# Patient Record
Sex: MALE | Race: WHITE | NOT HISPANIC OR LATINO | Employment: OTHER | URBAN - METROPOLITAN AREA
[De-identification: names, ages, dates, MRNs, and addresses within clinical notes are randomized per-mention and may not be internally consistent; named-entity substitution may affect disease eponyms.]

---

## 2017-01-19 ENCOUNTER — ALLSCRIPTS OFFICE VISIT (OUTPATIENT)
Dept: OTHER | Facility: OTHER | Age: 72
End: 2017-01-19

## 2017-01-19 ENCOUNTER — HOSPITAL ENCOUNTER (OUTPATIENT)
Dept: ULTRASOUND IMAGING | Facility: HOSPITAL | Age: 72
Discharge: HOME | End: 2017-01-19
Attending: UROLOGY
Payer: COMMERCIAL

## 2017-01-19 DIAGNOSIS — R39.198 OTHER DIFFICULTIES WITH MICTURITION: ICD-10-CM

## 2017-01-19 DIAGNOSIS — E78.5 HYPERLIPIDEMIA: ICD-10-CM

## 2017-01-19 DIAGNOSIS — T14.8XXA OTHER INJURY OF UNSPECIFIED BODY REGION: ICD-10-CM

## 2017-01-19 DIAGNOSIS — I35.0 NONRHEUMATIC AORTIC VALVE STENOSIS: ICD-10-CM

## 2017-01-19 LAB
BILIRUB UR QL STRIP: NORMAL
CLARITY UR: NORMAL
COLOR UR: YELLOW
GLUCOSE (HISTORICAL): NORMAL
HGB UR QL STRIP.AUTO: NORMAL
KETONES UR STRIP-MCNC: NORMAL MG/DL
LEUKOCYTE ESTERASE UR QL STRIP: NORMAL
NITRITE UR QL STRIP: NORMAL
PH UR STRIP.AUTO: 5 [PH]
PROT UR STRIP-MCNC: 30 MG/DL
SP GR UR STRIP.AUTO: 1.03
UROBILINOGEN UR QL STRIP.AUTO: NORMAL

## 2017-01-19 PROCEDURE — 76770 US EXAM ABDO BACK WALL COMP: CPT

## 2017-01-31 ENCOUNTER — APPOINTMENT (EMERGENCY)
Dept: RADIOLOGY | Facility: HOSPITAL | Age: 72
End: 2017-01-31
Payer: COMMERCIAL

## 2017-01-31 ENCOUNTER — HOSPITAL ENCOUNTER (EMERGENCY)
Facility: HOSPITAL | Age: 72
Discharge: HOME | End: 2017-01-31
Admitting: EMERGENCY MEDICINE
Payer: COMMERCIAL

## 2017-01-31 ENCOUNTER — APPOINTMENT (EMERGENCY)
Dept: ULTRASOUND IMAGING | Facility: HOSPITAL | Age: 72
End: 2017-01-31
Payer: COMMERCIAL
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
## 2017-01-31 DIAGNOSIS — S80.11XA TRAUMATIC HEMATOMA OF RIGHT LOWER LEG, INITIAL ENCOUNTER: Primary | ICD-10-CM

## 2017-01-31 LAB
ALBUMIN SERPL BCP-MCNC: 3.5 G/DL (ref 3.5–5)
ALP SERPL-CCNC: 113 U/L (ref 46–116)
ALT SERPL W P-5'-P-CCNC: 24 U/L (ref 12–78)
ANION GAP SERPL CALCULATED.3IONS-SCNC: 8 MMOL/L (ref 4–13)
APTT PPP: 35 SECONDS (ref 24–36)
AST SERPL W P-5'-P-CCNC: 16 U/L (ref 5–45)
BASOPHILS # BLD AUTO: 0.19 THOUSANDS/ΜL (ref 0–0.1)
BASOPHILS NFR BLD AUTO: 3 % (ref 0–1)
BILIRUB SERPL-MCNC: 0.5 MG/DL (ref 0.2–1)
BUN SERPL-MCNC: 16 MG/DL (ref 5–25)
CALCIUM SERPL-MCNC: 8.8 MG/DL (ref 8.3–10.1)
CHLORIDE SERPL-SCNC: 109 MMOL/L (ref 100–108)
CO2 SERPL-SCNC: 31 MMOL/L (ref 21–32)
CREAT SERPL-MCNC: 0.84 MG/DL (ref 0.6–1.3)
EOSINOPHIL # BLD AUTO: 0.3 THOUSAND/ΜL (ref 0–0.61)
EOSINOPHIL NFR BLD AUTO: 4 % (ref 0–6)
ERYTHROCYTE [DISTWIDTH] IN BLOOD BY AUTOMATED COUNT: 17.8 % (ref 11.6–15.1)
GFR SERPL CREATININE-BSD FRML MDRD: >60 ML/MIN/1.73SQ M
GLUCOSE SERPL-MCNC: 103 MG/DL (ref 65–140)
HCT VFR BLD AUTO: 37.1 % (ref 36.5–49.3)
HGB BLD-MCNC: 11.9 G/DL (ref 12–17)
HOLD SPECIMEN: NORMAL
HOLD SPECIMEN: YES
INR PPP: 1.12 (ref 0.86–1.16)
LYMPHOCYTES # BLD AUTO: 1.64 THOUSANDS/ΜL (ref 0.6–4.47)
LYMPHOCYTES NFR BLD AUTO: 21 % (ref 14–44)
MCH RBC QN AUTO: 22.8 PG (ref 26.8–34.3)
MCHC RBC AUTO-ENTMCNC: 32.1 G/DL (ref 31.4–37.4)
MCV RBC AUTO: 71 FL (ref 82–98)
MONOCYTES # BLD AUTO: 1.52 THOUSAND/ΜL (ref 0.17–1.22)
MONOCYTES NFR BLD AUTO: 20 % (ref 4–12)
NEUTROPHILS # BLD AUTO: 4.04 THOUSANDS/ΜL (ref 1.85–7.62)
NEUTS SEG NFR BLD AUTO: 52 % (ref 43–75)
PLATELET # BLD AUTO: 259 THOUSANDS/UL (ref 149–390)
PMV BLD AUTO: 9 FL (ref 8.9–12.7)
POTASSIUM SERPL-SCNC: 3.2 MMOL/L (ref 3.5–5.3)
PROT SERPL-MCNC: 6.2 G/DL (ref 6.4–8.2)
PROTHROMBIN TIME: 14.1 SECONDS (ref 12–14.3)
RBC # BLD AUTO: 5.23 MILLION/UL (ref 3.88–5.62)
SODIUM SERPL-SCNC: 148 MMOL/L (ref 136–145)
TROPONIN I SERPL-MCNC: <0.02 NG/ML
WBC # BLD AUTO: 7.69 THOUSAND/UL (ref 4.31–10.16)

## 2017-01-31 PROCEDURE — 93005 ELECTROCARDIOGRAM TRACING: CPT

## 2017-01-31 PROCEDURE — 99285 EMERGENCY DEPT VISIT HI MDM: CPT

## 2017-01-31 PROCEDURE — 73590 X-RAY EXAM OF LOWER LEG: CPT

## 2017-01-31 PROCEDURE — 85610 PROTHROMBIN TIME: CPT

## 2017-01-31 PROCEDURE — 85730 THROMBOPLASTIN TIME PARTIAL: CPT | Performed by: PHYSICIAN ASSISTANT

## 2017-01-31 PROCEDURE — 36415 COLL VENOUS BLD VENIPUNCTURE: CPT

## 2017-01-31 PROCEDURE — 80053 COMPREHEN METABOLIC PANEL: CPT

## 2017-01-31 PROCEDURE — 76882 US LMTD JT/FCL EVL NVASC XTR: CPT

## 2017-01-31 PROCEDURE — 84484 ASSAY OF TROPONIN QUANT: CPT

## 2017-01-31 PROCEDURE — 85025 COMPLETE CBC W/AUTO DIFF WBC: CPT

## 2017-01-31 PROCEDURE — 96374 THER/PROPH/DIAG INJ IV PUSH: CPT

## 2017-01-31 PROCEDURE — 93922 UPR/L XTREMITY ART 2 LEVELS: CPT

## 2017-01-31 PROCEDURE — 71020 HB CHEST X-RAY 2VW FRONTAL&LATL: CPT

## 2017-01-31 RX ORDER — TRAMADOL HYDROCHLORIDE 50 MG/1
50 TABLET ORAL ONCE
Status: COMPLETED | OUTPATIENT
Start: 2017-01-31 | End: 2017-01-31

## 2017-01-31 RX ORDER — TRAMADOL HYDROCHLORIDE 50 MG/1
50 TABLET ORAL EVERY 8 HOURS PRN
Qty: 12 TABLET | Refills: 0 | Status: SHIPPED | OUTPATIENT
Start: 2017-01-31 | End: 2017-02-10

## 2017-01-31 RX ADMIN — HYDROMORPHONE HYDROCHLORIDE 1 MG: 1 INJECTION, SOLUTION INTRAMUSCULAR; INTRAVENOUS; SUBCUTANEOUS at 16:34

## 2017-01-31 RX ADMIN — TRAMADOL HYDROCHLORIDE 50 MG: 50 TABLET, COATED ORAL at 18:28

## 2017-02-01 LAB
ATRIAL RATE: 61 BPM
P AXIS: 60 DEGREES
PR INTERVAL: 178 MS
QRS AXIS: 75 DEGREES
QRSD INTERVAL: 82 MS
QT INTERVAL: 418 MS
QTC INTERVAL: 420 MS
T WAVE AXIS: 61 DEGREES
VENTRICULAR RATE: 61 BPM

## 2017-02-02 VITALS
RESPIRATION RATE: 18 BRPM | DIASTOLIC BLOOD PRESSURE: 77 MMHG | SYSTOLIC BLOOD PRESSURE: 173 MMHG | WEIGHT: 200 LBS | TEMPERATURE: 98.4 F | OXYGEN SATURATION: 96 % | HEART RATE: 64 BPM | BODY MASS INDEX: 29.53 KG/M2

## 2017-02-07 ENCOUNTER — TRANSCRIBE ORDERS (OUTPATIENT)
Dept: ADMINISTRATIVE | Facility: HOSPITAL | Age: 72
End: 2017-02-07

## 2017-02-07 ENCOUNTER — HOSPITAL ENCOUNTER (OUTPATIENT)
Dept: RADIOLOGY | Facility: HOSPITAL | Age: 72
Discharge: HOME | End: 2017-02-07
Attending: ORTHOPAEDIC SURGERY
Payer: COMMERCIAL

## 2017-02-07 ENCOUNTER — APPOINTMENT (OUTPATIENT)
Dept: LAB | Facility: HOSPITAL | Age: 72
End: 2017-02-07
Attending: INTERNAL MEDICINE
Payer: COMMERCIAL

## 2017-02-07 ENCOUNTER — ALLSCRIPTS OFFICE VISIT (OUTPATIENT)
Dept: OTHER | Facility: OTHER | Age: 72
End: 2017-02-07

## 2017-02-07 DIAGNOSIS — E78.5 HYPERLIPIDEMIA: ICD-10-CM

## 2017-02-07 DIAGNOSIS — I35.0 NONRHEUMATIC AORTIC VALVE STENOSIS: ICD-10-CM

## 2017-02-07 DIAGNOSIS — I35.0 NODULAR CALCIFIC AORTIC VALVE STENOSIS: Primary | ICD-10-CM

## 2017-02-07 DIAGNOSIS — M16.11 LOCALIZED OSTEOARTHROSIS OF RIGHT HIP: ICD-10-CM

## 2017-02-07 LAB
CHOLEST SERPL-MCNC: 97 MG/DL (ref 50–200)
HDLC SERPL-MCNC: 30 MG/DL (ref 40–60)
LDLC SERPL CALC-MCNC: 42 MG/DL (ref 0–100)
TRIGL SERPL-MCNC: 124 MG/DL

## 2017-02-07 PROCEDURE — 80061 LIPID PANEL: CPT

## 2017-02-07 PROCEDURE — 77002 NEEDLE LOCALIZATION BY XRAY: CPT

## 2017-02-07 PROCEDURE — 36415 COLL VENOUS BLD VENIPUNCTURE: CPT

## 2017-02-07 RX ORDER — BUPIVACAINE HYDROCHLORIDE 5 MG/ML
10 INJECTION, SOLUTION PERINEURAL ONCE
Status: COMPLETED | OUTPATIENT
Start: 2017-02-07 | End: 2017-02-07

## 2017-02-07 RX ORDER — METHYLPREDNISOLONE ACETATE 80 MG/ML
80 INJECTION, SUSPENSION INTRA-ARTICULAR; INTRALESIONAL; INTRAMUSCULAR; SOFT TISSUE ONCE
Status: COMPLETED | OUTPATIENT
Start: 2017-02-07 | End: 2017-02-07

## 2017-02-07 RX ORDER — LIDOCAINE HYDROCHLORIDE 10 MG/ML
10 INJECTION, SOLUTION EPIDURAL; INFILTRATION; INTRACAUDAL; PERINEURAL ONCE
Status: COMPLETED | OUTPATIENT
Start: 2017-02-07 | End: 2017-02-07

## 2017-02-07 RX ADMIN — BUPIVACAINE HYDROCHLORIDE 10 ML: 5 INJECTION, SOLUTION PERINEURAL at 11:54

## 2017-02-07 RX ADMIN — LIDOCAINE HYDROCHLORIDE 10 ML: 10 INJECTION, SOLUTION EPIDURAL; INFILTRATION; INTRACAUDAL; PERINEURAL at 11:54

## 2017-02-07 RX ADMIN — IOHEXOL 2 ML: 240 INJECTION, SOLUTION INTRATHECAL; INTRAVASCULAR; INTRAVENOUS; ORAL at 11:54

## 2017-02-07 RX ADMIN — METHYLPREDNISOLONE ACETATE 80 MG: 80 INJECTION, SUSPENSION INTRA-ARTICULAR; INTRALESIONAL; INTRAMUSCULAR; SOFT TISSUE at 11:54

## 2017-02-15 ENCOUNTER — ALLSCRIPTS OFFICE VISIT (OUTPATIENT)
Dept: OTHER | Facility: OTHER | Age: 72
End: 2017-02-15

## 2017-02-15 ENCOUNTER — APPOINTMENT (OUTPATIENT)
Dept: LAB | Facility: MEDICAL CENTER | Age: 72
End: 2017-02-15
Payer: COMMERCIAL

## 2017-02-15 ENCOUNTER — TRANSCRIBE ORDERS (OUTPATIENT)
Dept: LAB | Facility: MEDICAL CENTER | Age: 72
End: 2017-02-15

## 2017-02-15 DIAGNOSIS — T07.XXXA CLOSED DISLOCATION, MULTIPLE AND ILL-DEFINED SITES: Primary | ICD-10-CM

## 2017-02-15 DIAGNOSIS — T07.XXXA CLOSED DISLOCATION, MULTIPLE AND ILL-DEFINED SITES: ICD-10-CM

## 2017-02-15 PROCEDURE — 87205 SMEAR GRAM STAIN: CPT

## 2017-02-15 PROCEDURE — 87070 CULTURE OTHR SPECIMN AEROBIC: CPT

## 2017-02-17 ENCOUNTER — OFFICE VISIT (OUTPATIENT)
Dept: URGENT CARE | Facility: CLINIC | Age: 72
End: 2017-02-17
Payer: COMMERCIAL

## 2017-02-17 PROCEDURE — 99214 OFFICE O/P EST MOD 30 MIN: CPT

## 2017-02-17 PROCEDURE — 10060 I&D ABSCESS SIMPLE/SINGLE: CPT

## 2017-02-17 PROCEDURE — G0463 HOSPITAL OUTPT CLINIC VISIT: HCPCS

## 2017-02-18 ENCOUNTER — HOSPITAL ENCOUNTER (EMERGENCY)
Facility: HOSPITAL | Age: 72
Discharge: HOME | End: 2017-02-18
Attending: EMERGENCY MEDICINE | Admitting: EMERGENCY MEDICINE
Payer: COMMERCIAL

## 2017-02-18 VITALS
BODY MASS INDEX: 26.34 KG/M2 | DIASTOLIC BLOOD PRESSURE: 78 MMHG | RESPIRATION RATE: 18 BRPM | SYSTOLIC BLOOD PRESSURE: 164 MMHG | OXYGEN SATURATION: 98 % | TEMPERATURE: 98.5 F | HEIGHT: 70 IN | WEIGHT: 184 LBS | HEART RATE: 81 BPM

## 2017-02-18 DIAGNOSIS — S81.801A LEG WOUND, RIGHT, INITIAL ENCOUNTER: Primary | ICD-10-CM

## 2017-02-18 LAB
APTT PPP: 31 SECONDS (ref 24–36)
BACTERIA WND AEROBE CULT: NO GROWTH
ERYTHROCYTE [DISTWIDTH] IN BLOOD BY AUTOMATED COUNT: 18 % (ref 11.6–15.1)
GRAM STN SPEC: NORMAL
GRAM STN SPEC: NORMAL
HCT VFR BLD AUTO: 34.8 % (ref 36.5–49.3)
HGB BLD-MCNC: 11 G/DL (ref 12–17)
INR PPP: 1.17 (ref 0.86–1.16)
MCH RBC QN AUTO: 22.6 PG (ref 26.8–34.3)
MCHC RBC AUTO-ENTMCNC: 31.6 G/DL (ref 31.4–37.4)
MCV RBC AUTO: 72 FL (ref 82–98)
PLATELET # BLD AUTO: 342 THOUSANDS/UL (ref 149–390)
PMV BLD AUTO: 8.9 FL (ref 8.9–12.7)
PROTHROMBIN TIME: 14.5 SECONDS (ref 12–14.3)
RBC # BLD AUTO: 4.87 MILLION/UL (ref 3.88–5.62)
WBC # BLD AUTO: 14.9 THOUSAND/UL (ref 4.31–10.16)

## 2017-02-18 PROCEDURE — 99283 EMERGENCY DEPT VISIT LOW MDM: CPT

## 2017-02-18 PROCEDURE — 36415 COLL VENOUS BLD VENIPUNCTURE: CPT | Performed by: EMERGENCY MEDICINE

## 2017-02-18 PROCEDURE — 85027 COMPLETE CBC AUTOMATED: CPT | Performed by: EMERGENCY MEDICINE

## 2017-02-18 PROCEDURE — 85730 THROMBOPLASTIN TIME PARTIAL: CPT | Performed by: EMERGENCY MEDICINE

## 2017-02-18 PROCEDURE — 85610 PROTHROMBIN TIME: CPT | Performed by: EMERGENCY MEDICINE

## 2017-02-18 RX ORDER — CEPHALEXIN 500 MG/1
500 CAPSULE ORAL 3 TIMES DAILY
COMMUNITY
End: 2017-06-03 | Stop reason: ALTCHOICE

## 2017-02-18 RX ORDER — ATORVASTATIN CALCIUM 40 MG/1
40 TABLET, FILM COATED ORAL DAILY
COMMUNITY
End: 2018-12-26 | Stop reason: SDUPTHER

## 2017-02-23 ENCOUNTER — HOSPITAL ENCOUNTER (EMERGENCY)
Facility: HOSPITAL | Age: 72
Discharge: HOME | End: 2017-02-23
Admitting: EMERGENCY MEDICINE
Payer: COMMERCIAL

## 2017-02-23 VITALS
OXYGEN SATURATION: 97 % | WEIGHT: 190 LBS | SYSTOLIC BLOOD PRESSURE: 190 MMHG | DIASTOLIC BLOOD PRESSURE: 91 MMHG | BODY MASS INDEX: 27.46 KG/M2 | RESPIRATION RATE: 18 BRPM | HEART RATE: 80 BPM | TEMPERATURE: 98.4 F

## 2017-02-23 DIAGNOSIS — Z51.89 VISIT FOR WOUND CHECK: Primary | ICD-10-CM

## 2017-02-23 DIAGNOSIS — E87.6 HYPOKALEMIA: ICD-10-CM

## 2017-02-23 LAB
ANION GAP SERPL CALCULATED.3IONS-SCNC: 6 MMOL/L (ref 4–13)
BASOPHILS # BLD AUTO: 0.24 THOUSANDS/ΜL (ref 0–0.1)
BASOPHILS NFR BLD AUTO: 2 % (ref 0–1)
BUN SERPL-MCNC: 15 MG/DL (ref 5–25)
CALCIUM SERPL-MCNC: 9.1 MG/DL (ref 8.3–10.1)
CHLORIDE SERPL-SCNC: 104 MMOL/L (ref 100–108)
CO2 SERPL-SCNC: 34 MMOL/L (ref 21–32)
CREAT SERPL-MCNC: 0.91 MG/DL (ref 0.6–1.3)
EOSINOPHIL # BLD AUTO: 0.41 THOUSAND/ΜL (ref 0–0.61)
EOSINOPHIL NFR BLD AUTO: 4 % (ref 0–6)
ERYTHROCYTE [DISTWIDTH] IN BLOOD BY AUTOMATED COUNT: 18.1 % (ref 11.6–15.1)
GFR SERPL CREATININE-BSD FRML MDRD: >60 ML/MIN/1.73SQ M
GLUCOSE SERPL-MCNC: 110 MG/DL (ref 65–140)
HCT VFR BLD AUTO: 35.4 % (ref 36.5–49.3)
HGB BLD-MCNC: 11.2 G/DL (ref 12–17)
LYMPHOCYTES # BLD AUTO: 2.33 THOUSANDS/ΜL (ref 0.6–4.47)
LYMPHOCYTES NFR BLD AUTO: 20 % (ref 14–44)
MCH RBC QN AUTO: 22.5 PG (ref 26.8–34.3)
MCHC RBC AUTO-ENTMCNC: 31.6 G/DL (ref 31.4–37.4)
MCV RBC AUTO: 71 FL (ref 82–98)
MONOCYTES # BLD AUTO: 1.54 THOUSAND/ΜL (ref 0.17–1.22)
MONOCYTES NFR BLD AUTO: 13 % (ref 4–12)
NEUTROPHILS # BLD AUTO: 7.17 THOUSANDS/ΜL (ref 1.85–7.62)
NEUTS SEG NFR BLD AUTO: 61 % (ref 43–75)
PLATELET # BLD AUTO: 401 THOUSANDS/UL (ref 149–390)
PMV BLD AUTO: 8.5 FL (ref 8.9–12.7)
POTASSIUM SERPL-SCNC: 3.2 MMOL/L (ref 3.5–5.3)
RBC # BLD AUTO: 4.98 MILLION/UL (ref 3.88–5.62)
SODIUM SERPL-SCNC: 144 MMOL/L (ref 136–145)
WBC # BLD AUTO: 11.69 THOUSAND/UL (ref 4.31–10.16)

## 2017-02-23 PROCEDURE — 85025 COMPLETE CBC W/AUTO DIFF WBC: CPT | Performed by: PHYSICIAN ASSISTANT

## 2017-02-23 PROCEDURE — 36415 COLL VENOUS BLD VENIPUNCTURE: CPT | Performed by: PHYSICIAN ASSISTANT

## 2017-02-23 PROCEDURE — 99283 EMERGENCY DEPT VISIT LOW MDM: CPT

## 2017-02-23 PROCEDURE — 80048 BASIC METABOLIC PNL TOTAL CA: CPT | Performed by: PHYSICIAN ASSISTANT

## 2017-02-23 RX ORDER — POTASSIUM CHLORIDE 20 MEQ/1
20 TABLET, EXTENDED RELEASE ORAL ONCE
Status: COMPLETED | OUTPATIENT
Start: 2017-02-23 | End: 2017-02-23

## 2017-02-23 RX ADMIN — POTASSIUM CHLORIDE 20 MEQ: 1500 TABLET, EXTENDED RELEASE ORAL at 15:59

## 2017-02-24 ENCOUNTER — HOSPITAL ENCOUNTER (OUTPATIENT)
Dept: ULTRASOUND IMAGING | Facility: HOSPITAL | Age: 72
Discharge: HOME | End: 2017-02-24
Payer: COMMERCIAL

## 2017-02-24 DIAGNOSIS — T14.8XXA OTHER INJURY OF UNSPECIFIED BODY REGION: ICD-10-CM

## 2017-02-24 PROCEDURE — 93971 EXTREMITY STUDY: CPT

## 2017-03-02 ENCOUNTER — GENERIC CONVERSION - ENCOUNTER (OUTPATIENT)
Dept: OTHER | Facility: OTHER | Age: 72
End: 2017-03-02

## 2017-03-03 ENCOUNTER — ALLSCRIPTS OFFICE VISIT (OUTPATIENT)
Dept: OTHER | Facility: OTHER | Age: 72
End: 2017-03-03

## 2017-03-10 ENCOUNTER — HOSPITAL ENCOUNTER (OUTPATIENT)
Dept: NON INVASIVE DIAGNOSTICS | Facility: HOSPITAL | Age: 72
Discharge: HOME | End: 2017-03-10
Attending: INTERNAL MEDICINE
Payer: COMMERCIAL

## 2017-03-10 ENCOUNTER — HOSPITAL ENCOUNTER (OUTPATIENT)
Dept: NUCLEAR MEDICINE | Facility: HOSPITAL | Age: 72
Discharge: HOME | End: 2017-03-10
Attending: INTERNAL MEDICINE
Payer: COMMERCIAL

## 2017-03-10 DIAGNOSIS — I35.0 NONRHEUMATIC AORTIC VALVE STENOSIS: ICD-10-CM

## 2017-03-10 PROCEDURE — 78452 HT MUSCLE IMAGE SPECT MULT: CPT

## 2017-03-10 PROCEDURE — A9502 TC99M TETROFOSMIN: HCPCS

## 2017-03-10 PROCEDURE — 93017 CV STRESS TEST TRACING ONLY: CPT

## 2017-03-10 PROCEDURE — 93306 TTE W/DOPPLER COMPLETE: CPT

## 2017-03-10 RX ADMIN — REGADENOSON 0.4 MG: 0.08 INJECTION, SOLUTION INTRAVENOUS at 09:15

## 2017-05-18 DIAGNOSIS — Z12.11 ENCOUNTER FOR SCREENING FOR MALIGNANT NEOPLASM OF COLON: ICD-10-CM

## 2017-05-30 ENCOUNTER — ALLSCRIPTS OFFICE VISIT (OUTPATIENT)
Dept: OTHER | Facility: OTHER | Age: 72
End: 2017-05-30

## 2017-06-03 ENCOUNTER — APPOINTMENT (EMERGENCY)
Dept: RADIOLOGY | Facility: HOSPITAL | Age: 72
End: 2017-06-03
Payer: COMMERCIAL

## 2017-06-03 ENCOUNTER — APPOINTMENT (EMERGENCY)
Dept: CT IMAGING | Facility: HOSPITAL | Age: 72
End: 2017-06-03
Payer: COMMERCIAL

## 2017-06-03 ENCOUNTER — HOSPITAL ENCOUNTER (OUTPATIENT)
Facility: HOSPITAL | Age: 72
Setting detail: OBSERVATION
End: 2017-06-04
Attending: EMERGENCY MEDICINE | Admitting: HOSPITALIST
Payer: COMMERCIAL

## 2017-06-03 DIAGNOSIS — R53.1 WEAKNESS GENERALIZED: ICD-10-CM

## 2017-06-03 DIAGNOSIS — E78.5 HYPERLIPIDEMIA, UNSPECIFIED HYPERLIPIDEMIA TYPE: ICD-10-CM

## 2017-06-03 DIAGNOSIS — R53.1 GENERALIZED WEAKNESS: ICD-10-CM

## 2017-06-03 DIAGNOSIS — I10 ESSENTIAL HYPERTENSION: ICD-10-CM

## 2017-06-03 DIAGNOSIS — E86.0 DEHYDRATION, MILD: ICD-10-CM

## 2017-06-03 DIAGNOSIS — N17.9 ACUTE KIDNEY INJURY (HCC): Primary | ICD-10-CM

## 2017-06-03 LAB
ALBUMIN SERPL BCP-MCNC: 3.5 G/DL (ref 3.5–5)
ALP SERPL-CCNC: 99 U/L (ref 46–116)
ALT SERPL W P-5'-P-CCNC: 24 U/L (ref 12–78)
AMMONIA PLAS-SCNC: 17 UMOL/L (ref 11–35)
ANION GAP SERPL CALCULATED.3IONS-SCNC: 9 MMOL/L (ref 4–13)
AST SERPL W P-5'-P-CCNC: 18 U/L (ref 5–45)
BASOPHILS # BLD AUTO: 0.18 THOUSANDS/ΜL (ref 0–0.1)
BASOPHILS NFR BLD AUTO: 2 % (ref 0–1)
BILIRUB SERPL-MCNC: 0.6 MG/DL (ref 0.2–1)
BUN SERPL-MCNC: 16 MG/DL (ref 5–25)
CALCIUM SERPL-MCNC: 9 MG/DL (ref 8.3–10.1)
CHLORIDE SERPL-SCNC: 106 MMOL/L (ref 100–108)
CK SERPL-CCNC: 84 U/L (ref 39–308)
CO2 SERPL-SCNC: 29 MMOL/L (ref 21–32)
CREAT SERPL-MCNC: 1.58 MG/DL (ref 0.6–1.3)
EOSINOPHIL # BLD AUTO: 0.4 THOUSAND/ΜL (ref 0–0.61)
EOSINOPHIL NFR BLD AUTO: 4 % (ref 0–6)
ERYTHROCYTE [DISTWIDTH] IN BLOOD BY AUTOMATED COUNT: 19.8 % (ref 11.6–15.1)
GFR SERPL CREATININE-BSD FRML MDRD: 43.1 ML/MIN/1.73SQ M
GLUCOSE SERPL-MCNC: 129 MG/DL (ref 65–140)
GLUCOSE SERPL-MCNC: 99 MG/DL (ref 65–140)
HCT VFR BLD AUTO: 36.9 % (ref 36.5–49.3)
HGB BLD-MCNC: 11.6 G/DL (ref 12–17)
LACTATE SERPL-SCNC: 1.8 MMOL/L (ref 0.5–2)
LYMPHOCYTES # BLD AUTO: 1.64 THOUSANDS/ΜL (ref 0.6–4.47)
LYMPHOCYTES NFR BLD AUTO: 16 % (ref 14–44)
MCH RBC QN AUTO: 21.4 PG (ref 26.8–34.3)
MCHC RBC AUTO-ENTMCNC: 31.4 G/DL (ref 31.4–37.4)
MCV RBC AUTO: 68 FL (ref 82–98)
MONOCYTES # BLD AUTO: 1.6 THOUSAND/ΜL (ref 0.17–1.22)
MONOCYTES NFR BLD AUTO: 16 % (ref 4–12)
NEUTROPHILS # BLD AUTO: 6.15 THOUSANDS/ΜL (ref 1.85–7.62)
NEUTS SEG NFR BLD AUTO: 62 % (ref 43–75)
PLATELET # BLD AUTO: 327 THOUSANDS/UL (ref 149–390)
PMV BLD AUTO: 10.2 FL (ref 8.9–12.7)
POTASSIUM SERPL-SCNC: 3.6 MMOL/L (ref 3.5–5.3)
PROT SERPL-MCNC: 6.1 G/DL (ref 6.4–8.2)
RBC # BLD AUTO: 5.43 MILLION/UL (ref 3.88–5.62)
SODIUM SERPL-SCNC: 144 MMOL/L (ref 136–145)
SPECIMEN SOURCE: NORMAL
TROPONIN I BLD-MCNC: 0.04 NG/ML (ref 0–0.08)
TSH SERPL DL<=0.05 MIU/L-ACNC: 1.95 UIU/ML (ref 0.36–3.74)
WBC # BLD AUTO: 9.97 THOUSAND/UL (ref 4.31–10.16)

## 2017-06-03 PROCEDURE — 84484 ASSAY OF TROPONIN QUANT: CPT

## 2017-06-03 PROCEDURE — 70450 CT HEAD/BRAIN W/O DYE: CPT

## 2017-06-03 PROCEDURE — 36415 COLL VENOUS BLD VENIPUNCTURE: CPT | Performed by: EMERGENCY MEDICINE

## 2017-06-03 PROCEDURE — 84443 ASSAY THYROID STIM HORMONE: CPT | Performed by: EMERGENCY MEDICINE

## 2017-06-03 PROCEDURE — 93005 ELECTROCARDIOGRAM TRACING: CPT | Performed by: EMERGENCY MEDICINE

## 2017-06-03 PROCEDURE — 80053 COMPREHEN METABOLIC PANEL: CPT | Performed by: EMERGENCY MEDICINE

## 2017-06-03 PROCEDURE — 71020 HB CHEST X-RAY 2VW FRONTAL&LATL: CPT

## 2017-06-03 PROCEDURE — 82550 ASSAY OF CK (CPK): CPT | Performed by: EMERGENCY MEDICINE

## 2017-06-03 PROCEDURE — 82948 REAGENT STRIP/BLOOD GLUCOSE: CPT

## 2017-06-03 PROCEDURE — 94762 N-INVAS EAR/PLS OXIMTRY CONT: CPT

## 2017-06-03 PROCEDURE — 85025 COMPLETE CBC W/AUTO DIFF WBC: CPT | Performed by: EMERGENCY MEDICINE

## 2017-06-03 PROCEDURE — 99285 EMERGENCY DEPT VISIT HI MDM: CPT

## 2017-06-03 PROCEDURE — 82140 ASSAY OF AMMONIA: CPT | Performed by: EMERGENCY MEDICINE

## 2017-06-03 PROCEDURE — 83605 ASSAY OF LACTIC ACID: CPT | Performed by: EMERGENCY MEDICINE

## 2017-06-03 PROCEDURE — 96360 HYDRATION IV INFUSION INIT: CPT

## 2017-06-03 RX ORDER — ASPIRIN 300 MG/1
300 SUPPOSITORY RECTAL DAILY
Status: DISCONTINUED | OUTPATIENT
Start: 2017-06-04 | End: 2017-06-04 | Stop reason: HOSPADM

## 2017-06-03 RX ORDER — BISACODYL 10 MG
10 SUPPOSITORY, RECTAL RECTAL AS NEEDED
Status: DISCONTINUED | OUTPATIENT
Start: 2017-06-03 | End: 2017-06-04 | Stop reason: HOSPADM

## 2017-06-03 RX ORDER — ALBUTEROL SULFATE 2.5 MG/3ML
2.5 SOLUTION RESPIRATORY (INHALATION) EVERY 6 HOURS PRN
Status: DISCONTINUED | OUTPATIENT
Start: 2017-06-03 | End: 2017-06-04 | Stop reason: HOSPADM

## 2017-06-03 RX ORDER — PANTOPRAZOLE SODIUM 40 MG/1
40 INJECTION, POWDER, FOR SOLUTION INTRAVENOUS
Status: DISCONTINUED | OUTPATIENT
Start: 2017-06-04 | End: 2017-06-04 | Stop reason: HOSPADM

## 2017-06-03 RX ORDER — SODIUM CHLORIDE 9 MG/ML
125 INJECTION, SOLUTION INTRAVENOUS CONTINUOUS
Status: DISCONTINUED | OUTPATIENT
Start: 2017-06-03 | End: 2017-06-04 | Stop reason: HOSPADM

## 2017-06-03 RX ORDER — ONDANSETRON 2 MG/ML
4 INJECTION INTRAMUSCULAR; INTRAVENOUS EVERY 6 HOURS PRN
Status: DISCONTINUED | OUTPATIENT
Start: 2017-06-03 | End: 2017-06-04 | Stop reason: HOSPADM

## 2017-06-03 RX ADMIN — SODIUM CHLORIDE 125 ML/HR: 0.9 INJECTION, SOLUTION INTRAVENOUS at 23:19

## 2017-06-03 RX ADMIN — SODIUM CHLORIDE 1000 ML: 0.9 INJECTION, SOLUTION INTRAVENOUS at 18:25

## 2017-06-04 ENCOUNTER — APPOINTMENT (INPATIENT)
Dept: RADIOLOGY | Facility: HOSPITAL | Age: 72
DRG: 057 | End: 2017-06-04
Payer: COMMERCIAL

## 2017-06-04 ENCOUNTER — HOSPITAL ENCOUNTER (INPATIENT)
Facility: HOSPITAL | Age: 72
LOS: 1 days | Discharge: HOME | DRG: 057 | End: 2017-06-05
Attending: HOSPITALIST | Admitting: HOSPITALIST
Payer: COMMERCIAL

## 2017-06-04 VITALS
BODY MASS INDEX: 27.36 KG/M2 | RESPIRATION RATE: 18 BRPM | HEIGHT: 69 IN | DIASTOLIC BLOOD PRESSURE: 61 MMHG | HEART RATE: 64 BPM | SYSTOLIC BLOOD PRESSURE: 131 MMHG | WEIGHT: 184.75 LBS | OXYGEN SATURATION: 96 % | TEMPERATURE: 98.9 F

## 2017-06-04 DIAGNOSIS — R53.1 GENERALIZED WEAKNESS: Primary | ICD-10-CM

## 2017-06-04 PROBLEM — R26.2 AMBULATORY DYSFUNCTION: Status: ACTIVE | Noted: 2017-06-04

## 2017-06-04 PROBLEM — E86.0 DEHYDRATION, MILD: Chronic | Status: ACTIVE | Noted: 2017-06-03

## 2017-06-04 PROBLEM — F17.200 SMOKER: Status: ACTIVE | Noted: 2017-06-04

## 2017-06-04 LAB
ANION GAP SERPL CALCULATED.3IONS-SCNC: 7 MMOL/L (ref 4–13)
BUN SERPL-MCNC: 17 MG/DL (ref 5–25)
CALCIUM SERPL-MCNC: 8.4 MG/DL (ref 8.3–10.1)
CHLORIDE SERPL-SCNC: 111 MMOL/L (ref 100–108)
CK SERPL-CCNC: 35 U/L (ref 39–308)
CO2 SERPL-SCNC: 27 MMOL/L (ref 21–32)
CREAT SERPL-MCNC: 1.03 MG/DL (ref 0.6–1.3)
ERYTHROCYTE [DISTWIDTH] IN BLOOD BY AUTOMATED COUNT: 18.9 % (ref 11.6–15.1)
EST. AVERAGE GLUCOSE BLD GHB EST-MCNC: 131 MG/DL
FOLATE SERPL-MCNC: >20 NG/ML (ref 3.1–17.5)
GFR SERPL CREATININE-BSD FRML MDRD: >60 ML/MIN/1.73SQ M
GLUCOSE SERPL-MCNC: 92 MG/DL (ref 65–140)
HBA1C MFR BLD: 6.2 % (ref 4.2–6.3)
HCT VFR BLD AUTO: 33.6 % (ref 36.5–49.3)
HGB BLD-MCNC: 10.6 G/DL (ref 12–17)
MAGNESIUM SERPL-MCNC: 1.9 MG/DL (ref 1.6–2.6)
MCH RBC QN AUTO: 21.3 PG (ref 26.8–34.3)
MCHC RBC AUTO-ENTMCNC: 31.5 G/DL (ref 31.4–37.4)
MCV RBC AUTO: 68 FL (ref 82–98)
PHOSPHATE SERPL-MCNC: 2.4 MG/DL (ref 2.3–4.1)
PLATELET # BLD AUTO: 274 THOUSANDS/UL (ref 149–390)
PMV BLD AUTO: 9.3 FL (ref 8.9–12.7)
POTASSIUM SERPL-SCNC: 3.7 MMOL/L (ref 3.5–5.3)
RBC # BLD AUTO: 4.98 MILLION/UL (ref 3.88–5.62)
SODIUM SERPL-SCNC: 145 MMOL/L (ref 136–145)
T4 FREE SERPL-MCNC: 1.09 NG/DL (ref 0.76–1.46)
TROPONIN I SERPL-MCNC: 0.02 NG/ML
TSH SERPL DL<=0.05 MIU/L-ACNC: 0.31 UIU/ML (ref 0.36–3.74)
TSH SERPL DL<=0.05 MIU/L-ACNC: 0.64 UIU/ML (ref 0.36–3.74)
VIT B12 SERPL-MCNC: 756 PG/ML (ref 100–900)
WBC # BLD AUTO: 9.63 THOUSAND/UL (ref 4.31–10.16)

## 2017-06-04 PROCEDURE — 84484 ASSAY OF TROPONIN QUANT: CPT | Performed by: HOSPITALIST

## 2017-06-04 PROCEDURE — 86038 ANTINUCLEAR ANTIBODIES: CPT | Performed by: PSYCHIATRY & NEUROLOGY

## 2017-06-04 PROCEDURE — 94760 N-INVAS EAR/PLS OXIMETRY 1: CPT

## 2017-06-04 PROCEDURE — 70551 MRI BRAIN STEM W/O DYE: CPT

## 2017-06-04 PROCEDURE — 83036 HEMOGLOBIN GLYCOSYLATED A1C: CPT | Performed by: PSYCHIATRY & NEUROLOGY

## 2017-06-04 PROCEDURE — 83735 ASSAY OF MAGNESIUM: CPT | Performed by: HOSPITALIST

## 2017-06-04 PROCEDURE — 82746 ASSAY OF FOLIC ACID SERUM: CPT | Performed by: PSYCHIATRY & NEUROLOGY

## 2017-06-04 PROCEDURE — 85027 COMPLETE CBC AUTOMATED: CPT | Performed by: HOSPITALIST

## 2017-06-04 PROCEDURE — 82550 ASSAY OF CK (CPK): CPT | Performed by: PSYCHIATRY & NEUROLOGY

## 2017-06-04 PROCEDURE — 84100 ASSAY OF PHOSPHORUS: CPT | Performed by: HOSPITALIST

## 2017-06-04 PROCEDURE — 84439 ASSAY OF FREE THYROXINE: CPT | Performed by: PSYCHIATRY & NEUROLOGY

## 2017-06-04 PROCEDURE — 84443 ASSAY THYROID STIM HORMONE: CPT | Performed by: PSYCHIATRY & NEUROLOGY

## 2017-06-04 PROCEDURE — 84443 ASSAY THYROID STIM HORMONE: CPT | Performed by: HOSPITALIST

## 2017-06-04 PROCEDURE — 86430 RHEUMATOID FACTOR TEST QUAL: CPT | Performed by: PSYCHIATRY & NEUROLOGY

## 2017-06-04 PROCEDURE — 86235 NUCLEAR ANTIGEN ANTIBODY: CPT | Performed by: PSYCHIATRY & NEUROLOGY

## 2017-06-04 PROCEDURE — 72141 MRI NECK SPINE W/O DYE: CPT

## 2017-06-04 PROCEDURE — 86592 SYPHILIS TEST NON-TREP QUAL: CPT | Performed by: PSYCHIATRY & NEUROLOGY

## 2017-06-04 PROCEDURE — 80048 BASIC METABOLIC PNL TOTAL CA: CPT | Performed by: HOSPITALIST

## 2017-06-04 PROCEDURE — 82607 VITAMIN B-12: CPT | Performed by: PSYCHIATRY & NEUROLOGY

## 2017-06-04 RX ORDER — LANOLIN ALCOHOL/MO/W.PET/CERES
3 CREAM (GRAM) TOPICAL
Status: DISCONTINUED | OUTPATIENT
Start: 2017-06-04 | End: 2017-06-05 | Stop reason: HOSPADM

## 2017-06-04 RX ORDER — ONDANSETRON 2 MG/ML
4 INJECTION INTRAMUSCULAR; INTRAVENOUS EVERY 6 HOURS PRN
Status: DISCONTINUED | OUTPATIENT
Start: 2017-06-04 | End: 2017-06-05 | Stop reason: HOSPADM

## 2017-06-04 RX ORDER — PANTOPRAZOLE SODIUM 20 MG/1
20 TABLET, DELAYED RELEASE ORAL
Status: DISCONTINUED | OUTPATIENT
Start: 2017-06-04 | End: 2017-06-05 | Stop reason: HOSPADM

## 2017-06-04 RX ORDER — TAMSULOSIN HYDROCHLORIDE 0.4 MG/1
0.4 CAPSULE ORAL DAILY
Status: DISCONTINUED | OUTPATIENT
Start: 2017-06-04 | End: 2017-06-05 | Stop reason: HOSPADM

## 2017-06-04 RX ORDER — RANOLAZINE 500 MG/1
500 TABLET, EXTENDED RELEASE ORAL 2 TIMES DAILY
Status: DISCONTINUED | OUTPATIENT
Start: 2017-06-04 | End: 2017-06-05 | Stop reason: HOSPADM

## 2017-06-04 RX ORDER — SENNOSIDES 8.6 MG
1 TABLET ORAL DAILY
Status: DISCONTINUED | OUTPATIENT
Start: 2017-06-04 | End: 2017-06-05 | Stop reason: HOSPADM

## 2017-06-04 RX ORDER — ATORVASTATIN CALCIUM 40 MG/1
40 TABLET, FILM COATED ORAL DAILY
Status: DISCONTINUED | OUTPATIENT
Start: 2017-06-04 | End: 2017-06-05 | Stop reason: HOSPADM

## 2017-06-04 RX ORDER — CLONIDINE HYDROCHLORIDE 0.1 MG/1
0.1 TABLET ORAL 2 TIMES DAILY
Status: DISCONTINUED | OUTPATIENT
Start: 2017-06-04 | End: 2017-06-05 | Stop reason: HOSPADM

## 2017-06-04 RX ORDER — SODIUM CHLORIDE 9 MG/ML
75 INJECTION, SOLUTION INTRAVENOUS CONTINUOUS
Status: DISCONTINUED | OUTPATIENT
Start: 2017-06-04 | End: 2017-06-05 | Stop reason: HOSPADM

## 2017-06-04 RX ORDER — DOCUSATE SODIUM 100 MG/1
100 CAPSULE, LIQUID FILLED ORAL 2 TIMES DAILY
Status: DISCONTINUED | OUTPATIENT
Start: 2017-06-04 | End: 2017-06-05 | Stop reason: HOSPADM

## 2017-06-04 RX ORDER — NICOTINE 21 MG/24HR
1 PATCH, TRANSDERMAL 24 HOURS TRANSDERMAL DAILY
Status: DISCONTINUED | OUTPATIENT
Start: 2017-06-04 | End: 2017-06-05 | Stop reason: HOSPADM

## 2017-06-04 RX ORDER — CLOPIDOGREL BISULFATE 75 MG/1
75 TABLET ORAL DAILY
Status: DISCONTINUED | OUTPATIENT
Start: 2017-06-04 | End: 2017-06-05

## 2017-06-04 RX ORDER — AMLODIPINE BESYLATE 5 MG/1
5 TABLET ORAL DAILY
Status: DISCONTINUED | OUTPATIENT
Start: 2017-06-04 | End: 2017-06-05 | Stop reason: HOSPADM

## 2017-06-04 RX ORDER — PREGABALIN 25 MG/1
75 CAPSULE ORAL DAILY
Status: DISCONTINUED | OUTPATIENT
Start: 2017-06-04 | End: 2017-06-05 | Stop reason: HOSPADM

## 2017-06-04 RX ORDER — ACETAMINOPHEN 325 MG/1
650 TABLET ORAL EVERY 6 HOURS PRN
Status: DISCONTINUED | OUTPATIENT
Start: 2017-06-04 | End: 2017-06-05 | Stop reason: HOSPADM

## 2017-06-04 RX ORDER — ACETAMINOPHEN 325 MG/1
500 TABLET ORAL 4 TIMES DAILY
Status: DISCONTINUED | OUTPATIENT
Start: 2017-06-04 | End: 2017-06-05 | Stop reason: HOSPADM

## 2017-06-04 RX ADMIN — ATORVASTATIN CALCIUM 40 MG: 40 TABLET, FILM COATED ORAL at 08:10

## 2017-06-04 RX ADMIN — DOCUSATE SODIUM 100 MG: 100 CAPSULE, LIQUID FILLED ORAL at 18:18

## 2017-06-04 RX ADMIN — SODIUM CHLORIDE 75 ML/HR: 0.9 INJECTION, SOLUTION INTRAVENOUS at 04:41

## 2017-06-04 RX ADMIN — ACETAMINOPHEN 488 MG: 325 TABLET, FILM COATED ORAL at 08:10

## 2017-06-04 RX ADMIN — TAMSULOSIN HYDROCHLORIDE 0.4 MG: 0.4 CAPSULE ORAL at 08:10

## 2017-06-04 RX ADMIN — CLOPIDOGREL BISULFATE 75 MG: 75 TABLET ORAL at 08:10

## 2017-06-04 RX ADMIN — METOPROLOL TARTRATE 25 MG: 25 TABLET ORAL at 08:10

## 2017-06-04 RX ADMIN — ENOXAPARIN SODIUM 40 MG: 40 INJECTION SUBCUTANEOUS at 08:11

## 2017-06-04 RX ADMIN — ACETAMINOPHEN 650 MG: 325 TABLET, FILM COATED ORAL at 05:14

## 2017-06-04 RX ADMIN — RANOLAZINE 500 MG: 500 TABLET, FILM COATED, EXTENDED RELEASE ORAL at 18:19

## 2017-06-04 RX ADMIN — CLONIDINE HYDROCHLORIDE 0.1 MG: 0.1 TABLET ORAL at 08:10

## 2017-06-04 RX ADMIN — CLONIDINE HYDROCHLORIDE 0.1 MG: 0.1 TABLET ORAL at 18:18

## 2017-06-04 RX ADMIN — ACETAMINOPHEN 488 MG: 325 TABLET, FILM COATED ORAL at 18:18

## 2017-06-04 RX ADMIN — RANOLAZINE 500 MG: 500 TABLET, FILM COATED, EXTENDED RELEASE ORAL at 08:15

## 2017-06-04 RX ADMIN — PANTOPRAZOLE SODIUM 20 MG: 20 TABLET, DELAYED RELEASE ORAL at 05:14

## 2017-06-04 RX ADMIN — METOPROLOL TARTRATE 25 MG: 25 TABLET ORAL at 18:18

## 2017-06-04 RX ADMIN — AMLODIPINE BESYLATE 5 MG: 5 TABLET ORAL at 08:10

## 2017-06-04 RX ADMIN — PREGABALIN 75 MG: 25 CAPSULE ORAL at 08:10

## 2017-06-05 ENCOUNTER — APPOINTMENT (INPATIENT)
Dept: RADIOLOGY | Facility: HOSPITAL | Age: 72
DRG: 057 | End: 2017-06-05
Payer: COMMERCIAL

## 2017-06-05 ENCOUNTER — APPOINTMENT (INPATIENT)
Dept: OCCUPATIONAL THERAPY | Facility: HOSPITAL | Age: 72
DRG: 057 | End: 2017-06-05
Payer: COMMERCIAL

## 2017-06-05 ENCOUNTER — APPOINTMENT (INPATIENT)
Dept: RADIOLOGY | Facility: HOSPITAL | Age: 72
DRG: 057 | End: 2017-06-05
Attending: HOSPITALIST
Payer: COMMERCIAL

## 2017-06-05 VITALS
RESPIRATION RATE: 18 BRPM | TEMPERATURE: 98.4 F | HEIGHT: 69 IN | WEIGHT: 184 LBS | BODY MASS INDEX: 27.25 KG/M2 | OXYGEN SATURATION: 96 % | SYSTOLIC BLOOD PRESSURE: 133 MMHG | HEART RATE: 60 BPM | DIASTOLIC BLOOD PRESSURE: 60 MMHG

## 2017-06-05 LAB
ANION GAP SERPL CALCULATED.3IONS-SCNC: 8 MMOL/L (ref 4–13)
ATRIAL RATE: 65 BPM
ATRIAL RATE: 65 BPM
BASOPHILS # BLD AUTO: 0.12 THOUSANDS/ΜL (ref 0–0.1)
BASOPHILS NFR BLD AUTO: 2 % (ref 0–1)
BUN SERPL-MCNC: 19 MG/DL (ref 5–25)
CALCIUM SERPL-MCNC: 8.6 MG/DL (ref 8.3–10.1)
CHLORIDE SERPL-SCNC: 113 MMOL/L (ref 100–108)
CO2 SERPL-SCNC: 25 MMOL/L (ref 21–32)
CREAT SERPL-MCNC: 1.01 MG/DL (ref 0.6–1.3)
EOSINOPHIL # BLD AUTO: 0.33 THOUSAND/ΜL (ref 0–0.61)
EOSINOPHIL NFR BLD AUTO: 5 % (ref 0–6)
ERYTHROCYTE [DISTWIDTH] IN BLOOD BY AUTOMATED COUNT: 18.8 % (ref 11.6–15.1)
GFR SERPL CREATININE-BSD FRML MDRD: >60 ML/MIN/1.73SQ M
GLUCOSE SERPL-MCNC: 78 MG/DL (ref 65–140)
HCT VFR BLD AUTO: 31.6 % (ref 36.5–49.3)
HGB BLD-MCNC: 9.8 G/DL (ref 12–17)
INR PPP: 1.11 (ref 0.86–1.16)
LYMPHOCYTES # BLD AUTO: 2.19 THOUSANDS/ΜL (ref 0.6–4.47)
LYMPHOCYTES NFR BLD AUTO: 35 % (ref 14–44)
MCH RBC QN AUTO: 21.2 PG (ref 26.8–34.3)
MCHC RBC AUTO-ENTMCNC: 31 G/DL (ref 31.4–37.4)
MCV RBC AUTO: 68 FL (ref 82–98)
MONOCYTES # BLD AUTO: 0.68 THOUSAND/ΜL (ref 0.17–1.22)
MONOCYTES NFR BLD AUTO: 11 % (ref 4–12)
NEUTROPHILS # BLD AUTO: 2.93 THOUSANDS/ΜL (ref 1.85–7.62)
NEUTS SEG NFR BLD AUTO: 47 % (ref 43–75)
NRBC BLD AUTO-RTO: 0 /100 WBCS
P AXIS: 50 DEGREES
PLATELET # BLD AUTO: 231 THOUSANDS/UL (ref 149–390)
PMV BLD AUTO: 9.5 FL (ref 8.9–12.7)
POTASSIUM SERPL-SCNC: 3.7 MMOL/L (ref 3.5–5.3)
PR INTERVAL: 184 MS
PROTHROMBIN TIME: 14.3 SECONDS (ref 12.1–14.4)
QRS AXIS: 62 DEGREES
QRS AXIS: 64 DEGREES
QRSD INTERVAL: 82 MS
QRSD INTERVAL: 84 MS
QT INTERVAL: 402 MS
QT INTERVAL: 418 MS
QTC INTERVAL: 418 MS
QTC INTERVAL: 434 MS
RBC # BLD AUTO: 4.62 MILLION/UL (ref 3.88–5.62)
RHEUMATOID FACT SER QL LA: NEGATIVE
RPR SER QL: NORMAL
RYE IGE QN: NEGATIVE
SODIUM SERPL-SCNC: 146 MMOL/L (ref 136–145)
T WAVE AXIS: 61 DEGREES
T WAVE AXIS: 98 DEGREES
VENTRICULAR RATE: 65 BPM
VENTRICULAR RATE: 65 BPM
WBC # BLD AUTO: 6.27 THOUSAND/UL (ref 4.31–10.16)

## 2017-06-05 PROCEDURE — 83655 ASSAY OF LEAD: CPT | Performed by: PSYCHIATRY & NEUROLOGY

## 2017-06-05 PROCEDURE — 82175 ASSAY OF ARSENIC: CPT | Performed by: PSYCHIATRY & NEUROLOGY

## 2017-06-05 PROCEDURE — 85610 PROTHROMBIN TIME: CPT | Performed by: PHYSICIAN ASSISTANT

## 2017-06-05 PROCEDURE — G8978 MOBILITY CURRENT STATUS: HCPCS

## 2017-06-05 PROCEDURE — 97162 PT EVAL MOD COMPLEX 30 MIN: CPT

## 2017-06-05 PROCEDURE — G8988 SELF CARE GOAL STATUS: HCPCS

## 2017-06-05 PROCEDURE — 85025 COMPLETE CBC W/AUTO DIFF WBC: CPT | Performed by: INTERNAL MEDICINE

## 2017-06-05 PROCEDURE — G8979 MOBILITY GOAL STATUS: HCPCS

## 2017-06-05 PROCEDURE — 97167 OT EVAL HIGH COMPLEX 60 MIN: CPT

## 2017-06-05 PROCEDURE — 84207 ASSAY OF VITAMIN B-6: CPT | Performed by: PSYCHIATRY & NEUROLOGY

## 2017-06-05 PROCEDURE — 80048 BASIC METABOLIC PNL TOTAL CA: CPT | Performed by: INTERNAL MEDICINE

## 2017-06-05 PROCEDURE — 83825 ASSAY OF MERCURY: CPT | Performed by: PSYCHIATRY & NEUROLOGY

## 2017-06-05 PROCEDURE — 82300 ASSAY OF CADMIUM: CPT | Performed by: PSYCHIATRY & NEUROLOGY

## 2017-06-05 PROCEDURE — G8987 SELF CARE CURRENT STATUS: HCPCS

## 2017-06-05 RX ORDER — CLOPIDOGREL BISULFATE 75 MG/1
75 TABLET ORAL DAILY
Qty: 30 TABLET | Refills: 0 | Status: ON HOLD | OUTPATIENT
Start: 2017-06-05 | End: 2018-04-10

## 2017-06-05 RX ADMIN — TAMSULOSIN HYDROCHLORIDE 0.4 MG: 0.4 CAPSULE ORAL at 08:52

## 2017-06-05 RX ADMIN — CLONIDINE HYDROCHLORIDE 0.1 MG: 0.1 TABLET ORAL at 08:53

## 2017-06-05 RX ADMIN — DOCUSATE SODIUM 100 MG: 100 CAPSULE, LIQUID FILLED ORAL at 08:52

## 2017-06-05 RX ADMIN — ACETAMINOPHEN 488 MG: 325 TABLET, FILM COATED ORAL at 17:17

## 2017-06-05 RX ADMIN — ACETAMINOPHEN 488 MG: 325 TABLET, FILM COATED ORAL at 08:52

## 2017-06-05 RX ADMIN — METOPROLOL TARTRATE 25 MG: 25 TABLET ORAL at 17:17

## 2017-06-05 RX ADMIN — ATORVASTATIN CALCIUM 40 MG: 40 TABLET, FILM COATED ORAL at 08:52

## 2017-06-05 RX ADMIN — PREGABALIN 150 MG: 100 CAPSULE ORAL at 00:20

## 2017-06-05 RX ADMIN — PANTOPRAZOLE SODIUM 20 MG: 20 TABLET, DELAYED RELEASE ORAL at 05:42

## 2017-06-05 RX ADMIN — CLONIDINE HYDROCHLORIDE 0.1 MG: 0.1 TABLET ORAL at 17:17

## 2017-06-05 RX ADMIN — SODIUM CHLORIDE 75 ML/HR: 0.9 INJECTION, SOLUTION INTRAVENOUS at 05:44

## 2017-06-05 RX ADMIN — AMLODIPINE BESYLATE 5 MG: 5 TABLET ORAL at 08:53

## 2017-06-05 RX ADMIN — ENOXAPARIN SODIUM 40 MG: 40 INJECTION SUBCUTANEOUS at 11:21

## 2017-06-05 RX ADMIN — ACETAMINOPHEN 488 MG: 325 TABLET, FILM COATED ORAL at 11:21

## 2017-06-05 RX ADMIN — SENNOSIDES 8.6 MG: 8.6 TABLET, FILM COATED ORAL at 08:53

## 2017-06-05 RX ADMIN — RANOLAZINE 500 MG: 500 TABLET, FILM COATED, EXTENDED RELEASE ORAL at 08:54

## 2017-06-05 RX ADMIN — ACETAMINOPHEN 488 MG: 325 TABLET, FILM COATED ORAL at 00:22

## 2017-06-05 RX ADMIN — PREGABALIN 75 MG: 25 CAPSULE ORAL at 08:58

## 2017-06-05 RX ADMIN — METOPROLOL TARTRATE 25 MG: 25 TABLET ORAL at 08:53

## 2017-06-05 RX ADMIN — RANOLAZINE 500 MG: 500 TABLET, FILM COATED, EXTENDED RELEASE ORAL at 17:17

## 2017-06-06 LAB
ENA SS-A AB SER-ACNC: <0.2 AI (ref 0–0.9)
ENA SS-B AB SER-ACNC: <0.2 AI (ref 0–0.9)

## 2017-06-07 LAB
ARSENIC BLD-MCNC: 6 UG/L (ref 2–23)
CADMIUM BLD-MCNC: 2.4 UG/L (ref 0–1.2)
LEAD BLD-MCNC: 2 UG/DL (ref 0–19)
MERCURY BLD-MCNC: ABNORMAL UG/L (ref 0–14.9)

## 2017-06-08 LAB — VIT B6 SERPL-MCNC: 7.8 UG/L (ref 5.3–46.7)

## 2017-06-20 DIAGNOSIS — M16.11 PRIMARY OSTEOARTHRITIS OF RIGHT HIP: ICD-10-CM

## 2018-01-13 VITALS
BODY MASS INDEX: 28.73 KG/M2 | HEART RATE: 68 BPM | WEIGHT: 194 LBS | SYSTOLIC BLOOD PRESSURE: 137 MMHG | HEIGHT: 69 IN | DIASTOLIC BLOOD PRESSURE: 87 MMHG

## 2018-01-13 VITALS
HEIGHT: 69 IN | DIASTOLIC BLOOD PRESSURE: 70 MMHG | WEIGHT: 197 LBS | HEART RATE: 67 BPM | SYSTOLIC BLOOD PRESSURE: 142 MMHG | BODY MASS INDEX: 29.18 KG/M2

## 2018-01-13 NOTE — MISCELLANEOUS
Provider Comments  Provider Comments:   no show for appt      Signatures   Electronically signed by : Reginaldo Snow DO; Mar  2 2017 10:48AM EST                       (Author)

## 2018-01-14 VITALS
HEART RATE: 64 BPM | SYSTOLIC BLOOD PRESSURE: 128 MMHG | BODY MASS INDEX: 29.03 KG/M2 | DIASTOLIC BLOOD PRESSURE: 78 MMHG | HEIGHT: 69 IN | WEIGHT: 196 LBS

## 2018-01-14 VITALS
SYSTOLIC BLOOD PRESSURE: 192 MMHG | BODY MASS INDEX: 29.33 KG/M2 | WEIGHT: 198 LBS | DIASTOLIC BLOOD PRESSURE: 93 MMHG | HEART RATE: 67 BPM | HEIGHT: 69 IN

## 2018-01-15 NOTE — MISCELLANEOUS
Provider Comments  Provider Comments:   Marlyn Sevilla FOR APPT      Signatures   Electronically signed by : Joseph Pendleton DO; May 30 2017 10:46AM EST                       (Author)

## 2018-02-14 ENCOUNTER — OFFICE VISIT (OUTPATIENT)
Dept: OBGYN CLINIC | Facility: CLINIC | Age: 73
End: 2018-02-14
Payer: COMMERCIAL

## 2018-02-14 ENCOUNTER — APPOINTMENT (OUTPATIENT)
Dept: RADIOLOGY | Facility: MEDICAL CENTER | Age: 73
End: 2018-02-14
Payer: COMMERCIAL

## 2018-02-14 VITALS
BODY MASS INDEX: 27.06 KG/M2 | DIASTOLIC BLOOD PRESSURE: 85 MMHG | WEIGHT: 189 LBS | HEIGHT: 70 IN | HEART RATE: 75 BPM | SYSTOLIC BLOOD PRESSURE: 164 MMHG

## 2018-02-14 DIAGNOSIS — M25.511 PAIN IN JOINT OF RIGHT SHOULDER: Primary | ICD-10-CM

## 2018-02-14 DIAGNOSIS — M25.511 CHRONIC RIGHT SHOULDER PAIN: ICD-10-CM

## 2018-02-14 DIAGNOSIS — G89.29 CHRONIC RIGHT SHOULDER PAIN: ICD-10-CM

## 2018-02-14 DIAGNOSIS — S12.501A CLOSED NONDISPLACED FRACTURE OF SIXTH CERVICAL VERTEBRA, UNSPECIFIED FRACTURE MORPHOLOGY, INITIAL ENCOUNTER (HCC): ICD-10-CM

## 2018-02-14 PROCEDURE — 72050 X-RAY EXAM NECK SPINE 4/5VWS: CPT

## 2018-02-14 PROCEDURE — 73030 X-RAY EXAM OF SHOULDER: CPT

## 2018-02-14 PROCEDURE — 20610 DRAIN/INJ JOINT/BURSA W/O US: CPT | Performed by: PHYSICIAN ASSISTANT

## 2018-02-14 PROCEDURE — 99213 OFFICE O/P EST LOW 20 MIN: CPT | Performed by: ORTHOPAEDIC SURGERY

## 2018-02-14 RX ORDER — RANOLAZINE 500 MG/1
500 TABLET, EXTENDED RELEASE ORAL 2 TIMES DAILY
COMMUNITY
End: 2018-12-26 | Stop reason: SDUPTHER

## 2018-02-14 RX ORDER — DOXAZOSIN MESYLATE 4 MG/1
TABLET ORAL
Status: ON HOLD | COMMUNITY
End: 2018-04-03 | Stop reason: CLARIF

## 2018-02-14 RX ORDER — ISOSORBIDE MONONITRATE 60 MG/1
TABLET, EXTENDED RELEASE ORAL
Status: ON HOLD | COMMUNITY
End: 2018-04-03 | Stop reason: CLARIF

## 2018-02-14 RX ORDER — METRONIDAZOLE 500 MG/1
TABLET ORAL
COMMUNITY
Start: 2016-11-03 | End: 2018-04-03 | Stop reason: ALTCHOICE

## 2018-02-14 RX ORDER — ALBUTEROL SULFATE 90 UG/1
AEROSOL, METERED RESPIRATORY (INHALATION)
Status: ON HOLD | COMMUNITY
End: 2018-04-10

## 2018-02-14 RX ORDER — LIDOCAINE HYDROCHLORIDE 10 MG/ML
1 INJECTION, SOLUTION INFILTRATION; PERINEURAL
Status: COMPLETED | OUTPATIENT
Start: 2018-02-14 | End: 2018-02-14

## 2018-02-14 RX ORDER — CITALOPRAM 20 MG/1
TABLET ORAL
Status: ON HOLD | COMMUNITY
End: 2018-04-03 | Stop reason: CLARIF

## 2018-02-14 RX ORDER — MECLIZINE HCL 12.5 MG/1
TABLET ORAL
COMMUNITY
Start: 2016-03-29 | End: 2018-04-10 | Stop reason: HOSPADM

## 2018-02-14 RX ORDER — MULTIVITAMIN
TABLET ORAL
Status: ON HOLD | COMMUNITY
End: 2018-05-06 | Stop reason: ALTCHOICE

## 2018-02-14 RX ORDER — BETAMETHASONE SODIUM PHOSPHATE AND BETAMETHASONE ACETATE 3; 3 MG/ML; MG/ML
6 INJECTION, SUSPENSION INTRA-ARTICULAR; INTRALESIONAL; INTRAMUSCULAR; SOFT TISSUE
Status: COMPLETED | OUTPATIENT
Start: 2018-02-14 | End: 2018-02-14

## 2018-02-14 RX ADMIN — BETAMETHASONE SODIUM PHOSPHATE AND BETAMETHASONE ACETATE 6 MG: 3; 3 INJECTION, SUSPENSION INTRA-ARTICULAR; INTRALESIONAL; INTRAMUSCULAR; SOFT TISSUE at 14:23

## 2018-02-14 RX ADMIN — LIDOCAINE HYDROCHLORIDE 1 ML: 10 INJECTION, SOLUTION INFILTRATION; PERINEURAL at 14:23
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
## 2018-02-14 NOTE — PATIENT INSTRUCTIONS
Ice right shoulder 20 minutes 2 times today  Continue to wear the cervical collar until seen by orthopedic spine surgeon  Continue wearing lumbar brace until seen by our orthopedic spine surgery make arrangements with the [employer] bus for transportation for that visit  Return to Dr Amish Eddy he has office in 8 weeks for repeat evaluation  No heavy lifting

## 2018-04-03 ENCOUNTER — APPOINTMENT (EMERGENCY)
Dept: RADIOLOGY | Facility: HOSPITAL | Age: 73
DRG: 189 | End: 2018-04-03
Payer: COMMERCIAL

## 2018-04-03 ENCOUNTER — APPOINTMENT (INPATIENT)
Dept: NON INVASIVE DIAGNOSTICS | Facility: HOSPITAL | Age: 73
DRG: 189 | End: 2018-04-03
Payer: COMMERCIAL

## 2018-04-03 ENCOUNTER — APPOINTMENT (EMERGENCY)
Dept: CT IMAGING | Facility: HOSPITAL | Age: 73
DRG: 189 | End: 2018-04-03
Payer: COMMERCIAL

## 2018-04-03 ENCOUNTER — HOSPITAL ENCOUNTER (INPATIENT)
Facility: HOSPITAL | Age: 73
LOS: 7 days | Discharge: HOME HEALTH | DRG: 189 | End: 2018-04-10
Attending: EMERGENCY MEDICINE | Admitting: INTERNAL MEDICINE
Payer: COMMERCIAL

## 2018-04-03 ENCOUNTER — APPOINTMENT (EMERGENCY)
Dept: ULTRASOUND IMAGING | Facility: HOSPITAL | Age: 73
DRG: 189 | End: 2018-04-03
Payer: COMMERCIAL

## 2018-04-03 ENCOUNTER — APPOINTMENT (INPATIENT)
Dept: CT IMAGING | Facility: HOSPITAL | Age: 73
DRG: 189 | End: 2018-04-03
Payer: COMMERCIAL

## 2018-04-03 ENCOUNTER — ANESTHESIA EVENT (INPATIENT)
Dept: PERIOP | Facility: HOSPITAL | Age: 73
DRG: 189 | End: 2018-04-03
Payer: COMMERCIAL

## 2018-04-03 DIAGNOSIS — Z72.0 TOBACCO ABUSE: ICD-10-CM

## 2018-04-03 DIAGNOSIS — E87.6 HYPOKALEMIA: ICD-10-CM

## 2018-04-03 DIAGNOSIS — R07.9 CHEST PAIN: ICD-10-CM

## 2018-04-03 DIAGNOSIS — J44.1 CHRONIC OBSTRUCTIVE PULMONARY DISEASE WITH ACUTE EXACERBATION (HCC): ICD-10-CM

## 2018-04-03 DIAGNOSIS — J44.9 COPD (CHRONIC OBSTRUCTIVE PULMONARY DISEASE) (HCC): Chronic | ICD-10-CM

## 2018-04-03 DIAGNOSIS — I48.91 ATRIAL FIBRILLATION WITH RAPID VENTRICULAR RESPONSE (HCC): Chronic | ICD-10-CM

## 2018-04-03 DIAGNOSIS — I48.91 ATRIAL FIBRILLATION (HCC): Primary | ICD-10-CM

## 2018-04-03 DIAGNOSIS — R51.9 HEADACHE: ICD-10-CM

## 2018-04-03 DIAGNOSIS — I10 ESSENTIAL HYPERTENSION: Chronic | ICD-10-CM

## 2018-04-03 DIAGNOSIS — G89.4 CHRONIC PAIN SYNDROME: Chronic | ICD-10-CM

## 2018-04-03 PROBLEM — R91.1 PULMONARY NODULE: Status: ACTIVE | Noted: 2018-04-03

## 2018-04-03 LAB
ALBUMIN SERPL BCP-MCNC: 3.7 G/DL (ref 3.5–5)
ALP SERPL-CCNC: 97 U/L (ref 46–116)
ALT SERPL W P-5'-P-CCNC: 23 U/L (ref 12–78)
ANION GAP SERPL CALCULATED.3IONS-SCNC: 9 MMOL/L (ref 4–13)
APTT PPP: 34 SECONDS (ref 23–35)
AST SERPL W P-5'-P-CCNC: 16 U/L (ref 5–45)
ATRIAL RATE: 86 BPM
BACTERIA UR QL AUTO: ABNORMAL /HPF
BASOPHILS # BLD AUTO: 0.18 THOUSANDS/ΜL (ref 0–0.1)
BASOPHILS NFR BLD AUTO: 2 % (ref 0–1)
BILIRUB SERPL-MCNC: 0.7 MG/DL (ref 0.2–1)
BILIRUB UR QL STRIP: NEGATIVE
BUN SERPL-MCNC: 19 MG/DL (ref 5–25)
CALCIUM SERPL-MCNC: 9.4 MG/DL (ref 8.3–10.1)
CHLORIDE SERPL-SCNC: 105 MMOL/L (ref 100–108)
CLARITY UR: ABNORMAL
CO2 SERPL-SCNC: 31 MMOL/L (ref 21–32)
COLOR UR: ABNORMAL
CREAT SERPL-MCNC: 1.15 MG/DL (ref 0.6–1.3)
EOSINOPHIL # BLD AUTO: 0.43 THOUSAND/ΜL (ref 0–0.61)
EOSINOPHIL NFR BLD AUTO: 4 % (ref 0–6)
ERYTHROCYTE [DISTWIDTH] IN BLOOD BY AUTOMATED COUNT: 16.7 % (ref 11.6–15.1)
GFR SERPL CREATININE-BSD FRML MDRD: 62 ML/MIN/1.73SQ M
GLUCOSE SERPL-MCNC: 97 MG/DL (ref 65–140)
GLUCOSE UR STRIP-MCNC: NEGATIVE MG/DL
HCT VFR BLD AUTO: 42.3 % (ref 36.5–49.3)
HGB BLD-MCNC: 13.9 G/DL (ref 12–17)
HGB UR QL STRIP.AUTO: NEGATIVE
HYALINE CASTS #/AREA URNS LPF: ABNORMAL /LPF
INR PPP: 1.12 (ref 0.86–1.16)
KETONES UR STRIP-MCNC: NEGATIVE MG/DL
L PNEUMO1 AG UR QL IA.RAPID: NEGATIVE
LACTATE SERPL-SCNC: 1.5 MMOL/L (ref 0.5–2)
LEUKOCYTE ESTERASE UR QL STRIP: NEGATIVE
LYMPHOCYTES # BLD AUTO: 1.95 THOUSANDS/ΜL (ref 0.6–4.47)
LYMPHOCYTES NFR BLD AUTO: 18 % (ref 14–44)
MAGNESIUM SERPL-MCNC: 1.7 MG/DL (ref 1.6–2.6)
MCH RBC QN AUTO: 23.4 PG (ref 26.8–34.3)
MCHC RBC AUTO-ENTMCNC: 32.9 G/DL (ref 31.4–37.4)
MCV RBC AUTO: 71 FL (ref 82–98)
MONOCYTES # BLD AUTO: 1.53 THOUSAND/ΜL (ref 0.17–1.22)
MONOCYTES NFR BLD AUTO: 14 % (ref 4–12)
MUCOUS THREADS UR QL AUTO: ABNORMAL
NEUTROPHILS # BLD AUTO: 6.61 THOUSANDS/ΜL (ref 1.85–7.62)
NEUTS SEG NFR BLD AUTO: 62 % (ref 43–75)
NITRITE UR QL STRIP: NEGATIVE
NON-SQ EPI CELLS URNS QL MICRO: ABNORMAL /HPF
NT-PROBNP SERPL-MCNC: 1210 PG/ML
OTHER STN SPEC: ABNORMAL
PH UR STRIP.AUTO: 6 [PH] (ref 4.5–8)
PLATELET # BLD AUTO: 286 THOUSANDS/UL (ref 149–390)
PMV BLD AUTO: 9.7 FL (ref 8.9–12.7)
POTASSIUM SERPL-SCNC: 3.1 MMOL/L (ref 3.5–5.3)
PROT SERPL-MCNC: 6.6 G/DL (ref 6.4–8.2)
PROT UR STRIP-MCNC: ABNORMAL MG/DL
PROTHROMBIN TIME: 14.3 SECONDS (ref 12.1–14.4)
QRS AXIS: 65 DEGREES
QRSD INTERVAL: 88 MS
QT INTERVAL: 284 MS
QTC INTERVAL: 392 MS
RBC # BLD AUTO: 5.93 MILLION/UL (ref 3.88–5.62)
RBC #/AREA URNS AUTO: ABNORMAL /HPF
S PNEUM AG UR QL: NEGATIVE
SODIUM SERPL-SCNC: 145 MMOL/L (ref 136–145)
SP GR UR STRIP.AUTO: >=1.03 (ref 1–1.03)
T WAVE AXIS: 234 DEGREES
TROPONIN I SERPL-MCNC: 0.02 NG/ML
TSH SERPL DL<=0.05 MIU/L-ACNC: 1.74 UIU/ML (ref 0.36–3.74)
UROBILINOGEN UR QL STRIP.AUTO: 0.2 E.U./DL
VENTRICULAR RATE: 115 BPM
WBC # BLD AUTO: 10.7 THOUSAND/UL (ref 4.31–10.16)
WBC #/AREA URNS AUTO: ABNORMAL /HPF

## 2018-04-03 PROCEDURE — 83735 ASSAY OF MAGNESIUM: CPT | Performed by: EMERGENCY MEDICINE

## 2018-04-03 PROCEDURE — 71250 CT THORAX DX C-: CPT

## 2018-04-03 PROCEDURE — 94640 AIRWAY INHALATION TREATMENT: CPT

## 2018-04-03 PROCEDURE — 87633 RESP VIRUS 12-25 TARGETS: CPT | Performed by: INTERNAL MEDICINE

## 2018-04-03 PROCEDURE — 36415 COLL VENOUS BLD VENIPUNCTURE: CPT | Performed by: EMERGENCY MEDICINE

## 2018-04-03 PROCEDURE — 83880 ASSAY OF NATRIURETIC PEPTIDE: CPT | Performed by: INTERNAL MEDICINE

## 2018-04-03 PROCEDURE — 93970 EXTREMITY STUDY: CPT | Performed by: SURGERY

## 2018-04-03 PROCEDURE — 96374 THER/PROPH/DIAG INJ IV PUSH: CPT

## 2018-04-03 PROCEDURE — 94760 N-INVAS EAR/PLS OXIMETRY 1: CPT

## 2018-04-03 PROCEDURE — 93005 ELECTROCARDIOGRAM TRACING: CPT

## 2018-04-03 PROCEDURE — 80053 COMPREHEN METABOLIC PANEL: CPT | Performed by: EMERGENCY MEDICINE

## 2018-04-03 PROCEDURE — 70450 CT HEAD/BRAIN W/O DYE: CPT

## 2018-04-03 PROCEDURE — 93010 ELECTROCARDIOGRAM REPORT: CPT | Performed by: INTERNAL MEDICINE

## 2018-04-03 PROCEDURE — 85730 THROMBOPLASTIN TIME PARTIAL: CPT | Performed by: EMERGENCY MEDICINE

## 2018-04-03 PROCEDURE — 87086 URINE CULTURE/COLONY COUNT: CPT | Performed by: INTERNAL MEDICINE

## 2018-04-03 PROCEDURE — 84484 ASSAY OF TROPONIN QUANT: CPT | Performed by: EMERGENCY MEDICINE

## 2018-04-03 PROCEDURE — 83605 ASSAY OF LACTIC ACID: CPT | Performed by: EMERGENCY MEDICINE

## 2018-04-03 PROCEDURE — 81001 URINALYSIS AUTO W/SCOPE: CPT | Performed by: INTERNAL MEDICINE

## 2018-04-03 PROCEDURE — 96375 TX/PRO/DX INJ NEW DRUG ADDON: CPT

## 2018-04-03 PROCEDURE — 93970 EXTREMITY STUDY: CPT

## 2018-04-03 PROCEDURE — 93306 TTE W/DOPPLER COMPLETE: CPT

## 2018-04-03 PROCEDURE — 99223 1ST HOSP IP/OBS HIGH 75: CPT | Performed by: INTERNAL MEDICINE

## 2018-04-03 PROCEDURE — 84443 ASSAY THYROID STIM HORMONE: CPT | Performed by: EMERGENCY MEDICINE

## 2018-04-03 PROCEDURE — 94660 CPAP INITIATION&MGMT: CPT

## 2018-04-03 PROCEDURE — 85025 COMPLETE CBC W/AUTO DIFF WBC: CPT | Performed by: EMERGENCY MEDICINE

## 2018-04-03 PROCEDURE — 87449 NOS EACH ORGANISM AG IA: CPT | Performed by: INTERNAL MEDICINE

## 2018-04-03 PROCEDURE — 84484 ASSAY OF TROPONIN QUANT: CPT | Performed by: INTERNAL MEDICINE

## 2018-04-03 PROCEDURE — 71046 X-RAY EXAM CHEST 2 VIEWS: CPT

## 2018-04-03 PROCEDURE — 99285 EMERGENCY DEPT VISIT HI MDM: CPT

## 2018-04-03 PROCEDURE — 87081 CULTURE SCREEN ONLY: CPT | Performed by: INTERNAL MEDICINE

## 2018-04-03 PROCEDURE — 96376 TX/PRO/DX INJ SAME DRUG ADON: CPT

## 2018-04-03 PROCEDURE — 93306 TTE W/DOPPLER COMPLETE: CPT | Performed by: INTERNAL MEDICINE

## 2018-04-03 PROCEDURE — 85610 PROTHROMBIN TIME: CPT | Performed by: EMERGENCY MEDICINE

## 2018-04-03 RX ORDER — DILTIAZEM HYDROCHLORIDE 5 MG/ML
10 INJECTION INTRAVENOUS ONCE
Status: COMPLETED | OUTPATIENT
Start: 2018-04-03 | End: 2018-04-03

## 2018-04-03 RX ORDER — CLOPIDOGREL BISULFATE 75 MG/1
75 TABLET ORAL DAILY
Status: DISCONTINUED | OUTPATIENT
Start: 2018-04-03 | End: 2018-04-10 | Stop reason: HOSPADM

## 2018-04-03 RX ORDER — METOPROLOL TARTRATE 50 MG/1
50 TABLET, FILM COATED ORAL EVERY 12 HOURS SCHEDULED
Status: DISCONTINUED | OUTPATIENT
Start: 2018-04-03 | End: 2018-04-08

## 2018-04-03 RX ORDER — PREGABALIN 300 MG/1
300 CAPSULE ORAL 2 TIMES DAILY
COMMUNITY
End: 2018-12-26 | Stop reason: SDUPTHER

## 2018-04-03 RX ORDER — RANOLAZINE 500 MG/1
500 TABLET, EXTENDED RELEASE ORAL EVERY 12 HOURS SCHEDULED
Status: DISCONTINUED | OUTPATIENT
Start: 2018-04-03 | End: 2018-04-10 | Stop reason: HOSPADM

## 2018-04-03 RX ORDER — OXYCODONE HYDROCHLORIDE 5 MG/1
5 TABLET ORAL EVERY 4 HOURS PRN
Status: DISCONTINUED | OUTPATIENT
Start: 2018-04-03 | End: 2018-04-10 | Stop reason: HOSPADM

## 2018-04-03 RX ORDER — DOXAZOSIN MESYLATE 4 MG/1
4 TABLET ORAL DAILY
Status: DISCONTINUED | OUTPATIENT
Start: 2018-04-03 | End: 2018-04-03

## 2018-04-03 RX ORDER — ONDANSETRON 2 MG/ML
4 INJECTION INTRAMUSCULAR; INTRAVENOUS EVERY 6 HOURS PRN
Status: DISCONTINUED | OUTPATIENT
Start: 2018-04-03 | End: 2018-04-10 | Stop reason: HOSPADM

## 2018-04-03 RX ORDER — SODIUM CHLORIDE, SODIUM LACTATE, POTASSIUM CHLORIDE, CALCIUM CHLORIDE 600; 310; 30; 20 MG/100ML; MG/100ML; MG/100ML; MG/100ML
125 INJECTION, SOLUTION INTRAVENOUS CONTINUOUS
Status: DISCONTINUED | OUTPATIENT
Start: 2018-04-03 | End: 2018-04-04

## 2018-04-03 RX ORDER — ASPIRIN 81 MG/1
324 TABLET, CHEWABLE ORAL ONCE
Status: COMPLETED | OUTPATIENT
Start: 2018-04-03 | End: 2018-04-03

## 2018-04-03 RX ORDER — SODIUM CHLORIDE 9 MG/ML
50 INJECTION, SOLUTION INTRAVENOUS CONTINUOUS
Status: DISCONTINUED | OUTPATIENT
Start: 2018-04-03 | End: 2018-04-03

## 2018-04-03 RX ORDER — LANOLIN ALCOHOL/MO/W.PET/CERES
3 CREAM (GRAM) TOPICAL
Status: DISCONTINUED | OUTPATIENT
Start: 2018-04-03 | End: 2018-04-10 | Stop reason: HOSPADM

## 2018-04-03 RX ORDER — LABETALOL HYDROCHLORIDE 5 MG/ML
10 INJECTION, SOLUTION INTRAVENOUS EVERY 4 HOURS PRN
Status: DISCONTINUED | OUTPATIENT
Start: 2018-04-03 | End: 2018-04-08

## 2018-04-03 RX ORDER — ATORVASTATIN CALCIUM 40 MG/1
40 TABLET, FILM COATED ORAL
Status: DISCONTINUED | OUTPATIENT
Start: 2018-04-03 | End: 2018-04-10 | Stop reason: HOSPADM

## 2018-04-03 RX ORDER — MORPHINE SULFATE 4 MG/ML
4 INJECTION, SOLUTION INTRAMUSCULAR; INTRAVENOUS ONCE
Status: COMPLETED | OUTPATIENT
Start: 2018-04-03 | End: 2018-04-03

## 2018-04-03 RX ORDER — ALBUTEROL SULFATE 2.5 MG/3ML
2.5 SOLUTION RESPIRATORY (INHALATION) EVERY 6 HOURS PRN
Status: DISCONTINUED | OUTPATIENT
Start: 2018-04-03 | End: 2018-04-10 | Stop reason: HOSPADM

## 2018-04-03 RX ORDER — MAGNESIUM SULFATE HEPTAHYDRATE 40 MG/ML
2 INJECTION, SOLUTION INTRAVENOUS ONCE
Status: COMPLETED | OUTPATIENT
Start: 2018-04-03 | End: 2018-04-05

## 2018-04-03 RX ORDER — PREGABALIN 50 MG/1
100 CAPSULE ORAL 3 TIMES DAILY
Status: DISCONTINUED | OUTPATIENT
Start: 2018-04-03 | End: 2018-04-10 | Stop reason: HOSPADM

## 2018-04-03 RX ORDER — IPRATROPIUM BROMIDE AND ALBUTEROL SULFATE 2.5; .5 MG/3ML; MG/3ML
3 SOLUTION RESPIRATORY (INHALATION)
Status: DISCONTINUED | OUTPATIENT
Start: 2018-04-03 | End: 2018-04-09

## 2018-04-03 RX ORDER — ISOSORBIDE MONONITRATE 60 MG/1
60 TABLET, EXTENDED RELEASE ORAL DAILY
Status: DISCONTINUED | OUTPATIENT
Start: 2018-04-04 | End: 2018-04-03

## 2018-04-03 RX ORDER — CITALOPRAM 20 MG/1
20 TABLET ORAL DAILY
Status: DISCONTINUED | OUTPATIENT
Start: 2018-04-04 | End: 2018-04-03

## 2018-04-03 RX ORDER — ASPIRIN 81 MG/1
81 TABLET, CHEWABLE ORAL DAILY
Status: DISCONTINUED | OUTPATIENT
Start: 2018-04-04 | End: 2018-04-10 | Stop reason: HOSPADM

## 2018-04-03 RX ORDER — METHYLPREDNISOLONE SODIUM SUCCINATE 40 MG/ML
40 INJECTION, POWDER, LYOPHILIZED, FOR SOLUTION INTRAMUSCULAR; INTRAVENOUS EVERY 12 HOURS SCHEDULED
Status: DISCONTINUED | OUTPATIENT
Start: 2018-04-03 | End: 2018-04-08

## 2018-04-03 RX ORDER — POTASSIUM CHLORIDE 14.9 MG/ML
20 INJECTION INTRAVENOUS ONCE
Status: COMPLETED | OUTPATIENT
Start: 2018-04-03 | End: 2018-04-05

## 2018-04-03 RX ORDER — ACETAMINOPHEN 325 MG/1
650 TABLET ORAL EVERY 6 HOURS PRN
Status: DISCONTINUED | OUTPATIENT
Start: 2018-04-03 | End: 2018-04-10 | Stop reason: HOSPADM

## 2018-04-03 RX ORDER — DULOXETIN HYDROCHLORIDE 30 MG/1
60 CAPSULE, DELAYED RELEASE ORAL DAILY
Status: DISCONTINUED | OUTPATIENT
Start: 2018-04-04 | End: 2018-04-10 | Stop reason: HOSPADM

## 2018-04-03 RX ORDER — CLONIDINE HYDROCHLORIDE 0.1 MG/1
0.1 TABLET ORAL 2 TIMES DAILY
Status: DISCONTINUED | OUTPATIENT
Start: 2018-04-03 | End: 2018-04-10 | Stop reason: HOSPADM

## 2018-04-03 RX ORDER — TAMSULOSIN HYDROCHLORIDE 0.4 MG/1
0.4 CAPSULE ORAL
Status: DISCONTINUED | OUTPATIENT
Start: 2018-04-04 | End: 2018-04-10 | Stop reason: HOSPADM

## 2018-04-03 RX ORDER — AMLODIPINE BESYLATE 5 MG/1
5 TABLET ORAL DAILY
Status: DISCONTINUED | OUTPATIENT
Start: 2018-04-03 | End: 2018-04-03

## 2018-04-03 RX ORDER — POTASSIUM CHLORIDE 20 MEQ/1
40 TABLET, EXTENDED RELEASE ORAL ONCE
Status: COMPLETED | OUTPATIENT
Start: 2018-04-03 | End: 2018-04-03

## 2018-04-03 RX ORDER — ONDANSETRON 2 MG/ML
4 INJECTION INTRAMUSCULAR; INTRAVENOUS ONCE
Status: COMPLETED | OUTPATIENT
Start: 2018-04-03 | End: 2018-04-03

## 2018-04-03 RX ORDER — TAMSULOSIN HYDROCHLORIDE 0.4 MG/1
0.4 CAPSULE ORAL
Status: DISCONTINUED | OUTPATIENT
Start: 2018-04-03 | End: 2018-04-03

## 2018-04-03 RX ADMIN — MORPHINE SULFATE 4 MG: 4 INJECTION, SOLUTION INTRAMUSCULAR; INTRAVENOUS at 07:40

## 2018-04-03 RX ADMIN — PREGABALIN 100 MG: 50 CAPSULE ORAL at 15:51

## 2018-04-03 RX ADMIN — DILTIAZEM HYDROCHLORIDE 10 MG: 5 INJECTION INTRAVENOUS at 06:43

## 2018-04-03 RX ADMIN — DILTIAZEM HYDROCHLORIDE 5 MG/HR: 5 INJECTION INTRAVENOUS at 07:44

## 2018-04-03 RX ADMIN — METHYLPREDNISOLONE SODIUM SUCCINATE 40 MG: 40 INJECTION, POWDER, FOR SOLUTION INTRAMUSCULAR; INTRAVENOUS at 21:05

## 2018-04-03 RX ADMIN — MELATONIN 3 MG: 3 TAB ORAL at 21:06

## 2018-04-03 RX ADMIN — POTASSIUM CHLORIDE 20 MEQ: 200 INJECTION, SOLUTION INTRAVENOUS at 08:41

## 2018-04-03 RX ADMIN — APIXABAN 5 MG: 5 TABLET, FILM COATED ORAL at 14:00

## 2018-04-03 RX ADMIN — CLONIDINE HYDROCHLORIDE 0.1 MG: 0.1 TABLET ORAL at 17:24

## 2018-04-03 RX ADMIN — MAGNESIUM SULFATE HEPTAHYDRATE 2 G: 40 INJECTION, SOLUTION INTRAVENOUS at 08:21

## 2018-04-03 RX ADMIN — METHYLPREDNISOLONE SODIUM SUCCINATE 40 MG: 40 INJECTION, POWDER, FOR SOLUTION INTRAMUSCULAR; INTRAVENOUS at 13:59

## 2018-04-03 RX ADMIN — IPRATROPIUM BROMIDE AND ALBUTEROL SULFATE 3 ML: .5; 3 SOLUTION RESPIRATORY (INHALATION) at 20:27

## 2018-04-03 RX ADMIN — CLOPIDOGREL BISULFATE 75 MG: 75 TABLET ORAL at 14:00

## 2018-04-03 RX ADMIN — NICOTINE 1 PATCH: 7 PATCH, EXTENDED RELEASE TRANSDERMAL at 14:00

## 2018-04-03 RX ADMIN — AZITHROMYCIN MONOHYDRATE 500 MG: 500 INJECTION, POWDER, LYOPHILIZED, FOR SOLUTION INTRAVENOUS at 13:39

## 2018-04-03 RX ADMIN — APIXABAN 5 MG: 5 TABLET, FILM COATED ORAL at 21:06

## 2018-04-03 RX ADMIN — POTASSIUM CHLORIDE 40 MEQ: 1500 TABLET, EXTENDED RELEASE ORAL at 09:48

## 2018-04-03 RX ADMIN — ASPIRIN 81 MG 324 MG: 81 TABLET ORAL at 06:43

## 2018-04-03 RX ADMIN — SODIUM CHLORIDE 125 ML/HR: 0.9 INJECTION, SOLUTION INTRAVENOUS at 08:41

## 2018-04-03 RX ADMIN — ATORVASTATIN CALCIUM 40 MG: 40 TABLET, FILM COATED ORAL at 15:51

## 2018-04-03 RX ADMIN — METOPROLOL TARTRATE 50 MG: 50 TABLET ORAL at 11:39

## 2018-04-03 RX ADMIN — NITROGLYCERIN 0.5 INCH: 20 OINTMENT TOPICAL at 06:45

## 2018-04-03 RX ADMIN — METOPROLOL TARTRATE 50 MG: 50 TABLET ORAL at 21:06

## 2018-04-03 RX ADMIN — PREGABALIN 100 MG: 50 CAPSULE ORAL at 21:05

## 2018-04-03 RX ADMIN — RANOLAZINE 500 MG: 500 TABLET, FILM COATED, EXTENDED RELEASE ORAL at 21:05

## 2018-04-03 RX ADMIN — ONDANSETRON 4 MG: 2 INJECTION INTRAMUSCULAR; INTRAVENOUS at 07:40

## 2018-04-03 RX ADMIN — SODIUM CHLORIDE, SODIUM LACTATE, POTASSIUM CHLORIDE, AND CALCIUM CHLORIDE 125 ML/HR: .6; .31; .03; .02 INJECTION, SOLUTION INTRAVENOUS at 19:36

## 2018-04-03 NOTE — ED PROCEDURE NOTE
PROCEDURE  ECG 12 Lead Documentation  Date/Time: 4/3/2018 6:27 AM  Performed by: Nguyen Yao  Authorized by: Nguyen Yao     Indications / Diagnosis:  Short of breath   ECG reviewed by me, the ED Provider: yes    Patient location:  ED  Previous ECG:     Previous ECG:  Unavailable  Interpretation:     Interpretation: non-specific    Rate:     ECG rate:  115    ECG rate assessment: normal    Rhythm:     Rhythm: atrial fibrillation    Ectopy:     Ectopy: none    ST segments:     ST segments:  Non-specific  T waves:     T waves: non-specific           Cody Rivera DO  04/03/18 [id number]

## 2018-04-03 NOTE — ASSESSMENT & PLAN NOTE
-admit the patient to level 1-stepdown status  -patient will be treated with an IV Cardizem drip/infusion  -p o  metoprolol  -follow troponin levels through 3 sets  -consult Cardiology  -check an echocardiogram  -initiate full anticoagulation with Eliquis 5 mg p o  B i d   -TSH level was within normal limits

## 2018-04-03 NOTE — ED NOTES
Called and spoke with Adair Gonzalez in the pharmacy about drug compatibility, waiting for a call back        Osvadlo Sethi RN  04/03/18 0050

## 2018-04-03 NOTE — ASSESSMENT & PLAN NOTE
-replete with p   O  and IV potassium supplementation for goal potassium level of at least 4  -follow the patient's magnesium level for goal magnesium level of at least
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
## 2018-04-03 NOTE — ASSESSMENT & PLAN NOTE
-IV methylprednisolone  -IV azithromycin  -initiate the respiratory protocol and airway clearance protocol  -supplemental oxygen to maintain oxygen saturation levels of 90% and above  -check the respiratory pathogen profile
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
## 2018-04-03 NOTE — ED NOTES
Returns from xray - states chest pain persists at 9/10 and HA is persistant as well     Veena Campbell, RN  04/03/18 4633
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
## 2018-04-03 NOTE — ED NOTES
Pharmacy made aware of need for cardizem drip, patient being transported to radiology at this time, ok with Dr Doyal Sanfilippo Ansel Cockayne, LEIGH  04/03/18 [id number]

## 2018-04-03 NOTE — ED NOTES
Pt sitting quietly in bed gripping siderail - states "I'm freaking out"  - reassured that we are helping him - Dr Rafal Tapia, LEIGH  04/03/18 2040

## 2018-04-03 NOTE — ASSESSMENT & PLAN NOTE
-continue the patient's home hypertension medication regimen  -p r n  IV labetalol  -follow patient's blood pressure trend

## 2018-04-03 NOTE — ED NOTES
Deanna Price at bedside performing testing at this time        Lorraine Constantino RN  04/03/18 9964
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
## 2018-04-03 NOTE — ED NOTES
Anamika Chavez from pharmacy states Potassium, Magnesium, and Cardizem are all compatible and I can run any of the three medications together       Vanesa Ramirez RN  04/03/18 6522

## 2018-04-04 ENCOUNTER — APPOINTMENT (INPATIENT)
Dept: NON INVASIVE DIAGNOSTICS | Facility: HOSPITAL | Age: 73
DRG: 189 | End: 2018-04-04
Attending: INTERNAL MEDICINE
Payer: COMMERCIAL

## 2018-04-04 ENCOUNTER — ANESTHESIA (INPATIENT)
Dept: PERIOP | Facility: HOSPITAL | Age: 73
DRG: 189 | End: 2018-04-04
Payer: COMMERCIAL

## 2018-04-04 LAB
ADENOVIRUS: NOT DETECTED
ALBUMIN SERPL BCP-MCNC: 3.5 G/DL (ref 3.5–5)
ALP SERPL-CCNC: 95 U/L (ref 46–116)
ALT SERPL W P-5'-P-CCNC: 31 U/L (ref 12–78)
ANION GAP SERPL CALCULATED.3IONS-SCNC: 9 MMOL/L (ref 4–13)
AST SERPL W P-5'-P-CCNC: 20 U/L (ref 5–45)
BASOPHILS # BLD AUTO: 0.01 THOUSANDS/ΜL (ref 0–0.1)
BASOPHILS NFR BLD AUTO: 0 % (ref 0–1)
BILIRUB SERPL-MCNC: 0.6 MG/DL (ref 0.2–1)
BUN SERPL-MCNC: 23 MG/DL (ref 5–25)
C PNEUM DNA SPEC QL NAA+PROBE: NOT DETECTED
CALCIUM SERPL-MCNC: 9.1 MG/DL (ref 8.3–10.1)
CHLORIDE SERPL-SCNC: 104 MMOL/L (ref 100–108)
CK SERPL-CCNC: 34 U/L (ref 39–308)
CO2 SERPL-SCNC: 27 MMOL/L (ref 21–32)
CREAT SERPL-MCNC: 1.16 MG/DL (ref 0.6–1.3)
EOSINOPHIL # BLD AUTO: 0.01 THOUSAND/ΜL (ref 0–0.61)
EOSINOPHIL NFR BLD AUTO: 0 % (ref 0–6)
ERYTHROCYTE [DISTWIDTH] IN BLOOD BY AUTOMATED COUNT: 16.5 % (ref 11.6–15.1)
FLUAV H1 RNA SPEC QL NAA+PROBE: NOT DETECTED
FLUAV H3 RNA SPEC QL NAA+PROBE: NOT DETECTED
FLUAV RNA SPEC QL NAA+PROBE: NOT DETECTED
FLUBV RNA SPEC QL NAA+PROBE: NOT DETECTED
GFR SERPL CREATININE-BSD FRML MDRD: 61 ML/MIN/1.73SQ M
GLUCOSE SERPL-MCNC: 147 MG/DL (ref 65–140)
HAV IGM SER QL: NORMAL
HBOV DNA SPEC QL NAA+PROBE: NOT DETECTED
HBV CORE IGM SER QL: NORMAL
HBV SURFACE AG SER QL: NORMAL
HCOV 229E RNA SPEC QL NAA+PROBE: NOT DETECTED
HCOV HKU1 RNA SPEC QL NAA+PROBE: NOT DETECTED
HCOV NL63 RNA SPEC QL NAA+PROBE: NOT DETECTED
HCOV OC43 RNA SPEC QL NAA+PROBE: NOT DETECTED
HCT VFR BLD AUTO: 41.4 % (ref 36.5–49.3)
HCV AB SER QL: NORMAL
HGB BLD-MCNC: 13.5 G/DL (ref 12–17)
HPIV1 RNA SPEC QL NAA+PROBE: NOT DETECTED
HPIV2 RNA SPEC QL NAA+PROBE: NOT DETECTED
HPIV3 RNA SPEC QL NAA+PROBE: NOT DETECTED
HPIV4 RNA SPEC QL NAA+PROBE: NOT DETECTED
LACTATE SERPL-SCNC: 2 MMOL/L (ref 0.5–2)
LYMPHOCYTES # BLD AUTO: 1.2 THOUSANDS/ΜL (ref 0.6–4.47)
LYMPHOCYTES NFR BLD AUTO: 10 % (ref 14–44)
M PNEUMO DNA SPEC QL NAA+PROBE: NOT DETECTED
MAGNESIUM SERPL-MCNC: 1.9 MG/DL (ref 1.6–2.6)
MCH RBC QN AUTO: 23.4 PG (ref 26.8–34.3)
MCHC RBC AUTO-ENTMCNC: 32.6 G/DL (ref 31.4–37.4)
MCV RBC AUTO: 72 FL (ref 82–98)
METAPNEUMOVIRUS: NOT DETECTED
MONOCYTES # BLD AUTO: 0.74 THOUSAND/ΜL (ref 0.17–1.22)
MONOCYTES NFR BLD AUTO: 6 % (ref 4–12)
MRSA NOSE QL CULT: NORMAL
NEUTROPHILS # BLD AUTO: 10.45 THOUSANDS/ΜL (ref 1.85–7.62)
NEUTS SEG NFR BLD AUTO: 84 % (ref 43–75)
PHOSPHATE SERPL-MCNC: 3.1 MG/DL (ref 2.3–4.1)
PLATELET # BLD AUTO: 259 THOUSANDS/UL (ref 149–390)
PMV BLD AUTO: 9.9 FL (ref 8.9–12.7)
POTASSIUM SERPL-SCNC: 4 MMOL/L (ref 3.5–5.3)
PROT SERPL-MCNC: 6.4 G/DL (ref 6.4–8.2)
RBC # BLD AUTO: 5.76 MILLION/UL (ref 3.88–5.62)
RHINOVIRUS RNA SPEC QL NAA+PROBE: NOT DETECTED
RSV A RNA SPEC QL NAA+PROBE: NOT DETECTED
RSV B RNA SPEC QL NAA+PROBE: NOT DETECTED
SODIUM SERPL-SCNC: 140 MMOL/L (ref 136–145)
WBC # BLD AUTO: 12.41 THOUSAND/UL (ref 4.31–10.16)

## 2018-04-04 PROCEDURE — 93005 ELECTROCARDIOGRAM TRACING: CPT

## 2018-04-04 PROCEDURE — 99232 SBSQ HOSP IP/OBS MODERATE 35: CPT | Performed by: INTERNAL MEDICINE

## 2018-04-04 PROCEDURE — 85025 COMPLETE CBC W/AUTO DIFF WBC: CPT | Performed by: INTERNAL MEDICINE

## 2018-04-04 PROCEDURE — 93312 ECHO TRANSESOPHAGEAL: CPT | Performed by: INTERNAL MEDICINE

## 2018-04-04 PROCEDURE — 83605 ASSAY OF LACTIC ACID: CPT | Performed by: INTERNAL MEDICINE

## 2018-04-04 PROCEDURE — 83825 ASSAY OF MERCURY: CPT | Performed by: INTERNAL MEDICINE

## 2018-04-04 PROCEDURE — 94760 N-INVAS EAR/PLS OXIMETRY 1: CPT

## 2018-04-04 PROCEDURE — 93320 DOPPLER ECHO COMPLETE: CPT | Performed by: INTERNAL MEDICINE

## 2018-04-04 PROCEDURE — 5A2204Z RESTORATION OF CARDIAC RHYTHM, SINGLE: ICD-10-PCS | Performed by: INTERNAL MEDICINE

## 2018-04-04 PROCEDURE — 83735 ASSAY OF MAGNESIUM: CPT | Performed by: INTERNAL MEDICINE

## 2018-04-04 PROCEDURE — 84100 ASSAY OF PHOSPHORUS: CPT | Performed by: INTERNAL MEDICINE

## 2018-04-04 PROCEDURE — 82550 ASSAY OF CK (CPK): CPT | Performed by: INTERNAL MEDICINE

## 2018-04-04 PROCEDURE — 94660 CPAP INITIATION&MGMT: CPT

## 2018-04-04 PROCEDURE — 80053 COMPREHEN METABOLIC PANEL: CPT | Performed by: INTERNAL MEDICINE

## 2018-04-04 PROCEDURE — 94640 AIRWAY INHALATION TREATMENT: CPT

## 2018-04-04 PROCEDURE — 93325 DOPPLER ECHO COLOR FLOW MAPG: CPT | Performed by: INTERNAL MEDICINE

## 2018-04-04 PROCEDURE — 83655 ASSAY OF LEAD: CPT | Performed by: INTERNAL MEDICINE

## 2018-04-04 PROCEDURE — 80074 ACUTE HEPATITIS PANEL: CPT | Performed by: INTERNAL MEDICINE

## 2018-04-04 PROCEDURE — 82175 ASSAY OF ARSENIC: CPT | Performed by: INTERNAL MEDICINE

## 2018-04-04 RX ORDER — PROPOFOL 10 MG/ML
INJECTION, EMULSION INTRAVENOUS AS NEEDED
Status: DISCONTINUED | OUTPATIENT
Start: 2018-04-04 | End: 2018-04-04 | Stop reason: SURG

## 2018-04-04 RX ORDER — ONDANSETRON 2 MG/ML
4 INJECTION INTRAMUSCULAR; INTRAVENOUS ONCE AS NEEDED
Status: DISCONTINUED | OUTPATIENT
Start: 2018-04-04 | End: 2018-04-04 | Stop reason: HOSPADM

## 2018-04-04 RX ORDER — LIDOCAINE HYDROCHLORIDE 10 MG/ML
INJECTION, SOLUTION INFILTRATION; PERINEURAL AS NEEDED
Status: DISCONTINUED | OUTPATIENT
Start: 2018-04-04 | End: 2018-04-04 | Stop reason: SURG

## 2018-04-04 RX ADMIN — METOPROLOL TARTRATE 50 MG: 50 TABLET ORAL at 05:46

## 2018-04-04 RX ADMIN — PREGABALIN 100 MG: 50 CAPSULE ORAL at 16:49

## 2018-04-04 RX ADMIN — APIXABAN 5 MG: 5 TABLET, FILM COATED ORAL at 12:28

## 2018-04-04 RX ADMIN — PREGABALIN 100 MG: 50 CAPSULE ORAL at 21:47

## 2018-04-04 RX ADMIN — MULTIPLE VITAMINS W/ MINERALS TAB 1 TABLET: TAB at 12:28

## 2018-04-04 RX ADMIN — RANOLAZINE 500 MG: 500 TABLET, FILM COATED, EXTENDED RELEASE ORAL at 21:47

## 2018-04-04 RX ADMIN — IPRATROPIUM BROMIDE AND ALBUTEROL SULFATE 3 ML: .5; 3 SOLUTION RESPIRATORY (INHALATION) at 08:02

## 2018-04-04 RX ADMIN — DULOXETINE HYDROCHLORIDE 60 MG: 30 CAPSULE, DELAYED RELEASE ORAL at 12:28

## 2018-04-04 RX ADMIN — ATORVASTATIN CALCIUM 40 MG: 40 TABLET, FILM COATED ORAL at 16:49

## 2018-04-04 RX ADMIN — ASPIRIN 81 MG 81 MG: 81 TABLET ORAL at 12:30

## 2018-04-04 RX ADMIN — ACETAMINOPHEN 650 MG: 325 TABLET, FILM COATED ORAL at 13:33

## 2018-04-04 RX ADMIN — LIDOCAINE HYDROCHLORIDE 50 MG: 10 INJECTION, SOLUTION INFILTRATION; PERINEURAL at 10:18

## 2018-04-04 RX ADMIN — APIXABAN 5 MG: 5 TABLET, FILM COATED ORAL at 21:47

## 2018-04-04 RX ADMIN — CLONIDINE HYDROCHLORIDE 0.1 MG: 0.1 TABLET ORAL at 12:27

## 2018-04-04 RX ADMIN — PROPOFOL 50 MG: 10 INJECTION, EMULSION INTRAVENOUS at 10:22

## 2018-04-04 RX ADMIN — CLOPIDOGREL BISULFATE 75 MG: 75 TABLET ORAL at 12:27

## 2018-04-04 RX ADMIN — SODIUM CHLORIDE, SODIUM LACTATE, POTASSIUM CHLORIDE, AND CALCIUM CHLORIDE 125 ML/HR: .6; .31; .03; .02 INJECTION, SOLUTION INTRAVENOUS at 04:32

## 2018-04-04 RX ADMIN — IPRATROPIUM BROMIDE AND ALBUTEROL SULFATE 3 ML: .5; 3 SOLUTION RESPIRATORY (INHALATION) at 20:31

## 2018-04-04 RX ADMIN — PROPOFOL 50 MG: 10 INJECTION, EMULSION INTRAVENOUS at 10:27

## 2018-04-04 RX ADMIN — RANOLAZINE 500 MG: 500 TABLET, FILM COATED, EXTENDED RELEASE ORAL at 12:38

## 2018-04-04 RX ADMIN — METHYLPREDNISOLONE SODIUM SUCCINATE 40 MG: 40 INJECTION, POWDER, FOR SOLUTION INTRAMUSCULAR; INTRAVENOUS at 21:47

## 2018-04-04 RX ADMIN — MELATONIN 3 MG: 3 TAB ORAL at 21:47

## 2018-04-04 RX ADMIN — CLONIDINE HYDROCHLORIDE 0.1 MG: 0.1 TABLET ORAL at 18:20

## 2018-04-04 RX ADMIN — LABETALOL 20 MG/4 ML (5 MG/ML) INTRAVENOUS SYRINGE 10 MG: at 09:32

## 2018-04-04 RX ADMIN — AZITHROMYCIN MONOHYDRATE 500 MG: 500 INJECTION, POWDER, LYOPHILIZED, FOR SOLUTION INTRAVENOUS at 12:48

## 2018-04-04 RX ADMIN — NICOTINE 1 PATCH: 7 PATCH, EXTENDED RELEASE TRANSDERMAL at 12:28

## 2018-04-04 RX ADMIN — PROPOFOL 100 MG: 10 INJECTION, EMULSION INTRAVENOUS at 10:18

## 2018-04-04 RX ADMIN — ALBUTEROL SULFATE 2.5 MG: 2.5 SOLUTION RESPIRATORY (INHALATION) at 13:30

## 2018-04-04 RX ADMIN — METOPROLOL TARTRATE 50 MG: 50 TABLET ORAL at 21:47

## 2018-04-04 RX ADMIN — PREGABALIN 100 MG: 50 CAPSULE ORAL at 12:28

## 2018-04-04 RX ADMIN — METHYLPREDNISOLONE SODIUM SUCCINATE 40 MG: 40 INJECTION, POWDER, FOR SOLUTION INTRAMUSCULAR; INTRAVENOUS at 08:10

## 2018-04-04 NOTE — OCCUPATIONAL THERAPY NOTE
Occupational Therapy         Patient Name: Kathrin Arnett  XIHNC'N Date: 4/4/2018            Order received, chart review performed; pt scheduled for cardioversion this AM; will follow up with pt as able for OT evaluation

## 2018-04-04 NOTE — CASE MANAGEMENT
Initial Clinical Review    Admission: Date/Time/Statement: 4/3/18 @ 0744 Inpatient Written     Orders Placed This Encounter   Procedures    Inpatient Admission (expected length of stay for this patient is greater than two midnights)     Standing Status:   Standing     Number of Occurrences:   1     Order Specific Question:   Admitting Physician     Answer:   Dimitris Ocampo     Order Specific Question:   Level of Care     Answer:   Level 2 Stepdown / HOT [14]     Order Specific Question:   Estimated length of stay     Answer:   More than 2 Midnights     Order Specific Question:   Certification     Answer:   I certify that inpatient services are medically necessary for this patient for a duration of greater than two midnights  See H&P and MD Progress Notes for additional information about the patient's course of treatment  ED: Date/Time/Mode of Arrival:   ED Arrival Information     Expected Arrival Acuity Means of Arrival Escorted By Service Admission Type    - 4/3/2018 06:13 Urgent Ambulance [facility] Medicine Urgent    Arrival Complaint    Chest Pain          Chief Complaint:   Chief Complaint   Patient presents with    Chest Pain     Patient started with chest pain and period of shortness of breath earlier this morning  Patient states for the past couple of weeks he has been unable to walk  without becoming SOB  History of Illness: 73-year-old male presents complaining of chest pain and shortness of breath which started about 1 hour prior to arrival   Patient came in a rapid AFib in the 120s to 140s  He states that he is having substernal chest pressure associated with this  He also states that he is having headache which has been a chronic problem for him    The patient came BLS so he did not have aspirin or nitroglycerin prior to arrival       ED Vital Signs:   ED Triage Vitals [04/03/18 0622]   Temperature Pulse Respirations Blood Pressure SpO2   97 9 °F (36 6 °C) (!) 120 18 (!) 194/96 97 %      Temp Source Heart Rate Source Patient Position - Orthostatic VS BP Location FiO2 (%)   Temporal Monitor Lying Left arm --      Pain Score       Worst Possible Pain        Wt Readings from Last 1 Encounters:   04/04/18 83 2 kg (183 lb 6 8 oz)       Vital Signs (abnormal):   Date/Time  Temp  Pulse  Resp  BP  SpO2  O2 Device   04/04/18 0600  --  91   26   167/103  95 %  --   04/03/18 1526   96 7 °F (35 9 °C)  58  19   82/55  95 %  Nasal cannula   04/03/18 1403  --  --  --  100/59  --  --   04/03/18 1139  --  103  --   134/102  --  --   04/03/18 1100  --   106   27  135/93  96 %  --   04/03/18 1017   97 4 °F (36 3 °C)   109  16  148/81  97 %  Nasal cannula   04/03/18 0915  --   124  20   173/77  94 %  Nasal cannula   04/03/18 0830  --   111  20   196/95  94 %  Nasal cannula   04/03/18 0700  --   118  20   170/107  94 %  Nasal cannula   04/03/18 0645  --   122   32   178/85  96 %  Non-rebreather mask   04/03/18 0630  --   115   38   194/96  96 %  None (Room air)     Abnormal Labs/Diagnostic Test Results:   WBC 10 70*   MONOS PCT 14*     POTASSIUM 3 1*     NT-proBNP 1,210       Protein, UA 30 (1+)     WBC, UA 4-10     RBC, UA 4-10     Hyaline Casts, UA 2-4     Bacteria, UA  Moderate       CT Chest:    2 pulmonary parenchymal abnormalities of low suspicion  Recommend 6 month follow-up   Small bilateral effusions   CXR:   1  Possible irregular nodular density in the left upper lobe  Recommend follow-up nonemergent chest CT  2   New trace right pleural effusion  Mild bibasilar atelectasis  CT Head:    No acute intracranial abnormality  Microangiopathic change  Atrophy  EKG:  Afib, rate 115    VAS lower limb venous duplex:  RIGHT LOWER LIMB:  No evidence of acute or chronic deep vein thrombosis  No evidence of superficial thrombophlebitis noted  Doppler evaluation shows a normal response to augmentation maneuvers    Popliteal, posterior tibial and anterior tibial arterial Doppler waveforms are  triphasic  LEFT LOWER LIMB:  No evidence of acute or chronic deep vein thrombosis  No evidence of superficial thrombophlebitis noted  Doppler evaluation shows a normal response to augmentation maneuvers  Popliteal, posterior tibial and anterior tibial arterial Doppler waveforms are  triphasic  Peroneal veins not visualized  ED Treatment:   Medication Administration from 04/03/2018 0613 to 04/03/2018 1017       Date/Time Order Dose Route Action     04/03/2018 0744 diltiazem (CARDIZEM) 125 mg in sodium chloride 0 9 % 125 mL infusion 5 mg/hr Intravenous New Bag     04/03/2018 0643 aspirin chewable tablet 324 mg 324 mg Oral Given     04/03/2018 0643 diltiazem (CARDIZEM) injection 10 mg 10 mg Intravenous Given     04/03/2018 0645 nitroglycerin (NITRO-BID) 2 % TD ointment 0 5 inch 0 5 inch Topical Given     04/03/2018 0740 morphine (PF) 4 mg/mL injection 4 mg 4 mg Intravenous Given     04/03/2018 0740 ondansetron (ZOFRAN) injection 4 mg 4 mg Intravenous Given     04/03/2018 0821 magnesium sulfate 2 g/50 mL IVPB (premix) 2 g 2 g Intravenous New Bag     04/03/2018 0841 potassium chloride 20 mEq IVPB (premix) 20 mEq Intravenous New Bag     04/03/2018 0841 sodium chloride 0 9 % infusion 125 mL/hr Intravenous New Bag     04/03/2018 0948 potassium chloride (K-DUR,KLOR-CON) CR tablet 40 mEq 40 mEq Oral Given          Past Medical/Surgical History:    Active Ambulatory Problems     Diagnosis Date Noted    Intractable diarrhea 10/26/2016    Weakness 10/26/2016    Hypokalemia 10/26/2016    Chronic obstructive pulmonary disease with acute exacerbation (HCC) 10/26/2016    Abdominal pain 10/26/2016    Essential hypertension 10/26/2016    Hyperlipidemia 10/26/2016    Depressive disorder 10/26/2016    Atrial fibrillation with rapid ventricular response ([facility] [address] ) 10/26/2016    Migraines 10/26/2016    Chronic pain 10/30/2016    Acute kidney injury ([facility] [address] ) 10/30/2016    Sepsis ([facility] [address] ) 10/30/2016    Colitis 10/30/2016    Non-ST elevation myocardial infarction (NSTEMI) due to mismatch of myocardial oxygen supply and demand ([facility] [address] ) 10/30/2016    Dehydration, mild 06/03/2017    Ambulatory dysfunction 06/04/2017    Tobacco abuse 06/04/2017     Resolved Ambulatory Problems     Diagnosis Date Noted    Shortness of breath 10/26/2016    Chronic pain 10/26/2016    Hx of transient ischemic attack (TIA) 10/26/2016     Past Medical History:   Diagnosis Date    Aortic stenosis     Arthritis     Asthma     [facility] fibrillation [facility]     Back pain     Cervical radiculopathy     CHF (congestive heart failure) ([facility]     Chronic pain     Chronic pain 10/26/2016    COPD (chronic obstructive pulmonary disease) ([facility]     Coronary artery disease     CVA (cerebral vascular accident) ([facility] [address] )     Depressive disorder     Encephalopathy     GERD (gastroesophageal reflux disease)     Head injury     Hx of transient ischemic attack (TIA) 10/26/2016    Hyperlipidemia     Hypertension     Kidney stones     Migraines     MRSA (methicillin resistant Staphylococcus aureus) infection     MRSA (methicillin resistant staphylococcus aureus) pneumonia ([facility] [address] )     Osteoarthritis     Peripheral neuropathy     Psychiatric disorder     PVD (peripheral vascular disease) ([facility] [address] )     Renal disorder     Shortness of breath 10/26/2016    TIA (transient ischemic attack) 2014    Vertigo        Admitting Diagnosis: Atrial fibrillation ([facility]) [I48 91]  COPD (chronic obstructive pulmonary disease) ([facility]) [J44 9]  Hypokalemia [E87 6]  Chest pain [R07 9]  Essential hypertension [I10]  Atrial fibrillation with rapid ventricular response ([facility] [address] ) [I48 91]  Headache [R51]    Age/Sex: 74 y o  male    Assessment/Plan:   Atrial fibrillation with rapid ventricular response ([facility] [address] )   Assessment & Plan     -admit the patient to level 1-stepdown status  -patient will be treated with an IV Cardizem drip/infusion  -p o  metoprolol  -follow troponin levels through 3 sets  -consult Cardiology  -check an echocardiogram  -initiate full anticoagulation with Eliquis 5 mg p o  B i d   -TSH level was within normal limits          Pulmonary nodule   Assessment & Plan     -the patient will need outpatient surveillance imaging of the pulmonary nodule with his PCP          Tobacco abuse   Assessment & Plan     -nicotine patch  -smoking cessation counseling          Essential hypertension   Assessment & Plan     -continue the patient's home hypertension medication regimen  -p r n  IV labetalol  -follow patient's blood pressure trend          Chronic obstructive pulmonary disease with acute exacerbation (HCC)   Assessment & Plan     -IV methylprednisolone  -IV azithromycin  -initiate the respiratory protocol and airway clearance protocol  -supplemental oxygen to maintain oxygen saturation levels of 90% and above  -check the respiratory pathogen profile          Hypokalemia   Assessment & Plan     -replete with p   O  and IV potassium supplementation for goal potassium level of at least 4  -follow the patient's magnesium level for goal magnesium level of at least             VTE Prophylaxis: Apixaban (Eliquis)  / sequential compression device   Code Status:  Level 1-full code     Anticipated Length of Stay:  Patient will be admitted on an Inpatient basis with an anticipated length of stay of greater than 2 midnights  Justification for Hospital Stay:  The patient requires an IV Cardizem drip/infusion for heart rate control and may require a MARTHA cardioversion per Cardiology    The patient also requires IV methylprednisone and IV azithromycin      Admission Orders:  Telemetry, trop T7I  Consult cardiology, cm  NPO  Daily weights, I/O  cpap  MARTHA  Pt, ot  Sequential compression device  O2 NC 2L    Scheduled Meds:   Current Facility-Administered Medications:  acetaminophen 650 mg Oral Q6H PRN   albuterol 2 5 mg Nebulization Q6H PRN   apixaban 5 mg Oral BID   aspirin 81 mg Oral Daily   atorvastatin 40 mg Oral Daily With Dinner   azithromycin 500 mg Intravenous Q24H   cloNIDine 0 1 mg Oral BID   clopidogrel 75 mg Oral Daily   diltiazem 1-15 mg/hr Intravenous Titrated   DULoxetine 60 mg Oral Daily   ipratropium-albuterol 3 mL Nebulization BID   labetalol 10 mg Intravenous Q4H PRN   lactated ringers 125 mL/hr Intravenous Continuous   melatonin 3 mg Oral HS   methylPREDNISolone sodium succinate 40 mg Intravenous Q12H [address]   metoprolol tartrate 50 mg Oral Q12H [address]   multivitamin-minerals 1 tablet Oral Daily   nicotine 1 patch Transdermal Daily   ondansetron 4 mg Intravenous Q6H PRN   oxyCODONE 5 mg Oral Q4H PRN   pregabalin 100 mg Oral TID   ranolazine 500 mg Oral Q12H [address]   tamsulosin 0 4 mg Oral Daily With Dinner     Continuous Infusions:   diltiazem 1-15 mg/hr Last Rate: Stopped (04/03/18 1344)   lactated ringers 125 mL/hr Last Rate: 125 mL/hr (04/04/18 0432)     PRN Meds:   acetaminophen    albuterol    labetalol    ondansetron    oxyCODONE    Thank you,  [address] [facility] in the [facility] by Ez Villarreal for 2017  Network Utilization Review Department  Phone: [phone]; Fax [phone]  ATTENTION: The Network Utilization Review Department is now centralized for our 7 Facilities  Make a note that we have a new phone and fax numbers for our Department  Please call with any questions or concerns to [phone] and carefully follow the prompts so that you are directed to the right person  All voicemails are confidential  Fax any determinations, approvals, denials, and requests for initial or continue stay review clinical to [phone]  Due to HIGH CALL volume, it would be easier if you could please send faxed requests to expedite your requests and in part, help us provide discharge notifications faster

## 2018-04-04 NOTE — ASSESSMENT & PLAN NOTE
-continue IV methylprednisolone  -continue IV azithromycin  -continue the respiratory protocol and airway clearance protocol  -continue supplemental oxygen to maintain oxygen saturation levels of 90% and above  -consult pulmonology

## 2018-04-04 NOTE — ANESTHESIA POSTPROCEDURE EVALUATION
Post-Op Assessment Note      CV Status:  Stable    Mental Status:  Alert and awake    Hydration Status:  Euvolemic    PONV Controlled:  Controlled    Airway Patency:  Patent    Post Op Vitals Reviewed: Yes          Staff: Anesthesiologist, with CRNAs           BP  140/69   Temp   96 9   Pulse  77   Resp  20   SpO2   96

## 2018-04-04 NOTE — ASSESSMENT & PLAN NOTE
-patient underwent a MARTHA cardioversion today, 04/04/2018  -he is now in a sinus rhythm  -he was weaned off the IV Cardizem drip/infusion  -continue full anticoagulation with Eliquis 5 mg p o  b i d   -continue p o  metoprolol for heart rate control  -TSH level was within normal limits  -I appreciate cardiology's input  -his echocardiogram did show a decreased ejection fraction of 40%, which may be heart rate related cardiomyopathy  -I will defer to Cardiology further ischemic workup for the decreased ejection fraction  -patient can be downgraded to med/surgstatus with telemetry monitoring and continuous pulse oximetry

## 2018-04-04 NOTE — PROGRESS NOTES
Progress Note - Adams Abarca 1945, 74 y o  male MRN: [id number]    Unit/Bed#:  Encounter: [id number]    Primary Care Provider: Peri Gilbert DO   Date and time admitted to hospital: 4/3/2018  6:13 AM        * Atrial fibrillation with rapid ventricular response ([facility] [address] )   Assessment & Plan    -patient underwent a MARTHA cardioversion today, 04/04/2018  -he is now in a sinus rhythm  -he was weaned off the IV Cardizem drip/infusion  -continue full anticoagulation with Eliquis 5 mg p o  b i d   -continue p o  metoprolol for heart rate control  -TSH level was within normal limits  -I appreciate cardiology's input  -his echocardiogram did show a decreased ejection fraction of 40%, which may be heart rate related cardiomyopathy  -I will defer to Cardiology further ischemic workup for the decreased ejection fraction  -patient can be downgraded to med/surg status with telemetry monitoring and continuous pulse oximetry        Chronic obstructive pulmonary disease with acute exacerbation (HCC)   Assessment & Plan    -continue IV methylprednisolone  -continue IV azithromycin  -continue the respiratory protocol and airway clearance protocol  -continue supplemental oxygen to maintain oxygen saturation levels of 90% and above  -consult pulmonology        Pulmonary nodule   Assessment & Plan    -the patient will need outpatient surveillance imaging of the pulmonary nodule with his PCP        Tobacco abuse   Assessment & Plan    -nicotine patch  -smoking cessation counseling        Essential hypertension   Assessment & Plan    -continue the patient's home hypertension medication regimen  -p r n  IV labetalol  -follow patient's blood pressure trend        Hypokalemia   Assessment & Plan    -resolved            VTE Pharmacologic Prophylaxis:   Pharmacologic: Apixaban (Eliquis)  Mechanical VTE Prophylaxis in Place: Yes    Patient Centered Rounds: I have performed bedside rounds with nursing staff today        Time Spent for Care: 30 minutes  More than 50% of total time spent on counseling and coordination of care as described above  Current Length of Stay: 1 day(s)    Current Patient Status: Inpatient   Certification Statement: The patient will continue to require additional inpatient hospital stay due to the need for IV methylprednisolone, IV azithromycin, supplemental oxygen to maintain adequate oxygen saturation levels, and the need for continued telemetry monitoring status-post a MARTHA cardioversion  Code Status: Level 1 - Full Code      Subjective: The patient was seen and examined  The patient continues to complain of shortness of breath, worsened with exertion  Objective:     Vitals:   Temp (24hrs), Av 5 °F (36 4 °C), Min:96 7 °F (35 9 °C), Max:98 3 °F (36 8 °C)    HR:  [58-97] 97  Resp:  [17-29] 20  BP: ()/() 166/88  SpO2:  [93 %-98 %] 98 %  Body mass index is 26 7 kg/m²  Input and Output Summary (last 24 hours):        Intake/Output Summary (Last 24 hours) at 18 1441  Last data filed at 18 1433   Gross per 24 hour   Intake          4531 83 ml   Output              550 ml   Net          3981 83 ml       Physical Exam:     Physical Exam  General:  NAD, awake, alert, oriented x 3, conversational dyspnea is present  HEENT:  NC/AT, mucous membranes moist  Neck:  Supple, No JVP elevation  CV:  + S1, + S2, RRR  Pulm:  Expiratory wheezing bilaterally with decreased breath sounds bilaterally  Abd:  Soft, Non-tender, Non-distended  Ext:  No clubbing/cyanosis/edema  Skin:  No rashes      Additional Data:     Labs:      Results from last 7 days  Lab Units 18  0532   WBC Thousand/uL 12 41*   HEMOGLOBIN g/dL 13 5   HEMATOCRIT % 41 4   PLATELETS Thousands/uL 259   NEUTROS PCT % 84*   LYMPHS PCT % 10*   MONOS PCT % 6   EOS PCT % 0       Results from last 7 days  Lab Units 18  0532   SODIUM mmol/L 140   POTASSIUM mmol/L 4 0   CHLORIDE mmol/L 104   CO2 mmol/L 27   BUN mg/dL 23   CREATININE mg/dL 1 16   CALCIUM mg/dL 9 1   TOTAL PROTEIN g/dL 6 4   BILIRUBIN TOTAL mg/dL 0 60   ALK PHOS U/L 95   ALT U/L 31   AST U/L 20   GLUCOSE RANDOM mg/dL 147*       Results from last 7 days  Lab Units 04/03/18  0627   INR  1 12       * I Have Reviewed All Lab Data Listed Above  * Additional Pertinent Lab Tests Reviewed:  [facility] Review[facility]      Recent Cultures (last 7 days):       Results from last 7 days  Lab Units 04/03/18  1730   LEGIONELLA URINARY ANTIGEN  Negative       Last 24 Hours Medication List:     Current Facility-Administered Medications:  acetaminophen 650 mg Oral Q6H PRN Aimee Clemens, DO    albuterol 2 5 mg Nebulization Q6H PRN Aimee Clemens, DO    apixaban 5 mg Oral BID Aimee Clemens, DO    aspirin 81 mg Oral Daily Aimee Clemens, DO    atorvastatin 40 mg Oral Daily With Zack Collier,     azithromycin 500 mg Intravenous Q24H Aimee Clemens, DO Last Rate: 500 mg (04/04/18 1248)   cloNIDine 0 1 mg Oral BID Aimee Clemens, DO    clopidogrel 75 mg Oral Daily Aimee Clemens, DO    DULoxetine 60 mg Oral Daily Aimee Clemens, DO    ipratropium-albuterol 3 mL Nebulization BID Aimee Clemens, DO    labetalol 10 mg Intravenous Q4H PRN Aimee Clemens, DO    melatonin 3 mg Oral HS Aimee Clemens, DO    methylPREDNISolone sodium succinate 40 mg Intravenous Q12H [facility] Aimee Clemens, DO    metoprolol tartrate 50 mg Oral Q12H [facility] Aimee Clemens, DO    multivitamin-minerals 1 tablet Oral Daily Aimee Clemens, DO    nicotine 1 patch Transdermal Daily Aimee Clemens, DO    ondansetron 4 mg Intravenous Q6H PRN Aimee Clemens, DO    oxyCODONE 5 mg Oral Q4H PRN Aimee Clemens, DO    pregabalin 100 mg Oral TID Aimee Clemens, DO    ranolazine 500 mg Oral Q12H [facility] Chinedu Magana, DO    tamsulosin 0 4 mg Oral Daily With Zack Collier,          Today, Patient Was Seen By: Aimee Clemens DO    ** Please Note: Dictation voice to text software may have been used in the creation of this document   **

## 2018-04-04 NOTE — ANESTHESIA PREPROCEDURE EVALUATION
Review of Systems/Medical History          Cardiovascular  Hyperlipidemia, Hypertension controlled, Past MI Unspecified site/ episode of care, CAD, , Dysrhythmias, atrial fibrillation, CHF ,    Pulmonary  Smoker , Pneumonia (Hx), COPD moderate- medication dependent , Asthma: well controlled/ stable , Shortness of breath, Sleep apnea CPAP,        GI/Hepatic    GERD well controlled,        Kidney stones,        Endo/Other  Negative endo/other ROS      GYN       Hematology   Musculoskeletal    Arthritis     Neurology    TIA, CVA , Headaches (hx, head injury 30 years ago; migraines),    Psychology   Depression , being treated for depression,              Physical Exam    Airway    Mallampati score: III  TM Distance: >3 FB  Neck ROM: full     Dental       Cardiovascular  Cardiovascular exam normal    Pulmonary  Pulmonary exam normal     Other Findings        Anesthesia Plan  ASA Score- 3     Anesthesia Type- IV sedation with anesthesia with ASA Monitors  Additional Monitors:   Airway Plan:         Plan Factors-    Induction- intravenous  Postoperative Plan-     Informed Consent- Anesthetic plan and risks discussed with patient  I personally reviewed this patient with the CRNA  Discussed and agreed on the Anesthesia Plan with the CRNA  Keaton Harper

## 2018-04-04 NOTE — PROGRESS NOTES
Progress Note - Cardiology   Serge Sanchez 74 y o  male MRN: [id number]  Unit/Bed#: OR POOL Encounter: [id number]    Assessment:  1  AF  2  CAD  3  Mild AS  4  COPD  5  SOB    Plan:    MARTHA / CV WAS PERFORMED   NO THROMBUS WAS SEEN  CV WAS SUCCESSFUL  OK TO D/C TO HOME TO F/UP WITH DR Marlee Hoang IN 3-4 WEEKS      Subjective/Objective   Chief Complaint: SOB        Objective: remains in AF    Vitals: /69   Pulse 76   Temp (!) 96 8 °F (36 °C)   Resp 22   Ht 5' 9 5" (1 765 m)   Wt 83 2 kg (183 lb 6 8 oz)   SpO2 94%   BMI 26 70 kg/m²   Vitals:    04/04/18 0516 04/04/18 0532   Weight: 83 2 kg (183 lb 6 8 oz) 83 2 kg (183 lb 6 8 oz)     Orthostatic Blood Pressures    Flowsheet Row Most Recent Value   Blood Pressure  140/69 filed at 04/04/2018 1040   Patient Position - Orthostatic VS  Lying filed at 04/04/2018 0930            Intake/Output Summary (Last 24 hours) at 04/04/18 1056  Last data filed at 04/04/18 1035   Gross per 24 hour   Intake             3935 ml   Output              750 ml   Net             3185 ml       Invasive Devices     Peripheral Intravenous Line            Peripheral IV 04/03/18 Left Hand 1 day    Peripheral IV 04/03/18 Right Antecubital 1 day                Review of Systems: Negative SOB    Physical Exam: /69   Pulse 76   Temp (!) 96 8 °F (36 °C)   Resp 22   Ht 5' 9 5" (1 765 m)   Wt 83 2 kg (183 lb 6 8 oz)   SpO2 94%   BMI 26 70 kg/m²     General Appearance:    Alert, cooperative, no distress, appears stated age   Head:    Normocephalic, without obvious abnormality, atraumatic   Eyes:    PERRL, conjunctiva/corneas clear, EOM's intact, fundi     benign, both eyes        Ears:    Normal TM's and external ear canals, both ears   Nose:   Nares normal, septum midline, mucosa normal, no drainage    or sinus tenderness   Throat:   Lips, mucosa, and tongue normal; teeth and gums normal   Neck:   Supple, symmetrical, trachea midline, no adenopathy;        thyroid:  No enlargement/tenderness/nodules; no carotid    bruit or JVD   Back:     Symmetric, no curvature, ROM normal, no CVA tenderness   Lungs:     Clear to auscultation bilaterally, respirations unlabored   Chest wall:    No tenderness or deformity   Heart:   irregular, S1 and S2 normal, no murmur, rub   or gallop   Abdomen:     Soft, non-tender, bowel sounds active all four quadrants,     no masses, no organomegaly           Extremities:   Extremities normal, atraumatic, no cyanosis or edema   Pulses:   2+ and symmetric all extremities   Skin:   Skin color, texture, turgor normal, no rashes or lesions   Lymph nodes:   Cervical, supraclavicular, and axillary nodes normal   Neurologic:   CNII-XII intact  Normal strength, sensation and reflexes       throughout       Lab Results: I have personally reviewed pertinent lab results

## 2018-04-04 NOTE — PROGRESS NOTES
Procedure note    Procedure :  MARTHA / Cardioversion    Indications : SOB, Atrial Fibrillation    The MARTHA and CV procedures were explained to the patient including the indications and risks and written consent was obtained  The patient received conscious sedation from anesthesia  The MARTHA probe was passed into the distal esophagus without difficulty  Imaging was obtained  No thrombus was seen  The probe was removed  No blood was seen on the tip  CV was performed and patient converted to NSR with HR 70 BPM with one 250 J biphasic shock  The patient tolerated the procedures well without apparent complications  OK for DC to follow up with jessica Meza

## 2018-04-04 NOTE — PROGRESS NOTES
AM care/ CHG bath completed  Pts silver crucifix and chain removed & placed in security office safe prior to OR  Nicotine patch removed also

## 2018-04-05 PROBLEM — E87.20 LACTIC ACIDOSIS: Status: ACTIVE | Noted: 2018-04-05

## 2018-04-05 PROBLEM — E87.2 LACTIC ACIDOSIS: Status: ACTIVE | Noted: 2018-04-05

## 2018-04-05 LAB
ANION GAP SERPL CALCULATED.3IONS-SCNC: 7 MMOL/L (ref 4–13)
ATRIAL RATE: 74 BPM
BACTERIA UR CULT: ABNORMAL
BACTERIA UR CULT: ABNORMAL
BASOPHILS # BLD AUTO: 0.01 THOUSANDS/ΜL (ref 0–0.1)
BASOPHILS NFR BLD AUTO: 0 % (ref 0–1)
BUN SERPL-MCNC: 29 MG/DL (ref 5–25)
CALCIUM SERPL-MCNC: 8.8 MG/DL (ref 8.3–10.1)
CHLORIDE SERPL-SCNC: 106 MMOL/L (ref 100–108)
CO2 SERPL-SCNC: 28 MMOL/L (ref 21–32)
CREAT SERPL-MCNC: 1.07 MG/DL (ref 0.6–1.3)
EOSINOPHIL # BLD AUTO: 0 THOUSAND/ΜL (ref 0–0.61)
EOSINOPHIL NFR BLD AUTO: 0 % (ref 0–6)
ERYTHROCYTE [DISTWIDTH] IN BLOOD BY AUTOMATED COUNT: 16.8 % (ref 11.6–15.1)
GFR SERPL CREATININE-BSD FRML MDRD: 68 ML/MIN/1.73SQ M
GLUCOSE SERPL-MCNC: 181 MG/DL (ref 65–140)
HCT VFR BLD AUTO: 36.4 % (ref 36.5–49.3)
HGB BLD-MCNC: 11.7 G/DL (ref 12–17)
LACTATE SERPL-SCNC: 2.2 MMOL/L (ref 0.5–2)
LYMPHOCYTES # BLD AUTO: 1.14 THOUSANDS/ΜL (ref 0.6–4.47)
LYMPHOCYTES NFR BLD AUTO: 7 % (ref 14–44)
MAGNESIUM SERPL-MCNC: 2 MG/DL (ref 1.6–2.6)
MCH RBC QN AUTO: 23.4 PG (ref 26.8–34.3)
MCHC RBC AUTO-ENTMCNC: 32.1 G/DL (ref 31.4–37.4)
MCV RBC AUTO: 73 FL (ref 82–98)
MONOCYTES # BLD AUTO: 1 THOUSAND/ΜL (ref 0.17–1.22)
MONOCYTES NFR BLD AUTO: 6 % (ref 4–12)
NEUTROPHILS # BLD AUTO: 15.34 THOUSANDS/ΜL (ref 1.85–7.62)
NEUTS SEG NFR BLD AUTO: 87 % (ref 43–75)
P AXIS: 66 DEGREES
PLATELET # BLD AUTO: 217 THOUSANDS/UL (ref 149–390)
PMV BLD AUTO: 10.1 FL (ref 8.9–12.7)
POTASSIUM SERPL-SCNC: 4.2 MMOL/L (ref 3.5–5.3)
PR INTERVAL: 184 MS
QRS AXIS: 72 DEGREES
QRSD INTERVAL: 82 MS
QT INTERVAL: 420 MS
QTC INTERVAL: 466 MS
RBC # BLD AUTO: 5.01 MILLION/UL (ref 3.88–5.62)
SODIUM SERPL-SCNC: 141 MMOL/L (ref 136–145)
T WAVE AXIS: 51 DEGREES
TROPONIN I SERPL-MCNC: 0.03 NG/ML
VENTRICULAR RATE: 74 BPM
WBC # BLD AUTO: 17.49 THOUSAND/UL (ref 4.31–10.16)

## 2018-04-05 PROCEDURE — G8987 SELF CARE CURRENT STATUS: HCPCS

## 2018-04-05 PROCEDURE — 85025 COMPLETE CBC W/AUTO DIFF WBC: CPT | Performed by: INTERNAL MEDICINE

## 2018-04-05 PROCEDURE — 84484 ASSAY OF TROPONIN QUANT: CPT | Performed by: PHYSICIAN ASSISTANT

## 2018-04-05 PROCEDURE — G8988 SELF CARE GOAL STATUS: HCPCS

## 2018-04-05 PROCEDURE — 83605 ASSAY OF LACTIC ACID: CPT | Performed by: INTERNAL MEDICINE

## 2018-04-05 PROCEDURE — G8978 MOBILITY CURRENT STATUS: HCPCS | Performed by: PHYSICAL THERAPIST

## 2018-04-05 PROCEDURE — 99232 SBSQ HOSP IP/OBS MODERATE 35: CPT | Performed by: INTERNAL MEDICINE

## 2018-04-05 PROCEDURE — G8979 MOBILITY GOAL STATUS: HCPCS | Performed by: PHYSICAL THERAPIST

## 2018-04-05 PROCEDURE — 94660 CPAP INITIATION&MGMT: CPT

## 2018-04-05 PROCEDURE — 97163 PT EVAL HIGH COMPLEX 45 MIN: CPT | Performed by: PHYSICAL THERAPIST

## 2018-04-05 PROCEDURE — 94640 AIRWAY INHALATION TREATMENT: CPT

## 2018-04-05 PROCEDURE — 97116 GAIT TRAINING THERAPY: CPT | Performed by: PHYSICAL THERAPIST

## 2018-04-05 PROCEDURE — 93010 ELECTROCARDIOGRAM REPORT: CPT | Performed by: INTERNAL MEDICINE

## 2018-04-05 PROCEDURE — 99222 1ST HOSP IP/OBS MODERATE 55: CPT | Performed by: INTERNAL MEDICINE

## 2018-04-05 PROCEDURE — 94762 N-INVAS EAR/PLS OXIMTRY CONT: CPT

## 2018-04-05 PROCEDURE — 97167 OT EVAL HIGH COMPLEX 60 MIN: CPT

## 2018-04-05 PROCEDURE — 80048 BASIC METABOLIC PNL TOTAL CA: CPT | Performed by: INTERNAL MEDICINE

## 2018-04-05 PROCEDURE — 94760 N-INVAS EAR/PLS OXIMETRY 1: CPT

## 2018-04-05 PROCEDURE — 83735 ASSAY OF MAGNESIUM: CPT | Performed by: INTERNAL MEDICINE

## 2018-04-05 RX ORDER — AMLODIPINE BESYLATE 5 MG/1
5 TABLET ORAL DAILY
Status: DISCONTINUED | OUTPATIENT
Start: 2018-04-05 | End: 2018-04-06

## 2018-04-05 RX ORDER — LABETALOL HYDROCHLORIDE 5 MG/ML
10 INJECTION, SOLUTION INTRAVENOUS ONCE
Status: COMPLETED | OUTPATIENT
Start: 2018-04-05 | End: 2018-04-05

## 2018-04-05 RX ORDER — LORAZEPAM 2 MG/ML
0.5 INJECTION INTRAMUSCULAR EVERY 4 HOURS PRN
Status: DISCONTINUED | OUTPATIENT
Start: 2018-04-05 | End: 2018-04-10 | Stop reason: HOSPADM

## 2018-04-05 RX ADMIN — MULTIPLE VITAMINS W/ MINERALS TAB 1 TABLET: TAB at 08:01

## 2018-04-05 RX ADMIN — ATORVASTATIN CALCIUM 40 MG: 40 TABLET, FILM COATED ORAL at 15:50

## 2018-04-05 RX ADMIN — METOPROLOL TARTRATE 50 MG: 50 TABLET ORAL at 08:01

## 2018-04-05 RX ADMIN — TAMSULOSIN HYDROCHLORIDE 0.4 MG: 0.4 CAPSULE ORAL at 15:49

## 2018-04-05 RX ADMIN — LABETALOL 20 MG/4 ML (5 MG/ML) INTRAVENOUS SYRINGE 10 MG: at 15:49

## 2018-04-05 RX ADMIN — LORAZEPAM 0.5 MG: 2 INJECTION INTRAMUSCULAR; INTRAVENOUS at 14:01

## 2018-04-05 RX ADMIN — CLONIDINE HYDROCHLORIDE 0.1 MG: 0.1 TABLET ORAL at 18:59

## 2018-04-05 RX ADMIN — PREGABALIN 100 MG: 50 CAPSULE ORAL at 08:01

## 2018-04-05 RX ADMIN — IPRATROPIUM BROMIDE AND ALBUTEROL SULFATE 3 ML: .5; 3 SOLUTION RESPIRATORY (INHALATION) at 07:39

## 2018-04-05 RX ADMIN — CLOPIDOGREL BISULFATE 75 MG: 75 TABLET ORAL at 08:01

## 2018-04-05 RX ADMIN — METHYLPREDNISOLONE SODIUM SUCCINATE 40 MG: 40 INJECTION, POWDER, FOR SOLUTION INTRAMUSCULAR; INTRAVENOUS at 21:38

## 2018-04-05 RX ADMIN — IPRATROPIUM BROMIDE AND ALBUTEROL SULFATE 3 ML: .5; 3 SOLUTION RESPIRATORY (INHALATION) at 19:42

## 2018-04-05 RX ADMIN — AMLODIPINE BESYLATE 5 MG: 5 TABLET ORAL at 15:50

## 2018-04-05 RX ADMIN — MELATONIN 3 MG: 3 TAB ORAL at 21:38

## 2018-04-05 RX ADMIN — PREGABALIN 100 MG: 50 CAPSULE ORAL at 15:50

## 2018-04-05 RX ADMIN — AZITHROMYCIN MONOHYDRATE 500 MG: 500 INJECTION, POWDER, LYOPHILIZED, FOR SOLUTION INTRAVENOUS at 12:56

## 2018-04-05 RX ADMIN — APIXABAN 5 MG: 5 TABLET, FILM COATED ORAL at 21:38

## 2018-04-05 RX ADMIN — ACETAMINOPHEN 650 MG: 325 TABLET, FILM COATED ORAL at 08:01

## 2018-04-05 RX ADMIN — CLONIDINE HYDROCHLORIDE 0.1 MG: 0.1 TABLET ORAL at 08:01

## 2018-04-05 RX ADMIN — APIXABAN 5 MG: 5 TABLET, FILM COATED ORAL at 08:01

## 2018-04-05 RX ADMIN — RANOLAZINE 500 MG: 500 TABLET, FILM COATED, EXTENDED RELEASE ORAL at 21:38

## 2018-04-05 RX ADMIN — METHYLPREDNISOLONE SODIUM SUCCINATE 40 MG: 40 INJECTION, POWDER, FOR SOLUTION INTRAMUSCULAR; INTRAVENOUS at 08:01

## 2018-04-05 RX ADMIN — ALBUTEROL SULFATE 2.5 MG: 2.5 SOLUTION RESPIRATORY (INHALATION) at 16:27

## 2018-04-05 RX ADMIN — ASPIRIN 81 MG 81 MG: 81 TABLET ORAL at 08:01

## 2018-04-05 RX ADMIN — LABETALOL 20 MG/4 ML (5 MG/ML) INTRAVENOUS SYRINGE 10 MG: at 16:44

## 2018-04-05 RX ADMIN — PREGABALIN 100 MG: 50 CAPSULE ORAL at 22:39

## 2018-04-05 RX ADMIN — RANOLAZINE 500 MG: 500 TABLET, FILM COATED, EXTENDED RELEASE ORAL at 08:02

## 2018-04-05 RX ADMIN — TIOTROPIUM BROMIDE 18 MCG: 18 CAPSULE ORAL; RESPIRATORY (INHALATION) at 13:10

## 2018-04-05 RX ADMIN — METOPROLOL TARTRATE 50 MG: 50 TABLET ORAL at 21:38

## 2018-04-05 RX ADMIN — DULOXETINE HYDROCHLORIDE 60 MG: 30 CAPSULE, DELAYED RELEASE ORAL at 08:01

## 2018-04-05 RX ADMIN — NICOTINE 1 PATCH: 7 PATCH, EXTENDED RELEASE TRANSDERMAL at 08:01

## 2018-04-05 NOTE — ASSESSMENT & PLAN NOTE
-continue the patient's home hypertension medication regimen  -add amlodipine 5 mg PO Qdaily  -p r n  IV labetalol  -follow patient's blood pressure trend
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
## 2018-04-05 NOTE — PLAN OF CARE
Problem: DISCHARGE PLANNING - CARE MANAGEMENT  Goal: Discharge to post-acute care or home with appropriate resources  INTERVENTIONS:  - Conduct assessment to determine patient/family and health care team treatment goals, and need for post-acute services based on payer coverage, community resources, and patient preferences, and barriers to discharge  - Address psychosocial, clinical, and financial barriers to discharge as identified in assessment in conjunction with the patient/family and health care team  Pt will need to be qualified for 02 on discharge     - Arrange appropriate level of post-acute services according to patient's   needs and preference and payer coverage in collaboration with the physician and health care team  - Communicate with and update the patient/family, physician, and health care team regarding progress on the discharge plan  - Arrange appropriate transportation to post-acute venues  Outcome: Progressing

## 2018-04-05 NOTE — PROGRESS NOTES
Progress Note - Jackie Avery 1945, 74 y o  male MRN: [id number]    Unit/Bed#:  Encounter: [id number]    Primary Care Provider: Anita Singh DO   Date and time admitted to hospital: 4/3/2018  6:13 AM        * Atrial fibrillation with rapid ventricular response ([address] )   Assessment & Plan    -patient underwent a MARTHA cardioversion on 04/04/2018  -he is now in a sinus rhythm  -he was weaned off the IV Cardizem drip/infusion  -continue full anticoagulation with Eliquis 5 mg p o  b i d   -continue p o  metoprolol for heart rate control  -TSH level was within normal limits  -I appreciate cardiology's input  -his echocardiogram did show a decreased ejection fraction of 40%, which may be heart rate related cardiomyopathy  -I will defer to Cardiology further ischemic workup for the decreased ejection fraction  -patient can be maintained on med/surg status with telemetry monitoring and continuous pulse oximetry        Chronic obstructive pulmonary disease with acute exacerbation (HCC)   Assessment & Plan    -continue IV methylprednisolone  -continue IV azithromycin  -continue the respiratory protocol and airway clearance protocol  -continue supplemental oxygen to maintain oxygen saturation levels of 90% and above  -consult pulmonology        Lactic acidosis   Assessment & Plan    -mild lactic acidosis  -follow the lactic acid level        Pulmonary nodule   Assessment & Plan    -the patient will need outpatient surveillance imaging of the pulmonary nodule with his PCP        Tobacco abuse   Assessment & Plan    -nicotine patch  -smoking cessation counseling        Essential hypertension   Assessment & Plan    -continue the patient's home hypertension medication regimen  -add amlodipine 5 mg PO Qdaily  -p r n  IV labetalol  -follow patient's blood pressure trend        Hypokalemia   Assessment & Plan    -resolved          VTE Pharmacologic Prophylaxis:   Pharmacologic: Apixaban (Eliquis)  Mechanical VTE Prophylaxis in Place: Yes    Patient Centered Rounds: I have performed bedside rounds with nursing staff today  Time Spent for Care: 20 minutes  More than 50% of total time spent on counseling and coordination of care as described above  Current Length of Stay: 2 day(s)    Current Patient Status: Inpatient   Certification Statement: The patient will continue to require additional inpatient hospital stay due to The need for IV methylprednisolone and IV antibiotics  Code Status: Level 1 - Full Code      Subjective: The patient was seen and examined  He continues to complain of shortness of breath, worsened with exertion  Objective:     Vitals:   Temp (24hrs), Av 4 °F (36 9 °C), Min:98 °F (36 7 °C), Max:98 7 °F (37 1 °C)    HR:  [70-95] 75  Resp:  [20-31] 31  BP: (130-187)/(62-98) 183/98  SpO2:  [95 %-100 %] 97 %  Body mass index is 27 18 kg/m²  Input and Output Summary (last 24 hours):        Intake/Output Summary (Last 24 hours) at 18 1521  Last data filed at 18 1314   Gross per 24 hour   Intake             1170 ml   Output              675 ml   Net              495 ml       Physical Exam:     Physical Exam  General:  NAD, awake, alert, oriented x 3, conversational dyspnea is present  HEENT:  NC/AT, mucous membranes moist  Neck:  Supple, No JVP elevation  CV:  + S1, + S2, RRR  Pulm:  Decreased breath sounds bilaterally  Abd:  Soft, Non-tender, Non-distended  Ext:  No clubbing/cyanosis/edema  Skin:  No rashes      Additional Data:     Labs:      Results from last 7 days  Lab Units 18  0442   WBC Thousand/uL 17 49*   HEMOGLOBIN g/dL 11 7*   HEMATOCRIT % 36 4*   PLATELETS Thousands/uL 217   NEUTROS PCT % 87*   LYMPHS PCT % 7*   MONOS PCT % 6   EOS PCT % 0       Results from last 7 days  Lab Units 18  0442 18  0532   SODIUM mmol/L 141 140   POTASSIUM mmol/L 4 2 4 0   CHLORIDE mmol/L 106 104   CO2 mmol/L 28 27   BUN mg/dL 29* 23   CREATININE mg/dL 1 07 1 16 CALCIUM mg/dL 8 8 9 1   TOTAL PROTEIN g/dL  --  6 4   BILIRUBIN TOTAL mg/dL  --  0 60   ALK PHOS U/L  --  95   ALT U/L  --  31   AST U/L  --  20   GLUCOSE RANDOM mg/dL 181* 147*       Results from last 7 days  Lab Units 04/03/18  0627   INR  1 12       * I Have Reviewed All Lab Data Listed Above  * Additional Pertinent Lab Tests Reviewed:  [address] Admission Reviewed      Recent Cultures (last 7 days):       Results from last 7 days  Lab Units 04/03/18  1730   URINE CULTURE  Culture too young- will reincubate   LEGIONELLA URINARY ANTIGEN  Negative       Last 24 Hours Medication List:     Current Facility-Administered Medications:  acetaminophen 650 mg Oral Q6H PRN Chinedu Orellana, DO    albuterol 2 5 mg Nebulization Q6H PRN Chinedu Orellana, DO    amLODIPine 5 mg Oral Daily Chinedu Benavidezon, DO    apixaban 5 mg Oral BID Chinedu Orellana, DO    aspirin 81 mg Oral Daily Chinedu Orellana, DO    atorvastatin 40 mg Oral Daily With Zack Collier, DO    azithromycin 500 mg Intravenous Q24H Chinedu Orellana, DO Last Rate: 500 mg (04/05/18 1256)   cloNIDine 0 1 mg Oral BID Chinedu Orellana, DO    clopidogrel 75 mg Oral Daily Chinedu Orellana, DO    DULoxetine 60 mg Oral Daily Chinedu Benavidezon, DO    ipratropium-albuterol 3 mL Nebulization BID Chinedu Orellana, DO    labetalol 10 mg Intravenous Q4H PRN Chinedu Orellana, DO    LORazepam 0 5 mg Intravenous Q4H PRN Chinedu Orellana, DO    melatonin 3 mg Oral HS Chinedu Magana, DO    methylPREDNISolone sodium succinate 40 mg Intravenous Q12H [address] Chinedu Orellana, DO    metoprolol tartrate 50 mg Oral Q12H [address] Chinedu Orellana, DO    multivitamin-minerals 1 tablet Oral Daily Chinedu Orellana, DO    nicotine 1 patch Transdermal Daily Chinedu Orellana, DO    ondansetron 4 mg Intravenous Q6H PRN Chinedu Orellana, DO    oxyCODONE 5 mg Oral Q4H PRN Chinedu Orellana, DO    pregabalin 100 mg Oral TID Chinedu Orellana, DO ranolazine 500 mg Oral Q12H [address] Jose Painter DO    tamsulosin 0 4 mg Oral Daily With Zack Collier DO    tiotropium 18 mcg Inhalation Daily Claudis Cooks, DO         Today, Patient Was Seen By: Jose Painter DO    ** Please Note: Dictation voice to text software may have been used in the creation of this document   **
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
## 2018-04-05 NOTE — PHYSICAL THERAPY NOTE
PT Evaluation     04/05/18 1021   Note Type   Note type Eval/Treat   Pain Assessment   Pain Score 5   Pain Location Generalized   Home Living   Type of [address] [place] [address] One level; Able to live on main level with bedroom/bathroom; Performs ADLs on one level;Stairs to enter with rails  (17 steps to enter with HR)   Bathroom Shower/Tub Tub/shower unit   Bathroom Toilet Standard   Bathroom Accessibility [place]; Wheelchair-electric  (rollator walker)   Prior Function   Level of [place] Independent with ADLs and functional mobility  ((I) ambulation short distance with rollator or SPC then w/c)   Lives With Friend(s)  (caretaker)   Receives Help From Friend(s)  (caretaker)   ADL Assistance Needs assistance  (caretaker assists with bathing, dressing as needed)   IADLs Needs assistance  (caretaker performs IADL's)   Comments caregiver drives   Restrictions/Precautions   Other Precautions Contact/isolation; Chair Alarm;Multiple lines;Telemetry;O2;Fall Risk;Pain   General   Family/Caregiver Present No   Cognition   Arousal/Participation Alert   Orientation Level Oriented X4   Following Commands Follows all commands and directions without difficulty   RLE Assessment   RLE Assessment WFL  (except ankle df, 3+ limited from prior CVA)   LLE Assessment   LLE Assessment WFL  (4+/5 throughout)   Coordination   Sensation X   Light Touch   RLE Light Touch Absent   RLE Light Touch Comments minimal to no light touch sensation R LE from prior CVA   LLE Light Touch Grossly intact   Bed Mobility   Additional Comments pt seated in chair at bedside when PT entered  Returned to sitting in chair at bedside after ambulation  Transfers   Sit to Stand 5  Supervision   Additional items Armrests; Verbal cues  (with RW)   Stand to Sit 5  Supervision   Additional items Verbal cues;Armrests  (with RW)   Stand pivot 5  Supervision   Additional items Verbal cues  (with RW)   Additional Comments Pt on 3L's O2 with Spo2 96% HR 75 at rest and during ambulation 95% HR 85  pt required 2 seated rest breaks during ambulation due to fatigue and SOB but SpO2 did not drop  Post ambulation SpO2 93% HR 87  Pt requires chair follow due to decreased endurance and SOB  Ambulation/Elevation   Gait pattern Short stride; Forward Flexion   Gait Assistance 5  Supervision   Assistive Device Rolling walker   Distance 200ft with RW Supervision requiring 2 seated rest breaks during ambulation due to SOB but no drop in spO2 on 3L's   Balance   Static Sitting Good   Dynamic Sitting Good   Static Standing Fair +  (with RW)   Dynamic Standing Fair  (with RW)   Ambulatory Fair  (with RW)   Endurance Deficit   Endurance Deficit Yes   Endurance Deficit Description limited ambulation tolerance due to SOB requiring seated rest break   Activity Tolerance   Activity Tolerance Patient limited by fatigue;Patient limited by pain   Assessment   Prognosis Good   Problem List Decreased strength;Decreased endurance; Impaired balance;Decreased mobility; Decreased safety awareness; Impaired sensation;Pain   Assessment Pt is a 74year old male presenting with decreased R LE strength and sensation, decreased balance endurance and functional mobility  Pt is performing all bed mobility transfers and ambulation at a Supervision level with use of RW however demonstrates SOB requiring multiple seated rest breaks  Pt is in need of continued activity in PT to improve impairments and functional deficits  Pt is not agreeable to short term rehab therefore anticipate pt will return home with home health care services     Goals   Patient Goals To feel better and return home   LTG Expiration Date 04/19/18   Long Term Goal #1 (I) with all bed mobility and transfers with RW   Long Term Goal #2 pt will ambulate 250ft with RW (S) no LOB or drop in SpO2 below 90% and will ascend/descend 11 steps with HR (S) no LOB   Plan   Treatment/Interventions Functional transfer training;LE strengthening/ROM; Elevations; Therapeutic exercise; Endurance training;Patient/family training;Bed mobility;Gait training   PT Frequency (3-5x/wk)   Recommendation   Recommendation Home PT  (pt not agreeable for short term rehab)   PT - OK to Discharge No   Pt with SCD's on when PT entered room  SCD's reapplied and turned on with pt seated in chair at bedside with call bell in reach

## 2018-04-05 NOTE — ASSESSMENT & PLAN NOTE
-patient underwent a MARTHA cardioversion on 04/04/2018  -he is now in a sinus rhythm  -he was weaned off the IV Cardizem drip/infusion  -continue full anticoagulation with Eliquis 5 mg p o  b i d   -continue p o  metoprolol for heart rate control  -TSH level was within normal limits  -I appreciate cardiology's input  -his echocardiogram did show a decreased ejection fraction of 40%, which may be heart rate related cardiomyopathy  -I will defer to Cardiology further ischemic workup for the decreased ejection fraction  -patient can be maintained on med/surg status with telemetry monitoring and continuous pulse oximetry

## 2018-04-05 NOTE — PROGRESS NOTES
Pt is repeatedly removing nasal cannula, telemetry, and continuous pulse ox  States he is feeling anxious and out of breath  Pulse ox 90-92%  Lungs diminished and clear  Dr Angel Bales notified and awaiting orders for IV Ativan

## 2018-04-05 NOTE — INCIDENTAL FINDINGS
The following findings require follow up:  Radiographic finding   Findin mm pulmonary nodule   Follow up required:  CT chest   Follow up should be done within 6 month(s)    Please notify the following clinician to assist with the follow up:   Dr Lockett Roles

## 2018-04-05 NOTE — PROGRESS NOTES
Patient awake & holding chest  Patient stated that he had "mild" chest pain, but stated he didn't want any pain meds  VSS(see vitals)  Patient also stated that he has had the pain all day  Rylan Lambert PAC made aware & troponin level ordered
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
## 2018-04-06 PROBLEM — N30.00 ACUTE CYSTITIS: Status: ACTIVE | Noted: 2018-04-06

## 2018-04-06 PROBLEM — D72.829 LEUKOCYTOSIS: Status: ACTIVE | Noted: 2018-04-06

## 2018-04-06 LAB
ANION GAP SERPL CALCULATED.3IONS-SCNC: 7 MMOL/L (ref 4–13)
ANISOCYTOSIS BLD QL SMEAR: PRESENT
BASOPHILS # BLD MANUAL: 0 THOUSAND/UL (ref 0–0.1)
BASOPHILS NFR MAR MANUAL: 0 % (ref 0–1)
BUN SERPL-MCNC: 32 MG/DL (ref 5–25)
CALCIUM SERPL-MCNC: 8.7 MG/DL (ref 8.3–10.1)
CHLORIDE SERPL-SCNC: 106 MMOL/L (ref 100–108)
CO2 SERPL-SCNC: 27 MMOL/L (ref 21–32)
CREAT SERPL-MCNC: 1.02 MG/DL (ref 0.6–1.3)
EOSINOPHIL # BLD MANUAL: 0 THOUSAND/UL (ref 0–0.4)
EOSINOPHIL NFR BLD MANUAL: 0 % (ref 0–6)
ERYTHROCYTE [DISTWIDTH] IN BLOOD BY AUTOMATED COUNT: 16.8 % (ref 11.6–15.1)
GFR SERPL CREATININE-BSD FRML MDRD: 72 ML/MIN/1.73SQ M
GLUCOSE SERPL-MCNC: 192 MG/DL (ref 65–140)
HCT VFR BLD AUTO: 37.5 % (ref 36.5–49.3)
HGB BLD-MCNC: 12.3 G/DL (ref 12–17)
LACTATE SERPL-SCNC: 2.1 MMOL/L (ref 0.5–2)
LYMPHOCYTES # BLD AUTO: 1.19 THOUSAND/UL (ref 0.6–4.47)
LYMPHOCYTES # BLD AUTO: 6 % (ref 14–44)
MAGNESIUM SERPL-MCNC: 2.1 MG/DL (ref 1.6–2.6)
MCH RBC QN AUTO: 23.8 PG (ref 26.8–34.3)
MCHC RBC AUTO-ENTMCNC: 32.8 G/DL (ref 31.4–37.4)
MCV RBC AUTO: 73 FL (ref 82–98)
MONOCYTES # BLD AUTO: 0.79 THOUSAND/UL (ref 0–1.22)
MONOCYTES NFR BLD: 4 % (ref 4–12)
NEUTROPHILS # BLD MANUAL: 17.28 THOUSAND/UL (ref 1.85–7.62)
NEUTS SEG NFR BLD AUTO: 87 % (ref 43–75)
OVALOCYTES BLD QL SMEAR: PRESENT
PLATELET # BLD AUTO: 251 THOUSANDS/UL (ref 149–390)
PLATELET BLD QL SMEAR: ADEQUATE
PMV BLD AUTO: 10.5 FL (ref 8.9–12.7)
POIKILOCYTOSIS BLD QL SMEAR: PRESENT
POTASSIUM SERPL-SCNC: 4.3 MMOL/L (ref 3.5–5.3)
RBC # BLD AUTO: 5.17 MILLION/UL (ref 3.88–5.62)
SODIUM SERPL-SCNC: 140 MMOL/L (ref 136–145)
TOTAL CELLS COUNTED SPEC: 100
VARIANT LYMPHS # BLD AUTO: 3 %
WBC # BLD AUTO: 19.86 THOUSAND/UL (ref 4.31–10.16)

## 2018-04-06 PROCEDURE — 94760 N-INVAS EAR/PLS OXIMETRY 1: CPT

## 2018-04-06 PROCEDURE — 97116 GAIT TRAINING THERAPY: CPT

## 2018-04-06 PROCEDURE — 94640 AIRWAY INHALATION TREATMENT: CPT

## 2018-04-06 PROCEDURE — 85027 COMPLETE CBC AUTOMATED: CPT | Performed by: INTERNAL MEDICINE

## 2018-04-06 PROCEDURE — 83605 ASSAY OF LACTIC ACID: CPT | Performed by: INTERNAL MEDICINE

## 2018-04-06 PROCEDURE — 85007 BL SMEAR W/DIFF WBC COUNT: CPT | Performed by: INTERNAL MEDICINE

## 2018-04-06 PROCEDURE — 80048 BASIC METABOLIC PNL TOTAL CA: CPT | Performed by: INTERNAL MEDICINE

## 2018-04-06 PROCEDURE — 94762 N-INVAS EAR/PLS OXIMTRY CONT: CPT

## 2018-04-06 PROCEDURE — 99232 SBSQ HOSP IP/OBS MODERATE 35: CPT | Performed by: INTERNAL MEDICINE

## 2018-04-06 PROCEDURE — 94660 CPAP INITIATION&MGMT: CPT

## 2018-04-06 PROCEDURE — 83735 ASSAY OF MAGNESIUM: CPT | Performed by: INTERNAL MEDICINE

## 2018-04-06 RX ORDER — FLUCONAZOLE 100 MG/1
200 TABLET ORAL DAILY
Status: DISCONTINUED | OUTPATIENT
Start: 2018-04-06 | End: 2018-04-07

## 2018-04-06 RX ORDER — HYDRALAZINE HYDROCHLORIDE 20 MG/ML
10 INJECTION INTRAMUSCULAR; INTRAVENOUS ONCE
Status: COMPLETED | OUTPATIENT
Start: 2018-04-06 | End: 2018-04-06

## 2018-04-06 RX ORDER — AMLODIPINE BESYLATE 5 MG/1
5 TABLET ORAL ONCE
Status: COMPLETED | OUTPATIENT
Start: 2018-04-06 | End: 2018-04-06

## 2018-04-06 RX ORDER — AMLODIPINE BESYLATE 10 MG/1
10 TABLET ORAL DAILY
Status: DISCONTINUED | OUTPATIENT
Start: 2018-04-07 | End: 2018-04-09

## 2018-04-06 RX ORDER — LABETALOL HYDROCHLORIDE 5 MG/ML
10 INJECTION, SOLUTION INTRAVENOUS ONCE
Status: COMPLETED | OUTPATIENT
Start: 2018-04-06 | End: 2018-04-06

## 2018-04-06 RX ADMIN — TAMSULOSIN HYDROCHLORIDE 0.4 MG: 0.4 CAPSULE ORAL at 16:12

## 2018-04-06 RX ADMIN — LABETALOL 20 MG/4 ML (5 MG/ML) INTRAVENOUS SYRINGE 10 MG: at 12:05

## 2018-04-06 RX ADMIN — AMLODIPINE BESYLATE 5 MG: 5 TABLET ORAL at 12:05

## 2018-04-06 RX ADMIN — LORAZEPAM 0.5 MG: 2 INJECTION INTRAMUSCULAR; INTRAVENOUS at 04:53

## 2018-04-06 RX ADMIN — APIXABAN 5 MG: 5 TABLET, FILM COATED ORAL at 21:03

## 2018-04-06 RX ADMIN — METOPROLOL TARTRATE 50 MG: 50 TABLET ORAL at 08:07

## 2018-04-06 RX ADMIN — PREGABALIN 100 MG: 50 CAPSULE ORAL at 08:07

## 2018-04-06 RX ADMIN — OXYCODONE HYDROCHLORIDE 5 MG: 5 TABLET ORAL at 16:12

## 2018-04-06 RX ADMIN — OXYCODONE HYDROCHLORIDE 5 MG: 5 TABLET ORAL at 08:20

## 2018-04-06 RX ADMIN — PREGABALIN 100 MG: 50 CAPSULE ORAL at 21:02

## 2018-04-06 RX ADMIN — OXYCODONE HYDROCHLORIDE 5 MG: 5 TABLET ORAL at 02:53

## 2018-04-06 RX ADMIN — AZITHROMYCIN MONOHYDRATE 500 MG: 500 INJECTION, POWDER, LYOPHILIZED, FOR SOLUTION INTRAVENOUS at 12:37

## 2018-04-06 RX ADMIN — MELATONIN 3 MG: 3 TAB ORAL at 21:03

## 2018-04-06 RX ADMIN — IPRATROPIUM BROMIDE AND ALBUTEROL SULFATE 3 ML: .5; 3 SOLUTION RESPIRATORY (INHALATION) at 08:00

## 2018-04-06 RX ADMIN — RANOLAZINE 500 MG: 500 TABLET, FILM COATED, EXTENDED RELEASE ORAL at 08:07

## 2018-04-06 RX ADMIN — CLOPIDOGREL BISULFATE 75 MG: 75 TABLET ORAL at 08:07

## 2018-04-06 RX ADMIN — AMLODIPINE BESYLATE 5 MG: 5 TABLET ORAL at 08:07

## 2018-04-06 RX ADMIN — METOPROLOL TARTRATE 50 MG: 50 TABLET ORAL at 21:00

## 2018-04-06 RX ADMIN — PREGABALIN 100 MG: 50 CAPSULE ORAL at 16:12

## 2018-04-06 RX ADMIN — DULOXETINE HYDROCHLORIDE 60 MG: 30 CAPSULE, DELAYED RELEASE ORAL at 08:07

## 2018-04-06 RX ADMIN — TIOTROPIUM BROMIDE 18 MCG: 18 CAPSULE ORAL; RESPIRATORY (INHALATION) at 08:15

## 2018-04-06 RX ADMIN — APIXABAN 5 MG: 5 TABLET, FILM COATED ORAL at 08:07

## 2018-04-06 RX ADMIN — ALBUTEROL SULFATE 2.5 MG: 2.5 SOLUTION RESPIRATORY (INHALATION) at 05:05

## 2018-04-06 RX ADMIN — ATORVASTATIN CALCIUM 40 MG: 40 TABLET, FILM COATED ORAL at 16:12

## 2018-04-06 RX ADMIN — IPRATROPIUM BROMIDE AND ALBUTEROL SULFATE 3 ML: .5; 3 SOLUTION RESPIRATORY (INHALATION) at 19:39

## 2018-04-06 RX ADMIN — LORAZEPAM 0.5 MG: 2 INJECTION INTRAMUSCULAR; INTRAVENOUS at 10:08

## 2018-04-06 RX ADMIN — MULTIPLE VITAMINS W/ MINERALS TAB 1 TABLET: TAB at 08:07

## 2018-04-06 RX ADMIN — HYDRALAZINE HYDROCHLORIDE 10 MG: 20 INJECTION INTRAMUSCULAR; INTRAVENOUS at 13:41

## 2018-04-06 RX ADMIN — RANOLAZINE 500 MG: 500 TABLET, FILM COATED, EXTENDED RELEASE ORAL at 21:02

## 2018-04-06 RX ADMIN — ASPIRIN 81 MG 81 MG: 81 TABLET ORAL at 08:07

## 2018-04-06 RX ADMIN — LORAZEPAM 0.5 MG: 2 INJECTION INTRAMUSCULAR; INTRAVENOUS at 18:24

## 2018-04-06 RX ADMIN — LABETALOL 20 MG/4 ML (5 MG/ML) INTRAVENOUS SYRINGE 10 MG: at 17:11

## 2018-04-06 RX ADMIN — NICOTINE 1 PATCH: 7 PATCH, EXTENDED RELEASE TRANSDERMAL at 08:09

## 2018-04-06 RX ADMIN — METHYLPREDNISOLONE SODIUM SUCCINATE 40 MG: 40 INJECTION, POWDER, FOR SOLUTION INTRAMUSCULAR; INTRAVENOUS at 21:03

## 2018-04-06 RX ADMIN — LABETALOL 20 MG/4 ML (5 MG/ML) INTRAVENOUS SYRINGE 10 MG: at 10:51

## 2018-04-06 RX ADMIN — CLONIDINE HYDROCHLORIDE 0.1 MG: 0.1 TABLET ORAL at 17:11

## 2018-04-06 RX ADMIN — FLUCONAZOLE 200 MG: 100 TABLET ORAL at 16:12

## 2018-04-06 RX ADMIN — METHYLPREDNISOLONE SODIUM SUCCINATE 40 MG: 40 INJECTION, POWDER, FOR SOLUTION INTRAMUSCULAR; INTRAVENOUS at 08:10

## 2018-04-06 RX ADMIN — CLONIDINE HYDROCHLORIDE 0.1 MG: 0.1 TABLET ORAL at 08:07

## 2018-04-06 NOTE — PHYSICAL THERAPY NOTE
PT treatment Note      04/06/18 1107   Restrictions/Precautions   Other Precautions (Contact/isolation;Multiple lines;Telemetry;O2;Fall Risk)   Subjective   Subjective I need shoes to walk   Bed Mobility   Additional Comments seated EOB at start of PT session  Pt O2 off  reports he takes it off himself  (SpO2 low 84% reapplied returned to 90%)   Transfers   Sit to Stand 5  Supervision   Additional items Bedrails;Verbal cues   Stand to Sit 5  Supervision   Additional items Armrests; Verbal cues   Stand pivot 5  Supervision   Ambulation/Elevation   Gait pattern (Short stride; Forward Flexion)   Gait Assistance 5  Supervision   Assistive Device Rolling walker   Distance 120' x 2 with seated rest break  SpO2 90-88% with 4 L O2 moderate SOB   Balance   Static Sitting Good   Dynamic Sitting Good   Static Standing Fair +   Dynamic Standing Fair   Ambulatory Fair  (with Rw)   Endurance Deficit   Endurance Deficit Yes   Activity Tolerance   Activity Tolerance Patient limited by fatigue   Assessment   Prognosis Good   Problem List Decreased strength;Decreased endurance; Impaired balance;Decreased mobility; Decreased safety awareness   Assessment Performs functional mobility with supervison and cues for safe transfer techniques  Moderate SOB with activity  Pt is in need of continued activity in PT to improve impairments and functional deficits  Plan   Treatment/Interventions Functional transfer training;LE strengthening/ROM; Therapeutic exercise; Endurance training;Bed mobility;Gait training   Progress Progressing toward goals   Recommendation   Recommendation Home PT   Pt  OOB with call bell within reach and alarm on at end of PT session

## 2018-04-06 NOTE — ASSESSMENT & PLAN NOTE
-continue IV methylprednisolone  -continue IV azithromycin  -continue the respiratory protocol and airway clearance protocol  -continue supplemental oxygen to maintain oxygen saturation levels of 90% and above

## 2018-04-06 NOTE — PROGRESS NOTES
Progress Note - Yumiko [facility] 1945, 74 y o  male MRN: [id number]    Unit/Bed#: 401-01 Encounter: [id number]    Primary Care Provider: Raysa Jeffries DO   Date and time admitted to hospital: 4/3/2018  6:13 AM        Chronic obstructive pulmonary disease with acute exacerbation ([facility] )   Assessment & Plan    -continue IV methylprednisolone  -continue IV azithromycin  -continue the respiratory protocol and airway clearance protocol  -continue supplemental oxygen to maintain oxygen saturation levels of 90% and above          * Atrial fibrillation with rapid ventricular response (HCC)   Assessment & Plan    -patient underwent a MARTHA cardioversion on 04/04/2018  -he is now in a sinus rhythm  -he was weaned off the IV Cardizem drip/infusion  -continue full anticoagulation with Eliquis 5 mg p o  b i d   -continue p o  metoprolol for heart rate control  -TSH level was within normal limits  -I appreciate cardiology's input  -his echocardiogram did show a decreased ejection fraction of 40%, which may be heart rate related cardiomyopathy  -I will defer to Cardiology further ischemic workup for the decreased ejection fraction  -discontinue telemetry        Leukocytosis   Assessment & Plan    -Check blood cultures x 2 sets  -this is partially secondary to methylprednisolone  -initiate PO fluconazole for the acute cystitis  -continue IV azithromycin          Acute cystitis   Assessment & Plan    -initiate PO fluconazole  -await the urine culture sensivities        Lactic acidosis   Assessment & Plan    -mild lactic acidosis  -follow the lactic acid level  -I will not utilize NSS IV fluids in the setting of uncontrolled hypertension        Pulmonary nodule   Assessment & Plan    -the patient will need outpatient surveillance imaging of the pulmonary nodule with his PCP        Tobacco abuse   Assessment & Plan    -nicotine patch  -smoking cessation counseling        Essential hypertension   Assessment & Plan    -the patient continues to have uncontrolled hypertension  -continue the patient's home hypertension medication regimen  -increase amlodipine to 10 mg PO Qdaily  -p r n  IV labetalol  -follow patient's blood pressure trend            VTE Pharmacologic Prophylaxis:   Pharmacologic: Apixaban (Eliquis)  Mechanical VTE Prophylaxis in Place: Yes    Patient Centered Rounds: I have performed bedside rounds with nursing staff today  Time Spent for Care: 20 minutes  More than 50% of total time spent on counseling and coordination of care as described above  Current Length of Stay: 3 day(s)    Current Patient Status: Inpatient   Certification Statement: The patient will continue to require additional inpatient hospital stay due to the need for IV methylprednisolone, IV azithromycin, work-up for leukocytosis, continued monitoring of his respiratory status  Code Status: Level 1 - Full Code      Subjective: The patient was seen and examined  The patient continues to experience shortness of breath, worsened with exertion  Objective:     Vitals:   Temp (24hrs), Av 9 °F (36 6 °C), Min:97 3 °F (36 3 °C), Max:98 2 °F (36 8 °C)    HR:  [69-93] 81  Resp:  [18-33] 18  BP: (139-228)/() 170/80  SpO2:  [88 %-98 %] 98 %  Body mass index is 27 85 kg/m²  Input and Output Summary (last 24 hours):        Intake/Output Summary (Last 24 hours) at 18 1610  Last data filed at 18 1128   Gross per 24 hour   Intake              360 ml   Output              301 ml   Net               59 ml       Physical Exam:     Physical Exam  General:  Mild respiratory distress, awake, alert, conversational dyspnea is present  HEENT:  NC/AT, mucous membranes moist  Neck:  Supple, No JVP elevation  CV:  + S1, + S2, RRR  Pulm:  Expiratory wheezing bilaterally  Abd:  Soft, Non-tender, Non-distended  Ext:  No clubbing/cyanosis/edema  Skin:  No rashes      Additional Data:     Labs:      Results from last 7 days  Lab Units 18  0601 04/05/18  0442   WBC Thousand/uL 19 86* 17 49*   HEMOGLOBIN g/dL 12 3 11 7*   HEMATOCRIT % 37 5 36 4*   PLATELETS Thousands/uL 251 217   NEUTROS PCT %  --  87*   LYMPHS PCT %  --  7*   LYMPHO PCT % 6*  --    MONOS PCT %  --  6   MONO PCT MAN % 4  --    EOS PCT %  --  0   EOSINO PCT MANUAL % 0  --        Results from last 7 days  Lab Units 04/06/18  0601  04/04/18  0532   SODIUM mmol/L 140  < > 140   POTASSIUM mmol/L 4 3  < > 4 0   CHLORIDE mmol/L 106  < > 104   CO2 mmol/L 27  < > 27   BUN mg/dL 32*  < > 23   CREATININE mg/dL 1 02  < > 1 16   CALCIUM mg/dL 8 7  < > 9 1   TOTAL PROTEIN g/dL  --   --  6 4   BILIRUBIN TOTAL mg/dL  --   --  0 60   ALK PHOS U/L  --   --  95   ALT U/L  --   --  31   AST U/L  --   --  20   GLUCOSE RANDOM mg/dL 192*  < > 147*   < > = values in this interval not displayed  Results from last 7 days  Lab Units 04/03/18  0627   INR  1 12       * I Have Reviewed All Lab Data Listed Above  * Additional Pertinent Lab Tests Reviewed:  [facility] Admission Reviewed        Recent Cultures (last 7 days):       Results from last 7 days  Lab Units 04/03/18  1730   URINE CULTURE  >100,000 cfu/ml Candida sp  presumptively glabrata*  20,000-29,000 cfu/ml    LEGIONELLA URINARY ANTIGEN  Negative       Last 24 Hours Medication List:     Current Facility-Administered Medications:  acetaminophen 650 mg Oral Q6H PRN Clotilde Bound, DO    albuterol 2 5 mg Nebulization Q6H PRN Clotilde Bound, DO    [START ON 4/7/2018] amLODIPine 10 mg Oral Daily Clotilde Bound, DO    apixaban 5 mg Oral BID Clotilde Bound, DO    aspirin 81 mg Oral Daily Clotilde Bound, DO    atorvastatin 40 mg Oral Daily With Zack Collier, DO    azithromycin 500 mg Intravenous Q24H Clotilde Bound, DO Last Rate: 500 mg (04/06/18 1237)   cloNIDine 0 1 mg Oral BID Clotilde Bound, DO    clopidogrel 75 mg Oral Daily Clotilde Bound, DO    DULoxetine 60 mg Oral Daily Clotilde Bound, DO fluconazole 200 mg Oral Daily Izzy Marshallight, DO    ipratropium-albuterol 3 mL Nebulization BID Izzy Marshallight, DO    labetalol 10 mg Intravenous Q4H PRN Izzy Marshallight, DO    LORazepam 0 5 mg Intravenous Q4H PRN Izzy Marshallight, DO    melatonin 3 mg Oral HS Chinedu Magana,     methylPREDNISolone sodium succinate 40 mg Intravenous Q12H [address] Izzyhaylee Marshallight, DO    metoprolol tartrate 50 mg Oral Q12H [address] Izzy Marshallight, DO    multivitamin-minerals 1 tablet Oral Daily Izzy Marshallight, DO    nicotine 1 patch Transdermal Daily Izzy Marshallight, DO    ondansetron 4 mg Intravenous Q6H PRN Izzy Granado, DO    oxyCODONE 5 mg Oral Q4H PRN Izzy Marshallight, DO    pregabalin 100 mg Oral TID Izzy Marshallight, DO    ranolazine 500 mg Oral Q12H [address] Chinedu Magana,     tamsulosin 0 4 mg Oral Daily With Zack Collier DO    tiotropium 18 mcg Inhalation Daily Jp Marquez DO         Today, Patient Was Seen By: Izzy Granado DO    ** Please Note: Dictation voice to text software may have been used in the creation of this document   **

## 2018-04-06 NOTE — ASSESSMENT & PLAN NOTE
-the patient continues to have uncontrolled hypertension  -continue the patient's home hypertension medication regimen  -increase amlodipine to 10 mg PO Qdaily  -p r n  IV labetalol  -follow patient's blood pressure trend

## 2018-04-06 NOTE — ASSESSMENT & PLAN NOTE
-mild lactic acidosis  -follow the lactic acid level  -I will not utilize NSS IV fluids in the setting of uncontrolled hypertension

## 2018-04-06 NOTE — ASSESSMENT & PLAN NOTE
-patient underwent a MARTHA cardioversion on 04/04/2018  -he is now in a sinus rhythm  -he was weaned off the IV Cardizem drip/infusion  -continue full anticoagulation with Eliquis 5 mg p o  b i d   -continue p o  metoprolol for heart rate control  -TSH level was within normal limits  -I appreciate cardiology's input  -his echocardiogram did show a decreased ejection fraction of 40%, which may be heart rate related cardiomyopathy  -I will defer to Cardiology further ischemic workup for the decreased ejection fraction  -discontinue telemetry
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
## 2018-04-06 NOTE — ASSESSMENT & PLAN NOTE
-Check blood cultures x 2 sets  -this is partially secondary to methylprednisolone  -initiate PO fluconazole for the acute cystitis  -continue IV azithromycin

## 2018-04-07 PROBLEM — N30.00 ACUTE CYSTITIS: Status: RESOLVED | Noted: 2018-04-06 | Resolved: 2018-04-07

## 2018-04-07 LAB
ALBUMIN SERPL BCP-MCNC: 3.2 G/DL (ref 3.5–5)
ALP SERPL-CCNC: 67 U/L (ref 46–116)
ALT SERPL W P-5'-P-CCNC: 47 U/L (ref 12–78)
ANION GAP SERPL CALCULATED.3IONS-SCNC: 4 MMOL/L (ref 4–13)
ANISOCYTOSIS BLD QL SMEAR: PRESENT
ARSENIC BLD-MCNC: 9 UG/L (ref 2–23)
ARTERIAL PATENCY WRIST A: YES
AST SERPL W P-5'-P-CCNC: 18 U/L (ref 5–45)
BASE EXCESS BLDA CALC-SCNC: 4.9 MMOL/L
BASOPHILS # BLD MANUAL: 0 THOUSAND/UL (ref 0–0.1)
BASOPHILS NFR MAR MANUAL: 0 % (ref 0–1)
BILIRUB SERPL-MCNC: 0.7 MG/DL (ref 0.2–1)
BUN SERPL-MCNC: 21 MG/DL (ref 5–25)
CALCIUM SERPL-MCNC: 8.3 MG/DL (ref 8.3–10.1)
CHLORIDE SERPL-SCNC: 104 MMOL/L (ref 100–108)
CO2 SERPL-SCNC: 31 MMOL/L (ref 21–32)
CREAT SERPL-MCNC: 0.83 MG/DL (ref 0.6–1.3)
EOSINOPHIL # BLD MANUAL: 0 THOUSAND/UL (ref 0–0.4)
EOSINOPHIL NFR BLD MANUAL: 0 % (ref 0–6)
ERYTHROCYTE [DISTWIDTH] IN BLOOD BY AUTOMATED COUNT: 17 % (ref 11.6–15.1)
GFR SERPL CREATININE-BSD FRML MDRD: 86 ML/MIN/1.73SQ M
GLUCOSE SERPL-MCNC: 166 MG/DL (ref 65–140)
GLUCOSE SERPL-MCNC: 190 MG/DL (ref 65–140)
HCO3 BLDA-SCNC: 29 MMOL/L (ref 22–28)
HCT VFR BLD AUTO: 39.9 % (ref 36.5–49.3)
HFNC FLOW LPM: 30
HGB BLD-MCNC: 13.2 G/DL (ref 12–17)
LACTATE SERPL-SCNC: 2.9 MMOL/L (ref 0.5–2)
LEAD BLD-MCNC: 2 UG/DL (ref 0–19)
LYMPHOCYTES # BLD AUTO: 1.74 THOUSAND/UL (ref 0.6–4.47)
LYMPHOCYTES # BLD AUTO: 10 % (ref 14–44)
MAGNESIUM SERPL-MCNC: 1.7 MG/DL (ref 1.6–2.6)
MCH RBC QN AUTO: 23.6 PG (ref 26.8–34.3)
MCHC RBC AUTO-ENTMCNC: 33.1 G/DL (ref 31.4–37.4)
MCV RBC AUTO: 71 FL (ref 82–98)
MERCURY BLD-MCNC: NORMAL UG/L (ref 0–14.9)
MONOCYTES # BLD AUTO: 0.87 THOUSAND/UL (ref 0–1.22)
MONOCYTES NFR BLD: 5 % (ref 4–12)
NEUTROPHILS # BLD MANUAL: 14.82 THOUSAND/UL (ref 1.85–7.62)
NEUTS BAND NFR BLD MANUAL: 1 % (ref 0–8)
NEUTS SEG NFR BLD AUTO: 84 % (ref 43–75)
NON VENT HFNC FIO2: 50
NON VENT TYPE HFNC: ABNORMAL
O2 CT BLDA-SCNC: 18.5 ML/DL (ref 16–23)
OVALOCYTES BLD QL SMEAR: PRESENT
OXYHGB MFR BLDA: 97.4 % (ref 94–97)
PCO2 BLDA: 40.8 MM HG (ref 36–44)
PH BLDA: 7.47 [PH] (ref 7.35–7.45)
PLATELET # BLD AUTO: 283 THOUSANDS/UL (ref 149–390)
PLATELET BLD QL SMEAR: ADEQUATE
PMV BLD AUTO: 10.3 FL (ref 8.9–12.7)
PO2 BLDA: 126.9 MM HG (ref 75–129)
POIKILOCYTOSIS BLD QL SMEAR: PRESENT
POTASSIUM SERPL-SCNC: 3.6 MMOL/L (ref 3.5–5.3)
PROCALCITONIN SERPL-MCNC: <0.05 NG/ML
PROT SERPL-MCNC: 5.6 G/DL (ref 6.4–8.2)
RBC # BLD AUTO: 5.6 MILLION/UL (ref 3.88–5.62)
SODIUM SERPL-SCNC: 139 MMOL/L (ref 136–145)
SPECIMEN SOURCE: ABNORMAL
TOTAL CELLS COUNTED SPEC: 100
TROPONIN I SERPL-MCNC: <0.02 NG/ML
TROPONIN I SERPL-MCNC: <0.02 NG/ML
WBC # BLD AUTO: 17.44 THOUSAND/UL (ref 4.31–10.16)

## 2018-04-07 PROCEDURE — 82805 BLOOD GASES W/O2 SATURATION: CPT | Performed by: INTERNAL MEDICINE

## 2018-04-07 PROCEDURE — 93005 ELECTROCARDIOGRAM TRACING: CPT | Performed by: INTERNAL MEDICINE

## 2018-04-07 PROCEDURE — 94640 AIRWAY INHALATION TREATMENT: CPT

## 2018-04-07 PROCEDURE — 85027 COMPLETE CBC AUTOMATED: CPT | Performed by: INTERNAL MEDICINE

## 2018-04-07 PROCEDURE — 84145 PROCALCITONIN (PCT): CPT | Performed by: INTERNAL MEDICINE

## 2018-04-07 PROCEDURE — 83605 ASSAY OF LACTIC ACID: CPT | Performed by: INTERNAL MEDICINE

## 2018-04-07 PROCEDURE — 80053 COMPREHEN METABOLIC PANEL: CPT | Performed by: INTERNAL MEDICINE

## 2018-04-07 PROCEDURE — 84484 ASSAY OF TROPONIN QUANT: CPT | Performed by: INTERNAL MEDICINE

## 2018-04-07 PROCEDURE — 82948 REAGENT STRIP/BLOOD GLUCOSE: CPT

## 2018-04-07 PROCEDURE — 87631 RESP VIRUS 3-5 TARGETS: CPT | Performed by: INTERNAL MEDICINE

## 2018-04-07 PROCEDURE — 94760 N-INVAS EAR/PLS OXIMETRY 1: CPT

## 2018-04-07 PROCEDURE — 36600 WITHDRAWAL OF ARTERIAL BLOOD: CPT

## 2018-04-07 PROCEDURE — 83735 ASSAY OF MAGNESIUM: CPT | Performed by: INTERNAL MEDICINE

## 2018-04-07 PROCEDURE — 85007 BL SMEAR W/DIFF WBC COUNT: CPT | Performed by: INTERNAL MEDICINE

## 2018-04-07 PROCEDURE — 99232 SBSQ HOSP IP/OBS MODERATE 35: CPT | Performed by: INTERNAL MEDICINE

## 2018-04-07 PROCEDURE — 94660 CPAP INITIATION&MGMT: CPT

## 2018-04-07 PROCEDURE — 87040 BLOOD CULTURE FOR BACTERIA: CPT | Performed by: INTERNAL MEDICINE

## 2018-04-07 RX ORDER — LEVOFLOXACIN 5 MG/ML
750 INJECTION, SOLUTION INTRAVENOUS EVERY 24 HOURS
Status: DISCONTINUED | OUTPATIENT
Start: 2018-04-07 | End: 2018-04-08

## 2018-04-07 RX ADMIN — PREGABALIN 100 MG: 50 CAPSULE ORAL at 16:21

## 2018-04-07 RX ADMIN — AMLODIPINE BESYLATE 10 MG: 10 TABLET ORAL at 10:04

## 2018-04-07 RX ADMIN — LEVOFLOXACIN 750 MG: 5 INJECTION, SOLUTION INTRAVENOUS at 13:57

## 2018-04-07 RX ADMIN — TAMSULOSIN HYDROCHLORIDE 0.4 MG: 0.4 CAPSULE ORAL at 16:21

## 2018-04-07 RX ADMIN — RANOLAZINE 500 MG: 500 TABLET, FILM COATED, EXTENDED RELEASE ORAL at 09:59

## 2018-04-07 RX ADMIN — ACETAMINOPHEN 650 MG: 325 TABLET, FILM COATED ORAL at 03:29

## 2018-04-07 RX ADMIN — MELATONIN 3 MG: 3 TAB ORAL at 21:32

## 2018-04-07 RX ADMIN — ATORVASTATIN CALCIUM 40 MG: 40 TABLET, FILM COATED ORAL at 16:20

## 2018-04-07 RX ADMIN — CLOPIDOGREL BISULFATE 75 MG: 75 TABLET ORAL at 09:56

## 2018-04-07 RX ADMIN — PREGABALIN 100 MG: 50 CAPSULE ORAL at 21:32

## 2018-04-07 RX ADMIN — CLONIDINE HYDROCHLORIDE 0.1 MG: 0.1 TABLET ORAL at 18:40

## 2018-04-07 RX ADMIN — FLUCONAZOLE 200 MG: 100 TABLET ORAL at 09:57

## 2018-04-07 RX ADMIN — METHYLPREDNISOLONE SODIUM SUCCINATE 40 MG: 40 INJECTION, POWDER, FOR SOLUTION INTRAMUSCULAR; INTRAVENOUS at 09:57

## 2018-04-07 RX ADMIN — CLONIDINE HYDROCHLORIDE 0.1 MG: 0.1 TABLET ORAL at 09:55

## 2018-04-07 RX ADMIN — SODIUM CHLORIDE 500 ML: 0.9 INJECTION, SOLUTION INTRAVENOUS at 06:35

## 2018-04-07 RX ADMIN — ACETAMINOPHEN 650 MG: 325 TABLET, FILM COATED ORAL at 16:20

## 2018-04-07 RX ADMIN — IPRATROPIUM BROMIDE AND ALBUTEROL SULFATE 3 ML: .5; 3 SOLUTION RESPIRATORY (INHALATION) at 20:50

## 2018-04-07 RX ADMIN — METHYLPREDNISOLONE SODIUM SUCCINATE 40 MG: 40 INJECTION, POWDER, FOR SOLUTION INTRAMUSCULAR; INTRAVENOUS at 21:33

## 2018-04-07 RX ADMIN — IPRATROPIUM BROMIDE AND ALBUTEROL SULFATE 3 ML: .5; 3 SOLUTION RESPIRATORY (INHALATION) at 07:03

## 2018-04-07 RX ADMIN — RANOLAZINE 500 MG: 500 TABLET, FILM COATED, EXTENDED RELEASE ORAL at 21:32

## 2018-04-07 RX ADMIN — APIXABAN 5 MG: 5 TABLET, FILM COATED ORAL at 09:55

## 2018-04-07 RX ADMIN — ASPIRIN 81 MG 81 MG: 81 TABLET ORAL at 09:55

## 2018-04-07 RX ADMIN — APIXABAN 5 MG: 5 TABLET, FILM COATED ORAL at 21:32

## 2018-04-07 RX ADMIN — METOPROLOL TARTRATE 50 MG: 50 TABLET ORAL at 09:57

## 2018-04-07 RX ADMIN — NICOTINE 1 PATCH: 7 PATCH, EXTENDED RELEASE TRANSDERMAL at 09:58

## 2018-04-07 RX ADMIN — PREGABALIN 100 MG: 50 CAPSULE ORAL at 09:59

## 2018-04-07 RX ADMIN — TIOTROPIUM BROMIDE 18 MCG: 18 CAPSULE ORAL; RESPIRATORY (INHALATION) at 10:00

## 2018-04-07 RX ADMIN — MULTIPLE VITAMINS W/ MINERALS TAB 1 TABLET: TAB at 09:57

## 2018-04-07 RX ADMIN — METOPROLOL TARTRATE 50 MG: 50 TABLET ORAL at 21:31

## 2018-04-07 RX ADMIN — OXYCODONE HYDROCHLORIDE 5 MG: 5 TABLET ORAL at 21:41

## 2018-04-07 RX ADMIN — DULOXETINE HYDROCHLORIDE 60 MG: 30 CAPSULE, DELAYED RELEASE ORAL at 09:56

## 2018-04-07 NOTE — ASSESSMENT & PLAN NOTE
-Check blood cultures x 2 sets  -this is partially secondary to methylprednisolone  -  Discontinue fluconazole  - see antibiotic noted above

## 2018-04-07 NOTE — ASSESSMENT & PLAN NOTE
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

## 2018-04-07 NOTE — RESPIRATORY THERAPY NOTE
Pt  Refusing to wear BiPAP  He stated that the mask is making him feel like he is suffocating  He agreed to wear HFNC via Vaportherm

## 2018-04-07 NOTE — ASSESSMENT & PLAN NOTE
-continue IV methylprednisolone  -  Check repeat chest x-ray PA and lateral film  -  Change Azithromycin to Levaquin, will complete 5 day course   -continue the respiratory protocol and airway clearance protocol  -continue supplemental oxygen to maintain oxygen saturation levels of 90% and above     patient is requiring Vapotherm, he refused BiPAP

## 2018-04-07 NOTE — PLAN OF CARE
CARDIOVASCULAR - ADULT     Maintains optimal cardiac output and hemodynamic stability Progressing     Absence of cardiac dysrhythmias or at baseline rhythm Progressing        DISCHARGE PLANNING     Discharge to home or other facility with appropriate resources Progressing        DISCHARGE PLANNING - CARE MANAGEMENT     Discharge to post-acute care or home with appropriate resources Progressing        INFECTION - ADULT     Absence or prevention of progression during hospitalization Progressing     Absence of fever/infection during neutropenic period Progressing        Knowledge Deficit     Patient/family/caregiver demonstrates understanding of disease process, treatment plan, medications, and discharge instructions Progressing        METABOLIC, FLUID AND ELECTROLYTES - ADULT     Electrolytes maintained within normal limits Progressing     Fluid balance maintained Progressing     Glucose maintained within target range Progressing        PAIN - ADULT     Verbalizes/displays adequate comfort level or baseline comfort level Progressing        Potential for Falls     Patient will remain free of falls Progressing        Prexisting or High Potential for Compromised Skin Integrity     Skin integrity is maintained or improved Progressing        RESPIRATORY - ADULT     Achieves optimal ventilation and oxygenation Progressing        SAFETY ADULT     Maintain or return to baseline ADL function Progressing     Maintain or return mobility status to optimal level Progressing

## 2018-04-07 NOTE — RESPIRATORY THERAPY NOTE
Pt placed back on vapotherm as per Dr Rebekah Neff for pt  C/O thick mucous   FIO2=30%, flow =25, temp=37 degr

## 2018-04-07 NOTE — TREATMENT PLAN
Please note that patient has had recurrent atrial fibrillation with rapid ventricular response on 4/6/2018 at approximately 17:00

## 2018-04-07 NOTE — PROGRESS NOTES
Pt c/o burning chest pain rating 5/10  O2 in use with Vapotherm  Pulse ox shows 98% on Vapotherm  Afib on monitor with a rate in the 90's  Resp  22  /93  Dr Kris Marquez made aware  New orders received  Stat ABGS, CXR, EKG and labwork done  Pt resting quietly

## 2018-04-07 NOTE — PROGRESS NOTES
Progress Note - Naya Capone 1945, 74 y o  male MRN: [id number]    Unit/Bed#: 401-01 Encounter: [id number]    Primary Care Provider: Yarelis Medina DO   Date and time admitted to hospital: 4/3/2018  6:13 AM        * Chronic obstructive pulmonary disease with acute exacerbation ([facility] [address] )   Assessment & Plan    -continue IV methylprednisolone  -  Check repeat chest x-ray PA and lateral film  -  Change Azithromycin to Levaquin, will complete 5 day course   -continue the respiratory protocol and airway clearance protocol  -continue supplemental oxygen to maintain oxygen saturation levels of 90% and above     patient is requiring Vapotherm, he refused BiPAP  Atrial fibrillation with rapid ventricular response (HCC)   Assessment & Plan    -patient underwent a MARTHA cardioversion on 04/04/2018  -he is now in a sinus rhythm  -he was weaned off the IV Cardizem drip/infusion  -continue full anticoagulation with Eliquis 5 mg p o  b i d   -continue p o  metoprolol for heart rate control  -TSH level was within normal limits  -I appreciate cardiology's input  -his echocardiogram did show a decreased ejection fraction of 40%, which may be heart rate related cardiomyopathy  -I will defer to Cardiology further ischemic workup for the decreased ejection fraction          Lactic acidosis   Assessment & Plan      Patient with persistent lactic acidosis  Treat underlying respiratory illness  Leukocytosis   Assessment & Plan    -Check blood cultures x 2 sets  -this is partially secondary to methylprednisolone  -  Discontinue fluconazole  - see antibiotic noted above            Essential hypertension   Assessment & Plan    -the patient continues to have uncontrolled hypertension  -continue the patient's home hypertension medication regimen  -increase amlodipine to 10 mg PO Qdaily  -p r n  IV labetalol  -follow patient's blood pressure trend        Pulmonary nodule   Assessment & Plan    -the patient will need outpatient surveillance imaging of the pulmonary nodule with his PCP        Tobacco abuse   Assessment & Plan    -nicotine patch  -smoking cessation counseling            VTE Pharmacologic Prophylaxis:   Pharmacologic: Apixaban (Eliquis)  Mechanical VTE Prophylaxis in Place: Yes    Patient Centered Rounds: I have performed bedside rounds with nursing staff today  Current Length of Stay: 4 day(s)    Current Patient Status: Inpatient   Certification Statement: The patient will continue to require additional inpatient hospital stay due to Acute respiratory distress and hypoxia  Code Status: Level 1 - Full Code      Subjective:     No pain, patient with persistent shortness of breath, difficulty breathing even at rest, increased accessory muscle use  Objective:     Vitals:   Temp (24hrs), Av 4 °F (36 3 °C), Min:97 3 °F (36 3 °C), Max:97 7 °F (36 5 °C)    HR:  [] 76  Resp:  [18-20] 20  BP: (148-189)/() 172/90  SpO2:  [91 %-99 %] 96 %  Body mass index is 28 5 kg/m²  Input and Output Summary (last 24 hours): Intake/Output Summary (Last 24 hours) at 18 1234  Last data filed at 18 1053   Gross per 24 hour   Intake              600 ml   Output             1975 ml   Net            -1375 ml       Physical Exam:     Physical Exam   Constitutional: He is oriented to person, place, and time  He appears well-developed and well-nourished  He appears distressed  HENT:   Head: Normocephalic and atraumatic  Mouth/Throat: Oropharynx is clear and moist  No oropharyngeal exudate  Eyes: Conjunctivae and EOM are normal  Pupils are equal, round, and reactive to light  Right eye exhibits no discharge  Left eye exhibits no discharge  Pulmonary/Chest: He has no wheezes  Increased respiratory effort     increased accessory muscle use     decreased air exchange bilaterally   Abdominal: Soft  Bowel sounds are normal  He exhibits no distension  There is no tenderness     Neurological: He is alert and oriented to person, place, and time  No cranial nerve deficit  Coordination normal    Skin: He is not diaphoretic  Nursing note and vitals reviewed  Additional Data:     Labs:      Results from last 7 days  Lab Units 04/07/18  0533  04/05/18  0442   WBC Thousand/uL 17 44*  < > 17 49*   HEMOGLOBIN g/dL 13 2  < > 11 7*   HEMATOCRIT % 39 9  < > 36 4*   PLATELETS Thousands/uL 283  < > 217   NEUTROS PCT %  --   --  87*   LYMPHS PCT %  --   --  7*   LYMPHO PCT % 10*  < >  --    MONOS PCT %  --   --  6   MONO PCT MAN % 5  < >  --    EOS PCT %  --   --  0   EOSINO PCT MANUAL % 0  < >  --    < > = values in this interval not displayed  Results from last 7 days  Lab Units 04/07/18  0533   SODIUM mmol/L 139   POTASSIUM mmol/L 3 6   CHLORIDE mmol/L 104   CO2 mmol/L 31   BUN mg/dL 21   CREATININE mg/dL 0 83   CALCIUM mg/dL 8 3   TOTAL PROTEIN g/dL 5 6*   BILIRUBIN TOTAL mg/dL 0 70   ALK PHOS U/L 67   ALT U/L 47   AST U/L 18   GLUCOSE RANDOM mg/dL 190*       Results from last 7 days  Lab Units 04/03/18  0627   INR  1 12       * I Have Reviewed All Lab Data Listed Above  * Additional Pertinent Lab Tests Reviewed:  [address] Admission Reviewed      Recent Cultures (last 7 days):       Results from last 7 days  Lab Units 04/03/18  1730   URINE CULTURE  >100,000 cfu/ml Candida sp  presumptively glabrata*  20,000-29,000 cfu/ml    LEGIONELLA URINARY ANTIGEN  Negative       Last 24 Hours Medication List:     Current Facility-Administered Medications:  acetaminophen 650 mg Oral Q6H PRN Izzy [place], DO   albuterol 2 5 mg Nebulization Q6H PRN Izzy [place], DO   amLODIPine 10 mg Oral Daily Izzy [place], DO   apixaban 5 mg Oral BID Izzy Vannesa, DO   aspirin 81 mg Oral Daily Izzy [place], DO   atorvastatin 40 mg Oral Daily With Zack Collier, DO   cloNIDine 0 1 mg Oral BID Izzy Vannesa, DO   clopidogrel 75 mg Oral Daily Izzy [place], DO   DULoxetine 60 mg Oral Daily [facility], DO   ipratropium-albuterol 3 mL Nebulization BID [facility], DO   labetalol 10 mg Intravenous Q4H PRN [facility], DO   levofloxacin 750 mg Intravenous Q24H Marylen Glad, DO   LORazepam 0 5 mg Intravenous Q4H PRN [facility], DO   melatonin 3 mg Oral HS Chinedu Magana, DO   methylPREDNISolone sodium succinate 40 mg Intravenous Q12H [facility] [address], DO   metoprolol tartrate 50 mg Oral Q12H [facility] [address], DO   multivitamin-minerals 1 tablet Oral Daily [facility], DO   nicotine 1 patch Transdermal Daily [facility], DO   ondansetron 4 mg Intravenous Q6H PRN [facility], DO   oxyCODONE 5 mg Oral Q4H PRN [facility], DO   pregabalin 100 mg Oral TID [facility], DO   ranolazine 500 mg Oral Q12H [facility] Chinedu Magana, DO   tamsulosin 0 4 mg Oral Daily With Zack Collier, DO   tiotropium 18 mcg Inhalation Daily Jones Rose DO        Today, Patient Was Seen By: Marylen Glad, DO

## 2018-04-08 LAB
ALBUMIN SERPL BCP-MCNC: 3.2 G/DL (ref 3.5–5)
ALP SERPL-CCNC: 70 U/L (ref 46–116)
ALT SERPL W P-5'-P-CCNC: 61 U/L (ref 12–78)
ANION GAP SERPL CALCULATED.3IONS-SCNC: 7 MMOL/L (ref 4–13)
ANISOCYTOSIS BLD QL SMEAR: PRESENT
AST SERPL W P-5'-P-CCNC: 19 U/L (ref 5–45)
ATRIAL RATE: 125 BPM
BASOPHILS # BLD MANUAL: 0 THOUSAND/UL (ref 0–0.1)
BASOPHILS NFR MAR MANUAL: 0 % (ref 0–1)
BILIRUB SERPL-MCNC: 0.7 MG/DL (ref 0.2–1)
BUN SERPL-MCNC: 28 MG/DL (ref 5–25)
CALCIUM SERPL-MCNC: 9.5 MG/DL (ref 8.3–10.1)
CHLORIDE SERPL-SCNC: 102 MMOL/L (ref 100–108)
CO2 SERPL-SCNC: 31 MMOL/L (ref 21–32)
CREAT SERPL-MCNC: 0.95 MG/DL (ref 0.6–1.3)
EOSINOPHIL # BLD MANUAL: 0 THOUSAND/UL (ref 0–0.4)
EOSINOPHIL NFR BLD MANUAL: 0 % (ref 0–6)
ERYTHROCYTE [DISTWIDTH] IN BLOOD BY AUTOMATED COUNT: 16.8 % (ref 11.6–15.1)
FLUAV AG SPEC QL: NORMAL
FLUBV AG SPEC QL: NORMAL
GFR SERPL CREATININE-BSD FRML MDRD: 78 ML/MIN/1.73SQ M
GLUCOSE SERPL-MCNC: 193 MG/DL (ref 65–140)
HCT VFR BLD AUTO: 41.7 % (ref 36.5–49.3)
HGB BLD-MCNC: 13.8 G/DL (ref 12–17)
INR PPP: 1.25 (ref 0.86–1.16)
LG PLATELETS BLD QL SMEAR: PRESENT
LYMPHOCYTES # BLD AUTO: 11 % (ref 14–44)
LYMPHOCYTES # BLD AUTO: 2.33 THOUSAND/UL (ref 0.6–4.47)
MAGNESIUM SERPL-MCNC: 2 MG/DL (ref 1.6–2.6)
MCH RBC QN AUTO: 23.4 PG (ref 26.8–34.3)
MCHC RBC AUTO-ENTMCNC: 33.1 G/DL (ref 31.4–37.4)
MCV RBC AUTO: 71 FL (ref 82–98)
METAMYELOCYTES NFR BLD MANUAL: 1 % (ref 0–1)
MONOCYTES # BLD AUTO: 0.85 THOUSAND/UL (ref 0–1.22)
MONOCYTES NFR BLD: 4 % (ref 4–12)
NEUTROPHILS # BLD MANUAL: 17.76 THOUSAND/UL (ref 1.85–7.62)
NEUTS SEG NFR BLD AUTO: 84 % (ref 43–75)
NRBC BLD AUTO-RTO: 1 /100 WBC (ref 0–2)
OVALOCYTES BLD QL SMEAR: PRESENT
PLATELET # BLD AUTO: 361 THOUSANDS/UL (ref 149–390)
PLATELET BLD QL SMEAR: ADEQUATE
PMV BLD AUTO: 10.3 FL (ref 8.9–12.7)
POIKILOCYTOSIS BLD QL SMEAR: PRESENT
POTASSIUM SERPL-SCNC: 4.2 MMOL/L (ref 3.5–5.3)
PROCALCITONIN SERPL-MCNC: <0.05 NG/ML
PROT SERPL-MCNC: 5.7 G/DL (ref 6.4–8.2)
PROTHROMBIN TIME: 15.6 SECONDS (ref 12.1–14.4)
QRS AXIS: 64 DEGREES
QRSD INTERVAL: 88 MS
QT INTERVAL: 382 MS
QTC INTERVAL: 462 MS
RBC # BLD AUTO: 5.91 MILLION/UL (ref 3.88–5.62)
RSV B RNA SPEC QL NAA+PROBE: NORMAL
SODIUM SERPL-SCNC: 140 MMOL/L (ref 136–145)
T WAVE AXIS: 56 DEGREES
TOTAL CELLS COUNTED SPEC: 100
TROPONIN I SERPL-MCNC: <0.02 NG/ML
VENTRICULAR RATE: 88 BPM
WBC # BLD AUTO: 21.14 THOUSAND/UL (ref 4.31–10.16)

## 2018-04-08 PROCEDURE — 84145 PROCALCITONIN (PCT): CPT | Performed by: INTERNAL MEDICINE

## 2018-04-08 PROCEDURE — 85610 PROTHROMBIN TIME: CPT | Performed by: INTERNAL MEDICINE

## 2018-04-08 PROCEDURE — 94760 N-INVAS EAR/PLS OXIMETRY 1: CPT

## 2018-04-08 PROCEDURE — 94640 AIRWAY INHALATION TREATMENT: CPT

## 2018-04-08 PROCEDURE — 83735 ASSAY OF MAGNESIUM: CPT | Performed by: INTERNAL MEDICINE

## 2018-04-08 PROCEDURE — 80053 COMPREHEN METABOLIC PANEL: CPT | Performed by: INTERNAL MEDICINE

## 2018-04-08 PROCEDURE — 85027 COMPLETE CBC AUTOMATED: CPT | Performed by: INTERNAL MEDICINE

## 2018-04-08 PROCEDURE — 93010 ELECTROCARDIOGRAM REPORT: CPT | Performed by: INTERNAL MEDICINE

## 2018-04-08 PROCEDURE — 84484 ASSAY OF TROPONIN QUANT: CPT | Performed by: INTERNAL MEDICINE

## 2018-04-08 PROCEDURE — 85007 BL SMEAR W/DIFF WBC COUNT: CPT | Performed by: INTERNAL MEDICINE

## 2018-04-08 PROCEDURE — 99232 SBSQ HOSP IP/OBS MODERATE 35: CPT | Performed by: INTERNAL MEDICINE

## 2018-04-08 PROCEDURE — 94660 CPAP INITIATION&MGMT: CPT

## 2018-04-08 RX ORDER — METOPROLOL TARTRATE 100 MG/1
100 TABLET ORAL EVERY 12 HOURS SCHEDULED
Status: DISCONTINUED | OUTPATIENT
Start: 2018-04-08 | End: 2018-04-10 | Stop reason: HOSPADM

## 2018-04-08 RX ORDER — MAGNESIUM HYDROXIDE/ALUMINUM HYDROXICE/SIMETHICONE 120; 1200; 1200 MG/30ML; MG/30ML; MG/30ML
30 SUSPENSION ORAL EVERY 4 HOURS PRN
Status: DISCONTINUED | OUTPATIENT
Start: 2018-04-08 | End: 2018-04-10 | Stop reason: HOSPADM

## 2018-04-08 RX ORDER — PREDNISONE 20 MG/1
40 TABLET ORAL DAILY
Status: DISCONTINUED | OUTPATIENT
Start: 2018-04-09 | End: 2018-04-10 | Stop reason: HOSPADM

## 2018-04-08 RX ORDER — PREDNISONE 20 MG/1
40 TABLET ORAL DAILY
Status: DISCONTINUED | OUTPATIENT
Start: 2018-04-08 | End: 2018-04-08

## 2018-04-08 RX ORDER — METOPROLOL TARTRATE 50 MG/1
50 TABLET, FILM COATED ORAL ONCE
Status: COMPLETED | OUTPATIENT
Start: 2018-04-08 | End: 2018-04-08

## 2018-04-08 RX ADMIN — PREGABALIN 100 MG: 50 CAPSULE ORAL at 09:04

## 2018-04-08 RX ADMIN — NICOTINE 1 PATCH: 7 PATCH, EXTENDED RELEASE TRANSDERMAL at 09:08

## 2018-04-08 RX ADMIN — AMLODIPINE BESYLATE 10 MG: 10 TABLET ORAL at 09:02

## 2018-04-08 RX ADMIN — ATORVASTATIN CALCIUM 40 MG: 40 TABLET, FILM COATED ORAL at 17:20

## 2018-04-08 RX ADMIN — TAMSULOSIN HYDROCHLORIDE 0.4 MG: 0.4 CAPSULE ORAL at 17:20

## 2018-04-08 RX ADMIN — MELATONIN 3 MG: 3 TAB ORAL at 22:10

## 2018-04-08 RX ADMIN — PREGABALIN 100 MG: 50 CAPSULE ORAL at 17:20

## 2018-04-08 RX ADMIN — METOPROLOL TARTRATE 100 MG: 100 TABLET ORAL at 22:11

## 2018-04-08 RX ADMIN — CLOPIDOGREL BISULFATE 75 MG: 75 TABLET ORAL at 09:02

## 2018-04-08 RX ADMIN — RANOLAZINE 500 MG: 500 TABLET, FILM COATED, EXTENDED RELEASE ORAL at 22:12

## 2018-04-08 RX ADMIN — METOPROLOL TARTRATE 50 MG: 50 TABLET ORAL at 09:03

## 2018-04-08 RX ADMIN — OXYCODONE HYDROCHLORIDE 5 MG: 5 TABLET ORAL at 01:48

## 2018-04-08 RX ADMIN — DULOXETINE HYDROCHLORIDE 60 MG: 30 CAPSULE, DELAYED RELEASE ORAL at 09:03

## 2018-04-08 RX ADMIN — APIXABAN 5 MG: 5 TABLET, FILM COATED ORAL at 09:02

## 2018-04-08 RX ADMIN — METOPROLOL TARTRATE 50 MG: 50 TABLET ORAL at 09:49

## 2018-04-08 RX ADMIN — ACETAMINOPHEN 650 MG: 325 TABLET, FILM COATED ORAL at 20:41

## 2018-04-08 RX ADMIN — IPRATROPIUM BROMIDE AND ALBUTEROL SULFATE 3 ML: .5; 3 SOLUTION RESPIRATORY (INHALATION) at 20:09

## 2018-04-08 RX ADMIN — ASPIRIN 81 MG 81 MG: 81 TABLET ORAL at 09:02

## 2018-04-08 RX ADMIN — PREGABALIN 100 MG: 50 CAPSULE ORAL at 22:12

## 2018-04-08 RX ADMIN — CLONIDINE HYDROCHLORIDE 0.1 MG: 0.1 TABLET ORAL at 17:20

## 2018-04-08 RX ADMIN — IPRATROPIUM BROMIDE AND ALBUTEROL SULFATE 3 ML: .5; 3 SOLUTION RESPIRATORY (INHALATION) at 07:45

## 2018-04-08 RX ADMIN — OXYCODONE HYDROCHLORIDE 5 MG: 5 TABLET ORAL at 18:18

## 2018-04-08 RX ADMIN — CLONIDINE HYDROCHLORIDE 0.1 MG: 0.1 TABLET ORAL at 09:02

## 2018-04-08 RX ADMIN — ALUMINUM HYDROXIDE, MAGNESIUM HYDROXIDE, AND SIMETHICONE 30 ML: 200; 200; 20 SUSPENSION ORAL at 20:33

## 2018-04-08 RX ADMIN — TIOTROPIUM BROMIDE 18 MCG: 18 CAPSULE ORAL; RESPIRATORY (INHALATION) at 09:06

## 2018-04-08 RX ADMIN — METHYLPREDNISOLONE SODIUM SUCCINATE 40 MG: 40 INJECTION, POWDER, FOR SOLUTION INTRAMUSCULAR; INTRAVENOUS at 09:03

## 2018-04-08 RX ADMIN — MULTIPLE VITAMINS W/ MINERALS TAB 1 TABLET: TAB at 09:04

## 2018-04-08 RX ADMIN — APIXABAN 5 MG: 5 TABLET, FILM COATED ORAL at 22:10

## 2018-04-08 RX ADMIN — RANOLAZINE 500 MG: 500 TABLET, FILM COATED, EXTENDED RELEASE ORAL at 09:04

## 2018-04-08 NOTE — ASSESSMENT & PLAN NOTE
-patient underwent a MARTHA cardioversion on 04/04/2018  -patient developed return of atrial fibrillation with rapid ventricular response on 4/6/2018  -increase Lopressor to 100 mg twice daily   -continue full anticoagulation with Eliquis 5 mg p o  b i d     -TSH level was within normal limits  -will obtain repeat cardiology eval tomorrow  -his echocardiogram did show a decreased ejection fraction of 40%, which may be heart rate related cardiomyopathy

## 2018-04-08 NOTE — PROGRESS NOTES
Progress Note - Celestina Gottlieb 1945, 74 y o  male MRN: [id number]    Unit/Bed#: 401-01 Encounter: [id number]    Primary Care Provider: Urszula Morales DO   Date and time admitted to hospital: 4/3/2018  6:13 AM        * Chronic obstructive pulmonary disease with acute exacerbation ([facility] [address] )   Assessment & Plan    -changed to p o  prednisone  -    -  discontinue antibiotics as procalcitonin level is is low   -continue the respiratory protocol and airway clearance protocol  -continue supplemental oxygen to maintain oxygen saturation levels of 90% and above    Continue with Vapotherm to 8 in mucus clearance  Atrial fibrillation with rapid ventricular response (HCC)   Assessment & Plan    -patient underwent a MARTHA cardioversion on 04/04/2018  -patient developed return of atrial fibrillation with rapid ventricular response on 4/6/2018  -increase Lopressor to 100 mg twice daily   -continue full anticoagulation with Eliquis 5 mg p o  b i d     -TSH level was within normal limits  -will obtain repeat cardiology eval tomorrow  -his echocardiogram did show a decreased ejection fraction of 40%, which may be heart rate related cardiomyopathy            Lactic acidosis   Assessment & Plan      Patient with persistent lactic acidosis  Treat underlying respiratory illness  Essential hypertension   Assessment & Plan    -the patient continues to have uncontrolled hypertension    -increase amlodipine to 10 mg PO Qdaily, Lopressor was increased to 100 mg twice daily    -follow patient's blood pressure trend        Pulmonary nodule   Assessment & Plan    -the patient will need outpatient surveillance imaging of the pulmonary nodule with his PCP        Tobacco abuse   Assessment & Plan    -nicotine patch  -smoking cessation counseling            Please note patient had nonspecific chest pain yesterday EKG showed no specific changes, cardiac enzymes negative x3      VTE Pharmacologic Prophylaxis:   Pharmacologic: Apixaban (Eliquis)  Mechanical VTE Prophylaxis in Place: Yes    Patient Centered Rounds: I have performed bedside rounds with nursing staff today  Current Length of Stay: 5 day(s)    Current Patient Status: Inpatient   Certification Statement: The patient will continue to require additional inpatient hospital stay due to Recurrent atrial fibrillation with rapid ventricular response, ongoing shortness of breath with hypoxia and bronchospasm  Code Status: Level 1 - Full Code      Subjective:   Patient states that his breathing is slightly improved today, denies any specific pain    Objective:     Vitals:   Temp (24hrs), Av 3 °F (36 3 °C), Min:96 8 °F (36 °C), Max:98 1 °F (36 7 °C)    HR:  [] 137  Resp:  [16-20] 16  BP: (120-175)/() 142/89  SpO2:  [92 %-97 %] 92 %  Body mass index is 28 53 kg/m²  Input and Output Summary (last 24 hours): Intake/Output Summary (Last 24 hours) at 18 1022  Last data filed at 18 0949   Gross per 24 hour   Intake             1560 ml   Output             1700 ml   Net             -140 ml       Physical Exam:     Physical Exam   Constitutional: He is oriented to person, place, and time  He appears well-developed and well-nourished  No distress  Pulmonary/Chest: Effort normal  No respiratory distress  He has no wheezes  Decreased breath sounds bilaterally   Abdominal: Soft  Bowel sounds are normal  He exhibits no distension  There is no tenderness  Neurological: He is alert and oriented to person, place, and time  No cranial nerve deficit  Coordination normal    Skin: Skin is warm and dry  No rash noted  He is not diaphoretic  No erythema  Psychiatric: He has a normal mood and affect  His behavior is normal  Judgment and thought content normal    Nursing note and vitals reviewed        Additional Data:     Labs:      Results from last 7 days  Lab Units 18  0602  18  0442   WBC Thousand/uL 21 14*  < > 17 49*   HEMOGLOBIN g/dL 13 8  < > 11 7*   HEMATOCRIT % 41 7  < > 36 4*   PLATELETS Thousands/uL 361  < > 217   NEUTROS PCT %  --   --  87*   LYMPHS PCT %  --   --  7*   LYMPHO PCT % 11*  < >  --    MONOS PCT %  --   --  6   MONO PCT MAN % 4  < >  --    EOS PCT %  --   --  0   EOSINO PCT MANUAL % 0  < >  --    < > = values in this interval not displayed  Results from last 7 days  Lab Units 04/08/18  0555   SODIUM mmol/L 140   POTASSIUM mmol/L 4 2   CHLORIDE mmol/L 102   CO2 mmol/L 31   BUN mg/dL 28*   CREATININE mg/dL 0 95   CALCIUM mg/dL 9 5   TOTAL PROTEIN g/dL 5 7*   BILIRUBIN TOTAL mg/dL 0 70   ALK PHOS U/L 70   ALT U/L 61   AST U/L 19   GLUCOSE RANDOM mg/dL 193*       Results from last 7 days  Lab Units 04/08/18  0557   INR  1 25*       * I Have Reviewed All Lab Data Listed Above  * Additional Pertinent Lab Tests Reviewed:  All [facility] Admission Reviewed        Recent Cultures (last 7 days):       Results from last 7 days  Lab Units 04/03/18  1730   URINE CULTURE  >100,000 cfu/ml Candida sp  presumptively glabrata*  20,000-29,000 cfu/ml    LEGIONELLA URINARY ANTIGEN  Negative       Last 24 Hours Medication List:     Current Facility-Administered Medications:  acetaminophen 650 mg Oral Q6H PRN [employer], DO   albuterol 2 5 mg Nebulization Q6H PRN [employer], DO   amLODIPine 10 mg Oral Daily [employer], DO   apixaban 5 mg Oral BID [employer], DO   aspirin 81 mg Oral Daily [employer], DO   atorvastatin 40 mg Oral Daily With Zack Collier, DO   cloNIDine 0 1 mg Oral BID [employer], DO   clopidogrel 75 mg Oral Daily [employer], DO   DULoxetine 60 mg Oral Daily [employer], DO   ipratropium-albuterol 3 mL Nebulization BID [employer], DO   LORazepam 0 5 mg Intravenous Q4H PRN [employer], DO   melatonin 3 mg Oral HS Chinedu Magana, DO   metoprolol tartrate 100 mg Oral Q12H [address] Elida Vásquez, DO   multivitamin-minerals 1 tablet Oral Daily Katharine Izzy,    nicotine 1 patch Transdermal Daily Clotilde Bound, DO   ondansetron 4 mg Intravenous Q6H PRN Clotilde Bound, DO   oxyCODONE 5 mg Oral Q4H PRN Clotilde Echeverria, DO   [START ON 4/9/2018] predniSONE 40 mg Oral Daily Pavel Menard,    pregabalin 100 mg Oral TID Clotilde Bound, DO   ranolazine 500 mg Oral Q12H [address] Clotilde Bound, DO   tamsulosin 0 4 mg Oral Daily With Zack Collier DO   tiotropium 18 mcg Inhalation Daily Sophia Lazaro DO        Today, Patient Was Seen By: Pavel Menard DO

## 2018-04-08 NOTE — ASSESSMENT & PLAN NOTE
-the patient continues to have uncontrolled hypertension    -increase amlodipine to 10 mg PO Qdaily, Lopressor was increased to 100 mg twice daily    -follow patient's blood pressure trend

## 2018-04-08 NOTE — ASSESSMENT & PLAN NOTE
-changed to p o  prednisone  -    -  discontinue antibiotics as procalcitonin level is is low   -continue the respiratory protocol and airway clearance protocol  -continue supplemental oxygen to maintain oxygen saturation levels of 90% and above    Continue with Vapotherm to 8 in mucus clearance

## 2018-04-09 LAB
ALBUMIN SERPL BCP-MCNC: 3.4 G/DL (ref 3.5–5)
ALP SERPL-CCNC: 83 U/L (ref 46–116)
ALT SERPL W P-5'-P-CCNC: 109 U/L (ref 12–78)
ANION GAP SERPL CALCULATED.3IONS-SCNC: 3 MMOL/L (ref 4–13)
ANISOCYTOSIS BLD QL SMEAR: PRESENT
AST SERPL W P-5'-P-CCNC: 39 U/L (ref 5–45)
BASOPHILS # BLD MANUAL: 0 THOUSAND/UL (ref 0–0.1)
BASOPHILS NFR MAR MANUAL: 0 % (ref 0–1)
BILIRUB SERPL-MCNC: 0.7 MG/DL (ref 0.2–1)
BUN SERPL-MCNC: 34 MG/DL (ref 5–25)
CALCIUM SERPL-MCNC: 9.1 MG/DL (ref 8.3–10.1)
CHLORIDE SERPL-SCNC: 101 MMOL/L (ref 100–108)
CO2 SERPL-SCNC: 34 MMOL/L (ref 21–32)
CREAT SERPL-MCNC: 1.01 MG/DL (ref 0.6–1.3)
EOSINOPHIL # BLD MANUAL: 0 THOUSAND/UL (ref 0–0.4)
EOSINOPHIL NFR BLD MANUAL: 0 % (ref 0–6)
ERYTHROCYTE [DISTWIDTH] IN BLOOD BY AUTOMATED COUNT: 18.1 % (ref 11.6–15.1)
GFR SERPL CREATININE-BSD FRML MDRD: 72 ML/MIN/1.73SQ M
GLUCOSE SERPL-MCNC: 158 MG/DL (ref 65–140)
HCT VFR BLD AUTO: 44.5 % (ref 36.5–49.3)
HGB BLD-MCNC: 14.8 G/DL (ref 12–17)
INR PPP: 1.28 (ref 0.86–1.16)
LYMPHOCYTES # BLD AUTO: 2.31 THOUSAND/UL (ref 0.6–4.47)
LYMPHOCYTES # BLD AUTO: 8 % (ref 14–44)
MAGNESIUM SERPL-MCNC: 2.2 MG/DL (ref 1.6–2.6)
MCH RBC QN AUTO: 23.4 PG (ref 26.8–34.3)
MCHC RBC AUTO-ENTMCNC: 33.3 G/DL (ref 31.4–37.4)
MCV RBC AUTO: 70 FL (ref 82–98)
MONOCYTES # BLD AUTO: 2.31 THOUSAND/UL (ref 0–1.22)
MONOCYTES NFR BLD: 8 % (ref 4–12)
NEUTROPHILS # BLD MANUAL: 19.34 THOUSAND/UL (ref 1.85–7.62)
NEUTS SEG NFR BLD AUTO: 67 % (ref 43–75)
OVALOCYTES BLD QL SMEAR: PRESENT
PLATELET # BLD AUTO: 426 THOUSANDS/UL (ref 149–390)
PLATELET BLD QL SMEAR: ABNORMAL
PMV BLD AUTO: 9.9 FL (ref 8.9–12.7)
POIKILOCYTOSIS BLD QL SMEAR: PRESENT
POTASSIUM SERPL-SCNC: 4 MMOL/L (ref 3.5–5.3)
PROT SERPL-MCNC: 6.2 G/DL (ref 6.4–8.2)
PROTHROMBIN TIME: 15.9 SECONDS (ref 12.1–14.4)
RBC # BLD AUTO: 6.33 MILLION/UL (ref 3.88–5.62)
SODIUM SERPL-SCNC: 138 MMOL/L (ref 136–145)
TOTAL CELLS COUNTED SPEC: 100
VARIANT LYMPHS # BLD AUTO: 17 %
WBC # BLD AUTO: 28.86 THOUSAND/UL (ref 4.31–10.16)

## 2018-04-09 PROCEDURE — 97116 GAIT TRAINING THERAPY: CPT

## 2018-04-09 PROCEDURE — 94760 N-INVAS EAR/PLS OXIMETRY 1: CPT

## 2018-04-09 PROCEDURE — 85007 BL SMEAR W/DIFF WBC COUNT: CPT | Performed by: INTERNAL MEDICINE

## 2018-04-09 PROCEDURE — 99232 SBSQ HOSP IP/OBS MODERATE 35: CPT | Performed by: INTERNAL MEDICINE

## 2018-04-09 PROCEDURE — 80053 COMPREHEN METABOLIC PANEL: CPT | Performed by: INTERNAL MEDICINE

## 2018-04-09 PROCEDURE — 94660 CPAP INITIATION&MGMT: CPT

## 2018-04-09 PROCEDURE — 85027 COMPLETE CBC AUTOMATED: CPT | Performed by: INTERNAL MEDICINE

## 2018-04-09 PROCEDURE — 83735 ASSAY OF MAGNESIUM: CPT | Performed by: INTERNAL MEDICINE

## 2018-04-09 PROCEDURE — 85610 PROTHROMBIN TIME: CPT | Performed by: INTERNAL MEDICINE

## 2018-04-09 RX ORDER — PANTOPRAZOLE SODIUM 40 MG/1
40 TABLET, DELAYED RELEASE ORAL
Status: DISCONTINUED | OUTPATIENT
Start: 2018-04-09 | End: 2018-04-10 | Stop reason: HOSPADM

## 2018-04-09 RX ADMIN — AMLODIPINE BESYLATE 10 MG: 10 TABLET ORAL at 08:42

## 2018-04-09 RX ADMIN — CLOPIDOGREL BISULFATE 75 MG: 75 TABLET ORAL at 08:42

## 2018-04-09 RX ADMIN — NICOTINE 1 PATCH: 7 PATCH, EXTENDED RELEASE TRANSDERMAL at 08:43

## 2018-04-09 RX ADMIN — TAMSULOSIN HYDROCHLORIDE 0.4 MG: 0.4 CAPSULE ORAL at 17:08

## 2018-04-09 RX ADMIN — APIXABAN 5 MG: 5 TABLET, FILM COATED ORAL at 08:42

## 2018-04-09 RX ADMIN — MULTIPLE VITAMINS W/ MINERALS TAB 1 TABLET: TAB at 08:43

## 2018-04-09 RX ADMIN — METOPROLOL TARTRATE 100 MG: 100 TABLET ORAL at 08:43

## 2018-04-09 RX ADMIN — DILTIAZEM HYDROCHLORIDE 30 MG: 30 TABLET, FILM COATED ORAL at 12:09

## 2018-04-09 RX ADMIN — METOPROLOL TARTRATE 100 MG: 100 TABLET ORAL at 21:36

## 2018-04-09 RX ADMIN — APIXABAN 5 MG: 5 TABLET, FILM COATED ORAL at 21:37

## 2018-04-09 RX ADMIN — DILTIAZEM HYDROCHLORIDE 30 MG: 30 TABLET, FILM COATED ORAL at 17:08

## 2018-04-09 RX ADMIN — PREGABALIN 100 MG: 50 CAPSULE ORAL at 21:36

## 2018-04-09 RX ADMIN — OXYCODONE HYDROCHLORIDE 5 MG: 5 TABLET ORAL at 05:53

## 2018-04-09 RX ADMIN — PREGABALIN 100 MG: 50 CAPSULE ORAL at 17:08

## 2018-04-09 RX ADMIN — RANOLAZINE 500 MG: 500 TABLET, FILM COATED, EXTENDED RELEASE ORAL at 08:45

## 2018-04-09 RX ADMIN — CLONIDINE HYDROCHLORIDE 0.1 MG: 0.1 TABLET ORAL at 08:42

## 2018-04-09 RX ADMIN — TIOTROPIUM BROMIDE 18 MCG: 18 CAPSULE ORAL; RESPIRATORY (INHALATION) at 07:56

## 2018-04-09 RX ADMIN — ALUMINUM HYDROXIDE, MAGNESIUM HYDROXIDE, AND SIMETHICONE 30 ML: 200; 200; 20 SUSPENSION ORAL at 14:02

## 2018-04-09 RX ADMIN — ATORVASTATIN CALCIUM 40 MG: 40 TABLET, FILM COATED ORAL at 17:08

## 2018-04-09 RX ADMIN — CLONIDINE HYDROCHLORIDE 0.1 MG: 0.1 TABLET ORAL at 17:08

## 2018-04-09 RX ADMIN — ASPIRIN 81 MG 81 MG: 81 TABLET ORAL at 08:42

## 2018-04-09 RX ADMIN — MELATONIN 3 MG: 3 TAB ORAL at 21:36

## 2018-04-09 RX ADMIN — DULOXETINE HYDROCHLORIDE 60 MG: 30 CAPSULE, DELAYED RELEASE ORAL at 08:43

## 2018-04-09 RX ADMIN — PREDNISONE 40 MG: 20 TABLET ORAL at 08:45

## 2018-04-09 RX ADMIN — PREGABALIN 100 MG: 50 CAPSULE ORAL at 08:45

## 2018-04-09 RX ADMIN — DILTIAZEM HYDROCHLORIDE 30 MG: 30 TABLET, FILM COATED ORAL at 23:14

## 2018-04-09 RX ADMIN — RANOLAZINE 500 MG: 500 TABLET, FILM COATED, EXTENDED RELEASE ORAL at 21:37

## 2018-04-09 RX ADMIN — PANTOPRAZOLE SODIUM 40 MG: 40 TABLET, DELAYED RELEASE ORAL at 14:03

## 2018-04-09 NOTE — NURSING NOTE
Sepsis screen completed with 2 indcators of sepsis  Dr Gaby Daniel notified  Patient no presently on antibiotics

## 2018-04-09 NOTE — PROGRESS NOTES
Cardiology Progress Note - Janie Rizzo 74 y o  male MRN: [id number]    Unit/Bed#: 401-01 Encounter: [id number]      Assessment and plan   1  Paroxysmal atrial fibrillation   2  Acute COPD exacerbation   3  Coronary artery disease with chronic occlusion of the right coronary artery   4  Hypertension   5  Mild Aortic stenosis   6  Cardiomyopathy ejection fraction 45%   7  Moderate mitral regurgitation    Recommendations:   Given the acute COPD exacerbation I think repeat cardioversion would be of low utility at this point  Will change from norvasc to Cardizem 30 mg po Q 6 hours in addition to his metoprolol and try to obtain better rate control  Will titrate for BP control as well  Continue anticoagulation  We could consider cardioversion in the future once he recovers from the acute COPD exacerbation  Coronary disease stable without angina  No signs of decompensated heart failure  Ambulate in the halls today and watch heart rate control  Hopeful discharge tomorrow  Subjective:    No significant events overnight  States he is feeling better eager to get home  Admits to palpitations when he walks  Telemetry shows average heart rate around 100  ROS    Objective:   Vitals: Blood pressure 142/81, pulse (!) 107, temperature 97 9 °F (36 6 °C), temperature source Temporal, resp  rate 20, height 5' 9 5" (1 765 m), weight 83 7 kg (184 lb 8 4 oz), SpO2 95 %  , Body mass index is 26 86 kg/m² , Orthostatic Blood Pressures    Flowsheet Row Most Recent Value   Blood Pressure  142/81 filed at 04/09/2018 0727   Patient Position - Orthostatic VS  Lying filed at 04/09/2018 5363         Systolic (65WKF), SDD:581 , Min:136 , EMMY:440     Diastolic (70MYX), FEM:46, Min:68, Max:103      Intake/Output Summary (Last 24 hours) at 04/09/18 1021  Last data filed at 04/09/18 0917   Gross per 24 hour   Intake              840 ml   Output             1200 ml   Net             -360 ml     Weight (last 2 days)     Date/Time Weight    04/09/18 0537  83 7 (184 53)    04/08/18 0600  88 9 (195 99)    04/07/18 0600  88 8 (195 77)                Telemetry Review: No significant arrhythmias seen on telemetry review  EKG personally reviewed by Marina Luevano DO  Physical Exam   Constitutional: He is oriented to person, place, and time  He appears well-nourished  No distress  HENT:   Head: Atraumatic  Eyes: Conjunctivae are normal  Pupils are equal, round, and reactive to light  Neck: Neck supple  Cardiovascular: Normal rate and S1 normal   An irregularly irregular rhythm present  Exam reveals no friction rub  Murmur heard  Systolic murmur is present with a grade of 1/6   Pulmonary/Chest: Effort normal  No respiratory distress  He has decreased breath sounds  He has no wheezes  He has no rales  Abdominal: Bowel sounds are normal  He exhibits no distension  There is no tenderness  There is no rebound  Musculoskeletal: He exhibits no edema  Neurological: He is alert and oriented to person, place, and time  No cranial nerve deficit  Skin: Skin is warm and dry  No erythema  Nursing note and vitals reviewed  Laboratory Results:    Results from last 7 days  Lab Units 04/08/18  0520 04/07/18  1514 04/07/18  1325  04/04/18  0532   CK TOTAL U/L  --   --   --   --  34*   TROPONIN I ng/mL <0 02 <0 02 <0 02  < >  --    < > = values in this interval not displayed      CBC with diff:   Results from last 7 days  Lab Units 04/09/18  0522 04/08/18  0602 04/07/18  0533 04/06/18  0601 04/05/18  0442 04/04/18  0532 04/03/18  0627   WBC Thousand/uL 28 86* 21 14* 17 44* 19 86* 17 49* 12 41* 10 70*   HEMOGLOBIN g/dL 14 8 13 8 13 2 12 3 11 7* 13 5 13 9   HEMATOCRIT % 44 5 41 7 39 9 37 5 36 4* 41 4 42 3   MCV fL 70* 71* 71* 73* 73* 72* 71*   PLATELETS Thousands/uL 426* 361 283 251 217 259 286   MCH pg 23 4* 23 4* 23 6* 23 8* 23 4* 23 4* 23 4*   MCHC g/dL 33 3 33 1 33 1 32 8 32 1 32 6 32 9   RDW % 18 1* 16 8* 17 0* 16 8* 16 8* 16 5* 16 7*   MPV fL 9 9 10 3 10 3 10 5 10 1 9 9 9 7   NRBC /100 WBC  --  1  --   --   --   --   --          CMP:  Results from last 7 days  Lab Units 04/09/18  0522 04/08/18  0555 04/07/18  0533 04/06/18  0601 04/05/18  0442 04/04/18  0532 04/03/18  0627   SODIUM mmol/L 138 140 139 140 141 140 145   POTASSIUM mmol/L 4 0 4 2 3 6 4 3 4 2 4 0 3 1*   CHLORIDE mmol/L 101 102 104 106 106 104 105   CO2 mmol/L 34* 31 31 27 28 27 31   ANION GAP mmol/L 3* 7 4 7 7 9 9   BUN mg/dL 34* 28* 21 32* 29* 23 19   CREATININE mg/dL 1 01 0 95 0 83 1 02 1 07 1 16 1 15   GLUCOSE RANDOM mg/dL 158* 193* 190* 192* 181* 147* 97   CALCIUM mg/dL 9 1 9 5 8 3 8 7 8 8 9 1 9 4   AST U/L 39 19 18  --   --  20 16   ALT U/L 109* 61 47  --   --  31 23   ALK PHOS U/L 83 70 67  --   --  95 97   TOTAL PROTEIN g/dL 6 2* 5 7* 5 6*  --   --  6 4 6 6   BILIRUBIN TOTAL mg/dL 0 70 0 70 0 70  --   --  0 60 0 70   EGFR ml/min/1 73sq m 72 78 86 72 68 61 62         BMP:  Results from last 7 days  Lab Units 04/09/18  0522 04/08/18  0555 04/07/18  0533 04/06/18  0601 04/05/18  0442 04/04/18  0532 04/03/18  0627   SODIUM mmol/L 138 140 139 140 141 140 145   POTASSIUM mmol/L 4 0 4 2 3 6 4 3 4 2 4 0 3 1*   CHLORIDE mmol/L 101 102 104 106 106 104 105   CO2 mmol/L 34* 31 31 27 28 27 31   BUN mg/dL 34* 28* 21 32* 29* 23 19   CREATININE mg/dL 1 01 0 95 0 83 1 02 1 07 1 16 1 15   GLUCOSE RANDOM mg/dL 158* 193* 190* 192* 181* 147* 97   CALCIUM mg/dL 9 1 9 5 8 3 8 7 8 8 9 1 9 4       BNP: No results for input(s): BNP in the last 72 hours      Magnesium:   Results from last 7 days  Lab Units 04/09/18  0522 04/08/18  0555 04/07/18  0533 04/06/18  0601 04/05/18  0442 04/04/18  0532 04/03/18  2432   MAGNESIUM mg/dL 2 2 2 0 1 7 2 1 2 0 1 9 1 7       Coags:   Results from last 7 days  Lab Units 04/09/18  0522 04/08/18  0557 04/03/18  0627   PTT seconds  --   --  34   INR  1 28* 1 25* 1 12       TSH:        Hemoglobin A1C       Lipid Profile:       Cardiac testing:   Results for orders placed during the hospital encounter of 18   Echo complete with contrast if indicated    Narrative  [address], [place] [address]  [phone]    Transthoracic Echocardiogram  2D, M-mode, Doppler, and Color Doppler    Study date:  2018    Patient: Donta Guzman  MR number: [id number]  Account number: [de-identified]  : 1945  Age: 74 years  Gender: Male  Status: Inpatient  Location: Bedside  Height: 68 in  Weight: 140 lb  BP: 110/ 60 mmHg    Indications: chest pain    Diagnoses: 786 50 - CHEST PAIN NOS    Sonographer:  Gris Ramachandran CCT  Primary Physician:  Patt Slaughter DO  Referring Physician:  Karren Riedel, DO  Group:  [id number]  Interpreting Physician:  Gagan De León MD    SUMMARY    LEFT VENTRICLE:  Size was normal   Systolic function was mildly to moderately reduced  Ejection fraction was estimated to be 40 %  There was mild diffuse hypokinesis  Wall thickness was mildly increased  MITRAL VALVE:  There was mild regurgitation  AORTIC VALVE:  The valve was trileaflet  Leaflets exhibited sclerosis  Cannot exclude mild Aortic Stenosis  TRICUSPID VALVE:  There was mild regurgitation  PROCEDURE: The procedure was performed at the bedside  This was a routine study  The transthoracic approach was used  The study included complete 2D imaging, M-mode, complete spectral Doppler, and color Doppler  The heart rate was 60 bpm,  at the start of the study  This was a technically difficult study  LEFT VENTRICLE: Size was normal  Systolic function was mildly to moderately reduced  Ejection fraction was estimated to be 40 %  There was mild diffuse hypokinesis  Wall thickness was mildly increased      RIGHT VENTRICLE: The size was normal  Systolic function was normal  Wall thickness was normal     LEFT ATRIUM: Size was normal     RIGHT ATRIUM: Size was normal     MITRAL VALVE: Valve structure was normal  There was normal leaflet separation  DOPPLER: The transmitral velocity was within the normal range  There was no evidence for stenosis  There was mild regurgitation  AORTIC VALVE: The valve was trileaflet  Leaflets exhibited sclerosis  Cannot exclude mild Aortic Stenosis  TRICUSPID VALVE: DOPPLER: There was mild regurgitation  Estimated peak PA pressure was 20 mmHg  PULMONIC VALVE: Leaflets exhibited normal thickness, no calcification, and normal cuspal separation  DOPPLER: The transpulmonic velocity was within the normal range  There was no regurgitation  PERICARDIUM: There was no pericardial effusion  The pericardium was normal in appearance  AORTA: The root exhibited normal size  SYSTEM MEASUREMENT TABLES    2D  %FS: 38 32 %  Ao Diam: 3 25 cm  EDV(Teich): 78 64 ml  EF(Teich): 68 97 %  ESV(Teich): 24 4 ml  IVSd: 1 51 cm  LA Diam: 4 11 cm  LVIDd: 4 2 cm  LVIDs: 2 59 cm  LVPWd: 1 09 cm  SV(Teich): 54 23 ml    CW  TR Vmax: 2 m/s  TR maxP 01 mmHg    PW  E' Sept: 0 08 m/s  E/E' Sept: 13 67  LVOT Env  Ti: 311 42 ms  LVOT VTI: 14 8 cm  LVOT Vmax: 0 62 m/s  LVOT Vmax: 0 64 m/s  LVOT Vmean: 0 48 m/s  LVOT maxP 56 mmHg  LVOT maxP 64 mmHg  LVOT meanP 98 mmHg  MV A Alberto: 0 32 m/s  MV Dec Berrien: 4 58 m/s2  MV DecT: 227 64 ms  MV E Alberto: 1 04 m/s  MV E/A Ratio: 3 26    [facility] Accredited Echocardi[facility]    Prepared and electronically signed by    Titi Luo MD  Signed 2018 14:42:55       No results found for this or any previous visit  No results found for this or any previous visit  Results for orders placed in visit on 14   NM myocardial perfusion spect (stress and/or rest)    Narrative This stress test was performed by a cardiologist and the    cardiologist will render the interpretation              [facility] Coordinator        Reading Radiologist- AMAYA Jones MD        Releasing Radiologist- AMAYA Ibarra MD        Released Date Time- 02/26/14 1017      ------------------------------------------------------------------------------   Torrey Puga        Meds/Allergies   all current active meds have been reviewed  Prescriptions Prior to Admission   Medication    albuterol (VENTOLIN HFA) 90 mcg/act inhaler    amLODIPine (NORVASC) 5 mg tablet    aspirin 81 MG tablet    atorvastatin (LIPITOR) 40 mg tablet    clopidogrel (PLAVIX) 75 mg tablet    DULoxetine (CYMBALTA) 60 mg delayed release capsule    esomeprazole (NexIUM) 40 MG capsule    meclizine (ANTIVERT) 12 5 MG tablet    meloxicam (MOBIC) 15 mg tablet    metoprolol tartrate (LOPRESSOR) 25 mg tablet    Multiple Vitamin tablet    pregabalin (LYRICA) 300 MG capsule    ranolazine (RANEXA) 500 mg 12 hr tablet    Tamsulosin HCl (FLOMAX PO)    cloNIDine (CATAPRES) 0 1 mg tablet    Melatonin 3 MG CAPS    senna-docusate sodium (SENOKOT-S) 8 6-50 mg per tablet          Assessment:  Principal Problem:    Chronic obstructive pulmonary disease with acute exacerbation (HCC)  Active Problems:    Essential hypertension    Atrial fibrillation with rapid ventricular response (HCC)    Tobacco abuse    Pulmonary nodule    Lactic acidosis    Leukocytosis
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
## 2018-04-09 NOTE — ASSESSMENT & PLAN NOTE
-the patient continues to have uncontrolled hypertension    -change amlodipine to Cardizem per Cardiology, Lopressor was increased to 100 mg twice daily    -follow patient's blood pressure trend

## 2018-04-09 NOTE — ASSESSMENT & PLAN NOTE
-Check blood cultures x 2 sets  -this is partially secondary to methylprednisolone  - Discontinue fluconazole

## 2018-04-09 NOTE — PROGRESS NOTES
Progress Note - Petrona Pena 1945, 74 y o  male MRN: [id number]    Unit/Bed#: 401-01 Encounter: [id number]    Primary Care Provider: Carlos Deleon DO   Date and time admitted to hospital: 4/3/2018  6:13 AM        * Chronic [facility] with [facility]ation [facility] )   Assessment & Plan    -continued p o  prednisone    -  antibiotics were discontinued, procalcitonin level was low   -continue the respiratory protocol and airway clearance protocol  -continue supplemental oxygen to maintain oxygen saturation levels of 90% and above    Patient's breathing status is markedly improved  Atrial fibrillation with rapid ventricular response ([facility] )   Assessment & Plan    -patient underwent a MARTHA cardioversion on 04/04/2018  -patient developed return of atrial fibrillation with rapid ventricular response on 4/6/2018  -increase Lopressor to 100 mg twice daily   -case discussed with Cardiology, add Cardizem 30 mg every 6 hours  -continue full anticoagulation with Eliquis 5 mg p o  b i d     -TSH level was within normal limits  -will obtain repeat cardiology eval tomorrow  -his echocardiogram did show a decreased ejection fraction of 40%, which may be heart rate related cardiomyopathy            Lactic acidosis   Assessment & Plan      Patient with persistent lactic acidosis  Treat underlying respiratory illness, as well as rapid atrial fibrillation          Leukocytosis   Assessment & Plan    -Check blood cultures x 2 sets  -this is partially secondary to methylprednisolone  - Discontinue fluconazole          Essential hypertension   Assessment & Plan    -the patient continues to have uncontrolled hypertension    -change amlodipine to Cardizem per Cardiology, Lopressor was increased to 100 mg twice daily    -follow patient's blood pressure trend        Pulmonary nodule   Assessment & Plan    -the patient will need outpatient surveillance imaging of the pulmonary nodule with his PCP Tobacco abuse   Assessment & Plan    -nicotine patch  -smoking cessation counseling          VTE Pharmacologic Prophylaxis:   Pharmacologic: Apixaban (Eliquis)  Mechanical VTE Prophylaxis in Place: Yes    Patient Centered Rounds: I have performed bedside rounds with nursing staff today  Discussions with Specialists or Other Care Team Provider:  Case discussed today with Cardiology  Education and Discussions with Family / Patient:  Case discussed with patient at bedside  Current Length of Stay: 6 day(s)    Current Patient Status: Inpatient   Certification Statement: The patient will continue to require additional inpatient hospital stay due to Patient needs improved heart rate control, has underlying cardiomyopathy/reduced ejection fraction    Discharge Plan / Estimated Discharge Date:  Likely discharge home tomorrow 4/10/2018      Code Status: Level 1 - Full Code      Subjective:   No pain, breathing improved  Objective:     Vitals:   Temp (24hrs), Av 5 °F (36 4 °C), Min:96 8 °F (36 °C), Max:97 9 °F (36 6 °C)    HR:  [] 107  Resp:  [18-20] 20  BP: (132-168)/() 132/74  SpO2:  [91 %-97 %] 95 %  Body mass index is 26 86 kg/m²  Input and Output Summary (last 24 hours): Intake/Output Summary (Last 24 hours) at 18 1306  Last data filed at 18 0917   Gross per 24 hour   Intake              840 ml   Output             1200 ml   Net             -360 ml       Physical Exam:     Physical Exam   Constitutional: He is oriented to person, place, and time  He appears well-developed and well-nourished  No distress  Pulmonary/Chest: Effort normal    Overall improved air exchange, less rhonchi, no expiratory wheezing noted  Abdominal: Soft  Bowel sounds are normal  He exhibits no distension  There is no tenderness  Neurological: He is alert and oriented to person, place, and time  No cranial nerve deficit  Coordination normal    Skin: Skin is warm and dry  No rash noted   No erythema  Nursing note and vitals reviewed  Additional Data:     Labs:      Results from last 7 days  Lab Units 04/09/18  0522  04/05/18  0442   WBC Thousand/uL 28 86*  < > 17 49*   HEMOGLOBIN g/dL 14 8  < > 11 7*   HEMATOCRIT % 44 5  < > 36 4*   PLATELETS Thousands/uL 426*  < > 217   NEUTROS PCT %  --   --  87*   LYMPHS PCT %  --   --  7*   LYMPHO PCT % 8*  < >  --    MONOS PCT %  --   --  6   MONO PCT MAN % 8  < >  --    EOS PCT %  --   --  0   EOSINO PCT MANUAL % 0  < >  --    < > = values in this interval not displayed  Results from last 7 days  Lab Units 04/09/18  0522   SODIUM mmol/L 138   POTASSIUM mmol/L 4 0   CHLORIDE mmol/L 101   CO2 mmol/L 34*   BUN mg/dL 34*   CREATININE mg/dL 1 01   CALCIUM mg/dL 9 1   TOTAL PROTEIN g/dL 6 2*   BILIRUBIN TOTAL mg/dL 0 70   ALK PHOS U/L 83   ALT U/L 109*   AST U/L 39   GLUCOSE RANDOM mg/dL 158*       Results from last 7 days  Lab Units 04/09/18  0522   INR  1 28*       * I Have Reviewed All Lab Data Listed Above  * Additional Pertinent Lab Tests Reviewed: [facility] Admission Reviewed      Recent Cultures (last 7 days):       Results from last 7 days  Lab Units 04/07/18  1304 04/07/18  0533 04/03/18  1730   BLOOD CULTURE   --  No Growth at 24 hrs    No Growth at 24 hrs   --    URINE CULTURE   --   --  >100,000 cfu/ml Candida sp  presumptively glabrata*  20,000-29,000 cfu/ml    INFLUENZA A PCR  None Detected  --   --    INFLUENZA B PCR  None Detected  --   --    RSV PCR  None Detected  --   --    LEGIONELLA URINARY ANTIGEN   --   --  Negative       Last 24 Hours Medication List:     Current Facility-Administered Medications:  acetaminophen 650 mg Oral Q6H PRN [facility], DO   albuterol 2 5 mg Nebulization Q6H PRN [facility], DO   aluminum-magnesium hydroxide-simethicone 30 mL Oral Q4H PRN [facility], DO   apixaban 5 mg Oral BID [facility], DO   aspirin 81 mg Oral Daily [facility], DO   atorvastatin 40 mg Oral Daily With Zack Collier, DO   cloNIDine 0 1 mg Oral BID [employer], DO   clopidogrel 75 mg Oral Daily [employer], DO   diltiazem 30 mg Oral Q6H [address] Cullen Green, DO   DULoxetine 60 mg Oral Daily [employer], DO   LORazepam 0 5 mg Intravenous Q4H PRN [employer], DO   melatonin 3 mg Oral HS [employer], DO   metoprolol tartrate 100 mg Oral Q12H [address] Genevieve Rose,    multivitamin-minerals 1 tablet Oral Daily [employer], DO   nicotine 1 patch Transdermal Daily [employer], DO   ondansetron 4 mg Intravenous Q6H PRN [employer], DO   oxyCODONE 5 mg Oral Q4H PRN [employer], DO   predniSONE 40 mg Oral Daily Genevieve Rose,    pregabalin 100 mg Oral TID [employer], DO   ranolazine 500 mg Oral Q12H [address] Chinedu Magana, DO   tamsulosin 0 4 mg Oral Daily With Zack Collier, DO   tiotropium 18 mcg Inhalation Daily Abril Miller, DO        Today, Patient Was Seen By: Genevieve Rose,

## 2018-04-09 NOTE — ASSESSMENT & PLAN NOTE
-patient underwent a MARTHA cardioversion on 04/04/2018  -patient developed return of atrial fibrillation with rapid ventricular response on 4/6/2018  -increase Lopressor to 100 mg twice daily   -case discussed with Cardiology, add Cardizem 30 mg every 6 hours    -continue full anticoagulation with Eliquis 5 mg p o  b i d     -TSH level was within normal limits  -will obtain repeat cardiology eval tomorrow  -his echocardiogram did show a decreased ejection fraction of 40%, which may be heart rate related cardiomyopathy

## 2018-04-09 NOTE — ASSESSMENT & PLAN NOTE
Patient with persistent lactic acidosis  Treat underlying respiratory illness, as well as rapid atrial fibrillation

## 2018-04-09 NOTE — RESPIRATORY THERAPY NOTE
Cont pulse ox DC'd as per Dr Zepeda Done order! Pulse ox reading prior to DC = sat 96% on vapotherm 20flow/28%  Pt is ready to come off vapotherm although he gave us a difficult time yesterday stating,"mucous stuck in his chest" where it was reordered again via Dr Amna Craven for humidity for secretions

## 2018-04-09 NOTE — ASSESSMENT & PLAN NOTE
-continued p o  prednisone    -  antibiotics were discontinued, procalcitonin level was low   -continue the respiratory protocol and airway clearance protocol  -continue supplemental oxygen to maintain oxygen saturation levels of 90% and above    Patient's breathing status is markedly improved

## 2018-04-09 NOTE — PHYSICAL THERAPY NOTE
PT treatment Note      04/09/18 1411   Pain Assessment   Pain Score No Pain   Bed Mobility   Additional Comments EOB start session   Transfers   Sit to Stand 5  Supervision   Additional items Bedrails;Verbal cues   Stand to Sit 5  Supervision   Additional items Bedrails;Verbal cues   Stand pivot 5  Supervision   Ambulation/Elevation   Gait Assistance 5  Supervision   Additional items Assist x 1   Assistive Device Rolling walker   Distance 160'   SpO2 97-92% with 1 L during ambulation  RA at rest  pt     Balance   Static Sitting Good   Dynamic Sitting Good   Static Standing Fair +   Dynamic Standing Fair   Ambulatory Fair  (with RW)   Endurance Deficit   Endurance Deficit Yes   Activity Tolerance   Activity Tolerance Patient limited by fatigue   Assessment   Prognosis Good   Problem List Decreased strength;Decreased endurance; Impaired balance;Decreased mobility   Assessment Performs functional mobility with supervison and cues for safe transfer techniques  Mild SOB with activity  Pt is in need of continued activity in PT to improve impairments and functional deficits   Plan   Treatment/Interventions Functional transfer training;LE strengthening/ROM; Therapeutic exercise; Endurance training;Bed mobility;Gait training   Progress Progressing toward goals   Recommendation   Recommendation Home PT   Pt  OOB with call bell within reach, scd's connected and turned on and alarm on at end of PT session
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
## 2018-04-10 VITALS
TEMPERATURE: 97.1 F | DIASTOLIC BLOOD PRESSURE: 89 MMHG | WEIGHT: 184.97 LBS | OXYGEN SATURATION: 99 % | BODY MASS INDEX: 26.48 KG/M2 | SYSTOLIC BLOOD PRESSURE: 139 MMHG | HEIGHT: 70 IN | RESPIRATION RATE: 18 BRPM | HEART RATE: 97 BPM

## 2018-04-10 PROBLEM — E87.2 LACTIC ACIDOSIS: Status: RESOLVED | Noted: 2018-04-05 | Resolved: 2018-04-10

## 2018-04-10 PROBLEM — E87.20 LACTIC ACIDOSIS: Status: RESOLVED | Noted: 2018-04-05 | Resolved: 2018-04-10

## 2018-04-10 LAB
ALBUMIN SERPL BCP-MCNC: 2.8 G/DL (ref 3.5–5)
ALP SERPL-CCNC: 63 U/L (ref 46–116)
ALT SERPL W P-5'-P-CCNC: 86 U/L (ref 12–78)
ANION GAP SERPL CALCULATED.3IONS-SCNC: 8 MMOL/L (ref 4–13)
ANION GAP SERPL CALCULATED.3IONS-SCNC: 9 MMOL/L (ref 4–13)
ANISOCYTOSIS BLD QL SMEAR: PRESENT
AST SERPL W P-5'-P-CCNC: 30 U/L (ref 5–45)
BASOPHILS # BLD MANUAL: 0 THOUSAND/UL (ref 0–0.1)
BASOPHILS NFR MAR MANUAL: 0 % (ref 0–1)
BILIRUB SERPL-MCNC: 0.47 MG/DL (ref 0.2–1)
BUN SERPL-MCNC: 34 MG/DL (ref 5–25)
BUN SERPL-MCNC: 35 MG/DL (ref 5–25)
CALCIUM SERPL-MCNC: 8.8 MG/DL
CALCIUM SERPL-MCNC: 9 MG/DL
CHLORIDE SERPL-SCNC: 103 MMOL/L (ref 100–108)
CHLORIDE SERPL-SCNC: 103 MMOL/L (ref 100–108)
CO2 SERPL-SCNC: 29 MMOL/L (ref 21–32)
CO2 SERPL-SCNC: 30 MMOL/L (ref 21–32)
CREAT SERPL-MCNC: 0.94 MG/DL (ref 0.6–1.3)
CREAT SERPL-MCNC: 0.96 MG/DL (ref 0.6–1.3)
EOSINOPHIL # BLD MANUAL: 1.08 THOUSAND/UL (ref 0–0.4)
EOSINOPHIL NFR BLD MANUAL: 4 % (ref 0–6)
ERYTHROCYTE [DISTWIDTH] IN BLOOD BY AUTOMATED COUNT: 17.1 % (ref 11.6–15.1)
GFR SERPL CREATININE-BSD FRML MDRD: 77 ML/MIN/1.73SQ M
GFR SERPL CREATININE-BSD FRML MDRD: 79 ML/MIN/1.73SQ M
GLUCOSE SERPL-MCNC: 174 MG/DL (ref 65–140)
GLUCOSE SERPL-MCNC: 175 MG/DL (ref 65–140)
HCT VFR BLD AUTO: 39.1 % (ref 36.5–49.3)
HGB BLD-MCNC: 12.9 G/DL (ref 12–17)
LYMPHOCYTES # BLD AUTO: 30 % (ref 14–44)
LYMPHOCYTES # BLD AUTO: 8.08 THOUSAND/UL (ref 0.6–4.47)
MAGNESIUM SERPL-MCNC: 2.1 MG/DL (ref 1.6–2.6)
MCH RBC QN AUTO: 23.4 PG (ref 26.8–34.3)
MCHC RBC AUTO-ENTMCNC: 33 G/DL (ref 31.4–37.4)
MCV RBC AUTO: 71 FL (ref 82–98)
MONOCYTES # BLD AUTO: 3.23 THOUSAND/UL (ref 0–1.22)
MONOCYTES NFR BLD: 12 % (ref 4–12)
NEUTROPHILS # BLD MANUAL: 14.54 THOUSAND/UL (ref 1.85–7.62)
NEUTS SEG NFR BLD AUTO: 54 % (ref 43–75)
PLATELET # BLD AUTO: 349 THOUSANDS/UL (ref 149–390)
PLATELET BLD QL SMEAR: ADEQUATE
PMV BLD AUTO: 9.5 FL (ref 8.9–12.7)
POIKILOCYTOSIS BLD QL SMEAR: PRESENT
POTASSIUM SERPL-SCNC: 4.1 MMOL/L (ref 3.5–5.3)
POTASSIUM SERPL-SCNC: 4.1 MMOL/L (ref 3.5–5.3)
PROT SERPL-MCNC: 4.9 G/DL (ref 6.4–8.2)
RBC # BLD AUTO: 5.52 MILLION/UL (ref 3.88–5.62)
SODIUM SERPL-SCNC: 141 MMOL/L (ref 136–145)
SODIUM SERPL-SCNC: 141 MMOL/L (ref 136–145)
TOTAL CELLS COUNTED SPEC: 100
WBC # BLD AUTO: 26.92 THOUSAND/UL (ref 4.31–10.16)

## 2018-04-10 PROCEDURE — 80048 BASIC METABOLIC PNL TOTAL CA: CPT | Performed by: INTERNAL MEDICINE

## 2018-04-10 PROCEDURE — 97116 GAIT TRAINING THERAPY: CPT

## 2018-04-10 PROCEDURE — 83735 ASSAY OF MAGNESIUM: CPT | Performed by: INTERNAL MEDICINE

## 2018-04-10 PROCEDURE — 94761 N-INVAS EAR/PLS OXIMETRY MLT: CPT

## 2018-04-10 PROCEDURE — 80053 COMPREHEN METABOLIC PANEL: CPT | Performed by: INTERNAL MEDICINE

## 2018-04-10 PROCEDURE — 99239 HOSP IP/OBS DSCHRG MGMT >30: CPT | Performed by: INTERNAL MEDICINE

## 2018-04-10 PROCEDURE — 99232 SBSQ HOSP IP/OBS MODERATE 35: CPT | Performed by: INTERNAL MEDICINE

## 2018-04-10 PROCEDURE — 85007 BL SMEAR W/DIFF WBC COUNT: CPT | Performed by: INTERNAL MEDICINE

## 2018-04-10 PROCEDURE — 94760 N-INVAS EAR/PLS OXIMETRY 1: CPT

## 2018-04-10 PROCEDURE — 85027 COMPLETE CBC AUTOMATED: CPT | Performed by: INTERNAL MEDICINE

## 2018-04-10 RX ORDER — PREDNISONE 10 MG/1
40 TABLET ORAL DAILY
Qty: 30 TABLET | Refills: 0 | Status: SHIPPED | OUTPATIENT
Start: 2018-04-11 | End: 2018-07-06 | Stop reason: ALTCHOICE

## 2018-04-10 RX ORDER — ALBUTEROL SULFATE 2.5 MG/3ML
2.5 SOLUTION RESPIRATORY (INHALATION) EVERY 6 HOURS PRN
Qty: 75 ML | Refills: 0 | Status: ON HOLD | OUTPATIENT
Start: 2018-04-10 | End: 2018-05-06 | Stop reason: ALTCHOICE

## 2018-04-10 RX ORDER — ALBUTEROL SULFATE 90 UG/1
2 AEROSOL, METERED RESPIRATORY (INHALATION) EVERY 6 HOURS PRN
Qty: 1 INHALER | Refills: 0 | Status: ON HOLD | OUTPATIENT
Start: 2018-04-10 | End: 2018-05-06 | Stop reason: ALTCHOICE

## 2018-04-10 RX ORDER — CLOPIDOGREL BISULFATE 75 MG/1
75 TABLET ORAL DAILY
Qty: 30 TABLET | Refills: 0 | Status: SHIPPED | OUTPATIENT
Start: 2018-04-10 | End: 2018-05-09 | Stop reason: HOSPADM

## 2018-04-10 RX ORDER — DILTIAZEM HYDROCHLORIDE 120 MG/1
120 CAPSULE, COATED, EXTENDED RELEASE ORAL DAILY
Qty: 30 CAPSULE | Refills: 0 | Status: SHIPPED | OUTPATIENT
Start: 2018-04-10 | End: 2018-05-09 | Stop reason: HOSPADM

## 2018-04-10 RX ORDER — CLONIDINE HYDROCHLORIDE 0.1 MG/1
0.1 TABLET ORAL 2 TIMES DAILY
Qty: 20 TABLET | Refills: 0 | Status: SHIPPED | OUTPATIENT
Start: 2018-04-10 | End: 2018-07-25

## 2018-04-10 RX ORDER — ESOMEPRAZOLE MAGNESIUM 40 MG/1
40 CAPSULE, DELAYED RELEASE ORAL DAILY
Qty: 30 CAPSULE | Refills: 0 | Status: ON HOLD | OUTPATIENT
Start: 2018-04-10 | End: 2018-09-02

## 2018-04-10 RX ORDER — DULOXETIN HYDROCHLORIDE 60 MG/1
60 CAPSULE, DELAYED RELEASE ORAL DAILY
Qty: 5 CAPSULE | Refills: 0 | Status: ON HOLD | OUTPATIENT
Start: 2018-04-10 | End: 2018-08-30 | Stop reason: ALTCHOICE

## 2018-04-10 RX ORDER — METOPROLOL TARTRATE 100 MG/1
100 TABLET ORAL EVERY 12 HOURS SCHEDULED
Qty: 60 TABLET | Refills: 0 | Status: SHIPPED | OUTPATIENT
Start: 2018-04-10 | End: 2018-05-09 | Stop reason: HOSPADM

## 2018-04-10 RX ADMIN — APIXABAN 5 MG: 5 TABLET, FILM COATED ORAL at 09:58

## 2018-04-10 RX ADMIN — PANTOPRAZOLE SODIUM 40 MG: 40 TABLET, DELAYED RELEASE ORAL at 05:27

## 2018-04-10 RX ADMIN — CLOPIDOGREL BISULFATE 75 MG: 75 TABLET ORAL at 09:58

## 2018-04-10 RX ADMIN — RANOLAZINE 500 MG: 500 TABLET, FILM COATED, EXTENDED RELEASE ORAL at 09:58

## 2018-04-10 RX ADMIN — DULOXETINE HYDROCHLORIDE 60 MG: 30 CAPSULE, DELAYED RELEASE ORAL at 09:57

## 2018-04-10 RX ADMIN — MULTIPLE VITAMINS W/ MINERALS TAB 1 TABLET: TAB at 09:56

## 2018-04-10 RX ADMIN — CLONIDINE HYDROCHLORIDE 0.1 MG: 0.1 TABLET ORAL at 09:57

## 2018-04-10 RX ADMIN — METOPROLOL TARTRATE 100 MG: 100 TABLET ORAL at 09:56

## 2018-04-10 RX ADMIN — ASPIRIN 81 MG 81 MG: 81 TABLET ORAL at 09:56

## 2018-04-10 RX ADMIN — DILTIAZEM HYDROCHLORIDE 30 MG: 30 TABLET, FILM COATED ORAL at 12:19

## 2018-04-10 RX ADMIN — DILTIAZEM HYDROCHLORIDE 30 MG: 30 TABLET, FILM COATED ORAL at 05:27

## 2018-04-10 RX ADMIN — PREDNISONE 40 MG: 20 TABLET ORAL at 09:56

## 2018-04-10 RX ADMIN — ACETAMINOPHEN 650 MG: 325 TABLET, FILM COATED ORAL at 09:59

## 2018-04-10 RX ADMIN — TIOTROPIUM BROMIDE 18 MCG: 18 CAPSULE ORAL; RESPIRATORY (INHALATION) at 12:10

## 2018-04-10 RX ADMIN — OXYCODONE HYDROCHLORIDE 5 MG: 5 TABLET ORAL at 04:28

## 2018-04-10 RX ADMIN — OXYCODONE HYDROCHLORIDE 5 MG: 5 TABLET ORAL at 16:43

## 2018-04-10 RX ADMIN — NICOTINE 1 PATCH: 7 PATCH, EXTENDED RELEASE TRANSDERMAL at 09:58

## 2018-04-10 RX ADMIN — PREGABALIN 100 MG: 50 CAPSULE ORAL at 09:56

## 2018-04-10 NOTE — PHYSICAL THERAPY NOTE
PT treatment Note      04/10/18 0959   Pain Assessment   Pain Score Worst Possible Pain   Pain Location Back;Arm;Head   Pain Orientation Right   Restrictions/Precautions   Other Precautions (Contact/isolation;Multiple lines;Telemetry;O2;Fall Risk;Pain)   Subjective   Subjective I'm tired  Transfers   Sit to Stand 5  Supervision   Additional items Bedrails;Verbal cues   Stand to Sit 5  Supervision   Additional items Bedrails;Verbal cues   Stand pivot 5  Supervision   Ambulation/Elevation   Gait Assistance 5  Supervision   Additional items Assist x 1   Assistive Device Rolling walker   Distance 200', 2 L SpO2 95%   Balance   Static Sitting Good   Dynamic Sitting Fair +   Static Standing Fair   Dynamic Standing Fair   Ambulatory Fair  (with RW)   Endurance Deficit   Endurance Deficit Yes   Activity Tolerance   Activity Tolerance Patient limited by fatigue   Assessment   Prognosis Good   Problem List Decreased strength;Decreased endurance; Impaired balance;Decreased mobility   Assessment Performs functional mobility with supervison and cues for safe transfer techniques  Mild SOB with activity  Fair to good stability with RW  Pt is in need of continued activity in PT to improve impairments and functional deficits   Plan   Treatment/Interventions Functional transfer training;LE strengthening/ROM; Therapeutic exercise; Endurance training;Bed mobility;Gait training   Progress Progressing toward goals   Recommendation   Recommendation Home PT   Pt  In bed  with call bell within reach, scd's connected and turned on and alarm on at end of PT session

## 2018-04-10 NOTE — RESPIRATORY THERAPY NOTE
Home Oxygen Qualifying Test       Patient name: Joel Goldsmith        : 1945   Date of Test:  April 10, 2018  Diagnosis:      Home Oxygen Test:    **Medicare Guidelines require item(s) 1-5 on all ambulatory patients or 1 and 2 on on-ambulatory patients  1   Baseline SPO2 on Room Air at rest 95 %  If = or < 88% on room air add O2 via NC and titrate patient  Patient must be ambulated with O2 and titrated to > 88% with exertion  2   SPO2 on Oxygen at rest na %  3   SPO2 during exercise on Room Air 85 %  4   SPO2 during exercise on Oxygen  93% at a liter flow of 2 lpm     5   Exercise performed:    [x] Walking     [] Stairs     [] Duration 10 (min )     [x] Distance 200 (ft )       [x]  Supplemental Home Oxygen is indicated  []  Client does not qualify for home oxygen        Jorge Castillo, RT

## 2018-04-10 NOTE — SOCIAL WORK
Pt is being discharged today  Pt qualifies for 02  Referral sent to [facility]  Pt given portable concentrator   Pt is agreeable to Home care   Referral sent to [facility] care  Pt 's care giver will give the pt a ride home after dinner  Pt has follow up appointments made   Case Management reviewed discharge planning process including the following: identifying help that is needed at home, pt's preference for discharge needs and Meds at [facility]  Reviewed with Pt that any member of the healthcare team can answer questions regarding : medications, jmportance of recognizing  Signs and symptoms of any  medical problems  Case Management also encouraged pt to follow up with all recommended appointments after discharge  Georgette Ormond

## 2018-04-10 NOTE — CASE MANAGEMENT
Continued Stay Review  Date: 4/10/18  Vital Signs: /65 (BP Location: Left arm)   Pulse 79   Temp (!) 97 1 °F (36 2 °C) (Temporal)   Resp 18   Ht 5' 9 5" (1 765 m)   Wt 83 9 kg (184 lb 15 5 oz)   SpO2 94%   BMI 26 92 kg/m²   Medications:   Scheduled Meds:   Current Facility-Administered Medications:  acetaminophen 650 mg Oral Q6H PRN [facility], DO   albuterol 2 5 mg Nebulization Q6H PRN [facility], DO   aluminum-magnesium hydroxide-simethicone 30 mL Oral Q4H PRN [facility], DO   apixaban 5 mg Oral BID [facility], DO   aspirin 81 mg Oral Daily [facility], DO   atorvastatin 40 mg Oral Daily With Zack Collier, DO   cloNIDine 0 1 mg Oral BID [facility], DO   clopidogrel 75 mg Oral Daily [facility], DO   diltiazem 30 mg Oral Q6H [address] Cullen Green, DO   DULoxetine 60 mg Oral Daily [facility], DO   LORazepam 0 5 mg Intravenous Q4H PRN [facility], DO   melatonin 3 mg Oral HS [facility], DO   metoprolol tartrate 100 mg Oral Q12H [address] Shanna Torres, DO   multivitamin-minerals 1 tablet Oral Daily [facility], DO   nicotine 1 patch Transdermal Daily [facility], DO   ondansetron 4 mg Intravenous Q6H PRN [facility], DO   oxyCODONE 5 mg Oral Q4H PRN [facility], DO   pantoprazole 40 mg Oral Early Morning Noralee Brightlurdes, DO   predniSONE 40 mg Oral Daily Saravanan Goldberg, DO   pregabalin 100 mg Oral TID [facility], DO   ranolazine 500 mg Oral Q12H [address] [facility], DO   tamsulosin 0 4 mg Oral Daily With Zack Collier, DO   tiotropium 18 mcg Inhalation Daily Loni Parker, DO     Continuous Infusions:    PRN Meds:   acetaminophen    albuterol    aluminum-magnesium hydroxide-simethicone    LORazepam    ondansetron    oxyCODONE    Abnormal Labs/Diagnostic Results:   BUN 34 GLUC 174 ALT 86 TPROT 4 9 ALB 2 8 WBC 26 92     Age/Sex: 74 y o  male     Assessment/Plan:   PER CARDIO    Discharge Plan: TBD

## 2018-04-10 NOTE — PROGRESS NOTES
Cardiology Progress Note - Adams Abarca [address] y o  male MRN: [id number]    Unit/Bed#: 401-01 Encounter: [id number]    Assessment and plan   1  Paroxysmal atrial fibrillation   2  Acute COPD exacerbation   3  Coronary artery disease with chronic occlusion of the right coronary artery   4  Hypertension   5  Mild Aortic stenosis   6  Cardiomyopathy ejection fraction 45%   7  Moderate mitral regurgitation     Recommendations:   Given the acute COPD exacerbation I think repeat cardioversion would be of low utility at this point  Can change to Cardizem  on discharge  Continue metoprolol  Heart rate and blood pressure well controlled  Continue anticoagulation  We could consider cardioversion in the future once he recovers from the acute COPD exacerbation  Coronary disease stable without angina  No signs of decompensated heart failure  Ambulate in the halls today and watch heart rate control  Hopeful discharge today  Subjective:    No significant events overnight  Denies any chest pain or shortness of breath, palpitations, lightheadedness, dizziness, or syncope  ROS    Objective:   Vitals: Blood pressure 126/65, pulse 79, temperature (!) 97 1 °F (36 2 °C), temperature source Temporal, resp  rate 18, height 5' 9 5" (1 765 m), weight 83 9 kg (184 lb 15 5 oz), SpO2 96 %  , Body mass index is 26 92 kg/m² , Orthostatic Blood Pressures    Flowsheet Row Most Recent Value   Blood Pressure  126/65 filed at 04/10/2018 0727   Patient Position - Orthostatic VS  Lying filed at 04/10/2018 1012         Systolic (13JVX), NPQ:438 , Min:121 , SYP:002     Diastolic (44HFB), SJY:83, Min:63, Max:74      Intake/Output Summary (Last 24 hours) at 04/10/18 0824  Last data filed at 04/09/18 2100   Gross per 24 hour   Intake             1440 ml   Output             1100 ml   Net              340 ml     Weight (last 2 days)     Date/Time   Weight    04/10/18 0600  83 9 (184 97)    04/09/18 0537  83 7 (184 53)    04/08/18 0600 88 9 (195 99)                Telemetry Review: No significant arrhythmias seen on telemetry review  EKG personally reviewed by Pasquale Morris DO  Physical Exam   Constitutional: He is oriented to person, place, and time  He appears well-nourished  No distress  HENT:   Head: Atraumatic  Eyes: Conjunctivae are normal  Pupils are equal, round, and reactive to light  Neck: Neck supple  Cardiovascular: Normal rate and normal heart sounds  An irregularly irregular rhythm present  Exam reveals no friction rub  No murmur heard  Pulmonary/Chest: Effort normal and breath sounds normal  No respiratory distress  He has no wheezes  He has no rales  Abdominal: Bowel sounds are normal  He exhibits no distension  There is no tenderness  There is no rebound  Musculoskeletal: He exhibits no edema  Neurological: He is alert and oriented to person, place, and time  No cranial nerve deficit  Skin: Skin is warm and dry  No erythema  Nursing note and vitals reviewed  Laboratory Results:    Results from last 7 days  Lab Units 04/08/18  0520 04/07/18  1514 04/07/18  1325  04/04/18  0532   CK TOTAL U/L  --   --   --   --  34*   TROPONIN I ng/mL <0 02 <0 02 <0 02  < >  --    < > = values in this interval not displayed      CBC with diff:   Results from last 7 days  Lab Units 04/10/18  0714 04/09/18  0522 04/08/18  0602 04/07/18  0533 04/06/18  0601 04/05/18  0442 04/04/18  0532   WBC Thousand/uL 26 92* 28 86* 21 14* 17 44* 19 86* 17 49* 12 41*   HEMOGLOBIN g/dL 12 9 14 8 13 8 13 2 12 3 11 7* 13 5   HEMATOCRIT % 39 1 44 5 41 7 39 9 37 5 36 4* 41 4   MCV fL 71* 70* 71* 71* 73* 73* 72*   PLATELETS Thousands/uL 349 426* 361 283 251 217 259   MCH pg 23 4* 23 4* 23 4* 23 6* 23 8* 23 4* 23 4*   MCHC g/dL 33 0 33 3 33 1 33 1 32 8 32 1 32 6   RDW % 17 1* 18 1* 16 8* 17 0* 16 8* 16 8* 16 5*   MPV fL 9 5 9 9 10 3 10 3 10 5 10 1 9 9   NRBC /100 WBC  --   --  1  --   --   --   --          CMP:  Results from last 7 days  Lab Units 04/10/18  0427 04/09/18  0522 04/08/18  0555 04/07/18  0533 04/06/18  0601 04/05/18  0442 04/04/18  0532   SODIUM mmol/L 141 138 140 139 140 141 140   POTASSIUM mmol/L 4 1 4 0 4 2 3 6 4 3 4 2 4 0   CHLORIDE mmol/L 103 101 102 104 106 106 104   CO2 mmol/L 30 34* 31 31 27 28 27   ANION GAP mmol/L 8 3* 7 4 7 7 9   BUN mg/dL 34* 34* 28* 21 32* 29* 23   CREATININE mg/dL 0 94 1 01 0 95 0 83 1 02 1 07 1 16   GLUCOSE RANDOM mg/dL 174* 158* 193* 190* 192* 181* 147*   CALCIUM mg/dL 8 8 9 1 9 5 8 3 8 7 8 8 9 1   AST U/L 30 39 19 18  --   --  20   ALT U/L 86* 109* 61 47  --   --  31   ALK PHOS U/L 63 83 70 67  --   --  95   TOTAL PROTEIN g/dL 4 9* 6 2* 5 7* 5 6*  --   --  6 4   BILIRUBIN TOTAL mg/dL 0 47 0 70 0 70 0 70  --   --  0 60   EGFR ml/min/1 73sq m 79 72 78 86 72 68 61         BMP:  Results from last 7 days  Lab Units 04/10/18  0427 04/09/18  0522 04/08/18  0555 04/07/18  0533 04/06/18  0601 04/05/18  0442 04/04/18  0532   SODIUM mmol/L 141 138 140 139 140 141 140   POTASSIUM mmol/L 4 1 4 0 4 2 3 6 4 3 4 2 4 0   CHLORIDE mmol/L 103 101 102 104 106 106 104   CO2 mmol/L 30 34* 31 31 27 28 27   BUN mg/dL 34* 34* 28* 21 32* 29* 23   CREATININE mg/dL 0 94 1 01 0 95 0 83 1 02 1 07 1 16   GLUCOSE RANDOM mg/dL 174* 158* 193* 190* 192* 181* 147*   CALCIUM mg/dL 8 8 9 1 9 5 8 3 8 7 8 8 9 1       BNP: No results for input(s): BNP in the last 72 hours      Magnesium:   Results from last 7 days  Lab Units 04/10/18  0427 04/09/18  0522 04/08/18  0555 04/07/18  0533 04/06/18  0601 04/05/18  0442 04/04/18  0532   MAGNESIUM mg/dL 2 1 2 2 2 0 1 7 2 1 2 0 1 9       Coags:   Results from last 7 days  Lab Units 04/09/18  0522 04/08/18  0557   INR  1 28* 1 25*       TSH:        Hemoglobin A1C       Lipid Profile:       Cardiac testing:   Results for orders placed during the hospital encounter of 04/03/18   Echo complete with contrast if indicated    Narrative [address] [facility] [address] [place] [address], [place] [address]  [phone]    Transthoracic Echocardiogram  2D, M-mode, Doppler, and Color Doppler    Study date:  2018    Patient: Jasbir Hernandez  MR number: [id number]  Account number: [de-identified]  : 1945  Age: 74 years  Gender: Male  Status: Inpatient  Location: Bedside  Height: 68 in  Weight: 140 lb  BP: 110/ 60 mmHg    Indications: chest pain    Diagnoses: 786 50 - CHEST PAIN NOS    Sonographer:  Roxie Camejo RDCS, CCT  Primary Physician:  Carlos Deleon III, DO  Referring Physician:  Yokasta Moseley DO  Group:  [id number]  Interpreting Physician:  Adelina Aguirre MD    SUMMARY    LEFT VENTRICLE:  Size was normal   Systolic function was mildly to moderately reduced  Ejection fraction was estimated to be 40 %  There was mild diffuse hypokinesis  Wall thickness was mildly increased  MITRAL VALVE:  There was mild regurgitation  AORTIC VALVE:  The valve was trileaflet  Leaflets exhibited sclerosis  Cannot exclude mild Aortic Stenosis  TRICUSPID VALVE:  There was mild regurgitation  PROCEDURE: The procedure was performed at the bedside  This was a routine study  The transthoracic approach was used  The study included complete 2D imaging, M-mode, complete spectral Doppler, and color Doppler  The heart rate was 60 bpm,  at the start of the study  This was a technically difficult study  LEFT VENTRICLE: Size was normal  Systolic function was mildly to moderately reduced  Ejection fraction was estimated to be 40 %  There was mild diffuse hypokinesis  Wall thickness was mildly increased  RIGHT VENTRICLE: The size was normal  Systolic function was normal  Wall thickness was normal     LEFT ATRIUM: Size was normal     RIGHT ATRIUM: Size was normal     MITRAL VALVE: Valve structure was normal  There was normal leaflet separation  DOPPLER: The transmitral velocity was within the normal range  There was no evidence for stenosis  There was mild regurgitation      AORTIC VALVE: The valve was trileaflet  Leaflets exhibited sclerosis  Cannot exclude mild Aortic Stenosis  TRICUSPID VALVE: DOPPLER: There was mild regurgitation  Estimated peak PA pressure was 20 mmHg  PULMONIC VALVE: Leaflets exhibited normal thickness, no calcification, and normal cuspal separation  DOPPLER: The transpulmonic velocity was within the normal range  There was no regurgitation  PERICARDIUM: There was no pericardial effusion  The pericardium was normal in appearance  AORTA: The root exhibited normal size  SYSTEM MEASUREMENT TABLES    2D  %FS: 38 32 %  Ao Diam: 3 25 cm  EDV(Teich): 78 64 ml  EF(Teich): 68 97 %  ESV(Teich): 24 4 ml  IVSd: 1 51 cm  LA Diam: 4 11 cm  LVIDd: 4 2 cm  LVIDs: 2 59 cm  LVPWd: 1 09 cm  SV(Teich): 54 23 ml    CW  TR Vmax: 2 m/s  TR maxP 01 mmHg    PW  E' Sept: 0 08 m/s  E/E' Sept: 13 67  LVOT Env  Ti: 311 42 ms  LVOT VTI: 14 8 cm  LVOT Vmax: 0 62 m/s  LVOT Vmax: 0 64 m/s  LVOT Vmean: 0 48 m/s  LVOT maxP 56 mmHg  LVOT maxP 64 mmHg  LVOT meanP 98 mmHg  MV A Alberto: 0 32 m/s  MV Dec [place]: 4 58 m/s2  MV DecT: 227 64 ms  MV E Alberto: 1 04 m/s  MV E/A Ratio: 3 26    [facility] [place] [facility] Accredited Echocardiography Laboratory    Prepared and electronically signed by    Marilyn Viramontes MD  Signed 2018 14:42:55       No results found for this or any previous visit  No results found for this or any previous visit  Results for orders placed in visit on 14   NM myocardial perfusion spect (stress and/or rest)    Narrative This stress test was performed by a cardiologist and the    cardiologist will render the interpretation              Emma [id number] Coordinator        Reading Radiologist- AMAYA Tony MD        Releasing Radiologist- AMAYA Tony MD        Released Date Time- 14 1017      ------------------------------------------------------------------------------   Alberto Ledesma Meds/Allergies   all current active meds have been reviewed  Prescriptions Prior to Admission   Medication    albuterol (VENTOLIN HFA) 90 mcg/act inhaler    amLODIPine (NORVASC) 5 mg tablet    aspirin 81 MG tablet    atorvastatin (LIPITOR) 40 mg tablet    clopidogrel (PLAVIX) 75 mg tablet    DULoxetine (CYMBALTA) 60 mg delayed release capsule    esomeprazole (NexIUM) 40 MG capsule    meclizine (ANTIVERT) 12 5 MG tablet    meloxicam (MOBIC) 15 mg tablet    metoprolol tartrate (LOPRESSOR) 25 mg tablet    Multiple Vitamin tablet    pregabalin (LYRICA) 300 MG capsule    ranolazine (RANEXA) 500 mg 12 hr tablet    Tamsulosin HCl (FLOMAX PO)    cloNIDine (CATAPRES) 0 1 mg tablet    Melatonin 3 MG CAPS    senna-docusate sodium (SENOKOT-S) 8 6-50 mg per tablet          Assessment:  Principal Problem:    Chronic obstructive pulmonary disease with acute exacerbation (HCC)  Active Problems:    Essential hypertension    Atrial fibrillation with rapid ventricular response (HCC)    Tobacco abuse    Pulmonary nodule    Lactic acidosis    Leukocytosis

## 2018-04-10 NOTE — PLAN OF CARE
Problem: DISCHARGE PLANNING - CARE MANAGEMENT  Goal: Discharge to post-acute care or home with appropriate resources  INTERVENTIONS:  - Conduct assessment to determine patient/family and health care team treatment goals, and need for post-acute services based on payer coverage, community resources, and patient preferences, and barriers to discharge  - Address psychosocial, clinical, and financial barriers to discharge as identified in assessment in conjunction with the patient/family and health care team  Pt will need to be qualified for 02 on discharge  - Arrange appropriate level of post-acute services according to patient's   needs and preference and payer coverage in collaboration with the physician and health care team  - Communicate with and update the patient/family, physician, and health care team regarding progress on the discharge plan  - Arrange appropriate transportation to post-acute venues   Pt qualifies for home O2      Pt would benefit from homecare   Outcome: Completed Date Met: 04/10/18

## 2018-04-10 NOTE — NURSING NOTE
Patient discharged on 4/10/18, D/c instructions and script given and explained to pt with adequate understanding verbalized  No s/s of distress upon D/c
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
## 2018-04-12 LAB
BACTERIA BLD CULT: NORMAL
BACTERIA BLD CULT: NORMAL

## 2018-05-06 ENCOUNTER — HOSPITAL ENCOUNTER (INPATIENT)
Facility: HOSPITAL | Age: 73
LOS: 3 days | Discharge: HOME HEALTH | DRG: 310 | End: 2018-05-09
Attending: EMERGENCY MEDICINE | Admitting: INTERNAL MEDICINE
Payer: COMMERCIAL

## 2018-05-06 ENCOUNTER — APPOINTMENT (INPATIENT)
Dept: CT IMAGING | Facility: HOSPITAL | Age: 73
DRG: 310 | End: 2018-05-06
Payer: COMMERCIAL

## 2018-05-06 ENCOUNTER — APPOINTMENT (EMERGENCY)
Dept: RADIOLOGY | Facility: HOSPITAL | Age: 73
DRG: 310 | End: 2018-05-06
Payer: COMMERCIAL

## 2018-05-06 DIAGNOSIS — E87.6 HYPOKALEMIA: ICD-10-CM

## 2018-05-06 DIAGNOSIS — J18.9 HEALTHCARE-ASSOCIATED PNEUMONIA: ICD-10-CM

## 2018-05-06 DIAGNOSIS — I10 ESSENTIAL HYPERTENSION: ICD-10-CM

## 2018-05-06 DIAGNOSIS — I48.91 ATRIAL FIBRILLATION WITH RAPID VENTRICULAR RESPONSE (HCC): Primary | ICD-10-CM

## 2018-05-06 DIAGNOSIS — E11.9 TYPE 2 DIABETES MELLITUS WITHOUT COMPLICATION, WITHOUT LONG-TERM CURRENT USE OF INSULIN (HCC): ICD-10-CM

## 2018-05-06 PROBLEM — F12.10 MARIJUANA ABUSE: Status: ACTIVE | Noted: 2018-05-06

## 2018-05-06 PROBLEM — I51.9 DECREASED LEFT VENTRICULAR SYSTOLIC FUNCTION: Status: ACTIVE | Noted: 2018-05-06

## 2018-05-06 LAB
ANION GAP SERPL CALCULATED.3IONS-SCNC: 6 MMOL/L (ref 4–13)
ANISOCYTOSIS BLD QL SMEAR: PRESENT
BASOPHILS # BLD MANUAL: 0.14 THOUSAND/UL (ref 0–0.1)
BASOPHILS NFR MAR MANUAL: 1 % (ref 0–1)
BUN SERPL-MCNC: 12 MG/DL (ref 5–25)
CALCIUM SERPL-MCNC: 9.2 MG/DL (ref 8.3–10.1)
CHLORIDE SERPL-SCNC: 108 MMOL/L (ref 100–108)
CO2 SERPL-SCNC: 30 MMOL/L (ref 21–32)
CREAT SERPL-MCNC: 0.85 MG/DL (ref 0.6–1.3)
DACRYOCYTES BLD QL SMEAR: PRESENT
EOSINOPHIL # BLD MANUAL: 0.56 THOUSAND/UL (ref 0–0.4)
EOSINOPHIL NFR BLD MANUAL: 4 % (ref 0–6)
ERYTHROCYTE [DISTWIDTH] IN BLOOD BY AUTOMATED COUNT: 19.5 % (ref 11.6–15.1)
GFR SERPL CREATININE-BSD FRML MDRD: 85 ML/MIN/1.73SQ M
GLUCOSE SERPL-MCNC: 123 MG/DL (ref 65–140)
HCT VFR BLD AUTO: 38.8 % (ref 36.5–49.3)
HELMET CELLS BLD QL SMEAR: PRESENT
HGB BLD-MCNC: 12.4 G/DL (ref 12–17)
HYPERCHROMIA BLD QL SMEAR: PRESENT
INR PPP: 1.16 (ref 0.86–1.16)
LACTATE SERPL-SCNC: 2.3 MMOL/L (ref 0.5–2)
LYMPHOCYTES # BLD AUTO: 25 % (ref 14–44)
LYMPHOCYTES # BLD AUTO: 3.48 THOUSAND/UL (ref 0.6–4.47)
MAGNESIUM SERPL-MCNC: 1.7 MG/DL (ref 1.6–2.6)
MCH RBC QN AUTO: 23.3 PG (ref 26.8–34.3)
MCHC RBC AUTO-ENTMCNC: 32 G/DL (ref 31.4–37.4)
MCV RBC AUTO: 73 FL (ref 82–98)
METAMYELOCYTES NFR BLD MANUAL: 2 % (ref 0–1)
MICROCYTES BLD QL AUTO: PRESENT
MONOCYTES # BLD AUTO: 1.11 THOUSAND/UL (ref 0–1.22)
MONOCYTES NFR BLD: 8 % (ref 4–12)
NEUTROPHILS # BLD MANUAL: 8.35 THOUSAND/UL (ref 1.85–7.62)
NEUTS BAND NFR BLD MANUAL: 2 % (ref 0–8)
NEUTS SEG NFR BLD AUTO: 58 % (ref 43–75)
NT-PROBNP SERPL-MCNC: 1902 PG/ML
OVALOCYTES BLD QL SMEAR: PRESENT
PLATELET # BLD AUTO: 343 THOUSANDS/UL (ref 149–390)
PLATELET BLD QL SMEAR: ABNORMAL
PMV BLD AUTO: 8.9 FL (ref 8.9–12.7)
POIKILOCYTOSIS BLD QL SMEAR: PRESENT
POTASSIUM SERPL-SCNC: 3.2 MMOL/L (ref 3.5–5.3)
PROTHROMBIN TIME: 14.8 SECONDS (ref 12.1–14.4)
RBC # BLD AUTO: 5.33 MILLION/UL (ref 3.88–5.62)
SCHISTOCYTES BLD QL SMEAR: PRESENT
SODIUM SERPL-SCNC: 144 MMOL/L (ref 136–145)
TOTAL CELLS COUNTED SPEC: 100
TROPONIN I SERPL-MCNC: <0.02 NG/ML
WBC # BLD AUTO: 13.91 THOUSAND/UL (ref 4.31–10.16)

## 2018-05-06 PROCEDURE — 85007 BL SMEAR W/DIFF WBC COUNT: CPT | Performed by: EMERGENCY MEDICINE

## 2018-05-06 PROCEDURE — 85027 COMPLETE CBC AUTOMATED: CPT | Performed by: EMERGENCY MEDICINE

## 2018-05-06 PROCEDURE — 83735 ASSAY OF MAGNESIUM: CPT | Performed by: EMERGENCY MEDICINE

## 2018-05-06 PROCEDURE — 84484 ASSAY OF TROPONIN QUANT: CPT | Performed by: EMERGENCY MEDICINE

## 2018-05-06 PROCEDURE — 80048 BASIC METABOLIC PNL TOTAL CA: CPT | Performed by: EMERGENCY MEDICINE

## 2018-05-06 PROCEDURE — 87631 RESP VIRUS 3-5 TARGETS: CPT | Performed by: INTERNAL MEDICINE

## 2018-05-06 PROCEDURE — 81001 URINALYSIS AUTO W/SCOPE: CPT | Performed by: INTERNAL MEDICINE

## 2018-05-06 PROCEDURE — 99285 EMERGENCY DEPT VISIT HI MDM: CPT

## 2018-05-06 PROCEDURE — 87086 URINE CULTURE/COLONY COUNT: CPT | Performed by: INTERNAL MEDICINE

## 2018-05-06 PROCEDURE — 93005 ELECTROCARDIOGRAM TRACING: CPT

## 2018-05-06 PROCEDURE — 71250 CT THORAX DX C-: CPT

## 2018-05-06 PROCEDURE — 85610 PROTHROMBIN TIME: CPT | Performed by: EMERGENCY MEDICINE

## 2018-05-06 PROCEDURE — 87081 CULTURE SCREEN ONLY: CPT | Performed by: INTERNAL MEDICINE

## 2018-05-06 PROCEDURE — 36415 COLL VENOUS BLD VENIPUNCTURE: CPT | Performed by: EMERGENCY MEDICINE

## 2018-05-06 PROCEDURE — 87449 NOS EACH ORGANISM AG IA: CPT | Performed by: INTERNAL MEDICINE

## 2018-05-06 PROCEDURE — 87040 BLOOD CULTURE FOR BACTERIA: CPT | Performed by: EMERGENCY MEDICINE

## 2018-05-06 PROCEDURE — 93005 ELECTROCARDIOGRAM TRACING: CPT | Performed by: EMERGENCY MEDICINE

## 2018-05-06 PROCEDURE — 96376 TX/PRO/DX INJ SAME DRUG ADON: CPT

## 2018-05-06 PROCEDURE — 83605 ASSAY OF LACTIC ACID: CPT | Performed by: EMERGENCY MEDICINE

## 2018-05-06 PROCEDURE — 96365 THER/PROPH/DIAG IV INF INIT: CPT

## 2018-05-06 PROCEDURE — 71045 X-RAY EXAM CHEST 1 VIEW: CPT

## 2018-05-06 PROCEDURE — 83880 ASSAY OF NATRIURETIC PEPTIDE: CPT | Performed by: EMERGENCY MEDICINE

## 2018-05-06 RX ORDER — CLOPIDOGREL BISULFATE 75 MG/1
75 TABLET ORAL DAILY
Status: DISCONTINUED | OUTPATIENT
Start: 2018-05-07 | End: 2018-05-09

## 2018-05-06 RX ORDER — CEFEPIME HYDROCHLORIDE 2 G/1
2000 INJECTION, POWDER, FOR SOLUTION INTRAVENOUS ONCE
Status: DISCONTINUED | OUTPATIENT
Start: 2018-05-06 | End: 2018-05-06

## 2018-05-06 RX ORDER — ASPIRIN 81 MG/1
81 TABLET, CHEWABLE ORAL DAILY
Status: DISCONTINUED | OUTPATIENT
Start: 2018-05-07 | End: 2018-05-09 | Stop reason: HOSPADM

## 2018-05-06 RX ORDER — ATORVASTATIN CALCIUM 40 MG/1
40 TABLET, FILM COATED ORAL
Status: DISCONTINUED | OUTPATIENT
Start: 2018-05-06 | End: 2018-05-09 | Stop reason: HOSPADM

## 2018-05-06 RX ORDER — RANOLAZINE 500 MG/1
500 TABLET, EXTENDED RELEASE ORAL EVERY 12 HOURS SCHEDULED
Status: DISCONTINUED | OUTPATIENT
Start: 2018-05-06 | End: 2018-05-09 | Stop reason: HOSPADM

## 2018-05-06 RX ORDER — METOPROLOL TARTRATE 100 MG/1
100 TABLET ORAL EVERY 12 HOURS SCHEDULED
Status: DISCONTINUED | OUTPATIENT
Start: 2018-05-06 | End: 2018-05-07

## 2018-05-06 RX ORDER — DULOXETIN HYDROCHLORIDE 30 MG/1
60 CAPSULE, DELAYED RELEASE ORAL DAILY
Status: DISCONTINUED | OUTPATIENT
Start: 2018-05-07 | End: 2018-05-09 | Stop reason: HOSPADM

## 2018-05-06 RX ORDER — VANCOMYCIN HYDROCHLORIDE 1 G/200ML
12.5 INJECTION, SOLUTION INTRAVENOUS EVERY 12 HOURS
Status: DISCONTINUED | OUTPATIENT
Start: 2018-05-07 | End: 2018-05-07

## 2018-05-06 RX ORDER — PANTOPRAZOLE SODIUM 20 MG/1
20 TABLET, DELAYED RELEASE ORAL
Status: DISCONTINUED | OUTPATIENT
Start: 2018-05-07 | End: 2018-05-09 | Stop reason: HOSPADM

## 2018-05-06 RX ORDER — LANOLIN ALCOHOL/MO/W.PET/CERES
3 CREAM (GRAM) TOPICAL
Status: DISCONTINUED | OUTPATIENT
Start: 2018-05-06 | End: 2018-05-09 | Stop reason: HOSPADM

## 2018-05-06 RX ORDER — POTASSIUM CHLORIDE 20 MEQ/1
20 TABLET, EXTENDED RELEASE ORAL ONCE
Status: DISCONTINUED | OUTPATIENT
Start: 2018-05-06 | End: 2018-05-09 | Stop reason: HOSPADM

## 2018-05-06 RX ORDER — POTASSIUM CHLORIDE 20 MEQ/1
40 TABLET, EXTENDED RELEASE ORAL ONCE
Status: COMPLETED | OUTPATIENT
Start: 2018-05-06 | End: 2018-05-06

## 2018-05-06 RX ORDER — TAMSULOSIN HYDROCHLORIDE 0.4 MG/1
0.4 CAPSULE ORAL
Status: DISCONTINUED | OUTPATIENT
Start: 2018-05-07 | End: 2018-05-09 | Stop reason: HOSPADM

## 2018-05-06 RX ORDER — MAGNESIUM SULFATE HEPTAHYDRATE 40 MG/ML
2 INJECTION, SOLUTION INTRAVENOUS ONCE
Status: COMPLETED | OUTPATIENT
Start: 2018-05-06 | End: 2018-05-07

## 2018-05-06 RX ORDER — DILTIAZEM HYDROCHLORIDE 5 MG/ML
15 INJECTION INTRAVENOUS ONCE
Status: COMPLETED | OUTPATIENT
Start: 2018-05-06 | End: 2018-05-06

## 2018-05-06 RX ORDER — ACETAMINOPHEN 325 MG/1
650 TABLET ORAL EVERY 6 HOURS PRN
Status: DISCONTINUED | OUTPATIENT
Start: 2018-05-06 | End: 2018-05-09 | Stop reason: HOSPADM

## 2018-05-06 RX ORDER — 0.9 % SODIUM CHLORIDE 0.9 %
3 VIAL (ML) INJECTION AS NEEDED
Status: DISCONTINUED | OUTPATIENT
Start: 2018-05-06 | End: 2018-05-09 | Stop reason: HOSPADM

## 2018-05-06 RX ADMIN — CEFEPIME HYDROCHLORIDE 2000 MG: 2 INJECTION, SOLUTION INTRAVENOUS at 21:17

## 2018-05-06 RX ADMIN — SODIUM CHLORIDE 500 ML: 0.9 INJECTION, SOLUTION INTRAVENOUS at 23:04

## 2018-05-06 RX ADMIN — METOPROLOL TARTRATE 100 MG: 100 TABLET ORAL at 23:38

## 2018-05-06 RX ADMIN — VANCOMYCIN HYDROCHLORIDE 1250 MG: 1 INJECTION, POWDER, LYOPHILIZED, FOR SOLUTION INTRAVENOUS at 20:45

## 2018-05-06 RX ADMIN — MAGNESIUM SULFATE HEPTAHYDRATE 2 G: 40 INJECTION, SOLUTION INTRAVENOUS at 23:41

## 2018-05-06 RX ADMIN — POTASSIUM CHLORIDE 40 MEQ: 1500 TABLET, EXTENDED RELEASE ORAL at 21:13

## 2018-05-06 RX ADMIN — Medication 5 MG/HR: at 20:39

## 2018-05-06 RX ADMIN — MELATONIN TAB 3 MG 3 MG: 3 TAB at 23:38

## 2018-05-06 RX ADMIN — APIXABAN 5 MG: 5 TABLET, FILM COATED ORAL at 23:38

## 2018-05-06 RX ADMIN — DILTIAZEM HYDROCHLORIDE 15 MG: 5 INJECTION INTRAVENOUS at 20:31

## 2018-05-06 RX ADMIN — ATORVASTATIN CALCIUM 40 MG: 40 TABLET, FILM COATED ORAL at 23:38

## 2018-05-07 ENCOUNTER — APPOINTMENT (INPATIENT)
Dept: NON INVASIVE DIAGNOSTICS | Facility: HOSPITAL | Age: 73
DRG: 310 | End: 2018-05-07
Payer: COMMERCIAL

## 2018-05-07 PROBLEM — R13.10 DYSPHAGIA: Status: ACTIVE | Noted: 2018-05-07

## 2018-05-07 LAB
ALBUMIN SERPL BCP-MCNC: 2.6 G/DL (ref 3.5–5)
ALP SERPL-CCNC: 62 U/L (ref 46–116)
ALT SERPL W P-5'-P-CCNC: 31 U/L (ref 12–78)
ANION GAP SERPL CALCULATED.3IONS-SCNC: 6 MMOL/L (ref 4–13)
ANISOCYTOSIS BLD QL SMEAR: PRESENT
AST SERPL W P-5'-P-CCNC: 14 U/L (ref 5–45)
ATRIAL RATE: 122 BPM
ATRIAL RATE: 234 BPM
BACTERIA UR QL AUTO: ABNORMAL /HPF
BASOPHILS # BLD MANUAL: 0 THOUSAND/UL (ref 0–0.1)
BASOPHILS NFR MAR MANUAL: 0 % (ref 0–1)
BILIRUB SERPL-MCNC: 0.3 MG/DL (ref 0.2–1)
BILIRUB UR QL STRIP: NEGATIVE
BUN SERPL-MCNC: 11 MG/DL (ref 5–25)
CALCIUM SERPL-MCNC: 8.1 MG/DL (ref 8.3–10.1)
CHLORIDE SERPL-SCNC: 109 MMOL/L (ref 100–108)
CK SERPL-CCNC: 24 U/L (ref 39–308)
CLARITY UR: ABNORMAL
CO2 SERPL-SCNC: 29 MMOL/L (ref 21–32)
COLOR UR: ABNORMAL
CREAT SERPL-MCNC: 0.83 MG/DL (ref 0.6–1.3)
EOSINOPHIL # BLD MANUAL: 0.24 THOUSAND/UL (ref 0–0.4)
EOSINOPHIL NFR BLD MANUAL: 2 % (ref 0–6)
ERYTHROCYTE [DISTWIDTH] IN BLOOD BY AUTOMATED COUNT: 19.3 % (ref 11.6–15.1)
EST. AVERAGE GLUCOSE BLD GHB EST-MCNC: 154 MG/DL
FLUAV AG SPEC QL: NORMAL
FLUBV AG SPEC QL: NORMAL
GFR SERPL CREATININE-BSD FRML MDRD: 86 ML/MIN/1.73SQ M
GLUCOSE SERPL-MCNC: 148 MG/DL (ref 65–140)
GLUCOSE UR STRIP-MCNC: NEGATIVE MG/DL
HBA1C MFR BLD: 7 % (ref 4.2–6.3)
HCT VFR BLD AUTO: 34.3 % (ref 36.5–49.3)
HGB BLD-MCNC: 10.8 G/DL (ref 12–17)
HGB UR QL STRIP.AUTO: ABNORMAL
HYALINE CASTS #/AREA URNS LPF: ABNORMAL /LPF
KETONES UR STRIP-MCNC: NEGATIVE MG/DL
L PNEUMO1 AG UR QL IA.RAPID: NEGATIVE
LACTATE SERPL-SCNC: 1.9 MMOL/L (ref 0.5–2)
LACTATE SERPL-SCNC: 2.4 MMOL/L (ref 0.5–2)
LEUKOCYTE ESTERASE UR QL STRIP: NEGATIVE
LYMPHOCYTES # BLD AUTO: 1.68 THOUSAND/UL (ref 0.6–4.47)
LYMPHOCYTES # BLD AUTO: 14 % (ref 14–44)
MAGNESIUM SERPL-MCNC: 2.1 MG/DL (ref 1.6–2.6)
MCH RBC QN AUTO: 23.3 PG (ref 26.8–34.3)
MCHC RBC AUTO-ENTMCNC: 31.5 G/DL (ref 31.4–37.4)
MCV RBC AUTO: 74 FL (ref 82–98)
MONOCYTES # BLD AUTO: 0.6 THOUSAND/UL (ref 0–1.22)
MONOCYTES NFR BLD: 5 % (ref 4–12)
MUCOUS THREADS UR QL AUTO: ABNORMAL
NEUTROPHILS # BLD MANUAL: 9.47 THOUSAND/UL (ref 1.85–7.62)
NEUTS BAND NFR BLD MANUAL: 1 % (ref 0–8)
NEUTS SEG NFR BLD AUTO: 78 % (ref 43–75)
NITRITE UR QL STRIP: NEGATIVE
NON-SQ EPI CELLS URNS QL MICRO: ABNORMAL /HPF
OTHER STN SPEC: ABNORMAL
OVALOCYTES BLD QL SMEAR: PRESENT
PH UR STRIP.AUTO: 5.5 [PH] (ref 4.5–8)
PHOSPHATE SERPL-MCNC: 2.6 MG/DL (ref 2.3–4.1)
PLATELET # BLD AUTO: 264 THOUSANDS/UL (ref 149–390)
PLATELET BLD QL SMEAR: ADEQUATE
PMV BLD AUTO: 9.2 FL (ref 8.9–12.7)
POTASSIUM SERPL-SCNC: 3.3 MMOL/L (ref 3.5–5.3)
PROCALCITONIN SERPL-MCNC: <0.05 NG/ML
PROCALCITONIN SERPL-MCNC: <0.05 NG/ML
PROT SERPL-MCNC: 4.9 G/DL (ref 6.4–8.2)
PROT UR STRIP-MCNC: ABNORMAL MG/DL
QRS AXIS: 65 DEGREES
QRS AXIS: 91 DEGREES
QRSD INTERVAL: 74 MS
QRSD INTERVAL: 78 MS
QT INTERVAL: 270 MS
QT INTERVAL: 388 MS
QTC INTERVAL: 397 MS
QTC INTERVAL: 436 MS
RBC # BLD AUTO: 4.63 MILLION/UL (ref 3.88–5.62)
RBC #/AREA URNS AUTO: ABNORMAL /HPF
RSV B RNA SPEC QL NAA+PROBE: NORMAL
S PNEUM AG UR QL: NEGATIVE
SODIUM SERPL-SCNC: 144 MMOL/L (ref 136–145)
SP GR UR STRIP.AUTO: >=1.03 (ref 1–1.03)
T WAVE AXIS: -15 DEGREES
T WAVE AXIS: -72 DEGREES
TOTAL CELLS COUNTED SPEC: 100
TROPONIN I SERPL-MCNC: 0.02 NG/ML
TROPONIN I SERPL-MCNC: 0.02 NG/ML
TSH SERPL DL<=0.05 MIU/L-ACNC: 1.02 UIU/ML (ref 0.36–3.74)
UROBILINOGEN UR QL STRIP.AUTO: 0.2 E.U./DL
VENTRICULAR RATE: 130 BPM
VENTRICULAR RATE: 76 BPM
WBC # BLD AUTO: 11.99 THOUSAND/UL (ref 4.31–10.16)
WBC #/AREA URNS AUTO: ABNORMAL /HPF

## 2018-05-07 PROCEDURE — 84484 ASSAY OF TROPONIN QUANT: CPT | Performed by: INTERNAL MEDICINE

## 2018-05-07 PROCEDURE — 94760 N-INVAS EAR/PLS OXIMETRY 1: CPT

## 2018-05-07 PROCEDURE — 99232 SBSQ HOSP IP/OBS MODERATE 35: CPT | Performed by: FAMILY MEDICINE

## 2018-05-07 PROCEDURE — 83735 ASSAY OF MAGNESIUM: CPT | Performed by: INTERNAL MEDICINE

## 2018-05-07 PROCEDURE — 84443 ASSAY THYROID STIM HORMONE: CPT | Performed by: INTERNAL MEDICINE

## 2018-05-07 PROCEDURE — 97163 PT EVAL HIGH COMPLEX 45 MIN: CPT | Performed by: PHYSICAL THERAPIST

## 2018-05-07 PROCEDURE — 94664 DEMO&/EVAL PT USE INHALER: CPT

## 2018-05-07 PROCEDURE — 83605 ASSAY OF LACTIC ACID: CPT | Performed by: INTERNAL MEDICINE

## 2018-05-07 PROCEDURE — 92610 EVALUATE SWALLOWING FUNCTION: CPT | Performed by: SPEECH-LANGUAGE PATHOLOGIST

## 2018-05-07 PROCEDURE — G8988 SELF CARE GOAL STATUS: HCPCS

## 2018-05-07 PROCEDURE — 84145 PROCALCITONIN (PCT): CPT | Performed by: INTERNAL MEDICINE

## 2018-05-07 PROCEDURE — 93010 ELECTROCARDIOGRAM REPORT: CPT | Performed by: INTERNAL MEDICINE

## 2018-05-07 PROCEDURE — 82550 ASSAY OF CK (CPK): CPT | Performed by: INTERNAL MEDICINE

## 2018-05-07 PROCEDURE — 84100 ASSAY OF PHOSPHORUS: CPT | Performed by: INTERNAL MEDICINE

## 2018-05-07 PROCEDURE — G8978 MOBILITY CURRENT STATUS: HCPCS | Performed by: PHYSICAL THERAPIST

## 2018-05-07 PROCEDURE — 93306 TTE W/DOPPLER COMPLETE: CPT | Performed by: INTERNAL MEDICINE

## 2018-05-07 PROCEDURE — 97167 OT EVAL HIGH COMPLEX 60 MIN: CPT

## 2018-05-07 PROCEDURE — G8979 MOBILITY GOAL STATUS: HCPCS | Performed by: PHYSICAL THERAPIST

## 2018-05-07 PROCEDURE — 85027 COMPLETE CBC AUTOMATED: CPT | Performed by: INTERNAL MEDICINE

## 2018-05-07 PROCEDURE — G8987 SELF CARE CURRENT STATUS: HCPCS

## 2018-05-07 PROCEDURE — 99223 1ST HOSP IP/OBS HIGH 75: CPT | Performed by: INTERNAL MEDICINE

## 2018-05-07 PROCEDURE — 94640 AIRWAY INHALATION TREATMENT: CPT

## 2018-05-07 PROCEDURE — 85007 BL SMEAR W/DIFF WBC COUNT: CPT | Performed by: INTERNAL MEDICINE

## 2018-05-07 PROCEDURE — 80053 COMPREHEN METABOLIC PANEL: CPT | Performed by: INTERNAL MEDICINE

## 2018-05-07 PROCEDURE — 97530 THERAPEUTIC ACTIVITIES: CPT

## 2018-05-07 PROCEDURE — 83036 HEMOGLOBIN GLYCOSYLATED A1C: CPT | Performed by: INTERNAL MEDICINE

## 2018-05-07 PROCEDURE — 93306 TTE W/DOPPLER COMPLETE: CPT

## 2018-05-07 RX ORDER — DILTIAZEM HYDROCHLORIDE 5 MG/ML
10 INJECTION INTRAVENOUS ONCE
Status: COMPLETED | OUTPATIENT
Start: 2018-05-07 | End: 2018-05-07

## 2018-05-07 RX ORDER — METOPROLOL SUCCINATE 100 MG/1
100 TABLET, EXTENDED RELEASE ORAL DAILY
Status: DISCONTINUED | OUTPATIENT
Start: 2018-05-08 | End: 2018-05-08

## 2018-05-07 RX ORDER — POTASSIUM CHLORIDE 20 MEQ/1
40 TABLET, EXTENDED RELEASE ORAL ONCE
Status: DISCONTINUED | OUTPATIENT
Start: 2018-05-07 | End: 2018-05-07

## 2018-05-07 RX ORDER — POTASSIUM CHLORIDE 20 MEQ/1
40 TABLET, EXTENDED RELEASE ORAL 2 TIMES DAILY WITH MEALS
Status: COMPLETED | OUTPATIENT
Start: 2018-05-07 | End: 2018-05-07

## 2018-05-07 RX ORDER — ALBUTEROL SULFATE 2.5 MG/3ML
2.5 SOLUTION RESPIRATORY (INHALATION) EVERY 6 HOURS PRN
Status: DISCONTINUED | OUTPATIENT
Start: 2018-05-07 | End: 2018-05-09 | Stop reason: HOSPADM

## 2018-05-07 RX ORDER — LEVALBUTEROL 1.25 MG/.5ML
1.25 SOLUTION, CONCENTRATE RESPIRATORY (INHALATION)
Status: DISCONTINUED | OUTPATIENT
Start: 2018-05-07 | End: 2018-05-08

## 2018-05-07 RX ORDER — SODIUM CHLORIDE FOR INHALATION 0.9 %
3 VIAL, NEBULIZER (ML) INHALATION
Status: DISCONTINUED | OUTPATIENT
Start: 2018-05-07 | End: 2018-05-08

## 2018-05-07 RX ORDER — LEVALBUTEROL 1.25 MG/.5ML
SOLUTION, CONCENTRATE RESPIRATORY (INHALATION)
Status: COMPLETED
Start: 2018-05-07 | End: 2018-05-07

## 2018-05-07 RX ORDER — OXYCODONE HYDROCHLORIDE 5 MG/1
5 TABLET ORAL EVERY 4 HOURS PRN
Status: DISCONTINUED | OUTPATIENT
Start: 2018-05-07 | End: 2018-05-09 | Stop reason: HOSPADM

## 2018-05-07 RX ORDER — LISINOPRIL 5 MG/1
5 TABLET ORAL DAILY
Status: DISCONTINUED | OUTPATIENT
Start: 2018-05-07 | End: 2018-05-08

## 2018-05-07 RX ADMIN — OXYCODONE HYDROCHLORIDE 5 MG: 5 TABLET ORAL at 23:36

## 2018-05-07 RX ADMIN — Medication 0.5 MG: at 01:00

## 2018-05-07 RX ADMIN — LEVALBUTEROL 1.25 MG: 1.25 SOLUTION, CONCENTRATE RESPIRATORY (INHALATION) at 01:00

## 2018-05-07 RX ADMIN — OXYCODONE HYDROCHLORIDE 5 MG: 5 TABLET ORAL at 19:28

## 2018-05-07 RX ADMIN — ISODIUM CHLORIDE 3 ML: 0.03 SOLUTION RESPIRATORY (INHALATION) at 08:34

## 2018-05-07 RX ADMIN — APIXABAN 5 MG: 5 TABLET, FILM COATED ORAL at 18:35

## 2018-05-07 RX ADMIN — POTASSIUM CHLORIDE 40 MEQ: 1500 TABLET, EXTENDED RELEASE ORAL at 16:44

## 2018-05-07 RX ADMIN — DILTIAZEM HYDROCHLORIDE 10 MG: 5 INJECTION INTRAVENOUS at 22:03

## 2018-05-07 RX ADMIN — OXYCODONE HYDROCHLORIDE 5 MG: 5 TABLET ORAL at 04:10

## 2018-05-07 RX ADMIN — DILTIAZEM HYDROCHLORIDE 30 MG: 30 TABLET, FILM COATED ORAL at 11:31

## 2018-05-07 RX ADMIN — RANOLAZINE 500 MG: 500 TABLET, FILM COATED, EXTENDED RELEASE ORAL at 00:48

## 2018-05-07 RX ADMIN — IPRATROPIUM BROMIDE 0.5 MG: 0.5 SOLUTION RESPIRATORY (INHALATION) at 01:00

## 2018-05-07 RX ADMIN — LEVALBUTEROL 1.25 MG: 1.25 SOLUTION, CONCENTRATE RESPIRATORY (INHALATION) at 20:05

## 2018-05-07 RX ADMIN — LEVALBUTEROL 1.25 MG: 1.25 SOLUTION, CONCENTRATE RESPIRATORY (INHALATION) at 08:34

## 2018-05-07 RX ADMIN — RANOLAZINE 500 MG: 500 TABLET, FILM COATED, EXTENDED RELEASE ORAL at 08:50

## 2018-05-07 RX ADMIN — LISINOPRIL 5 MG: 5 TABLET ORAL at 08:25

## 2018-05-07 RX ADMIN — LEVALBUTEROL 1.25 MG: 1.25 SOLUTION, CONCENTRATE RESPIRATORY (INHALATION) at 14:13

## 2018-05-07 RX ADMIN — CLOPIDOGREL BISULFATE 75 MG: 75 TABLET ORAL at 08:25

## 2018-05-07 RX ADMIN — APIXABAN 5 MG: 5 TABLET, FILM COATED ORAL at 08:25

## 2018-05-07 RX ADMIN — TIOTROPIUM BROMIDE 18 MCG: 18 CAPSULE ORAL; RESPIRATORY (INHALATION) at 08:51

## 2018-05-07 RX ADMIN — ASPIRIN 81 MG 81 MG: 81 TABLET ORAL at 08:25

## 2018-05-07 RX ADMIN — POTASSIUM CHLORIDE 40 MEQ: 1500 TABLET, EXTENDED RELEASE ORAL at 08:25

## 2018-05-07 RX ADMIN — ISODIUM CHLORIDE 3 ML: 0.03 SOLUTION RESPIRATORY (INHALATION) at 14:13

## 2018-05-07 RX ADMIN — NICOTINE 1 PATCH: 7 PATCH, EXTENDED RELEASE TRANSDERMAL at 00:48

## 2018-05-07 RX ADMIN — DULOXETINE HYDROCHLORIDE 60 MG: 30 CAPSULE, DELAYED RELEASE ORAL at 08:25

## 2018-05-07 RX ADMIN — DILTIAZEM HYDROCHLORIDE 30 MG: 30 TABLET, FILM COATED ORAL at 23:33

## 2018-05-07 RX ADMIN — DILTIAZEM HYDROCHLORIDE 30 MG: 30 TABLET, FILM COATED ORAL at 08:51

## 2018-05-07 RX ADMIN — NICOTINE 1 PATCH: 7 PATCH, EXTENDED RELEASE TRANSDERMAL at 23:34

## 2018-05-07 RX ADMIN — PANTOPRAZOLE SODIUM 20 MG: 20 TABLET, DELAYED RELEASE ORAL at 05:17

## 2018-05-07 RX ADMIN — RANOLAZINE 500 MG: 500 TABLET, FILM COATED, EXTENDED RELEASE ORAL at 21:44

## 2018-05-07 RX ADMIN — ISODIUM CHLORIDE 3 ML: 0.03 SOLUTION RESPIRATORY (INHALATION) at 20:05

## 2018-05-07 RX ADMIN — MELATONIN TAB 3 MG 3 MG: 3 TAB at 22:02

## 2018-05-07 RX ADMIN — DILTIAZEM HYDROCHLORIDE 30 MG: 30 TABLET, FILM COATED ORAL at 18:35

## 2018-05-07 RX ADMIN — SODIUM CHLORIDE 500 ML: 0.9 INJECTION, SOLUTION INTRAVENOUS at 02:01

## 2018-05-07 RX ADMIN — ATORVASTATIN CALCIUM 40 MG: 40 TABLET, FILM COATED ORAL at 16:44

## 2018-05-07 RX ADMIN — ACETAMINOPHEN 650 MG: 325 TABLET, FILM COATED ORAL at 06:52

## 2018-05-07 RX ADMIN — TAMSULOSIN HYDROCHLORIDE 0.4 MG: 0.4 CAPSULE ORAL at 16:44

## 2018-05-07 NOTE — ASSESSMENT & PLAN NOTE
Heart rate improving on cardizem gtt  Will add PO cardizem and wean drip to off  Troponin negative  Continue eliquis

## 2018-05-07 NOTE — ASSESSMENT & PLAN NOTE
-change the patient's beta-blocker to Toprol-XL in the setting of a decreased left ventricular ejection fraction  -the patient should be on either Toprol XL or Coreg in the setting of a decreased left ventricular ejection fraction  -I will also add lisinopril 5 mg p o  daily in the setting of a decreased left ventricular ejection fraction  -repeat a limited echocardiogram to evaluate his ejection fraction at this time  -he needs close outpatient follow-up with Cardiology

## 2018-05-07 NOTE — NUTRITION
05/07/18 1240   Assessment   Timepoint Initial  (speech)   Labs   List Completed Labs (K 3 3, alb 2 6; meds- plavix, cardizem)   Adequacy of Intake   Nutrition Modality PO  (cardiac TLC 2 3g Na)   Intake Meals %   Estimated Calorie Intake %   Estimated Protein Intake  %   Estimated Fluid Intake %   Estimated calorie intake compared to estimated need appetite is adequate, requests double portions, will liberalize diet to 2g Na for more menu options   Nutrition Prognosis   Comorbid Concerns (a-fib, dysphagia?)   Nutrition Considerations (denied diet education at this time; pt admitted to buying a lot of pies and sweets, would benefit from diet education but denied at this time)   PES Statement   Problem No nutrition diagnosis   Additional PES details no nutrition diagnosis warranted at this time as pt is eating well, requesting double portions   Patient Nutrition Goals   Goal other (comment)  (continue with adequate po intake to meet estimated needs)   Goal Status initiated   Timeframe to complete goal by d/c   Recommendations/Interventions   Summary Pt stated his appetite is good, witnessed 100% completion of lunch tray  Pt requested double portions and wished to be off of TLC diet  Relayed to pt that his diet can be liberalized to 2g Na, but the double portions will need to be approved by MD  Denied diet education at this time  Noted speech is consulted, eval pending, Will continue to monitor      Malnutrition/BMI Present No   Interventions Diet: order adjustment   Nutrition Recommendations Other (specify)  (see summary above)   Nutrition Complexity Risk   Nutrition complexity level Moderate risk   Nutrition review: 05/10/18   Follow up date 05/14/18
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
## 2018-05-07 NOTE — RESPIRATORY THERAPY NOTE
Pt requested to go back on CPAP  Pt requested a size small mask from the size large one he currently had  Pt also stated his CPAP setting is 16, and wanted his level to be turned up  Pt's CPAP increased to around 15  RN aware  Will continue to monitor

## 2018-05-07 NOTE — ED NOTES
Verbal order from dr Josephine Santizo   HR lower than [address] [facility]  05/06/18 2047

## 2018-05-07 NOTE — ASSESSMENT & PLAN NOTE
I do not believe he has pneumonia   CT chest does not show infiltrate  procalcitonin < 05  Hold antibiotics and follow up urine legionella / pneumo / influenza / blood cultures

## 2018-05-07 NOTE — ASSESSMENT & PLAN NOTE
I do not believe he was septic  This was probably due to RVR and reactive leucocytosis   Will monitor for now and follow up micro studies

## 2018-05-07 NOTE — ASSESSMENT & PLAN NOTE
-the patient did not receive a 30 mL/kg normal saline IV fluid bolus secondary to possible volume overload in the setting of a decreased left ventricular ejection fraction  -the patient will receive a normal saline IV fluid bolus of 500 mL over 2 hours  -follow the lactic acid level

## 2018-05-07 NOTE — PROGRESS NOTES
Progress Note - Pietro Yuri 1945, 74 y o  male MRN: [id number]    Unit/Bed#:  Encounter: [id number]    Primary Care Provider: Holly Davis DO   Date and time admitted to hospital: 5/6/2018  8:02 PM        * Atrial fibrillation with rapid ventricular response (HCC)   Assessment & Plan    Heart rate improving on cardizem gtt  Will add PO cardizem and wean drip to off  Troponin negative  Continue eliquis         Healthcare-associated pneumonia   Assessment & Plan    I do not believe he has pneumonia   CT chest does not show infiltrate  procalcitonin < 05  Hold antibiotics and follow up urine legionella / pneumo / influenza / blood cultures         Dysphagia   Assessment & Plan    -consult speech therapy for a dysphagia evaluation  -aspiration precautions        Decreased left ventricular systolic function   Assessment & Plan    Metoprolol added  Repeat echo today  Will consider cardiology consult if rate does not improve or EF worse today than previous         Pulmonary nodule   Assessment & Plan    -outpatient surveillance imaging with his PCP or Pulmonology        Sepsis [facility])   Assessment & Plan    I do not believe he was septic  This was probably due to RVR and reactive leucocytosis   Will monitor for now and follow up micro studies           Progress Note - Pietro Yuri 74 y o  male MRN: [id number]    Unit/Bed#:  Encounter: [id number]      Subjective:   Seen and examined @ bedside  He feels better today     Objective:     Vitals:   Vitals:    05/07/18 0850   BP: (!) 169/110   Pulse:    Resp:    Temp:    SpO2:      Body mass index is 26 79 kg/m²      Intake/Output Summary (Last 24 hours) at 05/07/18 0851  Last data filed at 05/07/18 0656   Gross per 24 hour   Intake             2070 ml   Output              225 ml   Net             1845 ml       Physical Exam:   BP (!) 169/110   Pulse 89   Temp 99 °F (37 2 °C) (Temporal)   Resp (!) 32   Ht 5' 10" (1 778 m)   Wt 84 7 kg (186 lb 11 7 oz)   SpO2 98%   BMI 26 79 kg/m²   General appearance: alert and oriented, in no acute distress  Head: Normocephalic, without obvious abnormality, atraumatic  Lungs: clear to auscultation bilaterally  Heart: irregularly irregular rhythm  Abdomen: soft, non-tender; bowel sounds normal; no masses,  no organomegaly  Extremities: extremities normal, warm and well-perfused; no cyanosis, clubbing, or edema  Pulses: 2+ and symmetric  Neurologic: Grossly normal     Invasive Devices     Peripheral Intravenous Line            Peripheral IV 05/06/18 Right Antecubital less than 1 day    Peripheral IV 05/07/18 Left Wrist less than 1 day                  Results from last 7 days  Lab Units 05/07/18 0515 05/06/18 2016   WBC Thousand/uL 11 99* 13 91*   HEMOGLOBIN g/dL 10 8* 12 4   HEMATOCRIT % 34 3* 38 8   PLATELETS Thousands/uL 264 343         Results from last 7 days  Lab Units 05/07/18 0515 05/06/18 2016   SODIUM mmol/L 144 144   POTASSIUM mmol/L 3 3* 3 2*   CHLORIDE mmol/L 109* 108   CO2 mmol/L 29 30   BUN mg/dL 11 12   CREATININE mg/dL 0 83 0 85   CALCIUM mg/dL 8 1* 9 2   TOTAL PROTEIN g/dL 4 9*  --    BILIRUBIN TOTAL mg/dL 0 30  --    ALK PHOS U/L 62  --    ALT U/L 31  --    AST U/L 14  --    GLUCOSE RANDOM mg/dL 148* 123       Medication Administration - last 24 hours from 05/06/2018 0851 to 05/07/2018 8409       Date/Time Order Dose Route Action Action by     05/06/2018 2013  EMS REPLENISHMENT MED 0  Does not apply Given to EMS Kimmie Vazquez RN     05/07/2018 0311 diltiazem (CARDIZEM) 125 mg in sodium chloride 0 9 % 125 mL infusion 2 5 mg/hr Intravenous Rate/Dose Change Moy Kelly RN     05/07/2018 0246 diltiazem (CARDIZEM) 125 mg in sodium chloride 0 9 % 125 mL infusion 5 mg/hr Intravenous Rate/Dose Change Moy Kelly RN     05/07/2018 0226 diltiazem (CARDIZEM) 125 mg in sodium chloride 0 9 % 125 mL infusion 7 5 mg/hr Intravenous Rate/Dose Change Moy Kelly RN     05/07/2018 0202 diltiazem (CARDIZEM) 125 mg in sodium chloride 0 9 % 125 mL infusion 10 mg/hr Intravenous Rate/Dose Change Moy Kelly RN     05/06/2018 2307 diltiazem (CARDIZEM) 125 mg in sodium chloride 0 9 % 125 mL infusion 12 5 mg/hr Intravenous Rate/Dose Change Moy Kelly RN     05/06/2018 2237 diltiazem (CARDIZEM) 125 mg in sodium chloride 0 9 % 125 mL infusion 10 mg/hr Intravenous Rate/Dose Change Moy Kelly RN     05/06/2018 2220 diltiazem (CARDIZEM) 125 mg in sodium chloride 0 9 % 125 mL infusion 7 5 mg/hr Intravenous Rate/Dose Change Moy Kelly RN     05/06/2018 2039 diltiazem (CARDIZEM) 125 mg in sodium chloride 0 9 % 125 mL infusion 5 mg/hr Intravenous [address] Tony RN     05/06/2018 2031 diltiazem (CARDIZEM) injection 15 mg 15 mg Intravenous Given [facility], LEIGH     05/06/2018 2057 cefepime (MAXIPIME) injection 2,000 mg 2,000 mg Intravenous Not Given [facility], RN     05/06/2018 2341 vancomycin (VANCOCIN) 1,250 mg in sodium chloride 0 9 % 250 mL IVPB 0 mg Intravenous Stopped Moy Kelly RN     05/06/2018 2045 vancomycin (VANCOCIN) 1,250 mg in sodium chloride 0 9 % 250 mL IVPB 1,250 mg Intravenous [address] [facility], LEIGH     05/06/2018 2147 cefepime (MAXIPIME) IVPB (premix) 2,000 mg 0 mg Intravenous Stopped [facility] LEIGH [place]     05/06/2018 2117 cefepime (MAXIPIME) IVPB (premix) 2,000 mg 2,000 mg Intravenous [address] [facility], LEIGH     05/06/2018 2113 potassium chloride (K-DUR,KLOR-CON) CR tablet 40 mEq 40 mEq Oral Given [facility], RN     05/07/2018 0104 sodium chloride 0 9 % bolus 500 mL 0 mL Intravenous Stopped Moy Kelly RN     05/06/2018 2304 sodium chloride 0 9 % bolus 500 mL 500 mL Intravenous [address] Moy Kelly, [facility]     05/07/2018 0141 magnesium sulfate 2 g/50 mL IVPB (premix) 2 g 0 g Intravenous Stopped Moy Kelly RN     05/06/2018 2341 magnesium sulfate 2 g/50 mL IVPB (premix) 2 g 2 g Intravenous New Bag Moy Kelly RN 05/07/2018 0008 potassium chloride (K-DUR,KLOR-CON) CR tablet 20 mEq 20 mEq Oral Not Given [facility], RN     05/06/2018 2338 atorvastatin (LIPITOR) tablet 40 mg 40 mg Oral Given [facility], RN     05/07/2018 0825 apixaban (ELIQUIS) tablet 5 mg 5 mg Oral Given Mac Concord, RN     05/06/2018 2338 apixaban (ELIQUIS) tablet 5 mg 5 mg Oral Given [facility], RN     05/07/2018 0825 aspirin chewable tablet 81 mg 81 mg Oral Given Mac , RN     05/07/2018 0825 clopidogrel (PLAVIX) tablet 75 mg 75 mg Oral Given Mac , RN     05/07/2018 0825 DULoxetine (CYMBALTA) delayed release capsule 60 mg 60 mg Oral Given Mac Concord, RN     05/07/2018 0517 pantoprazole (PROTONIX) EC tablet 20 mg 20 mg Oral Given [facility], RN     05/06/2018 2338 melatonin tablet 3 mg 3 mg Oral Given Shemar Spar, RN     05/07/2018 0851 tiotropium (SPIRIVA) capsule for inhaler 18 mcg 18 mcg Inhalation Given Mac , RN     05/07/2018 0850 ranolazine (RANEXA) 12 hr tablet 500 mg 500 mg Oral Given Mac , RN     05/07/2018 0048 ranolazine (RANEXA) 12 hr tablet 500 mg 500 mg Oral Given Shemar Spar, RN     05/06/2018 2338 metoprolol tartrate (LOPRESSOR) tablet 100 mg 100 mg Oral Given Shemar Spar, RN     05/07/2018 9844 nicotine (NICODERM CQ) 7 mg/24hr TD 24 hr patch 1 patch 0 patch Transdermal Hold Dariel Dominguez     05/07/2018 0048 nicotine (NICODERM CQ) 7 mg/24hr TD 24 hr patch 1 patch 1 patch Transdermal Medication Applied [facility], RN     05/07/2018 3576 acetaminophen (TYLENOL) tablet 650 mg 650 mg Oral Given Shemar Spar, RN     05/07/2018 0825 lisinopril (ZESTRIL) tablet 5 mg 5 mg Oral Given Mac , RN     05/07/2018 0100 levalbuterol (XOPENEX) 1 25 mg/0 5 mL inhalation solution **AcuDose Override Pull** 1 25 mg  Given Marcell Loaiza, RT     05/07/2018 0834 levalbuterol (XOPENEX) inhalation solution 1 25 mg 1 25 mg Nebulization Given Ridge Calderon,      05/07/2018 0100 ipratropium (ATROVENT) 0 02 % inhalation solution 0 5 mg 0 5 mg Nebulization Given Sanjeev Ruiz, RT     05/07/2018 0401 sodium chloride 0 9 % bolus 500 mL 0 mL Intravenous Stopped Ludmila Osman RN     05/07/2018 0201 sodium chloride 0 9 % bolus 500 mL 500 mL Intravenous [address] Ludmila Osman RN     05/07/2018 0410 oxyCODONE (ROXICODONE) IR tablet 5 mg 5 mg Oral Given Ludmila Osman RN     05/07/2018 0825 potassium chloride (K-DUR,KLOR-CON) CR tablet 40 mEq 40 mEq Oral Given Samantha Medley RN     05/07/2018 0851 diltiazem (CARDIZEM) tablet 30 mg 30 mg Oral Given Samantha Medley RN     05/07/2018 3018 sodium chloride 0 9 % inhalation solution 3 mL 3 mL Nebulization Given Ridge Calderon, RT            Lab, Imaging and other studies: I have personally reviewed pertinent reports      VTE Pharmacologic Prophylaxis: eliquis  VTE Mechanical Prophylaxis: sequential compression device     Krystle Lai MD  5/7/2018,8:51 AM

## 2018-05-07 NOTE — CASE MANAGEMENT
Initial Clinical Review  Admission: Date/Time/Statement: 5/6/18 @ 2107   Orders Placed This Encounter   Procedures    Inpatient Admission     Standing Status:   Standing     Number of Occurrences:   1     Order Specific Question:   Admitting Physician     Answer:   Geovanny Holden     Order Specific Question:   Level of Care     Answer:   Level 1 Stepdown [13]     Order Specific Question:   Estimated length of stay     Answer:   More than 2 Midnights     Order Specific Question:   Certification     Answer:   I certify that inpatient services are medically necessary for this patient for a duration of greater than two midnights  See H&P and MD Progress Notes for additional information about the patient's course of treatment  ED: Date/Time/Mode of Arrival:   ED Arrival Information     Expected Arrival Acuity Means of Arrival Escorted By Service Admission Type    - 5/6/2018 20:02 Urgent Ambulance [facility] General Medicine Urgent    Arrival Complaint    CHEST PAIN      Chief Complaint:   Chief Complaint   Patient presents with    Chest Pain     Patient started with shortness of breath with chest pain for the past "few" days  History of Illness:   68-year-old male with the noted past medical history who presents from home by EMS for evaluation of a 4-5 day history of gradually increasing chest pain described as a dull/retrosternal chest pressure that has been essentially constant and persistently worsening; patient awoke this morning with significant dyspnea described as a sensation of air hunger that has been worse throughout the course the day and also worse with activity  States he is able to walk 4-5 steps before having to stop because of his significant dyspnea    ED Vital Signs:   ED Triage Vitals [05/06/18 2004]   Temperature Pulse Respirations Blood Pressure SpO2   99 3 °F (37 4 °C) (!) 130 18 (!) 195/99 96 %      Temp Source Heart Rate Source Patient Position - Orthostatic VS BP Location FiO2 (%)   Temporal Monitor Lying Left arm --      Pain Score       9        Wt Readings from Last 1 Encounters:   05/07/18 84 7 kg (186 lb 11 7 oz)   Vital Signs (abnormal):     /102  RR 22, 26, 27, 32  Abnormal Labs/Diagnostic Test Results:   WBC 13 91 K 3 2 BNP 1902 PT 14 8 LACTIC ACID 2 3   U/A+PROT  CT CHEST=No acute pathology  Stable small bilateral pleural effusions, with mild bibasilar compressive atelectasis  Unchanged biapical nodules, and left upper lobe scarring or groundglass lesion  Suggest continued annual surveillance to ensure stability    COPD   EKG= Interpretation: abnormal    Quality:     Tracing quality:  Limited by artifact  Rate:     ECG rate:  130    ECG rate assessment: tachycardic    Rhythm:     Rhythm: atrial fibrillation    Ectopy:     Ectopy: PVCs      PVCs:  Infrequent and unifocal  QRS:     QRS axis:  Normal    QRS intervals:  Normal  Conduction:     Conduction: normal    ST segments:     ST segments:  Normal  T waves:     T waves: normal    Comments:      qrs 74 qtc 397  ED Treatment:   Medication Administration from 05/06/2018 2002 to 05/06/2018 2209       Date/Time Order Dose Route Action Action by Comments     05/06/2018 2013  EMS REPLENISHMENT MED 0  Does not apply Given to EMS [employer], RN      05/06/2018 2039 diltiazem (CARDIZEM) 125 mg in sodium chloride 0 9 % 125 mL infusion 5 mg/hr Intravenous Lukas Chun RN      05/06/2018 2031 diltiazem (CARDIZEM) injection 15 mg 15 mg Intravenous Given Cecil Perez RN BP: 154/95HR: 126     05/06/2018 2057 cefepime (MAXIPIME) injection 2,000 mg 2,000 mg Intravenous Not Given Cecil Perez RN      05/06/2018 2045 vancomycin (VANCOCIN) 1,250 mg in sodium chloride 0 9 % 250 mL IVPB 1,250 mg Intravenous [address] Cecil Perez RN      05/06/2018 2147 cefepime (MAXIPIME) IVPB (premix) 2,000 mg 0 mg Intravenous Stopped Cecil Perez RN      05/06/2018 2117 cefepime (MAXIPIME) IVPB (premix) 2,000 mg 2,000 mg Intravenous New Bag Brennan Flores RN      05/06/2018 2113 potassium chloride (K-DUR,KLOR-CON) CR tablet 40 mEq 40 mEq Oral Given Brennan Flores RN       Past Medical/Surgical History:    Active Ambulatory Problems     Diagnosis Date Noted    Intractable diarrhea 10/26/2016    Weakness 10/26/2016    Hypokalemia 10/26/2016    Abdominal pain 10/26/2016    Essential hypertension 10/26/2016    Hyperlipidemia 10/26/2016    Depressive disorder 10/26/2016    Atrial fibrillation with rapid ventricular response ([facility] ) 10/26/2016    Migraines 10/26/2016    Chronic pain 10/30/2016    Acute kidney injury ([facility] ) 10/30/2016    Sepsis ([facility] ) 10/30/2016    Colitis 10/30/2016    Non-ST elevation myocardial infarction (NSTEMI) due to mismatch of myocardial oxygen supply and demand 10/30/2016    Dehydration, mild 06/03/2017    Ambulatory dysfunction 06/04/2017    Tobacco abuse 06/04/2017    Pulmonary nodule 04/03/2018    Lactic acidosis 04/05/2018    Leukocytosis 04/06/2018     Resolved Ambulatory Problems     Diagnosis Date Noted    Shortness of breath 10/26/2016    Chronic obstructive pulmonary disease with acute exacerbation ([facility] ) 10/26/2016    Chronic pain 10/26/2016    Hx of transient ischemic attack (TIA) 10/26/2016    Acute cystitis 04/06/2018     Past Medical History:   Diagnosis Date    Aortic stenosis     Arthritis     Asthma     Atrial fibrillation (HCC)     Back pain     Cervical radiculopathy     CHF (congestive heart failure) (HCC)     Chronic pain     Chronic pain 10/26/2016    COPD (chronic obstructive pulmonary disease) (HCC)     Coronary artery disease     CVA (cerebral vascular accident) ([facility] )     Depressive disorder     Encephalopathy     GERD (gastroesophageal reflux disease)     Head injury     Hx of transient ischemic attack (TIA) 10/26/2016    Hyperlipidemia     Hypertension     Kidney stones     Migraines     MRSA (methicillin resistant Staphylococcus aureus) infection     [facility]     Osteoarthritis     Peripheral neuropathy     Psychiatric disorder     PVD (peripheral vascular disease) ([facility] )     Renal disorder     Shortness of breath 10/26/2016    TIA (transient ischemic attack) 2014    Vertigo    Admitting Diagnosis: Hypokalemia [E87 6]  Chest pain [R07 9]  Healthcare-associated pneumonia [J18 9]  Atrial fibrillation with rapid ventricular response ([place] [address] ) [I48 91]  Age/Sex: 74 y o  male  Assessment/Plan:   Atrial fibrillation with rapid ventricular response (HCC)   Assessment & Plan     -admit the patient to [address] [place] [address] status with the need for a titratable IV Cardizem drip/infusion  -an IV Cardizem drip/infusion was initiated in the emergency department  -continue beta-blocker therapy with Toprol-XL in the setting of a decreased left ventricular ejection fraction       Healthcare-associated pneumonia   Assessment & Plan     -possible gram-negative pneumonia with the patient having a recent hospitalization  -the patient possibly has a infiltrate at the right base of the on chest x-ray by my interpretation  -initiate IV cefepime and IV vancomycin to cover for healthcare-associated pneumonia pathogens  -check blood cultures x 2 sets  -check a Streptococcus pneumoniae urinary antigen  -check a Legionella urinary antigen  -check a MRSA nasal screen       Lactic acidosis   Assessment & Plan     -the patient did not receive a 30 mL/kg normal saline IV fluid bolus secondary to possible volume overload in the setting of a decreased left ventricular ejection fraction  -the patient will receive a normal saline IV fluid bolus of 500 mL over 2 hours  -follow the lactic acid level       Sepsis ([facility] )   Assessment & Plan     -the sepsis is present on admission and secondary to possible healthcare-associated pneumonia  -the patient did not receive a 30 mL/kg normal saline IV fluid bolus secondary to possible volume overload in the setting of a decreased left ventricular ejection fraction       Decreased left ventricular systolic function   Assessment & Plan     -change the patient's beta-blocker to Toprol-XL in the setting of a decreased left ventricular ejection fraction  -the patient should be on either Toprol XL or Coreg in the setting of a decreased left ventricular ejection fraction  -I will also add lisinopril 5 mg p o  daily in the setting of a decreased left ventricular ejection fraction       Marijuana abuse   Assessment & Plan     -I personally counseled the patient on marijuana cessation  -it is highly unlikely the patient will cease using marijuana       Pulmonary nodule   Assessment & Plan     -outpatient surveillance imaging with his PCP or Pulmonology       Tobacco abuse   Assessment & Plan     -nicotine patch  -smoking cessation counseling       Hyperlipidemia   Assessment & Plan     -continue statin therapy       Hypokalemia   Assessment & Plan     -replete with p o  potassium chloride for a goal potassium level of at least 4 in the setting of a cardiac arrhythmia  -replete the patient's magnesium for a goal magnesium level of at least 2 in the setting of a cardiac arrhythmia   Admission Orders:  [place]  PT/OT/ST EVAL& [address]  O2 TO KEEP SATS>92%  Scheduled Meds:   Current Facility-Administered Medications:  acetaminophen 650 mg Oral Q6H PRN Brigette Dowdy, DO    apixaban 5 mg Oral BID Brigette Noédy, DO    aspirin 81 mg Oral Daily Brigette Noédy, DO    atorvastatin 40 mg Oral Daily With Zack Collier, DO    clopidogrel 75 mg Oral Daily Brigette Dowdy, DO    diltiazem 5 mg/hr Intravenous Continuous Brigette Dowshannon, DO Last Rate: 2 5 mg/hr (05/07/18 0311)   diltiazem 30 mg Oral Q6H [address] Darrin Quintanilla MD    DULoxetine 60 mg Oral Daily Brigette Shoaib, DO    levalbuterol 1 25 mg Nebulization TID Brigette Dowdy, DO    lisinopril 5 mg Oral Daily Brigette Dowdy, DO    melatonin 3 mg Oral HS Brigette Dowdy, DO    [START ON 5/8/2018] metoprolol succinate 100 mg Oral Daily Brigette Dowdy, DO    nicotine 1 patch Transdermal Daily Brigette Dowdy, DO    oxyCODONE 5 mg Oral Q4H PRN Brigette Dowdy, DO    pantoprazole 20 mg Oral Early Morning Brigette Dowdy, DO    potassium chloride 20 mEq Oral Once Brigette Dowdy, DO    potassium chloride 40 mEq Oral BID With Meals Brigette Dowdy, DO    ranolazine 500 mg Oral Q12H [address] Chinedu Magana, DO    sodium chloride (PF) 3 mL Intravenous PRN Caitlin Porter, DO    sodium chloride 3 mL Nebulization TID Brigette Dowdy, DO    tamsulosin 0 4 mg Oral Daily With [employer], DO    tiotropium 18 mcg Inhalation Daily Brigette Dowdy, DO      Continuous Infusions:   diltiazem 5 mg/hr Last Rate: 2 5 mg/hr (05/07/18 0311)     PRN Meds:   acetaminophen    oxyCODONE    Insert peripheral IV **AND** sodium chloride (PF)  Thank you,  [address] [place] [address] in the [facility] by Ez Villarreal for 2017  Network Utilization Review Department  Phone: [phone]; Fax [phone]  ATTENTION: The Network Utilization Review Department is now centralized for our 7 Facilities  Make a note that we have a new phone and fax numbers for our Department  Please call with any questions or concerns to [phone] and carefully follow the prompts so that you are directed to the right person  All voicemails are confidential  Fax any determinations, approvals, denials, and requests for initial or continue stay review clinical to [phone]  Due to HIGH CALL volume, it would be easier if you could please send faxed requests to expedite your requests and in part, help us provide discharge notifications faster

## 2018-05-07 NOTE — CASE MANAGEMENT
Notification of Inpatient Admission/Inpatient Authorization Request  This is a Notification of Inpatient Admission/Request for Inpatient Authorization to our facility [address]  Please be advised that this patient is currently in our facility under Inpatient Status  Below you will find the Attending Physician and Facilitys information including NPI# and contact information for the Utilization  assigned to the [facility] where the patient is receiving services  Please feel free to contact the Utilization Review Department with any questions  Patient Information:  PATIENT NAME: Milad Merrill  MRN: [id number]  YOB: 1945    PRESENTATION DATE: 5/6/2018  8:02 PM  IP ADMISSION DATE: 5/6/18 2107  DISCHARGE DATE: No discharge date for patient encounter  DISPOSITION: Home with [facility]    Attending Physician:  JUVE Kauffman  Specialty- Hospitalist,   [facility] ID- [id number]  [address]   [address]  Phone 1: [phone]  Fax: [phone]    Facility:  [address] [place] [address], [address]  Phone: [phone]  Tax ID: [id number]  NPI: [id number]    [address] [place] [address] in the [facility] by Ez Villarreal for 2017  Network Utilization Review Department  Phone: [phone]; Fax [phone]  ATTENTION: The Network Utilization Review Department is now centralized for our 7 Facilities  Make a note that we have a new phone and fax numbers for our Department  Please call with any questions or concerns to [phone] and carefully follow the prompts so that you are directed to the right person  All voicemails are confidential  Fax any determinations, approvals, denials, and requests for initial or continue stay review clinical to [phone]   Due to HIGH CALL volume, it would be easier if you could please send faxed requests to expedite your requests and in part, help us provide discharge notifications faster
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
## 2018-05-07 NOTE — ASSESSMENT & PLAN NOTE
-possible gram-negative pneumonia with the patient having a recent hospitalization  -the patient possibly has a infiltrate at the right base of the on chest x-ray by my interpretation  -a CT scan of the chest without contrast was ordered in the emergency department to determine if the patient has a true infiltrate  -initiate IV cefepime and IV vancomycin to cover for healthcare-associated pneumonia pathogens  -check blood cultures x 2 sets  -check a Streptococcus pneumoniae urinary antigen  -check a Legionella urinary antigen  -check a MRSA nasal screen

## 2018-05-07 NOTE — ASSESSMENT & PLAN NOTE
-I personally counseled the patient on marijuana cessation  -it is highly unlikely the patient will cease using marijuana

## 2018-05-07 NOTE — PHYSICAL THERAPY NOTE
PT Evaluation     05/07/18 1146   Note Type   Note type Eval/Treat   Pain Assessment   Pain Assessment No/denies pain   Pain Score No Pain   Home Living   Type of Home House   Home Layout Stairs to enter with rails; Able to live on main level with bedroom/bathroom; Performs ADLs on one level; One level   Bathroom Shower/Tub Tub/shower unit   Bathroom Toilet Standard   Bathroom Accessibility [place]; Wheelchair-electric  (4 wheeled walker)   Prior Function   Level of [place] Needs assistance with ADLs and functional mobility  (ambulates with SPC, 4 wheeled walker or w/c)   Lives With Friend(s)  (caretaker)   Receives Help From Friend(s)   ADL Assistance Needs assistance  (assist with ADL's)   IADLs Needs assistance  (assist with IADL's)   Comments caretaker drives   Restrictions/Precautions   Other Precautions Contact/isolation;Droplet precautions; Chair Alarm; Bed Alarm;Multiple lines;Telemetry;O2;Fall Risk   General   Family/Caregiver Present No   Cognition   Arousal/Participation Alert   Orientation Level Oriented X4   Following Commands Follows all commands and directions without difficulty   RLE Assessment   RLE Assessment X  (hip flex, knee flex/ext, and ankle df limited, 3-/5)   LLE Assessment   LLE Assessment WFL  (4+/5)   Coordination   Movements are Fluid and Coordinated (clonus present R ankle with passive DF)   Sensation X  (pt had prior CVA affecting R LE strength/sensation)   Light Touch   RLE Light Touch Impaired   RLE Light Touch Comments decreased to light touch to absent to light touch throughout R LE   LLE Light Touch Grossly intact   Bed Mobility   Supine to Sit 5  Supervision   Additional items Bedrails;Verbal cues   Sit to Supine (seated in chair with alarm on and bell in reach)   Additional Comments Pt is on 2L's O2 with SpO2 98% HR 93 /107 at rest  After ambulation SpO2 97% HR 93 with SOB   Transfers   Sit to Stand 5  Supervision   Additional items Verbal cues  (with RW)   Stand to Sit 5  Supervision   Additional items Verbal cues  (with RW)   Stand pivot 5  Supervision   Additional items Verbal cues  (with RW)   Additional Comments no LOB with transfers or short distance ambulation with RW   Ambulation/Elevation   Gait pattern Narrow BRAULIO; Short stride; Foward flexed   Gait Assistance 5  Supervision  (CGA)   Assistive Device Rolling walker   Distance 30ft with RW CGA no LOB but demonstrates SOB and limited ambulation tolerance   Balance   Static Sitting Good   Dynamic Sitting Good   Static Standing Fair +  (with RW)   Dynamic Standing Fair +  (with RW)   Ambulatory Fair  (with RW)   Endurance Deficit   Endurance Deficit Yes   Endurance Deficit Description limited ambulation and activity tolerance due to SOB and fatigue   Activity Tolerance   Activity Tolerance Patient limited by fatigue   Assessment   Prognosis Good   Problem List Decreased strength;Decreased range of motion;Decreased endurance; Impaired balance;Decreased mobility; Impaired sensation   Assessment Pt is a 74year old male presenting with decreased R LE ROM strength sensation decreased balance endurance and functional mobility  Pt is performing all bed mobility transfers and ambulation at a CGA to (S) level with use of RW but demonstrates limited ambulation tolerance due to SOB and fatigue requiring continued activity in PT to improve impairments and functional deficits  Anticipate pt will be safe to return home on discharge  Goals   Patient Goals To feel better and return home   LTG Expiration Date 05/21/18   Long Term Goal #1 (I) with all bed mobility and transfers with RW   Long Term Goal #2 Pt will ambulate 250ft with RW (S) no drop in SpO2 below 90% and will ascend/descend 11 steps with HR (S)   Plan   Treatment/Interventions Functional transfer training;LE strengthening/ROM; Elevations; Therapeutic exercise; Endurance training;Patient/family training;Bed mobility;Gait training   PT Frequency (3-5x/wk)   Recommendation   Recommendation Home PT; Home independently   PT - OK to Discharge No  (when medically stable for discharge)   Pt with SCD's on when PT entered room  SCD's reapplied and turned on with pt seated in chair at bedside with call bell in reach and chair alarm on

## 2018-05-07 NOTE — PLAN OF CARE
CARDIOVASCULAR - ADULT     Maintains optimal cardiac output and hemodynamic stability Progressing     Absence of cardiac dysrhythmias or at baseline rhythm Progressing        DISCHARGE PLANNING     Discharge to home or other facility with appropriate resources Progressing        INFECTION - ADULT     Absence or prevention of progression during hospitalization Progressing        Knowledge Deficit     Patient/family/caregiver demonstrates understanding of disease process, treatment plan, medications, and discharge instructions Progressing        PAIN - ADULT     Verbalizes/displays adequate comfort level or baseline comfort level Progressing        Potential for Falls     Patient will remain free of falls Progressing        RESPIRATORY - ADULT     Achieves optimal ventilation and oxygenation Progressing        SAFETY ADULT     Maintain or return to baseline ADL function Progressing     Maintain or return mobility status to optimal level Progressing     Patient will remain free of falls Progressing

## 2018-05-07 NOTE — RESPIRATORY THERAPY NOTE
RESP THERAPIST NOTE:  Pt requested CPAP  T @ 8331 this a m  Order obtained = entered via DR DUMONT  PT TRIALED PRESSEURES UP & SUKHJINDER + 12 0cm h20 @ R/A,  VIA LG FFM & SAT=96%, HR CONSTANT & STABLE @ 95/MIN  REPORTED OFF TO PT RN ON PT STATUS  WILL REPORT OFF TO DAYSHIFT RT NEW CPAP SET UP

## 2018-05-07 NOTE — ASSESSMENT & PLAN NOTE
-the sepsis is present on admission and secondary to possible healthcare-associated pneumonia  -the patient did not receive a 30 mL/kg normal saline IV fluid bolus secondary to possible volume overload in the setting of a decreased left ventricular ejection fraction  -check blood cultures x 2 sets  -check a urine culture  -check influenza/RSV PCR testing  -follow the lactic acid level  -the patient will be treated with IV vancomycin and IV cefepime to cover for healthcare-associated pneumonia pathogens

## 2018-05-07 NOTE — ASSESSMENT & PLAN NOTE
-admit the patient to [address] [place] [address] status with the need for a titratable IV Cardizem drip/infusion  -an IV Cardizem drip/infusion was initiated in the emergency department  -continue beta-blocker therapy with Toprol-XL in the setting of a decreased left ventricular ejection fraction  -the patient should be on either Toprol XL or Coreg in the setting of a decreased left ventricular ejection fraction  -during the patient's last hospitalization, he underwent a MARTHA cardioversion on 04/04/2018  -check troponin levels through 3 sets  -repeat a limited echocardiogram to evaluate the patient's left ventricular ejection fraction with his last echocardiogram in April 2018 showing a new finding of a decreased left ventricular ejection fraction of 40%  -continue full anticoagulation with Eliquis 5 mg p o  b i d   -if the atrial fibrillation does not improve with the IV Cardizem drip/infusion, he will need a Cardiology consultation

## 2018-05-07 NOTE — ASSESSMENT & PLAN NOTE
-replete with p o  potassium chloride for a goal potassium level of at least 4 in the setting of a cardiac arrhythmia  -follow the patient's potassium level and magnesium level  -replete the patient's magnesium for a goal magnesium level of at least 2 in the setting of a cardiac arrhythmia

## 2018-05-07 NOTE — ASSESSMENT & PLAN NOTE
Metoprolol added  Repeat echo today  Will consider cardiology consult if rate does not improve or EF worse today than previous

## 2018-05-07 NOTE — PROGRESS NOTES
I was notified by the nursing staff that the patient experienced an episode of choking while attempting to swallow a pill  I will consult speech therapy for a dysphagia evaluation  The patient will be placed on aspiration precautions

## 2018-05-07 NOTE — ED NOTES
HR: 100  Atrial Fib on the monitor  Pt states hes feeling much better           Melissa Bello RN  05/06/18 8850
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
## 2018-05-07 NOTE — PLAN OF CARE
Problem: Potential for Falls  Goal: Patient will remain free of falls  INTERVENTIONS:  - Assess patient frequently for physical needs  -  Identify cognitive and physical deficits and behaviors that affect risk of falls    -  [place] fall precautions as indicated by assessment   - Educate patient/family on patient safety including physical limitations  - Instruct patient to call for assistance with activity based on assessment  - Modify environment to reduce risk of injury  - Consider OT/PT consult to assist with strengthening/mobility    Outcome: Progressing      Problem: PAIN - ADULT  Goal: Verbalizes/displays adequate comfort level or baseline comfort level  Interventions:  - Encourage patient to monitor pain and request assistance  - Assess pain using appropriate pain scale  - Administer analgesics based on type and severity of pain and evaluate response  - Implement non-pharmacological measures as appropriate and evaluate response  - Consider cultural and social influences on pain and pain management  - Notify physician/advanced practitioner if interventions unsuccessful or patient reports new pain   Outcome: Progressing      Problem: INFECTION - ADULT  Goal: Absence or prevention of progression during hospitalization  INTERVENTIONS:  - Assess and monitor for signs and symptoms of infection  - Monitor lab/diagnostic results  - Monitor all insertion sites, i e  indwelling lines, tubes, and drains  - Monitor endotracheal (as able) and nasal secretions for changes in amount and color  - [place] appropriate cooling/warming therapies per order  - Administer medications as ordered  - Instruct and encourage patient and family to use good hand hygiene technique  - Identify and instruct in appropriate isolation precautions for identified infection/condition   Outcome: Progressing      Problem: SAFETY ADULT  Goal: Maintain or return to baseline ADL function  INTERVENTIONS:  -  Assess patient's ability to carry out ADLs; assess patient's baseline for ADL function and identify physical deficits which impact ability to perform ADLs (bathing, care of mouth/teeth, toileting, grooming, dressing, etc )  - Assess/evaluate cause of self-care deficits   - Assess range of motion  - Assess patient's mobility; develop plan if impaired  - Assess patient's need for assistive devices and provide as appropriate  - Encourage maximum independence but intervene and supervise when necessary  ¯ Involve family in performance of ADLs  ¯ Assess for home care needs following discharge   ¯ Request OT consult to assist with ADL evaluation and planning for discharge  ¯ Provide patient education as appropriate   Outcome: Progressing    Goal: Maintain or return mobility status to optimal level  INTERVENTIONS:  - Assess patient's baseline mobility status (ambulation, transfers, stairs, etc )    - Identify cognitive and physical deficits and behaviors that affect mobility  - Identify mobility aids required to assist with transfers and/or ambulation (gait belt, sit-to-stand, lift, walker, cane, etc )  - [place] fall precautions as indicated by assessment  - Record patient progress and toleration of activity level on Mobility SBAR; progress patient to next Phase/Stage  - Instruct patient to call for assistance with activity based on assessment  - Request Rehabilitation consult to assist with strengthening/weightbearing, etc    Outcome: Progressing    Goal: Patient will remain free of falls  INTERVENTIONS:  - Assess patient frequently for physical needs  -  Identify cognitive and physical deficits and behaviors that affect risk of falls    -  [place] fall precautions as indicated by assessment   - Educate patient/family on patient safety including physical limitations  - Instruct patient to call for assistance with activity based on assessment  - Modify environment to reduce risk of injury  - Consider OT/PT consult to assist with strengthening/mobility Outcome: Progressing      Problem: DISCHARGE PLANNING  Goal: Discharge to home or other facility with appropriate resources  INTERVENTIONS:  - Identify barriers to discharge w/patient and caregiver  - Arrange for needed discharge resources and transportation as appropriate  - Identify discharge learning needs (meds, wound care, etc )  - Arrange for interpretive services to assist at discharge as needed  - Refer to Case Management Department for coordinating discharge planning if the patient needs post-hospital services based on physician/advanced practitioner order or complex needs related to functional status, cognitive ability, or social support system   Outcome: Progressing      Problem: Knowledge Deficit  Goal: Patient/family/caregiver demonstrates understanding of disease process, treatment plan, medications, and discharge instructions  Complete learning assessment and assess knowledge base  Interventions:  - Provide teaching at level of understanding  - Provide teaching via preferred learning methods   Outcome: Progressing      Problem: CARDIOVASCULAR - ADULT  Goal: Maintains optimal cardiac output and hemodynamic stability  INTERVENTIONS:  - Monitor I/O, vital signs and rhythm  - Monitor for S/S and trends of decreased cardiac output i e  bleeding, hypotension  - Administer and titrate ordered vasoactive medications to optimize hemodynamic stability  - Assess quality of pulses, skin color and temperature  - Assess for signs of decreased coronary artery perfusion - ex   Angina  - Instruct patient to report change in severity of symptoms   Outcome: Progressing    Goal: Absence of cardiac dysrhythmias or at baseline rhythm  INTERVENTIONS:  - Continuous cardiac monitoring, monitor vital signs, obtain 12 lead EKG if indicated  - Administer antiarrhythmic and heart rate control medications as ordered  - Monitor electrolytes and administer replacement therapy as ordered   Outcome: Progressing      Problem: RESPIRATORY - ADULT  Goal: Achieves optimal ventilation and oxygenation  INTERVENTIONS:  - Assess for changes in respiratory status  - Assess for changes in mentation and behavior  - Position to facilitate oxygenation and minimize respiratory effort  - Oxygen administration by appropriate delivery method based on oxygen saturation (per order) or ABGs  - Initiate smoking cessation education as indicated  - Encourage broncho-pulmonary hygiene including cough, deep breathe, Incentive Spirometry  - Assess the need for suctioning and aspirate as needed  - Assess and instruct to report SOB or any respiratory difficulty  - Respiratory Therapy support as indicated   Outcome: Progressing

## 2018-05-08 PROBLEM — E11.9 TYPE 2 DIABETES MELLITUS WITHOUT COMPLICATION, WITHOUT LONG-TERM CURRENT USE OF INSULIN (HCC): Status: ACTIVE | Noted: 2018-05-08

## 2018-05-08 LAB
ANION GAP SERPL CALCULATED.3IONS-SCNC: 5 MMOL/L (ref 4–13)
ANISOCYTOSIS BLD QL SMEAR: PRESENT
BACTERIA UR CULT: NORMAL
BASOPHILS # BLD MANUAL: 0 THOUSAND/UL (ref 0–0.1)
BASOPHILS NFR MAR MANUAL: 0 % (ref 0–1)
BUN SERPL-MCNC: 14 MG/DL (ref 5–25)
CALCIUM SERPL-MCNC: 8.3 MG/DL (ref 8.3–10.1)
CHLORIDE SERPL-SCNC: 106 MMOL/L (ref 100–108)
CO2 SERPL-SCNC: 31 MMOL/L (ref 21–32)
CREAT SERPL-MCNC: 0.88 MG/DL (ref 0.6–1.3)
EOSINOPHIL # BLD MANUAL: 0.25 THOUSAND/UL (ref 0–0.4)
EOSINOPHIL NFR BLD MANUAL: 2 % (ref 0–6)
ERYTHROCYTE [DISTWIDTH] IN BLOOD BY AUTOMATED COUNT: 19.4 % (ref 11.6–15.1)
GFR SERPL CREATININE-BSD FRML MDRD: 84 ML/MIN/1.73SQ M
GLUCOSE SERPL-MCNC: 123 MG/DL (ref 65–140)
GLUCOSE SERPL-MCNC: 141 MG/DL (ref 65–140)
HCT VFR BLD AUTO: 35.2 % (ref 36.5–49.3)
HGB BLD-MCNC: 11.2 G/DL (ref 12–17)
LYMPHOCYTES # BLD AUTO: 19 % (ref 14–44)
LYMPHOCYTES # BLD AUTO: 2.36 THOUSAND/UL (ref 0.6–4.47)
MAGNESIUM SERPL-MCNC: 1.7 MG/DL (ref 1.6–2.6)
MCH RBC QN AUTO: 23.2 PG (ref 26.8–34.3)
MCHC RBC AUTO-ENTMCNC: 31.8 G/DL (ref 31.4–37.4)
MCV RBC AUTO: 73 FL (ref 82–98)
MONOCYTES # BLD AUTO: 1.49 THOUSAND/UL (ref 0–1.22)
MONOCYTES NFR BLD: 12 % (ref 4–12)
MRSA NOSE QL CULT: NORMAL
NEUTROPHILS # BLD MANUAL: 8.32 THOUSAND/UL (ref 1.85–7.62)
NEUTS SEG NFR BLD AUTO: 67 % (ref 43–75)
OVALOCYTES BLD QL SMEAR: PRESENT
PLATELET # BLD AUTO: 262 THOUSANDS/UL (ref 149–390)
PLATELET BLD QL SMEAR: ADEQUATE
PMV BLD AUTO: 9.7 FL (ref 8.9–12.7)
POTASSIUM SERPL-SCNC: 4.2 MMOL/L (ref 3.5–5.3)
RBC # BLD AUTO: 4.83 MILLION/UL (ref 3.88–5.62)
SODIUM SERPL-SCNC: 142 MMOL/L (ref 136–145)
TOTAL CELLS COUNTED SPEC: 100
WBC # BLD AUTO: 12.42 THOUSAND/UL (ref 4.31–10.16)

## 2018-05-08 PROCEDURE — 94760 N-INVAS EAR/PLS OXIMETRY 1: CPT

## 2018-05-08 PROCEDURE — 94660 CPAP INITIATION&MGMT: CPT

## 2018-05-08 PROCEDURE — 80048 BASIC METABOLIC PNL TOTAL CA: CPT | Performed by: FAMILY MEDICINE

## 2018-05-08 PROCEDURE — 85027 COMPLETE CBC AUTOMATED: CPT | Performed by: FAMILY MEDICINE

## 2018-05-08 PROCEDURE — 85007 BL SMEAR W/DIFF WBC COUNT: CPT | Performed by: FAMILY MEDICINE

## 2018-05-08 PROCEDURE — 82948 REAGENT STRIP/BLOOD GLUCOSE: CPT

## 2018-05-08 PROCEDURE — 94640 AIRWAY INHALATION TREATMENT: CPT

## 2018-05-08 PROCEDURE — 83735 ASSAY OF MAGNESIUM: CPT | Performed by: FAMILY MEDICINE

## 2018-05-08 PROCEDURE — 99232 SBSQ HOSP IP/OBS MODERATE 35: CPT | Performed by: FAMILY MEDICINE

## 2018-05-08 PROCEDURE — 99222 1ST HOSP IP/OBS MODERATE 55: CPT | Performed by: INTERNAL MEDICINE

## 2018-05-08 RX ORDER — LORAZEPAM 0.5 MG/1
0.5 TABLET ORAL EVERY 6 HOURS PRN
Status: DISCONTINUED | OUTPATIENT
Start: 2018-05-08 | End: 2018-05-09 | Stop reason: HOSPADM

## 2018-05-08 RX ORDER — DILTIAZEM HYDROCHLORIDE 60 MG/1
60 TABLET, FILM COATED ORAL EVERY 6 HOURS SCHEDULED
Status: DISCONTINUED | OUTPATIENT
Start: 2018-05-08 | End: 2018-05-09

## 2018-05-08 RX ORDER — LISINOPRIL 10 MG/1
10 TABLET ORAL DAILY
Status: DISCONTINUED | OUTPATIENT
Start: 2018-05-09 | End: 2018-05-09 | Stop reason: HOSPADM

## 2018-05-08 RX ORDER — LABETALOL HYDROCHLORIDE 5 MG/ML
10 INJECTION, SOLUTION INTRAVENOUS EVERY 4 HOURS PRN
Status: DISCONTINUED | OUTPATIENT
Start: 2018-05-08 | End: 2018-05-09 | Stop reason: HOSPADM

## 2018-05-08 RX ORDER — METOPROLOL SUCCINATE 100 MG/1
100 TABLET, EXTENDED RELEASE ORAL 2 TIMES DAILY
Status: DISCONTINUED | OUTPATIENT
Start: 2018-05-08 | End: 2018-05-09 | Stop reason: HOSPADM

## 2018-05-08 RX ORDER — METOPROLOL TARTRATE 5 MG/5ML
5 INJECTION INTRAVENOUS ONCE
Status: COMPLETED | OUTPATIENT
Start: 2018-05-08 | End: 2018-05-08

## 2018-05-08 RX ORDER — CLONIDINE HYDROCHLORIDE 0.1 MG/1
0.1 TABLET ORAL EVERY 12 HOURS SCHEDULED
Status: DISCONTINUED | OUTPATIENT
Start: 2018-05-08 | End: 2018-05-09 | Stop reason: HOSPADM

## 2018-05-08 RX ADMIN — ALBUTEROL SULFATE 2.5 MG: 2.5 SOLUTION RESPIRATORY (INHALATION) at 03:56

## 2018-05-08 RX ADMIN — RANOLAZINE 500 MG: 500 TABLET, FILM COATED, EXTENDED RELEASE ORAL at 10:00

## 2018-05-08 RX ADMIN — MELATONIN TAB 3 MG 3 MG: 3 TAB at 21:33

## 2018-05-08 RX ADMIN — NICOTINE 1 PATCH: 7 PATCH, EXTENDED RELEASE TRANSDERMAL at 23:04

## 2018-05-08 RX ADMIN — ASPIRIN 81 MG 81 MG: 81 TABLET ORAL at 10:00

## 2018-05-08 RX ADMIN — METOPROLOL TARTRATE 5 MG: 5 INJECTION INTRAVENOUS at 01:19

## 2018-05-08 RX ADMIN — METFORMIN HYDROCHLORIDE 500 MG: 500 TABLET, FILM COATED ORAL at 17:48

## 2018-05-08 RX ADMIN — ATORVASTATIN CALCIUM 40 MG: 40 TABLET, FILM COATED ORAL at 17:49

## 2018-05-08 RX ADMIN — CLOPIDOGREL BISULFATE 75 MG: 75 TABLET ORAL at 10:00

## 2018-05-08 RX ADMIN — CLONIDINE HYDROCHLORIDE 0.1 MG: 0.1 TABLET ORAL at 02:33

## 2018-05-08 RX ADMIN — PANTOPRAZOLE SODIUM 20 MG: 20 TABLET, DELAYED RELEASE ORAL at 06:10

## 2018-05-08 RX ADMIN — Medication 400 MG: at 17:49

## 2018-05-08 RX ADMIN — CLONIDINE HYDROCHLORIDE 0.1 MG: 0.1 TABLET ORAL at 10:00

## 2018-05-08 RX ADMIN — TIOTROPIUM BROMIDE 18 MCG: 18 CAPSULE ORAL; RESPIRATORY (INHALATION) at 09:35

## 2018-05-08 RX ADMIN — METOPROLOL SUCCINATE 100 MG: 100 TABLET, EXTENDED RELEASE ORAL at 10:00

## 2018-05-08 RX ADMIN — APIXABAN 5 MG: 5 TABLET, FILM COATED ORAL at 10:00

## 2018-05-08 RX ADMIN — DILTIAZEM HYDROCHLORIDE 60 MG: 60 TABLET, FILM COATED ORAL at 17:48

## 2018-05-08 RX ADMIN — CLONIDINE HYDROCHLORIDE 0.1 MG: 0.1 TABLET ORAL at 21:33

## 2018-05-08 RX ADMIN — DULOXETINE HYDROCHLORIDE 60 MG: 30 CAPSULE, DELAYED RELEASE ORAL at 10:00

## 2018-05-08 RX ADMIN — DILTIAZEM HYDROCHLORIDE 60 MG: 60 TABLET, FILM COATED ORAL at 23:03

## 2018-05-08 RX ADMIN — LISINOPRIL 5 MG: 5 TABLET ORAL at 10:00

## 2018-05-08 RX ADMIN — TAMSULOSIN HYDROCHLORIDE 0.4 MG: 0.4 CAPSULE ORAL at 17:48

## 2018-05-08 RX ADMIN — METOPROLOL SUCCINATE 100 MG: 100 TABLET, EXTENDED RELEASE ORAL at 17:49

## 2018-05-08 RX ADMIN — LORAZEPAM 0.5 MG: 0.5 TABLET ORAL at 03:39

## 2018-05-08 RX ADMIN — LORAZEPAM 0.5 MG: 0.5 TABLET ORAL at 23:37

## 2018-05-08 RX ADMIN — DILTIAZEM HYDROCHLORIDE 60 MG: 60 TABLET, FILM COATED ORAL at 13:00

## 2018-05-08 RX ADMIN — ALBUTEROL SULFATE 2.5 MG: 2.5 SOLUTION RESPIRATORY (INHALATION) at 23:31

## 2018-05-08 RX ADMIN — DILTIAZEM HYDROCHLORIDE 30 MG: 30 TABLET, FILM COATED ORAL at 06:10

## 2018-05-08 RX ADMIN — APIXABAN 5 MG: 5 TABLET, FILM COATED ORAL at 17:49

## 2018-05-08 RX ADMIN — METFORMIN HYDROCHLORIDE 500 MG: 500 TABLET, FILM COATED ORAL at 10:00

## 2018-05-08 RX ADMIN — Medication 400 MG: at 10:00

## 2018-05-08 RX ADMIN — OXYCODONE HYDROCHLORIDE 5 MG: 5 TABLET ORAL at 23:03

## 2018-05-08 RX ADMIN — RANOLAZINE 500 MG: 500 TABLET, FILM COATED, EXTENDED RELEASE ORAL at 21:33
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
## 2018-05-08 NOTE — PROGRESS NOTES
Progress Note - Familia Stanford 1945, 74 y o  male MRN: [id number]    Unit/Bed#:  Encounter: [id number]    Primary Care Provider: Oscar Calderon DO   Date and time admitted to hospital: 5/6/2018  8:02 PM        * Atrial fibrillation with rapid ventricular response [facility])   Assessment & Plan    Patient went into rapid ventricular response last evening and received a 1 time dose of IV Cardizem  I will increase Cardizem to 60 mg p o  Q 6  Continue Toprol  mg p o  b i d  Consult Cardiology  Echocardiogram reviewed and shows improvement of ejection fraction  Troponin negative  Continue eliquis         Type 2 diabetes mellitus without complication, without long-term current use of insulin (HCC)   Assessment & Plan    Patient's hemoglobin A1c is 7 0  Fasting blood glucose is 123  Start metformin 500mg PO BID         Decreased left ventricular systolic function   Assessment & Plan    Metoprolol added  Repeat echo shows improvement of ejection fraction        Sepsis ([address] )   Assessment & Plan    I do not believe he was septic  This was probably due to RVR and reactive leucocytosis   Will monitor for now and follow up micro studies             Progress Note - Familia Stanford 74 y o  male MRN: [id number]    Unit/Bed#:  Encounter: [id number]          Subjective:   Patient seen and examined at bedside, he felt anxious last night and was noted to be in rapid ventricular response  When this occurs he does get dizzy and lightheaded  He does complain of palpitations  Objective:     Vitals:   Vitals:    05/08/18 0807   BP:    Pulse: (!) 123   Resp: 22   Temp:    SpO2: 99%     Body mass index is 26 98 kg/m²      Intake/Output Summary (Last 24 hours) at 05/08/18 0812  Last data filed at 05/08/18 0612   Gross per 24 hour   Intake              750 ml   Output             1350 ml   Net             -600 ml       Physical Exam:   /97 (BP Location: Left arm)   Pulse (!) 123   Temp 97 6 °F (36 4 °C) (Temporal)   Resp 22   Ht 5' 10" (1 778 m)   Wt 85 3 kg (188 lb 0 8 oz)   SpO2 99%   BMI 26 98 kg/m²   General appearance: alert and oriented, in no acute distress  Head: Normocephalic, without obvious abnormality, atraumatic  Lungs:  Diminished bilaterally without evidence of wheezes rales or rhonchi  Heart: irregularly irregular rhythm  Abdomen: soft, non-tender; bowel sounds normal; no masses,  no organomegaly  Extremities: extremities normal, warm and well-perfused; no cyanosis, clubbing, or edema  Pulses: 2+ and symmetric  Neurologic: Grossly normal     Invasive Devices     Peripheral Intravenous Line            Peripheral IV 05/06/18 Right Antecubital 1 day    Peripheral IV 05/07/18 Left Wrist 1 day                  Results from last 7 days  Lab Units 05/08/18 0442 05/07/18 0515 05/06/18 2016   WBC Thousand/uL 12 42* 11 99* 13 91*   HEMOGLOBIN g/dL 11 2* 10 8* 12 4   HEMATOCRIT % 35 2* 34 3* 38 8   PLATELETS Thousands/uL 262 264 343         Results from last 7 days  Lab Units 05/08/18 0442 05/07/18 0515 05/06/18 2016   SODIUM mmol/L 142 144 144   POTASSIUM mmol/L 4 2 3 3* 3 2*   CHLORIDE mmol/L 106 109* 108   CO2 mmol/L 31 29 30   BUN mg/dL 14 11 12   CREATININE mg/dL 0 88 0 83 0 85   CALCIUM mg/dL 8 3 8 1* 9 2   TOTAL PROTEIN g/dL  --  4 9*  --    BILIRUBIN TOTAL mg/dL  --  0 30  --    ALK PHOS U/L  --  62  --    ALT U/L  --  31  --    AST U/L  --  14  --    GLUCOSE RANDOM mg/dL 123 148* 123       Medication Administration - last 24 hours from 05/07/2018 0812 to 05/08/2018 7436       Date/Time Order Dose Route Action Action by     05/07/2018 1013 diltiazem (CARDIZEM) 125 mg in sodium chloride 0 9 % 125 mL infusion 0 mg/hr Intravenous Stopped Levy Soulier, RN     05/07/2018 1644 atorvastatin (LIPITOR) tablet 40 mg 40 mg Oral Given Levy Soulier, RN     05/07/2018 1835 apixaban (ELIQUIS) tablet 5 mg 5 mg Oral Given Levy Soulier, RN     05/07/2018 0825 apixaban (ELIQUIS) tablet 5 mg 5 mg Oral Given Priscila Ailyn, RN     05/07/2018 0825 aspirin chewable tablet 81 mg 81 mg Oral Given Priscila Ailyn, RN     05/07/2018 0825 clopidogrel (PLAVIX) tablet 75 mg 75 mg Oral Given Priscila Ailyn, RN     05/07/2018 0825 DULoxetine (CYMBALTA) delayed release capsule 60 mg 60 mg Oral Given Priscila Ailyn, RN     05/08/2018 0610 pantoprazole (PROTONIX) EC tablet 20 mg 20 mg Oral Given Sami Collar, RN     05/07/2018 2202 melatonin tablet 3 mg 3 mg Oral Given Sami Collar, RN     05/07/2018 0851 tiotropium (SPIRIVA) capsule for inhaler 18 mcg 18 mcg Inhalation Given Priscila Ailyn, RN     05/07/2018 1644 tamsulosin (FLOMAX) capsule 0 4 mg 0 4 mg Oral Given Priscila Ailyn, RN     05/07/2018 2144 ranolazine (RANEXA) 12 hr tablet 500 mg 500 mg Oral Given Sami Collar, RN     05/07/2018 0850 ranolazine (RANEXA) 12 hr tablet 500 mg 500 mg Oral Given Priscila Ailyn, RN     05/07/2018 2334 nicotine (NICODERM CQ) 7 mg/24hr TD 24 hr patch 1 patch 1 patch Transdermal Medication Applied Sami Collar, RN     05/07/2018 6579 nicotine (NICODERM CQ) 7 mg/24hr TD 24 hr patch 1 patch 0 patch Transdermal Not Given Priscila Ailyn, RN     05/07/2018 0825 lisinopril (ZESTRIL) tablet 5 mg 5 mg Oral Given Priscila Ailyn, RN     05/08/2018 0802 levalbuterol (Vallarie Chintan) inhalation solution 1 25 mg 0 mg Nebulization Hold Ridge Calderon, RT     05/07/2018 2005 levalbuterol (Vallarie Chintan) inhalation solution 1 25 mg 1 25 mg Nebulization Given Alexandria Navarro, RT     05/07/2018 1413 levalbuterol (XOPENEX) inhalation solution 1 25 mg 1 25 mg Nebulization Given Ridge Calderon, RT     05/07/2018 0834 levalbuterol (XOPENEX) inhalation solution 1 25 mg 1 25 mg Nebulization Given Ridge Calderon, RT     05/07/2018 2336 oxyCODONE (ROXICODONE) IR tablet 5 mg 5 mg Oral Given Sami Inman, RN     05/07/2018 1928 oxyCODONE (ROXICODONE) IR tablet 5 mg 5 mg Oral Given Sami Collar, RN     05/07/2018 1644 potassium chloride (K-DUR,KLOR-CON) CR tablet 40 mEq 40 mEq Oral Given Lyndawaldemar Hendrickson, RN     05/07/2018 0825 potassium chloride (K-DUR,KLOR-CON) CR tablet 40 mEq 40 mEq Oral Given Yessy Gene, RN     05/08/2018 0610 diltiazem (CARDIZEM) tablet 30 mg 30 mg Oral Given Katie Bless, RN     05/07/2018 2333 diltiazem (CARDIZEM) tablet 30 mg 30 mg Oral Given Katie Bless, RN     05/07/2018 1835 diltiazem (CARDIZEM) tablet 30 mg 30 mg Oral Given Yessy Gene, RN     05/07/2018 1131 diltiazem (CARDIZEM) tablet 30 mg 30 mg Oral Given Yessy Gene, RN     05/07/2018 0851 diltiazem (CARDIZEM) tablet 30 mg 30 mg [facility]     05/08/2018 0802 sodium chloride 0 9 % inhalation solution 3 mL 0 mL Nebulization Hold Ridge Citino, RT     05/07/2018 2005 sodium chloride 0 9 % inhalation solution 3 mL 3 mL Nebulization Given Lorelee Pillow, RT     05/07/2018 1413 sodium chloride 0 9 % inhalation solution 3 mL 3 mL Nebulization Given Ridge Citino, RT     05/07/2018 0834 sodium chloride 0 9 % inhalation solution 3 mL 3 mL Nebulization Given Ridge Citino, RT     05/07/2018 2203 diltiazem (CARDIZEM) injection 10 mg 10 mg Intravenous Given Katie Bless, RN     05/08/2018 0356 albuterol inhalation solution 2 5 mg 2 5 mg Nebulization Given Lorelee Pillow, RT     05/08/2018 0119 metoprolol (LOPRESSOR) injection 5 mg 5 mg Intravenous Given Katie Bless, RN     05/08/2018 0233 cloNIDine (CATAPRES) tablet 0 1 mg 0 1 mg Oral Given Katie Bless, RN     05/08/2018 3033 LORazepam (ATIVAN) tablet 0 5 mg 0 5 mg Oral Given Katie Bless, RN            Lab, Imaging and other studies: I have personally reviewed pertinent reports      VTE Pharmacologic Prophylaxis:  Eliquis  VTE Mechanical Prophylaxis: sequential compression device     Karma Stanford MD  5/8/2018,8:12 AM

## 2018-05-08 NOTE — RESPIRATORY THERAPY NOTE
0800 nebulizer treatment  Not given per Dr Curtis Public order due to increased HR   Nebulizer changed to PRN per dr guillen

## 2018-05-08 NOTE — SPEECH THERAPY NOTE
Speech Language/Pathology    Patient sleeping  Spoke with nursing whether patient is tolerating diet and pills without s/s  Nursing states he is not compliant with strategies for eating that were posted in room and discussed during evaluation  Patient has not had any s/s with meals or with taking his pills per nursing

## 2018-05-08 NOTE — PROGRESS NOTES
I was notified by the nursing staff that the patient's heart rate is currently in the 120's-130's with a rapid atrial fibrillation rhythm  I will give the patient a dose of Cardizem 10 mg IV x 1 dose now  The IV Cardizem drip/infusion may need to be re-initiated

## 2018-05-08 NOTE — ASSESSMENT & PLAN NOTE
Patient went into rapid ventricular response last evening and received a 1 time dose of IV Cardizem  I will increase Cardizem to 60 mg p o  Q 6  Continue Toprol  mg p o  b i d  Consult Cardiology  Echocardiogram reviewed and shows improvement of ejection fraction    Troponin negative  Continue eliquis

## 2018-05-09 VITALS
WEIGHT: 188.49 LBS | BODY MASS INDEX: 26.99 KG/M2 | HEART RATE: 90 BPM | RESPIRATION RATE: 27 BRPM | TEMPERATURE: 96.9 F | OXYGEN SATURATION: 97 % | HEIGHT: 70 IN | SYSTOLIC BLOOD PRESSURE: 150 MMHG | DIASTOLIC BLOOD PRESSURE: 70 MMHG

## 2018-05-09 LAB
AMPHETAMINES SERPL QL SCN: NEGATIVE
ANION GAP SERPL CALCULATED.3IONS-SCNC: 5 MMOL/L (ref 4–13)
ANISOCYTOSIS BLD QL SMEAR: PRESENT
BARBITURATES UR QL: NEGATIVE
BASOPHILS # BLD MANUAL: 0 THOUSAND/UL (ref 0–0.1)
BASOPHILS NFR MAR MANUAL: 0 % (ref 0–1)
BENZODIAZ UR QL: NEGATIVE
BUN SERPL-MCNC: 16 MG/DL (ref 5–25)
CALCIUM SERPL-MCNC: 8.9 MG/DL (ref 8.3–10.1)
CHLORIDE SERPL-SCNC: 102 MMOL/L (ref 100–108)
CO2 SERPL-SCNC: 31 MMOL/L (ref 21–32)
COCAINE UR QL: NEGATIVE
CREAT SERPL-MCNC: 0.85 MG/DL (ref 0.6–1.3)
EOSINOPHIL # BLD MANUAL: 0 THOUSAND/UL (ref 0–0.4)
EOSINOPHIL NFR BLD MANUAL: 0 % (ref 0–6)
ERYTHROCYTE [DISTWIDTH] IN BLOOD BY AUTOMATED COUNT: 20 % (ref 11.6–15.1)
GFR SERPL CREATININE-BSD FRML MDRD: 85 ML/MIN/1.73SQ M
GLUCOSE SERPL-MCNC: 116 MG/DL (ref 65–140)
GLUCOSE SERPL-MCNC: 139 MG/DL (ref 65–140)
HCT VFR BLD AUTO: 38.3 % (ref 36.5–49.3)
HGB BLD-MCNC: 12.3 G/DL (ref 12–17)
HYPERCHROMIA BLD QL SMEAR: PRESENT
LYMPHOCYTES # BLD AUTO: 2.24 THOUSAND/UL (ref 0.6–4.47)
LYMPHOCYTES # BLD AUTO: 20 % (ref 14–44)
MCH RBC QN AUTO: 23.3 PG (ref 26.8–34.3)
MCHC RBC AUTO-ENTMCNC: 32.1 G/DL (ref 31.4–37.4)
MCV RBC AUTO: 73 FL (ref 82–98)
METHADONE UR QL: NEGATIVE
MONOCYTES # BLD AUTO: 1.12 THOUSAND/UL (ref 0–1.22)
MONOCYTES NFR BLD: 10 % (ref 4–12)
NEUTROPHILS # BLD MANUAL: 7.83 THOUSAND/UL (ref 1.85–7.62)
NEUTS SEG NFR BLD AUTO: 70 % (ref 43–75)
OPIATES UR QL SCN: NEGATIVE
OVALOCYTES BLD QL SMEAR: PRESENT
PCP UR QL: NEGATIVE
PLATELET # BLD AUTO: 258 THOUSANDS/UL (ref 149–390)
PLATELET BLD QL SMEAR: ADEQUATE
PMV BLD AUTO: 9.2 FL (ref 8.9–12.7)
POTASSIUM SERPL-SCNC: 4.2 MMOL/L (ref 3.5–5.3)
RBC # BLD AUTO: 5.27 MILLION/UL (ref 3.88–5.62)
SODIUM SERPL-SCNC: 138 MMOL/L (ref 136–145)
THC UR QL: NEGATIVE
TOTAL CELLS COUNTED SPEC: 100
WBC # BLD AUTO: 11.18 THOUSAND/UL (ref 4.31–10.16)

## 2018-05-09 PROCEDURE — 85007 BL SMEAR W/DIFF WBC COUNT: CPT | Performed by: FAMILY MEDICINE

## 2018-05-09 PROCEDURE — 97116 GAIT TRAINING THERAPY: CPT

## 2018-05-09 PROCEDURE — 82948 REAGENT STRIP/BLOOD GLUCOSE: CPT

## 2018-05-09 PROCEDURE — 80307 DRUG TEST PRSMV CHEM ANLYZR: CPT | Performed by: FAMILY MEDICINE

## 2018-05-09 PROCEDURE — 85027 COMPLETE CBC AUTOMATED: CPT | Performed by: FAMILY MEDICINE

## 2018-05-09 PROCEDURE — 94660 CPAP INITIATION&MGMT: CPT

## 2018-05-09 PROCEDURE — 80048 BASIC METABOLIC PNL TOTAL CA: CPT | Performed by: FAMILY MEDICINE

## 2018-05-09 PROCEDURE — 99239 HOSP IP/OBS DSCHRG MGMT >30: CPT | Performed by: FAMILY MEDICINE

## 2018-05-09 PROCEDURE — 97530 THERAPEUTIC ACTIVITIES: CPT

## 2018-05-09 PROCEDURE — 99232 SBSQ HOSP IP/OBS MODERATE 35: CPT | Performed by: INTERNAL MEDICINE

## 2018-05-09 RX ORDER — DIAZEPAM 5 MG/1
5 TABLET ORAL EVERY 6 HOURS PRN
Qty: 30 TABLET | Refills: 0 | Status: SHIPPED | OUTPATIENT
Start: 2018-05-09 | End: 2018-07-06 | Stop reason: ALTCHOICE

## 2018-05-09 RX ORDER — METOPROLOL SUCCINATE 100 MG/1
100 TABLET, EXTENDED RELEASE ORAL 2 TIMES DAILY
Qty: 60 TABLET | Refills: 0 | Status: SHIPPED | OUTPATIENT
Start: 2018-05-09 | End: 2018-12-26 | Stop reason: SDUPTHER

## 2018-05-09 RX ORDER — DILTIAZEM HYDROCHLORIDE 240 MG/1
240 CAPSULE, COATED, EXTENDED RELEASE ORAL DAILY
Qty: 30 CAPSULE | Refills: 0 | Status: SHIPPED | OUTPATIENT
Start: 2018-05-10 | End: 2018-07-19 | Stop reason: HOSPADM

## 2018-05-09 RX ORDER — LISINOPRIL 10 MG/1
10 TABLET ORAL DAILY
Qty: 30 TABLET | Refills: 0 | Status: SHIPPED | OUTPATIENT
Start: 2018-05-10 | End: 2018-05-18 | Stop reason: SDUPTHER

## 2018-05-09 RX ORDER — DILTIAZEM HYDROCHLORIDE 240 MG/1
240 CAPSULE, COATED, EXTENDED RELEASE ORAL DAILY
Status: DISCONTINUED | OUTPATIENT
Start: 2018-05-09 | End: 2018-05-09 | Stop reason: HOSPADM

## 2018-05-09 RX ADMIN — TAMSULOSIN HYDROCHLORIDE 0.4 MG: 0.4 CAPSULE ORAL at 16:48

## 2018-05-09 RX ADMIN — CLONIDINE HYDROCHLORIDE 0.1 MG: 0.1 TABLET ORAL at 10:00

## 2018-05-09 RX ADMIN — DILTIAZEM HYDROCHLORIDE 240 MG: 240 CAPSULE, COATED, EXTENDED RELEASE ORAL at 10:00

## 2018-05-09 RX ADMIN — ASPIRIN 81 MG 81 MG: 81 TABLET ORAL at 10:00

## 2018-05-09 RX ADMIN — RANOLAZINE 500 MG: 500 TABLET, FILM COATED, EXTENDED RELEASE ORAL at 10:00

## 2018-05-09 RX ADMIN — METFORMIN HYDROCHLORIDE 500 MG: 500 TABLET, FILM COATED ORAL at 09:00

## 2018-05-09 RX ADMIN — DULOXETINE HYDROCHLORIDE 60 MG: 30 CAPSULE, DELAYED RELEASE ORAL at 10:00

## 2018-05-09 RX ADMIN — Medication 400 MG: at 10:00

## 2018-05-09 RX ADMIN — PANTOPRAZOLE SODIUM 20 MG: 20 TABLET, DELAYED RELEASE ORAL at 05:24

## 2018-05-09 RX ADMIN — METOPROLOL SUCCINATE 100 MG: 100 TABLET, EXTENDED RELEASE ORAL at 10:00

## 2018-05-09 RX ADMIN — TIOTROPIUM BROMIDE 18 MCG: 18 CAPSULE ORAL; RESPIRATORY (INHALATION) at 10:00

## 2018-05-09 RX ADMIN — OXYCODONE HYDROCHLORIDE 5 MG: 5 TABLET ORAL at 05:27

## 2018-05-09 RX ADMIN — METOPROLOL SUCCINATE 100 MG: 100 TABLET, EXTENDED RELEASE ORAL at 18:25

## 2018-05-09 RX ADMIN — LISINOPRIL 10 MG: 10 TABLET ORAL at 10:00

## 2018-05-09 RX ADMIN — APIXABAN 5 MG: 5 TABLET, FILM COATED ORAL at 10:00

## 2018-05-09 RX ADMIN — Medication 400 MG: at 18:25

## 2018-05-09 RX ADMIN — METFORMIN HYDROCHLORIDE 500 MG: 500 TABLET, FILM COATED ORAL at 16:48

## 2018-05-09 RX ADMIN — ATORVASTATIN CALCIUM 40 MG: 40 TABLET, FILM COATED ORAL at 16:49

## 2018-05-09 RX ADMIN — OXYCODONE HYDROCHLORIDE 5 MG: 5 TABLET ORAL at 18:25

## 2018-05-09 NOTE — INCIDENTAL FINDINGS
The following findings require follow up:  Radiographic finding   Finding:  Pulmonary nodule   Follow up required:  CT chest   Follow up should be done within 1 year    Please notify the following clinician to assist with the follow up:   Family doctor

## 2018-05-09 NOTE — DISCHARGE INSTR - APPOINTMENTS
YOU HAVE AN APPOINTMENT WIOTH DR RESENDIZ TOMORROW  PM IF YOU CANNOT MAKE THIS APPOINTMENT CALL AND CANCEL

## 2018-05-09 NOTE — SOCIAL WORK
Cm spoke with the patient and they are for d/c to home today  Cm is taking into account that the patient has preferences while planning the d/c needs  Cm plan was discussed with the patient and the patient is agreeable to the plan the patient does understand the importance of follow up care and taking the medications as prescribed  The patient is aware of any symptoms that he should look for when d/c  The patient will need a sleep study so that he can get a new c pap and I spoke with Tristan Shabazz in the sleep study lab and she will come up and talk to the patient he already has a cardiology appointment for may 12th  I called the pcp Dr Angie Hughes and I left a message to schedule an appointment for the patient

## 2018-05-09 NOTE — PHYSICAL THERAPY NOTE
PT treatment Note      05/09/18 0818   Restrictions/Precautions   Other Precautions Contact/isolation; Chair Alarm;Multiple lines;O2;Telemetry; Fall Risk   Bed Mobility   Supine to Sit 5  Supervision   Additional items Bedrails;Verbal cues   Transfers   Sit to Stand 5  Supervision   Additional items Verbal cues   Stand to Sit 5  Supervision   Additional items Verbal cues   Stand pivot 5  Supervision   Ambulation/Elevation   Gait pattern (Narrow BRAULIO; Short stride; Foward flexed)   Gait Assistance 5  Supervision   Assistive Device Rolling walker   Distance 60' SpO2 97-92%, HR 74-87 mild SOB on 2 L   Balance   Static Sitting Good   Dynamic Sitting Good   Static Standing Fair +   Dynamic Standing Fair +   Ambulatory Fair   Endurance Deficit   Endurance Deficit Yes   Activity Tolerance   Activity Tolerance Patient limited by fatigue   Assessment   Prognosis Good   Problem List Decreased strength;Decreased endurance; Impaired balance;Decreased mobility   Assessment Supervison to ambulaate  HR did not change significantly with ambulation  Decreased endurance  Plan   Treatment/Interventions Functional transfer training;LE strengthening/ROM; Therapeutic exercise; Endurance training;Bed mobility;Gait training   Progress Progressing toward goals   Recommendation   Recommendation Home independently;Home PT   Pt  OOB with call bell within reach, scd's connected and turned on and alarm on at end of PT session

## 2018-05-09 NOTE — PROGRESS NOTES
Consult received for diet education on low sodium diabetic diet  Patient refused diet education but did accept printed handouts  Since admission patient has been on regular diet with double portions  He does not plan on following diet at home  He is being discharged today

## 2018-05-09 NOTE — PROGRESS NOTES
Cardiology Progress Note - Negro Tomas 74 y o  male MRN: [id number]    Unit/Bed#:  Encounter: [id number]    Assesment/ Plan:  1  Atrial fibrillation with rapid ventricular response  2  Coronary artery disease stable without angina  3   COPD without acute exacerbation  4  Hypertension  5  Hyperlipidemia  6  Chronic pain on medical marijuana     Recommendations:   Change Cardizem to  daily  AFib rates are much better controlled  Metoprolol was changed to XL will make it 100 mg b i d   continue anticoagulation with Eliquis  Needs to get back on CPAP at home  Ejection fraction has been improving  Continue ACE-inhibitor  No signs of volume overload  Coronary disease stable without angina continue no recent coronary stenting discontinue Plavix will remain on aspirin 81 mg and Eliquis  Subjective:    No significant events overnight  Feels well heart rate much better controlled  Denies chest pain    ROS    Objective:   Vitals: Blood pressure 140/82, pulse 75, temperature (!) 96 8 °F (36 °C), temperature source Temporal, resp  rate (!) 24, height 5' 10" (1 778 m), weight 85 5 kg (188 lb 7 9 oz), SpO2 99 %  , Body mass index is 27 05 kg/m² , Orthostatic Blood Pressures      Most Recent Value   Blood Pressure  140/82 filed at 05/09/2018 0727   Patient Position - Orthostatic VS  Sitting filed at 05/09/2018 2183         Systolic (11IMJ), VYO:996 , Min:94 , WKN:993     Diastolic (31LBO), NYX:55, Min:50, Max:125      Intake/Output Summary (Last 24 hours) at 05/09/18 0858  Last data filed at 05/09/18 0501   Gross per 24 hour   Intake              480 ml   Output             1220 ml   Net             -740 ml     Weight (last 2 days)     Date/Time   Weight    05/09/18 0535  85 5 (188 49)    05/08/18 0442  85 3 (188 05)    05/07/18 1236  84 7 (186 73)    05/07/18 0553  84 7 (186 73)    05/07/18 0522  84 7 (186 73)                Telemetry Review: No significant arrhythmias seen on telemetry review  EKG personally reviewed by Skyla Bajwa DO  Physical Exam   Constitutional: He is oriented to person, place, and time  He appears well-nourished  No distress  HENT:   Head: Atraumatic  Eyes: Conjunctivae are normal  Pupils are equal, round, and reactive to light  Neck: Neck supple  Cardiovascular: Normal rate and S1 normal   An irregularly irregular rhythm present  Exam reveals no friction rub  Murmur heard  Systolic murmur is present with a grade of 1/6   Pulmonary/Chest: Effort normal and breath sounds normal  No respiratory distress  He has no wheezes  He has no rales  Abdominal: Bowel sounds are normal  He exhibits no distension  There is no tenderness  There is no rebound  Musculoskeletal: He exhibits no edema  Neurological: He is alert and oriented to person, place, and time  No cranial nerve deficit  Skin: Skin is warm and dry  No erythema  Nursing note and vitals reviewed          Laboratory Results:    Results from last 7 days  Lab Units 05/07/18 0515 05/07/18 0056 05/06/18 2016   CK TOTAL U/L 24*  --   --    TROPONIN I ng/mL 0 02 0 02 <0 02       CBC with diff:   Results from last 7 days  Lab Units 05/09/18 0536 05/08/18 0442 05/07/18  0515 05/06/18 2016   WBC Thousand/uL 11 18* 12 42* 11 99* 13 91*   HEMOGLOBIN g/dL 12 3 11 2* 10 8* 12 4   HEMATOCRIT % 38 3 35 2* 34 3* 38 8   MCV fL 73* 73* 74* 73*   PLATELETS Thousands/uL 258 262 264 343   MCH pg 23 3* 23 2* 23 3* 23 3*   MCHC g/dL 32 1 31 8 31 5 32 0   RDW % 20 0* 19 4* 19 3* 19 5*   MPV fL 9 2 9 7 9 2 8 9         CMP:  Results from last 7 days  Lab Units 05/09/18 0536 05/08/18  0442 05/07/18  0515 05/06/18 2016   SODIUM mmol/L 138 142 144 144   POTASSIUM mmol/L 4 2 4 2 3 3* 3 2*   CHLORIDE mmol/L 102 106 109* 108   CO2 mmol/L 31 31 29 30   ANION GAP mmol/L 5 5 6 6   BUN mg/dL 16 14 11 12   CREATININE mg/dL 0 85 0 88 0 83 0 85   GLUCOSE RANDOM mg/dL 139 123 148* 123   CALCIUM mg/dL 8 9 8 3 8 1* 9 2   AST U/L --   --  14  --    ALT U/L  --   --  31  --    ALK PHOS U/L  --   --  62  --    TOTAL PROTEIN g/dL  --   --  4 9*  --    BILIRUBIN TOTAL mg/dL  --   --  0 30  --    EGFR ml/min/1 73sq m 85 84 86 85         BMP:  Results from last 7 days  Lab Units 1836 18   SODIUM mmol/L 138 142 144 144   POTASSIUM mmol/L 4 2 4 2 3 3* 3 2*   CHLORIDE mmol/L 102 106 109* 108   CO2 mmol/L 31 31 29 30   BUN mg/dL 16 14 11 12   CREATININE mg/dL 0 85 0 88 0 83 0 85   GLUCOSE RANDOM mg/dL 139 123 148* 123   CALCIUM mg/dL 8 9 8 3 8 1* 9 2       BNP: No results for input(s): BNP in the last 72 hours      Magnesium:   Results from last 7 days  Lab Units 18   MAGNESIUM mg/dL 1 7 2 1 1 7       Coags:   Results from last 7 days  Lab Units 18   INR  1 16       TSH:        Hemoglobin A1C   Results from last 7 days  Lab Units 18   HEMOGLOBIN A1C % 7 0*       Lipid Profile:       Cardiac testing:   Results for orders placed during the hospital encounter of 18   Echo complete with contrast if indicated    Narrative [address] [facility] [address] [facility] [address], [facility] [address]  [phone]    Transthoracic Echocardiogram  2D, M-mode, Doppler, and Color Doppler    Study date:  07-May-2018    Patient: Yousuf Child  MR number: [id number]  Account number: [de-identified]  : 1945  Age: 74 years  Gender: Male  Status: Inpatient  Location: Bedside  Height: 70 in  Weight: 186 lb  BP: 150/ 90 mmHg    Indications: atrial fibrillation    Diagnoses: J98 9 - Respiratory disorder, unspecified    Sonographer:  NITHIN Rice  Primary Physician:  Samantha Montelongo III, DO  Referring Physician:  Quinten Medeiros DO  Group:  [facility]  Interpreting Physician:  Eladia Quick MD    SUMMARY    LEFT VENTRICLE:  Size was normal   Systolic function was at the lower limits of normal  Ejection fraction was estimated to be 50 %  There was mild diffuse hypokinesis  Wall thickness was mildly increased  There was mild concentric hypertrophy  LEFT ATRIUM:  The atrium was mildly dilated  MITRAL VALVE:  There was mild annular calcification  There was mild to moderate regurgitation  TRICUSPID VALVE:  There was mild regurgitation  Estimated peak PA pressure was 30 mmHg  PROCEDURE: The procedure was performed at the bedside  This was a routine study  The transthoracic approach was used  The study included complete 2D imaging, M-mode, complete spectral Doppler, and color Doppler  The heart rate was 109 bpm,  at the start of the study  Image quality was adequate  LEFT VENTRICLE: Size was normal  Systolic function was at the lower limits of normal  Ejection fraction was estimated to be 50 %  There was mild diffuse hypokinesis  Wall thickness was mildly increased  There was mild concentric  hypertrophy  RIGHT VENTRICLE: The size was normal  Systolic function was normal  Wall thickness was normal     LEFT ATRIUM: The atrium was mildly dilated  RIGHT ATRIUM: Size was normal     MITRAL VALVE: There was mild annular calcification  DOPPLER: There was mild to moderate regurgitation  AORTIC VALVE: The valve was trileaflet  Leaflets exhibited normal thickness and normal cuspal separation  DOPPLER: Transaortic velocity was within the normal range  There was no evidence for stenosis  There was no regurgitation  TRICUSPID VALVE: DOPPLER: There was mild regurgitation  Estimated peak PA pressure was 30 mmHg  PULMONIC VALVE: Not well visualized  PERICARDIUM: There was no pericardial effusion  The pericardium was normal in appearance  AORTA: The root exhibited normal size      SYSTEM MEASUREMENT TABLES    2D  Ao Diam: 3 14 cm  IVSd: 1 34 cm  LA Diam: 4 41 cm  LVIDd: 4 89 cm  LVIDs: 3 63 cm  LVPWd: 1 07 cm    CW  TR Vmax: 2 51 m/s  TR maxP 2 mmHg    [employer] Accredited Echocardiography Laboratory    Prepared and electronically signed by    Hedy Rich MD  Signed 07-May-2018 12:02:01           Results for orders placed in visit on 02/25/14   NM myocardial perfusion spect (stress and/or rest)    Narrative This stress test was performed by a cardiologist and the    cardiologist will render the interpretation              AMAYA Frederick System Coordinator        Reading Radiologist- AMAYA Bertrand MD        Releasing Radiologist- AMAYA Bertrand MD        Released Date Time- 02/26/14 1017      ------------------------------------------------------------------------------   [place]        Meds/Allergies   all current active meds have been reviewed  Prescriptions Prior to Admission   Medication    apixaban (ELIQUIS) 5 mg    aspirin 81 MG tablet    atorvastatin (LIPITOR) 40 mg tablet    cloNIDine (CATAPRES) 0 1 mg tablet    clopidogrel (PLAVIX) 75 mg tablet    diltiazem (CARDIZEM CD) 120 mg 24 hr capsule    DULoxetine (CYMBALTA) 60 mg delayed release capsule    esomeprazole (NexIUM) 40 MG capsule    Melatonin 3 MG CAPS    metoprolol tartrate (LOPRESSOR) 100 mg tablet    nicotine (NICODERM CQ) 7 mg/24hr TD 24 hr patch    predniSONE 10 mg tablet    pregabalin (LYRICA) 300 MG capsule    ranolazine (RANEXA) 500 mg 12 hr tablet    Tamsulosin HCl (FLOMAX PO)    tiotropium (SPIRIVA) 18 mcg inhalation capsule          Assessment:  Principal Problem:    Atrial fibrillation with rapid ventricular response (HCC)  Active Problems:    Hypokalemia    Hyperlipidemia    Sepsis ([facility] [address] )    Pulmonary nodule    Marijuana abuse    Healthcare-associated pneumonia    Decreased left ventricular systolic function    Type 2 diabetes mellitus without complication, without long-term current use of insulin ([facility] [address] )

## 2018-05-09 NOTE — SPEECH THERAPY NOTE
Speech Language/Pathology    Patient does not want to follow strategies for dysphagia  Patient being discharged today

## 2018-05-09 NOTE — DISCHARGE SUMMARY
Discharge Summary - Ofelia Hoffman 74 y o  male MRN: [id number]    Unit/Bed#:  Encounter: [id number]    Admission Date:   Admission Orders     Ordered        05/06/18 2107  Inpatient Admission  Once               Admitting Diagnosis: Hypokalemia [E87 6]  Chest pain [R07 9]  Healthcare-associated pneumonia [J18 9]  Atrial fibrillation with rapid ventricular response ([address] ) [I48 91]    HPI: Ofelia Hoffman is a 74 y o  male who presents to the emergency department with the complaints of chest pain and shortness of breath  The patient's symptomatology has been ongoing over the last 4-5 days  The patient has been experiencing episodic substernal chest pain described as a pressure-type of sensation  The chest pain worsened with exertion  The chest pain was non-radiating in nature  The patient also has been experiencing shortness of breath associated with the chest pain  The shortness of breath worsened with any type of exertion or movement and only partially improved with rest   In addition, the patient has been experiencing gait instability and multiple falls at home  The patient complains of feeling lightheadedness and off-balance during ambulation  Upon arrival to the emergency department, the patient was found to be in rapid atrial fibrillation        Procedures Performed:   Orders Placed This Encounter   Procedures    ED ECG Documentation Only       Summary of Hospital Course:     Sepsis / Healthcare associated pneumonia:  Patient did not have healthcare associated pneumonia  He was placed vancomycin and cefepime and a CT of the chest was obtained  However this did not reveal any infiltrate  Procalcitonin level was less than 0 05 and antibiotics were discontinued  Atrial fibrillation with rapid ventricular response:  Patient was admitted to intensive care unit for titrated will Cardizem drip  Eliquis was continued  He was started on metoprolol 100 mg p  O  b i d    He is placed on 30 mg of Cardizem every 6 hours but reverted back to rapid ventricular response later in the evening  He was increased to 60 mg every 6 hours and a cardiology consult was obtained to assess if cardioversion was warranted  He tolerated the 240 mg p o  dose of Cardizem daily and no immediate need for cardioversion  He will follow up with his primary cardiologist in 1 month  A repeat echocardiogram showed improvement of ejection fraction from 40  previously to-50% now    Significant Findings, Care, Treatment and Services Provided:   CTA Chest:   No acute pathology   Stable small bilateral pleural effusions, with mild bibasilar compressive atelectasis  Unchanged biapical nodules, and left upper lobe scarring or groundglass lesion   Suggest continued annual surveillance to ensure stability   COPD  Complications: none    Discharge Diagnosis: see above    Resolved Problems  Date Reviewed: 5/7/2018    None          Condition at Discharge: good         Discharge instructions/Information to patient and family:   See after visit summary for information provided to patient and family  Provisions for Follow-Up Care:  See after visit summary for information related to follow-up care and any pertinent home health orders  PCP: Jillian December, DO    Disposition: Home    Planned Readmission: No    Discharge Statement   I spent 60 minutes discharging the patient  This time was spent on the day of discharge  I had direct contact with the patient on the day of discharge  Additional documentation is required if more than 30 minutes were spent on discharge  Discharge Medications:  See after visit summary for reconciled discharge medications provided to patient and family

## 2018-05-09 NOTE — SOCIAL WORK
Case management met with the patient to assess the prior level of function and form a safe d/c plan  The patient was given and I reviewed the case management  discharge checklist with the patient  The patient is aware that the d/c planning starts on the day of admission and that if he  has any questions or needs they  is to contact case management  The patient is at home and he lives in a multi story home with yao mendez and he has his own area that he lives in  If he enters the home via back there is no steps but he states that in the front there is16 steps in the front  He uses the back entrance  The patient a cane and a walker a jaci and an electric w/c  He has a 4 wheeled walker and he uses all them as needed and it depends on how he feels  He has o2 from [employer] and a neb and he uses 4l[employer]   The patient may need a sleep study on d/c as he will need a new cpap  Harmony Hirsch drives him to appointments and his pcp is Alyssa Coburn and he uses [facility] in [place]

## 2018-05-12 LAB
BACTERIA BLD CULT: NORMAL
BACTERIA BLD CULT: NORMAL

## 2018-05-18 ENCOUNTER — OFFICE VISIT (OUTPATIENT)
Dept: CARDIOLOGY CLINIC | Facility: HOSPITAL | Age: 73
End: 2018-05-18
Payer: COMMERCIAL

## 2018-05-18 ENCOUNTER — TELEPHONE (OUTPATIENT)
Dept: SLEEP CENTER | Facility: CLINIC | Age: 73
End: 2018-05-18

## 2018-05-18 VITALS
HEIGHT: 69 IN | HEART RATE: 95 BPM | WEIGHT: 172 LBS | SYSTOLIC BLOOD PRESSURE: 140 MMHG | DIASTOLIC BLOOD PRESSURE: 82 MMHG | BODY MASS INDEX: 25.48 KG/M2

## 2018-05-18 DIAGNOSIS — I48.19 PERSISTENT ATRIAL FIBRILLATION (HCC): Primary | ICD-10-CM

## 2018-05-18 DIAGNOSIS — R00.0 DILATED CARDIOMYOPATHY SECONDARY TO TACHYCARDIA (HCC): ICD-10-CM

## 2018-05-18 DIAGNOSIS — E78.5 DYSLIPIDEMIA: ICD-10-CM

## 2018-05-18 DIAGNOSIS — I43 DILATED CARDIOMYOPATHY SECONDARY TO TACHYCARDIA (HCC): ICD-10-CM

## 2018-05-18 DIAGNOSIS — I25.10 CORONARY ARTERY DISEASE INVOLVING NATIVE CORONARY ARTERY OF NATIVE HEART WITHOUT ANGINA PECTORIS: ICD-10-CM

## 2018-05-18 DIAGNOSIS — Z72.0 TOBACCO ABUSE: ICD-10-CM

## 2018-05-18 DIAGNOSIS — I10 BENIGN ESSENTIAL HYPERTENSION: ICD-10-CM

## 2018-05-18 PROCEDURE — 99214 OFFICE O/P EST MOD 30 MIN: CPT | Performed by: INTERNAL MEDICINE

## 2018-05-18 PROCEDURE — 1111F DSCHRG MED/CURRENT MED MERGE: CPT | Performed by: INTERNAL MEDICINE

## 2018-05-18 PROCEDURE — 93000 ELECTROCARDIOGRAM COMPLETE: CPT | Performed by: INTERNAL MEDICINE

## 2018-05-18 PROCEDURE — 4010F ACE/ARB THERAPY RXD/TAKEN: CPT | Performed by: FAMILY MEDICINE

## 2018-05-18 RX ORDER — LISINOPRIL 20 MG/1
20 TABLET ORAL DAILY
Qty: 90 TABLET | Refills: 3 | Status: SHIPPED | OUTPATIENT
Start: 2018-05-18 | End: 2018-12-26 | Stop reason: SDUPTHER
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
## 2018-05-20 ENCOUNTER — HOSPITAL ENCOUNTER (OUTPATIENT)
Dept: SLEEP CENTER | Facility: HOSPITAL | Age: 73
Discharge: HOME | End: 2018-05-20
Attending: FAMILY MEDICINE

## 2018-05-20 DIAGNOSIS — I48.91 ATRIAL FIBRILLATION WITH RAPID VENTRICULAR RESPONSE (HCC): ICD-10-CM

## 2018-05-23 LAB
ATRIAL RATE: 96 BPM
QRS AXIS: 90 DEGREES
QRSD INTERVAL: 78 MS
QT INTERVAL: 368 MS
QTC INTERVAL: 462 MS
T WAVE AXIS: 72 DEGREES
VENTRICULAR RATE: 95 BPM

## 2018-06-08 ENCOUNTER — OFFICE VISIT (OUTPATIENT)
Dept: OBGYN CLINIC | Facility: CLINIC | Age: 73
End: 2018-06-08
Payer: COMMERCIAL

## 2018-06-08 VITALS
HEIGHT: 69 IN | HEART RATE: 70 BPM | BODY MASS INDEX: 25.52 KG/M2 | WEIGHT: 172.3 LBS | RESPIRATION RATE: 14 BRPM | SYSTOLIC BLOOD PRESSURE: 175 MMHG | DIASTOLIC BLOOD PRESSURE: 84 MMHG

## 2018-06-08 DIAGNOSIS — M75.41 IMPINGEMENT SYNDROME OF RIGHT SHOULDER: Primary | ICD-10-CM

## 2018-06-08 DIAGNOSIS — M54.40 BILATERAL LOW BACK PAIN WITH SCIATICA, SCIATICA LATERALITY UNSPECIFIED, UNSPECIFIED CHRONICITY: ICD-10-CM

## 2018-06-08 PROCEDURE — 20610 DRAIN/INJ JOINT/BURSA W/O US: CPT | Performed by: ORTHOPAEDIC SURGERY

## 2018-06-08 PROCEDURE — 99213 OFFICE O/P EST LOW 20 MIN: CPT | Performed by: ORTHOPAEDIC SURGERY

## 2018-06-08 RX ORDER — BUPIVACAINE HYDROCHLORIDE 2.5 MG/ML
4 INJECTION, SOLUTION INFILTRATION; PERINEURAL
Status: COMPLETED | OUTPATIENT
Start: 2018-06-08 | End: 2018-06-08

## 2018-06-08 RX ORDER — BETAMETHASONE SODIUM PHOSPHATE AND BETAMETHASONE ACETATE 3; 3 MG/ML; MG/ML
12 INJECTION, SUSPENSION INTRA-ARTICULAR; INTRALESIONAL; INTRAMUSCULAR; SOFT TISSUE
Status: COMPLETED | OUTPATIENT
Start: 2018-06-08 | End: 2018-06-08

## 2018-06-08 RX ADMIN — BUPIVACAINE HYDROCHLORIDE 4 ML: 2.5 INJECTION, SOLUTION INFILTRATION; PERINEURAL at 12:06

## 2018-06-08 RX ADMIN — BETAMETHASONE SODIUM PHOSPHATE AND BETAMETHASONE ACETATE 12 MG: 3; 3 INJECTION, SUSPENSION INTRA-ARTICULAR; INTRALESIONAL; INTRAMUSCULAR; SOFT TISSUE at 12:06
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
## 2018-06-12 ENCOUNTER — HOSPITAL ENCOUNTER (OUTPATIENT)
Dept: PULMONOLOGY | Facility: HOSPITAL | Age: 73
Discharge: HOME | End: 2018-06-12
Attending: INTERNAL MEDICINE
Payer: COMMERCIAL

## 2018-06-12 DIAGNOSIS — J44.1 CHRONIC OBSTRUCTIVE PULMONARY DISEASE WITH ACUTE EXACERBATION (HCC): ICD-10-CM

## 2018-06-12 PROCEDURE — 94729 DIFFUSING CAPACITY: CPT

## 2018-06-12 PROCEDURE — 94060 EVALUATION OF WHEEZING: CPT

## 2018-06-12 PROCEDURE — 94060 EVALUATION OF WHEEZING: CPT | Performed by: INTERNAL MEDICINE

## 2018-06-12 PROCEDURE — 94727 GAS DIL/WSHOT DETER LNG VOL: CPT

## 2018-06-12 PROCEDURE — 94726 PLETHYSMOGRAPHY LUNG VOLUMES: CPT | Performed by: INTERNAL MEDICINE

## 2018-06-12 PROCEDURE — 94729 DIFFUSING CAPACITY: CPT | Performed by: INTERNAL MEDICINE

## 2018-06-12 PROCEDURE — 94760 N-INVAS EAR/PLS OXIMETRY 1: CPT

## 2018-06-12 RX ORDER — ALBUTEROL SULFATE 2.5 MG/3ML
2.5 SOLUTION RESPIRATORY (INHALATION) ONCE AS NEEDED
Status: COMPLETED | OUTPATIENT
Start: 2018-06-12 | End: 2018-06-12

## 2018-06-12 RX ADMIN — ALBUTEROL SULFATE 2.5 MG: 2.5 SOLUTION RESPIRATORY (INHALATION) at 11:15

## 2018-07-06 ENCOUNTER — APPOINTMENT (EMERGENCY)
Dept: CT IMAGING | Facility: HOSPITAL | Age: 73
DRG: 280 | End: 2018-07-06
Payer: COMMERCIAL

## 2018-07-06 ENCOUNTER — HOSPITAL ENCOUNTER (INPATIENT)
Facility: HOSPITAL | Age: 73
LOS: 8 days | Discharge: HOME HEALTH | DRG: 280 | End: 2018-07-15
Attending: EMERGENCY MEDICINE | Admitting: INTERNAL MEDICINE
Payer: COMMERCIAL

## 2018-07-06 DIAGNOSIS — I10 ESSENTIAL HYPERTENSION: ICD-10-CM

## 2018-07-06 DIAGNOSIS — J44.9 CHRONIC OBSTRUCTIVE PULMONARY DISEASE, UNSPECIFIED COPD TYPE (HCC): ICD-10-CM

## 2018-07-06 DIAGNOSIS — E87.2 LACTIC ACIDOSIS: ICD-10-CM

## 2018-07-06 DIAGNOSIS — D64.9 ANEMIA: ICD-10-CM

## 2018-07-06 DIAGNOSIS — I48.91 ATRIAL FIBRILLATION WITH RAPID VENTRICULAR RESPONSE (HCC): Chronic | ICD-10-CM

## 2018-07-06 DIAGNOSIS — R55 SYNCOPE AND COLLAPSE: Primary | ICD-10-CM

## 2018-07-06 DIAGNOSIS — J96.02 ACUTE RESPIRATORY FAILURE WITH HYPOXIA AND HYPERCAPNIA (HCC): ICD-10-CM

## 2018-07-06 DIAGNOSIS — J90 PLEURAL EFFUSION: ICD-10-CM

## 2018-07-06 DIAGNOSIS — I50.21 ACUTE SYSTOLIC CHF (CONGESTIVE HEART FAILURE) (HCC): ICD-10-CM

## 2018-07-06 DIAGNOSIS — J96.01 ACUTE RESPIRATORY FAILURE WITH HYPOXIA AND HYPERCAPNIA (HCC): ICD-10-CM

## 2018-07-06 DIAGNOSIS — N30.00 ACUTE CYSTITIS WITHOUT HEMATURIA: ICD-10-CM

## 2018-07-06 DIAGNOSIS — R77.8 ELEVATED TROPONIN: ICD-10-CM

## 2018-07-06 DIAGNOSIS — E87.0 HYPERNATREMIA: ICD-10-CM

## 2018-07-06 LAB
ALBUMIN SERPL BCP-MCNC: 3.1 G/DL (ref 3.5–5)
ALP SERPL-CCNC: 80 U/L (ref 46–116)
ALT SERPL W P-5'-P-CCNC: 33 U/L (ref 12–78)
ANION GAP BLD CALC-SCNC: 17 MMOL/L (ref 4–13)
ANION GAP SERPL CALCULATED.3IONS-SCNC: 8 MMOL/L (ref 4–13)
ANISOCYTOSIS BLD QL SMEAR: PRESENT
APTT PPP: 37 SECONDS (ref 24–36)
AST SERPL W P-5'-P-CCNC: 19 U/L (ref 5–45)
BASOPHILS # BLD MANUAL: 0 THOUSAND/UL (ref 0–0.1)
BASOPHILS NFR MAR MANUAL: 0 % (ref 0–1)
BILIRUB SERPL-MCNC: 0.3 MG/DL (ref 0.2–1)
BUN BLD-MCNC: 20 MG/DL (ref 5–25)
BUN SERPL-MCNC: 20 MG/DL (ref 5–25)
CA-I BLD-SCNC: 1.27 MMOL/L (ref 1.12–1.32)
CALCIUM SERPL-MCNC: 9.1 MG/DL (ref 8.3–10.1)
CHLORIDE BLD-SCNC: 105 MMOL/L (ref 100–108)
CHLORIDE SERPL-SCNC: 110 MMOL/L (ref 100–108)
CK SERPL-CCNC: 56 U/L (ref 39–308)
CO2 SERPL-SCNC: 28 MMOL/L (ref 21–32)
CREAT BLD-MCNC: 1.2 MG/DL (ref 0.6–1.3)
CREAT SERPL-MCNC: 1.21 MG/DL (ref 0.6–1.3)
EOSINOPHIL # BLD MANUAL: 0.46 THOUSAND/UL (ref 0–0.4)
EOSINOPHIL NFR BLD MANUAL: 5 % (ref 0–6)
ERYTHROCYTE [DISTWIDTH] IN BLOOD BY AUTOMATED COUNT: 18.2 % (ref 11.6–15.1)
ETHANOL SERPL-MCNC: <3 MG/DL (ref 0–3)
GFR SERPL CREATININE-BSD FRML MDRD: 58 ML/MIN/1.73SQ M
GFR SERPL CREATININE-BSD FRML MDRD: 59 ML/MIN/1.73SQ M
GLUCOSE SERPL-MCNC: 124 MG/DL (ref 65–140)
GLUCOSE SERPL-MCNC: 126 MG/DL (ref 65–140)
HCT VFR BLD AUTO: 34.8 % (ref 36.5–49.3)
HCT VFR BLD CALC: 33 % (ref 36.5–49.3)
HGB BLD-MCNC: 10.9 G/DL (ref 12–17)
HGB BLDA-MCNC: 11.2 G/DL (ref 12–17)
HYPERCHROMIA BLD QL SMEAR: PRESENT
INR PPP: 1.38 (ref 0.86–1.17)
LACTATE SERPL-SCNC: 2.5 MMOL/L (ref 0.5–2)
LIPASE SERPL-CCNC: 97 U/L (ref 73–393)
LYMPHOCYTES # BLD AUTO: 1.48 THOUSAND/UL (ref 0.6–4.47)
LYMPHOCYTES # BLD AUTO: 16 % (ref 14–44)
MCH RBC QN AUTO: 23.1 PG (ref 26.8–34.3)
MCHC RBC AUTO-ENTMCNC: 31.3 G/DL (ref 31.4–37.4)
MCV RBC AUTO: 74 FL (ref 82–98)
MONOCYTES # BLD AUTO: 0.65 THOUSAND/UL (ref 0–1.22)
MONOCYTES NFR BLD: 7 % (ref 4–12)
NEUTROPHILS # BLD MANUAL: 6.65 THOUSAND/UL (ref 1.85–7.62)
NEUTS SEG NFR BLD AUTO: 72 % (ref 43–75)
OVALOCYTES BLD QL SMEAR: PRESENT
PCO2 BLD: 26 MMOL/L (ref 21–32)
PLATELET # BLD AUTO: 308 THOUSANDS/UL (ref 149–390)
PLATELET BLD QL SMEAR: ADEQUATE
PMV BLD AUTO: 9.1 FL (ref 8.9–12.7)
POTASSIUM BLD-SCNC: 3.3 MMOL/L (ref 3.5–5.3)
POTASSIUM SERPL-SCNC: 3.4 MMOL/L (ref 3.5–5.3)
PROT SERPL-MCNC: 5.7 G/DL (ref 6.4–8.2)
PROTHROMBIN TIME: 16.3 SECONDS (ref 11.8–14.2)
RBC # BLD AUTO: 4.72 MILLION/UL (ref 3.88–5.62)
SODIUM BLD-SCNC: 144 MMOL/L (ref 136–145)
SODIUM SERPL-SCNC: 146 MMOL/L (ref 136–145)
SPECIMEN SOURCE: ABNORMAL
TOTAL CELLS COUNTED SPEC: 100
TROPONIN I SERPL-MCNC: <0.02 NG/ML
TSH SERPL DL<=0.05 MIU/L-ACNC: 0.88 UIU/ML (ref 0.36–3.74)
WBC # BLD AUTO: 9.24 THOUSAND/UL (ref 4.31–10.16)

## 2018-07-06 PROCEDURE — 84484 ASSAY OF TROPONIN QUANT: CPT | Performed by: EMERGENCY MEDICINE

## 2018-07-06 PROCEDURE — 70450 CT HEAD/BRAIN W/O DYE: CPT

## 2018-07-06 PROCEDURE — 80320 DRUG SCREEN QUANTALCOHOLS: CPT | Performed by: EMERGENCY MEDICINE

## 2018-07-06 PROCEDURE — 83605 ASSAY OF LACTIC ACID: CPT | Performed by: EMERGENCY MEDICINE

## 2018-07-06 PROCEDURE — 99285 EMERGENCY DEPT VISIT HI MDM: CPT

## 2018-07-06 PROCEDURE — 85007 BL SMEAR W/DIFF WBC COUNT: CPT | Performed by: EMERGENCY MEDICINE

## 2018-07-06 PROCEDURE — 36415 COLL VENOUS BLD VENIPUNCTURE: CPT | Performed by: EMERGENCY MEDICINE

## 2018-07-06 PROCEDURE — 93005 ELECTROCARDIOGRAM TRACING: CPT

## 2018-07-06 PROCEDURE — 85014 HEMATOCRIT: CPT

## 2018-07-06 PROCEDURE — 83605 ASSAY OF LACTIC ACID: CPT | Performed by: PHYSICIAN ASSISTANT

## 2018-07-06 PROCEDURE — 96360 HYDRATION IV INFUSION INIT: CPT

## 2018-07-06 PROCEDURE — 72125 CT NECK SPINE W/O DYE: CPT

## 2018-07-06 PROCEDURE — 82550 ASSAY OF CK (CPK): CPT | Performed by: EMERGENCY MEDICINE

## 2018-07-06 PROCEDURE — 80053 COMPREHEN METABOLIC PANEL: CPT | Performed by: EMERGENCY MEDICINE

## 2018-07-06 PROCEDURE — 84443 ASSAY THYROID STIM HORMONE: CPT | Performed by: EMERGENCY MEDICINE

## 2018-07-06 PROCEDURE — 80047 BASIC METABLC PNL IONIZED CA: CPT

## 2018-07-06 PROCEDURE — 74176 CT ABD & PELVIS W/O CONTRAST: CPT

## 2018-07-06 PROCEDURE — 85027 COMPLETE CBC AUTOMATED: CPT | Performed by: EMERGENCY MEDICINE

## 2018-07-06 PROCEDURE — 71250 CT THORAX DX C-: CPT

## 2018-07-06 PROCEDURE — 83690 ASSAY OF LIPASE: CPT | Performed by: EMERGENCY MEDICINE

## 2018-07-06 PROCEDURE — 85730 THROMBOPLASTIN TIME PARTIAL: CPT | Performed by: EMERGENCY MEDICINE

## 2018-07-06 PROCEDURE — 99219 PR INITIAL OBSERVATION CARE/DAY 50 MINUTES: CPT | Performed by: PHYSICIAN ASSISTANT

## 2018-07-06 PROCEDURE — 85610 PROTHROMBIN TIME: CPT | Performed by: EMERGENCY MEDICINE

## 2018-07-06 RX ORDER — ONDANSETRON 2 MG/ML
4 INJECTION INTRAMUSCULAR; INTRAVENOUS EVERY 6 HOURS PRN
Status: DISCONTINUED | OUTPATIENT
Start: 2018-07-06 | End: 2018-07-15 | Stop reason: HOSPADM

## 2018-07-06 RX ORDER — DILTIAZEM HYDROCHLORIDE 240 MG/1
240 CAPSULE, COATED, EXTENDED RELEASE ORAL DAILY
Status: DISCONTINUED | OUTPATIENT
Start: 2018-07-07 | End: 2018-07-08

## 2018-07-06 RX ORDER — ATORVASTATIN CALCIUM 40 MG/1
40 TABLET, FILM COATED ORAL DAILY
Status: DISCONTINUED | OUTPATIENT
Start: 2018-07-07 | End: 2018-07-15 | Stop reason: HOSPADM

## 2018-07-06 RX ORDER — METOPROLOL SUCCINATE 100 MG/1
100 TABLET, EXTENDED RELEASE ORAL 2 TIMES DAILY
Status: DISCONTINUED | OUTPATIENT
Start: 2018-07-07 | End: 2018-07-15 | Stop reason: HOSPADM

## 2018-07-06 RX ORDER — PANTOPRAZOLE SODIUM 40 MG/1
40 TABLET, DELAYED RELEASE ORAL
Status: DISCONTINUED | OUTPATIENT
Start: 2018-07-07 | End: 2018-07-15 | Stop reason: HOSPADM

## 2018-07-06 RX ORDER — POTASSIUM CHLORIDE 20 MEQ/1
20 TABLET, EXTENDED RELEASE ORAL ONCE
Status: COMPLETED | OUTPATIENT
Start: 2018-07-06 | End: 2018-07-06

## 2018-07-06 RX ORDER — SODIUM CHLORIDE 9 MG/ML
75 INJECTION, SOLUTION INTRAVENOUS CONTINUOUS
Status: DISCONTINUED | OUTPATIENT
Start: 2018-07-06 | End: 2018-07-07

## 2018-07-06 RX ORDER — RANOLAZINE 500 MG/1
500 TABLET, EXTENDED RELEASE ORAL EVERY 12 HOURS SCHEDULED
Status: DISCONTINUED | OUTPATIENT
Start: 2018-07-06 | End: 2018-07-15 | Stop reason: HOSPADM

## 2018-07-06 RX ORDER — ASPIRIN 81 MG/1
81 TABLET, CHEWABLE ORAL DAILY
Status: DISCONTINUED | OUTPATIENT
Start: 2018-07-07 | End: 2018-07-15 | Stop reason: HOSPADM

## 2018-07-06 RX ORDER — PREGABALIN 75 MG/1
300 CAPSULE ORAL 3 TIMES DAILY
Status: DISCONTINUED | OUTPATIENT
Start: 2018-07-06 | End: 2018-07-15 | Stop reason: HOSPADM

## 2018-07-06 RX ORDER — TAMSULOSIN HYDROCHLORIDE 0.4 MG/1
0.4 CAPSULE ORAL DAILY
Status: DISCONTINUED | OUTPATIENT
Start: 2018-07-07 | End: 2018-07-15 | Stop reason: HOSPADM

## 2018-07-06 RX ORDER — LISINOPRIL 20 MG/1
20 TABLET ORAL DAILY
Status: DISCONTINUED | OUTPATIENT
Start: 2018-07-07 | End: 2018-07-15 | Stop reason: HOSPADM

## 2018-07-06 RX ORDER — CLONIDINE HYDROCHLORIDE 0.1 MG/1
0.1 TABLET ORAL DAILY
Status: DISCONTINUED | OUTPATIENT
Start: 2018-07-07 | End: 2018-07-12

## 2018-07-06 RX ADMIN — SODIUM CHLORIDE 75 ML/HR: 0.9 INJECTION, SOLUTION INTRAVENOUS at 23:56

## 2018-07-06 RX ADMIN — SODIUM CHLORIDE 1000 ML: 0.9 INJECTION, SOLUTION INTRAVENOUS at 20:11

## 2018-07-06 RX ADMIN — POTASSIUM CHLORIDE 20 MEQ: 1500 TABLET, EXTENDED RELEASE ORAL at 21:48

## 2018-07-07 ENCOUNTER — APPOINTMENT (OUTPATIENT)
Dept: MRI IMAGING | Facility: HOSPITAL | Age: 73
DRG: 280 | End: 2018-07-07
Payer: COMMERCIAL

## 2018-07-07 PROBLEM — R55 SYNCOPE AND COLLAPSE: Status: ACTIVE | Noted: 2018-07-07

## 2018-07-07 PROBLEM — E87.0 HYPERNATREMIA: Status: ACTIVE | Noted: 2018-07-07

## 2018-07-07 LAB
ALBUMIN SERPL BCP-MCNC: 2.9 G/DL (ref 3.5–5)
ALP SERPL-CCNC: 74 U/L (ref 46–116)
ALT SERPL W P-5'-P-CCNC: 28 U/L (ref 12–78)
ANION GAP SERPL CALCULATED.3IONS-SCNC: 6 MMOL/L (ref 4–13)
APTT PPP: 37 SECONDS (ref 24–36)
AST SERPL W P-5'-P-CCNC: 16 U/L (ref 5–45)
BILIRUB SERPL-MCNC: 0.4 MG/DL (ref 0.2–1)
BUN SERPL-MCNC: 20 MG/DL (ref 5–25)
CALCIUM SERPL-MCNC: 8.9 MG/DL (ref 8.3–10.1)
CHLORIDE SERPL-SCNC: 113 MMOL/L (ref 100–108)
CHOLEST SERPL-MCNC: 93 MG/DL (ref 50–200)
CO2 SERPL-SCNC: 28 MMOL/L (ref 21–32)
CREAT SERPL-MCNC: 1.13 MG/DL (ref 0.6–1.3)
ERYTHROCYTE [DISTWIDTH] IN BLOOD BY AUTOMATED COUNT: 18.5 % (ref 11.6–15.1)
GFR SERPL CREATININE-BSD FRML MDRD: 63 ML/MIN/1.73SQ M
GLUCOSE SERPL-MCNC: 102 MG/DL (ref 65–140)
GLUCOSE SERPL-MCNC: 108 MG/DL (ref 65–140)
GLUCOSE SERPL-MCNC: 142 MG/DL (ref 65–140)
GLUCOSE SERPL-MCNC: 149 MG/DL (ref 65–140)
GLUCOSE SERPL-MCNC: 159 MG/DL (ref 65–140)
GLUCOSE SERPL-MCNC: 174 MG/DL (ref 65–140)
HCT VFR BLD AUTO: 35.7 % (ref 36.5–49.3)
HDLC SERPL-MCNC: 26 MG/DL (ref 40–60)
HGB BLD-MCNC: 10.8 G/DL (ref 12–17)
INR PPP: 1.33 (ref 0.86–1.17)
LACTATE SERPL-SCNC: 1.2 MMOL/L (ref 0.5–2)
LDLC SERPL CALC-MCNC: 52 MG/DL (ref 0–100)
MAGNESIUM SERPL-MCNC: 1.7 MG/DL (ref 1.6–2.6)
MCH RBC QN AUTO: 22.6 PG (ref 26.8–34.3)
MCHC RBC AUTO-ENTMCNC: 30.3 G/DL (ref 31.4–37.4)
MCV RBC AUTO: 75 FL (ref 82–98)
PHOSPHATE SERPL-MCNC: 3.8 MG/DL (ref 2.3–4.1)
PLATELET # BLD AUTO: 308 THOUSANDS/UL (ref 149–390)
PMV BLD AUTO: 9.7 FL (ref 8.9–12.7)
POTASSIUM SERPL-SCNC: 4 MMOL/L (ref 3.5–5.3)
PROT SERPL-MCNC: 5.4 G/DL (ref 6.4–8.2)
PROTHROMBIN TIME: 15.8 SECONDS (ref 11.8–14.2)
RBC # BLD AUTO: 4.77 MILLION/UL (ref 3.88–5.62)
SODIUM SERPL-SCNC: 147 MMOL/L (ref 136–145)
TRIGL SERPL-MCNC: 75 MG/DL
WBC # BLD AUTO: 8.42 THOUSAND/UL (ref 4.31–10.16)

## 2018-07-07 PROCEDURE — 83735 ASSAY OF MAGNESIUM: CPT | Performed by: PHYSICIAN ASSISTANT

## 2018-07-07 PROCEDURE — 97530 THERAPEUTIC ACTIVITIES: CPT | Performed by: PHYSICAL THERAPIST

## 2018-07-07 PROCEDURE — 97163 PT EVAL HIGH COMPLEX 45 MIN: CPT | Performed by: PHYSICAL THERAPIST

## 2018-07-07 PROCEDURE — 70551 MRI BRAIN STEM W/O DYE: CPT

## 2018-07-07 PROCEDURE — 80053 COMPREHEN METABOLIC PANEL: CPT | Performed by: PHYSICIAN ASSISTANT

## 2018-07-07 PROCEDURE — 80061 LIPID PANEL: CPT | Performed by: PHYSICIAN ASSISTANT

## 2018-07-07 PROCEDURE — 82948 REAGENT STRIP/BLOOD GLUCOSE: CPT

## 2018-07-07 PROCEDURE — G8978 MOBILITY CURRENT STATUS: HCPCS | Performed by: PHYSICAL THERAPIST

## 2018-07-07 PROCEDURE — 85610 PROTHROMBIN TIME: CPT | Performed by: PHYSICIAN ASSISTANT

## 2018-07-07 PROCEDURE — G8979 MOBILITY GOAL STATUS: HCPCS | Performed by: PHYSICAL THERAPIST

## 2018-07-07 PROCEDURE — 85027 COMPLETE CBC AUTOMATED: CPT | Performed by: PHYSICIAN ASSISTANT

## 2018-07-07 PROCEDURE — 94760 N-INVAS EAR/PLS OXIMETRY 1: CPT

## 2018-07-07 PROCEDURE — 94640 AIRWAY INHALATION TREATMENT: CPT

## 2018-07-07 PROCEDURE — 99232 SBSQ HOSP IP/OBS MODERATE 35: CPT | Performed by: PHYSICIAN ASSISTANT

## 2018-07-07 PROCEDURE — 84100 ASSAY OF PHOSPHORUS: CPT | Performed by: PHYSICIAN ASSISTANT

## 2018-07-07 PROCEDURE — 85730 THROMBOPLASTIN TIME PARTIAL: CPT | Performed by: PHYSICIAN ASSISTANT

## 2018-07-07 RX ORDER — DIAZEPAM 2 MG/1
2 TABLET ORAL ONCE
Status: COMPLETED | OUTPATIENT
Start: 2018-07-07 | End: 2018-07-07

## 2018-07-07 RX ORDER — DIAZEPAM 5 MG/1
5 TABLET ORAL EVERY 6 HOURS PRN
Status: ON HOLD | COMMUNITY
End: 2018-07-12

## 2018-07-07 RX ORDER — ALBUTEROL SULFATE 2.5 MG/3ML
2.5 SOLUTION RESPIRATORY (INHALATION) EVERY 6 HOURS PRN
Status: DISCONTINUED | OUTPATIENT
Start: 2018-07-07 | End: 2018-07-15 | Stop reason: HOSPADM

## 2018-07-07 RX ORDER — LEVALBUTEROL INHALATION SOLUTION 0.63 MG/3ML
0.63 SOLUTION RESPIRATORY (INHALATION)
Status: DISCONTINUED | OUTPATIENT
Start: 2018-07-08 | End: 2018-07-09

## 2018-07-07 RX ORDER — SODIUM CHLORIDE 450 MG/100ML
75 INJECTION, SOLUTION INTRAVENOUS CONTINUOUS
Status: DISCONTINUED | OUTPATIENT
Start: 2018-07-07 | End: 2018-07-09

## 2018-07-07 RX ADMIN — TIOTROPIUM BROMIDE 18 MCG: 18 CAPSULE ORAL; RESPIRATORY (INHALATION) at 09:55

## 2018-07-07 RX ADMIN — CLONIDINE HYDROCHLORIDE 0.1 MG: 0.1 TABLET ORAL at 09:54

## 2018-07-07 RX ADMIN — NICOTINE 1 PATCH: 7 PATCH, EXTENDED RELEASE TRANSDERMAL at 09:54

## 2018-07-07 RX ADMIN — ALBUTEROL SULFATE 2.5 MG: 2.5 SOLUTION RESPIRATORY (INHALATION) at 22:09

## 2018-07-07 RX ADMIN — ASPIRIN 81 MG 81 MG: 81 TABLET ORAL at 09:54

## 2018-07-07 RX ADMIN — ATORVASTATIN CALCIUM 40 MG: 40 TABLET, FILM COATED ORAL at 09:54

## 2018-07-07 RX ADMIN — PREGABALIN 300 MG: 75 CAPSULE ORAL at 09:53

## 2018-07-07 RX ADMIN — METOPROLOL SUCCINATE 100 MG: 100 TABLET, EXTENDED RELEASE ORAL at 09:54

## 2018-07-07 RX ADMIN — TAMSULOSIN HYDROCHLORIDE 0.4 MG: 0.4 CAPSULE ORAL at 09:54

## 2018-07-07 RX ADMIN — DIAZEPAM 2 MG: 2 TABLET ORAL at 22:35

## 2018-07-07 RX ADMIN — DILTIAZEM HYDROCHLORIDE 240 MG: 240 CAPSULE, COATED, EXTENDED RELEASE ORAL at 09:54

## 2018-07-07 RX ADMIN — INSULIN LISPRO 1 UNITS: 100 INJECTION, SOLUTION INTRAVENOUS; SUBCUTANEOUS at 16:33

## 2018-07-07 RX ADMIN — RANOLAZINE 500 MG: 500 TABLET, FILM COATED, EXTENDED RELEASE ORAL at 21:06

## 2018-07-07 RX ADMIN — APIXABAN 5 MG: 5 TABLET, FILM COATED ORAL at 09:54

## 2018-07-07 RX ADMIN — SODIUM CHLORIDE 75 ML/HR: 0.45 INJECTION, SOLUTION INTRAVENOUS at 23:19

## 2018-07-07 RX ADMIN — LISINOPRIL 20 MG: 20 TABLET ORAL at 09:54

## 2018-07-07 RX ADMIN — INSULIN LISPRO 1 UNITS: 100 INJECTION, SOLUTION INTRAVENOUS; SUBCUTANEOUS at 11:53

## 2018-07-07 RX ADMIN — SODIUM CHLORIDE 75 ML/HR: 0.45 INJECTION, SOLUTION INTRAVENOUS at 09:53

## 2018-07-07 RX ADMIN — PREGABALIN 300 MG: 75 CAPSULE ORAL at 21:06

## 2018-07-07 RX ADMIN — RANOLAZINE 500 MG: 500 TABLET, FILM COATED, EXTENDED RELEASE ORAL at 09:54

## 2018-07-07 RX ADMIN — APIXABAN 5 MG: 5 TABLET, FILM COATED ORAL at 17:52

## 2018-07-07 RX ADMIN — ONDANSETRON 4 MG: 2 INJECTION INTRAMUSCULAR; INTRAVENOUS at 02:10

## 2018-07-07 RX ADMIN — PREGABALIN 300 MG: 75 CAPSULE ORAL at 16:32

## 2018-07-07 RX ADMIN — METOPROLOL SUCCINATE 100 MG: 100 TABLET, EXTENDED RELEASE ORAL at 17:52

## 2018-07-07 NOTE — PROGRESS NOTES
Progress Note - Carol North 1945, 74 y o  male MRN: [id number]    Unit/Bed#: 418-01 Encounter: [id number]    Primary Care Provider: Gina Triana DO   Date and time admitted to hospital: 7/6/2018  7:56 PM        * Syncope and collapse   Assessment & Plan    -reports several episodes  -last episode resulted with hitting head on floor  -Complains of feeling weak prior to episodes  -has hx of CVA with residual right sided   -head CT shows-No acute intracranial abnormality  Microangiopathic changes   -Continue stroke pathway  -Telemetry monitoring  -Neuro checks  -Continue statin, Aspirin  -MRI pending,   -ECHO not availble til Monday  Acute kidney injury [facility])   Assessment & Plan    -patient's creatinine is noted to be within normal range at 1 21 on admission, however his baseline creatinine appears to be about 0 8   - continue IV fluids  -repeat labs in am         Hypokalemia   Assessment & Plan    -Potassium at 3 3 on admission   -resolved with potassium supplementation    -continue to monitor  Hypernatremia   Assessment & Plan    -the patient was noted to have a sodium of 146 which increased to 147 on NS IV fluids   -Likely due to dehydration  -will switch to 1/2 NS IV fluids  -repeat labs in am          Lactic acidosis   Assessment & Plan    -Lactic acid at 2 5  -resolved with IV fluids          COPD (chronic obstructive pulmonary disease) (HCC)   Assessment & Plan    -No respiratory distress or evidence of exacerbation  -Respiratory protocol  -Continue home medication        Type 2 diabetes mellitus without complication, without long-term current use of insulin [facility])   Assessment & Plan    Lab Results   Component Value Date    HGBA1C 7 0 (H) 05/07/2018       Recent Labs      07/07/18   0000  07/07/18   0754  07/07/18   1152   POCGLU  108  142*  159*       -sliding scale insulin, fingerstick glucose 4 times daily AC/HS  -AM CMP        Persistent atrial fibrillation (HCC)   Assessment & Plan    -Rate controlled  -anticoagulated on  eliquis  -Continue home medications        Dyslipidemia   Assessment & Plan    -Continue statins        Tobacco abuse   Assessment & Plan    -Smoking cessation counseling  -Nicotine patch            VTE Pharmacologic Prophylaxis:   Pharmacologic: Apixaban (Eliquis)  Mechanical VTE Prophylaxis in Place: Yes    Patient Centered Rounds: I have performed bedside rounds with nursing staff today  Discussions with Specialists or Other Care Team Provider: Nursing and attending    Education and Discussions with Family / Patient: n/a    Time Spent for Care: 20 minutes  More than 50% of total time spent on counseling and coordination of care as described above  Current Length of Stay: 0 day(s)    Current Patient Status: Inpatient   Certification Statement: The patient, admitted on an observation basis, will now require > 2 midnight hospital stay due to continued monitoring and treatment of acute kidney injury and hypernatremia with IV fluids  Discharge Plan: TBD    Code Status: Level 1 - Full Code      Subjective: The patient was seen and examined  The patient states he still is feeling dizzy/weak  He denies any pain    Objective:     Vitals:   Temp (24hrs), Av 3 °F (36 3 °C), Min:96 7 °F (35 9 °C), Max:97 8 °F (36 6 °C)    HR:  [60-99] 91  Resp:  [18-20] 18  BP: ()/(58-86) 136/62  SpO2:  [94 %-100 %] 98 %  Body mass index is 27 71 kg/m²  Input and Output Summary (last 24 hours): Intake/Output Summary (Last 24 hours) at 18 1435  Last data filed at 18 1301   Gross per 24 hour   Intake           1577 5 ml   Output              550 ml   Net           1027 5 ml       Physical Exam:     Physical Exam   Constitutional: He is oriented to person, place, and time  Vital signs are normal  He appears well-developed and well-nourished  He is active and cooperative  Cardiovascular: Normal rate and regular rhythm      Pulmonary/Chest: Effort normal and breath sounds normal  He has no wheezes  He has no rhonchi  He has no rales  Abdominal: Soft  Normal appearance and bowel sounds are normal  There is no tenderness  Neurological: He is alert and oriented to person, place, and time  No cranial nerve deficit  Skin: Skin is warm, dry and intact  Nursing note and vitals reviewed  Additional Data:     Labs:      Results from last 7 days  Lab Units 07/07/18  0512 07/06/18 2000   WBC Thousand/uL 8 42  --  9 24   HEMOGLOBIN g/dL 10 8*  --  10 9*   I STAT HEMOGLOBIN   --   < >  --    HEMATOCRIT % 35 7*  --  34 8*   PLATELETS Thousands/uL 308  --  308   LYMPHO PCT %  --   --  16   MONO PCT MAN %  --   --  7   EOSINO PCT MANUAL %  --   --  5   < > = values in this interval not displayed  Results from last 7 days  Lab Units 07/07/18  0512   SODIUM mmol/L 147*   POTASSIUM mmol/L 4 0   CHLORIDE mmol/L 113*   CO2 mmol/L 28   BUN mg/dL 20   CREATININE mg/dL 1 13   CALCIUM mg/dL 8 9   TOTAL PROTEIN g/dL 5 4*   BILIRUBIN TOTAL mg/dL 0 40   ALK PHOS U/L 74   ALT U/L 28   AST U/L 16   GLUCOSE RANDOM mg/dL 102       Results from last 7 days  Lab Units 07/07/18  0512   INR  1 33*       Results from last 7 days  Lab Units 07/07/18  1152 07/07/18  0754 07/07/18  0000   POC GLUCOSE mg/dl 159* 142* 108             * I Have Reviewed All Lab Data Listed Above  * Additional Pertinent Lab Tests Reviewed:  All Labs Within Last 24 Hours Reviewed    Imaging:    Imaging Reports Reviewed Today Include: CT head  Imaging Personally Reviewed by Myself Includes:  none    Recent Cultures (last 7 days):           Last 24 Hours Medication List:     Current Facility-Administered Medications:  albuterol 2 5 mg Nebulization Q6H PRN Darrin Quintanilla MD    apixaban 5 mg Oral BID Eyal Yang PA-C    aspirin 81 mg Oral Daily Eyal Yang PA-C    atorvastatin 40 mg Oral Daily Eyal Yang PA-C    cloNIDine 0 1 mg Oral Daily Eyal Yang PA-C    diltiazem 240 mg Oral Daily Eyal Yang PA-C    insulin lispro 1-6 Units Subcutaneous TID AC Eyal Yang PA-C    insulin lispro 1-6 Units Subcutaneous HS Eyal Yang PA-C    lisinopril 20 mg Oral Daily Eyal Yang PA-C    metoprolol succinate 100 mg Oral BID Eyal Yang PA-C    nicotine 1 patch Transdermal Daily Eyal Yang PA-C    ondansetron 4 mg Intravenous Q6H PRN Eyal Yang PA-C    pantoprazole 40 mg Oral Early Morning Eyal Yang PA-C    pregabalin 300 mg Oral TID Eyal Yang PA-C    ranolazine 500 mg Oral Q12H [facility] Eyal Yang PA-C    sodium chloride 75 mL/hr Intravenous Continuous Nelson Banks PA-C Last Rate: 75 mL/hr (07/07/18 0953)   tamsulosin 0 4 mg Oral Daily Eyal Yang PA-C    tiotropium 18 mcg Inhalation Daily Eyal Yang PA-C         Today, Patient Was Seen By: Nelson Banks PA-C    ** Please Note: Dictation voice to text software may have been used in the creation of this document   **

## 2018-07-07 NOTE — PROGRESS NOTES
NIH stroke scale performed to the best of my ability due to the patient being very fatigued, drowsy and unable to cooperate  Notified Whitney CRAIG that the patient was also unable to swallow 3 oz of water and regurgitated the fluid as well and will be made NPO at this time

## 2018-07-07 NOTE — ASSESSMENT & PLAN NOTE
-reports several episodes  -last episode resulted with hitting head on floor  -Complains of feeling weak prior to episodes  -has hx of CVA with residual right sided   -head CT shows-No acute intracranial abnormality  Microangiopathic changes   -Continue stroke pathway  -Telemetry monitoring  -Neuro checks  -Continue statin, Aspirin  -MRI pending,   -ECHO not availble til Monday

## 2018-07-07 NOTE — ASSESSMENT & PLAN NOTE
Lab Results   Component Value Date    HGBA1C 7 0 (H) 05/07/2018       Recent Labs      07/07/18   0000  07/07/18   0754  07/07/18   1152   POCGLU  108  142*  159*       -sliding scale insulin, fingerstick glucose 4 times daily AC/HS  -AM CMP

## 2018-07-07 NOTE — PLAN OF CARE
Problem: Potential for Falls  Goal: Patient will remain free of falls  INTERVENTIONS:  - Assess patient frequently for physical needs  -  Identify cognitive and physical deficits and behaviors that affect risk of falls    -  [place] fall precautions as indicated by assessment   - Educate patient/family on patient safety including physical limitations  - Instruct patient to call for assistance with activity based on assessment  - Modify environment to reduce risk of injury  - Consider OT/PT consult to assist with strengthening/mobility   Outcome: Progressing      Problem: Prexisting or High Potential for Compromised Skin Integrity  Goal: Skin integrity is maintained or improved  INTERVENTIONS:  - Identify patients at risk for skin breakdown  - Assess and monitor skin integrity  - Assess and monitor nutrition and hydration status  - Monitor labs (i e  albumin)  - Assess for incontinence   - Turn and reposition patient  - Assist with mobility/ambulation  - Relieve pressure over bony prominences  - Avoid friction and shearing  - Provide appropriate hygiene as needed including keeping skin clean and dry  - Evaluate need for skin moisturizer/barrier cream  - Collaborate with interdisciplinary team (i e  Nutrition, Rehabilitation, etc )   - Patient/family teaching   Outcome: Progressing      Problem: PAIN - ADULT  Goal: Verbalizes/displays adequate comfort level or baseline comfort level  Interventions:  - Encourage patient to monitor pain and request assistance  - Assess pain using appropriate pain scale  - Administer analgesics based on type and severity of pain and evaluate response  - Implement non-pharmacological measures as appropriate and evaluate response  - Consider cultural and social influences on pain and pain management  - Notify physician/advanced practitioner if interventions unsuccessful or patient reports new pain  Outcome: Progressing      Problem: INFECTION - ADULT  Goal: Absence or prevention of progression during hospitalization  INTERVENTIONS:  - Assess and monitor for signs and symptoms of infection  - Monitor lab/diagnostic results  - Monitor all insertion sites, i e  indwelling lines, tubes, and drains  - Monitor endotracheal (as able) and nasal secretions for changes in amount and color  - [place] appropriate cooling/warming therapies per order  - Administer medications as ordered  - Instruct and encourage patient and family to use good hand hygiene technique  - Identify and instruct in appropriate isolation precautions for identified infection/condition  Outcome: Progressing    Goal: Absence of fever/infection during neutropenic period  INTERVENTIONS:  - Monitor WBC  - Implement neutropenic guidelines  Outcome: Progressing      Problem: SAFETY ADULT  Goal: Maintain or return to baseline ADL function  INTERVENTIONS:  -  Assess patient's ability to carry out ADLs; assess patient's baseline for ADL function and identify physical deficits which impact ability to perform ADLs (bathing, care of mouth/teeth, toileting, grooming, dressing, etc )  - Assess/evaluate cause of self-care deficits   - Assess range of motion  - Assess patient's mobility; develop plan if impaired  - Assess patient's need for assistive devices and provide as appropriate  - Encourage maximum independence but intervene and supervise when necessary  ¯ Involve family in performance of ADLs  ¯ Assess for home care needs following discharge   ¯ Request OT consult to assist with ADL evaluation and planning for discharge  ¯ Provide patient education as appropriate  Outcome: Progressing    Goal: Maintain or return mobility status to optimal level  INTERVENTIONS:  - Assess patient's baseline mobility status (ambulation, transfers, stairs, etc )    - Identify cognitive and physical deficits and behaviors that affect mobility  - Identify mobility aids required to assist with transfers and/or ambulation (gait belt, sit-to-stand, lift, walker, cane, etc )  - [place] fall precautions as indicated by assessment  - Record patient progress and toleration of activity level on Mobility SBAR; progress patient to next Phase/Stage  - Instruct patient to call for assistance with activity based on assessment  - Request Rehabilitation consult to assist with strengthening/weightbearing, etc   Outcome: Progressing      Problem: DISCHARGE PLANNING  Goal: Discharge to home or other facility with appropriate resources  INTERVENTIONS:  - Identify barriers to discharge w/patient and caregiver  - Arrange for needed discharge resources and transportation as appropriate  - Identify discharge learning needs (meds, wound care, etc )  - Arrange for interpretive services to assist at discharge as needed  - Refer to Case Management Department for coordinating discharge planning if the patient needs post-hospital services based on physician/advanced practitioner order or complex needs related to functional status, cognitive ability, or social support system  Outcome: Progressing      Problem: Knowledge Deficit  Goal: Patient/family/caregiver demonstrates understanding of disease process, treatment plan, medications, and discharge instructions  Complete learning assessment and assess knowledge base    Interventions:  - Provide teaching at level of understanding  - Provide teaching via preferred learning methods  Outcome: Progressing

## 2018-07-07 NOTE — ASSESSMENT & PLAN NOTE
-No respiratory distress or evidence of exacerbation  -Respiratory protocol  -Continue home medication

## 2018-07-07 NOTE — ASSESSMENT & PLAN NOTE
-reports several episodes today  -last episode resulted with hitting head on floor  -Complains of feeling weak prior to episodes  -has hx of CVA with residual right sided   -head CT shows-No acute intracranial abnormality   Microangiopathic changes   -admit on observation  -Continue stroke pathway  -Telemetry monitoring  -Neuro checks  -Continue statin, Aspirin  -Check MRI, ECHO  -AM labs  -Supportive care
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
## 2018-07-07 NOTE — ASSESSMENT & PLAN NOTE
-the patient was noted to have a sodium of 146 which increased to 147 on NS IV fluids   -Likely due to dehydration  -will switch to 1/2 NS IV fluids    -repeat labs in am

## 2018-07-07 NOTE — PHYSICAL THERAPY NOTE
PT Evaluation        07/07/18 1131   Home Living   Type of Home [facility] scooter   Additional Comments Lives with Friend    Prior Function   Level of [place] Modified independent with wheelchair   Lives With Friend(s)   Receives Help From Friend(s)   ADL Assistance Needs assistance   Comments Pt is a poor historian   Restrictions/Precautions   Other Precautions Contact/isolation; Fall Risk;Telemetry;Multiple lines;O2   Cognition   Overall Cognitive Status WFL   Arousal/Participation Alert   Orientation Level Oriented X4   Memory Within functional limits   Following Commands Follows all commands and directions without difficulty   RLE Assessment   RLE Assessment X   LLE Assessment   LLE Assessment WFL   Coordination   Sensation X   Light Touch   RLE Light Touch Impaired   LLE Light Touch Grossly intact   Bed Mobility   Supine to Sit 5  Supervision   Additional items Increased time required;Verbal cues; Bedrails   Additional Comments Pt seated EOB at the end of the session    Transfers   Sit to Stand 4  Minimal assistance   Additional items Increased time required;Verbal cues   Stand to Sit 4  Minimal assistance   Additional items Increased time required;Verbal cues   Additional Comments Did Not ambulate this session, due to patient becoming symptmatic    Balance   Static Sitting Good   Dynamic Sitting Good   Static Standing Fair   Dynamic Standing Fair   Assessment   Prognosis Fair   Problem List Decreased strength;Decreased range of motion;Decreased endurance; Impaired balance;Decreased mobility   Assessment Pt is a 74year old male with a dx of syncope and collapse  Pt reports he fell down his stairs, and he does not remember what happended  BP was 116/68 supine, 122/86 EOB, and 136/62 standing EOB  Pt has difficulty with finger to nose, and rapid alternating movement tests  Did not ambulate this session, due to patient becoming symptmatic   Pt would benefit from PT to help improve strength, ROM, balance, and functional acitivty tolerance  Goals   LTG Expiration Date 07/21/18   Long Term Goal #1 amb 20 feet using RW    Long Term Goal #2 (I) with all transfers and bed mobility    Plan   Treatment/Interventions Functional transfer training;LE strengthening/ROM; Elevations; Therapeutic exercise; Endurance training;Bed mobility;Gait training   PT Frequency Once a day; Twice a day   Recommendation   Recommendation Defer at this time   Equipment Recommended Walker   PT - OK to Discharge No   Pt seated EOB at the end of the session   All lines intact, all needs in reach

## 2018-07-07 NOTE — ASSESSMENT & PLAN NOTE
-patient's creatinine is noted to be within normal range at 1 21 on admission, however his baseline creatinine appears to be about 0 8   - continue IV fluids    -repeat labs in am
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
## 2018-07-07 NOTE — ED PROCEDURE NOTE
PROCEDURE  ECG 12 Lead Documentation  Date/Time: 7/6/2018 8:11 PM  Performed by: Morris Witt  Authorized by: Morris Witt     Indications / Diagnosis:  Syncope   ECG reviewed by me, the ED Provider: yes    Patient location:  ED  Previous ECG:     Previous ECG:  Unavailable  Interpretation:     Interpretation: non-specific    Rate:     ECG rate:  66  Rhythm:     Rhythm: atrial fibrillation    ST segments:     ST segments:  Non-specific         Jeremy Pena DO  07/06/18 2011

## 2018-07-07 NOTE — ASSESSMENT & PLAN NOTE
Lab Results   Component Value Date    HGBA1C 7 0 (H) 05/07/2018       Recent Labs      07/07/18   0000   POCGLU  108       -Glucose at 126  -sliding scale insulin, fingerstick glucose 4 times daily AC/HS  -AM CMP
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
## 2018-07-07 NOTE — CASE MANAGEMENT
Initial Clinical Review    Admission: Date/Time/Statement:  OBS 7/6 2132 converted to IP on 7/7 @ 1426 for cont  Treatment of hyponatremia and CHAITANYA     Admitting Physician Renny Leblanc of [facility]    Bed request comments telemetry  Estimated length of stay More than 2 Midnights    Certification I certify that inpatient services are medically necessary for this patient for a duration of greater than two midnights  See H&P and MD Progress Notes for additional information about the patient's course of treatment  ED: Date/Time/Mode of Arrival:   ED Arrival Information     Expected Arrival Acuity Means of Arrival Escorted By Service Admission Type    - 7/6/2018 19:56 [facility]    Arrival Complaint    fall          Chief Complaint:   Chief Complaint   Patient presents with    Fall     According to EMS, pt was found by family lying on kitchen floor - pt states that he fell x4 today and struck head only 1 time  - has been having therapy on right side       History of Illness: Nena Arnold is a 74 y o  male who presents to ER brought in by EMS for a fall at home  EMS reports that patient was found on kitchen floor by family that he lives with  Patient reports 4 syncopal episodes throughtout the day  He states that he struck his head on the last episode   Patient states that he was feeling  Weak and dizzy prior to episodes     ED Vital Signs:   ED Triage Vitals   Temperature Pulse Respirations Blood Pressure SpO2   07/06/18 2208 07/06/18 2003 07/06/18 2003 07/06/18 2003 07/06/18 2003   (!) 97 1 °F (36 2 °C) 60 20 103/59 94 %      Temp Source Heart Rate Source Patient Position - Orthostatic VS BP Location FiO2 (%)   07/06/18 2003 07/06/18 2003 07/06/18 2003 07/06/18 2003 --   Temporal Monitor Lying Left arm       Pain Score       07/06/18 2017       Worst Possible Pain        Wt Readings from Last 1 Encounters:   07/07/18 85 1 kg (187 lb 9 8 oz)       Vital Signs (abnormal):  07/06/18 2048  --  73  18  94/65  99 %  --  --   07/06/18 2033  --  67  18  98/62  96 %  --  --   07/06/18 2018  --  74  18  97/58  96 %  --       Abnormal Labs/Diagnostic Test Results: H&H   10 9  34 8, na  146, K  3 4, cl   110, total prot  5 7, alb  3 1  Lactic acid  2 5, ptt 37, pt inr  16 3  1 38    ED Treatment:   Medication Administration from 07/06/2018 1956 to 07/06/2018 2208       Date/Time Order Dose Route Action Action by Comments     07/06/2018 2131 sodium chloride 0 9 % bolus 1,000 mL 0 mL Intravenous Stopped Author LEIGH Glover      07/06/2018 2011 sodium chloride 0 9 % bolus 1,000 mL 1,000 mL Intravenous New Bag Sarah Melendez RN      07/06/2018 2148 potassium chloride (K-DUR,KLOR-CON) CR tablet 20 mEq 20 mEq Oral Given Sarah Melendez RN           Past Medical/Surgical History:    Active Ambulatory Problems     Diagnosis Date Noted    Intractable diarrhea 10/26/2016    Weakness 10/26/2016    Hypokalemia 10/26/2016    COPD (chronic obstructive pulmonary disease) ([facility] ) 10/26/2016    Abdominal pain 10/26/2016    Dyslipidemia 10/26/2016    Depressive disorder 10/26/2016    Persistent atrial fibrillation ([facility] ) 10/26/2016    Migraines 10/26/2016    Chronic pain 10/30/2016    Acute kidney injury ([facility] ) 10/30/2016    Sepsis ([facility] ) 10/30/2016    Colitis 10/30/2016    Non-ST elevation myocardial infarction (NSTEMI) due to mismatch of myocardial oxygen supply and demand 10/30/2016    Dehydration, mild 06/03/2017    Ambulatory dysfunction 06/04/2017    Tobacco abuse 06/04/2017    Pulmonary nodule 04/03/2018    Lactic acidosis 04/05/2018    Leukocytosis 04/06/2018    Marijuana abuse 05/06/2018    Healthcare-associated pneumonia 05/06/2018    Decreased left ventricular systolic function 71/04/2618    Dysphagia 05/07/2018    Type 2 diabetes mellitus without complication, without long-term current use of insulin ([facility] ) 05/08/2018    Dilated cardiomyopathy secondary to tachycardia ([facility] ) 05/18/2018    Coronary artery disease involving native coronary artery of native heart without angina pectoris 05/18/2018     Resolved Ambulatory Problems     Diagnosis Date Noted    Shortness of breath 10/26/2016    Benign essential hypertension 10/26/2016    Chronic pain 10/26/2016    Hx of transient ischemic attack (TIA) 10/26/2016    Acute cystitis 04/06/2018     Past Medical History:   Diagnosis Date    Aortic stenosis     Arthritis     Asthma     [facility] fibrillation [facility]     Back pain     Cervical radiculopathy     CHF (congestive heart failure) ([facility])     Chronic pain     Chronic pain 10/26/2016    COPD (chronic obstructive pulmonary disease) ([facility])     Coronary artery disease     CVA (cerebral vascular accident) ([facility] )     Depressive disorder     Diabetes mellitus ([facility] )     Encephalopathy     GERD (gastroesophageal reflux disease)     Head injury     Hx of transient ischemic attack (TIA) 10/26/2016    Hyperlipidemia     Hypertension     Kidney stones     Migraines     MRSA (methicillin resistant Staphylococcus aureus) infection     MRSA (methicillin resistant staphylococcus aureus) pneumonia ([place] [address] )     Osteoarthritis     Peripheral neuropathy     Psychiatric disorder     PVD (peripheral vascular disease) ([place] [address] )     Renal disorder     Shortness of breath 10/26/2016    TIA (transient ischemic attack) 2014    Vertigo        Admitting Diagnosis: Syncope and collapse [R55]  Lactic acidosis [E87 2]  Hypernatremia [E87 0]  Anemia [D64 9]  Pleural effusion [J90]  Head injury [S09 90XA]  Right sided weakness [R53 1]    Age/Sex: 74 y o  male    Assessment/Plan:   * Syncope and collapse   Assessment & Plan     -reports several episodes today  -last episode resulted with hitting head on floor  -Complains of feeling weak prior to episodes  -has hx of CVA with residual right sided   -head CT shows-No acute intracranial abnormality  Microangiopathic changes   -admit on observation  -Continue stroke pathway  -Telemetry monitoring  -Neuro checks  -Continue statin, Aspirin  -Check MRI, ECHO  -AM labs  -Supportive care        Type 2 diabetes mellitus without complication, without long-term current use of insulin (HCC)   Assessment & Plan             Lab Results   Component Value Date     HGBA1C 7 0 (H) 05/07/2018             Recent Labs      07/07/18   0000   POCGLU  108         -Glucose at 126  -sliding scale insulin, fingerstick glucose 4 times daily AC/HS  -AM CMP          Lactic acidosis   Assessment & Plan     -Lactic acid at 2 5  -IV normal saline   -Trend lactic acid until resolved          Persistent atrial fibrillation (HCC)   Assessment & Plan     -Rate controlled  -anticoagulated on  eliquis  -Continue home medications          COPD (chronic obstructive pulmonary disease) ([facility])   Assessment & Plan     -No respiratory distress or evidence of exacerbation  -Respiratory protocol  -Continue home medication          Hypokalemia   Assessment & Plan     -Potassium at 3 3  -Replace potassium  -AM CMP          Tobacco abuse   Assessment & Plan     -Smoking cessation counseling  -Nicotine patch          Dyslipidemia   Assessment & Plan     -Continue statins             VTE Prophylaxis: Apixaban (Eliquis)  / sequential compression device   Code Status: Level 1- Full code  POLST: POLST is not applicable to this patient  Discussion with family: none- attempts to contact at numbers on file unsuccessful    Anticipated Length of Stay:  Patient will be admitted on an Observation basis with an anticipated length of stay of  < 2 midnights     Justification for Hospital Stay: Syncope with collapse, lactic acidosis, hypokalemia    Admission Orders:  Scheduled Meds:   Current Facility-Administered Medications:  albuterol 2 5 mg Nebulization Q6H PRN Darrin Quintanilla MD    apixaban 5 mg Oral BID Eyal Yang PA-C    aspirin 81 mg Oral Daily Eyal Yang PA-C atorvastatin 40 mg Oral Daily Eyal Yang PA-C    cloNIDine 0 1 mg Oral Daily Eyal Yang PA-C    diltiazem 240 mg Oral Daily Eyal Yang PA-C    insulin lispro 1-6 Units Subcutaneous TID AC Eyal Yang PA-C    insulin lispro 1-6 Units Subcutaneous HS Eyal Yang PA-C    lisinopril 20 mg Oral Daily Eyal Yang PA-C    metoprolol succinate 100 mg Oral BID Eyal Yang PA-C    nicotine 1 patch Transdermal Daily Eyal Yang PA-C    ondansetron 4 mg Intravenous Q6H PRN Eyal Yang PA-C    pantoprazole 40 mg Oral Early Morning Eyal Yang PA-C    pregabalin 300 mg Oral TID Eyal Yang PA-C    ranolazine 500 mg Oral Q12H [address] Eyal Yang PA-C    sodium chloride 75 mL/hr Intravenous Continuous Tennis Duque, PA-C Last Rate: 75 mL/hr (07/07/18 0953)   tamsulosin 0 4 mg Oral Daily Eyal Yang PA-C    tiotropium 18 mcg Inhalation Daily Eyal Yang PA-C      Continuous Infusions:   sodium chloride 75 mL/hr Last Rate: 75 mL/hr (07/07/18 0953)     PRN Meds:   albuterol    Ondansetron   x1    orthostatic BP   Fingerstick ac and hs  Cons carb diet   I&O   Daily weight   Up and OOB as gerry   Neuro checks q4hr  Inc spirom   SCD  7/7  Na   147, cl  113, total prot  5 4, alb 2 9, H&H   10 8  35 7, pt inr  15 8  1 33, ptt  37, gluc 142    IM note 7/7    * Syncope and collapse   Assessment & Plan     -reports several episodes  -last episode resulted with hitting head on floor  -Complains of feeling weak prior to episodes  -has hx of CVA with residual right sided   -head CT shows-No acute intracranial abnormality  Microangiopathic changes   -Continue stroke pathway  -Telemetry monitoring  -Neuro checks  -Continue statin, Aspirin  -MRI pending,   -ECHO not availble til Monday               Acute kidney injury ([facility] )   Assessment & Plan     -patient's creatinine is noted to be within normal range at 1 21 on admission, however his baseline creatinine appears to be about 0 8   - continue IV fluids    -repeat labs in am           Hypokalemia Assessment & Plan     -Potassium at 3 3 on admission   -resolved with potassium supplementation    -continue to monitor           Hypernatremia   Assessment & Plan     -the patient was noted to have a sodium of 146 which increased to 147 on NS IV fluids   -Likely due to dehydration  -will switch to 1/2 NS IV fluids  -repeat labs in am            Lactic acidosis   Assessment & Plan     -Lactic acid at 2 5  -resolved with IV fluids           COPD (chronic obstructive pulmonary disease) ([facility])   Assessment & Plan     -No respiratory distress or evidence of exacerbation  -Respiratory protocol  -Continue home medication          Type 2 diabetes mellitus without complication, without long-term current use of insulin ([facility])   Assessment & Plan             Lab Results   Component Value Date     HGBA1C 7 0 (H) 05/07/2018               Recent Labs      07/07/18   0000  07/07/18   0754  07/07/18   1152   POCGLU  108  142*  159*         -sliding scale insulin, fingerstick glucose 4 times daily AC/HS  -AM CMP          Persistent atrial fibrillation ([facility])   Assessment & Plan     -Rate controlled  -anticoagulated on  eliquis  -Continue home medications          Dyslipidemia   Assessment & Plan     -Continue statins        Tobacco abuse   Assessment & Plan     -Smoking cessation counseling  -Nicotine patch            E Pharmacologic Prophylaxis:   Pharmacologic: Apixaban (Eliquis)  Mechanical VTE Prophylaxis in Place: Yes   Patient Centered Rounds: I have performed bedside rounds with nursing staff today    Discussions with Specialists or Other Care Team Provider: Nursing and attending   Education and Discussions with Family / Patient: n/a   Time Spent for Care: 20 minutes    More than 50% of total time spent on counseling and coordination of care as described above    Current Length of Stay: 0 day(s)   Current Patient Status: Inpatient   Certification Statement: The patient, admitted on an observation basis, will now require > 2 midnight hospital stay due to continued monitoring and treatment of acute kidney injury and hypernatremia with IV fluids    Discharge Plan: TBD

## 2018-07-08 LAB
AMPHETAMINES SERPL QL SCN: NEGATIVE
ANION GAP SERPL CALCULATED.3IONS-SCNC: 8 MMOL/L (ref 4–13)
ATRIAL RATE: 375 BPM
BACTERIA UR QL AUTO: ABNORMAL /HPF
BARBITURATES UR QL: NEGATIVE
BASOPHILS # BLD AUTO: 0.1 THOUSANDS/ΜL (ref 0–0.1)
BASOPHILS NFR BLD AUTO: 1 % (ref 0–1)
BENZODIAZ UR QL: POSITIVE
BILIRUB UR QL STRIP: NEGATIVE
BUN SERPL-MCNC: 22 MG/DL (ref 5–25)
CALCIUM SERPL-MCNC: 8.5 MG/DL (ref 8.3–10.1)
CHLORIDE SERPL-SCNC: 107 MMOL/L (ref 100–108)
CLARITY UR: ABNORMAL
CO2 SERPL-SCNC: 25 MMOL/L (ref 21–32)
COCAINE UR QL: NEGATIVE
COLOR UR: YELLOW
CREAT SERPL-MCNC: 1.06 MG/DL (ref 0.6–1.3)
EOSINOPHIL # BLD AUTO: 0.34 THOUSAND/ΜL (ref 0–0.61)
EOSINOPHIL NFR BLD AUTO: 3 % (ref 0–6)
ERYTHROCYTE [DISTWIDTH] IN BLOOD BY AUTOMATED COUNT: 18.2 % (ref 11.6–15.1)
GFR SERPL CREATININE-BSD FRML MDRD: 68 ML/MIN/1.73SQ M
GLUCOSE SERPL-MCNC: 107 MG/DL (ref 65–140)
GLUCOSE SERPL-MCNC: 109 MG/DL (ref 65–140)
GLUCOSE SERPL-MCNC: 117 MG/DL (ref 65–140)
GLUCOSE SERPL-MCNC: 118 MG/DL (ref 65–140)
GLUCOSE SERPL-MCNC: 145 MG/DL (ref 65–140)
GLUCOSE UR STRIP-MCNC: NEGATIVE MG/DL
HCT VFR BLD AUTO: 34.8 % (ref 36.5–49.3)
HGB BLD-MCNC: 10.6 G/DL (ref 12–17)
HGB UR QL STRIP.AUTO: NEGATIVE
KETONES UR STRIP-MCNC: NEGATIVE MG/DL
LEUKOCYTE ESTERASE UR QL STRIP: ABNORMAL
LYMPHOCYTES # BLD AUTO: 2.12 THOUSANDS/ΜL (ref 0.6–4.47)
LYMPHOCYTES NFR BLD AUTO: 19 % (ref 14–44)
MCH RBC QN AUTO: 22.9 PG (ref 26.8–34.3)
MCHC RBC AUTO-ENTMCNC: 30.5 G/DL (ref 31.4–37.4)
MCV RBC AUTO: 75 FL (ref 82–98)
METHADONE UR QL: NEGATIVE
MONOCYTES # BLD AUTO: 2.06 THOUSAND/ΜL (ref 0.17–1.22)
MONOCYTES NFR BLD AUTO: 18 % (ref 4–12)
NEUTROPHILS # BLD AUTO: 6.78 THOUSANDS/ΜL (ref 1.85–7.62)
NEUTS SEG NFR BLD AUTO: 59 % (ref 43–75)
NITRITE UR QL STRIP: NEGATIVE
NON-SQ EPI CELLS URNS QL MICRO: ABNORMAL /HPF
OPIATES UR QL SCN: NEGATIVE
PCP UR QL: NEGATIVE
PH UR STRIP.AUTO: 6 [PH] (ref 4.5–8)
PLATELET # BLD AUTO: 293 THOUSANDS/UL (ref 149–390)
PMV BLD AUTO: 9.8 FL (ref 8.9–12.7)
POTASSIUM SERPL-SCNC: 4.2 MMOL/L (ref 3.5–5.3)
PROT UR STRIP-MCNC: ABNORMAL MG/DL
QRS AXIS: 74 DEGREES
QRSD INTERVAL: 82 MS
QT INTERVAL: 412 MS
QTC INTERVAL: 431 MS
RBC # BLD AUTO: 4.62 MILLION/UL (ref 3.88–5.62)
RBC #/AREA URNS AUTO: ABNORMAL /HPF
SODIUM SERPL-SCNC: 140 MMOL/L (ref 136–145)
SP GR UR STRIP.AUTO: >=1.03 (ref 1–1.03)
T WAVE AXIS: 117 DEGREES
THC UR QL: NEGATIVE
UROBILINOGEN UR QL STRIP.AUTO: 0.2 E.U./DL
VENTRICULAR RATE: 66 BPM
WBC # BLD AUTO: 11.4 THOUSAND/UL (ref 4.31–10.16)
WBC #/AREA URNS AUTO: ABNORMAL /HPF

## 2018-07-08 PROCEDURE — 93010 ELECTROCARDIOGRAM REPORT: CPT | Performed by: INTERNAL MEDICINE

## 2018-07-08 PROCEDURE — 87077 CULTURE AEROBIC IDENTIFY: CPT | Performed by: EMERGENCY MEDICINE

## 2018-07-08 PROCEDURE — 85025 COMPLETE CBC W/AUTO DIFF WBC: CPT | Performed by: PHYSICIAN ASSISTANT

## 2018-07-08 PROCEDURE — 81001 URINALYSIS AUTO W/SCOPE: CPT | Performed by: EMERGENCY MEDICINE

## 2018-07-08 PROCEDURE — 94640 AIRWAY INHALATION TREATMENT: CPT

## 2018-07-08 PROCEDURE — 80048 BASIC METABOLIC PNL TOTAL CA: CPT | Performed by: PHYSICIAN ASSISTANT

## 2018-07-08 PROCEDURE — 87147 CULTURE TYPE IMMUNOLOGIC: CPT | Performed by: EMERGENCY MEDICINE

## 2018-07-08 PROCEDURE — 87081 CULTURE SCREEN ONLY: CPT | Performed by: INTERNAL MEDICINE

## 2018-07-08 PROCEDURE — 82948 REAGENT STRIP/BLOOD GLUCOSE: CPT

## 2018-07-08 PROCEDURE — 94760 N-INVAS EAR/PLS OXIMETRY 1: CPT

## 2018-07-08 PROCEDURE — 87186 SC STD MICRODIL/AGAR DIL: CPT | Performed by: EMERGENCY MEDICINE

## 2018-07-08 PROCEDURE — 99232 SBSQ HOSP IP/OBS MODERATE 35: CPT | Performed by: PHYSICIAN ASSISTANT

## 2018-07-08 PROCEDURE — 87086 URINE CULTURE/COLONY COUNT: CPT | Performed by: EMERGENCY MEDICINE

## 2018-07-08 PROCEDURE — 80307 DRUG TEST PRSMV CHEM ANLYZR: CPT | Performed by: EMERGENCY MEDICINE

## 2018-07-08 RX ORDER — DILTIAZEM HYDROCHLORIDE 120 MG/1
120 CAPSULE, COATED, EXTENDED RELEASE ORAL DAILY
Status: DISCONTINUED | OUTPATIENT
Start: 2018-07-08 | End: 2018-07-12

## 2018-07-08 RX ADMIN — TAMSULOSIN HYDROCHLORIDE 0.4 MG: 0.4 CAPSULE ORAL at 08:34

## 2018-07-08 RX ADMIN — RANOLAZINE 500 MG: 500 TABLET, FILM COATED, EXTENDED RELEASE ORAL at 21:18

## 2018-07-08 RX ADMIN — PANTOPRAZOLE SODIUM 40 MG: 40 TABLET, DELAYED RELEASE ORAL at 05:17

## 2018-07-08 RX ADMIN — APIXABAN 5 MG: 5 TABLET, FILM COATED ORAL at 17:29

## 2018-07-08 RX ADMIN — ALBUTEROL SULFATE 2.5 MG: 2.5 SOLUTION RESPIRATORY (INHALATION) at 03:16

## 2018-07-08 RX ADMIN — APIXABAN 5 MG: 5 TABLET, FILM COATED ORAL at 08:33

## 2018-07-08 RX ADMIN — NICOTINE 1 PATCH: 7 PATCH, EXTENDED RELEASE TRANSDERMAL at 08:36

## 2018-07-08 RX ADMIN — ALBUTEROL SULFATE 2.5 MG: 2.5 SOLUTION RESPIRATORY (INHALATION) at 14:34

## 2018-07-08 RX ADMIN — LEVALBUTEROL HYDROCHLORIDE 0.63 MG: 0.63 SOLUTION RESPIRATORY (INHALATION) at 20:52

## 2018-07-08 RX ADMIN — ATORVASTATIN CALCIUM 40 MG: 40 TABLET, FILM COATED ORAL at 08:34

## 2018-07-08 RX ADMIN — PREGABALIN 300 MG: 75 CAPSULE ORAL at 08:34

## 2018-07-08 RX ADMIN — ASPIRIN 81 MG 81 MG: 81 TABLET ORAL at 08:34

## 2018-07-08 RX ADMIN — PREGABALIN 300 MG: 75 CAPSULE ORAL at 21:17

## 2018-07-08 RX ADMIN — TIOTROPIUM BROMIDE 18 MCG: 18 CAPSULE ORAL; RESPIRATORY (INHALATION) at 08:35

## 2018-07-08 RX ADMIN — PREGABALIN 300 MG: 75 CAPSULE ORAL at 16:51

## 2018-07-08 RX ADMIN — SODIUM CHLORIDE 75 ML/HR: 0.45 INJECTION, SOLUTION INTRAVENOUS at 11:35

## 2018-07-08 RX ADMIN — RANOLAZINE 500 MG: 500 TABLET, FILM COATED, EXTENDED RELEASE ORAL at 08:33

## 2018-07-08 NOTE — ASSESSMENT & PLAN NOTE
-patient continues to complain of dizziness/weakness  -reports several episodes  -last episode resulted with hitting head on floor  -Complains of feeling weak prior to episodes  -has hx of CVA with residual right sided   -head CT shows-No acute intracranial abnormality  Microangiopathic changes   -Continue statin, Aspirin  -MRI brain negative for acute findings  -ECHO tomorrow

## 2018-07-08 NOTE — PROGRESS NOTES
Progress Note - Jackie Avery 1945, 74 y o  male MRN: [id number]    Unit/Bed#: 418-01 Encounter: [id number]    Primary Care Provider: Anita Singh DO   Date and time admitted to hospital: 7/6/2018  7:56 PM        * Syncope and collapse   Assessment & Plan    -patient continues to complain of dizziness/weakness  -reports several episodes  -last episode resulted with hitting head on floor  -Complains of feeling weak prior to episodes  -has hx of CVA with residual right sided   -head CT shows-No acute intracranial abnormality  Microangiopathic changes   -Continue statin, Aspirin  -MRI brain negative for acute findings  -ECHO tomorrow  Acute kidney injury [facility])   Assessment & Plan    -patient's creatinine is noted to be within normal range at 1 21 on admission, however his baseline creatinine appears to be about 0 8   -improving with IV fluids  -continue IV fluids  -repeat labs in am         Hypokalemia   Assessment & Plan    -Potassium at 3 3 on admission   -resolved with potassium supplementation    -continue to monitor  Hypernatremia   Assessment & Plan    -the patient was noted to have a sodium of 146 which increased to 147 on NS IV fluids   -Likely due to dehydration   -resolved  -continue IV fluids  -repeat labs in am          Lactic acidosis   Assessment & Plan    -Lactic acid at 2 5  -resolved with IV fluids          COPD (chronic obstructive pulmonary disease) (HCC)   Assessment & Plan    -No respiratory distress or evidence of exacerbation  -Respiratory protocol  -Continue home medication        Type 2 diabetes mellitus without complication, without long-term current use of insulin [facility])   Assessment & Plan    Lab Results   Component Value Date    HGBA1C 7 0 (H) 05/07/2018       Recent Labs      07/07/18   1619  07/07/18   2104  07/08/18   0818  07/08/18   1156   POCGLU  174*  149*  118  117       -sliding scale insulin, fingerstick glucose 4 times daily AC/HS  -AM CMP Persistent atrial fibrillation (HCC)   Assessment & Plan    -Rate controlled  -anticoagulated on  eliquis  -Continue home medications        Dyslipidemia   Assessment & Plan    -Continue statins        Tobacco abuse   Assessment & Plan    -Smoking cessation counseling  -Nicotine patch            VTE Pharmacologic Prophylaxis:   Pharmacologic: Apixaban (Eliquis)  Mechanical VTE Prophylaxis in Place: Yes    Patient Centered Rounds: I have performed bedside rounds with nursing staff today  Discussions with Specialists or Other Care Team Provider: nursing, CM, and attending    Education and Discussions with Family / Patient: n/a    Time Spent for Care: 20 minutes  More than 50% of total time spent on counseling and coordination of care as described above  Current Length of Stay: 1 day(s)    Current Patient Status: Inpatient   Certification Statement: The patient will continue to require additional inpatient hospital stay due to continued workup for dizziness/weakness  Discharge Plan: Likely in the next 24-48 hours    Code Status: Level 1 - Full Code      Subjective: The patient was seen and examined  The patient states he continues to have dizziness and weakness  Objective:     Vitals:   Temp (24hrs), Av 3 °F (36 3 °C), Min:97 °F (36 1 °C), Max:97 8 °F (36 6 °C)    HR:  [67-94] 67  Resp:  [18-20] 20  BP: ()/(53-63) 90/53  SpO2:  [94 %-99 %] 96 %  Body mass index is 28 06 kg/m²  Input and Output Summary (last 24 hours): Intake/Output Summary (Last 24 hours) at 18 1435  Last data filed at 18 1134   Gross per 24 hour   Intake             3260 ml   Output                0 ml   Net             3260 ml       Physical Exam:     Physical Exam   Constitutional: He is oriented to person, place, and time  Vital signs are normal  He appears well-developed and well-nourished  He is active and cooperative  Nasal cannula in place     Cardiovascular: Normal rate, regular rhythm and normal heart sounds  Pulmonary/Chest: Effort normal  He has decreased breath sounds  He has no wheezes  He has no rhonchi  He has no rales  Abdominal: Soft  Normal appearance and bowel sounds are normal  There is no tenderness  Neurological: He is alert and oriented to person, place, and time  No cranial nerve deficit  Skin: Skin is warm, dry and intact  Nursing note and vitals reviewed  Additional Data:     Labs:      Results from last 7 days  Lab Units 07/08/18  0459   WBC Thousand/uL 11 40*   HEMOGLOBIN g/dL 10 6*   HEMATOCRIT % 34 8*   PLATELETS Thousands/uL 293   NEUTROS PCT % 59   LYMPHS PCT % 19   MONOS PCT % 18*   EOS PCT % 3       Results from last 7 days  Lab Units 07/08/18  0459 07/07/18  0512   SODIUM mmol/L 140 147*   POTASSIUM mmol/L 4 2 4 0   CHLORIDE mmol/L 107 113*   CO2 mmol/L 25 28   BUN mg/dL 22 20   CREATININE mg/dL 1 06 1 13   CALCIUM mg/dL 8 5 8 9   TOTAL PROTEIN g/dL  --  5 4*   BILIRUBIN TOTAL mg/dL  --  0 40   ALK PHOS U/L  --  74   ALT U/L  --  28   AST U/L  --  16   GLUCOSE RANDOM mg/dL 109 102       Results from last 7 days  Lab Units 07/07/18  0512   INR  1 33*       Results from last 7 days  Lab Units 07/08/18  1156 07/08/18  0818 07/07/18  2104 07/07/18  1619 07/07/18  1152 07/07/18  0754 07/07/18  0000   POC GLUCOSE mg/dl 117 118 149* 174* 159* 142* 108             * I Have Reviewed All Lab Data Listed Above  * Additional Pertinent Lab Tests Reviewed:  All Labs Within Last 24 Hours Reviewed    Imaging:    Imaging Reports Reviewed Today Include: MRI brain  Imaging Personally Reviewed by Myself Includes:  none    Recent Cultures (last 7 days):           Last 24 Hours Medication List:     Current Facility-Administered Medications:  albuterol 2 5 mg Nebulization Q6H PRN Darrin Quintanilla MD    apixaban 5 mg Oral BID Eyal Yang PA-C    aspirin 81 mg Oral Daily Eyal Yang PA-C    atorvastatin 40 mg Oral Daily Eyal Yang PA-C    cloNIDine 0 1 mg Oral Daily Eyal Yang PA-C diltiazem 120 mg Oral Daily Roman Rios PA-C    insulin lispro 1-6 Units Subcutaneous TID AC Eyal Yang PA-C    insulin lispro 1-6 Units Subcutaneous HS Eyal Yang PA-C    levalbuterol 0 63 mg Nebulization BID Darrin Quintanilla MD    lisinopril 20 mg Oral Daily Eyal Yang PA-C    metoprolol succinate 100 mg Oral BID Eyal Yang PA-C    nicotine 1 patch Transdermal Daily Eyal Yang PA-C    ondansetron 4 mg Intravenous Q6H PRN Eyal Yang PA-C    pantoprazole 40 mg Oral Early Morning Eyal Yang PA-C    pregabalin 300 mg Oral TID Eyal Yang PA-C    ranolazine 500 mg Oral Q12H [address] Eyal Yang PA-C    sodium chloride 75 mL/hr Intravenous Continuous Valerie Vera PA-C Last Rate: 75 mL/hr (07/08/18 1135)   tamsulosin 0 4 mg Oral Daily yEal Yang PA-C    tiotropium 18 mcg Inhalation Daily Eyal Yang PA-C         Today, Patient Was Seen By: Valerie Vera PA-C    ** Please Note: Dictation voice to text software may have been used in the creation of this document   **

## 2018-07-08 NOTE — PROGRESS NOTES
C/o coughing up thick mucus  Breath sounds diminished  Neb treatment given  Oxygen via NC at 3L   For overnight pulse ox  Pt stated he has no movement in his right arm and leg but movement noted on both  Pt also stated he can't move his left arm but movement noted with sitting to side of bed  Pt is able to go from lying position to sitting position without help  C/o lightheadedness with position change to sitting position at bedside   BP check completed and documented  Instructed to lay back into the bed which he did without any difficulty  Also stated he is not voiding  Voided 100ML this morning  Bladder scan performed with result of 125ML   Urinary incontinence noted in pull up when bladder scan performed

## 2018-07-08 NOTE — ASSESSMENT & PLAN NOTE
-the patient was noted to have a sodium of 146 which increased to 147 on NS IV fluids   -Likely due to dehydration   -resolved  -continue IV fluids    -repeat labs in am

## 2018-07-08 NOTE — RESPIRATORY THERAPY NOTE
Pt  Complained of being extremely SOB  Upon observing pt  He was Short of breath  BS decreased with scattered wheezing  Will change protocol treatments to BID 0 63mg Xopenex

## 2018-07-08 NOTE — SOCIAL WORK
Case management met with the patient to assess the prior level of function and form a safe d/c plan  The patient was given and I reviewed the case management  discharge checklist with the patient  The patient is aware that the d/c planning starts on the day of admission and that if he  has any questions or needs they  is to contact case management  The patient lives in a home with Tarun Lunch and he has his own living space  The patient is in a multi story home there is no steps to enter the area in the home where he lives  He has  A Kirby a walker and a jaci  He uses the walker as needed and jaci as needed  He has o2 from [employer]   He was suppose to schedule a sleep study but I cannot see that he did the sleep study will check this out on Monday

## 2018-07-08 NOTE — ASSESSMENT & PLAN NOTE
-patient's creatinine is noted to be within normal range at 1 21 on admission, however his baseline creatinine appears to be about 0 8   -improving with IV fluids  -continue IV fluids    -repeat labs in am

## 2018-07-08 NOTE — ASSESSMENT & PLAN NOTE
Lab Results   Component Value Date    HGBA1C 7 0 (H) 05/07/2018       Recent Labs      07/07/18   1619  07/07/18   2104  07/08/18   0818  07/08/18   1156   POCGLU  174*  149*  118  117       -sliding scale insulin, fingerstick glucose 4 times daily AC/HS  -AM CMP

## 2018-07-09 ENCOUNTER — APPOINTMENT (INPATIENT)
Dept: CT IMAGING | Facility: HOSPITAL | Age: 73
DRG: 280 | End: 2018-07-09
Payer: COMMERCIAL

## 2018-07-09 ENCOUNTER — APPOINTMENT (INPATIENT)
Dept: NON INVASIVE DIAGNOSTICS | Facility: HOSPITAL | Age: 73
DRG: 280 | End: 2018-07-09
Payer: COMMERCIAL

## 2018-07-09 PROBLEM — J96.01 ACUTE RESPIRATORY FAILURE WITH HYPOXIA AND HYPERCAPNIA (HCC): Status: ACTIVE | Noted: 2018-07-09

## 2018-07-09 PROBLEM — J96.02 ACUTE RESPIRATORY FAILURE WITH HYPOXIA AND HYPERCAPNIA (HCC): Status: ACTIVE | Noted: 2018-07-09

## 2018-07-09 LAB
ANION GAP SERPL CALCULATED.3IONS-SCNC: 7 MMOL/L (ref 4–13)
ANISOCYTOSIS BLD QL SMEAR: PRESENT
ARTERIAL PATENCY WRIST A: YES
ARTERIAL PATENCY WRIST A: YES
BASE EXCESS BLDA CALC-SCNC: -2.4 MMOL/L
BASE EXCESS BLDA CALC-SCNC: -3.6 MMOL/L
BASOPHILS # BLD MANUAL: 0 THOUSAND/UL (ref 0–0.1)
BASOPHILS NFR MAR MANUAL: 0 % (ref 0–1)
BUN SERPL-MCNC: 23 MG/DL (ref 5–25)
CALCIUM SERPL-MCNC: 9.5 MG/DL (ref 8.3–10.1)
CHLORIDE SERPL-SCNC: 106 MMOL/L (ref 100–108)
CO2 SERPL-SCNC: 29 MMOL/L (ref 21–32)
CREAT SERPL-MCNC: 1.16 MG/DL (ref 0.6–1.3)
EOSINOPHIL # BLD MANUAL: 0.74 THOUSAND/UL (ref 0–0.4)
EOSINOPHIL NFR BLD MANUAL: 6 % (ref 0–6)
ERYTHROCYTE [DISTWIDTH] IN BLOOD BY AUTOMATED COUNT: 17.9 % (ref 11.6–15.1)
GFR SERPL CREATININE-BSD FRML MDRD: 61 ML/MIN/1.73SQ M
GLUCOSE SERPL-MCNC: 102 MG/DL (ref 65–140)
GLUCOSE SERPL-MCNC: 103 MG/DL (ref 65–140)
GLUCOSE SERPL-MCNC: 125 MG/DL (ref 65–140)
GLUCOSE SERPL-MCNC: 125 MG/DL (ref 65–140)
GLUCOSE SERPL-MCNC: 129 MG/DL (ref 65–140)
HCO3 BLDA-SCNC: 23.4 MMOL/L (ref 22–28)
HCO3 BLDA-SCNC: 23.4 MMOL/L (ref 22–28)
HCT VFR BLD AUTO: 37.7 % (ref 36.5–49.3)
HGB BLD-MCNC: 11.9 G/DL (ref 12–17)
IPAP: 16
LYMPHOCYTES # BLD AUTO: 2.7 THOUSAND/UL (ref 0.6–4.47)
LYMPHOCYTES # BLD AUTO: 22 % (ref 14–44)
MCH RBC QN AUTO: 23.3 PG (ref 26.8–34.3)
MCHC RBC AUTO-ENTMCNC: 31.6 G/DL (ref 31.4–37.4)
MCV RBC AUTO: 74 FL (ref 82–98)
MONOCYTES # BLD AUTO: 1.59 THOUSAND/UL (ref 0–1.22)
MONOCYTES NFR BLD: 13 % (ref 4–12)
MRSA NOSE QL CULT: NORMAL
NEUTROPHILS # BLD MANUAL: 7.23 THOUSAND/UL (ref 1.85–7.62)
NEUTS BAND NFR BLD MANUAL: 2 % (ref 0–8)
NEUTS SEG NFR BLD AUTO: 57 % (ref 43–75)
NON VENT ROOM AIR: 21 %
NON VENT- BIPAP: ABNORMAL
O2 CT BLDA-SCNC: 13.8 ML/DL (ref 16–23)
O2 CT BLDA-SCNC: 15.4 ML/DL (ref 16–23)
OTHER FIO2: ABNORMAL %
OVALOCYTES BLD QL SMEAR: PRESENT
OXYHGB MFR BLDA: 84.6 % (ref 94–97)
OXYHGB MFR BLDA: 93.2 % (ref 94–97)
PCO2 BLDA: 44.6 MM HG (ref 36–44)
PCO2 BLDA: 50.4 MM HG (ref 36–44)
PEEP MAX SETTING VENT: 5 CM[H2O]
PH BLDA: 7.28 [PH] (ref 7.35–7.45)
PH BLDA: 7.34 [PH] (ref 7.35–7.45)
PLATELET # BLD AUTO: 310 THOUSANDS/UL (ref 149–390)
PLATELET BLD QL SMEAR: ADEQUATE
PMV BLD AUTO: 10 FL (ref 8.9–12.7)
PO2 BLDA: 54 MM HG (ref 75–129)
PO2 BLDA: 75.4 MM HG (ref 75–129)
POIKILOCYTOSIS BLD QL SMEAR: PRESENT
POTASSIUM SERPL-SCNC: 3.9 MMOL/L (ref 3.5–5.3)
RBC # BLD AUTO: 5.11 MILLION/UL (ref 3.88–5.62)
SODIUM SERPL-SCNC: 142 MMOL/L (ref 136–145)
SPECIMEN SOURCE: ABNORMAL
SPECIMEN SOURCE: ABNORMAL
TOTAL CELLS COUNTED SPEC: 100
WBC # BLD AUTO: 12.25 THOUSAND/UL (ref 4.31–10.16)

## 2018-07-09 PROCEDURE — 82948 REAGENT STRIP/BLOOD GLUCOSE: CPT

## 2018-07-09 PROCEDURE — 93306 TTE W/DOPPLER COMPLETE: CPT | Performed by: INTERNAL MEDICINE

## 2018-07-09 PROCEDURE — 85007 BL SMEAR W/DIFF WBC COUNT: CPT | Performed by: PHYSICIAN ASSISTANT

## 2018-07-09 PROCEDURE — 85027 COMPLETE CBC AUTOMATED: CPT | Performed by: PHYSICIAN ASSISTANT

## 2018-07-09 PROCEDURE — 36600 WITHDRAWAL OF ARTERIAL BLOOD: CPT

## 2018-07-09 PROCEDURE — 82805 BLOOD GASES W/O2 SATURATION: CPT | Performed by: PHYSICIAN ASSISTANT

## 2018-07-09 PROCEDURE — 94762 N-INVAS EAR/PLS OXIMTRY CONT: CPT

## 2018-07-09 PROCEDURE — 94660 CPAP INITIATION&MGMT: CPT

## 2018-07-09 PROCEDURE — 70450 CT HEAD/BRAIN W/O DYE: CPT

## 2018-07-09 PROCEDURE — 94664 DEMO&/EVAL PT USE INHALER: CPT

## 2018-07-09 PROCEDURE — 99232 SBSQ HOSP IP/OBS MODERATE 35: CPT | Performed by: PHYSICIAN ASSISTANT

## 2018-07-09 PROCEDURE — 94761 N-INVAS EAR/PLS OXIMETRY MLT: CPT

## 2018-07-09 PROCEDURE — 80048 BASIC METABOLIC PNL TOTAL CA: CPT | Performed by: PHYSICIAN ASSISTANT

## 2018-07-09 PROCEDURE — 94760 N-INVAS EAR/PLS OXIMETRY 1: CPT

## 2018-07-09 PROCEDURE — 97116 GAIT TRAINING THERAPY: CPT

## 2018-07-09 PROCEDURE — 94640 AIRWAY INHALATION TREATMENT: CPT

## 2018-07-09 PROCEDURE — 93306 TTE W/DOPPLER COMPLETE: CPT

## 2018-07-09 RX ORDER — SODIUM CHLORIDE FOR INHALATION 0.9 %
3 VIAL, NEBULIZER (ML) INHALATION
Status: DISCONTINUED | OUTPATIENT
Start: 2018-07-09 | End: 2018-07-15 | Stop reason: HOSPADM

## 2018-07-09 RX ORDER — LABETALOL HYDROCHLORIDE 5 MG/ML
10 INJECTION, SOLUTION INTRAVENOUS EVERY 4 HOURS PRN
Status: DISCONTINUED | OUTPATIENT
Start: 2018-07-09 | End: 2018-07-15 | Stop reason: HOSPADM

## 2018-07-09 RX ORDER — LEVALBUTEROL 1.25 MG/.5ML
SOLUTION, CONCENTRATE RESPIRATORY (INHALATION)
Status: COMPLETED
Start: 2018-07-09 | End: 2018-07-09

## 2018-07-09 RX ORDER — ACETAMINOPHEN 325 MG/1
650 TABLET ORAL ONCE
Status: COMPLETED | OUTPATIENT
Start: 2018-07-09 | End: 2018-07-09

## 2018-07-09 RX ORDER — LEVALBUTEROL 1.25 MG/.5ML
1.25 SOLUTION, CONCENTRATE RESPIRATORY (INHALATION)
Status: DISCONTINUED | OUTPATIENT
Start: 2018-07-09 | End: 2018-07-15 | Stop reason: HOSPADM

## 2018-07-09 RX ADMIN — METOPROLOL SUCCINATE 100 MG: 100 TABLET, EXTENDED RELEASE ORAL at 19:13

## 2018-07-09 RX ADMIN — CLONIDINE HYDROCHLORIDE 0.1 MG: 0.1 TABLET ORAL at 08:44

## 2018-07-09 RX ADMIN — PANTOPRAZOLE SODIUM 40 MG: 40 TABLET, DELAYED RELEASE ORAL at 05:03

## 2018-07-09 RX ADMIN — PREGABALIN 300 MG: 75 CAPSULE ORAL at 21:16

## 2018-07-09 RX ADMIN — SODIUM CHLORIDE 75 ML/HR: 0.45 INJECTION, SOLUTION INTRAVENOUS at 00:23

## 2018-07-09 RX ADMIN — ONDANSETRON 4 MG: 2 INJECTION INTRAMUSCULAR; INTRAVENOUS at 07:33

## 2018-07-09 RX ADMIN — NICOTINE 7 MG: 7 PATCH, EXTENDED RELEASE TRANSDERMAL at 21:59

## 2018-07-09 RX ADMIN — TIOTROPIUM BROMIDE 18 MCG: 18 CAPSULE ORAL; RESPIRATORY (INHALATION) at 08:47

## 2018-07-09 RX ADMIN — ISODIUM CHLORIDE 3 ML: 0.03 SOLUTION RESPIRATORY (INHALATION) at 22:03

## 2018-07-09 RX ADMIN — NICOTINE 1 PATCH: 7 PATCH, EXTENDED RELEASE TRANSDERMAL at 08:46

## 2018-07-09 RX ADMIN — PREGABALIN 300 MG: 75 CAPSULE ORAL at 15:49

## 2018-07-09 RX ADMIN — ALBUTEROL SULFATE 2.5 MG: 2.5 SOLUTION RESPIRATORY (INHALATION) at 14:22

## 2018-07-09 RX ADMIN — PREGABALIN 300 MG: 75 CAPSULE ORAL at 08:57

## 2018-07-09 RX ADMIN — TAMSULOSIN HYDROCHLORIDE 0.4 MG: 0.4 CAPSULE ORAL at 08:44

## 2018-07-09 RX ADMIN — APIXABAN 5 MG: 5 TABLET, FILM COATED ORAL at 08:45

## 2018-07-09 RX ADMIN — RANOLAZINE 500 MG: 500 TABLET, FILM COATED, EXTENDED RELEASE ORAL at 21:16

## 2018-07-09 RX ADMIN — LEVALBUTEROL HYDROCHLORIDE 0.63 MG: 0.63 SOLUTION RESPIRATORY (INHALATION) at 07:37

## 2018-07-09 RX ADMIN — RANOLAZINE 500 MG: 500 TABLET, FILM COATED, EXTENDED RELEASE ORAL at 08:44

## 2018-07-09 RX ADMIN — ACETAMINOPHEN 650 MG: 325 TABLET ORAL at 05:03

## 2018-07-09 RX ADMIN — LEVALBUTEROL 1.25 MG: 1.25 SOLUTION, CONCENTRATE RESPIRATORY (INHALATION) at 22:03

## 2018-07-09 RX ADMIN — APIXABAN 5 MG: 5 TABLET, FILM COATED ORAL at 19:12

## 2018-07-09 RX ADMIN — ASPIRIN 81 MG 81 MG: 81 TABLET ORAL at 08:45

## 2018-07-09 RX ADMIN — METOPROLOL SUCCINATE 100 MG: 100 TABLET, EXTENDED RELEASE ORAL at 08:44

## 2018-07-09 RX ADMIN — DILTIAZEM HYDROCHLORIDE 120 MG: 120 CAPSULE, COATED, EXTENDED RELEASE ORAL at 08:45

## 2018-07-09 RX ADMIN — LISINOPRIL 20 MG: 20 TABLET ORAL at 08:45

## 2018-07-09 RX ADMIN — ATORVASTATIN CALCIUM 40 MG: 40 TABLET, FILM COATED ORAL at 08:44

## 2018-07-09 NOTE — OCCUPATIONAL THERAPY NOTE
Occupational Therapy         Patient Name: Mary Menezes  Today's Date: 7/9/2018        Attempted OT evaluation this PM; pt refuses stating "get out, I am not going to rehab"; will attempt as able for OT evaluation

## 2018-07-09 NOTE — OCCUPATIONAL THERAPY NOTE
Occupational Therapy         Patient Name: Edis Araiza  [id number]'[id number] Date: 7/9/2018    Order received and chart review performed; attempted OT evaluation this AM; pt refuses stating he is tired and nauseous; will attempt as able for OT evaluation

## 2018-07-09 NOTE — NURSING NOTE
Pack of cigarrettes and lighter found in cabinet in patient's room  RN called Security and requested they place both items in a sealed bag and keep it at the security office as patient was stating he wanted to smoke in the room  Education provided to patient by RN that smoking is not allowed on this floor or on the hospital grounds

## 2018-07-09 NOTE — ASSESSMENT & PLAN NOTE
-the patient was noted to have a sodium of 146 which increased to 147 on NS IV fluids   -Likely due to dehydration   -resolved  -continue to monitor

## 2018-07-09 NOTE — PHYSICAL THERAPY NOTE
PT Treatment Note      07/09/18 1127   Restrictions/Precautions   Other Precautions (Contact/isolation; Fall Risk;Telemetry;Multiple lines;O2)   Subjective   Subjective c/o of feeling weak and dizzy  Utilizes SPC to ambulate  Bed Mobility   Supine to Sit 5  Supervision   Additional items Assist x 1;HOB elevated; Bedrails; Increased time required;Verbal cues   Sit to Supine 5  Supervision   Transfers   Sit to Stand 4  Minimal assistance   Additional items Assist x 1;Bedrails;Verbal cues   Stand to Sit 4  Minimal assistance   Additional items Assist x 1;Verbal cues   Ambulation/Elevation   Gait pattern Foward flexed; Short stride; Excessively slow; Shuffling   Gait Assistance 4  Minimal assist   Additional items Assist x 1  (+ assist for lines )   Assistive Device [facility]   Distance 25' unsteady gait   Balance   Static Sitting Fair +   Dynamic Sitting Fair +   Static Standing Fair -   Dynamic Standing Fair   Ambulatory Fair -   Endurance Deficit   Endurance Deficit Yes   Endurance Deficit Description (respiratory therapist monitored SpO2)   Assessment   Prognosis Good   Problem List Decreased strength;Decreased endurance; Impaired balance;Decreased mobility; Decreased safety awareness; Impaired judgement;Pain   Assessment Performing tx/ambulation at (min) x1 level of function  Pt  weak, c/o feeling dizzy, unsteady with decreased balance  Refuses RW to improve stability when ambulating  SpO2 monitored by respiratory therapist  SpO2 did not drop with ambulation, however pt was SOB when returned to bed after ambulation  Limited distance d/t weakness and fatigue  Pt would benefit from PT to help improve strength, ROM, balance, and functional acitivty tolerance  Plan   Treatment/Interventions Functional transfer training;LE strengthening/ROM; Therapeutic exercise; Endurance training;Bed mobility;Gait training   Progress Slow progress, decreased activity tolerance   Recommendation   Recommendation Short-term skilled PT   Pt   In bed with call bell within reach and alarm on at end of PT session

## 2018-07-09 NOTE — NURSING NOTE
Patient found sitting at edge of bed on two separate occasions this morning with nasal canula laying on floor at side of bed  RN placed nasal canula back on patient on both occasions with instructions to place feet up on bed and remain in upright position with head of bed at 45 degrees  Patient refused stating he feels better in a lying position  Pulse ox level attained at 1017 with result being 80 percent when oxygen first placed back on patient  Oxygen saturation level continuing to rise into the 80's  Chilango Brody Respiratory notified and will come to room to check patient    KIARA Guy also notified

## 2018-07-09 NOTE — PROGRESS NOTES
Progress Note - Arielle Tomlin 1945, 74 y o  male MRN: [id number]    Unit/Bed#: 418-01 Encounter: [id number]    Primary Care Provider: Alfreda Wang DO   Date and time admitted to hospital: 7/6/2018  7:56 PM        Acute respiratory failure with hypoxia and hypercapnia (HCC)   Assessment & Plan    -ABG checked due to patient complaining of shortness of breath and sleeping more often  -ABG showed pH of 7 284, pCO2 50 4, pO2 54 0  -the patient was placed on BiPAP  * Syncope and collapse   Assessment & Plan    -patient continues to complain of dizziness/weakness, will check repeat CT head  -reports several episodes  -last episode resulted with hitting head on floor  -Complains of feeling weak prior to episodes  -has hx of CVA with residual right sided   -head CT shows-No acute intracranial abnormality  Microangiopathic changes   -Continue statin, Aspirin  -MRI brain negative for acute findings              Acute kidney injury ([facility] [address] )   Assessment & Plan    -patient's creatinine is noted to be within normal range at 1 21 on admission, however his baseline creatinine appears to be about 0 8   -improving with IV fluids  -continue IV fluids  -repeat labs in am         Hypokalemia   Assessment & Plan    -Potassium at 3 3 on admission   -resolved with potassium supplementation    -continue to monitor  Hypernatremia   Assessment & Plan    -the patient was noted to have a sodium of 146 which increased to 147 on NS IV fluids   -Likely due to dehydration   -resolved  -continue to monitor  Lactic acidosis   Assessment & Plan    -Lactic acid at 2 5  -resolved with IV fluids          COPD (chronic obstructive pulmonary disease) (HCC)   Assessment & Plan    -No respiratory distress or evidence of exacerbation  -Respiratory protocol  -Continue home medication        Type 2 diabetes mellitus without complication, without long-term current use of insulin [facility])   Assessment & Plan    Lab Results Component Value Date    HGBA1C 7 0 (H) 2018       Recent Labs      18   1604  18   2053  18   0719  18   1143   POCGLU  107  145*  125  125       -sliding scale insulin, fingerstick glucose 4 times daily AC/HS  -AM CMP        Persistent atrial fibrillation (HCC)   Assessment & Plan    -Rate controlled  -anticoagulated on  eliquis  -Continue home medications        Dyslipidemia   Assessment & Plan    -Continue statins        Tobacco abuse   Assessment & Plan    -Smoking cessation counseling  -Nicotine patch            VTE Pharmacologic Prophylaxis:   Pharmacologic: Apixaban (Eliquis)  Mechanical VTE Prophylaxis in Place: Yes    Patient Centered Rounds: I have performed bedside rounds with nursing staff today  Discussions with Specialists or Other Care Team Provider: Nursing, CM, and attending    Education and Discussions with Family / Patient: n/a    Time Spent for Care: 30 minutes  More than 50% of total time spent on counseling and coordination of care as described above  Current Length of Stay: 2 day(s)    Current Patient Status: Inpatient   Certification Statement: The patient will continue to require additional inpatient hospital stay due to continued treatment and monitoring of his acute respiratory failure  Discharge Plan: TBD    Code Status: Level 1 - Full Code      Subjective: The patient was seen and examined  The patient continues to complain of feeling weak/dizzy  He's been sleeping more and he's been found with his oxygen off multiple times  Objective:     Vitals:   Temp (24hrs), Av °F (36 1 °C), Min:97 °F (36 1 °C), Max:97 °F (36 1 °C)    HR:  [80-97] 97  Resp:  [18-20] 20  BP: (139)/(63-88) 139/88  SpO2:  [91 %-97 %] 93 %  Body mass index is 28 26 kg/m²  Input and Output Summary (last 24 hours):        Intake/Output Summary (Last 24 hours) at 18 1541  Last data filed at 18 0843   Gross per 24 hour   Intake          2208 75 ml   Output 950 ml   Net          1258 75 ml       Physical Exam:     Physical Exam   Constitutional: He is oriented to person, place, and time  He appears well-developed and well-nourished  He is sleeping and cooperative  He is easily aroused  Cardiovascular: Normal rate and regular rhythm  Pulmonary/Chest: He has decreased breath sounds  He has no wheezes  He has no rhonchi  He has no rales  Abdominal: Soft  Normal appearance and bowel sounds are normal  There is no tenderness  Neurological: He is alert, oriented to person, place, and time and easily aroused  No cranial nerve deficit  Skin: Skin is warm, dry and intact  Nursing note and vitals reviewed  Additional Data:     Labs:      Results from last 7 days  Lab Units 07/09/18  0509 07/08/18  0459   WBC Thousand/uL 12 25* 11 40*   HEMOGLOBIN g/dL 11 9* 10 6*   HEMATOCRIT % 37 7 34 8*   PLATELETS Thousands/uL 310 293   NEUTROS PCT %  --  59   LYMPHS PCT %  --  19   LYMPHO PCT % 22  --    MONOS PCT %  --  18*   MONO PCT MAN % 13*  --    EOS PCT %  --  3   EOSINO PCT MANUAL % 6  --        Results from last 7 days  Lab Units 07/09/18  0509  07/07/18  0512   SODIUM mmol/L 142  < > 147*   POTASSIUM mmol/L 3 9  < > 4 0   CHLORIDE mmol/L 106  < > 113*   CO2 mmol/L 29  < > 28   BUN mg/dL 23  < > 20   CREATININE mg/dL 1 16  < > 1 13   CALCIUM mg/dL 9 5  < > 8 9   TOTAL PROTEIN g/dL  --   --  5 4*   BILIRUBIN TOTAL mg/dL  --   --  0 40   ALK PHOS U/L  --   --  74   ALT U/L  --   --  28   AST U/L  --   --  16   GLUCOSE RANDOM mg/dL 129  < > 102   < > = values in this interval not displayed      Results from last 7 days  Lab Units 07/07/18  0512   INR  1 33*       Results from last 7 days  Lab Units 07/09/18  1143 07/09/18  0719 07/08/18  2053 07/08/18  1604 07/08/18  1156 07/08/18  0818 07/07/18  2104 07/07/18  1619 07/07/18  1152 07/07/18  0754 07/07/18  0000   POC GLUCOSE mg/dl 125 125 145* 107 117 118 149* 174* 159* 142* 108             * I Have Reviewed All Lab Data Listed Above  * Additional Pertinent Lab Tests Reviewed: All Labs Within Last 24 Hours Reviewed    Imaging:    Imaging Reports Reviewed Today Include: none  Imaging Personally Reviewed by Myself Includes:  none    Recent Cultures (last 7 days):           Last 24 Hours Medication List:     Current Facility-Administered Medications:  albuterol 2 5 mg Nebulization Q6H PRN Darrin Quintanilla MD   apixaban 5 mg Oral BID Eyal Yang PA-C   aspirin 81 mg Oral Daily Eyal Yang, CECE   atorvastatin 40 mg Oral Daily Eyal Yang, CECE   cloNIDine 0 1 mg Oral Daily Eyal Yang, CECE   diltiazem 120 mg Oral Daily Roman Rios PA-C   insulin lispro 1-6 Units Subcutaneous TID AC Eyal Yang PA-C   insulin lispro 1-6 Units Subcutaneous HS Eyal Yang PA-C   levalbuterol 0 63 mg Nebulization BID Darrin Quintanilla MD   lisinopril 20 mg Oral Daily Eyal Yang PA-C   metoprolol succinate 100 mg Oral BID Eyal Yang PA-C   nicotine 1 patch Transdermal Daily Eyal Yang PA-C   ondansetron 4 mg Intravenous Q6H PRN Eyal Yang PA-C   pantoprazole 40 mg Oral Early Morning Eyal Yang PA-C   pregabalin 300 mg Oral TID Eyal Yang PA-C   ranolazine 500 mg Oral Q12H [address] Eyal Yang PA-C   tamsulosin 0 4 mg Oral Daily Eyal Yang PA-C   tiotropium 18 mcg Inhalation Daily Eyal Yang PA-C        Today, Patient Was Seen By: Nelson Banks PA-C    ** Please Note: Dictation voice to text software may have been used in the creation of this document   **

## 2018-07-09 NOTE — SPEECH THERAPY NOTE
Speech Language/Pathology    Consult received and chart reviewed  Patient refused evaluation and any food or drink  Patient was last seen by this service in May 2018; and a soft level 3 diet was recommended with thin liquids; but patient refused to change from regular diet level  Will attempt again to evaluate

## 2018-07-09 NOTE — ASSESSMENT & PLAN NOTE
-patient continues to complain of dizziness/weakness, will check repeat CT head  -reports several episodes  -last episode resulted with hitting head on floor  -Complains of feeling weak prior to episodes  -has hx of CVA with residual right sided   -head CT shows-No acute intracranial abnormality  Microangiopathic changes   -Continue statin, Aspirin  -MRI brain negative for acute findings  Katelyn Richter

## 2018-07-09 NOTE — ASSESSMENT & PLAN NOTE
Lab Results   Component Value Date    HGBA1C 7 0 (H) 05/07/2018       Recent Labs      07/08/18   1604  07/08/18   2053  07/09/18   0719  07/09/18   1143   POCGLU  107  145*  125  125       -sliding scale insulin, fingerstick glucose 4 times daily AC/HS  -AM CMP

## 2018-07-09 NOTE — RESPIRATORY THERAPY NOTE
Home Oxygen Qualifying Test       Patient name: Doron Swanson        : 1945   Date of Test:  2018  Diagnosis: Syncope and Collapse     Home Oxygen Test:    **Medicare Guidelines require item(s) 1-5 on all ambulatory patients or 1 and 2 on on-ambulatory patients  1   Baseline SPO2 on Room Air at rest n/a%  If = or < 88% on room air add O2 via NC and titrate patient  Patient must be ambulated with O2 and titrated to > 88% with exertion  2   SPO2 on Oxygen at rest 97%  4 l/m   Uses 4 l/m at home    3  SPO2 during exercise on Room Air n/a %  4   SPO2 during exercise on Oxygen  96% at a liter flow of 4 lpm     5   Exercise performed:    [x] Walking     [] Stairs     [x] Duration 10 (min )     [x] Distance 25 (ft )  Note: Patient wears 4 l/m continuous at home      []  Supplemental Home Oxygen is indicated  []  Client does not qualify for home oxygen        Indiana Esparza RT

## 2018-07-10 ENCOUNTER — APPOINTMENT (INPATIENT)
Dept: CT IMAGING | Facility: HOSPITAL | Age: 73
DRG: 280 | End: 2018-07-10
Payer: COMMERCIAL

## 2018-07-10 LAB
ALBUMIN SERPL BCP-MCNC: 3.1 G/DL (ref 3.5–5)
ALP SERPL-CCNC: 93 U/L (ref 46–116)
ALT SERPL W P-5'-P-CCNC: 47 U/L (ref 12–78)
ANION GAP SERPL CALCULATED.3IONS-SCNC: 7 MMOL/L (ref 4–13)
ARTERIAL PATENCY WRIST A: YES
ARTERIAL PATENCY WRIST A: YES
AST SERPL W P-5'-P-CCNC: 23 U/L (ref 5–45)
BASE EXCESS BLDA CALC-SCNC: -0.2 MMOL/L
BASE EXCESS BLDA CALC-SCNC: -2.3 MMOL/L
BASOPHILS # BLD AUTO: 0.04 THOUSANDS/ΜL (ref 0–0.1)
BASOPHILS NFR BLD AUTO: 0 % (ref 0–1)
BILIRUB SERPL-MCNC: 0.5 MG/DL (ref 0.2–1)
BUN SERPL-MCNC: 23 MG/DL (ref 5–25)
CALCIUM SERPL-MCNC: 9.7 MG/DL (ref 8.3–10.1)
CHLORIDE SERPL-SCNC: 106 MMOL/L (ref 100–108)
CO2 SERPL-SCNC: 29 MMOL/L (ref 21–32)
CREAT SERPL-MCNC: 1.03 MG/DL (ref 0.6–1.3)
EOSINOPHIL # BLD AUTO: 0.13 THOUSAND/ΜL (ref 0–0.61)
EOSINOPHIL NFR BLD AUTO: 1 % (ref 0–6)
ERYTHROCYTE [DISTWIDTH] IN BLOOD BY AUTOMATED COUNT: 17.8 % (ref 11.6–15.1)
GFR SERPL CREATININE-BSD FRML MDRD: 71 ML/MIN/1.73SQ M
GLUCOSE SERPL-MCNC: 105 MG/DL (ref 65–140)
GLUCOSE SERPL-MCNC: 114 MG/DL (ref 65–140)
GLUCOSE SERPL-MCNC: 178 MG/DL (ref 65–140)
GLUCOSE SERPL-MCNC: 220 MG/DL (ref 65–140)
GLUCOSE SERPL-MCNC: 95 MG/DL (ref 65–140)
HCO3 BLDA-SCNC: 24.9 MMOL/L (ref 22–28)
HCO3 BLDA-SCNC: 25 MMOL/L (ref 22–28)
HCT VFR BLD AUTO: 35.1 % (ref 36.5–49.3)
HGB BLD-MCNC: 10.8 G/DL (ref 12–17)
IPAP: 18
LYMPHOCYTES # BLD AUTO: 1.4 THOUSANDS/ΜL (ref 0.6–4.47)
LYMPHOCYTES NFR BLD AUTO: 14 % (ref 14–44)
MAGNESIUM SERPL-MCNC: 1.7 MG/DL (ref 1.6–2.6)
MCH RBC QN AUTO: 22.7 PG (ref 26.8–34.3)
MCHC RBC AUTO-ENTMCNC: 30.8 G/DL (ref 31.4–37.4)
MCV RBC AUTO: 74 FL (ref 82–98)
MONOCYTES # BLD AUTO: 1.37 THOUSAND/ΜL (ref 0.17–1.22)
MONOCYTES NFR BLD AUTO: 14 % (ref 4–12)
NASAL CANNULA: ABNORMAL
NEUTROPHILS # BLD AUTO: 6.77 THOUSANDS/ΜL (ref 1.85–7.62)
NEUTS SEG NFR BLD AUTO: 70 % (ref 43–75)
NON VENT- BIPAP: ABNORMAL
NT-PROBNP SERPL-MCNC: 2538 PG/ML
O2 CT BLDA-SCNC: 14.6 ML/DL (ref 16–23)
O2 CT BLDA-SCNC: 14.8 ML/DL (ref 16–23)
OXYHGB MFR BLDA: 91.1 % (ref 94–97)
OXYHGB MFR BLDA: 92.6 % (ref 94–97)
PCO2 BLDA: 42.7 MM HG (ref 36–44)
PCO2 BLDA: 54 MM HG (ref 36–44)
PEEP MAX SETTING VENT: 9 CM[H2O]
PH BLDA: 7.28 [PH] (ref 7.35–7.45)
PH BLDA: 7.38 [PH] (ref 7.35–7.45)
PHOSPHATE SERPL-MCNC: 2.5 MG/DL (ref 2.3–4.1)
PLATELET # BLD AUTO: 299 THOUSANDS/UL (ref 149–390)
PMV BLD AUTO: 10 FL (ref 8.9–12.7)
PO2 BLDA: 65.4 MM HG (ref 75–129)
PO2 BLDA: 76.3 MM HG (ref 75–129)
POTASSIUM SERPL-SCNC: 4.4 MMOL/L (ref 3.5–5.3)
PROT SERPL-MCNC: 5.8 G/DL (ref 6.4–8.2)
RBC # BLD AUTO: 4.75 MILLION/UL (ref 3.88–5.62)
SODIUM SERPL-SCNC: 142 MMOL/L (ref 136–145)
SPECIMEN SOURCE: ABNORMAL
SPECIMEN SOURCE: ABNORMAL
TROPONIN I SERPL-MCNC: 0.13 NG/ML
VENT BIPAP FIO2: ABNORMAL %
WBC # BLD AUTO: 9.71 THOUSAND/UL (ref 4.31–10.16)

## 2018-07-10 PROCEDURE — 82805 BLOOD GASES W/O2 SATURATION: CPT | Performed by: INTERNAL MEDICINE

## 2018-07-10 PROCEDURE — 94640 AIRWAY INHALATION TREATMENT: CPT

## 2018-07-10 PROCEDURE — 71275 CT ANGIOGRAPHY CHEST: CPT

## 2018-07-10 PROCEDURE — 94664 DEMO&/EVAL PT USE INHALER: CPT

## 2018-07-10 PROCEDURE — 94760 N-INVAS EAR/PLS OXIMETRY 1: CPT

## 2018-07-10 PROCEDURE — 84484 ASSAY OF TROPONIN QUANT: CPT | Performed by: INTERNAL MEDICINE

## 2018-07-10 PROCEDURE — 94762 N-INVAS EAR/PLS OXIMTRY CONT: CPT

## 2018-07-10 PROCEDURE — 80053 COMPREHEN METABOLIC PANEL: CPT | Performed by: PHYSICIAN ASSISTANT

## 2018-07-10 PROCEDURE — 82948 REAGENT STRIP/BLOOD GLUCOSE: CPT

## 2018-07-10 PROCEDURE — 94660 CPAP INITIATION&MGMT: CPT

## 2018-07-10 PROCEDURE — 83880 ASSAY OF NATRIURETIC PEPTIDE: CPT | Performed by: INTERNAL MEDICINE

## 2018-07-10 PROCEDURE — 83735 ASSAY OF MAGNESIUM: CPT | Performed by: INTERNAL MEDICINE

## 2018-07-10 PROCEDURE — 84100 ASSAY OF PHOSPHORUS: CPT | Performed by: INTERNAL MEDICINE

## 2018-07-10 PROCEDURE — 82805 BLOOD GASES W/O2 SATURATION: CPT | Performed by: PHYSICIAN ASSISTANT

## 2018-07-10 PROCEDURE — 36600 WITHDRAWAL OF ARTERIAL BLOOD: CPT

## 2018-07-10 PROCEDURE — 99232 SBSQ HOSP IP/OBS MODERATE 35: CPT | Performed by: PHYSICIAN ASSISTANT

## 2018-07-10 PROCEDURE — 85025 COMPLETE CBC W/AUTO DIFF WBC: CPT | Performed by: PHYSICIAN ASSISTANT

## 2018-07-10 RX ORDER — METHYLPREDNISOLONE SODIUM SUCCINATE 40 MG/ML
40 INJECTION, POWDER, LYOPHILIZED, FOR SOLUTION INTRAMUSCULAR; INTRAVENOUS EVERY 12 HOURS SCHEDULED
Status: DISCONTINUED | OUTPATIENT
Start: 2018-07-10 | End: 2018-07-12

## 2018-07-10 RX ORDER — TRAMADOL HYDROCHLORIDE 50 MG/1
50 TABLET ORAL EVERY 6 HOURS PRN
Status: DISCONTINUED | OUTPATIENT
Start: 2018-07-10 | End: 2018-07-15 | Stop reason: HOSPADM

## 2018-07-10 RX ORDER — FUROSEMIDE 10 MG/ML
40 INJECTION INTRAMUSCULAR; INTRAVENOUS ONCE
Status: COMPLETED | OUTPATIENT
Start: 2018-07-10 | End: 2018-07-10

## 2018-07-10 RX ORDER — SODIUM CHLORIDE 9 MG/ML
50 INJECTION, SOLUTION INTRAVENOUS CONTINUOUS
Status: DISCONTINUED | OUTPATIENT
Start: 2018-07-10 | End: 2018-07-10

## 2018-07-10 RX ORDER — FUROSEMIDE 10 MG/ML
40 INJECTION INTRAMUSCULAR; INTRAVENOUS DAILY
Status: DISCONTINUED | OUTPATIENT
Start: 2018-07-11 | End: 2018-07-11

## 2018-07-10 RX ADMIN — TAMSULOSIN HYDROCHLORIDE 0.4 MG: 0.4 CAPSULE ORAL at 08:10

## 2018-07-10 RX ADMIN — PREGABALIN 300 MG: 75 CAPSULE ORAL at 08:20

## 2018-07-10 RX ADMIN — METOPROLOL SUCCINATE 100 MG: 100 TABLET, EXTENDED RELEASE ORAL at 21:27

## 2018-07-10 RX ADMIN — ISODIUM CHLORIDE 3 ML: 0.03 SOLUTION RESPIRATORY (INHALATION) at 19:14

## 2018-07-10 RX ADMIN — CLONIDINE HYDROCHLORIDE 0.1 MG: 0.1 TABLET ORAL at 08:10

## 2018-07-10 RX ADMIN — INSULIN LISPRO 2 UNITS: 100 INJECTION, SOLUTION INTRAVENOUS; SUBCUTANEOUS at 21:24

## 2018-07-10 RX ADMIN — ISODIUM CHLORIDE 3 ML: 0.03 SOLUTION RESPIRATORY (INHALATION) at 13:13

## 2018-07-10 RX ADMIN — ATORVASTATIN CALCIUM 40 MG: 40 TABLET, FILM COATED ORAL at 08:10

## 2018-07-10 RX ADMIN — ASPIRIN 81 MG 81 MG: 81 TABLET ORAL at 08:10

## 2018-07-10 RX ADMIN — APIXABAN 5 MG: 5 TABLET, FILM COATED ORAL at 21:27

## 2018-07-10 RX ADMIN — LISINOPRIL 20 MG: 20 TABLET ORAL at 08:10

## 2018-07-10 RX ADMIN — LEVALBUTEROL 1.25 MG: 1.25 SOLUTION, CONCENTRATE RESPIRATORY (INHALATION) at 19:15

## 2018-07-10 RX ADMIN — PREGABALIN 300 MG: 75 CAPSULE ORAL at 21:26

## 2018-07-10 RX ADMIN — INSULIN LISPRO 1 UNITS: 100 INJECTION, SOLUTION INTRAVENOUS; SUBCUTANEOUS at 12:22

## 2018-07-10 RX ADMIN — TIOTROPIUM BROMIDE 18 MCG: 18 CAPSULE ORAL; RESPIRATORY (INHALATION) at 08:27

## 2018-07-10 RX ADMIN — Medication 0.5 MG: at 02:16

## 2018-07-10 RX ADMIN — IODIXANOL 85 ML: 320 INJECTION, SOLUTION INTRAVASCULAR at 16:15

## 2018-07-10 RX ADMIN — DILTIAZEM HYDROCHLORIDE 120 MG: 120 CAPSULE, COATED, EXTENDED RELEASE ORAL at 08:10

## 2018-07-10 RX ADMIN — APIXABAN 5 MG: 5 TABLET, FILM COATED ORAL at 08:12

## 2018-07-10 RX ADMIN — TRAMADOL HYDROCHLORIDE 50 MG: 50 TABLET, FILM COATED ORAL at 21:26

## 2018-07-10 RX ADMIN — LEVALBUTEROL 1.25 MG: 1.25 SOLUTION, CONCENTRATE RESPIRATORY (INHALATION) at 13:13

## 2018-07-10 RX ADMIN — TRAMADOL HYDROCHLORIDE 50 MG: 50 TABLET, FILM COATED ORAL at 10:39

## 2018-07-10 RX ADMIN — RANOLAZINE 500 MG: 500 TABLET, FILM COATED, EXTENDED RELEASE ORAL at 21:26

## 2018-07-10 RX ADMIN — RANOLAZINE 500 MG: 500 TABLET, FILM COATED, EXTENDED RELEASE ORAL at 08:12

## 2018-07-10 RX ADMIN — FUROSEMIDE 40 MG: 10 INJECTION, SOLUTION INTRAMUSCULAR; INTRAVENOUS at 17:24

## 2018-07-10 RX ADMIN — IPRATROPIUM BROMIDE 0.5 MG: 0.5 SOLUTION RESPIRATORY (INHALATION) at 02:16

## 2018-07-10 RX ADMIN — ISODIUM CHLORIDE 3 ML: 0.03 SOLUTION RESPIRATORY (INHALATION) at 07:28

## 2018-07-10 RX ADMIN — ALBUTEROL SULFATE 2.5 MG: 2.5 SOLUTION RESPIRATORY (INHALATION) at 01:41

## 2018-07-10 RX ADMIN — METHYLPREDNISOLONE SODIUM SUCCINATE 40 MG: 40 INJECTION, POWDER, FOR SOLUTION INTRAMUSCULAR; INTRAVENOUS at 14:53

## 2018-07-10 RX ADMIN — LEVALBUTEROL 1.25 MG: 1.25 SOLUTION, CONCENTRATE RESPIRATORY (INHALATION) at 07:30

## 2018-07-10 RX ADMIN — METOPROLOL SUCCINATE 100 MG: 100 TABLET, EXTENDED RELEASE ORAL at 08:15

## 2018-07-10 RX ADMIN — SODIUM CHLORIDE 50 ML/HR: 0.9 INJECTION, SOLUTION INTRAVENOUS at 14:53

## 2018-07-10 RX ADMIN — NICOTINE 1 PATCH: 7 PATCH, EXTENDED RELEASE TRANSDERMAL at 08:21

## 2018-07-10 NOTE — ASSESSMENT & PLAN NOTE
-Possibly due to COPD  -patient was weaned off BiPAP this am however the patient was sleeping more again this afternoon and c/o sob  -a repeat ABG showed pH of 7 282 and pCO 2 of 54 0   -the patient will be placed back on BiPAP and transferred to ICU  -will start IV solumedrol 40 mg IV every 12 hours   -respiratory proctol   -check CTA chest with PE protocol

## 2018-07-10 NOTE — RESPIRATORY THERAPY NOTE
BiPap Note:  Placed patient back on BiPap  Settings 16/6  R 18  FI02 30%   Sat 96%  Pt  Resting comfortably

## 2018-07-10 NOTE — ASSESSMENT & PLAN NOTE
-patient's creatinine is noted to be within normal range at 1 21 on admission, however his baseline creatinine appears to be about 0 8   -resolved    -continue to monitior
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
## 2018-07-10 NOTE — SOCIAL WORK
[id number] sent the PT notes and H&P and I built a case with [facility]  The case # is [id number]   I faxed the clinicals to [facility] at [phone]

## 2018-07-10 NOTE — OCCUPATIONAL THERAPY NOTE
Occupational Therapy         Patient Name: Laura Valle  [email or web address] Date: 7/10/2018      Pt is a hold from OT evaluation this AM due to continuous bipap; will attempt as able when pt is medically appropriate

## 2018-07-10 NOTE — CASE MANAGEMENT
Hanna Dean, RN Registered Nurse Addendum   Case Management Date of Service: 7/7/2018 10:50 AM         []Hide copied text  Initial Clinical Review     Admission: Date/Time/Statement:  OBS 7/6 2132 converted to IP on 7/7 @ 1426 for cont  Treatment of hyponatremia and CHAITANYA      Admitting Physician Swapna Barron     Level of Care Med Surg     Bed request comments telemetry      Estimated length of stay More than 2 Midnights     Certification I certify that inpatient services are medically necessary for this patient for a duration of greater than two midnights  See H&P and MD Progress Notes for additional information about the patient's course of treatment            ED: Date/Time/Mode of Arrival:             ED Arrival Information      Expected Arrival Acuity Means of Arrival Escorted By Service Admission Type     - 7/6/2018 19:56 Urgent Ambulance [facility] General Medicine Emergency     Arrival Complaint     fall             Chief Complaint:        Chief Complaint   Patient presents with    Fall       According to EMS, pt was found by family lying on kitchen floor - pt states that he fell x4 today and struck head only 1 time  - has been having therapy on right side         History of Illness: Warren Singleton a 74 y  o  male who presents to ER brought in by EMS for a fall at home  EMS reports that patient was found on kitchen floor by family that he lives with  Patient reports 4 syncopal episodes throughtout the day  He states that he struck his head on the last episode   Patient states that he was feeling  Weak and dizzy prior to episodes      ED Vital Signs:   ED Triage Vitals   Temperature Pulse Respirations Blood Pressure SpO2   07/06/18 2208 07/06/18 2003 07/06/18 2003 07/06/18 2003 07/06/18 2003   (!) 97 1 °F (36 2 °C) 60 20 103/59 94 %       Temp Source Heart Rate Source Patient Position - Orthostatic VS BP Location FiO2 (%)   07/06/18 2003 07/06/18 2003 07/06/18 2003 07/06/18 2003 --   Temporal Monitor Lying Left arm         Pain Score           07/06/18 2017           Worst Possible Pain                Wt Readings from Last 1 Encounters:   07/07/18 85 1 kg (187 lb 9 8 oz)         Vital Signs (abnormal):  07/06/18 2048   --   73   18   94/65   99 %   --   --   07/06/18 2033   --   67   18   98/62   96 %   --   --   07/06/18 2018   --   74   18   97/58   96 %   --          Abnormal Labs/Diagnostic Test Results: H&H   10 9  34 8, na  146, K  3 4, cl   110, total prot  5 7, alb  3 1  Lactic acid  2 5, ptt 37, pt inr  16 3  1 38     ED Treatment:              Medication Administration from 07/06/2018 1956 to 07/06/2018 2208        Date/Time Order Dose Route Action Action by Comments       07/06/2018 2131 sodium chloride 0 9 % bolus 1,000 mL 0 mL Intravenous Stopped Josias Davila RN         07/06/2018 2011 sodium chloride 0 9 % bolus 1,000 mL 1,000 mL Intravenous New Bag Brooklynn Banuelos RN         07/06/2018 2148 potassium chloride (K-DUR,KLOR-CON) CR tablet 20 mEq 20 mEq Oral Given Brooklynn Banuelos RN               Past Medical/Surgical History:         Active Ambulatory Problems     Diagnosis Date Noted    Intractable diarrhea 10/26/2016    Weakness 10/26/2016    Hypokalemia 10/26/2016    COPD (chronic obstructive pulmonary disease) ([facility] ) 10/26/2016    Abdominal pain 10/26/2016    Dyslipidemia 10/26/2016    Depressive disorder 10/26/2016    Persistent atrial fibrillation ([facility] ) 10/26/2016    Migraines 10/26/2016    Chronic pain 10/30/2016    [facility] ([facility] ) 10/30/2016    Sepsis ([facility] ) 10/30/2016    Colitis 10/30/2016    Non-ST elevation myocardial infarction (NSTEMI) due to mismatch of myocardial oxygen supply and demand 10/30/2016    Dehydration, mild 06/03/2017    Ambulatory dysfunction 06/04/2017    Tobacco abuse 06/04/2017    Pulmonary nodule 04/03/2018    Lactic acidosis 04/05/2018    Leukocytosis 04/06/2018    Marijuana abuse 05/06/2018    Healthcare-associated pneumonia 05/06/2018    Decreased left ventricular systolic function 24/97/6640    Dysphagia 05/07/2018    Type 2 diabetes mellitus without complication, without long-term current use of insulin ([facility] ) 05/08/2018    Dilated cardiomyopathy secondary to tachycardia ([facility] ) 05/18/2018    Coronary artery disease involving native coronary artery of native heart without angina pectoris 05/18/2018           Resolved Ambulatory Problems     Diagnosis Date Noted    Shortness of breath 10/26/2016    Benign essential hypertension 10/26/2016    Chronic pain 10/26/2016    Hx of transient ischemic attack (TIA) 10/26/2016    Acute cystitis 04/06/2018           Past Medical History:   Diagnosis Date    Aortic stenosis      Arthritis      Asthma      Atrial fibrillation ([facility])      Back pain      Cervical radiculopathy      CHF (congestive heart failure) ([facility])      Chronic pain      Chronic pain 10/26/2016    COPD (chronic obstructive pulmonary disease) ([facility])      Coronary artery disease      CVA (cerebral vascular accident) ([facility] )      Depressive disorder      Diabetes mellitus ([place] [address] )      Encephalopathy      GERD (gastroesophageal reflux disease)      Head injury      Hx of transient ischemic attack (TIA) 10/26/2016    Hyperlipidemia      Hypertension      Kidney stones      Migraines      MRSA (methicillin resistant Staphylococcus aureus) infection      MRSA (methicillin resistant staphylococcus aureus) pneumonia ([facility])      Osteoarthritis      Peripheral neuropathy      Psychiatric disorder      PVD (peripheral vascular disease) ([facility] )      Renal disorder      Shortness of breath 10/26/2016    TIA (transient ischemic attack) 2014    Vertigo           Admitting Diagnosis: Syncope and collapse [R55]  Lactic acidosis [E87 2]  Hypernatremia [E87 0]  Anemia [D64 9]  Pleural effusion [J90]  Head injury [S09 90XA]  Right sided weakness [R53 1]     Age/Sex: 74 y o  male     Assessment/Plan:       * Syncope and collapse   Assessment & Plan     -reports several episodes today  -last episode resulted with hitting head on floor  -Complains of feeling weak prior to episodes  -has hx of CVA with residual right sided   -head CT shows-No acute intracranial abnormality  Microangiopathic changes   -admit on observation  -Continue stroke pathway  -Telemetry monitoring  -Neuro checks  -Continue statin, Aspirin  -Check MRI, ECHO  -AM labs  -Supportive care        Type 2 diabetes mellitus without complication, without long-term current use of insulin ([facility])   Assessment & Plan                   Lab Results   Component Value Date     HGBA1C 7 0 (H) 05/07/2018               Recent Labs      07/07/18   0000   POCGLU  108         -Glucose at 126  -sliding scale insulin, fingerstick glucose 4 times daily AC/HS  -AM CMP          Lactic acidosis   Assessment & Plan     -Lactic acid at 2 5  -IV normal saline   -Trend lactic acid until resolved          Persistent atrial fibrillation ([facility])   Assessment & Plan     -Rate controlled  -anticoagulated on  eliquis  -Continue home medications          COPD (chronic obstructive pulmonary disease) ([facility])   Assessment & Plan     -No respiratory distress or evidence of exacerbation  -Respiratory protocol  -Continue home medication          Hypokalemia   Assessment & Plan     -Potassium at 3 3  -Replace potassium  -AM CMP          Tobacco abuse   Assessment & Plan     -Smoking cessation counseling  -Nicotine patch          Dyslipidemia   Assessment & Plan     -Continue statins             VTE Prophylaxis: Apixaban (Eliquis)  / sequential compression device   Code Status: Level 1- Full code  POLST: POLST is not applicable to this patient  Discussion with family: none- attempts to contact at numbers on file unsuccessful    Anticipated Length of Stay: Dominga Calvert will be admitted on an Observation basis with an anticipated length of stay of  < 2 midnights    Justification for Hospital Stay: Syncope with collapse, lactic acidosis, hypokalemia     Admission Orders:  Scheduled Meds:   Current Facility-Administered Medications:  albuterol 2 5 mg Nebulization Q6H PRN Darrin Quintanilla MD     apixaban 5 mg Oral BID Eyal Yang PA-C     aspirin 81 mg Oral Daily Eyal Yang PA-C     atorvastatin 40 mg Oral Daily Eyal Yang PA-C     cloNIDine 0 1 mg Oral Daily Eyal Yang PA-C     diltiazem 240 mg Oral Daily Eyal Yang PA-C     insulin lispro 1-6 Units Subcutaneous TID AC Eyal Yang PA-C     insulin lispro 1-6 Units Subcutaneous HS Eyal Yang PA-C     lisinopril 20 mg Oral Daily Eyal Yang PA-C     metoprolol succinate 100 mg Oral BID Eyal Yang PA-C     nicotine 1 patch Transdermal Daily Eyal Yang PA-C     ondansetron 4 mg Intravenous Q6H PRN Eyal Yang PA-C     pantoprazole 40 mg Oral Early Morning Eyal Yang PA-C     pregabalin 300 mg Oral TID Eyal Yang PA-C     ranolazine 500 mg Oral Q12H [facility] Eyal Yang PA-C     sodium chloride 75 mL/hr Intravenous Continuous Clover Mcdonough PA-C Last Rate: 75 mL/hr (07/07/18 0953)   tamsulosin 0 4 mg Oral Daily Eyal Yang PA-C     tiotropium 18 mcg Inhalation Daily Eyal Yang PA-C        Continuous Infusions:   sodium chloride 75 mL/hr Last Rate: 75 mL/hr (07/07/18 0953)      PRN Meds:   albuterol    Ondansetron   x1     orthostatic BP   Fingerstick ac and hs  Cons carb diet   I&O   Daily weight   Up and OOB as gerry   Neuro checks q4hr  Inc spirom   SCD  7/7  Na   147, cl  113, total prot  5 4, alb 2 9, H&H   10 8  35 7, pt inr  15 8  1 33, ptt  37, gluc 142     IM note 7/7        * Syncope and collapse   Assessment & Plan     -reports several episodes  -last episode resulted with hitting head on floor  -Complains of feeling weak prior to episodes  -has hx of CVA with residual right sided   -head CT shows-No acute intracranial abnormality   Microangiopathic changes   -Continue stroke pathway  -Telemetry monitoring  -Neuro checks  -Continue statin, Aspirin  -MRI pending,   -ECHO not availble til Monday               Acute kidney injury ([facility] )   Assessment & Plan     -patient's creatinine is noted to be within normal range at 1 21 on admission, however his baseline creatinine appears to be about 0 8   - continue IV fluids  -repeat labs in am           Hypokalemia   Assessment & Plan     -Potassium at 3 3 on admission   -resolved with potassium supplementation    -continue to monitor           Hypernatremia   Assessment & Plan     -the patient was noted to have a sodium of 146 which increased to 147 on NS IV fluids   -Likely due to dehydration  -will switch to 1/2 NS IV fluids    -repeat labs in am            Lactic acidosis   Assessment & Plan     -Lactic acid at 2 5  -resolved with IV fluids           COPD (chronic obstructive pulmonary disease) (HCC)   Assessment & Plan     -No respiratory distress or evidence of exacerbation  -Respiratory protocol  -Continue home medication          Type 2 diabetes mellitus without complication, without long-term current use of insulin [facility])   Assessment & Plan                   Lab Results   Component Value Date     HGBA1C 7 0 (H) 05/07/2018                   Recent Labs      07/07/18   0000  07/07/18   6840  07/07/18   1524   POCGLU  108  356*  834*         -sliding scale insulin, fingerstick glucose 4 times daily AC/HS  -AM CMP          Persistent atrial fibrillation (HCC)   Assessment & Plan     -Rate controlled  -anticoagulated on  eliquis  -Continue home medications          Dyslipidemia   Assessment & Plan     -Continue statins        Tobacco abuse   Assessment & Plan     -Smoking cessation counseling  -Nicotine patch            E Pharmacologic Prophylaxis:   Pharmacologic: Apixaban (Eliquis)  Mechanical VTE Prophylaxis in Place: Yes   Patient Centered Rounds: I have performed bedside rounds with nursing staff today    Discussions with Specialists or Other Care Team Provider: Nursing and attending   Education and Discussions with Family / Patient: n/a   Time Spent for Care: 20 minutes   More than 50% of total time spent on counseling and coordination of care as described above    Current Length of Stay: 0 day(s)   Current Patient Status: Inpatient   Certification Statement: The patient, admitted on an observation basis, will now require > 2 midnight hospital stay due to continued monitoring and treatment of acute kidney injury and hypernatremia with IV fluids    Discharge Plan: TBD         Revision History

## 2018-07-10 NOTE — SOCIAL WORK
The patient is accepted at the [facility] and he was agreeable to this this am  He was now transferred to the ICU for further care  The d/c is on hold

## 2018-07-10 NOTE — ASSESSMENT & PLAN NOTE
Lab Results   Component Value Date    HGBA1C 7 0 (H) 05/07/2018       Recent Labs      07/09/18   1559  07/09/18   2121  07/10/18   0804  07/10/18   1139   POCGLU  102  103  105  178*       -sliding scale insulin, fingerstick glucose 4 times daily AC/HS  -continue to monitor blood glucose trends

## 2018-07-10 NOTE — SPEECH THERAPY NOTE
Speech Language/Pathology    Consult received  Attempted evaluation; RN deferred as patient cannot come off bipap today  Will follow for eval tomorrow, if appropriate

## 2018-07-10 NOTE — ASSESSMENT & PLAN NOTE
-reported several episodes  -last episode resulted with hitting head on floor  -Complains of feeling weak prior to episodes  -has hx of CVA with residual right sided   -head CT shows-No acute intracranial abnormality  Microangiopathic changes   -Continue statin, Aspirin  -MRI brain negative for acute findings  -patient had continued complaints of dizziness  -repeat CT head on 7/9/18 was negative for acute findings  Dwyer Spruce

## 2018-07-10 NOTE — RESPIRATORY THERAPY NOTE
Removed pt  From BiPap put on 2 l/m, but pt  Kept complaining he could not breathe, sat was at 89%  Raised oxygen to 3 l/m, sat now 94%  Pt  States he uses 4  At home

## 2018-07-10 NOTE — PROGRESS NOTES
Progress Note - Jackie Avery 1945, 74 y o  male MRN: [id number]    Unit/Bed#: 418-01 Encounter: [id number]    Primary Care Provider: Anita Singh DO   Date and time admitted to hospital: 7/6/2018  7:56 PM        * Acute respiratory failure with hypoxia and hypercapnia ([facility] )   Assessment & Plan    -Possibly due to COPD  -patient was weaned off BiPAP this am however the patient was sleeping more again this afternoon and c/o sob  -a repeat ABG showed pH of 7 282 and pCO 2 of 54 0   -the patient will be placed back on BiPAP and transferred to ICU  -will start IV solumedrol 40 mg IV every 12 hours   -respiratory proctol   -check CTA chest with PE protocol  COPD (chronic obstructive pulmonary disease) (HCC)   Assessment & Plan    -mild wheezing noted today   -will start IV solumedrol 40 mg IV every 12 hours given patient's repeat ABG  -BiPAP, transfer to ICU  -Respiratory protocol  -Continue home medication        Syncope and collapse   Assessment & Plan    -reported several episodes  -last episode resulted with hitting head on floor  -Complains of feeling weak prior to episodes  -has hx of CVA with residual right sided   -head CT shows-No acute intracranial abnormality  Microangiopathic changes   -Continue statin, Aspirin  -MRI brain negative for acute findings  -patient had continued complaints of dizziness  -repeat CT head on 7/9/18 was negative for acute findings              Acute kidney injury ([facility] )   Assessment & Plan    -patient's creatinine is noted to be within normal range at 1 21 on admission, however his baseline creatinine appears to be about 0 8   -resolved  -continue to monitior        Hypernatremia   Assessment & Plan    -the patient was noted to have a sodium of 146 which increased to 147 on NS IV fluids   -Likely due to dehydration   -resolved  -continue to monitor           Hypokalemia   Assessment & Plan    -Potassium at 3 3 on admission   -resolved with potassium supplementation    -continue to monitor  Lactic acidosis   Assessment & Plan    -Lactic acid at 2 5  -resolved with IV fluids  Type 2 diabetes mellitus without complication, without long-term current use of insulin [facility])   Assessment & Plan    Lab Results   Component Value Date    HGBA1C 7 0 (H) 05/07/2018       Recent Labs      07/09/18   1559  07/09/18   2121  07/10/18   0804  07/10/18   1139   POCGLU  102  103  105  178*       -sliding scale insulin, fingerstick glucose 4 times daily AC/HS  -continue to monitor blood glucose trends  Persistent atrial fibrillation (HCC)   Assessment & Plan    -Rate controlled  -anticoagulated on eliquis  -Continue home medications        Dyslipidemia   Assessment & Plan    -Continue statins        Tobacco abuse   Assessment & Plan    -Smoking cessation counseling  -Nicotine patch        Dysphagia   Assessment & Plan    -speech was consulted however the patient refused to work with the speech therapist            VTE Pharmacologic Prophylaxis:   Pharmacologic: Apixaban (Eliquis)  Mechanical VTE Prophylaxis in Place: Yes    Patient Centered Rounds: I have performed bedside rounds with nursing staff today  Discussions with Specialists or Other Care Team Provider: nursing, CM, and attending    Education and Discussions with Family / Patient: n/a    Time Spent for Care: 20 minutes  More than 50% of total time spent on counseling and coordination of care as described above  Current Length of Stay: 3 day(s)    Current Patient Status: Inpatient   Certification Statement: The patient will continue to require additional inpatient hospital stay due to treatment of acute respiratory failure with BiPAP and IV steroids  Discharge Plan: TBD    Code Status: Level 1 - Full Code      Subjective: The patient was seen and examined  The patient was complaining of not feeling well  He states he is short of breath  He appears lethargic but easily aroused       Objective: Vitals:   Temp (24hrs), Av 7 °F (35 9 °C), Min:96 1 °F (35 6 °C), Max:97 2 °F (36 2 °C)    HR:  [63-90] 88  Resp:  [16-28] 18  BP: (122-179)/(88-94) 178/92  SpO2:  [92 %-97 %] 92 %  Body mass index is 28 39 kg/m²  Input and Output Summary (last 24 hours): Intake/Output Summary (Last 24 hours) at 07/10/18 1340  Last data filed at 07/10/18 0530   Gross per 24 hour   Intake                0 ml   Output              400 ml   Net             -400 ml       Physical Exam:     Physical Exam   Constitutional: He is oriented to person, place, and time  He appears well-developed and well-nourished  He appears lethargic  He is sleeping and cooperative  He is easily aroused  Cardiovascular: Normal rate and regular rhythm  Pulmonary/Chest: He has decreased breath sounds  He has wheezes (mild scattered wheezes  )  He has no rhonchi  He has no rales  Abdominal: Soft  Normal appearance and bowel sounds are normal  There is no tenderness  Neurological: He is oriented to person, place, and time and easily aroused  He appears lethargic  No cranial nerve deficit  Skin: Skin is warm, dry and intact  Nursing note and vitals reviewed        Additional Data:     Labs:      Results from last 7 days  Lab Units 07/10/18  0522   WBC Thousand/uL 9 71   HEMOGLOBIN g/dL 10 8*   HEMATOCRIT % 35 1*   PLATELETS Thousands/uL 299   NEUTROS PCT % 70   LYMPHS PCT % 14   MONOS PCT % 14*   EOS PCT % 1       Results from last 7 days  Lab Units 07/10/18  0522   SODIUM mmol/L 142   POTASSIUM mmol/L 4 4   CHLORIDE mmol/L 106   CO2 mmol/L 29   BUN mg/dL 23   CREATININE mg/dL 1 03   CALCIUM mg/dL 9 7   TOTAL PROTEIN g/dL 5 8*   BILIRUBIN TOTAL mg/dL 0 50   ALK PHOS U/L 93   ALT U/L 47   AST U/L 23   GLUCOSE RANDOM mg/dL 114       Results from last 7 days  Lab Units 18  0512   INR  1 33*       Results from last 7 days  Lab Units 07/10/18  1139 07/10/18  0804 18  2121 18  1559 18  1143 18  0719 07/08/18  2053 07/08/18  1604 07/08/18  1156 07/08/18  0818 07/07/18  2104 07/07/18  1619   POC GLUCOSE mg/dl 178* 105 103 102 125 125 145* 107 117 118 149* 174*             * I Have Reviewed All Lab Data Listed Above  * Additional Pertinent Lab Tests Reviewed: All Labs Within Last 24 Hours Reviewed    Imaging:    Imaging Reports Reviewed Today Include: none  Imaging Personally Reviewed by Myself Includes:  none    Recent Cultures (last 7 days):           Last 24 Hours Medication List:     Current Facility-Administered Medications:  albuterol 2 5 mg Nebulization Q6H PRN Darrin Quintanilla MD   apixaban 5 mg Oral BID Eyal Yang PA-C   aspirin 81 mg Oral Daily Eyal Yang PA-C   atorvastatin 40 mg Oral Daily Eyal Yang PA-C   cloNIDine 0 1 mg Oral Daily Eyal Yang PA-C   diltiazem 120 mg Oral Daily Roman Rios PA-C   insulin lispro 1-6 Units Subcutaneous TID AC Eyal Yang PA-C   insulin lispro 1-6 Units Subcutaneous HS Eyal Yang PA-C   labetalol 10 mg Intravenous Q4H PRN Lakshmi DO Yves   levalbuterol 1 25 mg Nebulization TID Darrin Quintanilla MD   lisinopril 20 mg Oral Daily Eyal Yang PA-C   methylPREDNISolone sodium succinate 40 mg Intravenous Q12H [address] Roman Rios PA-C   metoprolol succinate 100 mg Oral BID Eyal Yang PA-C   nicotine 1 patch Transdermal Daily Eyal Yang PA-C   nicotine 7 mg Transdermal Once Eyal Yang PA-C   ondansetron 4 mg Intravenous Q6H PRN Eyal Yang PA-C   pantoprazole 40 mg Oral Early Morning Eyal Yang PA-C   pregabalin 300 mg Oral TID Eyal Yang PA-C   ranolazine 500 mg Oral Q12H [facility] Eyal Yang PA-C   sodium chloride 3 mL Nebulization TID Darrin Quintanilla MD   tamsulosin 0 4 mg Oral Daily Eyal Yang PA-C   tiotropium 18 mcg Inhalation Daily Eyal Yang PA-C   traMADol 50 mg Oral Q6H PRN Valerie Vera PA-C        Today, Patient Was Seen By: Valerie Vera PA-C    ** Please Note: Dictation voice to text software may have been used in the creation of this document  **

## 2018-07-10 NOTE — ASSESSMENT & PLAN NOTE
-mild wheezing noted today   -will start IV solumedrol 40 mg IV every 12 hours given patient's repeat ABG  -BiPAP, transfer to ICU    -Respiratory protocol  -Continue home medication

## 2018-07-11 ENCOUNTER — APPOINTMENT (INPATIENT)
Dept: RADIOLOGY | Facility: HOSPITAL | Age: 73
DRG: 280 | End: 2018-07-11
Payer: COMMERCIAL

## 2018-07-11 PROBLEM — I50.21 ACUTE SYSTOLIC CHF (CONGESTIVE HEART FAILURE) (HCC): Status: ACTIVE | Noted: 2018-07-11

## 2018-07-11 LAB
ANION GAP SERPL CALCULATED.3IONS-SCNC: 6 MMOL/L (ref 4–13)
ARTERIAL PATENCY WRIST A: YES
ARTERIAL PATENCY WRIST A: YES
BACTERIA UR CULT: ABNORMAL
BACTERIA UR CULT: ABNORMAL
BASE EXCESS BLDA CALC-SCNC: 2 MMOL/L
BASE EXCESS BLDA CALC-SCNC: 2.9 MMOL/L
BASOPHILS # BLD AUTO: 0.02 THOUSANDS/ΜL (ref 0–0.1)
BASOPHILS NFR BLD AUTO: 0 % (ref 0–1)
BUN SERPL-MCNC: 25 MG/DL (ref 5–25)
CALCIUM SERPL-MCNC: 9.4 MG/DL (ref 8.3–10.1)
CHLORIDE SERPL-SCNC: 105 MMOL/L (ref 100–108)
CO2 SERPL-SCNC: 30 MMOL/L (ref 21–32)
CREAT SERPL-MCNC: 1.11 MG/DL (ref 0.6–1.3)
EOSINOPHIL # BLD AUTO: 0.02 THOUSAND/ΜL (ref 0–0.61)
EOSINOPHIL NFR BLD AUTO: 0 % (ref 0–6)
ERYTHROCYTE [DISTWIDTH] IN BLOOD BY AUTOMATED COUNT: 17.7 % (ref 11.6–15.1)
GFR SERPL CREATININE-BSD FRML MDRD: 65 ML/MIN/1.73SQ M
GLUCOSE SERPL-MCNC: 166 MG/DL (ref 65–140)
GLUCOSE SERPL-MCNC: 182 MG/DL (ref 65–140)
GLUCOSE SERPL-MCNC: 193 MG/DL (ref 65–140)
GLUCOSE SERPL-MCNC: 205 MG/DL (ref 65–140)
GLUCOSE SERPL-MCNC: 263 MG/DL (ref 65–140)
HCO3 BLDA-SCNC: 26.5 MMOL/L (ref 22–28)
HCO3 BLDA-SCNC: 27.7 MMOL/L (ref 22–28)
HCT VFR BLD AUTO: 35.2 % (ref 36.5–49.3)
HGB BLD-MCNC: 10.9 G/DL (ref 12–17)
IPAP: 16
IPAP: 16
LACTATE SERPL-SCNC: 2.8 MMOL/L (ref 0.5–2)
LACTATE SERPL-SCNC: 3.3 MMOL/L (ref 0.5–2)
LYMPHOCYTES # BLD AUTO: 1.05 THOUSANDS/ΜL (ref 0.6–4.47)
LYMPHOCYTES NFR BLD AUTO: 10 % (ref 14–44)
MCH RBC QN AUTO: 22.6 PG (ref 26.8–34.3)
MCHC RBC AUTO-ENTMCNC: 31 G/DL (ref 31.4–37.4)
MCV RBC AUTO: 73 FL (ref 82–98)
MONOCYTES # BLD AUTO: 0.83 THOUSAND/ΜL (ref 0.17–1.22)
MONOCYTES NFR BLD AUTO: 8 % (ref 4–12)
NEUTROPHILS # BLD AUTO: 8.99 THOUSANDS/ΜL (ref 1.85–7.62)
NEUTS SEG NFR BLD AUTO: 82 % (ref 43–75)
NON VENT- BIPAP: ABNORMAL
NON VENT- BIPAP: ABNORMAL
O2 CT BLDA-SCNC: 14.4 ML/DL (ref 16–23)
O2 CT BLDA-SCNC: 15.7 ML/DL (ref 16–23)
OXYHGB MFR BLDA: 86.3 % (ref 94–97)
OXYHGB MFR BLDA: 93.6 % (ref 94–97)
PCO2 BLDA: 41 MM HG (ref 36–44)
PCO2 BLDA: 43.2 MM HG (ref 36–44)
PEEP MAX SETTING VENT: 8 CM[H2O]
PEEP MAX SETTING VENT: 8 CM[H2O]
PH BLDA: 7.42 [PH] (ref 7.35–7.45)
PH BLDA: 7.43 [PH] (ref 7.35–7.45)
PLATELET # BLD AUTO: 300 THOUSANDS/UL (ref 149–390)
PMV BLD AUTO: 9.8 FL (ref 8.9–12.7)
PO2 BLDA: 53 MM HG (ref 75–129)
PO2 BLDA: 76.2 MM HG (ref 75–129)
POTASSIUM SERPL-SCNC: 4.2 MMOL/L (ref 3.5–5.3)
PROCALCITONIN SERPL-MCNC: <0.05 NG/ML
RBC # BLD AUTO: 4.82 MILLION/UL (ref 3.88–5.62)
SODIUM SERPL-SCNC: 141 MMOL/L (ref 136–145)
SPECIMEN SOURCE: ABNORMAL
SPECIMEN SOURCE: ABNORMAL
TROPONIN I SERPL-MCNC: 0.09 NG/ML
TROPONIN I SERPL-MCNC: 0.11 NG/ML
VENT BIPAP FIO2: 28 %
VENT BIPAP FIO2: 28 %
WBC # BLD AUTO: 10.91 THOUSAND/UL (ref 4.31–10.16)

## 2018-07-11 PROCEDURE — 97530 THERAPEUTIC ACTIVITIES: CPT

## 2018-07-11 PROCEDURE — 97116 GAIT TRAINING THERAPY: CPT | Performed by: PHYSICAL THERAPIST

## 2018-07-11 PROCEDURE — 82805 BLOOD GASES W/O2 SATURATION: CPT | Performed by: INTERNAL MEDICINE

## 2018-07-11 PROCEDURE — G8979 MOBILITY GOAL STATUS: HCPCS | Performed by: PHYSICAL THERAPIST

## 2018-07-11 PROCEDURE — 83605 ASSAY OF LACTIC ACID: CPT | Performed by: PHYSICIAN ASSISTANT

## 2018-07-11 PROCEDURE — G8978 MOBILITY CURRENT STATUS: HCPCS | Performed by: PHYSICAL THERAPIST

## 2018-07-11 PROCEDURE — 36600 WITHDRAWAL OF ARTERIAL BLOOD: CPT

## 2018-07-11 PROCEDURE — 99291 CRITICAL CARE FIRST HOUR: CPT | Performed by: PHYSICIAN ASSISTANT

## 2018-07-11 PROCEDURE — 94660 CPAP INITIATION&MGMT: CPT

## 2018-07-11 PROCEDURE — 80048 BASIC METABOLIC PNL TOTAL CA: CPT | Performed by: PHYSICIAN ASSISTANT

## 2018-07-11 PROCEDURE — 94760 N-INVAS EAR/PLS OXIMETRY 1: CPT

## 2018-07-11 PROCEDURE — 83605 ASSAY OF LACTIC ACID: CPT | Performed by: INTERNAL MEDICINE

## 2018-07-11 PROCEDURE — 97164 PT RE-EVAL EST PLAN CARE: CPT | Performed by: PHYSICAL THERAPIST

## 2018-07-11 PROCEDURE — 84484 ASSAY OF TROPONIN QUANT: CPT | Performed by: INTERNAL MEDICINE

## 2018-07-11 PROCEDURE — 99223 1ST HOSP IP/OBS HIGH 75: CPT | Performed by: INTERNAL MEDICINE

## 2018-07-11 PROCEDURE — 84145 PROCALCITONIN (PCT): CPT | Performed by: INTERNAL MEDICINE

## 2018-07-11 PROCEDURE — G8987 SELF CARE CURRENT STATUS: HCPCS

## 2018-07-11 PROCEDURE — 92610 EVALUATE SWALLOWING FUNCTION: CPT | Performed by: SPEECH-LANGUAGE PATHOLOGIST

## 2018-07-11 PROCEDURE — 71045 X-RAY EXAM CHEST 1 VIEW: CPT

## 2018-07-11 PROCEDURE — G8988 SELF CARE GOAL STATUS: HCPCS

## 2018-07-11 PROCEDURE — 85025 COMPLETE CBC W/AUTO DIFF WBC: CPT | Performed by: PHYSICIAN ASSISTANT

## 2018-07-11 PROCEDURE — 94640 AIRWAY INHALATION TREATMENT: CPT

## 2018-07-11 PROCEDURE — 97167 OT EVAL HIGH COMPLEX 60 MIN: CPT

## 2018-07-11 PROCEDURE — 82948 REAGENT STRIP/BLOOD GLUCOSE: CPT

## 2018-07-11 RX ORDER — FUROSEMIDE 10 MG/ML
40 INJECTION INTRAMUSCULAR; INTRAVENOUS
Status: DISCONTINUED | OUTPATIENT
Start: 2018-07-11 | End: 2018-07-14

## 2018-07-11 RX ORDER — HEPARIN SODIUM 1000 [USP'U]/ML
2000 INJECTION, SOLUTION INTRAVENOUS; SUBCUTANEOUS AS NEEDED
Status: DISCONTINUED | OUTPATIENT
Start: 2018-07-11 | End: 2018-07-11

## 2018-07-11 RX ORDER — HYDRALAZINE HYDROCHLORIDE 20 MG/ML
10 INJECTION INTRAMUSCULAR; INTRAVENOUS EVERY 6 HOURS PRN
Status: DISCONTINUED | OUTPATIENT
Start: 2018-07-11 | End: 2018-07-15 | Stop reason: HOSPADM

## 2018-07-11 RX ORDER — HEPARIN SODIUM 10000 [USP'U]/100ML
3-20 INJECTION, SOLUTION INTRAVENOUS
Status: DISCONTINUED | OUTPATIENT
Start: 2018-07-11 | End: 2018-07-11

## 2018-07-11 RX ORDER — HEPARIN SODIUM 1000 [USP'U]/ML
4000 INJECTION, SOLUTION INTRAVENOUS; SUBCUTANEOUS AS NEEDED
Status: DISCONTINUED | OUTPATIENT
Start: 2018-07-11 | End: 2018-07-11

## 2018-07-11 RX ORDER — LABETALOL HYDROCHLORIDE 5 MG/ML
10 INJECTION, SOLUTION INTRAVENOUS ONCE
Status: DISCONTINUED | OUTPATIENT
Start: 2018-07-11 | End: 2018-07-11

## 2018-07-11 RX ADMIN — FUROSEMIDE 40 MG: 10 INJECTION, SOLUTION INTRAMUSCULAR; INTRAVENOUS at 10:02

## 2018-07-11 RX ADMIN — METOPROLOL SUCCINATE 100 MG: 100 TABLET, EXTENDED RELEASE ORAL at 17:10

## 2018-07-11 RX ADMIN — LABETALOL 20 MG/4 ML (5 MG/ML) INTRAVENOUS SYRINGE 10 MG: at 10:02

## 2018-07-11 RX ADMIN — ISODIUM CHLORIDE 3 ML: 0.03 SOLUTION RESPIRATORY (INHALATION) at 13:54

## 2018-07-11 RX ADMIN — HYDRALAZINE HYDROCHLORIDE 10 MG: 20 INJECTION INTRAMUSCULAR; INTRAVENOUS at 11:12

## 2018-07-11 RX ADMIN — LEVALBUTEROL 1.25 MG: 1.25 SOLUTION, CONCENTRATE RESPIRATORY (INHALATION) at 20:00

## 2018-07-11 RX ADMIN — TRAMADOL HYDROCHLORIDE 50 MG: 50 TABLET, FILM COATED ORAL at 05:03

## 2018-07-11 RX ADMIN — PANTOPRAZOLE SODIUM 40 MG: 40 TABLET, DELAYED RELEASE ORAL at 05:04

## 2018-07-11 RX ADMIN — PREGABALIN 300 MG: 75 CAPSULE ORAL at 16:43

## 2018-07-11 RX ADMIN — LEVALBUTEROL 1.25 MG: 1.25 SOLUTION, CONCENTRATE RESPIRATORY (INHALATION) at 13:54

## 2018-07-11 RX ADMIN — CLONIDINE HYDROCHLORIDE 0.1 MG: 0.1 TABLET ORAL at 10:02

## 2018-07-11 RX ADMIN — LISINOPRIL 20 MG: 20 TABLET ORAL at 10:01

## 2018-07-11 RX ADMIN — NICOTINE 1 PATCH: 7 PATCH, EXTENDED RELEASE TRANSDERMAL at 10:05

## 2018-07-11 RX ADMIN — TIOTROPIUM BROMIDE 18 MCG: 18 CAPSULE ORAL; RESPIRATORY (INHALATION) at 10:17

## 2018-07-11 RX ADMIN — TAMSULOSIN HYDROCHLORIDE 0.4 MG: 0.4 CAPSULE ORAL at 10:01

## 2018-07-11 RX ADMIN — ASPIRIN 81 MG 81 MG: 81 TABLET ORAL at 10:02

## 2018-07-11 RX ADMIN — FUROSEMIDE 40 MG: 10 INJECTION, SOLUTION INTRAMUSCULAR; INTRAVENOUS at 16:43

## 2018-07-11 RX ADMIN — INSULIN LISPRO 3 UNITS: 100 INJECTION, SOLUTION INTRAVENOUS; SUBCUTANEOUS at 21:31

## 2018-07-11 RX ADMIN — METHYLPREDNISOLONE SODIUM SUCCINATE 40 MG: 40 INJECTION, POWDER, FOR SOLUTION INTRAMUSCULAR; INTRAVENOUS at 14:33

## 2018-07-11 RX ADMIN — LEVALBUTEROL 1.25 MG: 1.25 SOLUTION, CONCENTRATE RESPIRATORY (INHALATION) at 08:53

## 2018-07-11 RX ADMIN — APIXABAN 5 MG: 5 TABLET, FILM COATED ORAL at 17:52

## 2018-07-11 RX ADMIN — INSULIN LISPRO 1 UNITS: 100 INJECTION, SOLUTION INTRAVENOUS; SUBCUTANEOUS at 10:18

## 2018-07-11 RX ADMIN — METOPROLOL SUCCINATE 100 MG: 100 TABLET, EXTENDED RELEASE ORAL at 09:59

## 2018-07-11 RX ADMIN — METHYLPREDNISOLONE SODIUM SUCCINATE 40 MG: 40 INJECTION, POWDER, FOR SOLUTION INTRAMUSCULAR; INTRAVENOUS at 01:29

## 2018-07-11 RX ADMIN — INSULIN LISPRO 2 UNITS: 100 INJECTION, SOLUTION INTRAVENOUS; SUBCUTANEOUS at 11:12

## 2018-07-11 RX ADMIN — ISODIUM CHLORIDE 3 ML: 0.03 SOLUTION RESPIRATORY (INHALATION) at 20:01

## 2018-07-11 RX ADMIN — ISODIUM CHLORIDE 3 ML: 0.03 SOLUTION RESPIRATORY (INHALATION) at 08:53

## 2018-07-11 RX ADMIN — RANOLAZINE 500 MG: 500 TABLET, FILM COATED, EXTENDED RELEASE ORAL at 21:23

## 2018-07-11 RX ADMIN — TRAMADOL HYDROCHLORIDE 50 MG: 50 TABLET, FILM COATED ORAL at 22:15

## 2018-07-11 RX ADMIN — ATORVASTATIN CALCIUM 40 MG: 40 TABLET, FILM COATED ORAL at 10:01

## 2018-07-11 RX ADMIN — PREGABALIN 300 MG: 75 CAPSULE ORAL at 21:23

## 2018-07-11 RX ADMIN — PREGABALIN 300 MG: 75 CAPSULE ORAL at 09:59

## 2018-07-11 RX ADMIN — INSULIN LISPRO 1 UNITS: 100 INJECTION, SOLUTION INTRAVENOUS; SUBCUTANEOUS at 16:43

## 2018-07-11 RX ADMIN — DILTIAZEM HYDROCHLORIDE 120 MG: 120 CAPSULE, COATED, EXTENDED RELEASE ORAL at 10:01

## 2018-07-11 RX ADMIN — RANOLAZINE 500 MG: 500 TABLET, FILM COATED, EXTENDED RELEASE ORAL at 10:17
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
## 2018-07-11 NOTE — ASSESSMENT & PLAN NOTE
-patient's creatinine is noted to be within normal range at 1 21 on admission, however his baseline creatinine appears to be about 0 8   -creatinine stable   -continue to monitior

## 2018-07-11 NOTE — ASSESSMENT & PLAN NOTE
-chest x-ray shows enlarging right pleural effusion  -echo shows systolic CHF with EF of 34-65%  -patient started on IV lasix 40 mg daily last night   -Cardiology consulted  -continue Toprol and lisinopril

## 2018-07-11 NOTE — CASE MANAGEMENT
Thank you,  Luana Aqq  [facility] Utilization Review Department  Phone: [phone]; Fax [phone]  ATTENTION: The Network Utilization Review Department is now centralized for our 9 Facilities  Make a note that we have a new phone and fax numbers for our Department  Please call with any questions or concerns to [phone] and carefully follow the prompts so that you are directed to the right person  All voicemails are confidential  Fax any determinations, approvals, denials, and requests for initial or continue stay review clinical to [phone]  Due to HIGH CALL volume, it would be easier if you could please send faxed requests to expedite your requests and in part, help us provide discharge notifications faster    Continued Stay Review    Date: 7/11/18    Vital Signs: /65   Pulse 81   Temp (!) 97 °F (36 1 °C) (Temporal)   Resp (!) 28   Ht 5' 9" (1 753 m)   Wt 87 3 kg (192 lb 7 4 oz)   SpO2 98%   BMI 28 42 kg/m²     Medications:   Scheduled Meds:   Current Facility-Administered Medications:  albuterol 2 5 mg Nebulization Q6H PRN Darrin Quintanilla MD   apixaban 5 mg Oral BID Robel Pérez PA-C   aspirin 81 mg Oral Daily Eyal Yang PA-C   atorvastatin 40 mg Oral Daily Eyal Yang PA-C   cloNIDine 0 1 mg Oral Daily Eyal Yang PA-C   diltiazem 120 mg Oral Daily Robel Pérez PA-C   furosemide 40 mg Intravenous BID (diuretic) Jac Swift DO   hydrALAZINE 10 mg Intravenous Q6H PRN Roman Rios PA-C   insulin lispro 1-6 Units Subcutaneous TID AC Eyal Yang PA-C   insulin lispro 1-6 Units Subcutaneous HS Eyal Yang PA-C   labetalol 10 mg Intravenous Q4H PRN Jac Swift DO   levalbuterol 1 25 mg Nebulization TID Darrin Quintanilla MD   lisinopril 20 mg Oral Daily Eyal Yang PA-C   methylPREDNISolone sodium succinate 40 mg Intravenous Q12H [facility] Roman Rios PA-C   metoprolol succinate 100 mg Oral BID Eyal Yang PA-C   nicotine 1 patch Transdermal Daily Eyal Yang CECE   ondansetron 4 mg Intravenous Q6H PRN Eyal Yang PA-C   pantoprazole 40 mg Oral Early Morning Eyal Yang PA-C   pregabalin 300 mg Oral TID Eyal Yang PA-C   ranolazine 500 mg Oral Q12H [address] Eyal Yang PA-C   sodium chloride 3 mL Nebulization TID Darrin Quintanilla MD   tamsulosin 0 4 mg Oral Daily Eyal Yang PA-C   tiotropium 18 mcg Inhalation Daily Eyal Yang PA-C   traMADol 50 mg Oral Q6H PRN Ynes Mack PA-C     Continuous Infusions:    PRN Meds:   albuterol    hydrALAZINE    labetalol    ondansetron    traMADol    Abnormal Labs/Diagnostic Results:   Lab Units 07/11/18  0509   WBC Thousand/uL 10 91*   HEMOGLOBIN g/dL 10 9*   HEMATOCRIT % 35 2*   PLATELETS Thousands/uL 300   NEUTROS PCT % 82*   LYMPHS PCT % 10*   MONOS PCT % 8   EOS PCT % 0         Results from last 7 days  Lab Units 07/11/18  0509 07/10/18  0522   SODIUM mmol/L 141 142   POTASSIUM mmol/L 4 2 4 4   CHLORIDE mmol/L 105 106   CO2 mmol/L 30 29   BUN mg/dL 25 23   CREATININE mg/dL 1 11 1 03   CALCIUM mg/dL 9 4 9 7   TOTAL PROTEIN g/dL  --  5 8*   BILIRUBIN TOTAL mg/dL  --  0 50   ALK PHOS U/L  --  93   ALT U/L  --  47   AST U/L  --  23   GLUCOSE RANDOM mg/dL 193* 114         Results from last 7 days  Lab Units 07/07/18  0512   INR   1 33*         Results from last 7 days  Lab Units 07/11/18  0833 07/10/18  2106 07/10/18  1717 07/10/18  1139 07/10/18  0804 07/09/18  2121 07/09/18  1559 07/09/18  1143 07/09/18  0719 07/08/18  2053 07/08/18  1604 07/08/18  1156   POC GLUCOSE mg/dl 166* 220* 95 178* 105 103 102 125 125 145* 107 117                * I Have Reviewed All Lab Data Listed Above  * Additional Pertinent Lab Tests Reviewed:  All Labs Within Last 24 Hours Reviewed     Imaging:     Imaging Reports Reviewed Today Include: CTA chest & chest x-ray  Imaging Personally Reviewed by Myself Includes:  none     Recent Cultures (last 7 days):         Results from last 7 days  Lab Units 07/08/18  1701   URINE CULTURE   >100,000 cfu/ml Enterococcus faecalis*       Age/Sex: 74 y o  male     Assessment/Plan:   * Acute respiratory failure with hypoxia and hypercapnia ([facility])   Assessment & Plan     -CTA chest with PE protocol showed increasing large pleural effusions with overlying compressive atelectasis  No acute pulmonary embolism   -possibly due to systolic CHF given EF of 87-37 % on recent echo   -patient started on IV Lasix 40 mg daily   -Pulmonology was consulted, possible thoracentesis today  Will stop eliquis for now    -patient currently off BiPAP  Continue BiPAP PRN and HS   -continue IV solumedrol 40 mg IV every 12 hours   -respiratory proctol   -chest x-ray showed enlarging right pleural effusion  Stable small left pleural effusion          Acute systolic CHF (congestive heart failure) ([facility])   Assessment & Plan     -chest x-ray shows enlarging right pleural effusion  -echo shows systolic CHF with EF of 83-51%  -patient started on IV lasix 40 mg daily last night   -Cardiology consulted  -continue Toprol and lisinopril              COPD (chronic obstructive pulmonary disease) ([facility])   Assessment & Plan     -mild wheezing noted today   -continue IV solumedrol 40 mg IV every 12 hours   -BiPAP PRN and HS   -Respiratory protocol  -Continue home medication          Acute kidney injury ([facility] [address] )   Assessment & Plan     -patient's creatinine is noted to be within normal range at 1 21 on admission, however his baseline creatinine appears to be about 0 8   -creatinine stable   -continue to monitior          Hypernatremia   Assessment & Plan     -the patient was noted to have a sodium of 146 which increased to 147 on NS IV fluids   -Likely due to dehydration   -resolved  -continue to monitor            Hypokalemia   Assessment & Plan     -Potassium at 3 3 on admission   -resolved with potassium supplementation    -continue to monitor           Lactic acidosis   Assessment & Plan     -Lactic acid at noted to be 3 3 this am   -repeat at 1100    -continue to monitor           Type 2 diabetes mellitus without complication, without long-term current use of insulin [facility])   Assessment & Plan             Lab Results   Component Value Date     HGBA1C 7 0 (H) 05/07/2018                Recent Labs      07/10/18   1139  07/10/18   1717  07/10/18   2106  07/11/18   0833   POCGLU  178*  95  220*  166*         -sliding scale insulin, fingerstick glucose 4 times daily AC/HS  -continue to monitor blood glucose trends           Persistent atrial fibrillation (HCC)   Assessment & Plan     -Rate controlled  - eliquis held due to possible thoracentesis later today   -Continue home medications          Dyslipidemia   Assessment & Plan     -Continue statins          Tobacco abuse   Assessment & Plan     -Smoking cessation counseling  -Nicotine patch          Dysphagia   Assessment & Plan     -speech was consulted however the patient refused to work with the speech therapist            Syncope and collapse   Assessment & Plan     -dizziness resolved  -has hx of CVA with residual right sided   -head CT shows-No acute intracranial abnormality  Microangiopathic changes   -Continue statin, Aspirin  -MRI brain negative for acute findings  -repeat CT head on 7/9/18 was negative for acute findings                 NSTEMI (non-ST elevated myocardial infarction) [facility])   Assessment & Plan     -likely type 2 in the setting of acute respiratory failure and acute CHF  -troponin was noted to be 0 13, now trending down   -cardiology consulted   -telemetry monitoring   -continue aspirin, statin, and beta blocker           Essential hypertension   Assessment & Plan     - the patient's blood pressure is noted to be elevated this am as high as 216/142   - continue catapres, Cardizem, lisinopril, and Toprol  -IV PRN labetalol and IV hydralazine   -continue to monitor                 VTE Pharmacologic Prophylaxis:   Pharmacologic: Apixaban (Eliquis)  Mechanical VTE Prophylaxis in Place:  Yes     Patient Centered Rounds: I have performed bedside rounds with nursing staff today      Discussions with Specialists or Other Care Team Provider: Nursing, CM, and attending     Education and Discussions with Family / Patient: n/a     Time Spent for Care: 30 minutes    More than 50% of total time spent on counseling and coordination of care as described above      Current Length of Stay: 4 day(s)     Current Patient Status: Inpatient   Certification Statement: The patient will continue to require additional inpatient hospital stay due to continued treatment of acute respiratory failure/CHF with IV lasix and Bipap     Discharge Plan: TBD
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
## 2018-07-11 NOTE — ASSESSMENT & PLAN NOTE
-CTA chest with PE protocol showed increasing large pleural effusions with overlying compressive atelectasis  No acute pulmonary embolism   -possibly due to systolic CHF given EF of 33-17 % on recent echo   -patient started on IV Lasix 40 mg daily   -Pulmonology was consulted, possible thoracentesis today  Will stop eliquis for now    -patient currently off BiPAP  Continue BiPAP PRN and HS   -continue IV solumedrol 40 mg IV every 12 hours   -respiratory proctol   -chest x-ray showed enlarging right pleural effusion   Stable small left pleural effusion

## 2018-07-11 NOTE — ASSESSMENT & PLAN NOTE
-Rate controlled  - eliquis held due to possible thoracentesis later today   -Continue home medications
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
## 2018-07-11 NOTE — ASSESSMENT & PLAN NOTE
-likely type 2 in the setting of acute respiratory failure and acute CHF  -troponin was noted to be 0 13, now trending down   -cardiology consulted   -telemetry monitoring   -continue aspirin, statin, and beta blocker

## 2018-07-11 NOTE — ASSESSMENT & PLAN NOTE
-dizziness resolved  -has hx of CVA with residual right sided   -head CT shows-No acute intracranial abnormality  Microangiopathic changes   -Continue statin, Aspirin  -MRI brain negative for acute findings  -repeat CT head on 7/9/18 was negative for acute findings  Bartholome Pinks

## 2018-07-11 NOTE — ASSESSMENT & PLAN NOTE
-mild wheezing noted today   -continue IV solumedrol 40 mg IV every 12 hours   -BiPAP PRN and HS   -Respiratory protocol  -Continue home medication

## 2018-07-11 NOTE — NURSING NOTE
Patient removing BIPAP and climbing out of bed to use urinal  Educated on call bell use  Initially refused to have BIPAP placed back on, respiratory staff at bedside provided additional education and he is agreeable at this time  He is also refusing bladder scan

## 2018-07-11 NOTE — ASSESSMENT & PLAN NOTE
- the patient's blood pressure is noted to be elevated this am as high as 216/142   - continue catapres, Cardizem, lisinopril, and Toprol  -IV PRN labetalol and IV hydralazine   -continue to monitor

## 2018-07-11 NOTE — PHYSICAL THERAPY NOTE
PT Re-Evaluation     07/11/18 1036   Note Type   Note type Re-eval   Pain Assessment   Pain Assessment No/denies pain   Pain Score No Pain   Home Living   Additional Comments See Initial evaluation for home living   Prior Function   Comments see initial evaluation for prior function   Restrictions/Precautions   Other Precautions Contact/isolation;Telemetry;O2;Fall Risk   General   Family/Caregiver Present No   Cognition   Arousal/Participation Alert   Orientation Level Oriented X4   Following Commands Follows all commands and directions without difficulty   RLE Assessment   RLE Assessment X  (limited hip flex and knee ext, ankle WFL)   LLE Assessment   LLE Assessment WFL  (4 to 4+/5)   Coordination   Sensation WFL   Light Touch   RLE Light Touch Impaired   LLE Light Touch Grossly intact   Bed Mobility   Additional Comments pt seated at EOB when PT entered room  Pt on 3 5L's O2 with spO2 97% HR 72  Pt ambulated on 4L's with SpO2 after ambulation 97% HR 88 however pt with wheezing and lightheadedness limiting ambulation   Transfers   Sit to Stand 5  Supervision   Additional items Verbal cues  (with SPC)   Stand to Sit 5  Supervision   Additional items Verbal cues  (with SPC)   Stand pivot (CGA)   Additional items Verbal cues  (with SPC)   Additional Comments Pt with limited ambulation to 50ft due to lightheadedness fatigue and wheezing   Pt with mild unsteadiness and at increased risk for falls   Ambulation/Elevation   Gait pattern Short stride;Narrow BRAULIO   Gait Assistance (CGA)   Assistive Device SPC   Distance 50ft with SPC CGA with limited ambulation due to fatigue lightheadedness and wheezing   Balance   Static Sitting Good   Dynamic Sitting Good   Static Standing Fair +  (with SPC)   Dynamic Standing Fair  (with SPC)   Ambulatory Fair  (with SPC)   Endurance Deficit   Endurance Deficit Yes   Endurance Deficit Description limited ambulation and activity tolerance due to fatigue lightheadedness and wheezing Activity Tolerance   Activity Tolerance Patient limited by fatigue   Assessment   Prognosis Good   Problem List Decreased strength;Decreased range of motion;Decreased endurance; Impaired balance;Decreased mobility; Decreased safety awareness; Impaired sensation   Assessment Pt was evaluated and being seen by PT for ROM strengthening balance gait and mobility training however was transfered to CCU due to decline in respiratory status and placed on hold from PT  Recieved restart PT order therefore pt re-evaluated and presents with decreased ROM strength balance endurance and functional mobility requiring 3 5 to 4L's O2 to maintain SpO2 greater than 90% with exertion and ambulation  Pt with limited ambulation due to lightheadedness with decreased balance  Pt is requiring CGA to (S) for all functional mobility and would benefit from continued activity in PT to improve all impairments and functional deficits  Pt would benefit from short term rehab on discharge as he is unsafe to return home due to increased fall risk decreased strength balance and endurance  Goals   Patient Goals To feel better and return home   LTG Expiration Date 07/25/18   Long Term Goal #1 (I) with all bed mobility and transfers no A D  Long Term Goal #2 Pt will ambulate 225ft with SPC (S) and will ascend/descend 4 steps with HR (S)   Plan   Treatment/Interventions Functional transfer training;LE strengthening/ROM; Therapeutic exercise;Elevations; Endurance training;Patient/family training;Bed mobility;Gait training   PT Frequency (3-5x/wk)   Recommendation   Recommendation Short-term skilled PT   PT - OK to Discharge No  (when medically stable for discharge)   no SCD's pt refused  Pt seated at EOB with call bell in reach and nursing in room

## 2018-07-11 NOTE — ASSESSMENT & PLAN NOTE
Lab Results   Component Value Date    HGBA1C 7 0 (H) 05/07/2018       Recent Labs      07/10/18   1139  07/10/18   1717  07/10/18   2106  07/11/18   0833   POCGLU  178*  95  220*  166*       -sliding scale insulin, fingerstick glucose 4 times daily AC/HS  -continue to monitor blood glucose trends

## 2018-07-11 NOTE — PROGRESS NOTES
Progress Note - Edis Araiza 1945, 74 y o  male MRN: [id number]    Unit/Bed#:  Encounter: [id number]    Primary Care Provider: Joel Corrales DO   Date and time admitted to hospital: 7/6/2018  7:56 PM        * Acute respiratory failure with hypoxia and hypercapnia ([facility] [address] )   Assessment & Plan    -CTA chest with PE protocol showed increasing large pleural effusions with overlying compressive atelectasis  No acute pulmonary embolism   -possibly due to systolic CHF given EF of 74-74 % on recent echo   -patient started on IV Lasix 40 mg daily   -Pulmonology was consulted, possible thoracentesis today  Will stop eliquis for now    -patient currently off BiPAP  Continue BiPAP PRN and HS   -continue IV solumedrol 40 mg IV every 12 hours   -respiratory proctol   -chest x-ray showed enlarging right pleural effusion  Stable small left pleural effusion        Acute systolic CHF (congestive heart failure) ([facility] [address] )   Assessment & Plan    -chest x-ray shows enlarging right pleural effusion  -echo shows systolic CHF with EF of 76-19%  -patient started on IV lasix 40 mg daily last night   -Cardiology consulted  -continue Toprol and lisinopril  COPD (chronic obstructive pulmonary disease) (HCC)   Assessment & Plan    -mild wheezing noted today   -continue IV solumedrol 40 mg IV every 12 hours   -BiPAP PRN and HS   -Respiratory protocol  -Continue home medication        Acute kidney injury ([facility] [address] )   Assessment & Plan    -patient's creatinine is noted to be within normal range at 1 21 on admission, however his baseline creatinine appears to be about 0 8   -creatinine stable   -continue to monitior        Hypernatremia   Assessment & Plan    -the patient was noted to have a sodium of 146 which increased to 147 on NS IV fluids   -Likely due to dehydration   -resolved  -continue to monitor  Hypokalemia   Assessment & Plan    -Potassium at 3 3 on admission   -resolved with potassium supplementation  -continue to monitor  Lactic acidosis   Assessment & Plan    -Lactic acid at noted to be 3 3 this am   -repeat at 1100  -continue to monitor  Type 2 diabetes mellitus without complication, without long-term current use of insulin [facility])   Assessment & Plan    Lab Results   Component Value Date    HGBA1C 7 0 (H) 05/07/2018       Recent Labs      07/10/18   1139  07/10/18   1717  07/10/18   2106  07/11/18   0833   POCGLU  178*  95  220*  166*       -sliding scale insulin, fingerstick glucose 4 times daily AC/HS  -continue to monitor blood glucose trends  Persistent atrial fibrillation (HCC)   Assessment & Plan    -Rate controlled  - eliquis held due to possible thoracentesis later today   -Continue home medications        Dyslipidemia   Assessment & Plan    -Continue statins        Tobacco abuse   Assessment & Plan    -Smoking cessation counseling  -Nicotine patch        Dysphagia   Assessment & Plan    -speech was consulted however the patient refused to work with the speech therapist          Syncope and collapse   Assessment & Plan    -dizziness resolved  -has hx of CVA with residual right sided   -head CT shows-No acute intracranial abnormality  Microangiopathic changes   -Continue statin, Aspirin  -MRI brain negative for acute findings  -repeat CT head on 7/9/18 was negative for acute findings              NSTEMI (non-ST elevated myocardial infarction) [facility])   Assessment & Plan    -likely type 2 in the setting of acute respiratory failure and acute CHF  -troponin was noted to be 0 13, now trending down   -cardiology consulted   -telemetry monitoring   -continue aspirin, statin, and beta blocker  Essential hypertension   Assessment & Plan    - the patient's blood pressure is noted to be elevated this am as high as 216/142   - continue catapres, Cardizem, lisinopril, and Toprol  -IV PRN labetalol and IV hydralazine   -continue to monitor              VTE Pharmacologic Prophylaxis: Pharmacologic: Apixaban (Eliquis)  Mechanical VTE Prophylaxis in Place: Yes    Patient Centered Rounds: I have performed bedside rounds with nursing staff today  Discussions with Specialists or Other Care Team Provider: Nursing, CM, and attending    Education and Discussions with Family / Patient: n/a    Time Spent for Care: 30 minutes  More than 50% of total time spent on counseling and coordination of care as described above  Current Length of Stay: 4 day(s)    Current Patient Status: Inpatient   Certification Statement: The patient will continue to require additional inpatient hospital stay due to continued treatment of acute respiratory failure/CHF with IV lasix and Bipap    Discharge Plan: TBD    Code Status: Level 1 - Full Code      Subjective: The patient was seen and examined  The patient states he breathing has improved and he is feeling better  Objective:     Vitals:   Temp (24hrs), Av 1 °F (36 2 °C), Min:96 4 °F (35 8 °C), Max:98 6 °F (37 °C)    HR:  [58-94] 88  Resp:  [18-40] 25  BP: (125-216)/() 187/84  SpO2:  [91 %-99 %] 96 %  Body mass index is 28 42 kg/m²  Input and Output Summary (last 24 hours): Intake/Output Summary (Last 24 hours) at 18 1103  Last data filed at 18 1038   Gross per 24 hour   Intake              480 ml   Output             3000 ml   Net            -2520 ml       Physical Exam:     Physical Exam   Constitutional: He is oriented to person, place, and time  He appears well-developed and well-nourished  He is active and cooperative  Cardiovascular: Normal rate and regular rhythm  Pulmonary/Chest: Effort normal  He has decreased breath sounds in the right middle field, the right lower field, the left middle field and the left lower field  He has wheezes (mild scattered wheezes)  He has no rhonchi  He has no rales  Abdominal: Soft  Normal appearance and bowel sounds are normal  There is no tenderness     Neurological: He is alert and oriented to person, place, and time  No cranial nerve deficit  Skin: Skin is warm, dry and intact  Nursing note and vitals reviewed  Additional Data:     Labs:      Results from last 7 days  Lab Units 07/11/18  0509   WBC Thousand/uL 10 91*   HEMOGLOBIN g/dL 10 9*   HEMATOCRIT % 35 2*   PLATELETS Thousands/uL 300   NEUTROS PCT % 82*   LYMPHS PCT % 10*   MONOS PCT % 8   EOS PCT % 0       Results from last 7 days  Lab Units 07/11/18  0509 07/10/18  0522   SODIUM mmol/L 141 142   POTASSIUM mmol/L 4 2 4 4   CHLORIDE mmol/L 105 106   CO2 mmol/L 30 29   BUN mg/dL 25 23   CREATININE mg/dL 1 11 1 03   CALCIUM mg/dL 9 4 9 7   TOTAL PROTEIN g/dL  --  5 8*   BILIRUBIN TOTAL mg/dL  --  0 50   ALK PHOS U/L  --  93   ALT U/L  --  47   AST U/L  --  23   GLUCOSE RANDOM mg/dL 193* 114       Results from last 7 days  Lab Units 07/07/18  0512   INR  1 33*       Results from last 7 days  Lab Units 07/11/18  0833 07/10/18  2106 07/10/18  1717 07/10/18  1139 07/10/18  0804 07/09/18  2121 07/09/18  1559 07/09/18  1143 07/09/18  0719 07/08/18  2053 07/08/18  1604 07/08/18  1156   POC GLUCOSE mg/dl 166* 220* 95 178* 105 103 102 125 125 145* 107 117             * I Have Reviewed All Lab Data Listed Above  * Additional Pertinent Lab Tests Reviewed:  All Labs Within Last 24 Hours Reviewed    Imaging:    Imaging Reports Reviewed Today Include: CTA chest & chest x-ray  Imaging Personally Reviewed by Myself Includes:  none    Recent Cultures (last 7 days):       Results from last 7 days  Lab Units 07/08/18  1701   URINE CULTURE  >100,000 cfu/ml Enterococcus faecalis*       Last 24 Hours Medication List:     Current Facility-Administered Medications:  albuterol 2 5 mg Nebulization Q6H PRN Darrin Quintanilla MD   aspirin 81 mg Oral Daily Eyal Yang PA-C   atorvastatin 40 mg Oral Daily Eyal Yang PA-C   cloNIDine 0 1 mg Oral Daily Eyal Yang PA-C   diltiazem 120 mg Oral Daily Carlos Schmitz PA-C   furosemide 40 mg Intravenous Daily Paris Cast MD Shira   hydrALAZINE 10 mg Intravenous Q6H PRN Chiki Joshi PA-C   insulin lispro 1-6 Units Subcutaneous TID AC Eyal Yang PA-C   insulin lispro 1-6 Units Subcutaneous HS Eyal Yang PA-C   labetalol 10 mg Intravenous Q4H PRN [employer], DO   levalbuterol 1 25 mg Nebulization TID Darrin Quintanilla MD   lisinopril 20 mg Oral Daily Eyal Yang PA-C   methylPREDNISolone sodium succinate 40 mg Intravenous Q12H [facility] Roman RiosCECE   metoprolol succinate 100 mg Oral BID Eyal Yang PA-C   nicotine 1 patch Transdermal Daily Eyal Yang PA-C   ondansetron 4 mg Intravenous Q6H PRN Eyal Yang PA-C   pantoprazole 40 mg Oral Early Morning Eyal Yang PA-C   pregabalin 300 mg Oral TID Eyal Yang PA-C   ranolazine 500 mg Oral Q12H [facility] Eyal Yang PA-C   sodium chloride 3 mL Nebulization TID Darrin Quintanilla MD   tamsulosin 0 4 mg Oral Daily Eyal Yang PA-C   tiotropium 18 mcg Inhalation Daily Eyal Yang PA-C   traMADol 50 mg Oral Q6H PRN Chiki Joshi PA-C        Today, Patient Was Seen By: Chiki Joshi PA-C    ** Please Note: Dictation voice to text software may have been used in the creation of this document   **

## 2018-07-11 NOTE — RESPIRATORY THERAPY NOTE
pt taking bipap off wants to eat and drink, pt placed on 4 l/m nc and pulse ox 99%, then decreased to 3 l/m pulse ox 95%, NO incr WOB, no distress, will trial off bipap unless distress or incr WOB,  and plans to apply bipap at HS

## 2018-07-11 NOTE — RESPIRATORY THERAPY NOTE
Pt taken off Bipap in order to eat after reviewing ABG results and talking to Dr Angel Bales and Lenny CRAIG  RN [facility] aware  Plan to return to [facility] after eating

## 2018-07-12 ENCOUNTER — APPOINTMENT (INPATIENT)
Dept: RADIOLOGY | Facility: HOSPITAL | Age: 73
DRG: 280 | End: 2018-07-12
Payer: COMMERCIAL

## 2018-07-12 LAB
ALBUMIN SERPL BCP-MCNC: 2.9 G/DL (ref 3.5–5)
ALP SERPL-CCNC: 76 U/L (ref 46–116)
ALT SERPL W P-5'-P-CCNC: 35 U/L (ref 12–78)
ANION GAP SERPL CALCULATED.3IONS-SCNC: 5 MMOL/L (ref 4–13)
ANISOCYTOSIS BLD QL SMEAR: PRESENT
APTT PPP: 34 SECONDS (ref 24–36)
APTT PPP: 88 SECONDS (ref 24–36)
AST SERPL W P-5'-P-CCNC: 15 U/L (ref 5–45)
BASOPHILS # BLD MANUAL: 0 THOUSAND/UL (ref 0–0.1)
BASOPHILS NFR MAR MANUAL: 0 % (ref 0–1)
BILIRUB SERPL-MCNC: 0.6 MG/DL (ref 0.2–1)
BUN SERPL-MCNC: 31 MG/DL (ref 5–25)
CALCIUM SERPL-MCNC: 9.8 MG/DL (ref 8.3–10.1)
CHLORIDE SERPL-SCNC: 102 MMOL/L (ref 100–108)
CO2 SERPL-SCNC: 34 MMOL/L (ref 21–32)
CREAT SERPL-MCNC: 0.98 MG/DL (ref 0.6–1.3)
EOSINOPHIL # BLD MANUAL: 0 THOUSAND/UL (ref 0–0.4)
EOSINOPHIL NFR BLD MANUAL: 0 % (ref 0–6)
ERYTHROCYTE [DISTWIDTH] IN BLOOD BY AUTOMATED COUNT: 17.9 % (ref 11.6–15.1)
GFR SERPL CREATININE-BSD FRML MDRD: 75 ML/MIN/1.73SQ M
GLUCOSE SERPL-MCNC: 121 MG/DL (ref 65–140)
GLUCOSE SERPL-MCNC: 128 MG/DL (ref 65–140)
GLUCOSE SERPL-MCNC: 182 MG/DL (ref 65–140)
GLUCOSE SERPL-MCNC: 298 MG/DL (ref 65–140)
GLUCOSE SERPL-MCNC: 307 MG/DL (ref 65–140)
HCT VFR BLD AUTO: 33.7 % (ref 36.5–49.3)
HGB BLD-MCNC: 10.8 G/DL (ref 12–17)
HYPERCHROMIA BLD QL SMEAR: PRESENT
INR PPP: 1.35 (ref 0.86–1.17)
LACTATE SERPL-SCNC: 2.7 MMOL/L (ref 0.5–2)
LYMPHOCYTES # BLD AUTO: 11 % (ref 14–44)
LYMPHOCYTES # BLD AUTO: 2.03 THOUSAND/UL (ref 0.6–4.47)
MAGNESIUM SERPL-MCNC: 1.7 MG/DL (ref 1.6–2.6)
MCH RBC QN AUTO: 22.9 PG (ref 26.8–34.3)
MCHC RBC AUTO-ENTMCNC: 32 G/DL (ref 31.4–37.4)
MCV RBC AUTO: 71 FL (ref 82–98)
MONOCYTES # BLD AUTO: 1.11 THOUSAND/UL (ref 0–1.22)
MONOCYTES NFR BLD: 6 % (ref 4–12)
NEUTROPHILS # BLD MANUAL: 15.35 THOUSAND/UL (ref 1.85–7.62)
NEUTS BAND NFR BLD MANUAL: 1 % (ref 0–8)
NEUTS SEG NFR BLD AUTO: 82 % (ref 43–75)
PHOSPHATE SERPL-MCNC: 3.3 MG/DL (ref 2.3–4.1)
PLATELET # BLD AUTO: 314 THOUSANDS/UL (ref 149–390)
PLATELET BLD QL SMEAR: ADEQUATE
PMV BLD AUTO: 9.9 FL (ref 8.9–12.7)
POIKILOCYTOSIS BLD QL SMEAR: PRESENT
POTASSIUM SERPL-SCNC: 4.2 MMOL/L (ref 3.5–5.3)
PROCALCITONIN SERPL-MCNC: <0.05 NG/ML
PROT SERPL-MCNC: 5.6 G/DL (ref 6.4–8.2)
PROTHROMBIN TIME: 16 SECONDS (ref 11.8–14.2)
RBC # BLD AUTO: 4.72 MILLION/UL (ref 3.88–5.62)
SODIUM SERPL-SCNC: 141 MMOL/L (ref 136–145)
TOTAL CELLS COUNTED SPEC: 100
WBC # BLD AUTO: 18.49 THOUSAND/UL (ref 4.31–10.16)

## 2018-07-12 PROCEDURE — 94660 CPAP INITIATION&MGMT: CPT

## 2018-07-12 PROCEDURE — 71045 X-RAY EXAM CHEST 1 VIEW: CPT

## 2018-07-12 PROCEDURE — 84100 ASSAY OF PHOSPHORUS: CPT | Performed by: INTERNAL MEDICINE

## 2018-07-12 PROCEDURE — 83605 ASSAY OF LACTIC ACID: CPT | Performed by: INTERNAL MEDICINE

## 2018-07-12 PROCEDURE — 94760 N-INVAS EAR/PLS OXIMETRY 1: CPT

## 2018-07-12 PROCEDURE — 99232 SBSQ HOSP IP/OBS MODERATE 35: CPT | Performed by: PHYSICIAN ASSISTANT

## 2018-07-12 PROCEDURE — 97535 SELF CARE MNGMENT TRAINING: CPT

## 2018-07-12 PROCEDURE — 85610 PROTHROMBIN TIME: CPT | Performed by: PHYSICIAN ASSISTANT

## 2018-07-12 PROCEDURE — 85730 THROMBOPLASTIN TIME PARTIAL: CPT | Performed by: PHYSICIAN ASSISTANT

## 2018-07-12 PROCEDURE — 97116 GAIT TRAINING THERAPY: CPT

## 2018-07-12 PROCEDURE — 85007 BL SMEAR W/DIFF WBC COUNT: CPT | Performed by: PHYSICIAN ASSISTANT

## 2018-07-12 PROCEDURE — 83735 ASSAY OF MAGNESIUM: CPT | Performed by: INTERNAL MEDICINE

## 2018-07-12 PROCEDURE — 94640 AIRWAY INHALATION TREATMENT: CPT

## 2018-07-12 PROCEDURE — 84145 PROCALCITONIN (PCT): CPT | Performed by: INTERNAL MEDICINE

## 2018-07-12 PROCEDURE — 82948 REAGENT STRIP/BLOOD GLUCOSE: CPT

## 2018-07-12 PROCEDURE — 80053 COMPREHEN METABOLIC PANEL: CPT | Performed by: PHYSICIAN ASSISTANT

## 2018-07-12 PROCEDURE — 85730 THROMBOPLASTIN TIME PARTIAL: CPT | Performed by: INTERNAL MEDICINE

## 2018-07-12 PROCEDURE — 85027 COMPLETE CBC AUTOMATED: CPT | Performed by: PHYSICIAN ASSISTANT

## 2018-07-12 RX ORDER — HEPARIN SODIUM 1000 [USP'U]/ML
4000 INJECTION, SOLUTION INTRAVENOUS; SUBCUTANEOUS ONCE
Status: COMPLETED | OUTPATIENT
Start: 2018-07-12 | End: 2018-07-12

## 2018-07-12 RX ORDER — PREDNISONE 20 MG/1
40 TABLET ORAL DAILY
Status: DISCONTINUED | OUTPATIENT
Start: 2018-07-12 | End: 2018-07-15 | Stop reason: HOSPADM

## 2018-07-12 RX ORDER — HEPARIN SODIUM 1000 [USP'U]/ML
2000 INJECTION, SOLUTION INTRAVENOUS; SUBCUTANEOUS AS NEEDED
Status: DISCONTINUED | OUTPATIENT
Start: 2018-07-12 | End: 2018-07-13

## 2018-07-12 RX ORDER — DILTIAZEM HYDROCHLORIDE 180 MG/1
180 CAPSULE, COATED, EXTENDED RELEASE ORAL DAILY
Status: DISCONTINUED | OUTPATIENT
Start: 2018-07-13 | End: 2018-07-12

## 2018-07-12 RX ORDER — CLONIDINE HYDROCHLORIDE 0.1 MG/1
0.1 TABLET ORAL ONCE
Status: COMPLETED | OUTPATIENT
Start: 2018-07-12 | End: 2018-07-12

## 2018-07-12 RX ORDER — DILTIAZEM HYDROCHLORIDE 120 MG/1
120 CAPSULE, COATED, EXTENDED RELEASE ORAL DAILY
Status: DISCONTINUED | OUTPATIENT
Start: 2018-07-13 | End: 2018-07-13

## 2018-07-12 RX ORDER — CLONIDINE HYDROCHLORIDE 0.1 MG/1
0.2 TABLET ORAL EVERY 12 HOURS SCHEDULED
Status: DISCONTINUED | OUTPATIENT
Start: 2018-07-12 | End: 2018-07-15 | Stop reason: HOSPADM

## 2018-07-12 RX ORDER — HEPARIN SODIUM 10000 [USP'U]/100ML
3-20 INJECTION, SOLUTION INTRAVENOUS
Status: DISCONTINUED | OUTPATIENT
Start: 2018-07-12 | End: 2018-07-13

## 2018-07-12 RX ORDER — INSULIN GLARGINE 100 [IU]/ML
10 INJECTION, SOLUTION SUBCUTANEOUS EVERY MORNING
Status: DISCONTINUED | OUTPATIENT
Start: 2018-07-12 | End: 2018-07-15 | Stop reason: HOSPADM

## 2018-07-12 RX ORDER — HEPARIN SODIUM 1000 [USP'U]/ML
4000 INJECTION, SOLUTION INTRAVENOUS; SUBCUTANEOUS AS NEEDED
Status: DISCONTINUED | OUTPATIENT
Start: 2018-07-12 | End: 2018-07-13

## 2018-07-12 RX ADMIN — LEVALBUTEROL 1.25 MG: 1.25 SOLUTION, CONCENTRATE RESPIRATORY (INHALATION) at 20:36

## 2018-07-12 RX ADMIN — PANTOPRAZOLE SODIUM 40 MG: 40 TABLET, DELAYED RELEASE ORAL at 05:14

## 2018-07-12 RX ADMIN — INSULIN GLARGINE 10 UNITS: 100 INJECTION, SOLUTION SUBCUTANEOUS at 15:26

## 2018-07-12 RX ADMIN — DILTIAZEM HYDROCHLORIDE 120 MG: 120 CAPSULE, COATED, EXTENDED RELEASE ORAL at 08:15

## 2018-07-12 RX ADMIN — METHYLPREDNISOLONE SODIUM SUCCINATE 40 MG: 40 INJECTION, POWDER, FOR SOLUTION INTRAMUSCULAR; INTRAVENOUS at 01:48

## 2018-07-12 RX ADMIN — METOPROLOL SUCCINATE 100 MG: 100 TABLET, EXTENDED RELEASE ORAL at 08:16

## 2018-07-12 RX ADMIN — TAMSULOSIN HYDROCHLORIDE 0.4 MG: 0.4 CAPSULE ORAL at 08:23

## 2018-07-12 RX ADMIN — HEPARIN SODIUM AND DEXTROSE 11.8 UNITS/KG/HR: 10000; 5 INJECTION INTRAVENOUS at 12:02

## 2018-07-12 RX ADMIN — METOPROLOL SUCCINATE 100 MG: 100 TABLET, EXTENDED RELEASE ORAL at 18:41

## 2018-07-12 RX ADMIN — RANOLAZINE 500 MG: 500 TABLET, FILM COATED, EXTENDED RELEASE ORAL at 22:24

## 2018-07-12 RX ADMIN — NICOTINE 1 PATCH: 7 PATCH, EXTENDED RELEASE TRANSDERMAL at 08:17

## 2018-07-12 RX ADMIN — INSULIN LISPRO 4 UNITS: 100 INJECTION, SOLUTION INTRAVENOUS; SUBCUTANEOUS at 22:26

## 2018-07-12 RX ADMIN — PREGABALIN 300 MG: 75 CAPSULE ORAL at 09:58

## 2018-07-12 RX ADMIN — ISODIUM CHLORIDE 3 ML: 0.03 SOLUTION RESPIRATORY (INHALATION) at 14:14

## 2018-07-12 RX ADMIN — LEVALBUTEROL 1.25 MG: 1.25 SOLUTION, CONCENTRATE RESPIRATORY (INHALATION) at 08:15

## 2018-07-12 RX ADMIN — PREDNISONE 40 MG: 20 TABLET ORAL at 10:33

## 2018-07-12 RX ADMIN — FUROSEMIDE 40 MG: 10 INJECTION, SOLUTION INTRAMUSCULAR; INTRAVENOUS at 08:12

## 2018-07-12 RX ADMIN — TRAMADOL HYDROCHLORIDE 50 MG: 50 TABLET, FILM COATED ORAL at 22:24

## 2018-07-12 RX ADMIN — HEPARIN SODIUM 4000 UNITS: 1000 INJECTION, SOLUTION INTRAVENOUS; SUBCUTANEOUS at 12:02

## 2018-07-12 RX ADMIN — RANOLAZINE 500 MG: 500 TABLET, FILM COATED, EXTENDED RELEASE ORAL at 09:20

## 2018-07-12 RX ADMIN — LEVALBUTEROL 1.25 MG: 1.25 SOLUTION, CONCENTRATE RESPIRATORY (INHALATION) at 14:14

## 2018-07-12 RX ADMIN — INSULIN LISPRO 1 UNITS: 100 INJECTION, SOLUTION INTRAVENOUS; SUBCUTANEOUS at 15:32

## 2018-07-12 RX ADMIN — ISODIUM CHLORIDE 3 ML: 0.03 SOLUTION RESPIRATORY (INHALATION) at 20:36

## 2018-07-12 RX ADMIN — FUROSEMIDE 40 MG: 10 INJECTION, SOLUTION INTRAMUSCULAR; INTRAVENOUS at 15:26

## 2018-07-12 RX ADMIN — LISINOPRIL 20 MG: 20 TABLET ORAL at 08:15

## 2018-07-12 RX ADMIN — PREGABALIN 300 MG: 75 CAPSULE ORAL at 15:27

## 2018-07-12 RX ADMIN — CLONIDINE HYDROCHLORIDE 0.1 MG: 0.1 TABLET ORAL at 11:51

## 2018-07-12 RX ADMIN — CLONIDINE HYDROCHLORIDE 0.1 MG: 0.1 TABLET ORAL at 08:12

## 2018-07-12 RX ADMIN — INSULIN LISPRO 4 UNITS: 100 INJECTION, SOLUTION INTRAVENOUS; SUBCUTANEOUS at 11:28

## 2018-07-12 RX ADMIN — ISODIUM CHLORIDE 3 ML: 0.03 SOLUTION RESPIRATORY (INHALATION) at 08:15

## 2018-07-12 RX ADMIN — ATORVASTATIN CALCIUM 40 MG: 40 TABLET, FILM COATED ORAL at 08:23

## 2018-07-12 RX ADMIN — PREGABALIN 300 MG: 75 CAPSULE ORAL at 22:23

## 2018-07-12 RX ADMIN — CLONIDINE HYDROCHLORIDE 0.2 MG: 0.1 TABLET ORAL at 22:24

## 2018-07-12 RX ADMIN — ASPIRIN 81 MG 81 MG: 81 TABLET ORAL at 08:15

## 2018-07-12 RX ADMIN — TIOTROPIUM BROMIDE 18 MCG: 18 CAPSULE ORAL; RESPIRATORY (INHALATION) at 09:20

## 2018-07-12 NOTE — ASSESSMENT & PLAN NOTE
-Lactic acid trending down from 3 3 to 2 7 this am   -no IV fluids due to acute CHF  -repeat in am   -continue to monitor

## 2018-07-12 NOTE — ASSESSMENT & PLAN NOTE
- the patient's blood pressure continues to be elevated  - will increase catapres from 0 1 to 0 2 and change from daily to BID today 7/12/18  Continue Cardizem, lisinopril, and Toprol  -IV PRN labetalol and IV hydralazine   -continue to monitor

## 2018-07-12 NOTE — NURSING NOTE
Patient transferred to room 415 via wheelchair in no acute distress  O2 on at 3l via NC  Heparin infusing as ordered no s/s of bleeding noted or reported   Bedside report to [facility] RN

## 2018-07-12 NOTE — ASSESSMENT & PLAN NOTE
-Rate controlled  -eliquis held due to possible thoracentesis later today   -Continue home medications

## 2018-07-12 NOTE — ASSESSMENT & PLAN NOTE
-no wheezing noted today   -discontinue IV solumedrol and switch to prednisone 40 mg PO daily x 5 days    -BiPAP PRN and HS   -Respiratory protocol  -Continue home medication

## 2018-07-12 NOTE — ASSESSMENT & PLAN NOTE
-likely type 2 in the setting of acute respiratory failure and acute CHF  -troponin was noted to be 0 13, now trending down   -cardiology following  -telemetry monitoring   -continue aspirin, statin, and beta blocker

## 2018-07-12 NOTE — ASSESSMENT & PLAN NOTE
-chest x-ray shows enlarging right pleural effusion  -echo shows systolic CHF with EF of 50-23%  -continue IV lasix 40 mg BID  -Appreciate cardiology's input   -continue Toprol and lisinopril

## 2018-07-12 NOTE — ASSESSMENT & PLAN NOTE
-dizziness resolved  -has hx of CVA with residual right sided   -head CT shows-No acute intracranial abnormality  Microangiopathic changes   -Continue statin, Aspirin  -MRI brain negative for acute findings  -repeat CT head on 7/9/18 was negative for acute findings  Jenelle Eller

## 2018-07-12 NOTE — SOCIAL WORK
Called Jaqueline at [facility] and notified her that pt is ready for discharge tomorrow  They still have a bed and are willing to accept him  I started auth with [employer]

## 2018-07-12 NOTE — CASE MANAGEMENT
Continued Stay Review    Date: 7/12/18    Vital Signs: BP (!) 180/82 (BP Location: Right arm)   Pulse 84   Temp 98 °F (36 7 °C) (Temporal)   Resp 20   Ht 5' 9" (1 753 m)   Wt 86 4 kg (190 lb 7 6 oz)   SpO2 99%   BMI 28 13 kg/m²     Medications:   Scheduled Meds:   Current Facility-Administered Medications:  albuterol 2 5 mg Nebulization Q6H PRN Darrin Quintanilla MD   aspirin 81 mg Oral Daily Eyal Yang PA-C   atorvastatin 40 mg Oral Daily Eyal Yang PA-C   cloNIDine 0 1 mg Oral Once Mary Abdiaziz, DO   cloNIDine 0 2 mg Oral Q12H 6225 Westley Salazar DO   [START ON 7/13/2018] diltiazem 120 mg Oral Daily Mary Abdiaziz, DO   furosemide 40 mg Intravenous BID (diuretic) Mary Abdiaziz, DO   heparin (porcine) 3-20 Units/kg/hr (Order-Specific) Intravenous Titrated Kylee Leblanc PA-C   heparin (porcine) 2,000 Units Intravenous PRN Kylee Leblanc PA-C   heparin (porcine) 4,000 Units Intravenous Once KIARA Ortega-KHLOE   heparin (porcine) 4,000 Units Intravenous PRN Kylee Leblanc PA-C   hydrALAZINE 10 mg Intravenous Q6H PRN Roman Rios PA-C   insulin lispro 1-6 Units Subcutaneous TID AC KIARA Jackman-KHLOE   insulin lispro 1-6 Units Subcutaneous HS Eyal Yang PA-C   labetalol 10 mg Intravenous Q4H PRN Mary Freed, DO   levalbuterol 1 25 mg Nebulization TID Darrin Quintanilla MD   lisinopril 20 mg Oral Daily Eyal Yang PA-C   metoprolol succinate 100 mg Oral BID Eyal Yang PA-C   nicotine 1 patch Transdermal Daily Eyal Yang PA-C   ondansetron 4 mg Intravenous Q6H PRN Eyal Yang PA-C   pantoprazole 40 mg Oral Early Morning Eyal Yang PA-C   predniSONE 40 mg Oral Daily Roman Rios PA-C   pregabalin 300 mg Oral TID Eyal Yang PA-C   ranolazine 500 mg Oral Q12H [facility] Eyal Yang PA-C   sodium chloride 3 mL Nebulization TID Darrin Quintanilla MD   tamsulosin 0 4 mg Oral Daily Eyal Yang PA-C   tiotropium 18 mcg Inhalation Daily Eyal Yang PA-C   traMADol 50 mg Oral Q6H PRN Kylee Leblanc PA-C Continuous Infusions:   heparin (porcine) 3-20 Units/kg/hr (Order-Specific)     PRN Meds:   albuterol    heparin (porcine)    heparin (porcine)    hydrALAZINE    labetalol    ondansetron    traMADol    Abnormal Labs/Diagnostic Results:   WBC 18 49 HGB 10 8 CO2 34 BUN 31 TPROT 5 6 ALB 2 9 LACTIC ACID 2 7 PT 16 INR 1 35   CXR=Stable appearance of the chest     Age/Sex: 74 y o  male     Assessment/Plan: TRANSFER TO TELEMETRY  Acute respiratory failure with hypoxia and hypercapnia (HCC)   Assessment & Plan     -Pulmonology was consulted, plan for thoracentesis   -eliquis held and heparin gtt started today  This will need to be stopped on 7/13/18 at 0600   -Continue BiPAP PRN and HS  Lactic acidosis   Assessment & Plan     -Lactic acid trending down from 3 3 to 2 7 this am   -no IV fluids due to acute CHF  -repeat in am      Persistent atrial fibrillation (HCC)   Assessment & Plan     -Rate controlled  -eliquis held due to possible thoracentesis later today   -Continue home medications     Essential hypertension   Assessment & Plan     - the patient's blood pressure continues to be elevated  - will increase catapres from 0 1 to 0 2 and change from daily to BID today 7/12/18  Continue Cardizem, lisinopril, and Toprol  -IV PRN labetalol and IV hydralazine   -continue to monitor       Discharge Plan: TBD

## 2018-07-12 NOTE — SOCIAL WORK
Pt received authorization from [employer] for [facility] for 5 days starting 7/13/18 - 7/18/18  Nadeem Schultz is the     Phone number is [phone]

## 2018-07-12 NOTE — ASSESSMENT & PLAN NOTE
-resolved  Will transfer patient to med/surg floor with telemetry monitoring  -CTA chest with PE protocol showed increasing large pleural effusions with overlying compressive atelectasis  No acute pulmonary embolism   -chest x-ray showed enlarging right pleural effusion  Stable small left pleural effusion  -likely due to systolic CHF given EF of 40-13 % on recent echo   -patient started on IV Lasix 40 mg bid   -Pulmonology was consulted, plan for thoracentesis tomorrow 7/13/18    -eliquis held and heparin gtt started today  This will need to be stopped on 7/13/18 at 0600   -Continue BiPAP PRN and HS   - IV solumedrol 40 mg IV discontinued, Prednisone 40 mg PO daily started today 7/12/18   -respiratory proctol

## 2018-07-12 NOTE — PLAN OF CARE
Problem: Potential for Falls  Goal: Patient will remain free of falls  INTERVENTIONS:  - Assess patient frequently for physical needs  -  Identify cognitive and physical deficits and behaviors that affect risk of falls  -  [place] fall precautions as indicated by assessment   - Educate patient/family on patient safety including physical limitations  - Instruct patient to call for assistance with activity based on assessment  - Modify environment to reduce risk of injury  - Consider OT/PT consult to assist with strengthening/mobility   Outcome: Progressing      Problem: Neurological Deficit  Goal: Neurological status is stable or improving  Interventions:  - Monitor and assess patient's level of consciousness, motor function, sensory function, and level of assistance needed for ADLs  - Monitor and report changes from baseline  Collaborate with interdisciplinary team to initiate plan and implement interventions as ordered  - Provide and maintain a safe environment  - Utilize seizure precautions  - Utilize fall precautions  - Utilize aspiration precautions  - Utilize bleeding precautions  Outcome: Progressing      Problem: Activity Intolerance/Impaired Mobility  Goal: Mobility/activity is maintained at optimum level for patient  Interventions:  - Assess and monitor patient  barriers to mobility and need for assistive/adaptive devices  - Assess patient's emotional response to limitations  - Collaborate with interdisciplinary team and initiate plans and interventions as ordered  - Encourage independent activity per ability   - Maintain proper body alignment  - Perform active/passive rom as tolerated/ordered  - Plan activities to conserve energy   - Turn patient   Outcome: Progressing      Problem: Communication Impairment  Goal: Ability to express needs and understand communication  Assess patient's communication skills and ability to understand information    Patient will demonstrate use of effective communication techniques, alternative methods of communication and understanding even if not able to speak  - Encourage communication and provide alternate methods of communication as needed  - Collaborate with case management/ for discharge needs  - Include patient/family/caregiver in decisions related to communication     Outcome: Progressing

## 2018-07-12 NOTE — PHYSICAL THERAPY NOTE
PT Treatment Note      07/12/18 1045   Restrictions/Precautions   Other Precautions (Multiple lines;Telemetry;O2;Fall Risk;Contact/isolation)   Subjective   Subjective Agreeable to therapy  c/o feeling dizzy when ambulating  Bed Mobility   Additional Comments OOB in chair at start and end of PT session   Transfers   Sit to Stand 4  Minimal assistance   Additional items Assist x 1; Armrests; Verbal cues   Stand to Sit 4  Minimal assistance   Additional items Assist x 1; Armrests; Verbal cues   Stand pivot 4  Minimal assistance   Additional items Verbal cues   Ambulation/Elevation   Gait pattern Short stride   Gait Assistance 4  Minimal assist   Additional items Assist x 1;Verbal cues   Assistive Device [facility]   Distance 45' x 2 with 4 L O2 SpO2 97-96%, mild SOB  (3L at rest)   Balance   Static Sitting Good   Dynamic Sitting Good   Static Standing Fair   Dynamic Standing Fair   Ambulatory Fair  (with SPC)   Endurance Deficit   Endurance Deficit Yes   Activity Tolerance   Activity Tolerance Patient limited by fatigue   Assessment   Prognosis Good   Problem List Decreased strength;Decreased endurance; Impaired balance;Decreased mobility   Assessment Performing tx/ambulation at (min) x1 level of function with SPC fair stability  Slow gait with decreased balance increasing risk for falls  Decreased endurance limiting ambulation tolerance with mild SOB SPO2 did not drop with ambulation  Pt is in need of continued physical therapy and STR  to help improve strength, ROM, balance, and functional acitivty tolerance  Plan   Treatment/Interventions Functional transfer training;LE strengthening/ROM; Elevations; Endurance training; Therapeutic exercise; Bed mobility;Gait training   Progress Progressing toward goals   Recommendation   Recommendation Short-term skilled PT   Pt  OOB with call bell within reach with nurse  at end of PT session
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
## 2018-07-12 NOTE — PROGRESS NOTES
Called to verify that IV heparin and Eliquis to be discontinued  Spoke with Dr Marcelino Barros and the meds are to be discontinued

## 2018-07-12 NOTE — ASSESSMENT & PLAN NOTE
-speech was consulted and recommended a Level 3 Dysphagia/ advanced/ soft to chew with thin liquids however the patient refused

## 2018-07-12 NOTE — ASSESSMENT & PLAN NOTE
Lab Results   Component Value Date    HGBA1C 7 0 (H) 05/07/2018       Recent Labs      07/11/18   1103  07/11/18   1601  07/11/18   2129  07/12/18   0721   POCGLU  205*  182*  263*  121       -sliding scale insulin, fingerstick glucose 4 times daily AC/HS  -continue to monitor blood glucose trends

## 2018-07-12 NOTE — OCCUPATIONAL THERAPY NOTE
OT Note     07/12/18 0901   Restrictions/Precautions   Other Precautions Multiple lines;Telemetry;O2;Fall Risk;Contact/isolation   ADL   Where Assessed Chair   Grooming Assistance 5  Supervision/Setup   UB Bathing Assistance 4  Minimal Assistance   UB Bathing Deficit Left arm   UB Bathing Comments A with back   LB Bathing Assistance 4  Minimal Assistance   LB Bathing Deficit Buttocks  (B feet)   LB Bathing Comments Completes hips to ankles in seated, scrotal area in stance   UB Dressing Assistance 4  Minimal Assistance   UB Dressing Comments To manage lines and fasteners   LB Dressing Assistance 4  Minimal Assistance   LB Dressing Deficit Thread RLE into underwear; Thread LLE into underwear   LB Dressing Comments Doffs non skids, A to don   Bed Mobility   Supine to Sit 5  Supervision   Additional items Increased time required;Verbal cues   Transfers   Sit to Stand 5  Supervision   Additional items Verbal cues   Stand to Sit 5  Supervision   Additional items Armrests; Verbal cues   Functional Mobility   Functional Mobility 5  Supervision   Additional Comments CG Completes txfr EOB to chair at bedside   Additional items [facility]   Activity Tolerance   Activity Tolerance Patient tolerated treatment well   Assessment   Assessment Presents supine, participates with encouragement  Completes a m  self care as per above  Txfred recliner at bedside  O2 @ 3 liters, O2 sat 99%  All needs in reach     Recommendation   Recommendation (Continue OT services )

## 2018-07-12 NOTE — PROGRESS NOTES
Progress Note - Carol North 1945, 74 y o  male MRN: [id number]    Unit/Bed#:  Encounter: [id number]    Primary Care Provider: Gina Triana DO   Date and time admitted to hospital: 7/6/2018  7:56 PM        * Acute respiratory failure with hypoxia and hypercapnia ([facility] )   Assessment & Plan    -resolved  Will transfer patient to med/surg floor with telemetry monitoring  -CTA chest with PE protocol showed increasing large pleural effusions with overlying compressive atelectasis  No acute pulmonary embolism   -chest x-ray showed enlarging right pleural effusion  Stable small left pleural effusion  -likely due to systolic CHF given EF of 45-20 % on recent echo   -patient started on IV Lasix 40 mg bid   -Pulmonology was consulted, plan for thoracentesis tomorrow 7/13/18    -eliquis held and heparin gtt started today  This will need to be stopped on 7/13/18 at 0600   -Continue BiPAP PRN and HS   - IV solumedrol 40 mg IV discontinued, Prednisone 40 mg PO daily started today 7/12/18   -respiratory proctol  Acute systolic CHF (congestive heart failure) ([facility] )   Assessment & Plan    -chest x-ray shows enlarging right pleural effusion  -echo shows systolic CHF with EF of 73-26%  -continue IV lasix 40 mg BID  -Appreciate cardiology's input   -continue Toprol and lisinopril            COPD (chronic obstructive pulmonary disease) (HCC)   Assessment & Plan    -no wheezing noted today   -discontinue IV solumedrol and switch to prednisone 40 mg PO daily x 5 days    -BiPAP PRN and HS   -Respiratory protocol  -Continue home medication        Acute kidney injury ([facility] )   Assessment & Plan    -patient's creatinine is noted to be within normal range at 1 21 on admission, however his baseline creatinine appears to be about 0 8   -creatinine stable   -continue to monitior        Hypernatremia   Assessment & Plan    -the patient was noted to have a sodium of 146 which increased to 147 on NS IV fluids   -Likely due to dehydration   -resolved  -continue to monitor  Hypokalemia   Assessment & Plan    -Potassium at 3 3 on admission   -resolved with potassium supplementation    -continue to monitor  Lactic acidosis   Assessment & Plan    -Lactic acid trending down from 3 3 to 2 7 this am   -no IV fluids due to acute CHF  -repeat in am   -continue to monitor  Type 2 diabetes mellitus without complication, without long-term current use of insulin [facility])   Assessment & Plan    Lab Results   Component Value Date    HGBA1C 7 0 (H) 05/07/2018       Recent Labs      07/11/18   1103  07/11/18   1601  07/11/18   2129  07/12/18   0721   POCGLU  205*  182*  263*  121       -sliding scale insulin, fingerstick glucose 4 times daily AC/HS  -continue to monitor blood glucose trends  Persistent atrial fibrillation (HCC)   Assessment & Plan    -Rate controlled  -eliquis held due to possible thoracentesis later today   -Continue home medications        Dyslipidemia   Assessment & Plan    -Continue statins        Tobacco abuse   Assessment & Plan    -Smoking cessation counseling  -Nicotine patch        Dysphagia   Assessment & Plan    -speech was consulted and recommended a Level 3 Dysphagia/ advanced/ soft to chew with thin liquids however the patient refused  Syncope and collapse   Assessment & Plan    -dizziness resolved  -has hx of CVA with residual right sided   -head CT shows-No acute intracranial abnormality  Microangiopathic changes   -Continue statin, Aspirin  -MRI brain negative for acute findings  -repeat CT head on 7/9/18 was negative for acute findings              NSTEMI (non-ST elevated myocardial infarction) [facility])   Assessment & Plan    -likely type 2 in the setting of acute respiratory failure and acute CHF  -troponin was noted to be 0 13, now trending down   -cardiology following  -telemetry monitoring   -continue aspirin, statin, and beta blocker          Essential hypertension Assessment & Plan    - the patient's blood pressure continues to be elevated  - will increase catapres from 0 1 to 0 2 and change from daily to BID today 18  Continue Cardizem, lisinopril, and Toprol  -IV PRN labetalol and IV hydralazine   -continue to monitor  VTE Pharmacologic Prophylaxis:   Pharmacologic: Heparin Drip  Mechanical VTE Prophylaxis in Place: Yes    Patient Centered Rounds: I have performed bedside rounds with nursing staff today  Discussions with Specialists or Other Care Team Provider: Nursing, CM, and attending    Education and Discussions with Family / Patient: n/a    Time Spent for Care: 20 minutes  More than 50% of total time spent on counseling and coordination of care as described above  Current Length of Stay: 5 day(s)    Current Patient Status: Inpatient   Certification Statement: The patient will continue to require additional inpatient hospital stay due to planned thoracentesis tomorrow 18 and continued IV diuretics  Discharge Plan: likely in the next 1-2 days    Code Status: Level 1 - Full Code      Subjective: The patient was seen and examined  The patient states he is feeling better today  He states he is [place] and is wanting additional food  Objective:     Vitals:   Temp (24hrs), Av 2 °F (36 2 °C), Min:96 9 °F (36 1 °C), Max:98 °F (36 7 °C)    HR:  [67-98] 84  Resp:  [15-42] 20  BP: (111-187)/() 180/82  SpO2:  [93 %-99 %] 99 %  Body mass index is 28 13 kg/m²  Input and Output Summary (last 24 hours): Intake/Output Summary (Last 24 hours) at 18 1129  Last data filed at 18 1058   Gross per 24 hour   Intake             1020 ml   Output             1975 ml   Net             -955 ml       Physical Exam:     Physical Exam   Constitutional: He is oriented to person, place, and time  He appears well-developed and well-nourished  He is cooperative  Cardiovascular: Normal rate  An irregularly irregular rhythm present  Pulmonary/Chest: Effort normal  He has decreased breath sounds  He has no wheezes  He has no rhonchi  He has no rales  Abdominal: Soft  Normal appearance and bowel sounds are normal  There is no tenderness  Neurological: He is alert and oriented to person, place, and time  No cranial nerve deficit  Skin: Skin is warm, dry and intact  Nursing note and vitals reviewed  Additional Data:     Labs:      Results from last 7 days  Lab Units 07/12/18  0451 07/11/18  0509   WBC Thousand/uL 18 49* 10 91*   HEMOGLOBIN g/dL 10 8* 10 9*   HEMATOCRIT % 33 7* 35 2*   PLATELETS Thousands/uL 314 300   NEUTROS PCT %  --  82*   LYMPHS PCT %  --  10*   LYMPHO PCT % 11*  --    MONOS PCT %  --  8   MONO PCT MAN % 6  --    EOS PCT %  --  0   EOSINO PCT MANUAL % 0  --        Results from last 7 days  Lab Units 07/12/18  0451   SODIUM mmol/L 141   POTASSIUM mmol/L 4 2   CHLORIDE mmol/L 102   CO2 mmol/L 34*   BUN mg/dL 31*   CREATININE mg/dL 0 98   CALCIUM mg/dL 9 8   TOTAL PROTEIN g/dL 5 6*   BILIRUBIN TOTAL mg/dL 0 60   ALK PHOS U/L 76   ALT U/L 35   AST U/L 15   GLUCOSE RANDOM mg/dL 128       Results from last 7 days  Lab Units 07/12/18  1029   INR  1 35*       Results from last 7 days  Lab Units 07/12/18  1125 07/12/18  0721 07/11/18  2129 07/11/18  1601 07/11/18  1103 07/11/18  0833 07/10/18  2106 07/10/18  1717 07/10/18  1139 07/10/18  0804 07/09/18  2121 07/09/18  1559   POC GLUCOSE mg/dl 298* 121 263* 182* 205* 166* 220* 95 178* 105 103 102             * I Have Reviewed All Lab Data Listed Above  * Additional Pertinent Lab Tests Reviewed:  All Labs Within Last 24 Hours Reviewed    Imaging:    Imaging Reports Reviewed Today Include: none  Imaging Personally Reviewed by Myself Includes:  none    Recent Cultures (last 7 days):       Results from last 7 days  Lab Units 07/08/18  1701   URINE CULTURE  >100,000 cfu/ml Enterococcus faecalis*  <10,000 cfu/ml Staphylococcus coagulase negative*       Last 24 Hours Medication List:     Current Facility-Administered Medications:  albuterol 2 5 mg Nebulization Q6H PRN Cary Rodríguez MD   aspirin 81 mg Oral Daily Eyal Yang PA-C   atorvastatin 40 mg Oral Daily Eyal Yang PA-C   cloNIDine 0 1 mg Oral Once [facility], DO   cloNIDine 0 2 mg Oral Q12H 6225 Westley Salazar DO   [START ON 7/13/2018] diltiazem 120 mg Oral Daily [facility], DO   furosemide 40 mg Intravenous BID (diuretic) [facility], DO   heparin (porcine) 3-20 Units/kg/hr (Order-Specific) Intravenous Titrated Ynes Mack PA-C   heparin (porcine) 2,000 Units Intravenous PRN Ynes Mack PA-C   heparin (porcine) 4,000 Units Intravenous Once Ynes Mack PA-C   heparin (porcine) 4,000 Units Intravenous PRN Ynes Mack PA-C   hydrALAZINE 10 mg Intravenous Q6H PRN Roman Rios PA-C   insulin lispro 1-6 Units Subcutaneous TID AC Eyal Yang PA-C   insulin lispro 1-6 Units Subcutaneous HS Eyal Yang PA-C   labetalol 10 mg Intravenous Q4H PRN [facility], DO   levalbuterol 1 25 mg Nebulization TID Darrin Quintanilla MD   lisinopril 20 mg Oral Daily Eyal Yang PA-C   metoprolol succinate 100 mg Oral BID Eyal Yang PA-C   nicotine 1 patch Transdermal Daily Eyal Yang PA-C   ondansetron 4 mg Intravenous Q6H PRN Eyal Yang PA-C   pantoprazole 40 mg Oral Early Morning Eyal Yang PA-C   predniSONE 40 mg Oral Daily Roman Rios PA-C   pregabalin 300 mg Oral TID Eyal Yang PA-C   ranolazine 500 mg Oral Q12H [facility] Eyal Yang PA-C   sodium chloride 3 mL Nebulization TID Darrin Quintanilla MD   tamsulosin 0 4 mg Oral Daily Eyal Yang PA-C   tiotropium 18 mcg Inhalation Daily Eyal Yang PA-C   traMADol 50 mg Oral Q6H PRN Ynes Mack PA-C        Today, Patient Was Seen By: Ynes Mack PA-C    ** Please Note: Dictation voice to text software may have been used in the creation of this document   **

## 2018-07-13 ENCOUNTER — APPOINTMENT (INPATIENT)
Dept: RADIOLOGY | Facility: HOSPITAL | Age: 73
DRG: 280 | End: 2018-07-13
Payer: COMMERCIAL

## 2018-07-13 LAB
ANION GAP SERPL CALCULATED.3IONS-SCNC: 6 MMOL/L (ref 4–13)
ANISOCYTOSIS BLD QL SMEAR: PRESENT
APTT PPP: 73 SECONDS (ref 24–36)
APTT PPP: 75 SECONDS (ref 24–36)
BASOPHILS # BLD MANUAL: 0 THOUSAND/UL (ref 0–0.1)
BASOPHILS NFR MAR MANUAL: 0 % (ref 0–1)
BUN SERPL-MCNC: 39 MG/DL (ref 5–25)
CALCIUM SERPL-MCNC: 9.9 MG/DL (ref 8.3–10.1)
CHLORIDE SERPL-SCNC: 102 MMOL/L (ref 100–108)
CO2 SERPL-SCNC: 33 MMOL/L (ref 21–32)
CREAT SERPL-MCNC: 1.11 MG/DL (ref 0.6–1.3)
EOSINOPHIL # BLD MANUAL: 0 THOUSAND/UL (ref 0–0.4)
EOSINOPHIL NFR BLD MANUAL: 0 % (ref 0–6)
ERYTHROCYTE [DISTWIDTH] IN BLOOD BY AUTOMATED COUNT: 17.7 % (ref 11.6–15.1)
GFR SERPL CREATININE-BSD FRML MDRD: 65 ML/MIN/1.73SQ M
GLUCOSE SERPL-MCNC: 126 MG/DL (ref 65–140)
GLUCOSE SERPL-MCNC: 169 MG/DL (ref 65–140)
GLUCOSE SERPL-MCNC: 187 MG/DL (ref 65–140)
GLUCOSE SERPL-MCNC: 252 MG/DL (ref 65–140)
GLUCOSE SERPL-MCNC: 263 MG/DL (ref 65–140)
HCT VFR BLD AUTO: 36.2 % (ref 36.5–49.3)
HGB BLD-MCNC: 11.6 G/DL (ref 12–17)
HYPERCHROMIA BLD QL SMEAR: PRESENT
LACTATE SERPL-SCNC: 2.8 MMOL/L (ref 0.5–2)
LDH FLD L TO P-CCNC: 58 U/L
LYMPHOCYTES # BLD AUTO: 10 % (ref 14–44)
LYMPHOCYTES # BLD AUTO: 2.11 THOUSAND/UL (ref 0.6–4.47)
MCH RBC QN AUTO: 22.7 PG (ref 26.8–34.3)
MCHC RBC AUTO-ENTMCNC: 32 G/DL (ref 31.4–37.4)
MCV RBC AUTO: 71 FL (ref 82–98)
MONOCYTES # BLD AUTO: 1.9 THOUSAND/UL (ref 0–1.22)
MONOCYTES NFR BLD: 9 % (ref 4–12)
NEUTROPHILS # BLD MANUAL: 16.65 THOUSAND/UL (ref 1.85–7.62)
NEUTS BAND NFR BLD MANUAL: 1 % (ref 0–8)
NEUTS SEG NFR BLD AUTO: 78 % (ref 43–75)
OVALOCYTES BLD QL SMEAR: PRESENT
PH BODY FLUID: 7.6
PLATELET # BLD AUTO: 338 THOUSANDS/UL (ref 149–390)
PLATELET BLD QL SMEAR: ADEQUATE
PMV BLD AUTO: 9.8 FL (ref 8.9–12.7)
POIKILOCYTOSIS BLD QL SMEAR: PRESENT
POTASSIUM SERPL-SCNC: 3.6 MMOL/L (ref 3.5–5.3)
PROT FLD-MCNC: 2.4 G/DL
RBC # BLD AUTO: 5.11 MILLION/UL (ref 3.88–5.62)
RBC # FLD MANUAL: 0 /UL
SODIUM SERPL-SCNC: 141 MMOL/L (ref 136–145)
TOTAL CELLS COUNTED SPEC: 100
VARIANT LYMPHS # BLD AUTO: 2 %
WBC # BLD AUTO: 21.08 THOUSAND/UL (ref 4.31–10.16)

## 2018-07-13 PROCEDURE — 71045 X-RAY EXAM CHEST 1 VIEW: CPT

## 2018-07-13 PROCEDURE — 94640 AIRWAY INHALATION TREATMENT: CPT

## 2018-07-13 PROCEDURE — 80048 BASIC METABOLIC PNL TOTAL CA: CPT | Performed by: PHYSICIAN ASSISTANT

## 2018-07-13 PROCEDURE — 83986 ASSAY PH BODY FLUID NOS: CPT | Performed by: INTERNAL MEDICINE

## 2018-07-13 PROCEDURE — 32555 ASPIRATE PLEURA W/ IMAGING: CPT | Performed by: INTERNAL MEDICINE

## 2018-07-13 PROCEDURE — 87040 BLOOD CULTURE FOR BACTERIA: CPT | Performed by: INTERNAL MEDICINE

## 2018-07-13 PROCEDURE — 83605 ASSAY OF LACTIC ACID: CPT | Performed by: PHYSICIAN ASSISTANT

## 2018-07-13 PROCEDURE — 99232 SBSQ HOSP IP/OBS MODERATE 35: CPT | Performed by: PHYSICIAN ASSISTANT

## 2018-07-13 PROCEDURE — 84157 ASSAY OF PROTEIN OTHER: CPT | Performed by: INTERNAL MEDICINE

## 2018-07-13 PROCEDURE — 82948 REAGENT STRIP/BLOOD GLUCOSE: CPT

## 2018-07-13 PROCEDURE — 85730 THROMBOPLASTIN TIME PARTIAL: CPT | Performed by: INTERNAL MEDICINE

## 2018-07-13 PROCEDURE — 87070 CULTURE OTHR SPECIMN AEROBIC: CPT | Performed by: INTERNAL MEDICINE

## 2018-07-13 PROCEDURE — 0W993ZZ DRAINAGE OF RIGHT PLEURAL CAVITY, PERCUTANEOUS APPROACH: ICD-10-PCS | Performed by: INTERNAL MEDICINE

## 2018-07-13 PROCEDURE — 85027 COMPLETE CBC AUTOMATED: CPT | Performed by: PHYSICIAN ASSISTANT

## 2018-07-13 PROCEDURE — 89050 BODY FLUID CELL COUNT: CPT | Performed by: INTERNAL MEDICINE

## 2018-07-13 PROCEDURE — 87205 SMEAR GRAM STAIN: CPT | Performed by: INTERNAL MEDICINE

## 2018-07-13 PROCEDURE — 85007 BL SMEAR W/DIFF WBC COUNT: CPT | Performed by: PHYSICIAN ASSISTANT

## 2018-07-13 PROCEDURE — 94660 CPAP INITIATION&MGMT: CPT

## 2018-07-13 PROCEDURE — 94760 N-INVAS EAR/PLS OXIMETRY 1: CPT

## 2018-07-13 PROCEDURE — 0W993ZX DRAINAGE OF RIGHT PLEURAL CAVITY, PERCUTANEOUS APPROACH, DIAGNOSTIC: ICD-10-PCS | Performed by: INTERNAL MEDICINE

## 2018-07-13 PROCEDURE — 83615 LACTATE (LD) (LDH) ENZYME: CPT | Performed by: INTERNAL MEDICINE

## 2018-07-13 RX ORDER — DILTIAZEM HYDROCHLORIDE 180 MG/1
180 CAPSULE, COATED, EXTENDED RELEASE ORAL DAILY
Status: DISCONTINUED | OUTPATIENT
Start: 2018-07-13 | End: 2018-07-15 | Stop reason: HOSPADM

## 2018-07-13 RX ORDER — LEVOFLOXACIN 5 MG/ML
500 INJECTION, SOLUTION INTRAVENOUS EVERY 24 HOURS
Status: DISCONTINUED | OUTPATIENT
Start: 2018-07-13 | End: 2018-07-15 | Stop reason: HOSPADM

## 2018-07-13 RX ADMIN — INSULIN LISPRO 3 UNITS: 100 INJECTION, SOLUTION INTRAVENOUS; SUBCUTANEOUS at 21:10

## 2018-07-13 RX ADMIN — INSULIN LISPRO 1 UNITS: 100 INJECTION, SOLUTION INTRAVENOUS; SUBCUTANEOUS at 08:14

## 2018-07-13 RX ADMIN — INSULIN GLARGINE 10 UNITS: 100 INJECTION, SOLUTION SUBCUTANEOUS at 09:01

## 2018-07-13 RX ADMIN — ASPIRIN 81 MG 81 MG: 81 TABLET ORAL at 09:02

## 2018-07-13 RX ADMIN — INSULIN LISPRO 1 UNITS: 100 INJECTION, SOLUTION INTRAVENOUS; SUBCUTANEOUS at 17:20

## 2018-07-13 RX ADMIN — LEVALBUTEROL 1.25 MG: 1.25 SOLUTION, CONCENTRATE RESPIRATORY (INHALATION) at 19:24

## 2018-07-13 RX ADMIN — LEVALBUTEROL 1.25 MG: 1.25 SOLUTION, CONCENTRATE RESPIRATORY (INHALATION) at 07:32

## 2018-07-13 RX ADMIN — RANOLAZINE 500 MG: 500 TABLET, FILM COATED, EXTENDED RELEASE ORAL at 21:04

## 2018-07-13 RX ADMIN — PREGABALIN 300 MG: 75 CAPSULE ORAL at 09:02

## 2018-07-13 RX ADMIN — FUROSEMIDE 40 MG: 10 INJECTION, SOLUTION INTRAMUSCULAR; INTRAVENOUS at 17:21

## 2018-07-13 RX ADMIN — PREGABALIN 300 MG: 75 CAPSULE ORAL at 21:04

## 2018-07-13 RX ADMIN — LEVALBUTEROL 1.25 MG: 1.25 SOLUTION, CONCENTRATE RESPIRATORY (INHALATION) at 14:02

## 2018-07-13 RX ADMIN — INSULIN LISPRO 3 UNITS: 100 INJECTION, SOLUTION INTRAVENOUS; SUBCUTANEOUS at 11:58

## 2018-07-13 RX ADMIN — METOPROLOL SUCCINATE 100 MG: 100 TABLET, EXTENDED RELEASE ORAL at 17:21

## 2018-07-13 RX ADMIN — DILTIAZEM HYDROCHLORIDE 120 MG: 120 CAPSULE, COATED, EXTENDED RELEASE ORAL at 09:01

## 2018-07-13 RX ADMIN — ISODIUM CHLORIDE 3 ML: 0.03 SOLUTION RESPIRATORY (INHALATION) at 14:02

## 2018-07-13 RX ADMIN — LISINOPRIL 20 MG: 20 TABLET ORAL at 09:02

## 2018-07-13 RX ADMIN — METOPROLOL SUCCINATE 100 MG: 100 TABLET, EXTENDED RELEASE ORAL at 09:00

## 2018-07-13 RX ADMIN — ATORVASTATIN CALCIUM 40 MG: 40 TABLET, FILM COATED ORAL at 09:02

## 2018-07-13 RX ADMIN — APIXABAN 5 MG: 5 TABLET, FILM COATED ORAL at 17:21

## 2018-07-13 RX ADMIN — PANTOPRAZOLE SODIUM 40 MG: 40 TABLET, DELAYED RELEASE ORAL at 05:50

## 2018-07-13 RX ADMIN — RANOLAZINE 500 MG: 500 TABLET, FILM COATED, EXTENDED RELEASE ORAL at 09:01

## 2018-07-13 RX ADMIN — PREGABALIN 300 MG: 75 CAPSULE ORAL at 17:21

## 2018-07-13 RX ADMIN — DILTIAZEM HYDROCHLORIDE 180 MG: 180 CAPSULE, COATED, EXTENDED RELEASE ORAL at 17:24

## 2018-07-13 RX ADMIN — TAMSULOSIN HYDROCHLORIDE 0.4 MG: 0.4 CAPSULE ORAL at 09:02

## 2018-07-13 RX ADMIN — PREDNISONE 40 MG: 20 TABLET ORAL at 09:02

## 2018-07-13 RX ADMIN — FUROSEMIDE 40 MG: 10 INJECTION, SOLUTION INTRAMUSCULAR; INTRAVENOUS at 09:01

## 2018-07-13 RX ADMIN — NICOTINE 1 PATCH: 7 PATCH, EXTENDED RELEASE TRANSDERMAL at 09:00

## 2018-07-13 RX ADMIN — LEVOFLOXACIN 500 MG: 5 INJECTION, SOLUTION INTRAVENOUS at 17:21

## 2018-07-13 RX ADMIN — ISODIUM CHLORIDE 3 ML: 0.03 SOLUTION RESPIRATORY (INHALATION) at 19:24

## 2018-07-13 RX ADMIN — ISODIUM CHLORIDE 3 ML: 0.03 SOLUTION RESPIRATORY (INHALATION) at 07:32

## 2018-07-13 RX ADMIN — CLONIDINE HYDROCHLORIDE 0.2 MG: 0.1 TABLET ORAL at 09:02

## 2018-07-13 RX ADMIN — TRAMADOL HYDROCHLORIDE 50 MG: 50 TABLET, FILM COATED ORAL at 09:23

## 2018-07-13 RX ADMIN — CLONIDINE HYDROCHLORIDE 0.2 MG: 0.1 TABLET ORAL at 21:04

## 2018-07-13 NOTE — ASSESSMENT & PLAN NOTE
Lab Results   Component Value Date    HGBA1C 7 0 (H) 05/07/2018       Recent Labs      07/12/18   1525  07/12/18   2225  07/13/18   0812  07/13/18   1154   POCGLU  182*  307*  169*  263*       -sliding scale insulin, fingerstick glucose 4 times daily AC/HS  -continue to monitor blood glucose trends

## 2018-07-13 NOTE — ASSESSMENT & PLAN NOTE
- dizziness resolved  - has hx of CVA with residual right sided weakness   - head CT shows-No acute intracranial abnormality  Microangiopathic changes  - Continue statin, Aspirin  - MRI brain negative for acute findings  - repeat CT head on 7/9/18 was negative for acute findings  Kostas Rider

## 2018-07-13 NOTE — SOCIAL WORK
I WENT IN TO THE PATIENT TO DISCUSS THAT HE WILL BE D/C TODAY TO REHAB AT [facility]  THE PATIENT REFUSES REHAB AND HE TOLD ME THAT HE WANTS TO GO HOME AND "SMOKE" I TOLD HIM THAT HE WILL BE READMITTED AND I EXPLAINED TO HIM THAT SINCE HE DID NOT HAVE THE SLEEP STUDY YESTERDAY AS HE WAS IN THE HOSPITAL AND THAT HE NEEDS THE CPAP AT HOME AND HE WILL NEED THE CPAP AND HE WILL HAVE THAT AT THE REHAB  HE STILL REFUSES THIS PLAN  HE NEEDS PORTABLE O2 AT HOME AND HE DOES NOT HAVE  A TANK TO GO HOME WITH AND I TOLD HIM THAT HE CANNOT BE D/C TO HOME WITHOUT A PORTABLE TANK HE TOLD ME THAT HE WILL HAVE SOMEONE BRING IT IN I SPOKE TO THE PA AND SHE WILL TRY AND CONVINCE HIM TO GO TO THE REHAB

## 2018-07-13 NOTE — ASSESSMENT & PLAN NOTE
-no wheezing noted today    Continue prednisone 40 mg PO daily x 5 days total    -BiPAP PRN and HS   -Respiratory protocol  -Continue home medication

## 2018-07-13 NOTE — ASSESSMENT & PLAN NOTE
- likely type 2 in the setting of acute respiratory failure and acute CHF  - troponin was noted to be 0 13, now trending down  - cardiology following  - telemetry monitoring   - continue aspirin, statin, and beta blocker

## 2018-07-13 NOTE — ASSESSMENT & PLAN NOTE
- chest x-ray shows enlarging right pleural effusion  Pulmonology consulted and will perform thoracentesis today  - echo shows systolic CHF with EF of 64-38%  -continue IV lasix 40 mg BID for now, can likely use more diuresis  -Appreciate cardiology's input   -continue Toprol and lisinopril

## 2018-07-13 NOTE — ASSESSMENT & PLAN NOTE
- the patient's blood pressure is improving  - catapres was increased from 0 1 to 0 2 and change from daily to BID on 7/12/18  Continue Cardizem, lisinopril, and Toprol  - IV PRN labetalol and IV hydralazine  - continue to monitor

## 2018-07-13 NOTE — ASSESSMENT & PLAN NOTE
- patient's creatinine is noted to be within normal range at 1 21 on admission, however his baseline creatinine appears to be about 0 8   - creatinine stable    - continue to monitior

## 2018-07-13 NOTE — PROGRESS NOTES
Progress Note - Joe Pillar 1945, 74 y o  male MRN: [id number]    Unit/Bed#: 415-01 Encounter: [id number]    Primary Care Provider: Courtney Hunt DO   Date and time admitted to hospital: 7/6/2018  7:56 PM        Acute systolic CHF (congestive heart failure) ([facility] [address] )   Assessment & Plan    - chest x-ray shows enlarging right pleural effusion  Pulmonology consulted and will perform thoracentesis today  - echo shows systolic CHF with EF of 68-87%  -continue IV lasix 40 mg BID for now, can likely use more diuresis  -Appreciate cardiology's input   -continue Toprol and lisinopril  Acute kidney injury [facility])   Assessment & Plan    - patient's creatinine is noted to be within normal range at 1 21 on admission, however his baseline creatinine appears to be about 0 8   - creatinine stable  - continue to monitior        Persistent atrial fibrillation [facility])   Assessment & Plan    - Rate controlled  - Eliquis held due to possible thoracentesis later today  - Continue home medications        Essential hypertension   Assessment & Plan    - the patient's blood pressure is improving  - catapres was increased from 0 1 to 0 2 and change from daily to BID on 7/12/18  Continue Cardizem, lisinopril, and Toprol  - IV PRN labetalol and IV hydralazine  - continue to monitor  COPD (chronic obstructive pulmonary disease) (HCC)   Assessment & Plan    -no wheezing noted today  Continue prednisone 40 mg PO daily x 5 days total    -BiPAP PRN and HS   -Respiratory protocol  -Continue home medication        Dyslipidemia   Assessment & Plan    -Continue statins        Hypernatremia   Assessment & Plan    Likely due to dehydration   -resolved  -continue to monitor  Syncope and collapse   Assessment & Plan    - dizziness resolved  - has hx of CVA with residual right sided weakness   - head CT shows-No acute intracranial abnormality  Microangiopathic changes    - Continue statin, Aspirin  - MRI brain negative for acute findings  - repeat CT head on 7/9/18 was negative for acute findings              Type 2 diabetes mellitus without complication, without long-term current use of insulin [facility])   Assessment & Plan    Lab Results   Component Value Date    HGBA1C 7 0 (H) 05/07/2018       Recent Labs      07/12/18   1525  07/12/18   2225  07/13/18   0812  07/13/18   1154   POCGLU  182*  307*  169*  263*       -sliding scale insulin, fingerstick glucose 4 times daily AC/HS  -continue to monitor blood glucose trends  Dysphagia   Assessment & Plan    - speech was consulted and recommended a Level 3 Dysphagia/ advanced/ soft to chew with thin liquids however the patient refused  Lactic acidosis   Assessment & Plan    - Lactic acid remains elevated - 2 8  Will repeat tomorrow morning   - no IV fluids due to acute CHF  - continue to monitor  Tobacco abuse   Assessment & Plan    -Smoking cessation counseling  -Nicotine patch        NSTEMI (non-ST elevated myocardial infarction) [facility])   Assessment & Plan    - likely type 2 in the setting of acute respiratory failure and acute CHF  - troponin was noted to be 0 13, now trending down  - cardiology following  - telemetry monitoring   - continue aspirin, statin, and beta blocker  * Acute respiratory failure with hypoxia and hypercapnia (HCC)   Assessment & Plan    CTA chest with PE protocol showed increasing large pleural effusions with overlying compressive atelectasis  No acute pulmonary embolism  - Chest x-ray showed enlarging right pleural effusion  Stable small left pleural effusion  - likely due to systolic CHF given EF of 22-35 % on recent echo  - Patient has been on IV Lasix 40 mg bid  - Pulmonology was consulted, thoracentesis being performed today  - Eliquis held and heparin gtt started yesterday   Eliquis held this AM but will be restarted this evening   - Continue BiPAP PRN and HS   - IV solumedrol 40 mg IV discontinued, Prednisone 40 mg PO daily started today 18  - Respiratory protocol  UTI - enterococcus faecalis: Patients urine culture returned positive  He has an increasing white count which may be partially due to steroids, but suspect UTI  Will add IV Levaquin to patients medication regimen  It appears the Urine culture from 18, recently updated 18  VTE Pharmacologic Prophylaxis:   Pharmacologic: Apixaban (Eliquis)  Mechanical VTE Prophylaxis in Place: Yes        Time Spent for Care: 30 minutes  More than 50% of total time spent on counseling and coordination of care as described above  Current Length of Stay: 6 day(s)    Current Patient Status: Inpatient   Certification Statement: The patient will continue to require additional inpatient hospital stay due to need for close monitoring after thoracentesis    Discharge Plan / Estimated Discharge Date: likely home tomorrow, patient refusing rehab  Code Status: Level 1 - Full Code      Subjective:   Patient still with shortness of breath and fatigue  No chest pain  Objective:   Vitals:   Temp (24hrs), Av 7 °F (35 9 °C), Min:96 3 °F (35 7 °C), Max:97 °F (36 1 °C)    HR:  [76-88] 88  Resp:  [18-20] 18  BP: (117-168)/(67-87) 117/67  SpO2:  [94 %-100 %] 98 %  Body mass index is 28 kg/m²  Input and Output Summary (last 24 hours): Intake/Output Summary (Last 24 hours) at 18 1634  Last data filed at 18 1500   Gross per 24 hour   Intake             1320 ml   Output             2750 ml   Net            -1430 ml       Physical Exam:   Physical Exam   Constitutional: He is oriented to person, place, and time  He appears well-developed and well-nourished  HENT:   Head: Normocephalic  Mouth/Throat: Oropharynx is clear and moist    Cardiovascular: Normal rate and regular rhythm  No murmur heard  Pulmonary/Chest: Effort normal and breath sounds normal  No respiratory distress  He has no wheezes     Decreased breath sounds at the right lung base    Abdominal: Soft  Bowel sounds are normal  He exhibits no distension  There is no tenderness  Musculoskeletal: He exhibits no edema  Neurological: He is alert and oriented to person, place, and time  Skin: Skin is warm and dry  He is not diaphoretic  No erythema  Nursing note and vitals reviewed  Additional Data:  Labs:    Results from last 7 days  Lab Units 07/13/18  0610  07/11/18  0509   WBC Thousand/uL 21 08*  < > 10 91*   HEMOGLOBIN g/dL 11 6*  < > 10 9*   HEMATOCRIT % 36 2*  < > 35 2*   PLATELETS Thousands/uL 338  < > 300   NEUTROS PCT %  --   --  82*   LYMPHS PCT %  --   --  10*   LYMPHO PCT % 10*  < >  --    MONOS PCT %  --   --  8   MONO PCT MAN % 9  < >  --    EOS PCT %  --   --  0   EOSINO PCT MANUAL % 0  < >  --    < > = values in this interval not displayed  Results from last 7 days  Lab Units 07/13/18  0610 07/12/18  0451   SODIUM mmol/L 141 141   POTASSIUM mmol/L 3 6 4 2   CHLORIDE mmol/L 102 102   CO2 mmol/L 33* 34*   BUN mg/dL 39* 31*   CREATININE mg/dL 1 11 0 98   CALCIUM mg/dL 9 9 9 8   TOTAL PROTEIN g/dL  --  5 6*   BILIRUBIN TOTAL mg/dL  --  0 60   ALK PHOS U/L  --  76   ALT U/L  --  35   AST U/L  --  15   GLUCOSE RANDOM mg/dL 126 128       Results from last 7 days  Lab Units 07/12/18  1029   INR  1 35*       * I Have Reviewed All Lab Data Listed Above  * Additional Pertinent Lab Tests Reviewed:  All Labs Within Last 24 Hours Reviewed            Recent Cultures (last 7 days):     Results from last 7 days  Lab Units 07/08/18  1701   URINE CULTURE  >100,000 cfu/ml Enterococcus faecalis*  <10,000 cfu/ml Staphylococcus coagulase negative*       Last 24 Hours Medication List:     Current Facility-Administered Medications:  albuterol 2 5 mg Nebulization Q6H PRN Darrin Quintanilla MD   apixaban 5 mg Oral BID [employer], DO   aspirin 81 mg Oral Daily Eyal Yang PA-C   atorvastatin 40 mg Oral Daily Eyal Yang PA-C   cloNIDine 0 2 mg Oral Q12H 8084 Westley Salazar DO diltiazem 180 mg Oral Daily Keegan Roth, DO   furosemide 40 mg Intravenous BID (diuretic) Keegan Roth,    hydrALAZINE 10 mg Intravenous Q6H PRN Karan Larios PA-C   insulin glargine 10 Units Subcutaneous QAM Chinedu Magana, DO   insulin lispro 1-6 Units Subcutaneous TID AC Eyal Yang PA-C   insulin lispro 1-6 Units Subcutaneous HS Eyal Yang PA-C   labetalol 10 mg Intravenous Q4H PRN Keegan Roth,    levalbuterol 1 25 mg Nebulization TID Wali Sanches MD   lisinopril 20 mg Oral Daily Eyal Yang PA-C   metoprolol succinate 100 mg Oral BID Keegan Roth,    nicotine 1 patch Transdermal Daily Eyal Yang PA-C   ondansetron 4 mg Intravenous Q6H PRN Eyal Yang PA-C   pantoprazole 40 mg Oral Early Morning Eyal Yang PA-C   predniSONE 40 mg Oral Daily Roman Rios PA-C   pregabalin 300 mg Oral TID Eyal Yang PA-C   ranolazine 500 mg Oral Q12H [facility] Eyal Yang PA-C   sodium chloride 3 mL Nebulization TID Darrin Quintanilla MD   tamsulosin 0 4 mg Oral Daily Eyal Yang PA-C   tiotropium 18 mcg Inhalation Daily Eyal Yang PA-C   traMADol 50 mg Oral Q6H PRN Karan Larios PA-C        Today, Patient Was Seen By: Terry Worthington PA-C    ** Please Note: Dragon 360 Dictation voice to text software may have been used in the creation of this document   **

## 2018-07-13 NOTE — PROCEDURES
Thoracentesis  Date/Time: 7/13/2018 4:51 PM  Performed by: Karen Dietrich  Authorized by: Karen Dietrich     Patient location:  Bedside  Other Assisting Provider: No    Consent:     Consent obtained:  Written    Consent given by:  Patient    Risks discussed:  Bleeding, incomplete drainage, nerve damage, infection, pain and pneumothorax    Alternatives discussed:  No treatment  Universal protocol:     Procedure explained and questions answered to patient or proxy's satisfaction: yes      Relevant documents present and verified: yes      Test results available and properly labeled: yes      Radiology Images displayed and confirmed  If images not available, report reviewed: yes      Required blood products, implants, devices and special equipment available: yes      Site/side marked: yes      Immediately prior to procedure a time out was called: yes      Patient identity confirmed:  Verbally with patient  Indications:     Procedure Purpose: diagnostic and therapeutic      Indications: pleural effusion    Anesthesia (see MAR for exact dosages): Anesthesia method:  Local infiltration    Local anesthetic:  Lidocaine 1% w/o epi  Procedure details:     Preparation: Patient was prepped and draped in usual sterile fashion      Standard thoracentesis cath kit used: Yes      Patient position:  Sitting    Laterality:  Right    Intercostal space:  8th    Puncture method:  Over-the-needle catheter    Ultrasound guidance: yes      Reason for ultrasound: Identify fluid collection and guide cathetar placement  Indwelling catheter placed: no      Number of attempts:  1    Needle gauge:  14    Catheter size:  8 Fr    Drainage color:  Yellow    Fluid removed amount:  1500ml  Post-procedure details:     Chest x-ray performed: yes      Patient tolerance of procedure: Tolerated well, no immediate complications  Comments:      Please follow up fluid studies for further evaluation  May restart oral anticoagulation tonight

## 2018-07-13 NOTE — OCCUPATIONAL THERAPY NOTE
OT Note     07/13/18 0828   General   Missed Treatment Reason Patient refusal   Assessment   Assessment OT attempted see pt  this a m  Presents seated EOB, states " if I get any  you won"t even be able to seee me  " declines a m  self care     Recommendation   Recommendation (Continue as able unless d/c'd )

## 2018-07-13 NOTE — PROGRESS NOTES
Heparin drip /stopped at this time per order  KIARA Jacobs notified of change in order and stopped heparin  Patient is getting thoracentesis today

## 2018-07-13 NOTE — ASSESSMENT & PLAN NOTE
- Lactic acid remains elevated - 2 8  Will repeat tomorrow morning   - no IV fluids due to acute CHF  - continue to monitor

## 2018-07-13 NOTE — ASSESSMENT & PLAN NOTE
- Rate controlled  - Eliquis held due to possible thoracentesis later today    - Continue home medications

## 2018-07-13 NOTE — PLAN OF CARE
Problem: Potential for Falls  Goal: Patient will remain free of falls  INTERVENTIONS:  - Assess patient frequently for physical needs  -  Identify cognitive and physical deficits and behaviors that affect risk of falls    -  [place] fall precautions as indicated by assessment   - Educate patient/family on patient safety including physical limitations  - Instruct patient to call for assistance with activity based on assessment  - Modify environment to reduce risk of injury  - Consider OT/PT consult to assist with strengthening/mobility   Outcome: Progressing      Problem: Prexisting or High Potential for Compromised Skin Integrity  Goal: Skin integrity is maintained or improved  INTERVENTIONS:  - Identify patients at risk for skin breakdown  - Assess and monitor skin integrity  - Assess and monitor nutrition and hydration status  - Monitor labs (i e  albumin)  - Assess for incontinence   - Turn and reposition patient  - Assist with mobility/ambulation  - Relieve pressure over bony prominences  - Avoid friction and shearing  - Provide appropriate hygiene as needed including keeping skin clean and dry  - Evaluate need for skin moisturizer/barrier cream  - Collaborate with interdisciplinary team (i e  Nutrition, Rehabilitation, etc )   - Patient/family teaching   Outcome: Progressing      Problem: PAIN - ADULT  Goal: Verbalizes/displays adequate comfort level or baseline comfort level  Interventions:  - Encourage patient to monitor pain and request assistance  - Assess pain using appropriate pain scale  - Administer analgesics based on type and severity of pain and evaluate response  - Implement non-pharmacological measures as appropriate and evaluate response  - Consider cultural and social influences on pain and pain management  - Notify physician/advanced practitioner if interventions unsuccessful or patient reports new pain   Outcome: Progressing      Problem: INFECTION - ADULT  Goal: Absence or prevention of progression during hospitalization  INTERVENTIONS:  - Assess and monitor for signs and symptoms of infection  - Monitor lab/diagnostic results  - Monitor all insertion sites, i e  indwelling lines, tubes, and drains  - Monitor endotracheal (as able) and nasal secretions for changes in amount and color  - [place] appropriate cooling/warming therapies per order  - Administer medications as ordered  - Instruct and encourage patient and family to use good hand hygiene technique  - Identify and instruct in appropriate isolation precautions for identified infection/condition   Outcome: Progressing    Goal: Absence of fever/infection during neutropenic period  INTERVENTIONS:  - Monitor WBC  - Implement neutropenic guidelines   Outcome: Progressing      Problem: SAFETY ADULT  Goal: Maintain or return to baseline ADL function  INTERVENTIONS:  -  Assess patient's ability to carry out ADLs; assess patient's baseline for ADL function and identify physical deficits which impact ability to perform ADLs (bathing, care of mouth/teeth, toileting, grooming, dressing, etc )  - Assess/evaluate cause of self-care deficits   - Assess range of motion  - Assess patient's mobility; develop plan if impaired  - Assess patient's need for assistive devices and provide as appropriate  - Encourage maximum independence but intervene and supervise when necessary  ¯ Involve family in performance of ADLs  ¯ Assess for home care needs following discharge   ¯ Request OT consult to assist with ADL evaluation and planning for discharge  ¯ Provide patient education as appropriate   Outcome: Progressing    Goal: Maintain or return mobility status to optimal level  INTERVENTIONS:  - Assess patient's baseline mobility status (ambulation, transfers, stairs, etc )    - Identify cognitive and physical deficits and behaviors that affect mobility  - Identify mobility aids required to assist with transfers and/or ambulation (gait belt, sit-to-stand, lift, walker, cane, etc )  - [place] fall precautions as indicated by assessment  - Record patient progress and toleration of activity level on Mobility SBAR; progress patient to next Phase/Stage  - Instruct patient to call for assistance with activity based on assessment  - Request Rehabilitation consult to assist with strengthening/weightbearing, etc    Outcome: Progressing      Problem: DISCHARGE PLANNING  Goal: Discharge to home or other facility with appropriate resources  INTERVENTIONS:  - Identify barriers to discharge w/patient and caregiver  - Arrange for needed discharge resources and transportation as appropriate  - Identify discharge learning needs (meds, wound care, etc )  - Arrange for interpretive services to assist at discharge as needed  - Refer to Case Management Department for coordinating discharge planning if the patient needs post-hospital services based on physician/advanced practitioner order or complex needs related to functional status, cognitive ability, or social support system   Outcome: Progressing      Problem: Knowledge Deficit  Goal: Patient/family/caregiver demonstrates understanding of disease process, treatment plan, medications, and discharge instructions  Complete learning assessment and assess knowledge base  Interventions:  - Provide teaching at level of understanding  - Provide teaching via preferred learning methods   Outcome: Progressing      Problem: Neurological Deficit  Goal: Neurological status is stable or improving  Interventions:  - Monitor and assess patient's level of consciousness, motor function, sensory function, and level of assistance needed for ADLs  - Monitor and report changes from baseline  Collaborate with interdisciplinary team to initiate plan and implement interventions as ordered  - Provide and maintain a safe environment  - Utilize seizure precautions  - Utilize fall precautions  - Utilize aspiration precautions  - Utilize bleeding precautions  Outcome: Progressing      Problem: Activity Intolerance/Impaired Mobility  Goal: Mobility/activity is maintained at optimum level for patient  Interventions:  - Assess and monitor patient  barriers to mobility and need for assistive/adaptive devices  - Assess patient's emotional response to limitations  - Collaborate with interdisciplinary team and initiate plans and interventions as ordered  - Encourage independent activity per ability   - Maintain proper body alignment  - Perform active/passive rom as tolerated/ordered  - Plan activities to conserve energy   - Turn patient   Outcome: Progressing      Problem: Communication Impairment  Goal: Ability to express needs and understand communication  Assess patient's communication skills and ability to understand information  Patient will demonstrate use of effective communication techniques, alternative methods of communication and understanding even if not able to speak  - Encourage communication and provide alternate methods of communication as needed  - Collaborate with case management/ for discharge needs  - Include patient/family/caregiver in decisions related to communication  Outcome: Progressing      Problem: Potential for Aspiration  Goal: Non-ventilated patient's risk of aspiration is minimized  Assess and monitor vital signs, respiratory status, and labs (WBC)  Monitor for signs of aspiration (tachypnea, cough, rales, wheezing, cyanosis, fever)  - Assess and monitor patient's ability to swallow  - Place patient up in chair to eat if possible  - HOB up at 90 degrees to eat if unable to get patient up into chair   - Supervise patient during oral intake  - Instruct patient to take small bites  - Instruct patient to take small single sips when taking liquids    - Follow patient-specific strategies generated by speech pathologist    Outcome: Progressing    Goal: Ventilated patient's risk of aspiration is minimized  Assess and monitor vital signs, respiratory status, airway cuff pressure, and labs (WBC)  Monitor for signs of aspiration (tachypnea, cough, rales, wheezing, cyanosis, fever)  - Elevate head of bed 30 degrees if patient has tube feeding   - Monitor tube feeding  Outcome: Progressing      Problem: Nutrition  Goal: Nutrition/Hydration status is improving  Monitor and assess patient's nutrition/hydration status for malnutrition (ex- brittle hair, bruises, dry skin, pale skin and conjunctiva, muscle wasting, smooth red tongue, and disorientation)  Collaborate with interdisciplinary team and initiate plan and interventions as ordered  Monitor patient's weight and dietary intake as ordered or per policy  Utilize nutrition screening tool and intervene per policy  Determine patient's food preferences and provide high-protein, high-caloric foods as appropriate  - Assist patient with eating   - Allow adequate time for meals   - Encourage patient to take dietary supplement as ordered  - Collaborate with clinical nutritionist   - Include patient/family/caregiver in decisions related to nutrition     Outcome: Progressing      Problem: DISCHARGE PLANNING - CARE MANAGEMENT  Goal: Discharge to post-acute care or home with appropriate resources  INTERVENTIONS:  - Conduct assessment to determine patient/family and health care team treatment goals, and need for post-acute services based on payer coverage, community resources, and patient preferences, and barriers to discharge  - Address psychosocial, clinical, and financial barriers to discharge as identified in assessment in conjunction with the patient/family and health care team  - Arrange appropriate level of post-acute services according to patient's   needs and preference and payer coverage in collaboration with the physician and health care team  - Communicate with and update the patient/family, physician, and health care team regarding progress on the discharge plan  - Arrange appropriate transportation to post-acute venues   Outcome: Progressing      Problem: Nutrition/Hydration-ADULT  Goal: Nutrient/Hydration intake appropriate for improving, restoring or maintaining nutritional needs  Monitor and assess patient's nutrition/hydration status for malnutrition (ex- brittle hair, bruises, dry skin, pale skin and conjunctiva, muscle wasting, smooth red tongue, and disorientation)  Collaborate with interdisciplinary team and initiate plan and interventions as ordered  Monitor patient's weight and dietary intake as ordered or per policy  Utilize nutrition screening tool and intervene per policy  Determine patient's food preferences and provide high-protein, high-caloric foods as appropriate       INTERVENTIONS:  - Monitor oral intake, urinary output, labs, and treatment plans  - Assess nutrition and hydration status and recommend course of action  - Evaluate amount of meals eaten  - Assist patient with eating if necessary   - Allow adequate time for meals  - Recommend/ encourage appropriate diets, oral nutritional supplements, and vitamin/mineral supplements  - Order, calculate, and assess calorie counts as needed  - Recommend, monitor, and adjust tube feedings and TPN/PPN based on assessed needs  - Assess need for intravenous fluids  - Provide specific nutrition/hydration education as appropriate  - Include patient/family/caregiver in decisions related to nutrition   Outcome: Progressing

## 2018-07-13 NOTE — ASSESSMENT & PLAN NOTE
- speech was consulted and recommended a Level 3 Dysphagia/ advanced/ soft to chew with thin liquids however the patient refused

## 2018-07-13 NOTE — SOCIAL WORK
The patient is still refusing to go to rehab and I called Jaqueline at [facility] and she is aware that the patient is not coming  He w=needed o2 tanks to go home with and l called [facility] and they brought him o2 tanks  I also gave him a cane from [facility] and he is aware that he will have to pay for the cane if the insurance does not cover it   He wanted [facility] to get [facility] to deliver him diapers to the hospital but they only deliver medications

## 2018-07-13 NOTE — ASSESSMENT & PLAN NOTE
CTA chest with PE protocol showed increasing large pleural effusions with overlying compressive atelectasis  No acute pulmonary embolism  - Chest x-ray showed enlarging right pleural effusion  Stable small left pleural effusion  - likely due to systolic CHF given EF of 89-85 % on recent echo  - Patient has been on IV Lasix 40 mg bid  - Pulmonology was consulted, thoracentesis being performed today  - Eliquis held and heparin gtt started yesterday  Eliquis held this AM but will be restarted this evening   - Continue BiPAP PRN and HS   - IV solumedrol 40 mg IV discontinued, Prednisone 40 mg PO daily started today 7/12/18  - Respiratory protocol

## 2018-07-14 LAB
ALBUMIN SERPL BCP-MCNC: 2.6 G/DL (ref 3.5–5)
ALP SERPL-CCNC: 67 U/L (ref 46–116)
ALT SERPL W P-5'-P-CCNC: 34 U/L (ref 12–78)
ANION GAP SERPL CALCULATED.3IONS-SCNC: 6 MMOL/L (ref 4–13)
ANISOCYTOSIS BLD QL SMEAR: PRESENT
AST SERPL W P-5'-P-CCNC: 13 U/L (ref 5–45)
BASO STIPL BLD QL SMEAR: PRESENT
BASOPHILS # BLD MANUAL: 0 THOUSAND/UL (ref 0–0.1)
BASOPHILS NFR MAR MANUAL: 0 % (ref 0–1)
BILIRUB SERPL-MCNC: 0.4 MG/DL (ref 0.2–1)
BUN SERPL-MCNC: 43 MG/DL (ref 5–25)
CALCIUM SERPL-MCNC: 9.1 MG/DL (ref 8.3–10.1)
CHLORIDE SERPL-SCNC: 99 MMOL/L (ref 100–108)
CO2 SERPL-SCNC: 34 MMOL/L (ref 21–32)
CREAT SERPL-MCNC: 1.33 MG/DL (ref 0.6–1.3)
EOSINOPHIL # BLD MANUAL: 0.19 THOUSAND/UL (ref 0–0.4)
EOSINOPHIL NFR BLD MANUAL: 1 % (ref 0–6)
ERYTHROCYTE [DISTWIDTH] IN BLOOD BY AUTOMATED COUNT: 17.6 % (ref 11.6–15.1)
GFR SERPL CREATININE-BSD FRML MDRD: 52 ML/MIN/1.73SQ M
GLUCOSE SERPL-MCNC: 120 MG/DL (ref 65–140)
GLUCOSE SERPL-MCNC: 189 MG/DL (ref 65–140)
GLUCOSE SERPL-MCNC: 196 MG/DL (ref 65–140)
GLUCOSE SERPL-MCNC: 201 MG/DL (ref 65–140)
GLUCOSE SERPL-MCNC: 224 MG/DL (ref 65–140)
HCT VFR BLD AUTO: 34.7 % (ref 36.5–49.3)
HGB BLD-MCNC: 10.8 G/DL (ref 12–17)
HYPERCHROMIA BLD QL SMEAR: PRESENT
LACTATE SERPL-SCNC: 3.5 MMOL/L (ref 0.5–2)
LACTATE SERPL-SCNC: 3.9 MMOL/L (ref 0.5–2)
LYMPHOCYTES # BLD AUTO: 13 % (ref 14–44)
LYMPHOCYTES # BLD AUTO: 2.45 THOUSAND/UL (ref 0.6–4.47)
MAGNESIUM SERPL-MCNC: 1.4 MG/DL (ref 1.6–2.6)
MAGNESIUM SERPL-MCNC: 1.5 MG/DL (ref 1.6–2.6)
MCH RBC QN AUTO: 22.4 PG (ref 26.8–34.3)
MCHC RBC AUTO-ENTMCNC: 31.1 G/DL (ref 31.4–37.4)
MCV RBC AUTO: 72 FL (ref 82–98)
MONOCYTES # BLD AUTO: 1.13 THOUSAND/UL (ref 0–1.22)
MONOCYTES NFR BLD: 6 % (ref 4–12)
NEUTROPHILS # BLD MANUAL: 15.07 THOUSAND/UL (ref 1.85–7.62)
NEUTS SEG NFR BLD AUTO: 80 % (ref 43–75)
OVALOCYTES BLD QL SMEAR: PRESENT
PHOSPHATE SERPL-MCNC: 4 MG/DL (ref 2.3–4.1)
PLATELET # BLD AUTO: 296 THOUSANDS/UL (ref 149–390)
PLATELET BLD QL SMEAR: ADEQUATE
PMV BLD AUTO: 10.3 FL (ref 8.9–12.7)
POIKILOCYTOSIS BLD QL SMEAR: PRESENT
POTASSIUM SERPL-SCNC: 3.5 MMOL/L (ref 3.5–5.3)
PROT SERPL-MCNC: 5 G/DL (ref 6.4–8.2)
RBC # BLD AUTO: 4.83 MILLION/UL (ref 3.88–5.62)
SODIUM SERPL-SCNC: 139 MMOL/L (ref 136–145)
TOTAL CELLS COUNTED SPEC: 100
WBC # BLD AUTO: 18.84 THOUSAND/UL (ref 4.31–10.16)

## 2018-07-14 PROCEDURE — 83735 ASSAY OF MAGNESIUM: CPT | Performed by: INTERNAL MEDICINE

## 2018-07-14 PROCEDURE — 83605 ASSAY OF LACTIC ACID: CPT | Performed by: PHYSICIAN ASSISTANT

## 2018-07-14 PROCEDURE — 85007 BL SMEAR W/DIFF WBC COUNT: CPT | Performed by: INTERNAL MEDICINE

## 2018-07-14 PROCEDURE — 84100 ASSAY OF PHOSPHORUS: CPT | Performed by: INTERNAL MEDICINE

## 2018-07-14 PROCEDURE — 80053 COMPREHEN METABOLIC PANEL: CPT | Performed by: INTERNAL MEDICINE

## 2018-07-14 PROCEDURE — 83735 ASSAY OF MAGNESIUM: CPT | Performed by: PHYSICIAN ASSISTANT

## 2018-07-14 PROCEDURE — 99232 SBSQ HOSP IP/OBS MODERATE 35: CPT | Performed by: PHYSICIAN ASSISTANT

## 2018-07-14 PROCEDURE — 94760 N-INVAS EAR/PLS OXIMETRY 1: CPT

## 2018-07-14 PROCEDURE — 94762 N-INVAS EAR/PLS OXIMTRY CONT: CPT

## 2018-07-14 PROCEDURE — 82948 REAGENT STRIP/BLOOD GLUCOSE: CPT

## 2018-07-14 PROCEDURE — 94640 AIRWAY INHALATION TREATMENT: CPT

## 2018-07-14 PROCEDURE — 94660 CPAP INITIATION&MGMT: CPT

## 2018-07-14 PROCEDURE — 83605 ASSAY OF LACTIC ACID: CPT | Performed by: INTERNAL MEDICINE

## 2018-07-14 PROCEDURE — 85027 COMPLETE CBC AUTOMATED: CPT | Performed by: INTERNAL MEDICINE

## 2018-07-14 RX ORDER — MAGNESIUM SULFATE HEPTAHYDRATE 40 MG/ML
2 INJECTION, SOLUTION INTRAVENOUS ONCE
Status: COMPLETED | OUTPATIENT
Start: 2018-07-14 | End: 2018-07-14

## 2018-07-14 RX ADMIN — NICOTINE 1 PATCH: 7 PATCH, EXTENDED RELEASE TRANSDERMAL at 09:13

## 2018-07-14 RX ADMIN — PANTOPRAZOLE SODIUM 40 MG: 40 TABLET, DELAYED RELEASE ORAL at 05:00

## 2018-07-14 RX ADMIN — ISODIUM CHLORIDE 3 ML: 0.03 SOLUTION RESPIRATORY (INHALATION) at 13:46

## 2018-07-14 RX ADMIN — PREDNISONE 40 MG: 20 TABLET ORAL at 09:09

## 2018-07-14 RX ADMIN — ASPIRIN 81 MG 81 MG: 81 TABLET ORAL at 09:11

## 2018-07-14 RX ADMIN — INSULIN GLARGINE 10 UNITS: 100 INJECTION, SOLUTION SUBCUTANEOUS at 09:11

## 2018-07-14 RX ADMIN — RANOLAZINE 500 MG: 500 TABLET, FILM COATED, EXTENDED RELEASE ORAL at 21:27

## 2018-07-14 RX ADMIN — APIXABAN 5 MG: 5 TABLET, FILM COATED ORAL at 09:11

## 2018-07-14 RX ADMIN — MAGNESIUM SULFATE HEPTAHYDRATE 2 G: 40 INJECTION, SOLUTION INTRAVENOUS at 11:28

## 2018-07-14 RX ADMIN — INSULIN LISPRO 2 UNITS: 100 INJECTION, SOLUTION INTRAVENOUS; SUBCUTANEOUS at 21:26

## 2018-07-14 RX ADMIN — INSULIN LISPRO 1 UNITS: 100 INJECTION, SOLUTION INTRAVENOUS; SUBCUTANEOUS at 11:28

## 2018-07-14 RX ADMIN — INSULIN LISPRO 2 UNITS: 100 INJECTION, SOLUTION INTRAVENOUS; SUBCUTANEOUS at 16:44

## 2018-07-14 RX ADMIN — RANOLAZINE 500 MG: 500 TABLET, FILM COATED, EXTENDED RELEASE ORAL at 09:09

## 2018-07-14 RX ADMIN — PREGABALIN 300 MG: 75 CAPSULE ORAL at 08:49

## 2018-07-14 RX ADMIN — TRAMADOL HYDROCHLORIDE 50 MG: 50 TABLET, FILM COATED ORAL at 03:09

## 2018-07-14 RX ADMIN — TRAMADOL HYDROCHLORIDE 50 MG: 50 TABLET, FILM COATED ORAL at 10:01

## 2018-07-14 RX ADMIN — ATORVASTATIN CALCIUM 40 MG: 40 TABLET, FILM COATED ORAL at 09:09

## 2018-07-14 RX ADMIN — PREGABALIN 300 MG: 75 CAPSULE ORAL at 16:43

## 2018-07-14 RX ADMIN — ISODIUM CHLORIDE 3 ML: 0.03 SOLUTION RESPIRATORY (INHALATION) at 21:31

## 2018-07-14 RX ADMIN — PREGABALIN 300 MG: 75 CAPSULE ORAL at 21:27

## 2018-07-14 RX ADMIN — CLONIDINE HYDROCHLORIDE 0.2 MG: 0.1 TABLET ORAL at 21:27

## 2018-07-14 RX ADMIN — APIXABAN 5 MG: 5 TABLET, FILM COATED ORAL at 18:20

## 2018-07-14 RX ADMIN — LEVALBUTEROL 1.25 MG: 1.25 SOLUTION, CONCENTRATE RESPIRATORY (INHALATION) at 21:32

## 2018-07-14 RX ADMIN — ISODIUM CHLORIDE 3 ML: 0.03 SOLUTION RESPIRATORY (INHALATION) at 09:08

## 2018-07-14 RX ADMIN — LEVALBUTEROL 1.25 MG: 1.25 SOLUTION, CONCENTRATE RESPIRATORY (INHALATION) at 13:46

## 2018-07-14 RX ADMIN — LEVALBUTEROL 1.25 MG: 1.25 SOLUTION, CONCENTRATE RESPIRATORY (INHALATION) at 09:08

## 2018-07-14 RX ADMIN — LEVOFLOXACIN 500 MG: 5 INJECTION, SOLUTION INTRAVENOUS at 16:46

## 2018-07-14 RX ADMIN — TIOTROPIUM BROMIDE 18 MCG: 18 CAPSULE ORAL; RESPIRATORY (INHALATION) at 10:02

## 2018-07-14 RX ADMIN — TAMSULOSIN HYDROCHLORIDE 0.4 MG: 0.4 CAPSULE ORAL at 08:48

## 2018-07-14 NOTE — NURSING NOTE
Patient requesting 9 packs of sugar to put in coffee stating he always uses that much when he is at home  Explained to patient that he is diabetic and 9 sugars would be to much  Patient states the doctor told him he could eat anything he wants, as much as he wants and have as much sugar as he wants  Patient also states if he does not get want he wants he will be checking himself out of the hospital   Diet had been changed to [employer] on July 12, 2018  KIARA Jackman notified of this issue  Decision made by Dez to kindra patient's request for the extra sugar for his coffee

## 2018-07-14 NOTE — ASSESSMENT & PLAN NOTE
- dizziness resolved  - has hx of CVA with residual right sided weakness   - head CT shows-No acute intracranial abnormality  Microangiopathic changes  - Continue statin, Aspirin  - MRI brain negative for acute findings  - repeat CT head on 7/9/18 was negative for acute findings  [place]

## 2018-07-14 NOTE — ASSESSMENT & PLAN NOTE
-no wheezing noted today    Continue prednisone 40 mg PO daily x 5 days total    -BiPAP PRN and HS   -Respiratory protocol  -Continue home medications

## 2018-07-14 NOTE — ASSESSMENT & PLAN NOTE
Pulmonology performed thoracentesis yesterday  1 5L was removed during procedure  - echo shows systolic CHF with EF of 69-50%  - hold Lasix for now given probable euvolemia and hypotension noted this AM   -Appreciate cardiology's input   -continue Toprol and lisinopril

## 2018-07-14 NOTE — ASSESSMENT & PLAN NOTE
CTA chest with PE protocol showed increasing large pleural effusions with overlying compressive atelectasis  No acute pulmonary embolism  - Chest x-ray showed enlarging right pleural effusion  Stable small left pleural effusion  - likely due to systolic CHF given EF of 98-49 % on recent echo  - Patient has been on IV Lasix 40 mg bid, now stopped due to hypotension and probable euvolemia  - Pulmonology was consulted, thoracentesis performed yesterday  - Eliquis held and heparin gtt started yesterday  Eliquis held this AM but will be restarted this evening   - Continue BiPAP PRN and HS   - IV solumedrol 40 mg IV discontinued, Prednisone 40 mg PO daily started today 7/12/18, day 3 of 5   - Respiratory protocol

## 2018-07-14 NOTE — PROGRESS NOTES
Progress Note - Laura Valle 1945, 74 y o  male MRN: [id number]    Unit/Bed#: 415-01 Encounter: [id number]    Primary Care Provider: Tim Zurita DO   Date and time admitted to hospital: 7/6/2018  7:56 PM        Acute systolic CHF (congestive heart failure) [facility])   Assessment & Plan    Pulmonology performed thoracentesis yesterday  1 5L was removed during procedure  - echo shows systolic CHF with EF of 15-38%  - hold Lasix for now given probable euvolemia and hypotension noted this AM   -Appreciate cardiology's input   -continue Toprol and lisinopril  Acute kidney injury ([facility] [address] )   Assessment & Plan    - patient's creatinine is worse today 1 33 with a baseline of 1 2   - also some urinary retention noted overnight    - follow strict urinary retention protocol    - continue to treat UTI  Persistent atrial fibrillation (HCC)   Assessment & Plan    - Rate controlled  - continue eliquis  - Continue home medications        Essential hypertension   Assessment & Plan    - the patient's blood pressure was noted to be low  Will hold lasix and reassess, monitor BP closely  - catapres was increased from 0 1 to 0 2 and change from daily to BID on 7/12/18  May need to decrease dose again  Continue Cardizem, lisinopril, and Toprol  - IV PRN labetalol and IV hydralazine  - continue to monitor  COPD (chronic obstructive pulmonary disease) (HCC)   Assessment & Plan    -no wheezing noted today  Continue prednisone 40 mg PO daily x 5 days total    -BiPAP PRN and HS   -Respiratory protocol  -Continue home medications        Dyslipidemia   Assessment & Plan    -Continue statins        Hypernatremia   Assessment & Plan    Likely due to dehydration   -resolved  -continue to monitor  Syncope and collapse   Assessment & Plan    - dizziness resolved  - has hx of CVA with residual right sided weakness   - head CT shows-No acute intracranial abnormality  Microangiopathic changes    - Continue statin, Aspirin  - MRI brain negative for acute findings  - repeat CT head on 7/9/18 was negative for acute findings              Type 2 diabetes mellitus without complication, without long-term current use of insulin [facility])   Assessment & Plan    Lab Results   Component Value Date    HGBA1C 7 0 (H) 05/07/2018       Recent Labs      07/13/18   1154  07/13/18   1657  07/13/18   2109  07/14/18   0804   POCGLU  263*  187*  252*  120       -sliding scale insulin, fingerstick glucose 4 times daily AC/HS  -continue to monitor blood glucose trends  Dysphagia   Assessment & Plan    - speech was consulted and recommended a Level 3 Dysphagia/ advanced/ soft to chew with thin liquids however the patient refused  Acute cystitis without hematuria   Assessment & Plan    Continue IV Levaquin per culture results  Lactic acidosis   Assessment & Plan    - Lactic acid increasing today at 3 9 with hypotension    - no IV fluids due to acute CHF, but will stop IV lasix  - continue IV antibiotics for UTI  - continue to monitor  Tobacco abuse   Assessment & Plan    -Smoking cessation counseling  -Nicotine patch        NSTEMI (non-ST elevated myocardial infarction) [facility])   Assessment & Plan    - likely type 2 in the setting of acute respiratory failure and acute CHF  - troponin was noted to be 0 13, now resolving   - cardiology following  - telemetry monitoring   - continue aspirin, statin, and beta blocker  * Acute respiratory failure with hypoxia and hypercapnia (HCC)   Assessment & Plan    CTA chest with PE protocol showed increasing large pleural effusions with overlying compressive atelectasis  No acute pulmonary embolism  - Chest x-ray showed enlarging right pleural effusion  Stable small left pleural effusion  - likely due to systolic CHF given EF of 26-21 % on recent echo  - Patient has been on IV Lasix 40 mg bid, now stopped due to hypotension and probable euvolemia    - Pulmonology was consulted, thoracentesis performed yesterday  - Eliquis held and heparin gtt started yesterday  Eliquis held this AM but will be restarted this evening   - Continue BiPAP PRN and HS   - IV solumedrol 40 mg IV discontinued, Prednisone 40 mg PO daily started today 18, day 3 of 5   - Respiratory protocol  VTE Pharmacologic Prophylaxis:   Pharmacologic: Apixaban (Eliquis)  Mechanical VTE Prophylaxis in Place: Yes        Time Spent for Care: 45 minutes  More than 50% of total time spent on counseling and coordination of care as described above  Current Length of Stay: 7 day(s)    Current Patient Status: Inpatient   Certification Statement: The patient will continue to require additional inpatient hospital stay due to need for close monitoring, IV magnesium, frequent lab draws    Discharge Plan / Estimated Discharge Date: possibly home tomorrow pending labs and clinical improvement  Code Status: Level 1 - Full Code      Subjective:   Patient's breathing feels much better  He notes some urinary retention overnight  Feels better otherwise  Objective:   Vitals:   Temp (24hrs), Av °F (36 1 °C), Min:96 6 °F (35 9 °C), Max:97 5 °F (36 4 °C)    HR:  [59-88] 59  Resp:  [17-18] 18  BP: ()/(53-96) 100/55  SpO2:  [96 %-100 %] 100 %  Body mass index is 27 48 kg/m²  Input and Output Summary (last 24 hours): Intake/Output Summary (Last 24 hours) at 18 1129  Last data filed at 18 0301   Gross per 24 hour   Intake             1060 ml   Output             2300 ml   Net            -1240 ml       Physical Exam:     Physical Exam   Constitutional: He appears well-nourished  HENT:   Head: Normocephalic  Cardiovascular: Normal rate, regular rhythm and normal heart sounds  No murmur heard  Pulmonary/Chest: Effort normal and breath sounds normal  No respiratory distress  He has no wheezes  Abdominal: Soft  Bowel sounds are normal  There is no tenderness     Musculoskeletal: He exhibits no edema  Neurological: He is alert  Skin: Skin is warm and dry  Psychiatric: He has a normal mood and affect  Vitals reviewed  Additional Data:   Labs:      Results from last 7 days  Lab Units 07/14/18  0536  07/11/18  0509   WBC Thousand/uL 18 84*  < > 10 91*   HEMOGLOBIN g/dL 10 8*  < > 10 9*   HEMATOCRIT % 34 7*  < > 35 2*   PLATELETS Thousands/uL 296  < > 300   NEUTROS PCT %  --   --  82*   LYMPHS PCT %  --   --  10*   LYMPHO PCT % 13*  < >  --    MONOS PCT %  --   --  8   MONO PCT MAN % 6  < >  --    EOS PCT %  --   --  0   EOSINO PCT MANUAL % 1  < >  --    < > = values in this interval not displayed  Results from last 7 days  Lab Units 07/14/18  0536   SODIUM mmol/L 139   POTASSIUM mmol/L 3 5   CHLORIDE mmol/L 99*   CO2 mmol/L 34*   BUN mg/dL 43*   CREATININE mg/dL 1 33*   CALCIUM mg/dL 9 1   TOTAL PROTEIN g/dL 5 0*   BILIRUBIN TOTAL mg/dL 0 40   ALK PHOS U/L 67   ALT U/L 34   AST U/L 13   GLUCOSE RANDOM mg/dL 224*       Results from last 7 days  Lab Units 07/12/18  1029   INR  1 35*       * I Have Reviewed All Lab Data Listed Above  * Additional Pertinent Lab Tests Reviewed: All Labs Within Last 24 Hours Reviewed    Imaging:  Imaging Reports Reviewed Today Include: portable chest xray from yesterday afternoon is reviewed  Right pleural effusion resolved           Recent Cultures (last 7 days):       Results from last 7 days  Lab Units 07/08/18  1701   URINE CULTURE  >100,000 cfu/ml Enterococcus faecalis*  <10,000 cfu/ml Staphylococcus coagulase negative*       Last 24 Hours Medication List:     Current Facility-Administered Medications:  albuterol 2 5 mg Nebulization Q6H PRN Darrin Quintanilla MD    apixaban 5 mg Oral BID Oma Cervantes DO    aspirin 81 mg Oral Daily Eyal Yang PA-C    atorvastatin 40 mg Oral Daily Eyal Yang PA-C    cloNIDine 0 2 mg Oral Q12H [address] Chinedu Magana DO    diltiazem 180 mg Oral Daily Oma Cervantes DO    hydrALAZINE 10 mg Intravenous Q6H PRN Johnathan Leone PA-C    insulin glargine 10 Units Subcutaneous QAM Chinedu Magana, DO    insulin lispro 1-6 Units Subcutaneous TID AC Eyal Yang PA-C    insulin lispro 1-6 Units Subcutaneous HS Eyal Yang PA-C    labetalol 10 mg Intravenous Q4H PRN [employer], DO    levalbuterol 1 25 mg Nebulization TID Ema Curtis MD    levofloxacin 500 mg Intravenous Q24H Oral Wagner PA-C Last Rate: 500 mg (07/13/18 1721)   lisinopril 20 mg Oral Daily Eyal Yang PA-C    magnesium sulfate 2 g Intravenous Once Dez Richardson PA-C    metoprolol succinate 100 mg Oral BID [employer], DO    nicotine 1 patch Transdermal Daily Eyal Yang PA-C    ondansetron 4 mg Intravenous Q6H PRN Eyal Yang PA-C    pantoprazole 40 mg Oral Early Morning Eyal Yang PA-C    predniSONE 40 mg Oral Daily Roman Rios PA-C    pregabalin 300 mg Oral TID Eyal Yang PA-C    ranolazine 500 mg Oral Q12H [facility] Eyal Yang PA-C    sodium chloride 3 mL Nebulization TID Darrin Quintanilla MD    tamsulosin 0 4 mg Oral Daily Eyal Yang PA-C    tiotropium 18 mcg Inhalation Daily Eyal Yang PA-C    traMADol 50 mg Oral Q6H PRN Johnathan Leone PA-C         Today, Patient Was Seen By: Oral Wagner PA-C    ** Please Note: Dragon 360 Dictation voice to text software may have been used in the creation of this document   **

## 2018-07-14 NOTE — NURSING NOTE
Received call from [employer] stating patient is calling there every 10 minutes to order more food  KIARA Arnold made aware and clarification obtained that patient can have more food if he wants but cannot continue to call for unlimited amounts  Dietary also stated patient called stating he only had 3 sugar packets and was requesting an extra 7  Based on prior verbal confirmation from Daisy Arnold, patient can have extra sugar packets  Gave permission for dietary to bring extra packets up to patient's room

## 2018-07-14 NOTE — ASSESSMENT & PLAN NOTE
- Lactic acid increasing today at 3 9 with hypotension    - no IV fluids due to acute CHF, but will stop IV lasix  - continue IV antibiotics for UTI  - continue to monitor

## 2018-07-14 NOTE — ASSESSMENT & PLAN NOTE
- likely type 2 in the setting of acute respiratory failure and acute CHF  - troponin was noted to be 0 13, now resolving   - cardiology following  - telemetry monitoring   - continue aspirin, statin, and beta blocker

## 2018-07-14 NOTE — ASSESSMENT & PLAN NOTE
- the patient's blood pressure was noted to be low  Will hold lasix and reassess, monitor BP closely  - catapres was increased from 0 1 to 0 2 and change from daily to BID on 7/12/18  May need to decrease dose again  Continue Cardizem, lisinopril, and Toprol  - IV PRN labetalol and IV hydralazine  - continue to monitor

## 2018-07-14 NOTE — ASSESSMENT & PLAN NOTE
Lab Results   Component Value Date    HGBA1C 7 0 (H) 05/07/2018       Recent Labs      07/13/18   1154  07/13/18   1657  07/13/18   2109  07/14/18   0804   POCGLU  263*  187*  252*  120       -sliding scale insulin, fingerstick glucose 4 times daily AC/HS  -continue to monitor blood glucose trends

## 2018-07-14 NOTE — ASSESSMENT & PLAN NOTE
- patient's creatinine is worse today 1 33 with a baseline of 1 2   - also some urinary retention noted overnight    - follow strict urinary retention protocol    - continue to treat UTI

## 2018-07-15 VITALS
HEIGHT: 69 IN | RESPIRATION RATE: 20 BRPM | BODY MASS INDEX: 27.56 KG/M2 | HEART RATE: 89 BPM | TEMPERATURE: 97.6 F | DIASTOLIC BLOOD PRESSURE: 77 MMHG | SYSTOLIC BLOOD PRESSURE: 137 MMHG | WEIGHT: 186.07 LBS | OXYGEN SATURATION: 100 %

## 2018-07-15 PROBLEM — E87.0 HYPERNATREMIA: Status: RESOLVED | Noted: 2018-07-07 | Resolved: 2018-07-15

## 2018-07-15 LAB
ANION GAP SERPL CALCULATED.3IONS-SCNC: 6 MMOL/L (ref 4–13)
ANISOCYTOSIS BLD QL SMEAR: PRESENT
BASO STIPL BLD QL SMEAR: PRESENT
BASOPHILS # BLD MANUAL: 0 THOUSAND/UL (ref 0–0.1)
BASOPHILS NFR MAR MANUAL: 0 % (ref 0–1)
BUN SERPL-MCNC: 36 MG/DL (ref 5–25)
CALCIUM SERPL-MCNC: 9.1 MG/DL (ref 8.3–10.1)
CHLORIDE SERPL-SCNC: 102 MMOL/L (ref 100–108)
CO2 SERPL-SCNC: 31 MMOL/L (ref 21–32)
CREAT SERPL-MCNC: 1.14 MG/DL (ref 0.6–1.3)
EOSINOPHIL # BLD MANUAL: 0 THOUSAND/UL (ref 0–0.4)
EOSINOPHIL NFR BLD MANUAL: 0 % (ref 0–6)
ERYTHROCYTE [DISTWIDTH] IN BLOOD BY AUTOMATED COUNT: 17.3 % (ref 11.6–15.1)
GFR SERPL CREATININE-BSD FRML MDRD: 63 ML/MIN/1.73SQ M
GLUCOSE SERPL-MCNC: 120 MG/DL (ref 65–140)
GLUCOSE SERPL-MCNC: 126 MG/DL (ref 65–140)
GLUCOSE SERPL-MCNC: 153 MG/DL (ref 65–140)
HCT VFR BLD AUTO: 35.3 % (ref 36.5–49.3)
HGB BLD-MCNC: 11.1 G/DL (ref 12–17)
HYPERCHROMIA BLD QL SMEAR: PRESENT
LACTATE SERPL-SCNC: 2.7 MMOL/L (ref 0.5–2)
LYMPHOCYTES # BLD AUTO: 14 % (ref 14–44)
LYMPHOCYTES # BLD AUTO: 2.42 THOUSAND/UL (ref 0.6–4.47)
MCH RBC QN AUTO: 22.5 PG (ref 26.8–34.3)
MCHC RBC AUTO-ENTMCNC: 31.4 G/DL (ref 31.4–37.4)
MCV RBC AUTO: 72 FL (ref 82–98)
MONOCYTES # BLD AUTO: 2.94 THOUSAND/UL (ref 0–1.22)
MONOCYTES NFR BLD: 17 % (ref 4–12)
NEUTROPHILS # BLD MANUAL: 11.92 THOUSAND/UL (ref 1.85–7.62)
NEUTS SEG NFR BLD AUTO: 69 % (ref 43–75)
PLATELET # BLD AUTO: 284 THOUSANDS/UL (ref 149–390)
PLATELET BLD QL SMEAR: ADEQUATE
PMV BLD AUTO: 10.1 FL (ref 8.9–12.7)
POIKILOCYTOSIS BLD QL SMEAR: PRESENT
POTASSIUM SERPL-SCNC: 4 MMOL/L (ref 3.5–5.3)
RBC # BLD AUTO: 4.93 MILLION/UL (ref 3.88–5.62)
SODIUM SERPL-SCNC: 139 MMOL/L (ref 136–145)
TOTAL CELLS COUNTED SPEC: 100
WBC # BLD AUTO: 17.27 THOUSAND/UL (ref 4.31–10.16)

## 2018-07-15 PROCEDURE — 99239 HOSP IP/OBS DSCHRG MGMT >30: CPT | Performed by: PHYSICIAN ASSISTANT

## 2018-07-15 PROCEDURE — 85027 COMPLETE CBC AUTOMATED: CPT | Performed by: PHYSICIAN ASSISTANT

## 2018-07-15 PROCEDURE — 83605 ASSAY OF LACTIC ACID: CPT | Performed by: PHYSICIAN ASSISTANT

## 2018-07-15 PROCEDURE — 94760 N-INVAS EAR/PLS OXIMETRY 1: CPT

## 2018-07-15 PROCEDURE — 85007 BL SMEAR W/DIFF WBC COUNT: CPT | Performed by: PHYSICIAN ASSISTANT

## 2018-07-15 PROCEDURE — 94660 CPAP INITIATION&MGMT: CPT

## 2018-07-15 PROCEDURE — 82948 REAGENT STRIP/BLOOD GLUCOSE: CPT

## 2018-07-15 PROCEDURE — 80048 BASIC METABOLIC PNL TOTAL CA: CPT | Performed by: PHYSICIAN ASSISTANT

## 2018-07-15 PROCEDURE — 94640 AIRWAY INHALATION TREATMENT: CPT

## 2018-07-15 RX ORDER — MAGNESIUM HYDROXIDE/ALUMINUM HYDROXICE/SIMETHICONE 120; 1200; 1200 MG/30ML; MG/30ML; MG/30ML
15 SUSPENSION ORAL ONCE
Status: COMPLETED | OUTPATIENT
Start: 2018-07-15 | End: 2018-07-15

## 2018-07-15 RX ORDER — LEVOFLOXACIN 500 MG/1
500 TABLET, FILM COATED ORAL EVERY 24 HOURS
Qty: 5 TABLET | Refills: 0 | Status: SHIPPED | OUTPATIENT
Start: 2018-07-15 | End: 2018-07-20

## 2018-07-15 RX ORDER — FUROSEMIDE 20 MG/1
20 TABLET ORAL 2 TIMES DAILY
Qty: 30 TABLET | Refills: 0 | Status: SHIPPED | OUTPATIENT
Start: 2018-07-15 | End: 2019-03-04 | Stop reason: HOSPADM

## 2018-07-15 RX ORDER — DILTIAZEM HYDROCHLORIDE 180 MG/1
180 CAPSULE, COATED, EXTENDED RELEASE ORAL DAILY
Qty: 30 CAPSULE | Refills: 0 | Status: SHIPPED | OUTPATIENT
Start: 2018-07-16 | End: 2018-12-26 | Stop reason: SDUPTHER

## 2018-07-15 RX ORDER — CLONIDINE HYDROCHLORIDE 0.1 MG/1
0.1 TABLET ORAL 2 TIMES DAILY
Qty: 20 TABLET | Refills: 0 | Status: SHIPPED | OUTPATIENT
Start: 2018-07-15 | End: 2018-12-14

## 2018-07-15 RX ADMIN — ATORVASTATIN CALCIUM 40 MG: 40 TABLET, FILM COATED ORAL at 09:46

## 2018-07-15 RX ADMIN — PANTOPRAZOLE SODIUM 40 MG: 40 TABLET, DELAYED RELEASE ORAL at 05:02

## 2018-07-15 RX ADMIN — NICOTINE 1 PATCH: 7 PATCH, EXTENDED RELEASE TRANSDERMAL at 09:48

## 2018-07-15 RX ADMIN — TAMSULOSIN HYDROCHLORIDE 0.4 MG: 0.4 CAPSULE ORAL at 09:47

## 2018-07-15 RX ADMIN — TRAMADOL HYDROCHLORIDE 50 MG: 50 TABLET, FILM COATED ORAL at 00:12

## 2018-07-15 RX ADMIN — PREDNISONE 40 MG: 20 TABLET ORAL at 09:46

## 2018-07-15 RX ADMIN — ISODIUM CHLORIDE 3 ML: 0.03 SOLUTION RESPIRATORY (INHALATION) at 07:36

## 2018-07-15 RX ADMIN — CLONIDINE HYDROCHLORIDE 0.2 MG: 0.1 TABLET ORAL at 09:46

## 2018-07-15 RX ADMIN — PREGABALIN 300 MG: 75 CAPSULE ORAL at 09:46

## 2018-07-15 RX ADMIN — LEVALBUTEROL 1.25 MG: 1.25 SOLUTION, CONCENTRATE RESPIRATORY (INHALATION) at 07:36

## 2018-07-15 RX ADMIN — TIOTROPIUM BROMIDE 18 MCG: 18 CAPSULE ORAL; RESPIRATORY (INHALATION) at 09:47

## 2018-07-15 RX ADMIN — ASPIRIN 81 MG 81 MG: 81 TABLET ORAL at 09:46

## 2018-07-15 RX ADMIN — APIXABAN 5 MG: 5 TABLET, FILM COATED ORAL at 09:46

## 2018-07-15 RX ADMIN — LISINOPRIL 20 MG: 20 TABLET ORAL at 09:46

## 2018-07-15 RX ADMIN — METOPROLOL SUCCINATE 100 MG: 100 TABLET, EXTENDED RELEASE ORAL at 09:46

## 2018-07-15 RX ADMIN — LEVALBUTEROL 1.25 MG: 1.25 SOLUTION, CONCENTRATE RESPIRATORY (INHALATION) at 13:33

## 2018-07-15 RX ADMIN — DILTIAZEM HYDROCHLORIDE 180 MG: 180 CAPSULE, COATED, EXTENDED RELEASE ORAL at 09:47

## 2018-07-15 RX ADMIN — INSULIN GLARGINE 10 UNITS: 100 INJECTION, SOLUTION SUBCUTANEOUS at 09:46

## 2018-07-15 RX ADMIN — ISODIUM CHLORIDE 3 ML: 0.03 SOLUTION RESPIRATORY (INHALATION) at 13:33

## 2018-07-15 RX ADMIN — RANOLAZINE 500 MG: 500 TABLET, FILM COATED, EXTENDED RELEASE ORAL at 09:46

## 2018-07-15 RX ADMIN — ALUMINUM HYDROXIDE, MAGNESIUM HYDROXIDE, AND SIMETHICONE 15 ML: 200; 200; 20 SUSPENSION ORAL at 00:26

## 2018-07-15 NOTE — ASSESSMENT & PLAN NOTE
Patient was noted to have accelerated hypertension  Catapres was increased from 0 1 to 0 2 and change from daily to BID on 7/12/18  However, the patient then experienced hypotension on 7/14/18, and therefore would recommend patient continue his home medication regimen for blood pressure  It's possible his blood pressure is elevated due to steroids  Continue Cardizem, lisinopril, and Toprol at current doses at home

## 2018-07-15 NOTE — ASSESSMENT & PLAN NOTE
- Lactic acid was increased as high as 3 9, now trending down at 2 7  Uncertain etiology  No signs of sepsis at present  Patient feels well and clinically stable for discharge

## 2018-07-15 NOTE — NURSING NOTE
Patient discharged to home in stable condition  Discharge instructions were explained to patient with patient stating verbal understanding of all that were given  Patient's cigarettes and lighter which was in Security's office were returned to patient just prior to discharge  Patient then transported to the hospital exit via wheelchair and was escorted there by the PCA

## 2018-07-15 NOTE — ASSESSMENT & PLAN NOTE
CTA chest with PE protocol showed increasing large pleural effusions with overlying compressive atelectasis  No acute pulmonary embolism  See plan for CHF exacerbation

## 2018-07-15 NOTE — ASSESSMENT & PLAN NOTE
- dizziness resolved  - has hx of CVA with residual right sided weakness   - head CT shows-No acute intracranial abnormality  Microangiopathic changes  - Continue statin, Aspirin  - MRI brain negative for acute findings  - repeat CT head on 7/9/18 was negative for acute findings  Stefania Carlton

## 2018-07-15 NOTE — ASSESSMENT & PLAN NOTE
Lab Results   Component Value Date    HGBA1C 7 0 (H) 05/07/2018       Recent Labs      07/14/18   1126  07/14/18   1643  07/14/18   2124  07/15/18   0746   POCGLU  189*  196*  201*  120       - Sliding scale insulin, fingerstick glucose 4 times daily AC/HS provided while admitted     - Continue to monitor blood glucose trends while at home  - education provided about diabetic diet, although patient refused     - Patient would benefit from outpatient dietary consult

## 2018-07-15 NOTE — ASSESSMENT & PLAN NOTE
- Speech was consulted and recommended a Level 3 Dysphagia/ advanced/ soft to chew with thin liquids however the patient refused  He is also refusing diabetic diet, although education provided on importance of following proper diet

## 2018-07-15 NOTE — DISCHARGE SUMMARY
Discharge- Kathrin Arnett 1945, 74 y o  male MRN: [id number]    Unit/Bed#: 415-01 Encounter: [id number]    Primary Care Provider: Farhan De Los Santos DO   Date and time admitted to hospital: 7/6/2018  7:56 PM        Acute systolic CHF (congestive heart failure) [facility])   Assessment & Plan    Patient was diuresed with IV lasix at 40mg IV BID for several days  Despite diuresis, the patient remained short of breath  Repeat chest xray demonstrated worsening right pleural effusion  Due to the patient's right pleural effusion, thoracentesis was performed on 7/13/18 1 5L was removed during procedure  And the patient felt much better symptomatically after this procedure  Of note, the patient had some hypotension following several days of diuresis, likely due to some over-correction of his volume status, therefore this was held  His blood pressure greatly improved prior to discharge  Echo shows systolic CHF with EF of 93-52%  -continue Toprol and lisinopril   - will start low dose lasix at 20mg PO daily on discharge  He will need close outpatient follow up with both cardiology and his PCP  Persistent atrial fibrillation (HCC)   Assessment & Plan    - Rate controlled  - continue eliquis  - Continue home medications        Essential hypertension   Assessment & Plan    Patient was noted to have accelerated hypertension  Catapres was increased from 0 1 to 0 2 and change from daily to BID on 7/12/18  However, the patient then experienced hypotension on 7/14/18, and therefore would recommend patient continue his home medication regimen for blood pressure  It's possible his blood pressure is elevated due to steroids  Continue Cardizem, lisinopril, and Toprol at current doses at home             COPD (chronic obstructive pulmonary disease) ([facility] [address] )   Assessment & Plan    Patient was noted to have significant wheezing during his hospital stay, which was treated initially with IV solumedrol, then transitioned to PO prednisone  Would discontinue steroids of discharge after almost 1 week of treatment, due to potential detrimental effects of blood pressure and hyperglycemia  Will continue nebulizer treatments and spiriva at home  Acute kidney injury (HCC)resolved as of 7/15/2018   Assessment & Plan    - patient's creatinine is worse today 1 33 with a baseline of 1 2   - also some urinary retention noted overnight    - follow strict urinary retention protocol    - continue to treat UTI  Dyslipidemia   Assessment & Plan    -Continue statins        Syncope and collapse   Assessment & Plan    - dizziness resolved  - has hx of CVA with residual right sided weakness   - head CT shows-No acute intracranial abnormality  Microangiopathic changes  - Continue statin, Aspirin  - MRI brain negative for acute findings  - repeat CT head on 7/9/18 was negative for acute findings              Type 2 diabetes mellitus without complication, without long-term current use of insulin [facility])   Assessment & Plan    Lab Results   Component Value Date    HGBA1C 7 0 (H) 05/07/2018       Recent Labs      07/14/18   1126  07/14/18   1643  07/14/18   2124  07/15/18   0746   POCGLU  189*  196*  201*  120       - Sliding scale insulin, fingerstick glucose 4 times daily AC/HS provided while admitted     - Continue to monitor blood glucose trends while at home  - education provided about diabetic diet, although patient refused  - Patient would benefit from outpatient dietary consult         Dysphagia   Assessment & Plan    - Speech was consulted and recommended a Level 3 Dysphagia/ advanced/ soft to chew with thin liquids however the patient refused  He is also refusing diabetic diet, although education provided on importance of following proper diet  Acute cystitis without hematuria   Assessment & Plan    Continue IV Levaquin per culture results             Lactic acidosis   Assessment & Plan    - Lactic acid was increased as high as 3 9, now trending down at 2 7  Uncertain etiology  No signs of sepsis at present  Patient feels well and clinically stable for discharge  Tobacco abuse   Assessment & Plan    - Smoking cessation counseling  - Nicotine patch        NSTEMI (non-ST elevated myocardial infarction) [facility])   Assessment & Plan    - likely type 2 in the setting of acute respiratory failure and acute CHF  - troponin was noted to be 0 13 at highest, then trended down  - telemetry monitoring and EKGs remained stable  - continue aspirin, statin, and beta blocker  * Acute respiratory failure with hypoxia and hypercapnia (HCC)   Assessment & Plan    CTA chest with PE protocol showed increasing large pleural effusions with overlying compressive atelectasis  No acute pulmonary embolism  See plan for CHF exacerbation  Hypernatremiaresolved as of 7/15/2018   Assessment & Plan    Likely due to dehydration   -resolved  -continue to monitor  Discharging Physician / Practitioner: Pj Castillo PA-C  PCP: Lisa Randhawa DO  Admission Date: 7/6/18  Discharge Date: 07/15/18    Resolved Problems  Date Reviewed: 7/15/2018          Resolved    Hypokalemia 7/13/2018     Resolved by  Pj Castillo PA-C    Acute kidney injury ([facility] [address] ) 7/15/2018     Resolved by  Pj Castillo PA-C    Hypernatremia 7/15/2018     Resolved by  Pj Castillo PA-C          Consultations During Hospital Stay:  · Cardiology  · Pulmonary / critical care    Procedures Performed:   · Thoracentesis at bedside (7/13/18)    Significant Findings / Test Results:   · Urine culture positive for > 100,000 Enterococcus faecalis     Portable chest xrays (multiple) obtained showing an increasing Left pleural effusion  Post thoracentesis on 7/13/18 there was resolution of the right pleural effusions and no pneumothorax  Echocardiogram (7/9/18):   LEFT VENTRICLE:  Size was normal   Systolic function was mildly reduced   Ejection fraction was estimated in the range of 45 % to 50 %  There was mild diffuse hypokinesis  Wall thickness was at the upper limits of normal      MITRAL VALVE:  There was moderate regurgitation      AORTIC VALVE:  There was trace regurgitation      TRICUSPID VALVE:  There was mild regurgitation  Incidental Findings:   · none    Test Results Pending at Discharge (will require follow up):   · none     Outpatient Tests Requested:  · none    Complications:  Patients stay was complicated by an acute kidney injury, now resolved  Reason for Admission: Acute systolic CHF    Hospital Course:     Radha Leigh is a 74 y o  male patient who originally presented to the hospital on 7/6/2018 due to shortness of breath  Please see H&P from 7/6/18 for full details  Please see above list of diagnoses and related plan for additional information  Condition at Discharge: good     Discharge Day Visit / Exam:   Subjective:  Patient feels great today, no SOB, dizziness, CP  Vitals: Blood Pressure: 137/77 (07/15/18 0717)  Pulse: 89 (07/15/18 0717)  Temperature: 97 6 °F (36 4 °C) (07/15/18 0717)  Temp Source: Temporal (07/15/18 0717)  Respirations: 20 (07/15/18 0717)  Height: 5' 9" (175 3 cm) (07/06/18 2208)  Weight - Scale: 84 4 kg (186 lb 1 1 oz) (07/14/18 0550)  SpO2: 100 % (4 l/m) (07/15/18 0737)  Exam:   Physical Exam   Constitutional: He appears well-developed  No distress  HENT:   Head: Normocephalic  Cardiovascular: Normal rate and normal heart sounds  No murmur heard  Pulmonary/Chest: Effort normal and breath sounds normal  No respiratory distress  He has no wheezes  He has no rales  Abdominal: Soft  Bowel sounds are normal  He exhibits no distension  There is no tenderness  Musculoskeletal: He exhibits edema (2+ pitting edema BLE)  Neurological: He is alert  Skin: Skin is warm and dry  He is not diaphoretic  Multiple tattoos  Psychiatric: He has a normal mood and affect     Nursing note and vitals reviewed  Discharge instructions/Information to patient and family:   See after visit summary for information provided to patient and family  Provisions for Follow-Up Care:  See after visit summary for information related to follow-up care and any pertinent home health orders  Disposition:     Home  · Note: patient initially was agreeable to short term rehab, now refusing and would like to go home with his established homecare and home PT services  Planned Readmission: no     Discharge Statement:  I spent 60 minutes discharging the patient  This time was spent on the day of discharge  I had direct contact with the patient on the day of discharge  Greater than 50% of the total time was spent examining patient, answering all patient questions, arranging and discussing plan of care with patient as well as directly providing post-discharge instructions  Additional time then spent on discharge activities  Discharge Medications:  See after visit summary for reconciled discharge medications provided to patient and family        ** Please Note: This note has been constructed using a voice recognition system **

## 2018-07-15 NOTE — ASSESSMENT & PLAN NOTE
Patient was diuresed with IV lasix at 40mg IV BID for several days  Despite diuresis, the patient remained short of breath  Repeat chest xray demonstrated worsening right pleural effusion  Due to the patient's right pleural effusion, thoracentesis was performed on 7/13/18 1 5L was removed during procedure  And the patient felt much better symptomatically after this procedure  Of note, the patient had some hypotension following several days of diuresis, likely due to some over-correction of his volume status, therefore this was held  His blood pressure greatly improved prior to discharge  Echo shows systolic CHF with EF of 24-91%  -continue Toprol and lisinopril   - will start low dose lasix at 20mg PO daily on discharge  He will need close outpatient follow up with both cardiology and his PCP

## 2018-07-15 NOTE — ASSESSMENT & PLAN NOTE
Patient was noted to have significant wheezing during his hospital stay, which was treated initially with IV solumedrol, then transitioned to PO prednisone  Would discontinue steroids of discharge after almost 1 week of treatment, due to potential detrimental effects of blood pressure and hyperglycemia  Will continue nebulizer treatments and spiriva at home

## 2018-07-15 NOTE — SOCIAL WORK
[facility] is aware that the patient is d/c and they will resume care   He states that he has someone coming for him to transport him home between 2 and 3 pm

## 2018-07-15 NOTE — SOCIAL WORK
Cm spoke with the patient and they are for d/c to home today  Cm is taking into account that the patient has preferences while planning the d/c needs  Cm plan was discussed with the patient and the patient is agreeable to the plan the patient does understand the importance of follow up care and taking the medications as prescribed  The patient is aware of any symptoms that he should look for when d/c  The patient is aware that he has a sleep study appointment here at the [facility] and that he needs to keep that appointment because he needs a sleep study to get the cpap at home and it is extremely important that he keeps that appointment  He also has an appointment with Dr Reginald Emery on the 18th and he needs to keep this appointment too I stressed to him that he has to make the follow up appointments to keep him well and keep him out of the hospital  Binh Vasquez brought him two tanks to take home with him as he had told me he had no one to bring him tanks to the hospital for travel  He was given  A cane  From [employer]  I will see if he needs a  Ride

## 2018-07-15 NOTE — ASSESSMENT & PLAN NOTE
Symptomatically improving - started on day 2/7 of treatment with IV Levaquin  Continue PO levaquin for 5 more days

## 2018-07-16 LAB
BACTERIA SPEC BFLD CULT: NO GROWTH
GRAM STN SPEC: NORMAL

## 2018-07-17 ENCOUNTER — APPOINTMENT (EMERGENCY)
Dept: CT IMAGING | Facility: HOSPITAL | Age: 73
End: 2018-07-17
Payer: COMMERCIAL

## 2018-07-17 ENCOUNTER — HOSPITAL ENCOUNTER (OUTPATIENT)
Facility: HOSPITAL | Age: 73
Setting detail: OBSERVATION
Discharge: HOME HEALTH | End: 2018-07-19
Attending: FAMILY MEDICINE | Admitting: FAMILY MEDICINE
Payer: COMMERCIAL

## 2018-07-17 ENCOUNTER — APPOINTMENT (EMERGENCY)
Dept: RADIOLOGY | Facility: HOSPITAL | Age: 73
End: 2018-07-17
Payer: COMMERCIAL

## 2018-07-17 DIAGNOSIS — R07.9 CHEST PAIN: ICD-10-CM
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
## 2018-07-17 DIAGNOSIS — R29.898 LEG WEAKNESS: ICD-10-CM

## 2018-07-17 DIAGNOSIS — S89.91XA LEG INJURY, RIGHT, INITIAL ENCOUNTER: Primary | ICD-10-CM

## 2018-07-17 PROBLEM — I50.9 CHF EXACERBATION (HCC): Status: ACTIVE | Noted: 2018-07-17

## 2018-07-17 LAB
ALBUMIN SERPL BCP-MCNC: 2.8 G/DL (ref 3.5–5)
ALP SERPL-CCNC: 68 U/L (ref 46–116)
ALT SERPL W P-5'-P-CCNC: 67 U/L (ref 12–78)
ANION GAP SERPL CALCULATED.3IONS-SCNC: 3 MMOL/L (ref 4–13)
ANISOCYTOSIS BLD QL SMEAR: PRESENT
APTT PPP: 35 SECONDS (ref 24–36)
AST SERPL W P-5'-P-CCNC: 34 U/L (ref 5–45)
ATRIAL RATE: 72 BPM
BACTERIA UR QL AUTO: ABNORMAL /HPF
BASOPHILS # BLD MANUAL: 0 THOUSAND/UL (ref 0–0.1)
BASOPHILS NFR MAR MANUAL: 0 % (ref 0–1)
BILIRUB SERPL-MCNC: 0.5 MG/DL (ref 0.2–1)
BILIRUB UR QL STRIP: NEGATIVE
BUN SERPL-MCNC: 33 MG/DL (ref 5–25)
CALCIUM SERPL-MCNC: 8.6 MG/DL (ref 8.3–10.1)
CHLORIDE SERPL-SCNC: 105 MMOL/L (ref 100–108)
CLARITY UR: CLEAR
CO2 SERPL-SCNC: 34 MMOL/L (ref 21–32)
COLOR UR: YELLOW
CREAT SERPL-MCNC: 1.28 MG/DL (ref 0.6–1.3)
EOSINOPHIL # BLD MANUAL: 0.9 THOUSAND/UL (ref 0–0.4)
EOSINOPHIL NFR BLD MANUAL: 5 % (ref 0–6)
ERYTHROCYTE [DISTWIDTH] IN BLOOD BY AUTOMATED COUNT: 17.7 % (ref 11.6–15.1)
GFR SERPL CREATININE-BSD FRML MDRD: 54 ML/MIN/1.73SQ M
GLUCOSE SERPL-MCNC: 107 MG/DL (ref 65–140)
GLUCOSE SERPL-MCNC: 150 MG/DL (ref 65–140)
GLUCOSE UR STRIP-MCNC: NEGATIVE MG/DL
HCT VFR BLD AUTO: 35.4 % (ref 36.5–49.3)
HGB BLD-MCNC: 11.1 G/DL (ref 12–17)
HGB UR QL STRIP.AUTO: NEGATIVE
HYPERCHROMIA BLD QL SMEAR: PRESENT
INR PPP: 1.43 (ref 0.86–1.17)
KETONES UR STRIP-MCNC: NEGATIVE MG/DL
LACTATE SERPL-SCNC: 1.5 MMOL/L (ref 0.5–2)
LEUKOCYTE ESTERASE UR QL STRIP: ABNORMAL
LYMPHOCYTES # BLD AUTO: 20 % (ref 14–44)
LYMPHOCYTES # BLD AUTO: 3.61 THOUSAND/UL (ref 0.6–4.47)
MAGNESIUM SERPL-MCNC: 2 MG/DL (ref 1.6–2.6)
MCH RBC QN AUTO: 22.6 PG (ref 26.8–34.3)
MCHC RBC AUTO-ENTMCNC: 31.4 G/DL (ref 31.4–37.4)
MCV RBC AUTO: 72 FL (ref 82–98)
MONOCYTES # BLD AUTO: 0.54 THOUSAND/UL (ref 0–1.22)
MONOCYTES NFR BLD: 3 % (ref 4–12)
NEUTROPHILS # BLD MANUAL: 12.98 THOUSAND/UL (ref 1.85–7.62)
NEUTS SEG NFR BLD AUTO: 72 % (ref 43–75)
NITRITE UR QL STRIP: NEGATIVE
NON-SQ EPI CELLS URNS QL MICRO: ABNORMAL /HPF
NT-PROBNP SERPL-MCNC: 873 PG/ML
OVALOCYTES BLD QL SMEAR: PRESENT
PH UR STRIP.AUTO: 7 [PH] (ref 4.5–8)
PHOSPHATE SERPL-MCNC: 4 MG/DL (ref 2.3–4.1)
PLATELET # BLD AUTO: 252 THOUSANDS/UL (ref 149–390)
PLATELET BLD QL SMEAR: ADEQUATE
PMV BLD AUTO: 9.9 FL (ref 8.9–12.7)
POIKILOCYTOSIS BLD QL SMEAR: PRESENT
POTASSIUM SERPL-SCNC: 4 MMOL/L (ref 3.5–5.3)
PROT SERPL-MCNC: 5.1 G/DL (ref 6.4–8.2)
PROT UR STRIP-MCNC: NEGATIVE MG/DL
PROTHROMBIN TIME: 16.8 SECONDS (ref 11.8–14.2)
QRS AXIS: 49 DEGREES
QRSD INTERVAL: 84 MS
QT INTERVAL: 392 MS
QTC INTERVAL: 466 MS
RBC # BLD AUTO: 4.91 MILLION/UL (ref 3.88–5.62)
RBC #/AREA URNS AUTO: ABNORMAL /HPF
SODIUM SERPL-SCNC: 142 MMOL/L (ref 136–145)
SP GR UR STRIP.AUTO: 1.02 (ref 1–1.03)
T WAVE AXIS: 25 DEGREES
TOTAL CELLS COUNTED SPEC: 100
TROPONIN I SERPL-MCNC: 0.02 NG/ML
TROPONIN I SERPL-MCNC: <0.02 NG/ML
TROPONIN I SERPL-MCNC: <0.02 NG/ML
TSH SERPL DL<=0.05 MIU/L-ACNC: 1.77 UIU/ML (ref 0.36–3.74)
UROBILINOGEN UR QL STRIP.AUTO: 1 E.U./DL
VENTRICULAR RATE: 85 BPM
WBC # BLD AUTO: 18.03 THOUSAND/UL (ref 4.31–10.16)
WBC #/AREA URNS AUTO: ABNORMAL /HPF

## 2018-07-17 PROCEDURE — 73080 X-RAY EXAM OF ELBOW: CPT

## 2018-07-17 PROCEDURE — 84100 ASSAY OF PHOSPHORUS: CPT | Performed by: PHYSICIAN ASSISTANT

## 2018-07-17 PROCEDURE — 84443 ASSAY THYROID STIM HORMONE: CPT | Performed by: PHYSICIAN ASSISTANT

## 2018-07-17 PROCEDURE — 94760 N-INVAS EAR/PLS OXIMETRY 1: CPT

## 2018-07-17 PROCEDURE — 99285 EMERGENCY DEPT VISIT HI MDM: CPT

## 2018-07-17 PROCEDURE — 83735 ASSAY OF MAGNESIUM: CPT | Performed by: PHYSICIAN ASSISTANT

## 2018-07-17 PROCEDURE — 70450 CT HEAD/BRAIN W/O DYE: CPT

## 2018-07-17 PROCEDURE — 84484 ASSAY OF TROPONIN QUANT: CPT | Performed by: PHYSICIAN ASSISTANT

## 2018-07-17 PROCEDURE — 73590 X-RAY EXAM OF LOWER LEG: CPT

## 2018-07-17 PROCEDURE — 85027 COMPLETE CBC AUTOMATED: CPT | Performed by: PHYSICIAN ASSISTANT

## 2018-07-17 PROCEDURE — 36415 COLL VENOUS BLD VENIPUNCTURE: CPT | Performed by: PHYSICIAN ASSISTANT

## 2018-07-17 PROCEDURE — 80053 COMPREHEN METABOLIC PANEL: CPT | Performed by: PHYSICIAN ASSISTANT

## 2018-07-17 PROCEDURE — 99219 PR INITIAL OBSERVATION CARE/DAY 50 MINUTES: CPT | Performed by: NURSE PRACTITIONER

## 2018-07-17 PROCEDURE — 93010 ELECTROCARDIOGRAM REPORT: CPT | Performed by: INTERNAL MEDICINE

## 2018-07-17 PROCEDURE — 94640 AIRWAY INHALATION TREATMENT: CPT

## 2018-07-17 PROCEDURE — 73564 X-RAY EXAM KNEE 4 OR MORE: CPT

## 2018-07-17 PROCEDURE — 85610 PROTHROMBIN TIME: CPT | Performed by: PHYSICIAN ASSISTANT

## 2018-07-17 PROCEDURE — 85730 THROMBOPLASTIN TIME PARTIAL: CPT | Performed by: PHYSICIAN ASSISTANT

## 2018-07-17 PROCEDURE — 94660 CPAP INITIATION&MGMT: CPT

## 2018-07-17 PROCEDURE — 83605 ASSAY OF LACTIC ACID: CPT | Performed by: PHYSICIAN ASSISTANT

## 2018-07-17 PROCEDURE — 83880 ASSAY OF NATRIURETIC PEPTIDE: CPT | Performed by: PHYSICIAN ASSISTANT

## 2018-07-17 PROCEDURE — 72125 CT NECK SPINE W/O DYE: CPT

## 2018-07-17 PROCEDURE — 71250 CT THORAX DX C-: CPT

## 2018-07-17 PROCEDURE — 82948 REAGENT STRIP/BLOOD GLUCOSE: CPT

## 2018-07-17 PROCEDURE — 81001 URINALYSIS AUTO W/SCOPE: CPT | Performed by: PHYSICIAN ASSISTANT

## 2018-07-17 PROCEDURE — 93005 ELECTROCARDIOGRAM TRACING: CPT

## 2018-07-17 PROCEDURE — 94664 DEMO&/EVAL PT USE INHALER: CPT

## 2018-07-17 PROCEDURE — 74176 CT ABD & PELVIS W/O CONTRAST: CPT

## 2018-07-17 PROCEDURE — 84484 ASSAY OF TROPONIN QUANT: CPT | Performed by: FAMILY MEDICINE

## 2018-07-17 PROCEDURE — 96360 HYDRATION IV INFUSION INIT: CPT

## 2018-07-17 PROCEDURE — 85007 BL SMEAR W/DIFF WBC COUNT: CPT | Performed by: PHYSICIAN ASSISTANT

## 2018-07-17 RX ORDER — RANOLAZINE 500 MG/1
500 TABLET, EXTENDED RELEASE ORAL 2 TIMES DAILY
Status: DISCONTINUED | OUTPATIENT
Start: 2018-07-17 | End: 2018-07-19 | Stop reason: HOSPADM

## 2018-07-17 RX ORDER — DILTIAZEM HYDROCHLORIDE 180 MG/1
180 CAPSULE, COATED, EXTENDED RELEASE ORAL DAILY
Status: CANCELLED | OUTPATIENT
Start: 2018-07-17

## 2018-07-17 RX ORDER — LEVOFLOXACIN 500 MG/1
500 TABLET, FILM COATED ORAL EVERY 24 HOURS
Status: DISCONTINUED | OUTPATIENT
Start: 2018-07-17 | End: 2018-07-19 | Stop reason: HOSPADM

## 2018-07-17 RX ORDER — LEVALBUTEROL 1.25 MG/.5ML
1.25 SOLUTION, CONCENTRATE RESPIRATORY (INHALATION)
Status: DISCONTINUED | OUTPATIENT
Start: 2018-07-17 | End: 2018-07-19 | Stop reason: HOSPADM

## 2018-07-17 RX ORDER — ATORVASTATIN CALCIUM 40 MG/1
40 TABLET, FILM COATED ORAL
Status: DISCONTINUED | OUTPATIENT
Start: 2018-07-17 | End: 2018-07-19 | Stop reason: HOSPADM

## 2018-07-17 RX ORDER — TRAMADOL HYDROCHLORIDE 50 MG/1
50 TABLET ORAL EVERY 6 HOURS PRN
Status: DISCONTINUED | OUTPATIENT
Start: 2018-07-17 | End: 2018-07-19 | Stop reason: HOSPADM

## 2018-07-17 RX ORDER — PREGABALIN 75 MG/1
300 CAPSULE ORAL 2 TIMES DAILY
Status: DISCONTINUED | OUTPATIENT
Start: 2018-07-17 | End: 2018-07-19 | Stop reason: HOSPADM

## 2018-07-17 RX ORDER — CLONIDINE HYDROCHLORIDE 0.1 MG/1
0.1 TABLET ORAL 2 TIMES DAILY
Status: CANCELLED | OUTPATIENT
Start: 2018-07-17

## 2018-07-17 RX ORDER — SODIUM CHLORIDE 9 MG/ML
125 INJECTION, SOLUTION INTRAVENOUS CONTINUOUS
Status: DISCONTINUED | OUTPATIENT
Start: 2018-07-17 | End: 2018-07-17

## 2018-07-17 RX ORDER — ASPIRIN 81 MG/1
81 TABLET, CHEWABLE ORAL DAILY
Status: DISCONTINUED | OUTPATIENT
Start: 2018-07-18 | End: 2018-07-19 | Stop reason: HOSPADM

## 2018-07-17 RX ORDER — PANTOPRAZOLE SODIUM 40 MG/1
40 TABLET, DELAYED RELEASE ORAL
Status: CANCELLED | OUTPATIENT
Start: 2018-07-18

## 2018-07-17 RX ORDER — SODIUM CHLORIDE FOR INHALATION 0.9 %
3 VIAL, NEBULIZER (ML) INHALATION
Status: DISCONTINUED | OUTPATIENT
Start: 2018-07-17 | End: 2018-07-19 | Stop reason: HOSPADM

## 2018-07-17 RX ORDER — LISINOPRIL 20 MG/1
20 TABLET ORAL DAILY
Status: DISCONTINUED | OUTPATIENT
Start: 2018-07-18 | End: 2018-07-19 | Stop reason: HOSPADM

## 2018-07-17 RX ORDER — DULOXETIN HYDROCHLORIDE 30 MG/1
60 CAPSULE, DELAYED RELEASE ORAL DAILY
Status: DISCONTINUED | OUTPATIENT
Start: 2018-07-18 | End: 2018-07-19 | Stop reason: HOSPADM

## 2018-07-17 RX ORDER — ACETAMINOPHEN 325 MG/1
650 TABLET ORAL EVERY 6 HOURS PRN
Status: DISCONTINUED | OUTPATIENT
Start: 2018-07-17 | End: 2018-07-19 | Stop reason: HOSPADM

## 2018-07-17 RX ORDER — METOPROLOL SUCCINATE 100 MG/1
100 TABLET, EXTENDED RELEASE ORAL 2 TIMES DAILY
Status: DISCONTINUED | OUTPATIENT
Start: 2018-07-17 | End: 2018-07-19 | Stop reason: HOSPADM

## 2018-07-17 RX ORDER — CLONIDINE HYDROCHLORIDE 0.1 MG/1
0.1 TABLET ORAL 2 TIMES DAILY
Status: DISCONTINUED | OUTPATIENT
Start: 2018-07-17 | End: 2018-07-19 | Stop reason: HOSPADM

## 2018-07-17 RX ORDER — TAMSULOSIN HYDROCHLORIDE 0.4 MG/1
0.4 CAPSULE ORAL DAILY
Status: DISCONTINUED | OUTPATIENT
Start: 2018-07-18 | End: 2018-07-19 | Stop reason: HOSPADM

## 2018-07-17 RX ORDER — DULOXETIN HYDROCHLORIDE 30 MG/1
60 CAPSULE, DELAYED RELEASE ORAL DAILY
Status: CANCELLED | OUTPATIENT
Start: 2018-07-17

## 2018-07-17 RX ORDER — LANOLIN ALCOHOL/MO/W.PET/CERES
3 CREAM (GRAM) TOPICAL
Status: DISCONTINUED | OUTPATIENT
Start: 2018-07-17 | End: 2018-07-19 | Stop reason: HOSPADM

## 2018-07-17 RX ORDER — FUROSEMIDE 20 MG/1
20 TABLET ORAL
Status: DISCONTINUED | OUTPATIENT
Start: 2018-07-17 | End: 2018-07-19 | Stop reason: HOSPADM

## 2018-07-17 RX ORDER — PANTOPRAZOLE SODIUM 40 MG/1
40 TABLET, DELAYED RELEASE ORAL
Status: DISCONTINUED | OUTPATIENT
Start: 2018-07-18 | End: 2018-07-19 | Stop reason: HOSPADM

## 2018-07-17 RX ORDER — DILTIAZEM HYDROCHLORIDE 180 MG/1
180 CAPSULE, COATED, EXTENDED RELEASE ORAL DAILY
Status: DISCONTINUED | OUTPATIENT
Start: 2018-07-18 | End: 2018-07-19 | Stop reason: HOSPADM

## 2018-07-17 RX ADMIN — INSULIN LISPRO 1 UNITS: 100 INJECTION, SOLUTION INTRAVENOUS; SUBCUTANEOUS at 17:57

## 2018-07-17 RX ADMIN — PREGABALIN 300 MG: 75 CAPSULE ORAL at 17:57

## 2018-07-17 RX ADMIN — APIXABAN 5 MG: 5 TABLET, FILM COATED ORAL at 17:57

## 2018-07-17 RX ADMIN — LEVALBUTEROL HYDROCHLORIDE 1.25 MG: 1.25 SOLUTION, CONCENTRATE RESPIRATORY (INHALATION) at 20:19

## 2018-07-17 RX ADMIN — SODIUM CHLORIDE 125 ML/HR: 0.9 INJECTION, SOLUTION INTRAVENOUS at 13:52

## 2018-07-17 RX ADMIN — ATORVASTATIN CALCIUM 40 MG: 40 TABLET, FILM COATED ORAL at 17:57

## 2018-07-17 RX ADMIN — ISODIUM CHLORIDE 3 ML: 0.03 SOLUTION RESPIRATORY (INHALATION) at 20:19

## 2018-07-17 RX ADMIN — CLONIDINE HYDROCHLORIDE 0.1 MG: 0.1 TABLET ORAL at 17:57

## 2018-07-17 RX ADMIN — METOPROLOL SUCCINATE 100 MG: 100 TABLET, EXTENDED RELEASE ORAL at 17:57

## 2018-07-17 RX ADMIN — METFORMIN HYDROCHLORIDE 500 MG: 500 TABLET, FILM COATED ORAL at 17:57

## 2018-07-17 RX ADMIN — FUROSEMIDE 20 MG: 20 TABLET ORAL at 17:57

## 2018-07-17 RX ADMIN — LEVOFLOXACIN 500 MG: 500 TABLET, FILM COATED ORAL at 17:57

## 2018-07-17 RX ADMIN — RANOLAZINE 500 MG: 500 TABLET, FILM COATED, EXTENDED RELEASE ORAL at 17:57

## 2018-07-17 NOTE — ED NOTES
Verbal order received to transport pt to 2nd floor off of telemetry via [facility]       Kailee Fuentes RN  07/17/18 [id number]

## 2018-07-17 NOTE — ASSESSMENT & PLAN NOTE
X-RAY Right knee - Fluid fat level in the joint space suggesting radiographically occult fracture, nondisplaced  X-RAY Tib / Fib " No acute osseous abnormality;  Soft tissue swelling, lower leg, ankle and foot "  Ortho consulted  Knee immobilizer placed in ER  ICE PRN 20  Mins on 20 off   Keep right knee elevated
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
## 2018-07-17 NOTE — ASSESSMENT & PLAN NOTE
Reports CVA sin past and resulting in RUE and RLE weakness  Continue to use SC with ambulation  PT/ OT evaluation

## 2018-07-17 NOTE — ASSESSMENT & PLAN NOTE
Lab Results   Component Value Date    HGBA1C 7 0 (H) 05/07/2018       Recent Labs      07/14/18   2124  07/15/18   0746  07/15/18   1150   POCGLU  201*  120  126       Blood Sugar Average: Last 72 hrs:    ACCU checks ACHS and SSI  Continue PTA medication- metformin

## 2018-07-17 NOTE — ED PROCEDURE NOTE
Procedure  ECG 12 Lead Documentation  Date/Time: 7/17/2018 1:10 PM  Performed by: Cleveland Perez  Authorized by: Cleveland Perez     Indications / Diagnosis:  Chest pain  ECG reviewed by me, the ED Provider: yes    Patient location:  ED  Interpretation:     Interpretation: non-specific    Rate:     ECG rate:  85    ECG rate assessment: normal    Rhythm:     Rhythm: atrial fibrillation    Ectopy:     Ectopy: none    QRS:     QRS axis:  Normal    QRS intervals:  Normal  Conduction:     Conduction: normal    ST segments:     ST segments:  Normal  T waves:     T waves: normal                       Dayan Orona PA-C  07/17/18 [id number]

## 2018-07-17 NOTE — ASSESSMENT & PLAN NOTE
Most likely d/t ortho static HTN  Collect set of ortho static measurements  Monitor BP per protocol  CT head -"No acute intracranial abnormality  Microangiopathic changes "  Neuro checks q 4 hours  Troponin X 3  CT C- Spine -"1  No cervical spine fracture or traumatic malalignment  2  Emphysematous change with partially imaged densities in the left upper lobe  Please see dedicated chest CT "  CT ABD and Pelvis- "1  No acute traumatic injury to the chest, abdomen, or pelvis  2  Emphysematous change with bilateral groundglass, solid, and irregular nodular densities  The 4 mm solid nodule in the right upper lobe was not present previous to July 2018 and a follow-up CT in 6 months is recommended  Additional groundglass and irregular nodular opacities have been present since 2015 though mildly increased  These can also be reassessed at the time of follow-up  3  Improved bilateral pleural effusions  4  Small amount of pelvic free fluid "  X-RAY Right elbow x-ray - "No acute osseous abnormality "  X-RAY Right knee - Fluid fat level in the joint space suggesting radiographically occult fracture, nondisplaced  X-RAY Tib / Fib " No acute osseous abnormality;  Soft tissue swelling, lower leg, ankle and foot "

## 2018-07-17 NOTE — H&P
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 PARANASAL SINUSES:  Normal  CALVARIUM AND EXTRACRANIAL SOFT TISSUES: Normal      Impression: Age related changes; chronic white matter small vessel ischemic changes     Workstation performed: [id number]     Ct Head Without Contrast    Result Date: 7/6/2018  Narrative: CT BRAIN - WITHOUT CONTRAST INDICATION:   fall on anticoagulation  COMPARISON:  April 3, 2018 TECHNIQUE:  CT examination of the brain was performed  In addition to axial images, coronal 2D reformatted images were created and submitted for interpretation  Radiation dose length product (DLP) for this visit:  1059 55 mGy-cm   This examination, like all CT scans performed in the [facility], was performed utilizing techniques to minimize radiation dose exposure, including the use of iterative reconstruction and automated exposure control  IMAGE QUALITY:  Diagnostic  FINDINGS: PARENCHYMA: Decreased attenuation is noted in periventricular and subcortical white matter demonstrating an appearance that is statistically most likely to represent advanced microangiopathic change; this appearance is similar when compared to most recent prior examination  No CT signs of acute infarction    No intracranial mass, mass effect or midline shift  No acute parenchymal hemorrhage  VENTRICLES AND EXTRA-AXIAL SPACES:  Normal for the patient's age  VISUALIZED ORBITS AND PARANASAL SINUSES:  Unremarkable  CALVARIUM AND EXTRACRANIAL SOFT TISSUES:  Normal      Impression: No acute intracranial abnormality  Microangiopathic changes  Workstation performed: [id number]     Ct Spine Cervical Without Contrast    Result Date: 7/17/2018  Narrative: CT CERVICAL SPINE - WITHOUT CONTRAST INDICATION:   pain s/p fall  COMPARISON: 7/6/2018 TECHNIQUE:  CT examination of the cervical spine was performed without intravenous contrast   Contiguous axial images were obtained  Sagittal and coronal reconstructions were performed  Radiation dose length product (DLP) for this visit:  570 mGy-cm   This examination, like all CT scans performed in the [facility], was performed utilizing techniques to minimize radiation dose exposure, including the use of iterative reconstruction and automated exposure control  IMAGE QUALITY:  Diagnostic  FINDINGS: ALIGNMENT:  Normal alignment of the cervical spine  No subluxation  VERTEBRAL BODIES:  No fracture  DEGENERATIVE CHANGES:  Moderate multilevel cervical degenerative changes are noted  No critical central canal stenosis  PREVERTEBRAL AND PARASPINAL SOFT TISSUES:  Unremarkable  THORACIC INLET:  There are emphysematous changes with partially imaged nodular densities in the left upper lobe  Please see dedicated CT  Impression: 1  No cervical spine fracture or traumatic malalignment  2   Emphysematous change with partially imaged densities in the left upper lobe  Please see dedicated chest CT  Workstation performed: [id number]     Ct Cervical Spine Without Contrast    Result Date: 7/6/2018  Narrative: CT CERVICAL SPINE - WITHOUT CONTRAST INDICATION:   trauma  COMPARISON: None   TECHNIQUE:  CT examination of the cervical spine was performed without intravenous contrast  Contiguous axial images were obtained  Sagittal and coronal reconstructions were performed  Radiation dose length product (DLP) for this visit:  577 64 mGy-cm   This examination, like all CT scans performed in the [facility], was performed utilizing techniques to minimize radiation dose exposure, including the use of iterative  reconstruction and automated exposure control  IMAGE QUALITY:  Diagnostic  FINDINGS: ALIGNMENT:  Normal alignment of the cervical spine  No subluxation  VERTEBRAL BODIES:  No fracture  DEGENERATIVE CHANGES:  Moderate multilevel cervical degenerative changes are noted  No critical central canal stenosis  PREVERTEBRAL AND PARASPINAL SOFT TISSUES:  Unremarkable  THORACIC INLET:  Right pleural effusion with emphysematous changes  Impression: No acute fracture or dislocation  Workstation performed: [id number]     Mri Brain Wo Contrast    Result Date: 7/7/2018  Narrative: MRI BRAIN WITHOUT CONTRAST INDICATION:  TIA  weakness, slurred speech COMPARISON:   CT 7/6/2018, MR 6/4/2017 TECHNIQUE:  Sagittal T1, axial T2, axial FLAIR, axial T1, axial Gradient and axial diffusion imaging  IMAGE QUALITY:  Diagnostic  FINDINGS: BRAIN PARENCHYMA:  No acute disease  There is no acute ischemic comment cranial mass, mass effect or edema  Diffuse generalized volume loss  Moderate chronic small vessel disease which appears grossly stable  VENTRICLES:  The ventricles are normal in size and contour  SELLA AND PITUITARY GLAND:  Normal  ORBITS:  Normal  PARANASAL SINUSES:  Trace sinus mucosal disease  Minor right mastoid disease  VASCULATURE:  Evaluation of the major intracranial vasculature demonstrates appropriate flow voids  CALVARIUM AND SKULL BASE:  Normal  EXTRACRANIAL SOFT TISSUES:  Normal      Impression: No acute disease  Diffuse generalized volume loss, chronic small vessel   Workstation performed: [id number]     Ct Recon Only Lumbar Spine    Result Date: 7/6/2018  Narrative: CT LUMBAR SPINE INDICATION:   Trauma  COMPARISON:  2015 CT TECHNIQUE: Axial CT examination of the lumbar spine was obtained utilizing reconstructed images from CT of the chest, abdomen and pelvis performed the same day  Images were reformatted in the sagittal and coronal planes  This examination, like all CT scans performed in the [facility], was performed utilizing techniques to minimize radiation dose exposure, including the use of iterative reconstruction and automated exposure control  FINDINGS: ALIGNMENT: Normal alignment of lumbar vertebral bodies  No subluxation  VERTEBRAL BODIES: No fracture  Superior endplate depression of L5 from degenerative Schmorl's node again identified also seen in 2015  DEGENERATIVE CHANGES: Moderate multilevel degenerative disc disease  PREVERTEBRAL AND PARASPINAL SOFT TISSUES: Normal   No hematoma  Impression: 1  No acute fracture or dislocation  2   Multilevel degenerative changes  Workstation performed: [id number]     Cta Chest Pe Study    Result Date: 7/10/2018  Narrative: CTA - CHEST WITH IV CONTRAST - PULMONARY ANGIOGRAM INDICATION:   Shortness of breath  COMPARISON: 7/6/2018 TECHNIQUE: CTA examination of the chest was performed using angiographic technique according to a protocol specifically tailored to evaluate for pulmonary embolism  Axial, sagittal, and coronal 2D reformatted images were created from the source data and  submitted for interpretation  In addition, coronal 3D MIP postprocessing was performed on the acquisition scanner  Radiation dose length product (DLP) for this visit:  599 49 mGy-cm   This examination, like all CT scans performed in the [facility], was performed utilizing techniques to minimize radiation dose exposure, including the use of iterative  reconstruction and automated exposure control  IV Contrast:  85 mL of iodixanol (VISIPAQUE)  FINDINGS: PULMONARY ARTERIAL TREE:  No pulmonary embolus is seen   LUNGS: There are emphysematous changes with bibasilar compressive atelectasis  There is stable patchy opacity in the left upper lobe and groundglass density at the left apex  PLEURA:  There are worsening large bilateral pleural effusions  HEART/AORTA:  Unremarkable for patient's age  MEDIASTINUM AND WOLF:  Unremarkable  CHEST WALL AND LOWER NECK:   Unremarkable  VISUALIZED STRUCTURES IN THE UPPER ABDOMEN:  Unremarkable  OSSEOUS STRUCTURES:  No acute fracture or destructive osseous lesion  Impression: 1  Increasing large pleural effusions with overlying compressive atelectasis  2   No acute pulmonary embolism  3   Stable patchy/ground glass opacities in the left upper lobe and left apex  These have been present over multiple prior studies and are therefore not acute  As could represent scarring though continued surveillance in 3-6 months is recommended to exclude neoplasm  Workstation performed: [id number]       EKG, Pathology, and Other Studies Reviewed on Admission:   · EKG: reviewed A-fib    Allscripts / Epic Records Reviewed: Yes     ** Please Note: This note has been constructed using a voice recognition system   **
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
## 2018-07-17 NOTE — PLAN OF CARE
DISCHARGE PLANNING     Discharge to home or other facility with appropriate resources Progressing        INFECTION - ADULT     Absence or prevention of progression during hospitalization Progressing        Knowledge Deficit     Patient/family/caregiver demonstrates understanding of disease process, treatment plan, medications, and discharge instructions Progressing        PAIN - ADULT     Verbalizes/displays adequate comfort level or baseline comfort level Progressing        Potential for Falls     Patient will remain free of falls Progressing        Prexisting or High Potential for Compromised Skin Integrity     Skin integrity is maintained or improved Progressing        SAFETY ADULT     Maintain or return to baseline ADL function Progressing     Maintain or return mobility status to optimal level Progressing     Patient will remain free of falls Progressing

## 2018-07-18 ENCOUNTER — APPOINTMENT (OUTPATIENT)
Dept: MRI IMAGING | Facility: HOSPITAL | Age: 73
End: 2018-07-18
Payer: COMMERCIAL

## 2018-07-18 LAB
ANION GAP SERPL CALCULATED.3IONS-SCNC: 0 MMOL/L (ref 4–13)
BACTERIA BLD CULT: NORMAL
BACTERIA BLD CULT: NORMAL
BUN SERPL-MCNC: 27 MG/DL (ref 5–25)
CALCIUM SERPL-MCNC: 8.5 MG/DL (ref 8.3–10.1)
CHLORIDE SERPL-SCNC: 106 MMOL/L (ref 100–108)
CO2 SERPL-SCNC: 35 MMOL/L (ref 21–32)
CREAT SERPL-MCNC: 1.09 MG/DL (ref 0.6–1.3)
ERYTHROCYTE [DISTWIDTH] IN BLOOD BY AUTOMATED COUNT: 17.9 % (ref 11.6–15.1)
GFR SERPL CREATININE-BSD FRML MDRD: 66 ML/MIN/1.73SQ M
GLUCOSE P FAST SERPL-MCNC: 94 MG/DL (ref 65–99)
GLUCOSE SERPL-MCNC: 150 MG/DL (ref 65–140)
GLUCOSE SERPL-MCNC: 176 MG/DL (ref 65–140)
GLUCOSE SERPL-MCNC: 181 MG/DL (ref 65–140)
GLUCOSE SERPL-MCNC: 188 MG/DL (ref 65–140)
GLUCOSE SERPL-MCNC: 85 MG/DL (ref 65–140)
GLUCOSE SERPL-MCNC: 94 MG/DL (ref 65–140)
HCT VFR BLD AUTO: 36.8 % (ref 36.5–49.3)
HGB BLD-MCNC: 11.4 G/DL (ref 12–17)
MAGNESIUM SERPL-MCNC: 1.9 MG/DL (ref 1.6–2.6)
MCH RBC QN AUTO: 22.4 PG (ref 26.8–34.3)
MCHC RBC AUTO-ENTMCNC: 31 G/DL (ref 31.4–37.4)
MCV RBC AUTO: 72 FL (ref 82–98)
PLATELET # BLD AUTO: 260 THOUSANDS/UL (ref 149–390)
PMV BLD AUTO: 10.3 FL (ref 8.9–12.7)
POTASSIUM SERPL-SCNC: 4 MMOL/L (ref 3.5–5.3)
RBC # BLD AUTO: 5.09 MILLION/UL (ref 3.88–5.62)
SODIUM SERPL-SCNC: 141 MMOL/L (ref 136–145)
WBC # BLD AUTO: 18.07 THOUSAND/UL (ref 4.31–10.16)

## 2018-07-18 PROCEDURE — G8979 MOBILITY GOAL STATUS: HCPCS | Performed by: PHYSICAL THERAPIST

## 2018-07-18 PROCEDURE — 99225 PR SBSQ OBSERVATION CARE/DAY 25 MINUTES: CPT | Performed by: NURSE PRACTITIONER

## 2018-07-18 PROCEDURE — 97530 THERAPEUTIC ACTIVITIES: CPT | Performed by: PHYSICAL THERAPIST

## 2018-07-18 PROCEDURE — 97163 PT EVAL HIGH COMPLEX 45 MIN: CPT | Performed by: PHYSICAL THERAPIST

## 2018-07-18 PROCEDURE — 73721 MRI JNT OF LWR EXTRE W/O DYE: CPT

## 2018-07-18 PROCEDURE — 80048 BASIC METABOLIC PNL TOTAL CA: CPT | Performed by: FAMILY MEDICINE

## 2018-07-18 PROCEDURE — 83735 ASSAY OF MAGNESIUM: CPT | Performed by: FAMILY MEDICINE

## 2018-07-18 PROCEDURE — 85025 COMPLETE CBC W/AUTO DIFF WBC: CPT | Performed by: FAMILY MEDICINE

## 2018-07-18 PROCEDURE — 99214 OFFICE O/P EST MOD 30 MIN: CPT | Performed by: PHYSICIAN ASSISTANT

## 2018-07-18 PROCEDURE — 94640 AIRWAY INHALATION TREATMENT: CPT

## 2018-07-18 PROCEDURE — G8978 MOBILITY CURRENT STATUS: HCPCS | Performed by: PHYSICAL THERAPIST

## 2018-07-18 PROCEDURE — 94760 N-INVAS EAR/PLS OXIMETRY 1: CPT

## 2018-07-18 PROCEDURE — 82948 REAGENT STRIP/BLOOD GLUCOSE: CPT

## 2018-07-18 RX ADMIN — TRAMADOL HYDROCHLORIDE 50 MG: 50 TABLET, FILM COATED ORAL at 10:59

## 2018-07-18 RX ADMIN — LEVALBUTEROL HYDROCHLORIDE 1.25 MG: 1.25 SOLUTION, CONCENTRATE RESPIRATORY (INHALATION) at 07:29

## 2018-07-18 RX ADMIN — NICOTINE 1 PATCH: 7 PATCH, EXTENDED RELEASE TRANSDERMAL at 10:41

## 2018-07-18 RX ADMIN — MELATONIN TAB 3 MG 3 MG: 3 TAB at 21:04

## 2018-07-18 RX ADMIN — APIXABAN 5 MG: 5 TABLET, FILM COATED ORAL at 10:36

## 2018-07-18 RX ADMIN — PREGABALIN 300 MG: 75 CAPSULE ORAL at 17:15

## 2018-07-18 RX ADMIN — APIXABAN 5 MG: 5 TABLET, FILM COATED ORAL at 17:15

## 2018-07-18 RX ADMIN — ISODIUM CHLORIDE 3 ML: 0.03 SOLUTION RESPIRATORY (INHALATION) at 07:29

## 2018-07-18 RX ADMIN — INSULIN LISPRO 1 UNITS: 100 INJECTION, SOLUTION INTRAVENOUS; SUBCUTANEOUS at 08:21

## 2018-07-18 RX ADMIN — INSULIN LISPRO 1 UNITS: 100 INJECTION, SOLUTION INTRAVENOUS; SUBCUTANEOUS at 11:02

## 2018-07-18 RX ADMIN — TIOTROPIUM BROMIDE 18 MCG: 18 CAPSULE ORAL; RESPIRATORY (INHALATION) at 10:42

## 2018-07-18 RX ADMIN — ATORVASTATIN CALCIUM 40 MG: 40 TABLET, FILM COATED ORAL at 17:15

## 2018-07-18 RX ADMIN — FUROSEMIDE 20 MG: 20 TABLET ORAL at 10:36

## 2018-07-18 RX ADMIN — ASPIRIN 81 MG 81 MG: 81 TABLET ORAL at 10:36

## 2018-07-18 RX ADMIN — PANTOPRAZOLE SODIUM 40 MG: 40 TABLET, DELAYED RELEASE ORAL at 06:13

## 2018-07-18 RX ADMIN — RANOLAZINE 500 MG: 500 TABLET, FILM COATED, EXTENDED RELEASE ORAL at 10:41

## 2018-07-18 RX ADMIN — LISINOPRIL 20 MG: 20 TABLET ORAL at 10:39

## 2018-07-18 RX ADMIN — TRAMADOL HYDROCHLORIDE 50 MG: 50 TABLET, FILM COATED ORAL at 02:30

## 2018-07-18 RX ADMIN — TRAMADOL HYDROCHLORIDE 50 MG: 50 TABLET, FILM COATED ORAL at 21:21

## 2018-07-18 RX ADMIN — LEVALBUTEROL HYDROCHLORIDE 1.25 MG: 1.25 SOLUTION, CONCENTRATE RESPIRATORY (INHALATION) at 20:18

## 2018-07-18 RX ADMIN — DULOXETINE HYDROCHLORIDE 60 MG: 30 CAPSULE, DELAYED RELEASE ORAL at 10:37

## 2018-07-18 RX ADMIN — PREGABALIN 300 MG: 75 CAPSULE ORAL at 10:39

## 2018-07-18 RX ADMIN — LEVOFLOXACIN 500 MG: 500 TABLET, FILM COATED ORAL at 17:38

## 2018-07-18 RX ADMIN — ISODIUM CHLORIDE 3 ML: 0.03 SOLUTION RESPIRATORY (INHALATION) at 20:18

## 2018-07-18 RX ADMIN — CLONIDINE HYDROCHLORIDE 0.1 MG: 0.1 TABLET ORAL at 10:39

## 2018-07-18 RX ADMIN — FUROSEMIDE 20 MG: 20 TABLET ORAL at 17:14

## 2018-07-18 RX ADMIN — DILTIAZEM HYDROCHLORIDE 180 MG: 180 CAPSULE, COATED, EXTENDED RELEASE ORAL at 10:39

## 2018-07-18 RX ADMIN — RANOLAZINE 500 MG: 500 TABLET, FILM COATED, EXTENDED RELEASE ORAL at 17:15

## 2018-07-18 RX ADMIN — METFORMIN HYDROCHLORIDE 500 MG: 500 TABLET, FILM COATED ORAL at 17:15

## 2018-07-18 RX ADMIN — METOPROLOL SUCCINATE 100 MG: 100 TABLET, EXTENDED RELEASE ORAL at 10:37

## 2018-07-18 RX ADMIN — TAMSULOSIN HYDROCHLORIDE 0.4 MG: 0.4 CAPSULE ORAL at 10:39

## 2018-07-18 RX ADMIN — METFORMIN HYDROCHLORIDE 500 MG: 500 TABLET, FILM COATED ORAL at 10:39

## 2018-07-18 NOTE — PHYSICAL THERAPY NOTE
PT NOTE  Had attempted PT eval this morning and patient refused  Patient will also be going for an MRI on his knee and will hold PT eval this morning pending results of the MRI  Will attempt PT eval at a later time as able

## 2018-07-18 NOTE — PLAN OF CARE
Problem: Potential for Falls  Goal: Patient will remain free of falls  INTERVENTIONS:  - Assess patient frequently for physical needs  -  Identify cognitive and physical deficits and behaviors that affect risk of falls    -  [place] fall precautions as indicated by assessment   - Educate patient/family on patient safety including physical limitations  - Instruct patient to call for assistance with activity based on assessment  - Modify environment to reduce risk of injury  - Consider OT/PT consult to assist with strengthening/mobility    Outcome: Progressing      Problem: PAIN - ADULT  Goal: Verbalizes/displays adequate comfort level or baseline comfort level  Interventions:  - Encourage patient to monitor pain and request assistance  - Assess pain using appropriate pain scale  - Administer analgesics based on type and severity of pain and evaluate response  - Implement non-pharmacological measures as appropriate and evaluate response  - Consider cultural and social influences on pain and pain management  - Notify physician/advanced practitioner if interventions unsuccessful or patient reports new pain   Outcome: Progressing      Problem: INFECTION - ADULT  Goal: Absence or prevention of progression during hospitalization  INTERVENTIONS:  - Assess and monitor for signs and symptoms of infection  - Monitor lab/diagnostic results  - Monitor all insertion sites, i e  indwelling lines, tubes, and drains  - Monitor endotracheal (as able) and nasal secretions for changes in amount and color  - [place] appropriate cooling/warming therapies per order  - Administer medications as ordered  - Instruct and encourage patient and family to use good hand hygiene technique  - Identify and instruct in appropriate isolation precautions for identified infection/condition   Outcome: Progressing      Problem: SAFETY ADULT  Goal: Maintain or return to baseline ADL function  INTERVENTIONS:  -  Assess patient's ability to carry out ADLs; assess patient's baseline for ADL function and identify physical deficits which impact ability to perform ADLs (bathing, care of mouth/teeth, toileting, grooming, dressing, etc )  - Assess/evaluate cause of self-care deficits   - Assess range of motion  - Assess patient's mobility; develop plan if impaired  - Assess patient's need for assistive devices and provide as appropriate  - Encourage maximum independence but intervene and supervise when necessary  ¯ Involve family in performance of ADLs  ¯ Assess for home care needs following discharge   ¯ Request OT consult to assist with ADL evaluation and planning for discharge  ¯ Provide patient education as appropriate   Outcome: Progressing    Goal: Maintain or return mobility status to optimal level  INTERVENTIONS:  - Assess patient's baseline mobility status (ambulation, transfers, stairs, etc )    - Identify cognitive and physical deficits and behaviors that affect mobility  - Identify mobility aids required to assist with transfers and/or ambulation (gait belt, sit-to-stand, lift, walker, cane, etc )  - [place] fall precautions as indicated by assessment  - Record patient progress and toleration of activity level on Mobility SBAR; progress patient to next Phase/Stage  - Instruct patient to call for assistance with activity based on assessment  - Request Rehabilitation consult to assist with strengthening/weightbearing, etc    Outcome: Progressing    Goal: Patient will remain free of falls  INTERVENTIONS:  - Assess patient frequently for physical needs  -  Identify cognitive and physical deficits and behaviors that affect risk of falls    -  [place] fall precautions as indicated by assessment   - Educate patient/family on patient safety including physical limitations  - Instruct patient to call for assistance with activity based on assessment  - Modify environment to reduce risk of injury  - Consider OT/PT consult to assist with strengthening/mobility Outcome: Progressing      Problem: DISCHARGE PLANNING  Goal: Discharge to home or other facility with appropriate resources  INTERVENTIONS:  - Identify barriers to discharge w/patient and caregiver  - Arrange for needed discharge resources and transportation as appropriate  - Identify discharge learning needs (meds, wound care, etc )  - Arrange for interpretive services to assist at discharge as needed  - Refer to Case Management Department for coordinating discharge planning if the patient needs post-hospital services based on physician/advanced practitioner order or complex needs related to functional status, cognitive ability, or social support system   Outcome: Progressing      Problem: Knowledge Deficit  Goal: Patient/family/caregiver demonstrates understanding of disease process, treatment plan, medications, and discharge instructions  Complete learning assessment and assess knowledge base    Interventions:  - Provide teaching at level of understanding  - Provide teaching via preferred learning methods   Outcome: Progressing      Problem: Prexisting or High Potential for Compromised Skin Integrity  Goal: Skin integrity is maintained or improved  INTERVENTIONS:  - Identify patients at risk for skin breakdown  - Assess and monitor skin integrity  - Assess and monitor nutrition and hydration status  - Monitor labs (i e  albumin)  - Assess for incontinence   - Turn and reposition patient  - Assist with mobility/ambulation  - Relieve pressure over bony prominences  - Avoid friction and shearing  - Provide appropriate hygiene as needed including keeping skin clean and dry  - Evaluate need for skin moisturizer/barrier cream  - Collaborate with interdisciplinary team (i e  Nutrition, Rehabilitation, etc )   - Patient/family teaching   Outcome: Progressing

## 2018-07-18 NOTE — ASSESSMENT & PLAN NOTE
X-RAY Right knee - Fluid fat level in the joint space suggesting radiographically occult fracture, nondisplaced  X-RAY Tib / Fib " No acute osseous abnormality; Soft tissue swelling, lower leg, ankle and foot "  Ortho consulted and ordered MRI of right knee-- MRI reveals "1  Mild quadriceps insertional tendinosis without tear  The patellar tendon is unremarkable  2  Grade 2 cartilage patella   3   No evidence of meniscal tear or other internal derangement "  ICE PRN 20  Mins on 20 off   Keep right knee elevated  Awaiting ortho f/u
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
## 2018-07-18 NOTE — PROGRESS NOTES
Pt to MRI  Katina Suarez from PT present in room, pt refused walker and was refusing to give any assistance to hospital staff to transfer to  for transport to [facility]  Lino Grider and [employer] PA present in room and pt then agreed to try to help with transfer from bed to   Pt is very argumentative regarding care

## 2018-07-18 NOTE — PHYSICAL THERAPY NOTE
PT Evaluation     07/18/18 1981   Note Type   Note type Eval/Treat   Pain Assessment   Pain Score Worst Possible Pain   Pain Location Knee   Pain Orientation Right   Home Living   Type of [address] One level; Able to live on main level with bedroom/bathroom; Performs ADLs on one leve   Bathroom Shower/Tub Tub/shower unit   Bathroom Toilet Standard   Bathroom Accessibility Accessible   Home Equipment Walker;Cane;Wheelchair-electric   Prior Function   Level of Secor Independent with ADLs and functional mobility  ((I) ambulation with SPC)   Lives With Friend(s)  (caretaker)   Receives Help From Friend(s)   ADL Assistance Needs assistance  (assist at times for ADL's)   IADLs Needs assistance   Comments doesn't drive   Restrictions/Precautions   Weight Bearing Precautions Per Order Yes   RLE Weight Bearing Per Order NWB   Braces or Orthoses LE Immobilizer   Other Precautions Fall Risk;Pain;Telemetry;Contact/isolation; Bed Alarm   General   Family/Caregiver Present No   Cognition   Arousal/Participation Alert   Orientation Level Oriented X4   Following Commands (chooses not to follow commands and performs activity his way)   RLE Assessment   RLE Assessment X  (unable to SLR but able to reposition LE in w/c (I))   LLE Assessment   LLE Assessment WFL  (at least 4/5 based on function, refuses strength testing)   Coordination   Sensation X   Light Touch   RLE Light Touch Impaired   RLE Light Touch Comments decreased to light touch from prior stroke   LLE Light Touch Grossly intact   Bed Mobility   Rolling L 3  Moderate assistance   Additional items Assist x 1;Bedrails;Verbal cues   Supine to Sit 2  Maximal assistance   Additional items Assist x 1  (mod to max(A)x1)   Sit to Supine (seated in w/c for transport to MRI)   Additional Comments Pt stating I can't do anything, you have to lift me to the w/c however pt able to reposition self on EOB to use urinal without assistance and able to lift R LE in immobilizer to reposition self in w/c   Transfers   Sit to Stand 2  Maximal assistance   Additional items Assist x 2;Verbal cues  (no A  D  because pt refused use of RW)   Stand to Sit 2  Maximal assistance   Additional items Assist x 2;Verbal cues  (with armrests on w/c)   Stand pivot 2  Maximal assistance   Additional items Assist x 2;Verbal cues  (no A D )   Additional Comments pt dependent with lower body dressing and donning pull-up  Pt refusing use of RW and unable to maintain NWB status in immobilizer L LE during transfer  Pt also unable to ambulate due to refusal of use of RW  Poor safety awareness overall and pt is unsafe to return home  Pt refuses to listen to verbal commands from therapist and proceeds to complete mobility and tasks his own way  Ambulation/Elevation   Gait pattern (unable)   Balance   Static Sitting Good   Dynamic Sitting Good   Static Standing Fair   Dynamic Standing Fair -   Ambulatory Poor   Endurance Deficit   Endurance Deficit Yes   Endurance Deficit Description poor tolerance for activity and unable to maintain NWB status R LE   Activity Tolerance   Activity Tolerance Patient limited by pain;Treatment limited secondary to agitation   Assessment   Prognosis Good   Problem List Decreased strength;Decreased range of motion;Decreased endurance; Impaired balance;Decreased mobility; Impaired sensation;Pain;Orthopedic restrictions;Decreased safety awareness   Assessment Pt is a 74year old male presenting status post fall at home  Pt presents with decreased safety awareness decreased balance ROM strength and functional mobility  Pt unable to maintain NWB status R LE and refuses use of RW for transfers or attempted ambulation  Pt is unsafe to reutrn home and will require short term rehab on discharge  Pt is in need of continued activity in PT to improve all impairments and functional deficits     Goals   Patient Goals Pt refuses rehab and wants to return home with home health care services LTG Expiration Date 08/01/18   Long Term Goal #1 (S) with all bed mobility and transfers with least restrictive A D  Long Term Goal #2 Pt will ambulate 150ft with least restrictive A D  maintaining WB status and will ascend/descend 4 stairs with HR (S) maintaining WB status  Plan   Treatment/Interventions Functional transfer training;LE strengthening/ROM; Elevations; Therapeutic exercise; Endurance training;Patient/family training;Bed mobility;Gait training   PT Frequency (3-5x/wk)   Recommendation   Recommendation Short-term skilled PT   PT - OK to Discharge Yes  (when medically stable for discharge)   No SCD's   Pt seated in w/c for transport to MRI

## 2018-07-18 NOTE — ASSESSMENT & PLAN NOTE
Lab Results   Component Value Date    HGBA1C 7 0 (H) 05/07/2018       Recent Labs      07/17/18   1740  07/17/18 2054  07/18/18   0718 07/18/18   1101   POCGLU  150*  181*  188*  176*       Blood Sugar Average: Last 72 hrs:  (P) 173 75      ACCU checks ACHS and SSI  Continue PTA medication- metformin

## 2018-07-18 NOTE — CASE MANAGEMENT
Initial Clinical Review    Admission: Date/Time/Statement: OBSERVATION 7/17/18@ 1458  Orders Placed This Encounter   Procedures    Place in Observation (expected length of stay for this patient is less than two midnights)     Standing Status:   Standing     Number of Occurrences:   1     Order Specific Question:   Admitting Physician     Answer:   Valentina Olivo     Order Specific Question:   Level of Care     Answer:   Med Surg [16]   ED: Date/Time/Mode of Arrival:   ED Arrival Information     Expected Arrival Acuity Means of Arrival Escorted By Service Admission Type    7/17/2018 12:42 7/17/2018 12:42 Urgent Ambulance [address] Complaint    SYNCOPE      Chief Complaint:   Chief Complaint   Patient presents with    Syncope     Patien had chest pain, had syncopal episode    History of Illness:   Patient presents to the emergency department today for evaluation of weakness dizziness chest pain fall  Patient presents via [facility] emergency medical services  Based upon pre-hospital report a trauma evaluation level be was called  At bedside patient is conscious alert orient x4  Patient states he is having sternal chest pain however he has had this "all day every day for a long time"  Patient also reports to chronic shortness of breath however no novel symptoms  Patient states he stood up today for felt weak and dizzy, legs felt weak and he fell forward  Patient states he did lose consciousness  He complains of pain all over  Primarily right knee and right lower leg pain  He also complains of head and facial pain  Patient was recently discharged this hospital for syncope and collapse    ED Vital Signs:   ED Triage Vitals   Temperature Pulse Respirations Blood Pressure SpO2   07/17/18 1244 07/17/18 1244 07/17/18 1244 07/17/18 1244 07/17/18 1244   (!) 97 4 °F (36 3 °C) 82 14 118/58 95 %      Temp Source Heart Rate Source Patient Position - Orthostatic VS BP Location FiO2 (%)   07/17/18 1244 07/17/18 1244 07/17/18 1244 07/17/18 1530 07/17/18 2022   Temporal Monitor Lying Left arm 28      Pain Score       07/17/18 1530       No Pain        Wt Readings from Last 1 Encounters:   07/17/18 92 2 kg (203 lb 4 2 oz)   Vital Signs (abnormal): Abnormal Labs/Diagnostic Test Results:   WBC 18 03 HGB 11 1 PT 16 8 INR 1 43 CO2 34 BUN 33 TPROT 5 1 ALB 2 8    U/A+LEUK, BACTERIA   CT HEAD=No acute intracranial abnormality  Microangiopathic changes  CT CERVICAL SPINE=1  No cervical spine fracture or traumatic malalignment  2   Emphysematous change with partially imaged densities in the left upper lobe  Please see dedicated chest CT  CT CHEST, A/P=1  No acute traumatic injury to the chest, abdomen, or pelvis  2   Emphysematous change with bilateral groundglass, solid, and irregular nodular densities  The 4 mm solid nodule in the right upper lobe was not present previous to July 2018 and a follow-up CT in 6 months is recommended  Additional groundglass and   irregular nodular opacities have been present since 2015 though mildly increased  These can also be reassessed at the time of follow-up  3   Improved bilateral pleural effusions  4   Small amount of pelvic free fluid  L ELBOW XRAY=No acute osseous abnormality  R ELBOW XRAY= No acute osseous abnormality  R KNEE XRAY=Fluid fat level in the joint space suggesting radiographically occult fracture, nondisplaced  CAT scan may provide further information in this regard if clinically warranted  R TIB/FIB XRAY=No acute osseous abnormality    Soft tissue swelling, lower leg, ankle and foot  EKG=  Ventricular Rate BPM 85    Atrial Rate BPM 72    ID Interval ms    QRSD Interval ms 84    QT Interval ms 392    QTC Interval ms 466    P Axis degrees    QRS Axis degrees 49    T Wave Axis degrees 25      Atrial fibrillation      ED Treatment:   Medication Administration from 07/17/2018 1242 to 07/17/2018 1632 Date/Time Order Dose Route Action Action by Comments     07/17/2018 1352 sodium chloride 0 9 % infusion 125 mL/hr Intravenous New Bag Yanick Guerrero RN       Past Medical/Surgical History:    Active Ambulatory Problems     Diagnosis Date Noted    Intractable diarrhea 10/26/2016    Weakness 10/26/2016    COPD (chronic obstructive pulmonary disease) ([facility] ) 10/26/2016    Abdominal pain 10/26/2016    Essential hypertension 10/26/2016    Dyslipidemia 10/26/2016    Depressive disorder 10/26/2016    Persistent atrial fibrillation ([facility] ) 10/26/2016    Migraines 10/26/2016    Chronic pain 10/30/2016    Sepsis ([place] [address] ) 10/30/2016    Colitis 10/30/2016    NSTEMI (non-ST elevated myocardial infarction) ([place] [address] ) 10/30/2016    Dehydration, mild 06/03/2017    Ambulatory dysfunction 06/04/2017    Tobacco abuse 06/04/2017    Pulmonary nodule 04/03/2018    Lactic acidosis 04/05/2018    Acute cystitis without hematuria 04/06/2018    Leukocytosis 04/06/2018    Marijuana abuse 05/06/2018    Healthcare-associated pneumonia 05/06/2018    Decreased left ventricular systolic function 14/45/7062    Dysphagia 05/07/2018    Type 2 diabetes mellitus without complication, without long-term current use of insulin ([place] [address] ) 05/08/2018    Dilated cardiomyopathy secondary to tachycardia ([place] [address] ) 05/18/2018    Coronary artery disease involving native coronary artery of native heart without angina pectoris 05/18/2018    Syncope and collapse 07/07/2018    Acute respiratory failure with hypoxia and hypercapnia (HCC) 47/36/2580    Acute systolic CHF (congestive heart failure) ([facility] ) 07/11/2018    CHF exacerbation ([place] [address] ) 07/17/2018     Resolved Ambulatory Problems     Diagnosis Date Noted    Shortness of breath 10/26/2016    Hypokalemia 10/26/2016    Chronic pain 10/26/2016    Hx of transient ischemic attack (TIA) 10/26/2016    Acute kidney injury ([place] [address] ) 10/30/2016    Hypernatremia 07/07/2018     Past Medical History:   Diagnosis Date    Aortic stenosis     Arthritis     Asthma     [facility]     Back pain     Cervical radiculopathy     CH[facility]congestive [facility] failure) [facility]     Chronic pain     Chronic pain 10/26/2016    COPD (chronic obstructive pulmonary disease) ([place] [address] )     Coronary artery disease     CVA (cerebral vascular accident) ([place] [address] )     Depressive disorder     Diabetes mellitus ([place] [address] )     Encephalopathy     GERD (gastroesophageal reflux disease)     Head injury     Hx of transient ischemic attack (TIA) 10/26/2016    Hyperlipidemia     Hypertension     Kidney stones     Migraines     MRSA (methicillin resistant Staphylococcus aureus) infection     MRSA (methicillin resistant staphylococcus aureus) pneumonia ([place] [address] )     Osteoarthritis     Peripheral neuropathy     Psychiatric disorder     PVD (peripheral vascular disease) ([place] [address] )     Renal disorder     Shortness of breath 10/26/2016    TIA (transient ischemic attack) 2014    Vertigo    Admitting Diagnosis: Syncope [R55]  Leg injury, right, initial encounter [[id number]]  Age/Sex: 74 y o  male  Assessment/Plan:   Syncope and collapse   Assessment & Plan     Most likely d/t ortho static HTN  Collect set of ortho static measurements  Monitor BP per protocol     Right knee injury, initial encounter   Assessment & Plan     Ortho consulted  Knee immobilizer placed in ER  ICE PRN 20  Mins on 20 off   Keep right knee elevated   Admission Orders:  TELEMETRY  CONSULT ORTHO  PT/OT EVAL & TX  KNEE IMMOBILIZER  VENODYNES  ACCUCHECKS WITH COVERAGE SCALE  NEURO CHECKS Q4H  ORTHOSTATIC BP  ICE & ELEVATION  Scheduled Meds:   Current Facility-Administered Medications:  acetaminophen 650 mg Oral Q6H PRN Charmaine Garcia MD   apixaban 5 mg Oral BID Charmaine Garcia MD   aspirin 81 mg Oral Daily Darrin Quintanilla MD   atorvastatin 40 mg Oral After Le Haas MD   cloNIDine 0 1 mg Oral BID Darrin Quintanilla MD   diltiazem 180 mg Oral Daily Charmaine Garcia MD   DULoxetine 60 mg Oral Daily Melia Began, MD   furosemide 20 mg Oral BID (diuretic) Melia Began, MD   insulin lispro 1-5 Units Subcutaneous TID AC Melia Began, MD   levalbuterol 1 25 mg Nebulization TID Melia Began, MD   levofloxacin 500 mg Oral Q24H Darrin Quintanilla MD   lisinopril 20 mg Oral Daily Melia Began, MD   melatonin 3 mg Oral HS Melia Began, MD   metFORMIN 500 mg Oral BID With Austin Steve MD   metoprolol succinate 100 mg Oral BID Darrin Quintanilla MD   nicotine 1 patch Transdermal Daily Melia Began, MD   pantoprazole 40 mg Oral Early Morning Melia Began, MD   pregabalin 300 mg Oral BID Darrin Quintanilla MD   ranolazine 500 mg Oral BID Melia Began, MD   sodium chloride 3 mL Nebulization TID Melia Began, MD   tamsulosin 0 4 mg Oral Daily Darrin Quintanilla MD   tiotropium 18 mcg Inhalation Daily Darrin Quintanilla MD   traMADol 50 mg Oral Q6H PRN Darrin Quintanilla MD     Continuous Infusions:    PRN Meds:   acetaminophen    traMADol

## 2018-07-18 NOTE — ASSESSMENT & PLAN NOTE
Reports CVA sin past and resulting in RUE and RLE weakness   Continue to use SC with ambulation  PT/ OT evaluation - currently he is refusing PT eval and then ortho made him non-weight bearing on RLE -   MRI of knee reveals "1  Mild quadriceps insertional tendinosis without tear  The patellar tendon is unremarkable  2   Grade 2 cartilage patella   3   No evidence of meniscal tear or other internal derangement "  There is noticeable weakness in RLE - awaiting PT and ortho reports

## 2018-07-18 NOTE — PROGRESS NOTES
Pt requested to speak with Jamel Schuler - Patient Manager  Melissa Janene is aware and will be in to speak with patient  Patient also had complaints of not having enough food and his diet - called Sarah CRAIG and she was made aware

## 2018-07-18 NOTE — PROGRESS NOTES
Progress Note - Suzanna Reid 1945, 74 y o  male MRN: [id number]    Unit/Bed#:  Encounter: [id number]    Primary Care Provider: Juan Sandy DO   Date and time admitted to hospital: 7/17/2018 12:42 PM        Right knee injury, initial encounter   Assessment & Plan    X-RAY Right knee - Fluid fat level in the joint space suggesting radiographically occult fracture, nondisplaced  X-RAY Tib / Fib " No acute osseous abnormality; Soft tissue swelling, lower leg, ankle and foot "  Ortho consulted and ordered MRI of right knee-- MRI reveals "1  Mild quadriceps insertional tendinosis without tear  The patellar tendon is unremarkable  2  Grade 2 cartilage patella  3   No evidence of meniscal tear or other internal derangement "  ICE PRN 20  Mins on 20 off   Keep right knee elevated  Awaiting ortho f/u        * Syncope and collapse   Assessment & Plan    Most likely d/t ortho static HTN  Monitor BP per protocol  CT head -"No acute intracranial abnormality  Microangiopathic changes "  Neuro checks q 4 hours d/t syncope  Troponin X 3- 3/3 -WDL  CT C- Spine -"1  No cervical spine fracture or traumatic malalignment  2  Emphysematous change with partially imaged densities in the left upper lobe  Please see dedicated chest CT "  CT ABD and Pelvis- "1  No acute traumatic injury to the chest, abdomen, or pelvis  2  Emphysematous change with bilateral groundglass, solid, and irregular nodular densities  The 4 mm solid nodule in the right upper lobe was not present previous to July 2018 and a follow-up CT in 6 months is recommended  Additional groundglass and irregular nodular opacities have been present since 2015 though mildly increased  These can also be reassessed at the time of follow-up  3  Improved bilateral pleural effusions  4    Small amount of pelvic free fluid "  X-RAY Right elbow x-ray - "No acute osseous abnormality "  X-RAY Right knee - Fluid fat level in the joint space suggesting radiographically occult fracture, nondisplaced  X-RAY Tib / Fib " No acute osseous abnormality; Soft tissue swelling, lower leg, ankle and foot "          Weakness   Assessment & Plan    Reports CVA sin past and resulting in RUE and RLE weakness   Continue to use SC with ambulation  PT/ OT evaluation - currently he is refusing PT eval and then ortho made him non-weight bearing on RLE -   MRI of knee reveals "1  Mild quadriceps insertional tendinosis without tear  The patellar tendon is unremarkable  2   Grade 2 cartilage patella  3   No evidence of meniscal tear or other internal derangement "  There is noticeable weakness in RLE - awaiting PT and ortho reports         Type 2 diabetes mellitus without complication, without long-term current use of insulin [facility])   Assessment & Plan    Lab Results   Component Value Date    HGBA1C 7 0 (H) 05/07/2018       Recent Labs      07/17/18   1740  07/17/18   2054  07/18/18   0718  07/18/18   1101   POCGLU  150*  181*  188*  176*       Blood Sugar Average: Last 72 hrs:  (P) 173 75      ACCU checks ACHS and SSI  Continue PTA medication- metformin          Persistent atrial fibrillation (HCC)   Assessment & Plan    Continual cardiac monitoring  continue eliquis 5 mg   Continue PTA medications         Essential hypertension   Assessment & Plan    Monitor per protocol   Continue PTA medications  Trend BMP          Chronic pain   Assessment & Plan     Tylenol for mild pain PRN and Tramadol for severe pain PRN            VTE Pharmacologic Prophylaxis:   Pharmacologic: Apixaban (Eliquis)  Mechanical VTE Prophylaxis in Place: Yes    Patient Centered Rounds: I have performed bedside rounds with nursing staff today  Discussions with Specialists or Other Care Team Provider: Attending physician and PT    Education and Discussions with Family / Patient: Discussed need for PT in an outpatient setting  Strongly recommend sub acute rehab   Patient disagrees and states that he does not want to go to facility for rehab- we wants to have rehab at home with home health  Strongly recommend subacute d/t RLE weakness and quad tendonitis  Time Spent for Care: 30 minutes  More than 50% of total time spent on counseling and coordination of care as described above  Current Length of Stay: 0 day(s)    Current Patient Status: Observation   Certification Statement: The patient will continue to require additional inpatient hospital stay due to PT eval and monitoring    Discharge Plan: TBD    Code Status: Level 1 - Full Code      Subjective:   I have pain in my lower back and right knee  States the pain in the lower back is chronic and pain in the right knee is new d/t his fall  Objective:     Vitals:   Temp (24hrs), Av 1 °F (36 2 °C), Min:96 6 °F (35 9 °C), Max:97 6 °F (36 4 °C)    HR:  [69-96] 80  Resp:  [15-18] 18  BP: (101-141)/(55-88) 101/56  SpO2:  [93 %-100 %] 99 %  Body mass index is 30 02 kg/m²  Input and Output Summary (last 24 hours): Intake/Output Summary (Last 24 hours) at 18 1517  Last data filed at 18 1510   Gross per 24 hour   Intake          1232 92 ml   Output             1025 ml   Net           207 92 ml       Physical Exam:     Physical Exam   Constitutional: He is oriented to person, place, and time  He appears well-developed and well-nourished  HENT:   Head: Normocephalic and atraumatic  Eyes: Pupils are equal, round, and reactive to light  Right eye exhibits no discharge  Left eye exhibits no discharge  No scleral icterus  Neck: Normal range of motion  Neck supple  Cardiovascular: Normal rate and intact distal pulses  Exam reveals no gallop and no friction rub  No murmur heard  Pulmonary/Chest: Effort normal and breath sounds normal  No respiratory distress  He has no wheezes  Abdominal: Soft  Bowel sounds are normal  He exhibits no distension  There is no tenderness     Musculoskeletal:        Right shoulder: He exhibits decreased range of motion (right knee and right DF), swelling (right knee), pain (to touch right knee) and decreased strength (RLE  and RUE )  Neurological: He is alert and oriented to person, place, and time  No cranial nerve deficit  Skin: Skin is warm and dry  No rash noted  No erythema  Mild laceration right knee - dressing intact    Psychiatric: He has a normal mood and affect  His behavior is normal  Judgment and thought content normal          Additional Data:     Labs:      Results from last 7 days  Lab Units 07/18/18  0623 07/17/18  1258   WBC Thousand/uL 18 07* 18 03*   HEMOGLOBIN g/dL 11 4* 11 1*   HEMATOCRIT % 36 8 35 4*   PLATELETS Thousands/uL 260 252   LYMPHO PCT %  --  20   MONO PCT MAN %  --  3*   EOSINO PCT MANUAL %  --  5       Results from last 7 days  Lab Units 07/18/18  0623 07/17/18  1258   SODIUM mmol/L 141 142   POTASSIUM mmol/L 4 0 4 0   CHLORIDE mmol/L 106 105   CO2 mmol/L 35* 34*   BUN mg/dL 27* 33*   CREATININE mg/dL 1 09 1 28   CALCIUM mg/dL 8 5 8 6   TOTAL PROTEIN g/dL  --  5 1*   BILIRUBIN TOTAL mg/dL  --  0 50   ALK PHOS U/L  --  68   ALT U/L  --  67   AST U/L  --  34   GLUCOSE RANDOM mg/dL 94 107       Results from last 7 days  Lab Units 07/17/18  1258   INR  1 43*       Results from last 7 days  Lab Units 07/18/18  1101 07/18/18  0718 07/17/18  2054 07/17/18  1740 07/15/18  1150 07/15/18  0746 07/14/18  2124 07/14/18  1643 07/14/18  1126 07/14/18  0804 07/13/18  2109 07/13/18  1657   POC GLUCOSE mg/dl 176* 188* 181* 150* 126 120 201* 196* 189* 120 252* 187*             * I Have Reviewed All Lab Data Listed Above  * Additional Pertinent Lab Tests Reviewed: All Labs Within Last 24 Hours Reviewed    Imaging:    Imaging Reports Reviewed Today Include: MRI right knee      Recent Cultures (last 7 days):       Results from last 7 days  Lab Units 07/13/18  1712 07/13/18  1658   BLOOD CULTURE  No Growth After 4 Days  No Growth After 4 Days    --    GRAM STAIN RESULT   --  No Polys or Bacteria seen BODY FLUID CULTURE, STERILE   --  No growth       Last 24 Hours Medication List:     Current Facility-Administered Medications:  acetaminophen 650 mg Oral Q6H PRN Darrin Quintanilla MD   apixaban 5 mg Oral BID Talisha Alvarez MD   aspirin 81 mg Oral Daily Darrin Quintanilla MD   atorvastatin 40 mg Oral After Jm Lilly MD   cloNIDine 0 1 mg Oral BID Talisha Alvarez MD   diltiazem 180 mg Oral Daily Darrin Quintanilla MD   DULoxetine 60 mg Oral Daily Talisha Alvarez MD   furosemide 20 mg Oral BID (diuretic) Talisha Alvarez MD   insulin lispro 1-5 Units Subcutaneous TID AC Talisha Alvarez MD   levalbuterol 1 25 mg Nebulization TID Talisha Alvarez MD   levofloxacin 500 mg Oral Q24H Darrin Quintanilla MD   lisinopril 20 mg Oral Daily Talisha Alvarez MD   melatonin 3 mg Oral HS Talisha Alvarez MD   metFORMIN 500 mg Oral BID With Roberto Gill MD   metoprolol succinate 100 mg Oral BID Talisha Alvarez MD   nicotine 1 patch Transdermal Daily Talisha Alvarez MD   pantoprazole 40 mg Oral Early Morning Talisha Alvarez MD   pregabalin 300 mg Oral BID Darrin Quintanilla MD   ranolazine 500 mg Oral BID Talisha Alvarez MD   sodium chloride 3 mL Nebulization TID Talisha Alvarez MD   tamsulosin 0 4 mg Oral Daily Darrin Quintanilla MD   tiotropium 18 mcg Inhalation Daily Talisha Alvarez MD   traMADol 50 mg Oral Q6H PRN Talisha Alvarez MD        Today, Patient Was Seen By: NATALIIA Maynard    ** Please Note: Dictation voice to text software may have been used in the creation of this document   **

## 2018-07-18 NOTE — ASSESSMENT & PLAN NOTE
Most likely d/t ortho static HTN  Monitor BP per protocol  CT head -"No acute intracranial abnormality  Microangiopathic changes "  Neuro checks q 4 hours d/t syncope  Troponin X 3- 3/3 -WDL  CT C- Spine -"1  No cervical spine fracture or traumatic malalignment  2  Emphysematous change with partially imaged densities in the left upper lobe  Please see dedicated chest CT "  CT ABD and Pelvis- "1  No acute traumatic injury to the chest, abdomen, or pelvis  2  Emphysematous change with bilateral groundglass, solid, and irregular nodular densities  The 4 mm solid nodule in the right upper lobe was not present previous to July 2018 and a follow-up CT in 6 months is recommended  Additional groundglass and irregular nodular opacities have been present since 2015 though mildly increased  These can also be reassessed at the time of follow-up  3  Improved bilateral pleural effusions  4  Small amount of pelvic free fluid "  X-RAY Right elbow x-ray - "No acute osseous abnormality "  X-RAY Right knee - Fluid fat level in the joint space suggesting radiographically occult fracture, nondisplaced  X-RAY Tib / Fib " No acute osseous abnormality;  Soft tissue swelling, lower leg, ankle and foot "

## 2018-07-18 NOTE — SOCIAL WORK
Met with pt to discuss role as  in helping pt to develop discharge plan and to help pt carry out their plan  Pt lives in a house with his Care giver Anshu Cooney [phone]  Pt has no steps on the inside or outside Pt has a cane ,walker, and wheel chair at home  Pt uses the cane to ambulate  Pt cooks his own breakfast and lunch and Yadiel cooks him dinner  Pt has 02 at 2 liters from [employer] which he uses continiously  Pt PCP is Dr Jake Mullins  Pt uses the [address] [place] [address] in [place]  Spoke with pt and he adamantly  does not want to go to rehab

## 2018-07-19 ENCOUNTER — APPOINTMENT (OUTPATIENT)
Dept: RADIOLOGY | Facility: HOSPITAL | Age: 73
End: 2018-07-19
Payer: COMMERCIAL

## 2018-07-19 VITALS
WEIGHT: 203.26 LBS | HEART RATE: 69 BPM | OXYGEN SATURATION: 99 % | HEIGHT: 69 IN | TEMPERATURE: 97.2 F | BODY MASS INDEX: 30.11 KG/M2 | DIASTOLIC BLOOD PRESSURE: 59 MMHG | RESPIRATION RATE: 20 BRPM | SYSTOLIC BLOOD PRESSURE: 100 MMHG

## 2018-07-19 PROBLEM — Z91.19 NONCOMPLIANCE: Status: ACTIVE | Noted: 2018-07-19

## 2018-07-19 PROBLEM — Z91.199 NONCOMPLIANCE: Status: ACTIVE | Noted: 2018-07-19

## 2018-07-19 LAB
ANION GAP SERPL CALCULATED.3IONS-SCNC: 2 MMOL/L (ref 4–13)
ARTERIAL PATENCY WRIST A: YES
ARTERIAL PATENCY WRIST A: YES
BASE EXCESS BLDA CALC-SCNC: 5.4 MMOL/L
BASE EXCESS BLDA CALC-SCNC: 5.4 MMOL/L
BUN SERPL-MCNC: 24 MG/DL (ref 5–25)
CALCIUM SERPL-MCNC: 9.1 MG/DL (ref 8.3–10.1)
CHLORIDE SERPL-SCNC: 104 MMOL/L (ref 100–108)
CO2 SERPL-SCNC: 34 MMOL/L (ref 21–32)
CREAT SERPL-MCNC: 1.04 MG/DL (ref 0.6–1.3)
ERYTHROCYTE [DISTWIDTH] IN BLOOD BY AUTOMATED COUNT: 17.8 % (ref 11.6–15.1)
GFR SERPL CREATININE-BSD FRML MDRD: 70 ML/MIN/1.73SQ M
GLUCOSE SERPL-MCNC: 110 MG/DL (ref 65–140)
GLUCOSE SERPL-MCNC: 116 MG/DL (ref 65–140)
GLUCOSE SERPL-MCNC: 128 MG/DL (ref 65–140)
GLUCOSE SERPL-MCNC: 130 MG/DL (ref 65–140)
HCO3 BLDA-SCNC: 29.8 MMOL/L (ref 22–28)
HCO3 BLDA-SCNC: 30 MMOL/L (ref 22–28)
HCT VFR BLD AUTO: 34.8 % (ref 36.5–49.3)
HGB BLD-MCNC: 10.8 G/DL (ref 12–17)
IPAP: 14
MCH RBC QN AUTO: 22.3 PG (ref 26.8–34.3)
MCHC RBC AUTO-ENTMCNC: 31 G/DL (ref 31.4–37.4)
MCV RBC AUTO: 72 FL (ref 82–98)
NON VENT ROOM AIR: 21 %
NON VENT- BIPAP: ABNORMAL
O2 CT BLDA-SCNC: 14.4 ML/DL (ref 16–23)
O2 CT BLDA-SCNC: 14.8 ML/DL (ref 16–23)
OXYHGB MFR BLDA: 93.4 % (ref 94–97)
OXYHGB MFR BLDA: 97 % (ref 94–97)
PCO2 BLDA: 43.3 MM HG (ref 36–44)
PCO2 BLDA: 44.5 MM HG (ref 36–44)
PEEP MAX SETTING VENT: 5 CM[H2O]
PH BLDA: 7.45 [PH] (ref 7.35–7.45)
PH BLDA: 7.46 [PH] (ref 7.35–7.45)
PLATELET # BLD AUTO: 242 THOUSANDS/UL (ref 149–390)
PMV BLD AUTO: 10.1 FL (ref 8.9–12.7)
PO2 BLDA: 106.4 MM HG (ref 75–129)
PO2 BLDA: 70.4 MM HG (ref 75–129)
POTASSIUM SERPL-SCNC: 4.2 MMOL/L (ref 3.5–5.3)
RBC # BLD AUTO: 4.84 MILLION/UL (ref 3.88–5.62)
SODIUM SERPL-SCNC: 140 MMOL/L (ref 136–145)
SPECIMEN SOURCE: ABNORMAL
SPECIMEN SOURCE: ABNORMAL
VENT BIPAP FIO2: 28 %
WBC # BLD AUTO: 17.98 THOUSAND/UL (ref 4.31–10.16)

## 2018-07-19 PROCEDURE — 94760 N-INVAS EAR/PLS OXIMETRY 1: CPT

## 2018-07-19 PROCEDURE — 71045 X-RAY EXAM CHEST 1 VIEW: CPT

## 2018-07-19 PROCEDURE — 73630 X-RAY EXAM OF FOOT: CPT

## 2018-07-19 PROCEDURE — 82948 REAGENT STRIP/BLOOD GLUCOSE: CPT

## 2018-07-19 PROCEDURE — 80048 BASIC METABOLIC PNL TOTAL CA: CPT | Performed by: NURSE PRACTITIONER

## 2018-07-19 PROCEDURE — 85027 COMPLETE CBC AUTOMATED: CPT | Performed by: NURSE PRACTITIONER

## 2018-07-19 PROCEDURE — 36600 WITHDRAWAL OF ARTERIAL BLOOD: CPT

## 2018-07-19 PROCEDURE — 99214 OFFICE O/P EST MOD 30 MIN: CPT | Performed by: PHYSICIAN ASSISTANT

## 2018-07-19 PROCEDURE — 94660 CPAP INITIATION&MGMT: CPT

## 2018-07-19 PROCEDURE — 94640 AIRWAY INHALATION TREATMENT: CPT

## 2018-07-19 PROCEDURE — 73610 X-RAY EXAM OF ANKLE: CPT

## 2018-07-19 PROCEDURE — 82805 BLOOD GASES W/O2 SATURATION: CPT | Performed by: PHYSICIAN ASSISTANT

## 2018-07-19 PROCEDURE — 99217 PR OBSERVATION CARE DISCHARGE MANAGEMENT: CPT | Performed by: PHYSICIAN ASSISTANT

## 2018-07-19 RX ORDER — ONDANSETRON 2 MG/ML
4 INJECTION INTRAMUSCULAR; INTRAVENOUS ONCE
Status: COMPLETED | OUTPATIENT
Start: 2018-07-19 | End: 2018-07-19

## 2018-07-19 RX ADMIN — PREGABALIN 300 MG: 75 CAPSULE ORAL at 08:49

## 2018-07-19 RX ADMIN — RANOLAZINE 500 MG: 500 TABLET, FILM COATED, EXTENDED RELEASE ORAL at 08:51

## 2018-07-19 RX ADMIN — PANTOPRAZOLE SODIUM 40 MG: 40 TABLET, DELAYED RELEASE ORAL at 06:15

## 2018-07-19 RX ADMIN — ISODIUM CHLORIDE 3 ML: 0.03 SOLUTION RESPIRATORY (INHALATION) at 14:12

## 2018-07-19 RX ADMIN — NICOTINE 1 PATCH: 7 PATCH, EXTENDED RELEASE TRANSDERMAL at 08:52

## 2018-07-19 RX ADMIN — TRAMADOL HYDROCHLORIDE 50 MG: 50 TABLET, FILM COATED ORAL at 09:42

## 2018-07-19 RX ADMIN — METOPROLOL SUCCINATE 100 MG: 100 TABLET, EXTENDED RELEASE ORAL at 08:49

## 2018-07-19 RX ADMIN — LISINOPRIL 20 MG: 20 TABLET ORAL at 08:49

## 2018-07-19 RX ADMIN — LEVALBUTEROL HYDROCHLORIDE 1.25 MG: 1.25 SOLUTION, CONCENTRATE RESPIRATORY (INHALATION) at 07:21

## 2018-07-19 RX ADMIN — ISODIUM CHLORIDE 3 ML: 0.03 SOLUTION RESPIRATORY (INHALATION) at 07:21

## 2018-07-19 RX ADMIN — METFORMIN HYDROCHLORIDE 500 MG: 500 TABLET, FILM COATED ORAL at 08:50

## 2018-07-19 RX ADMIN — CLONIDINE HYDROCHLORIDE 0.1 MG: 0.1 TABLET ORAL at 08:50

## 2018-07-19 RX ADMIN — DULOXETINE HYDROCHLORIDE 60 MG: 30 CAPSULE, DELAYED RELEASE ORAL at 08:49

## 2018-07-19 RX ADMIN — DILTIAZEM HYDROCHLORIDE 180 MG: 180 CAPSULE, COATED, EXTENDED RELEASE ORAL at 08:49

## 2018-07-19 RX ADMIN — LEVALBUTEROL HYDROCHLORIDE 1.25 MG: 1.25 SOLUTION, CONCENTRATE RESPIRATORY (INHALATION) at 14:12

## 2018-07-19 RX ADMIN — ASPIRIN 81 MG 81 MG: 81 TABLET ORAL at 08:49

## 2018-07-19 RX ADMIN — METFORMIN HYDROCHLORIDE 500 MG: 500 TABLET, FILM COATED ORAL at 16:11

## 2018-07-19 RX ADMIN — FUROSEMIDE 20 MG: 20 TABLET ORAL at 08:49

## 2018-07-19 RX ADMIN — TRAMADOL HYDROCHLORIDE 50 MG: 50 TABLET, FILM COATED ORAL at 03:20

## 2018-07-19 RX ADMIN — TAMSULOSIN HYDROCHLORIDE 0.4 MG: 0.4 CAPSULE ORAL at 08:49

## 2018-07-19 RX ADMIN — APIXABAN 5 MG: 5 TABLET, FILM COATED ORAL at 08:50

## 2018-07-19 RX ADMIN — TIOTROPIUM BROMIDE 18 MCG: 18 CAPSULE ORAL; RESPIRATORY (INHALATION) at 08:51

## 2018-07-19 RX ADMIN — ONDANSETRON 4 MG: 2 INJECTION INTRAMUSCULAR; INTRAVENOUS at 15:53

## 2018-07-19 NOTE — PLAN OF CARE
Problem: Potential for Falls  Goal: Patient will remain free of falls  INTERVENTIONS:  - Assess patient frequently for physical needs  -  Identify cognitive and physical deficits and behaviors that affect risk of falls    -  [place] fall precautions as indicated by assessment   - Educate patient/family on patient safety including physical limitations  - Instruct patient to call for assistance with activity based on assessment  - Modify environment to reduce risk of injury  - Consider OT/PT consult to assist with strengthening/mobility    Outcome: Adequate for Discharge      Problem: PAIN - ADULT  Goal: Verbalizes/displays adequate comfort level or baseline comfort level  Interventions:  - Encourage patient to monitor pain and request assistance  - Assess pain using appropriate pain scale  - Administer analgesics based on type and severity of pain and evaluate response  - Implement non-pharmacological measures as appropriate and evaluate response  - Consider cultural and social influences on pain and pain management  - Notify physician/advanced practitioner if interventions unsuccessful or patient reports new pain   Outcome: Adequate for Discharge      Problem: INFECTION - ADULT  Goal: Absence or prevention of progression during hospitalization  INTERVENTIONS:  - Assess and monitor for signs and symptoms of infection  - Monitor lab/diagnostic results  - Monitor all insertion sites, i e  indwelling lines, tubes, and drains  - Monitor endotracheal (as able) and nasal secretions for changes in amount and color  - [place] appropriate cooling/warming therapies per order  - Administer medications as ordered  - Instruct and encourage patient and family to use good hand hygiene technique  - Identify and instruct in appropriate isolation precautions for identified infection/condition   Outcome: Adequate for Discharge      Problem: SAFETY ADULT  Goal: Maintain or return to baseline ADL function  INTERVENTIONS:  -  Assess patient's ability to carry out ADLs; assess patient's baseline for ADL function and identify physical deficits which impact ability to perform ADLs (bathing, care of mouth/teeth, toileting, grooming, dressing, etc )  - Assess/evaluate cause of self-care deficits   - Assess range of motion  - Assess patient's mobility; develop plan if impaired  - Assess patient's need for assistive devices and provide as appropriate  - Encourage maximum independence but intervene and supervise when necessary  ¯ Involve family in performance of ADLs  ¯ Assess for home care needs following discharge   ¯ Request OT consult to assist with ADL evaluation and planning for discharge  ¯ Provide patient education as appropriate   Outcome: Adequate for Discharge    Goal: Maintain or return mobility status to optimal level  INTERVENTIONS:  - Assess patient's baseline mobility status (ambulation, transfers, stairs, etc )    - Identify cognitive and physical deficits and behaviors that affect mobility  - Identify mobility aids required to assist with transfers and/or ambulation (gait belt, sit-to-stand, lift, walker, cane, etc )  - [place] fall precautions as indicated by assessment  - Record patient progress and toleration of activity level on Mobility SBAR; progress patient to next Phase/Stage  - Instruct patient to call for assistance with activity based on assessment  - Request Rehabilitation consult to assist with strengthening/weightbearing, etc    Outcome: Adequate for Discharge    Goal: Patient will remain free of falls  INTERVENTIONS:  - Assess patient frequently for physical needs  -  Identify cognitive and physical deficits and behaviors that affect risk of falls    -  [place] fall precautions as indicated by assessment   - Educate patient/family on patient safety including physical limitations  - Instruct patient to call for assistance with activity based on assessment  - Modify environment to reduce risk of injury  - Consider OT/PT consult to assist with strengthening/mobility    Outcome: Adequate for Discharge      Problem: DISCHARGE PLANNING  Goal: Discharge to home or other facility with appropriate resources  INTERVENTIONS:  - Identify barriers to discharge w/patient and caregiver  - Arrange for needed discharge resources and transportation as appropriate  - Identify discharge learning needs (meds, wound care, etc )  - Arrange for interpretive services to assist at discharge as needed  - Refer to Case Management Department for coordinating discharge planning if the patient needs post-hospital services based on physician/advanced practitioner order or complex needs related to functional status, cognitive ability, or social support system   Outcome: Adequate for Discharge      Problem: Knowledge Deficit  Goal: Patient/family/caregiver demonstrates understanding of disease process, treatment plan, medications, and discharge instructions  Complete learning assessment and assess knowledge base    Interventions:  - Provide teaching at level of understanding  - Provide teaching via preferred learning methods   Outcome: Adequate for Discharge      Problem: Prexisting or High Potential for Compromised Skin Integrity  Goal: Skin integrity is maintained or improved  INTERVENTIONS:  - Identify patients at risk for skin breakdown  - Assess and monitor skin integrity  - Assess and monitor nutrition and hydration status  - Monitor labs (i e  albumin)  - Assess for incontinence   - Turn and reposition patient  - Assist with mobility/ambulation  - Relieve pressure over bony prominences  - Avoid friction and shearing  - Provide appropriate hygiene as needed including keeping skin clean and dry  - Evaluate need for skin moisturizer/barrier cream  - Collaborate with interdisciplinary team (i e  Nutrition, Rehabilitation, etc )   - Patient/family teaching   Outcome: Adequate for Discharge

## 2018-07-19 NOTE — DISCHARGE SUMMARY
Discharge- Gil Salgado 1945, 74 y o  male MRN: [id number]    Unit/Bed#:  Encounter: [id number]    Primary Care Provider: Cha Perry DO   Date and time admitted to hospital: 7/17/2018 12:42 PM        Right knee injury, initial encounter   Assessment & Plan    Right knee contusion s/p presyncope and fall without ligament or tendon tear and grade 2 chondromalacia with superficial abrasion right anterior knee and right foot pain edema and medial ankle pain with limited motion status post fall with CVA and partial hemiparesis of the right side  Continue Right knee immobilizer for chondromalacia and decreased ROM  No acute fracture  Outpatient follow up appropriate  Weakness   Assessment & Plan    Reports CVA sin past and resulting in RUE and RLE weakness   Continue to use SC with ambulation  PT/ OT evaluation - currently he is refusing PT eval and short term rehab placement  Recommendation is for patient to use right knee immobilizer when ambulating  Follow up with PCP within 1 week and likely orthopedics within 1 month  * Syncope and collapse   Assessment & Plan    Possibly d/t ortho static HTN  BP stable however on all PTA medications  CT head - unremarkable  Neuro checks stable  Troponin x 3 sets unremarkable  All imaging studies unremarkable  Persistent atrial fibrillation [facility])   Assessment & Plan    Continual cardiac monitoring - unremarkable  continue eliquis 5 mg   Continue PTA medications         Essential hypertension   Assessment & Plan    Remained stable throughout hospital stay  Continue PTA medications            Chronic pain   Assessment & Plan     Tylenol for mild pain PRN and Tramadol for severe pain PRN        Noncompliance   Assessment & Plan    Patient has a history of medication, diet, and general medical treatment noncompliance          Type 2 diabetes mellitus without complication, without long-term current use of insulin ([facility] [address] ) Assessment & Plan    Lab Results   Component Value Date    HGBA1C 7 0 (H) 05/07/2018       Recent Labs      07/18/18   1605  07/18/18   2107  07/19/18   0811  07/19/18   1129   POCGLU  85  150*  128  110       Blood Sugar Average: Last 72 hrs:  (P) 146      ACCU checks ACHS and SSI was maintained here in the hospital   Continue PTA medication- metformin              Discharging Physician / Practitioner: Hubert Chris PA-C  PCP: Luann Perdue DO  Admission Date: 7/17/18  Discharge Date: 07/19/18    Resolved Problems  Date Reviewed: 7/19/2018    None          Consultations During Hospital Stay:  · Orthopedics  Procedures Performed:   · none    Significant Findings / Test Results:   CT head with contrast (7/17/18) - no acute intracranial abnormality  CT cervical spine (7/17/18) - no cervical spine fracture or traumatic malalignment  CT chest, abdomen, pelvis (7/17/18) - 1  No acute traumatic injury to the chest, abdomen, or pelvis    2   Emphysematous change with bilateral groundglass, solid, and irregular nodular densities  The 4 mm solid nodule in the right upper lobe was not present previous to July 2018 and a follow-up CT in 6 months is recommended  Additional groundglass and   irregular nodular opacities have been present since 2015 though mildly increased  These can also be reassessed at the time of follow-up    3   Improved bilateral pleural effusions    4   Small amount of pelvic free fluid  Xray left elbow (7/17/18) - No acute osseous abnormality  Xray right elbow (7/17/18) - no acute osseous abnormality    Xray right knee (7/17/18) - Fluid fat level in the joint space suggesting radiographically occult fracture, nondisplaced   CAT scan may provide further information in this regard if clinically warranted  Xray right tibula / fibula (717/18): No acute abnormality  MRI right knee (7/18/18) -   1  Mild quadriceps insertional tendinosis without tear    The patellar tendon is unremarkable  2   Grade 2 cartilage patella  3   No evidence of meniscal tear or other internal derangement  Xray Right ankle (7/19/18) - no acute osseous abnormality  Xray right foot (719/18) - no acute osseous abnormality     Chest Xray (7/19/18) - No acute cardiopulmonary disease  Incidental Findings:   · Pulmonary nodules as noted above  Follow up CT recommended  Test Results Pending at Discharge (will require follow up):   · none     Outpatient Tests Requested: per PCP recommendations       Complications:  none    Reason for Admission: [address] Course:   Suzanna Reid is a 74 y o  male patient who originally presented to the hospital on 7/17/2018 due to a presyncopal event where the patient fell to the ground hitting his right knee  Please see above imaging results and medical workup listed per diagnosis  Of note, the patient was offered short term rehab once again, but again, adamantly refused this option  He unfortunately is a high readmission risk for this reason  Please see above list of diagnoses and related plan for additional information  Condition at Discharge: good     Discharge Day Visit / Exam:   Subjective:  Patient feeling well today  Mild nausea and SOB, but overall feels well  Biggest complaint is actually not getting enough food while here  Vitals: Blood Pressure: 119/59 (07/19/18 0744)  Pulse: 72 (07/19/18 0744)  Temperature: (!) 97 2 °F (36 2 °C) (07/19/18 0744)  Temp Source: Temporal (07/19/18 0744)  Respirations: 20 (07/19/18 0744)  Height: 5' 9" (175 3 cm) (07/17/18 1643)  Weight - Scale: 92 2 kg (203 lb 4 2 oz) (07/17/18 1643)  SpO2: 99 % (07/19/18 1412)  Exam:   Physical Exam   Constitutional: He is oriented to person, place, and time  He appears well-developed  HENT:   Head: Normocephalic  Mouth/Throat: Oropharynx is clear and moist    Cardiovascular: Normal rate  No murmur heard    Pulmonary/Chest: Effort normal and breath sounds normal  No respiratory distress  He has no wheezes  Abdominal: He exhibits no distension  There is no tenderness  Neurological: He is alert and oriented to person, place, and time  Skin: Skin is warm and dry  No erythema  Multiple tattoos  Psychiatric: He has a normal mood and affect  Nursing note and vitals reviewed  Discharge instructions/Information to patient and family:   See after visit summary for information provided to patient and family  Provisions for Follow-Up Care:  See after visit summary for information related to follow-up care and any pertinent home health orders  Disposition:   Home    Planned Readmission: no     Discharge Statement:  I spent 45 minutes discharging the patient  This time was spent on the day of discharge  I had direct contact with the patient on the day of discharge  Greater than 50% of the total time was spent examining patient, answering all patient questions, arranging and discussing plan of care with patient as well as directly providing post-discharge instructions  Additional time then spent on discharge activities  Discharge Medications:  See after visit summary for reconciled discharge medications provided to patient and family        ** Please Note: This note has been constructed using a voice recognition system **

## 2018-07-19 NOTE — SOCIAL WORK
Pt is active with [facility]  CM called and notified them that pt is being dc'd home today  AVS to be faxed over to [phone]

## 2018-07-19 NOTE — PROGRESS NOTES
Subjective:  Patient notes less pain about the right knee  Denies any right elbow pain  Notes that he has swelling and tenderness of his right foot and tenderness over the medial aspect of his right ankle  Denies any gross numbness or tingling  His right side was affected by a CVA  Denies fever chills  Reports he had pleural centesis  Objective:  Right knee is without any significant effusion  Mild generalized anterior tenderness  No gross ligamentous laxity  Superficial abrasion appears noninfected without discharge or erythema  Right ankle with tenderness over the medial side and limited range of motion secondary to CVA  There is no bony tenderness over the distal tibia or fibula  There is moderate pitting edema over the dorsum of the right foot  Sensation is intact  MRI right knee notes no fracture  Mild quadriceps insertional tendinosis without tear and a normal patellar tendon  Grade 2 patella chondromalacia and no evidence of meniscal tear other derangement  There are no ankle x-rays or foot x-rays these will be ordered  Assessment:  Right knee contusion without ligament or tendon tear and grade 2 chondromalacia with superficial abrasion right anterior knee and right foot pain edema and medial ankle pain with limited motion status post fall with CVA and partial hemiparesis of the right side  Plan: We will get x-rays of the right foot and ankle  Allow the patient to weightbear as tolerated with physical therapy  Daily dressing changes to the anterior knee superficial abrasion  Ace wrap applied to the right foot and ankle for edema control and support elevate right ankle when in bed  Recommend patient obtain a hinged over-the-counter knee sleeve upon discharge from hospital   May use knee immobilizer for ambulation if needed but otherwise may remove

## 2018-07-19 NOTE — ASSESSMENT & PLAN NOTE
Lab Results   Component Value Date    HGBA1C 7 0 (H) 05/07/2018       Recent Labs      07/18/18   1605  07/18/18   2107  07/19/18   0811  07/19/18   1129   POCGLU  85  150*  128  110       Blood Sugar Average: Last 72 hrs:  (P) 146      ACCU checks ACHS and SSI was maintained here in the hospital   Continue PTA medication- metformin

## 2018-07-19 NOTE — PROGRESS NOTES
X-ray note  X-rays right foot ankle note no obvious fracture or bony abnormality  Plan:  Patient will continue weight-bearing as tolerated right lower extremity  Maintain Ace wrap to the right foot ankle area for edema control  Elevate right foot and ankle as needed and ice 20 minutes 3 to 4 times a day as needed

## 2018-07-19 NOTE — SOCIAL WORK
[facility] Karen campo will transport the pt home at 6:30 pm  It will cost $175 for the transport

## 2018-07-19 NOTE — ASSESSMENT & PLAN NOTE
Reports CVA sin past and resulting in RUE and RLE weakness   Continue to use SC with ambulation  PT/ OT evaluation - currently he is refusing PT eval and short term rehab placement  Recommendation is for patient to use right knee immobilizer when ambulating  Follow up with PCP within 1 week and likely orthopedics within 1 month

## 2018-07-19 NOTE — NURSING NOTE
When PCA went in to draw labs pt refused  RN notified and tried to get pt to allow it  He refused again but agreed to have them drawn later  Advised  and will pass on in RN report

## 2018-07-19 NOTE — SOCIAL WORK
Pt is being discharged today  Pt is still refusing to go to short term rehab  [facility] home care was notified of same  Pt does not have a ride home pt said his care giver can not pick him up  Called [facility] Tammy Smart to arrange for transport  Will check with Rebecca AMARAL to see if hospital will cover the cost of transport  Case Management reviewed discharge planning process including the following: identifying help that is needed at home, pt's preference for discharge needs and Meds at [facility]  Reviewed with Pt that any member of the healthcare team can answer questions regarding : medications, jmportance of recognizing  Signs and symptoms of any  medical problems  Case Management also encouraged pt to follow up with all recommended appointments after discharge  Georgette Ormond

## 2018-07-22 ENCOUNTER — HOSPITAL ENCOUNTER (OUTPATIENT)
Dept: SLEEP CENTER | Facility: HOSPITAL | Age: 73
Discharge: HOME | End: 2018-07-22
Attending: FAMILY MEDICINE
Payer: COMMERCIAL

## 2018-07-22 PROCEDURE — 95811 POLYSOM 6/>YRS CPAP 4/> PARM: CPT

## 2018-07-23 NOTE — PROGRESS NOTES
Sleep Study Documentation    Pre-Sleep Study       Sleep testing procedure explained to patient:YES    Patient napped prior to study:NO    Caffeine:Dayshift worker after 12PM   Caffeine use:YES- soda  12 to 26 ounces    Alcohol:Dayshift workers after 5PM: Alcohol use:NO    Typical day for patient:YES       Study Documentation  Treatment   Optimal PAP pressure: +10  Leak:Small  Snore:Eliminated  REM Obtained:yes  Supplemental O2: no    Minimum SaO2 89%  Baseline SaO2 97%  PAP mask tried (list all)F&P Simplus  PAP mask choice (final)Medium F&P Simplus  PAP mask type:full face  PAP pressure at which snoring was eliminated +10  Minimum SaO2 at final PAP pressure 92%  Mode of Therapy:CPAP  ETCO2:No  CPAP changed to BiPAP:No    Mode of Therapy:CPAP    EKG abnormalities: yes:  EPOCH example and comments: A-Fib throughout    EEG abnormalities: no    Study Terminated:no    Patient classification: retired       Post-Sleep Study    Medication used at bedtime or during sleep study:YES other prescription medications    Patient reports time it took to fall asleep:20 to 30 minutes    Patient reports waking up during study:Denied    Patient reports sleeping 4 to 6 hours without dreaming  Patient reports sleep during study:better than usual    Patient rated sleepiness: Somewhat sleepy or tired    PAP treatment:yes: Post PAP treatment patient reports feeling better and  would wear PAP mask at home

## 2018-07-25 ENCOUNTER — APPOINTMENT (EMERGENCY)
Dept: RADIOLOGY | Facility: HOSPITAL | Age: 73
End: 2018-07-25
Payer: COMMERCIAL

## 2018-07-25 ENCOUNTER — HOSPITAL ENCOUNTER (EMERGENCY)
Facility: HOSPITAL | Age: 73
Discharge: HOME | End: 2018-07-25
Admitting: EMERGENCY MEDICINE
Payer: COMMERCIAL

## 2018-07-25 VITALS
SYSTOLIC BLOOD PRESSURE: 173 MMHG | RESPIRATION RATE: 23 BRPM | TEMPERATURE: 97.9 F | OXYGEN SATURATION: 95 % | HEART RATE: 77 BPM | DIASTOLIC BLOOD PRESSURE: 94 MMHG | WEIGHT: 186 LBS | BODY MASS INDEX: 27.55 KG/M2 | HEIGHT: 69 IN

## 2018-07-25 DIAGNOSIS — R06.02 SHORTNESS OF BREATH: Primary | ICD-10-CM

## 2018-07-25 DIAGNOSIS — I50.9 CHF (CONGESTIVE HEART FAILURE) (HCC): ICD-10-CM

## 2018-07-25 LAB
ALBUMIN SERPL BCP-MCNC: 3.1 G/DL (ref 3.5–5)
ALP SERPL-CCNC: 111 U/L (ref 46–116)
ALT SERPL W P-5'-P-CCNC: 75 U/L (ref 12–78)
ANION GAP SERPL CALCULATED.3IONS-SCNC: 3 MMOL/L (ref 4–13)
APTT PPP: 43 SECONDS (ref 24–36)
AST SERPL W P-5'-P-CCNC: 35 U/L (ref 5–45)
ATRIAL RATE: 78 BPM
BACTERIA UR QL AUTO: ABNORMAL /HPF
BASOPHILS # BLD AUTO: 0.16 THOUSANDS/ΜL (ref 0–0.1)
BASOPHILS NFR BLD AUTO: 2 % (ref 0–1)
BILIRUB SERPL-MCNC: 0.7 MG/DL (ref 0.2–1)
BILIRUB UR QL STRIP: NEGATIVE
BUN SERPL-MCNC: 12 MG/DL (ref 5–25)
CALCIUM SERPL-MCNC: 9.3 MG/DL (ref 8.3–10.1)
CHLORIDE SERPL-SCNC: 106 MMOL/L (ref 100–108)
CLARITY UR: CLEAR
CO2 SERPL-SCNC: 32 MMOL/L (ref 21–32)
COLOR UR: YELLOW
CREAT SERPL-MCNC: 1.04 MG/DL (ref 0.6–1.3)
EOSINOPHIL # BLD AUTO: 0.23 THOUSAND/ΜL (ref 0–0.61)
EOSINOPHIL NFR BLD AUTO: 2 % (ref 0–6)
ERYTHROCYTE [DISTWIDTH] IN BLOOD BY AUTOMATED COUNT: 18.1 % (ref 11.6–15.1)
GFR SERPL CREATININE-BSD FRML MDRD: 70 ML/MIN/1.73SQ M
GLUCOSE SERPL-MCNC: 114 MG/DL (ref 65–140)
GLUCOSE UR STRIP-MCNC: NEGATIVE MG/DL
HCT VFR BLD AUTO: 35.6 % (ref 36.5–49.3)
HGB BLD-MCNC: 11.1 G/DL (ref 12–17)
HGB UR QL STRIP.AUTO: NEGATIVE
INR PPP: 1.37 (ref 0.86–1.17)
KETONES UR STRIP-MCNC: NEGATIVE MG/DL
LEUKOCYTE ESTERASE UR QL STRIP: ABNORMAL
LYMPHOCYTES # BLD AUTO: 1.24 THOUSANDS/ΜL (ref 0.6–4.47)
LYMPHOCYTES NFR BLD AUTO: 13 % (ref 14–44)
MAGNESIUM SERPL-MCNC: 1.8 MG/DL (ref 1.6–2.6)
MCH RBC QN AUTO: 22.5 PG (ref 26.8–34.3)
MCHC RBC AUTO-ENTMCNC: 31.2 G/DL (ref 31.4–37.4)
MCV RBC AUTO: 72 FL (ref 82–98)
MONOCYTES # BLD AUTO: 1.02 THOUSAND/ΜL (ref 0.17–1.22)
MONOCYTES NFR BLD AUTO: 11 % (ref 4–12)
NEUTROPHILS # BLD AUTO: 6.97 THOUSANDS/ΜL (ref 1.85–7.62)
NEUTS SEG NFR BLD AUTO: 72 % (ref 43–75)
NITRITE UR QL STRIP: NEGATIVE
NON-SQ EPI CELLS URNS QL MICRO: ABNORMAL /HPF
NT-PROBNP SERPL-MCNC: 1671 PG/ML
PH UR STRIP.AUTO: 7.5 [PH] (ref 4.5–8)
PLATELET # BLD AUTO: 254 THOUSANDS/UL (ref 149–390)
PMV BLD AUTO: 10.3 FL (ref 8.9–12.7)
POTASSIUM SERPL-SCNC: 4.2 MMOL/L (ref 3.5–5.3)
PROT SERPL-MCNC: 6 G/DL (ref 6.4–8.2)
PROT UR STRIP-MCNC: NEGATIVE MG/DL
PROTHROMBIN TIME: 16.2 SECONDS (ref 11.8–14.2)
QRS AXIS: 66 DEGREES
QRSD INTERVAL: 82 MS
QT INTERVAL: 370 MS
QTC INTERVAL: 405 MS
RBC # BLD AUTO: 4.94 MILLION/UL (ref 3.88–5.62)
RBC #/AREA URNS AUTO: ABNORMAL /HPF
SODIUM SERPL-SCNC: 141 MMOL/L (ref 136–145)
SP GR UR STRIP.AUTO: 1.02 (ref 1–1.03)
T WAVE AXIS: 45 DEGREES
TROPONIN I SERPL-MCNC: <0.02 NG/ML
UROBILINOGEN UR QL STRIP.AUTO: 0.2 E.U./DL
VENTRICULAR RATE: 72 BPM
WBC # BLD AUTO: 9.62 THOUSAND/UL (ref 4.31–10.16)
WBC #/AREA URNS AUTO: ABNORMAL /HPF

## 2018-07-25 PROCEDURE — 81001 URINALYSIS AUTO W/SCOPE: CPT | Performed by: PHYSICIAN ASSISTANT

## 2018-07-25 PROCEDURE — 83880 ASSAY OF NATRIURETIC PEPTIDE: CPT | Performed by: PHYSICIAN ASSISTANT

## 2018-07-25 PROCEDURE — 71045 X-RAY EXAM CHEST 1 VIEW: CPT

## 2018-07-25 PROCEDURE — 85730 THROMBOPLASTIN TIME PARTIAL: CPT | Performed by: PHYSICIAN ASSISTANT

## 2018-07-25 PROCEDURE — 99285 EMERGENCY DEPT VISIT HI MDM: CPT

## 2018-07-25 PROCEDURE — 93005 ELECTROCARDIOGRAM TRACING: CPT

## 2018-07-25 PROCEDURE — 85025 COMPLETE CBC W/AUTO DIFF WBC: CPT

## 2018-07-25 PROCEDURE — 80053 COMPREHEN METABOLIC PANEL: CPT

## 2018-07-25 PROCEDURE — 84484 ASSAY OF TROPONIN QUANT: CPT

## 2018-07-25 PROCEDURE — 85610 PROTHROMBIN TIME: CPT | Performed by: PHYSICIAN ASSISTANT

## 2018-07-25 PROCEDURE — 83735 ASSAY OF MAGNESIUM: CPT | Performed by: PHYSICIAN ASSISTANT

## 2018-07-25 PROCEDURE — 36415 COLL VENOUS BLD VENIPUNCTURE: CPT

## 2018-07-25 PROCEDURE — 93010 ELECTROCARDIOGRAM REPORT: CPT | Performed by: INTERNAL MEDICINE

## 2018-07-25 RX ORDER — ALBUTEROL SULFATE 90 UG/1
1-2 AEROSOL, METERED RESPIRATORY (INHALATION) EVERY 6 HOURS PRN
Qty: 1 INHALER | Refills: 0 | Status: SHIPPED | OUTPATIENT
Start: 2018-07-25 | End: 2018-10-29 | Stop reason: SDUPTHER

## 2018-07-25 NOTE — ED NOTES
Called patient's friend for a ride home  Friend states they can't come to pick him up until between 1930 and 2000  Patient made aware        Shanthi Escalera RN  07/25/18 8458

## 2018-07-25 NOTE — DISCHARGE INSTRUCTIONS
Shortness of Breath   AMBULATORY CARE:   Shortness of breath  is a feeling that you cannot get enough air when you breathe in  You may have this feeling only during activity, or all the time  Your symptoms can range from mild to severe  Shortness of breath may be a sign of a serious health condition that needs immediate care  Seek care immediately if:   · Your signs and symptoms are the same or worse within 24 hours of treatment  · The skin over your ribs or on your neck sinks in when you breathe  · You feel confused or dizzy  Contact your healthcare provider if:   · You have new or worsening symptoms  · You have questions or concerns about your condition or care  Treatment:   · Medicines  may be used to treat the cause of your symptoms  You may need medicine to treat a bacterial infection or reduce anxiety  Other medicines may be used to open your airway, reduce swelling, or remove extra fluid  If you have a heart condition, you may need medicine to help your heart beat more strongly or regularly  · Oxygen  may be given to help you breathe more easily  Manage shortness of breath:   · Create an action plan  You and your healthcare provider can work together to create a plan for how to handle shortness of breath  The plan can include daily activities, treatment changes, and what to do if you have severe breathing problems  · Lean forward on your elbows when you sit  This helps your lungs expand and may make it easier to breathe  · Use pursed-lip breathing any time you feel short of breath  Breathe in through your nose and then slowly breathe out through your mouth with your lips slightly puckered  It should take you twice as long to breathe out as it did to breathe in  · Do not smoke  Nicotine and other chemicals in cigarettes and cigars can cause lung damage and make shortness of breath worse   Ask your healthcare provider for information if you currently smoke and need help to quit  E-cigarettes or smokeless tobacco still contain nicotine  Talk to your healthcare provider before you use these products  · Reach or maintain a healthy weight  Your healthcare provider can help you create a safe weight loss plan if you are overweight  · Exercise as directed  Exercise can help your lungs work more easily  Exercise can also help you lose weight if needed  Try to get at least 30 minutes of exercise most days of the week  Follow up with your healthcare provider or specialist as directed:  Write down your questions so you remember to ask them during your visits  © 2017 [facility] Information is for End User's use only and may not be sold, redistributed or otherwise used for commercial purposes  All illustrations and images included in CareNotes® are the copyrighted property of [employer] D [employer] M [employer]  or [employer] Villar[employer]  The above information is an  only  It is not intended as medical advice for individual conditions or treatments  Talk to your doctor, nurse or pharmacist before following any medical regimen to see if it is safe and effective for you  Increase your Lasix to 40 mg in the morning and 20 mg in the evening  Call her family doctor tomorrow and have repeat blood work at their discretion
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
## 2018-07-26 ENCOUNTER — HOSPITAL ENCOUNTER (EMERGENCY)
Facility: HOSPITAL | Age: 73
Discharge: HOME | End: 2018-07-26
Attending: EMERGENCY MEDICINE
Payer: COMMERCIAL

## 2018-07-26 VITALS
DIASTOLIC BLOOD PRESSURE: 81 MMHG | SYSTOLIC BLOOD PRESSURE: 157 MMHG | HEART RATE: 75 BPM | RESPIRATION RATE: 18 BRPM | OXYGEN SATURATION: 100 % | TEMPERATURE: 96.8 F

## 2018-07-26 DIAGNOSIS — R04.0 LEFT-SIDED EPISTAXIS: Primary | ICD-10-CM

## 2018-07-26 PROCEDURE — 99283 EMERGENCY DEPT VISIT LOW MDM: CPT

## 2018-07-26 RX ORDER — TRANEXAMIC ACID 100 MG/ML
1000 INJECTION, SOLUTION INTRAVENOUS ONCE
Status: COMPLETED | OUTPATIENT
Start: 2018-07-26 | End: 2018-07-26

## 2018-07-26 RX ADMIN — TRANEXAMIC ACID 1000 MG: 100 INJECTION, SOLUTION INTRAVENOUS at 18:30
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
## 2018-07-27 ENCOUNTER — TELEPHONE (OUTPATIENT)
Dept: OTOLARYNGOLOGY | Facility: CLINIC | Age: 73
End: 2018-07-27

## 2018-07-27 ENCOUNTER — OFFICE VISIT (OUTPATIENT)
Dept: OTOLARYNGOLOGY | Facility: CLINIC | Age: 73
End: 2018-07-27
Payer: COMMERCIAL

## 2018-07-27 VITALS
DIASTOLIC BLOOD PRESSURE: 60 MMHG | WEIGHT: 198 LBS | HEIGHT: 69 IN | HEART RATE: 74 BPM | BODY MASS INDEX: 29.33 KG/M2 | RESPIRATION RATE: 16 BRPM | SYSTOLIC BLOOD PRESSURE: 116 MMHG

## 2018-07-27 DIAGNOSIS — Z72.0 TOBACCO ABUSE: ICD-10-CM

## 2018-07-27 DIAGNOSIS — R04.0 ANTERIOR EPISTAXIS: Primary | Chronic | ICD-10-CM

## 2018-07-27 DIAGNOSIS — I48.19 PERSISTENT ATRIAL FIBRILLATION (HCC): ICD-10-CM

## 2018-07-27 PROCEDURE — 30901 CONTROL OF NOSEBLEED: CPT | Performed by: OTOLARYNGOLOGY

## 2018-07-27 PROCEDURE — 99203 OFFICE O/P NEW LOW 30 MIN: CPT | Performed by: OTOLARYNGOLOGY

## 2018-07-27 PROCEDURE — 1111F DSCHRG MED/CURRENT MED MERGE: CPT | Performed by: OTOLARYNGOLOGY
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
## 2018-07-27 NOTE — PROGRESS NOTES
Review of Systems   HENT: Positive for nosebleeds  Respiratory: Positive for cough and chest tightness  All other systems reviewed and are negative

## 2018-07-27 NOTE — DISCHARGE INSTRUCTIONS
Epistaxis   WHAT YOU SHOULD KNOW:   Epistaxis is a nosebleed  A nosebleed occurs when the blood vessels near the surface of the nasal cavity are injured or damaged  AFTER YOU LEAVE:   Medicines:   · Nasal sprays:  Vasoconstrictor nasal spray is a medicine that helps make nasal blood vessels narrower  This limits the blood flow and stops the bleeding  This medicine also decreases the swelling inside your nose and helps you breathe easier  You may also be directed to use saline or other nasal sprays to add moisture to your nose  · Antibiotics: This medicine is given to help treat or prevent an infection caused by bacteria  · Take your medicine as directed  Call your healthcare provider if you think your medicine is not helping or if you have side effects  Tell him if you are allergic to any medicine  Keep a list of the medicines, vitamins, and herbs you take  Include the amounts, and when and why you take them  Bring the list or the pill bottles to follow-up visits  Carry your medicine list with you in case of an emergency  Follow up with your primary healthcare provider or otolaryngologist within 2 to 3 days or as directed: Any packing in your nose should be removed within 2 to 3 days  Write down your questions so you remember to ask them during your visits  First aid:   · Sit up and lean forward: This will help prevent you from swallowing blood  Spit blood and saliva into a bowl  · Apply pressure to your nose:  Use 2 fingers to pinch your nose shut for 10 minutes  This will help stop the bleeding  Breathe through your mouth  · Apply ice:  Use a cold pack or put crushed ice in a bag, cover with a towel, and place on the bridge of your nose  · Nasal packing:  Pack your nose with a cotton ball, tissue, tampon, or gauze bandage to stop the bleeding  Prevent epistaxis:  · Avoid nose picking and blowing your nose too hard: You can irritate or damage your nose if you pick it   Blowing your nose too hard may cause the bleeding to start again  · Avoid irritants:  Substances that can irritate your nose should be avoided  These include tobacco smoke and chemical sprays such as  that contain ammonia  · Use a cool mist humidifier in your home: This will add the moisture to the air and help keep your nose moist      · Put a small amount of petroleum jelly inside your nostrils: You may apply a small amount of petroleum jelly if you do not have a nasal packing  This will help keep your nose from drying out or getting irritated  Do not put anything else inside your nose unless your primary healthcare provider tells you to do so  Contact your primary healthcare provider or otolaryngologist if:   · You have a fever and are vomiting  · You have pain in and around your nose that is getting worse even after you take pain medicines  · Your nasal pack is loose  · You have questions or concerns about your condition or care  Seek care immediately if:   · Your nasal packing is soaked with blood  · Your nose is still bleeding after 20 minutes, even after you pinch it  · You have a foul-smelling discharge coming out of your nose  · You feel so weak and dizzy that you have trouble standing up  · You have trouble breathing or talking  © 2014 [employer] is for End User's use only and may not be sold, redistributed or otherwise used for commercial purposes  All illustrations and images included in CareNotes® are the copyrighted property of [employer]  or [employer]  The above information is an  only  It is not intended as medical advice for individual conditions or treatments  Talk to your doctor, nurse or pharmacist before following any medical regimen to see if it is safe and effective for you

## 2018-08-17 ENCOUNTER — OFFICE VISIT (OUTPATIENT)
Dept: CARDIOLOGY CLINIC | Facility: HOSPITAL | Age: 73
End: 2018-08-17
Payer: COMMERCIAL

## 2018-08-17 VITALS
DIASTOLIC BLOOD PRESSURE: 54 MMHG | SYSTOLIC BLOOD PRESSURE: 92 MMHG | HEART RATE: 64 BPM | BODY MASS INDEX: 27.25 KG/M2 | WEIGHT: 184 LBS | HEIGHT: 69 IN

## 2018-08-17 DIAGNOSIS — I25.10 CORONARY ARTERY DISEASE INVOLVING NATIVE CORONARY ARTERY OF NATIVE HEART WITHOUT ANGINA PECTORIS: ICD-10-CM

## 2018-08-17 DIAGNOSIS — Z72.0 TOBACCO ABUSE: ICD-10-CM

## 2018-08-17 DIAGNOSIS — I43 DILATED CARDIOMYOPATHY SECONDARY TO TACHYCARDIA (HCC): ICD-10-CM

## 2018-08-17 DIAGNOSIS — I48.19 PERSISTENT ATRIAL FIBRILLATION (HCC): Primary | ICD-10-CM

## 2018-08-17 DIAGNOSIS — I10 BENIGN ESSENTIAL HYPERTENSION: ICD-10-CM

## 2018-08-17 DIAGNOSIS — R00.0 DILATED CARDIOMYOPATHY SECONDARY TO TACHYCARDIA (HCC): ICD-10-CM

## 2018-08-17 DIAGNOSIS — E78.5 DYSLIPIDEMIA: ICD-10-CM

## 2018-08-17 DIAGNOSIS — I50.42 CHRONIC COMBINED SYSTOLIC AND DIASTOLIC CHF (CONGESTIVE HEART FAILURE) (HCC): ICD-10-CM

## 2018-08-17 PROCEDURE — 99214 OFFICE O/P EST MOD 30 MIN: CPT | Performed by: INTERNAL MEDICINE
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
## 2018-08-28 ENCOUNTER — TELEPHONE (OUTPATIENT)
Dept: OBGYN CLINIC | Facility: CLINIC | Age: 73
End: 2018-08-28

## 2018-08-29 ENCOUNTER — OFFICE VISIT (OUTPATIENT)
Dept: FAMILY MEDICINE CLINIC | Facility: CLINIC | Age: 73
End: 2018-08-29
Payer: COMMERCIAL

## 2018-08-29 ENCOUNTER — APPOINTMENT (OUTPATIENT)
Dept: LAB | Facility: MEDICAL CENTER | Age: 73
DRG: 812 | End: 2018-08-29
Payer: COMMERCIAL

## 2018-08-29 VITALS
WEIGHT: 188.4 LBS | DIASTOLIC BLOOD PRESSURE: 82 MMHG | HEIGHT: 69 IN | BODY MASS INDEX: 27.91 KG/M2 | SYSTOLIC BLOOD PRESSURE: 128 MMHG

## 2018-08-29 DIAGNOSIS — Z12.5 PROSTATE CANCER SCREENING: ICD-10-CM

## 2018-08-29 DIAGNOSIS — E11.9 TYPE 2 DIABETES MELLITUS WITHOUT COMPLICATION, WITHOUT LONG-TERM CURRENT USE OF INSULIN (HCC): Primary | ICD-10-CM

## 2018-08-29 DIAGNOSIS — N39.41 URGE INCONTINENCE OF URINE: ICD-10-CM

## 2018-08-29 DIAGNOSIS — Z12.11 SCREENING FOR COLON CANCER: Primary | ICD-10-CM

## 2018-08-29 DIAGNOSIS — Z23 ENCOUNTER FOR IMMUNIZATION: ICD-10-CM

## 2018-08-29 DIAGNOSIS — I10 BENIGN ESSENTIAL HYPERTENSION: ICD-10-CM

## 2018-08-29 LAB
ALBUMIN SERPL BCP-MCNC: 3.3 G/DL (ref 3.5–5)
ALP SERPL-CCNC: 93 U/L (ref 46–116)
ALT SERPL W P-5'-P-CCNC: 18 U/L (ref 12–78)
ANION GAP SERPL CALCULATED.3IONS-SCNC: 7 MMOL/L (ref 4–13)
AST SERPL W P-5'-P-CCNC: 12 U/L (ref 5–45)
BASOPHILS # BLD AUTO: 0.08 THOUSANDS/ΜL (ref 0–0.1)
BASOPHILS NFR BLD AUTO: 1 % (ref 0–1)
BILIRUB SERPL-MCNC: 0.59 MG/DL (ref 0.2–1)
BUN SERPL-MCNC: 15 MG/DL (ref 5–25)
CALCIUM SERPL-MCNC: 9 MG/DL (ref 8.3–10.1)
CHLORIDE SERPL-SCNC: 107 MMOL/L (ref 100–108)
CHOLEST SERPL-MCNC: 79 MG/DL (ref 50–200)
CO2 SERPL-SCNC: 29 MMOL/L (ref 21–32)
CREAT SERPL-MCNC: 1.03 MG/DL (ref 0.6–1.3)
CREAT UR-MCNC: 340 MG/DL
EOSINOPHIL # BLD AUTO: 0.24 THOUSAND/ΜL (ref 0–0.61)
EOSINOPHIL NFR BLD AUTO: 4 % (ref 0–6)
ERYTHROCYTE [DISTWIDTH] IN BLOOD BY AUTOMATED COUNT: 16.2 % (ref 11.6–15.1)
EST. AVERAGE GLUCOSE BLD GHB EST-MCNC: 108 MG/DL
GFR SERPL CREATININE-BSD FRML MDRD: 71 ML/MIN/1.73SQ M
GLUCOSE P FAST SERPL-MCNC: 94 MG/DL (ref 65–99)
HBA1C MFR BLD: 5.4 % (ref 4.2–6.3)
HCT VFR BLD AUTO: 28.2 % (ref 36.5–49.3)
HDLC SERPL-MCNC: 27 MG/DL (ref 40–60)
HGB BLD-MCNC: 7.9 G/DL (ref 12–17)
IMM GRANULOCYTES # BLD AUTO: 0.02 THOUSAND/UL (ref 0–0.2)
IMM GRANULOCYTES NFR BLD AUTO: 0 % (ref 0–2)
LDLC SERPL CALC-MCNC: 31 MG/DL (ref 0–100)
LYMPHOCYTES # BLD AUTO: 1.35 THOUSANDS/ΜL (ref 0.6–4.47)
LYMPHOCYTES NFR BLD AUTO: 23 % (ref 14–44)
MCH RBC QN AUTO: 19.6 PG (ref 26.8–34.3)
MCHC RBC AUTO-ENTMCNC: 28 G/DL (ref 31.4–37.4)
MCV RBC AUTO: 70 FL (ref 82–98)
MICROALBUMIN UR-MCNC: 95.9 MG/L (ref 0–20)
MICROALBUMIN/CREAT 24H UR: 28 MG/G CREATININE (ref 0–30)
MONOCYTES # BLD AUTO: 0.84 THOUSAND/ΜL (ref 0.17–1.22)
MONOCYTES NFR BLD AUTO: 14 % (ref 4–12)
NEUTROPHILS # BLD AUTO: 3.38 THOUSANDS/ΜL (ref 1.85–7.62)
NEUTS SEG NFR BLD AUTO: 58 % (ref 43–75)
NRBC BLD AUTO-RTO: 0 /100 WBCS
PLATELET # BLD AUTO: 228 THOUSANDS/UL (ref 149–390)
PMV BLD AUTO: 10.4 FL (ref 8.9–12.7)
POTASSIUM SERPL-SCNC: 3 MMOL/L (ref 3.5–5.3)
PROT SERPL-MCNC: 6.1 G/DL (ref 6.4–8.2)
PSA SERPL-MCNC: 1.7 NG/ML (ref 0–4)
RBC # BLD AUTO: 4.04 MILLION/UL (ref 3.88–5.62)
SODIUM SERPL-SCNC: 143 MMOL/L (ref 136–145)
TRIGL SERPL-MCNC: 106 MG/DL
TSH SERPL DL<=0.05 MIU/L-ACNC: 0.57 UIU/ML (ref 0.36–3.74)
WBC # BLD AUTO: 5.91 THOUSAND/UL (ref 4.31–10.16)

## 2018-08-29 PROCEDURE — 80053 COMPREHEN METABOLIC PANEL: CPT | Performed by: FAMILY MEDICINE

## 2018-08-29 PROCEDURE — 90471 IMMUNIZATION ADMIN: CPT

## 2018-08-29 PROCEDURE — 85025 COMPLETE CBC W/AUTO DIFF WBC: CPT | Performed by: FAMILY MEDICINE

## 2018-08-29 PROCEDURE — 80061 LIPID PANEL: CPT | Performed by: FAMILY MEDICINE

## 2018-08-29 PROCEDURE — 3074F SYST BP LT 130 MM HG: CPT | Performed by: FAMILY MEDICINE

## 2018-08-29 PROCEDURE — G0103 PSA SCREENING: HCPCS | Performed by: FAMILY MEDICINE

## 2018-08-29 PROCEDURE — 3044F HG A1C LEVEL LT 7.0%: CPT | Performed by: FAMILY MEDICINE

## 2018-08-29 PROCEDURE — 82570 ASSAY OF URINE CREATININE: CPT | Performed by: FAMILY MEDICINE

## 2018-08-29 PROCEDURE — 3079F DIAST BP 80-89 MM HG: CPT | Performed by: FAMILY MEDICINE

## 2018-08-29 PROCEDURE — 83036 HEMOGLOBIN GLYCOSYLATED A1C: CPT | Performed by: FAMILY MEDICINE

## 2018-08-29 PROCEDURE — 36415 COLL VENOUS BLD VENIPUNCTURE: CPT | Performed by: FAMILY MEDICINE

## 2018-08-29 PROCEDURE — 90662 IIV NO PRSV INCREASED AG IM: CPT

## 2018-08-29 PROCEDURE — 99213 OFFICE O/P EST LOW 20 MIN: CPT | Performed by: FAMILY MEDICINE

## 2018-08-29 PROCEDURE — 84443 ASSAY THYROID STIM HORMONE: CPT | Performed by: FAMILY MEDICINE

## 2018-08-29 PROCEDURE — 82043 UR ALBUMIN QUANTITATIVE: CPT | Performed by: FAMILY MEDICINE

## 2018-08-29 PROCEDURE — 3061F NEG MICROALBUMINURIA REV: CPT | Performed by: FAMILY MEDICINE

## 2018-08-29 RX ORDER — TAMSULOSIN HYDROCHLORIDE 0.4 MG/1
0.4 CAPSULE ORAL
COMMUNITY
Start: 2018-07-30 | End: 2018-11-29

## 2018-08-29 RX ORDER — CLOPIDOGREL BISULFATE 75 MG/1
TABLET ORAL
COMMUNITY
Start: 2018-07-30 | End: 2018-09-02 | Stop reason: HOSPADM

## 2018-08-29 RX ORDER — METOPROLOL TARTRATE 100 MG/1
TABLET ORAL
COMMUNITY
Start: 2018-07-30 | End: 2018-08-29

## 2018-08-29 RX ORDER — DILTIAZEM HYDROCHLORIDE 120 MG/1
CAPSULE, COATED, EXTENDED RELEASE ORAL
COMMUNITY
Start: 2018-07-30 | End: 2018-08-29

## 2018-08-29 NOTE — PROGRESS NOTES
Assessment/Plan: We will get blood work on him today  We will refer him to Urology for his urinary incontinence  We will refer him to Podiatry for his diabetes and foot care  We will make further recommendations once we have the blood work results  We will see him back in the next 3-4 weeks or p r n  [facility] Flu shot given today  No problem-specific Assessment & Plan notes found for this encounter  Diagnoses and all orders for this visit:    Type 2 diabetes mellitus without complication, without long-term current use of insulin (HCC)  -     Comprehensive metabolic panel  -     Hemoglobin A1C  -     Lipid Panel with Direct LDL reflex  -     Microalbumin / creatinine urine ratio  -     Ambulatory referral to Podiatry; Future    Urge incontinence of urine  -     Ambulatory referral to Urology; Future    Benign essential hypertension  -     CBC and differential  -     Comprehensive metabolic panel  -     TSH, 3rd generation with Free T4 reflex    Prostate cancer screening  -     PSA, Total Screen    Encounter for immunization  -     influenza vaccine, [id number], high-dose, PF 0 5 mL, for patients 65 yr+ (FLUZONE HIGH-DOSE)    Other orders  -     tamsulosin (FLOMAX) 0 4 mg;   -     Discontinue: metoprolol tartrate (LOPRESSOR) 100 mg tablet;   -     Discontinue: diltiazem (CARDIZEM CD) 120 mg 24 hr capsule;   -     clopidogrel (PLAVIX) 75 mg tablet;           Subjective:      Patient ID: Serge Sanchez is a 74 y o  male  New patient  Very complex medical history  Patient has coronary artery disease and atrial fibrillation  Also history of COPD  Patient also has diabetes  Has not had blood work for his diabetes in quite a while  Patient is concerned that he loses control of his bladder  He would like to see somebody for that  He denies any fever chills  No nausea or vomiting  Diabetes   He presents for his initial diabetic visit  He has type 2 diabetes mellitus   Associated symptoms include foot paresthesias  There are no hypoglycemic complications  Symptoms are stable  Diabetic complications include heart disease and peripheral neuropathy  Risk factors for coronary artery disease include diabetes mellitus, dyslipidemia, hypertension, male sex, sedentary lifestyle and tobacco exposure  Current diabetic treatment includes oral agent (monotherapy)  He is compliant with treatment all of the time  He is following a generally unhealthy diet  When asked about meal planning, he reported none  He has not had a previous visit with a dietitian  He never participates in exercise  An ACE inhibitor/angiotensin II receptor blocker is being taken  He does not see a podiatrist Eye exam is not current  The following portions of the patient's history were reviewed and updated as appropriate:   He  has a past medical history of Aortic stenosis; Arthritis; Asthma; Atrial fibrillation ([facility] ); Back pain; Cervical radiculopathy; CHF (congestive heart failure) ([facility] ); Chronic pain; Chronic pain (10/26/2016); COPD (chronic obstructive pulmonary disease) ([facility] ); Coronary artery disease; CVA (cerebral vascular accident) ([facility] ); Depressive disorder; Diabetes mellitus ([facility] ); Encephalopathy; GERD (gastroesophageal reflux disease); Head injury; Hearing loss; transient ischemic attack (TIA) (10/26/2016); Hyperlipidemia; Hypertension; Kidney stones; Migraines; MRSA (methicillin resistant Staphylococcus aureus) infection; MRSA (methicillin resistant staphylococcus aureus) pneumonia ([facility] ); Osteoarthritis; Partial small bowel obstruction ([facility] ); Peripheral neuropathy; Psychiatric disorder; PVD (peripheral vascular disease) ([facility] ); Renal disorder; Shortness of breath (10/26/2016); TIA (transient ischemic attack) (2014); and Vertigo    He   Patient Active Problem List    Diagnosis Date Noted    Urge incontinence of urine 08/29/2018    Chronic combined systolic and diastolic CHF (congestive heart failure) ([facility] ) 08/17/2018    Anterior epistaxis 07/27/2018    Obstructive sleep apnea (adult) (pediatric)     Noncompliance 07/19/2018    CHF exacerbation ([facility] ) 07/17/2018    Right knee injury, initial encounter 79/52/9175    Acute systolic CHF (congestive heart failure) ([place] [address] ) 07/11/2018    Acute respiratory failure with hypoxia and hypercapnia (HCC) 07/09/2018    Syncope and collapse 07/07/2018    Dilated cardiomyopathy secondary to tachycardia ([place] [address] ) 05/18/2018    Coronary artery disease involving native coronary artery of native heart without angina pectoris 05/18/2018    Type 2 diabetes mellitus without complication, without long-term current use of insulin ([place] [address] ) 05/08/2018    Dysphagia 05/07/2018    Marijuana abuse 05/06/2018    Healthcare-associated pneumonia 05/06/2018    Decreased left ventricular systolic function 32/51/3156    Acute cystitis without hematuria 04/06/2018    Leukocytosis 04/06/2018    Lactic acidosis 04/05/2018    Pulmonary nodule 04/03/2018    Ambulatory dysfunction 06/04/2017    Tobacco abuse 06/04/2017    Dehydration, mild 06/03/2017    Chronic pain 10/30/2016    Sepsis ([place] [address] ) 10/30/2016    Colitis 10/30/2016    NSTEMI (non-ST elevated myocardial infarction) ([place] [address] ) 10/30/2016    Intractable diarrhea 10/26/2016    Weakness 10/26/2016    COPD (chronic obstructive pulmonary disease) ([place] [address] ) 10/26/2016    Abdominal pain 10/26/2016    Benign essential hypertension 10/26/2016    Dyslipidemia 10/26/2016    Depressive disorder 10/26/2016    Persistent atrial fibrillation ([place] [address] ) 10/26/2016    Migraines 10/26/2016     He  has a past surgical history that includes Tonsillectomy; Appendectomy; Cholecystectomy; pr cardioversion elective arrhythmia external (N/A, 4/4/2018); pr echo transesophag r-t 2d w/prb img acquisj i&r (N/A, 4/4/2018); and Cardiac catheterization    His family history includes Alcohol abuse in his father; Cancer in his mother; Coronary artery disease in his mother; Diabetes in his brother and maternal aunt; Heart disease in his brother and maternal aunt; Hypertension in his mother  He  reports that he has been smoking Cigarettes  He has been smoking about 0 00 packs per day for the past 60 00 years  He has never used smokeless tobacco  He reports that he uses drugs, including Marijuana  He reports that he does not drink alcohol  Current Outpatient Prescriptions   Medication Sig Dispense Refill    albuterol (PROVENTIL HFA,VENTOLIN HFA) 90 mcg/act inhaler Inhale 1-2 puffs every 6 (six) hours as needed for wheezing 1 Inhaler 0    apixaban (ELIQUIS) 5 mg Take 1 tablet (5 mg total) by mouth 2 (two) times a day 60 tablet 0    aspirin 81 MG tablet Take 1 tablet by mouth daily      atorvastatin (LIPITOR) 40 mg tablet Take 40 mg by mouth daily      diltiazem (CARDIZEM CD) 180 mg 24 hr capsule Take 1 capsule (180 mg total) by mouth daily 30 capsule 0    esomeprazole (NexIUM) 40 MG capsule Take 1 capsule (40 mg total) by mouth daily 30 capsule 0    furosemide (LASIX) 20 mg tablet Take 1 tablet (20 mg total) by mouth 2 (two) times a day 30 tablet 0    lisinopril (ZESTRIL) 20 mg tablet Take 1 tablet (20 mg total) by mouth daily 90 tablet 3    Melatonin 3 MG CAPS Take 20 mg by mouth daily        metFORMIN (GLUCOPHAGE) 500 mg tablet Take 1 tablet (500 mg total) by mouth 2 (two) times a day with meals 60 tablet 0    metoprolol succinate (TOPROL-XL) 100 mg 24 hr tablet Take 1 tablet (100 mg total) by mouth 2 (two) times a day 60 tablet 0    pregabalin (LYRICA) 300 MG capsule Take 300 mg by mouth 2 (two) times a day [facility] pharmacist stated last script was 7/8/17       ranolazine (RANEXA) 500 mg 12 hr tablet Take 500 mg by mouth 2 (two) times a day [facility] stated script was Bid 7/7/18       Tamsulosin HCl (FLOMAX PO) Take 0 4 mg by mouth daily        tiotropium (SPIRIVA) 18 mcg inhalation capsule Place 1 capsule (18 mcg total) into inhaler and inhale daily 30 capsule 0    cloNIDine (CATAPRES) 0 1 mg tablet Take 1 tablet (0 1 mg total) by mouth 2 (two) times a day for 30 days 20 tablet 0    clopidogrel (PLAVIX) 75 mg tablet       DULoxetine (CYMBALTA) 60 mg delayed release capsule Take 1 capsule (60 mg total) by mouth daily for 30 days 5 capsule 0    tamsulosin (FLOMAX) 0 4 mg        No current facility-administered medications for this visit  Current Outpatient Prescriptions on File Prior to Visit   Medication Sig    albuterol (PROVENTIL HFA,VENTOLIN HFA) 90 mcg/act inhaler Inhale 1-2 puffs every 6 (six) hours as needed for wheezing    apixaban (ELIQUIS) 5 mg Take 1 tablet (5 mg total) by mouth 2 (two) times a day    aspirin 81 MG tablet Take 1 tablet by mouth daily    atorvastatin (LIPITOR) 40 mg tablet Take 40 mg by mouth daily    diltiazem (CARDIZEM CD) 180 mg 24 hr capsule Take 1 capsule (180 mg total) by mouth daily    esomeprazole (NexIUM) 40 MG capsule Take 1 capsule (40 mg total) by mouth daily    furosemide (LASIX) 20 mg tablet Take 1 tablet (20 mg total) by mouth 2 (two) times a day    lisinopril (ZESTRIL) 20 mg tablet Take 1 tablet (20 mg total) by mouth daily    Melatonin 3 MG CAPS Take 20 mg by mouth daily      metFORMIN (GLUCOPHAGE) 500 mg tablet Take 1 tablet (500 mg total) by mouth 2 (two) times a day with meals    metoprolol succinate (TOPROL-XL) 100 mg 24 hr tablet Take 1 tablet (100 mg total) by mouth 2 (two) times a day    pregabalin (LYRICA) 300 MG capsule Take 300 mg by mouth 2 (two) times a day [facility] pharmacist stated last script was 7/8/17     ranolazine (RANEXA) 500 mg 12 hr tablet Take 500 mg by mouth 2 (two) times a day [facility] stated script was Bid 7/7/18     Tamsulosin HCl (FLOMAX PO) Take 0 4 mg by mouth daily      tiotropium (SPIRIVA) 18 mcg inhalation capsule Place 1 capsule (18 mcg total) into inhaler and inhale daily    cloNIDine (CATAPRES) 0 1 mg tablet Take 1 tablet (0 1 mg total) by mouth 2 (two) times a day for 30 days    DULoxetine (CYMBALTA) 60 mg delayed release capsule Take 1 capsule (60 mg total) by mouth daily for 30 days     No current facility-administered medications on file prior to visit  He is allergic to other; demerol [meperidine]; iodinated diagnostic agents; iodine; ketorolac; meperidine and related; and sulfa antibiotics       Review of Systems   Constitutional: Negative  Respiratory: Negative  Cardiovascular: Negative  Gastrointestinal: Negative  Endocrine:        As per HPI   Genitourinary:        As per HPI         Objective:      /82   Ht 5' 9" (1 753 m)   Wt 85 5 kg (188 lb 6 4 oz)   BMI 27 82 kg/m²          Physical Exam   Constitutional: He is oriented to person, place, and time  He appears well-developed and well-nourished  No distress  Cardiovascular: Normal rate, regular rhythm and normal heart sounds  Exam reveals no gallop and no friction rub  Pulses are weak pulses  No murmur heard  Pulses:       Dorsalis pedis pulses are 1+ on the right side, and 1+ on the left side  Posterior tibial pulses are 1+ on the right side, and 1+ on the left side  Pulmonary/Chest: Effort normal and breath sounds normal  No respiratory distress  He has no wheezes  He has no rales  Musculoskeletal: He exhibits no edema  Feet:   Right Foot:   Skin Integrity: Negative for ulcer, skin breakdown, erythema, warmth, callus or dry skin  Left Foot:   Skin Integrity: Negative for ulcer, skin breakdown, erythema, warmth, callus or dry skin  Neurological: He is alert and oriented to person, place, and time  Skin: He is not diaphoretic  Psychiatric: He has a normal mood and affect  His behavior is normal  Judgment and thought content normal    Vitals reviewed  Patient's shoes and socks removed  Right Foot/Ankle   Right Foot Inspection  Skin Exam: skin normal and skin intact no dry skin, no warmth, no callus, no erythema, no maceration, no abnormal color, no pre-ulcer, no ulcer and no callus Sensory       Monofilament testing: absent  Vascular    The right DP pulse is 1+  The right PT pulse is 1+  Left Foot/Ankle  Left Foot Inspection  Skin Exam: skin normal and skin intactno dry skin, no warmth, no erythema, no maceration, normal color, no pre-ulcer, no ulcer and no callus                                         Sensory       Monofilament: absent  Vascular    The left DP pulse is 1+  The left PT pulse is 1+  Assign Risk Category:  No deformity present;  Loss of protective sensation; Weak pulses       Risk: 2

## 2018-08-30 ENCOUNTER — APPOINTMENT (EMERGENCY)
Dept: RADIOLOGY | Facility: HOSPITAL | Age: 73
DRG: 812 | End: 2018-08-30
Payer: COMMERCIAL

## 2018-08-30 ENCOUNTER — APPOINTMENT (EMERGENCY)
Dept: CT IMAGING | Facility: HOSPITAL | Age: 73
DRG: 812 | End: 2018-08-30
Payer: COMMERCIAL

## 2018-08-30 ENCOUNTER — TELEPHONE (OUTPATIENT)
Dept: FAMILY MEDICINE CLINIC | Facility: CLINIC | Age: 73
End: 2018-08-30

## 2018-08-30 ENCOUNTER — HOSPITAL ENCOUNTER (INPATIENT)
Facility: HOSPITAL | Age: 73
LOS: 3 days | Discharge: HOME HEALTH | DRG: 812 | End: 2018-09-02
Attending: EMERGENCY MEDICINE | Admitting: INTERNAL MEDICINE
Payer: COMMERCIAL

## 2018-08-30 DIAGNOSIS — K92.2 GI BLEEDING: ICD-10-CM

## 2018-08-30 DIAGNOSIS — K92.2 GASTROINTESTINAL HEMORRHAGE, UNSPECIFIED GASTROINTESTINAL HEMORRHAGE TYPE: ICD-10-CM

## 2018-08-30 DIAGNOSIS — D64.9 ANEMIA: Primary | ICD-10-CM

## 2018-08-30 DIAGNOSIS — R07.9 CHEST PAIN: ICD-10-CM

## 2018-08-30 DIAGNOSIS — R53.1 WEAKNESS: ICD-10-CM

## 2018-08-30 LAB
ABO GROUP BLD: NORMAL
ALBUMIN SERPL BCP-MCNC: 3.2 G/DL (ref 3.5–5)
ALP SERPL-CCNC: 91 U/L (ref 46–116)
ALT SERPL W P-5'-P-CCNC: 17 U/L (ref 12–78)
ANION GAP SERPL CALCULATED.3IONS-SCNC: 10 MMOL/L (ref 4–13)
ANION GAP SERPL CALCULATED.3IONS-SCNC: 7 MMOL/L (ref 4–13)
APTT PPP: 41 SECONDS (ref 24–36)
AST SERPL W P-5'-P-CCNC: 13 U/L (ref 5–45)
BACTERIA UR QL AUTO: ABNORMAL /HPF
BASOPHILS # BLD AUTO: 0.06 THOUSANDS/ΜL (ref 0–0.1)
BASOPHILS NFR BLD AUTO: 1 % (ref 0–1)
BILIRUB SERPL-MCNC: 0.6 MG/DL (ref 0.2–1)
BILIRUB UR QL STRIP: NEGATIVE
BLD GP AB SCN SERPL QL: NEGATIVE
BUN SERPL-MCNC: 15 MG/DL (ref 5–25)
BUN SERPL-MCNC: 15 MG/DL (ref 5–25)
CALCIUM SERPL-MCNC: 8.7 MG/DL (ref 8.3–10.1)
CALCIUM SERPL-MCNC: 8.9 MG/DL (ref 8.3–10.1)
CHLORIDE SERPL-SCNC: 106 MMOL/L (ref 100–108)
CHLORIDE SERPL-SCNC: 110 MMOL/L (ref 100–108)
CLARITY UR: CLEAR
CO2 SERPL-SCNC: 28 MMOL/L (ref 21–32)
CO2 SERPL-SCNC: 29 MMOL/L (ref 21–32)
COLOR UR: YELLOW
CREAT SERPL-MCNC: 0.98 MG/DL (ref 0.6–1.3)
CREAT SERPL-MCNC: 1.07 MG/DL (ref 0.6–1.3)
EOSINOPHIL # BLD AUTO: 0.33 THOUSAND/ΜL (ref 0–0.61)
EOSINOPHIL NFR BLD AUTO: 4 % (ref 0–6)
ERYTHROCYTE [DISTWIDTH] IN BLOOD BY AUTOMATED COUNT: 16.4 % (ref 11.6–15.1)
GFR SERPL CREATININE-BSD FRML MDRD: 68 ML/MIN/1.73SQ M
GFR SERPL CREATININE-BSD FRML MDRD: 75 ML/MIN/1.73SQ M
GLUCOSE SERPL-MCNC: 112 MG/DL (ref 65–140)
GLUCOSE SERPL-MCNC: 92 MG/DL (ref 65–140)
GLUCOSE UR STRIP-MCNC: NEGATIVE MG/DL
HCT VFR BLD AUTO: 26.1 % (ref 36.5–49.3)
HCT VFR BLD AUTO: 26.9 % (ref 36.5–49.3)
HGB BLD-MCNC: 7.6 G/DL (ref 12–17)
HGB BLD-MCNC: 7.8 G/DL (ref 12–17)
HGB UR QL STRIP.AUTO: NEGATIVE
IMM GRANULOCYTES # BLD AUTO: 0.02 THOUSAND/UL (ref 0–0.2)
IMM GRANULOCYTES NFR BLD AUTO: 0 % (ref 0–2)
INR PPP: 1.51 (ref 0.86–1.17)
KETONES UR STRIP-MCNC: ABNORMAL MG/DL
LACTATE SERPL-SCNC: 1.4 MMOL/L (ref 0.5–2)
LEUKOCYTE ESTERASE UR QL STRIP: ABNORMAL
LYMPHOCYTES # BLD AUTO: 1.33 THOUSANDS/ΜL (ref 0.6–4.47)
LYMPHOCYTES NFR BLD AUTO: 18 % (ref 14–44)
MAGNESIUM SERPL-MCNC: 1.8 MG/DL (ref 1.6–2.6)
MCH RBC QN AUTO: 19.8 PG (ref 26.8–34.3)
MCHC RBC AUTO-ENTMCNC: 29.1 G/DL (ref 31.4–37.4)
MCV RBC AUTO: 68 FL (ref 82–98)
MONOCYTES # BLD AUTO: 0.98 THOUSAND/ΜL (ref 0.17–1.22)
MONOCYTES NFR BLD AUTO: 13 % (ref 4–12)
NEUTROPHILS # BLD AUTO: 4.7 THOUSANDS/ΜL (ref 1.85–7.62)
NEUTS SEG NFR BLD AUTO: 64 % (ref 43–75)
NITRITE UR QL STRIP: NEGATIVE
NON-SQ EPI CELLS URNS QL MICRO: ABNORMAL /HPF
NRBC BLD AUTO-RTO: 0 /100 WBCS
PH UR STRIP.AUTO: 6 [PH] (ref 4.5–8)
PLATELET # BLD AUTO: 212 THOUSANDS/UL (ref 149–390)
PMV BLD AUTO: 9.7 FL (ref 8.9–12.7)
POTASSIUM SERPL-SCNC: 3.2 MMOL/L (ref 3.5–5.3)
POTASSIUM SERPL-SCNC: 3.3 MMOL/L (ref 3.5–5.3)
PROT SERPL-MCNC: 5.8 G/DL (ref 6.4–8.2)
PROT UR STRIP-MCNC: ABNORMAL MG/DL
PROTHROMBIN TIME: 17.5 SECONDS (ref 11.8–14.2)
RBC # BLD AUTO: 3.83 MILLION/UL (ref 3.88–5.62)
RBC #/AREA URNS AUTO: ABNORMAL /HPF
RH BLD: POSITIVE
SODIUM SERPL-SCNC: 144 MMOL/L (ref 136–145)
SODIUM SERPL-SCNC: 146 MMOL/L (ref 136–145)
SP GR UR STRIP.AUTO: >=1.03 (ref 1–1.03)
SPECIMEN EXPIRATION DATE: NORMAL
TROPONIN I SERPL-MCNC: <0.02 NG/ML
TSH SERPL DL<=0.05 MIU/L-ACNC: 0.71 UIU/ML (ref 0.36–3.74)
UROBILINOGEN UR QL STRIP.AUTO: 0.2 E.U./DL
WBC # BLD AUTO: 7.42 THOUSAND/UL (ref 4.31–10.16)
WBC #/AREA URNS AUTO: ABNORMAL /HPF

## 2018-08-30 PROCEDURE — 84443 ASSAY THYROID STIM HORMONE: CPT | Performed by: PHYSICIAN ASSISTANT

## 2018-08-30 PROCEDURE — 85610 PROTHROMBIN TIME: CPT | Performed by: PHYSICIAN ASSISTANT

## 2018-08-30 PROCEDURE — 86900 BLOOD TYPING SEROLOGIC ABO: CPT | Performed by: PHYSICIAN ASSISTANT

## 2018-08-30 PROCEDURE — 85730 THROMBOPLASTIN TIME PARTIAL: CPT | Performed by: PHYSICIAN ASSISTANT

## 2018-08-30 PROCEDURE — 85014 HEMATOCRIT: CPT | Performed by: PHYSICIAN ASSISTANT

## 2018-08-30 PROCEDURE — 94760 N-INVAS EAR/PLS OXIMETRY 1: CPT

## 2018-08-30 PROCEDURE — 86850 RBC ANTIBODY SCREEN: CPT | Performed by: PHYSICIAN ASSISTANT

## 2018-08-30 PROCEDURE — 74176 CT ABD & PELVIS W/O CONTRAST: CPT

## 2018-08-30 PROCEDURE — 80053 COMPREHEN METABOLIC PANEL: CPT | Performed by: PHYSICIAN ASSISTANT

## 2018-08-30 PROCEDURE — 80048 BASIC METABOLIC PNL TOTAL CA: CPT | Performed by: PHYSICIAN ASSISTANT

## 2018-08-30 PROCEDURE — 85025 COMPLETE CBC W/AUTO DIFF WBC: CPT | Performed by: PHYSICIAN ASSISTANT

## 2018-08-30 PROCEDURE — 84484 ASSAY OF TROPONIN QUANT: CPT | Performed by: PHYSICIAN ASSISTANT

## 2018-08-30 PROCEDURE — 96360 HYDRATION IV INFUSION INIT: CPT

## 2018-08-30 PROCEDURE — 83605 ASSAY OF LACTIC ACID: CPT | Performed by: PHYSICIAN ASSISTANT

## 2018-08-30 PROCEDURE — 81001 URINALYSIS AUTO W/SCOPE: CPT | Performed by: PHYSICIAN ASSISTANT

## 2018-08-30 PROCEDURE — 93005 ELECTROCARDIOGRAM TRACING: CPT

## 2018-08-30 PROCEDURE — 86901 BLOOD TYPING SEROLOGIC RH(D): CPT | Performed by: PHYSICIAN ASSISTANT

## 2018-08-30 PROCEDURE — 85018 HEMOGLOBIN: CPT | Performed by: PHYSICIAN ASSISTANT

## 2018-08-30 PROCEDURE — 71045 X-RAY EXAM CHEST 1 VIEW: CPT

## 2018-08-30 PROCEDURE — 99222 1ST HOSP IP/OBS MODERATE 55: CPT | Performed by: PHYSICIAN ASSISTANT

## 2018-08-30 PROCEDURE — 82948 REAGENT STRIP/BLOOD GLUCOSE: CPT

## 2018-08-30 PROCEDURE — 83735 ASSAY OF MAGNESIUM: CPT | Performed by: PHYSICIAN ASSISTANT

## 2018-08-30 PROCEDURE — 36415 COLL VENOUS BLD VENIPUNCTURE: CPT | Performed by: PHYSICIAN ASSISTANT

## 2018-08-30 PROCEDURE — 99285 EMERGENCY DEPT VISIT HI MDM: CPT

## 2018-08-30 RX ORDER — NICOTINE 21 MG/24HR
1 PATCH, TRANSDERMAL 24 HOURS TRANSDERMAL DAILY
Status: DISCONTINUED | OUTPATIENT
Start: 2018-08-31 | End: 2018-09-02 | Stop reason: HOSPADM

## 2018-08-30 RX ORDER — ALBUTEROL SULFATE 90 UG/1
2 AEROSOL, METERED RESPIRATORY (INHALATION) EVERY 6 HOURS PRN
Status: DISCONTINUED | OUTPATIENT
Start: 2018-08-30 | End: 2018-09-02 | Stop reason: HOSPADM

## 2018-08-30 RX ORDER — LISINOPRIL 20 MG/1
20 TABLET ORAL DAILY
Status: DISCONTINUED | OUTPATIENT
Start: 2018-08-31 | End: 2018-09-02 | Stop reason: HOSPADM

## 2018-08-30 RX ORDER — DILTIAZEM HYDROCHLORIDE 180 MG/1
180 CAPSULE, COATED, EXTENDED RELEASE ORAL DAILY
Status: DISCONTINUED | OUTPATIENT
Start: 2018-08-31 | End: 2018-09-02 | Stop reason: HOSPADM

## 2018-08-30 RX ORDER — ACETAMINOPHEN 325 MG/1
650 TABLET ORAL EVERY 6 HOURS PRN
Status: DISCONTINUED | OUTPATIENT
Start: 2018-08-30 | End: 2018-09-02 | Stop reason: HOSPADM

## 2018-08-30 RX ORDER — PREGABALIN 75 MG/1
300 CAPSULE ORAL 2 TIMES DAILY
Status: DISCONTINUED | OUTPATIENT
Start: 2018-08-30 | End: 2018-09-02 | Stop reason: HOSPADM

## 2018-08-30 RX ORDER — CLONIDINE HYDROCHLORIDE 0.1 MG/1
0.1 TABLET ORAL DAILY
COMMUNITY
Start: 2018-08-24 | End: 2018-12-26 | Stop reason: SDUPTHER

## 2018-08-30 RX ORDER — ALBUTEROL SULFATE 2.5 MG/3ML
2.5 SOLUTION RESPIRATORY (INHALATION) EVERY 4 HOURS PRN
Status: DISCONTINUED | OUTPATIENT
Start: 2018-08-30 | End: 2018-09-02 | Stop reason: HOSPADM

## 2018-08-30 RX ORDER — PANTOPRAZOLE SODIUM 40 MG/1
40 TABLET, DELAYED RELEASE ORAL
Status: DISCONTINUED | OUTPATIENT
Start: 2018-08-31 | End: 2018-09-02 | Stop reason: HOSPADM

## 2018-08-30 RX ORDER — METOPROLOL SUCCINATE 100 MG/1
100 TABLET, EXTENDED RELEASE ORAL 2 TIMES DAILY
Status: DISCONTINUED | OUTPATIENT
Start: 2018-08-30 | End: 2018-09-02 | Stop reason: HOSPADM

## 2018-08-30 RX ORDER — LANOLIN ALCOHOL/MO/W.PET/CERES
3 CREAM (GRAM) TOPICAL
Status: DISCONTINUED | OUTPATIENT
Start: 2018-08-30 | End: 2018-09-02 | Stop reason: HOSPADM

## 2018-08-30 RX ORDER — FUROSEMIDE 20 MG/1
20 TABLET ORAL 2 TIMES DAILY
Status: DISCONTINUED | OUTPATIENT
Start: 2018-08-30 | End: 2018-09-02 | Stop reason: HOSPADM

## 2018-08-30 RX ORDER — ONDANSETRON 2 MG/ML
4 INJECTION INTRAMUSCULAR; INTRAVENOUS EVERY 6 HOURS PRN
Status: DISCONTINUED | OUTPATIENT
Start: 2018-08-30 | End: 2018-09-02 | Stop reason: HOSPADM

## 2018-08-30 RX ORDER — POTASSIUM CHLORIDE 20 MEQ/1
40 TABLET, EXTENDED RELEASE ORAL ONCE
Status: COMPLETED | OUTPATIENT
Start: 2018-08-30 | End: 2018-08-30

## 2018-08-30 RX ORDER — RANOLAZINE 500 MG/1
500 TABLET, EXTENDED RELEASE ORAL 2 TIMES DAILY
Status: DISCONTINUED | OUTPATIENT
Start: 2018-08-30 | End: 2018-09-02 | Stop reason: HOSPADM

## 2018-08-30 RX ORDER — POTASSIUM CHLORIDE 14.9 MG/ML
20 INJECTION INTRAVENOUS ONCE
Status: DISCONTINUED | OUTPATIENT
Start: 2018-08-30 | End: 2018-08-31

## 2018-08-30 RX ORDER — ATORVASTATIN CALCIUM 40 MG/1
40 TABLET, FILM COATED ORAL DAILY
Status: DISCONTINUED | OUTPATIENT
Start: 2018-08-31 | End: 2018-09-02 | Stop reason: HOSPADM

## 2018-08-30 RX ORDER — TAMSULOSIN HYDROCHLORIDE 0.4 MG/1
0.4 CAPSULE ORAL DAILY
Status: DISCONTINUED | OUTPATIENT
Start: 2018-08-31 | End: 2018-09-02 | Stop reason: HOSPADM

## 2018-08-30 RX ADMIN — SODIUM CHLORIDE 1000 ML: 0.9 INJECTION, SOLUTION INTRAVENOUS at 18:28

## 2018-08-30 RX ADMIN — RANOLAZINE 500 MG: 500 TABLET, FILM COATED, EXTENDED RELEASE ORAL at 23:00

## 2018-08-30 RX ADMIN — MELATONIN TAB 3 MG 3 MG: 3 TAB at 23:00

## 2018-08-30 RX ADMIN — METOPROLOL SUCCINATE 100 MG: 100 TABLET, EXTENDED RELEASE ORAL at 23:00

## 2018-08-30 RX ADMIN — PREGABALIN 300 MG: 75 CAPSULE ORAL at 23:01

## 2018-08-30 RX ADMIN — FUROSEMIDE 20 MG: 20 TABLET ORAL at 23:00

## 2018-08-30 RX ADMIN — POTASSIUM CHLORIDE 40 MEQ: 1500 TABLET, EXTENDED RELEASE ORAL at 20:01
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
## 2018-08-30 NOTE — ED PROCEDURE NOTE
Procedure  ECG 12 Lead Documentation  Date/Time: 8/30/2018 6:11 PM  Performed by: Chad Miguel  Authorized by: Chad Miguel     Indications / Diagnosis:  Chest pain  ECG reviewed by me, the ED Provider: yes    Patient location:  ED  Previous ECG:     Comparison to cardiac monitor: Yes    Interpretation:     Interpretation: normal    Rate:     ECG rate:  83    ECG rate assessment: normal    Rhythm:     Rhythm: atrial fibrillation    Ectopy:     Ectopy: none    QRS:     QRS axis:  Normal    QRS intervals:  Normal  Conduction:     Conduction: normal    ST segments:     ST segments:  Normal  T waves:     T waves: normal                       Jay Newman PA-C  08/30/18 1814

## 2018-08-30 NOTE — TELEPHONE ENCOUNTER
I called patient myself  I told him I was very concerned about his low hemoglobin  Patient states he has been very short of breath with minimal exertion  I stressed that he needs to go to the ER, call 911 if necessary  Patient states he will go to the ER    I will call the emergency room to tell them that he is coming in

## 2018-08-31 LAB
ALBUMIN SERPL BCP-MCNC: 2.8 G/DL (ref 3.5–5)
ALP SERPL-CCNC: 79 U/L (ref 46–116)
ALT SERPL W P-5'-P-CCNC: 11 U/L (ref 12–78)
ANION GAP SERPL CALCULATED.3IONS-SCNC: 8 MMOL/L (ref 4–13)
AST SERPL W P-5'-P-CCNC: 13 U/L (ref 5–45)
ATRIAL RATE: 80 BPM
BILIRUB SERPL-MCNC: 0.4 MG/DL (ref 0.2–1)
BUN SERPL-MCNC: 14 MG/DL (ref 5–25)
CALCIUM SERPL-MCNC: 8.3 MG/DL (ref 8.3–10.1)
CHLORIDE SERPL-SCNC: 108 MMOL/L (ref 100–108)
CO2 SERPL-SCNC: 29 MMOL/L (ref 21–32)
CREAT SERPL-MCNC: 0.96 MG/DL (ref 0.6–1.3)
ERYTHROCYTE [DISTWIDTH] IN BLOOD BY AUTOMATED COUNT: 16.3 % (ref 11.6–15.1)
GFR SERPL CREATININE-BSD FRML MDRD: 77 ML/MIN/1.73SQ M
GLUCOSE SERPL-MCNC: 137 MG/DL (ref 65–140)
GLUCOSE SERPL-MCNC: 185 MG/DL (ref 65–140)
GLUCOSE SERPL-MCNC: 87 MG/DL (ref 65–140)
GLUCOSE SERPL-MCNC: 93 MG/DL (ref 65–140)
GLUCOSE SERPL-MCNC: 93 MG/DL (ref 65–140)
HCT VFR BLD AUTO: 25.3 % (ref 36.5–49.3)
HGB BLD-MCNC: 7.4 G/DL (ref 12–17)
MAGNESIUM SERPL-MCNC: 1.7 MG/DL (ref 1.6–2.6)
MCH RBC QN AUTO: 19.9 PG (ref 26.8–34.3)
MCHC RBC AUTO-ENTMCNC: 29.2 G/DL (ref 31.4–37.4)
MCV RBC AUTO: 68 FL (ref 82–98)
PHOSPHATE SERPL-MCNC: 2 MG/DL (ref 2.3–4.1)
PLATELET # BLD AUTO: 216 THOUSANDS/UL (ref 149–390)
PMV BLD AUTO: 10.5 FL (ref 8.9–12.7)
POTASSIUM SERPL-SCNC: 2.9 MMOL/L (ref 3.5–5.3)
PROT SERPL-MCNC: 5.1 G/DL (ref 6.4–8.2)
QRS AXIS: 56 DEGREES
QRSD INTERVAL: 84 MS
QT INTERVAL: 390 MS
QTC INTERVAL: 458 MS
RBC # BLD AUTO: 3.71 MILLION/UL (ref 3.88–5.62)
SODIUM SERPL-SCNC: 145 MMOL/L (ref 136–145)
T WAVE AXIS: 26 DEGREES
VENTRICULAR RATE: 83 BPM
WBC # BLD AUTO: 6.77 THOUSAND/UL (ref 4.31–10.16)

## 2018-08-31 PROCEDURE — 99232 SBSQ HOSP IP/OBS MODERATE 35: CPT | Performed by: FAMILY MEDICINE

## 2018-08-31 PROCEDURE — 80053 COMPREHEN METABOLIC PANEL: CPT | Performed by: PHYSICIAN ASSISTANT

## 2018-08-31 PROCEDURE — G8978 MOBILITY CURRENT STATUS: HCPCS | Performed by: PHYSICAL THERAPIST

## 2018-08-31 PROCEDURE — 85027 COMPLETE CBC AUTOMATED: CPT | Performed by: PHYSICIAN ASSISTANT

## 2018-08-31 PROCEDURE — 97163 PT EVAL HIGH COMPLEX 45 MIN: CPT | Performed by: PHYSICAL THERAPIST

## 2018-08-31 PROCEDURE — 99222 1ST HOSP IP/OBS MODERATE 55: CPT | Performed by: INTERNAL MEDICINE

## 2018-08-31 PROCEDURE — G8979 MOBILITY GOAL STATUS: HCPCS | Performed by: PHYSICAL THERAPIST

## 2018-08-31 PROCEDURE — 87081 CULTURE SCREEN ONLY: CPT | Performed by: FAMILY MEDICINE

## 2018-08-31 PROCEDURE — 82948 REAGENT STRIP/BLOOD GLUCOSE: CPT

## 2018-08-31 PROCEDURE — 93010 ELECTROCARDIOGRAM REPORT: CPT | Performed by: INTERNAL MEDICINE

## 2018-08-31 PROCEDURE — 84100 ASSAY OF PHOSPHORUS: CPT | Performed by: PHYSICIAN ASSISTANT

## 2018-08-31 PROCEDURE — 83735 ASSAY OF MAGNESIUM: CPT | Performed by: PHYSICIAN ASSISTANT

## 2018-08-31 RX ORDER — POTASSIUM CHLORIDE 20 MEQ/1
20 TABLET, EXTENDED RELEASE ORAL ONCE
Status: COMPLETED | OUTPATIENT
Start: 2018-08-31 | End: 2018-08-31

## 2018-08-31 RX ORDER — POTASSIUM CHLORIDE 20 MEQ/1
40 TABLET, EXTENDED RELEASE ORAL
Status: COMPLETED | OUTPATIENT
Start: 2018-08-31 | End: 2018-09-01

## 2018-08-31 RX ORDER — POTASSIUM CHLORIDE 14.9 MG/ML
20 INJECTION INTRAVENOUS ONCE
Status: COMPLETED | OUTPATIENT
Start: 2018-08-31 | End: 2018-08-31

## 2018-08-31 RX ADMIN — POTASSIUM CHLORIDE 40 MEQ: 1500 TABLET, EXTENDED RELEASE ORAL at 12:36

## 2018-08-31 RX ADMIN — ATORVASTATIN CALCIUM 40 MG: 40 TABLET, FILM COATED ORAL at 08:20

## 2018-08-31 RX ADMIN — METOPROLOL SUCCINATE 100 MG: 100 TABLET, EXTENDED RELEASE ORAL at 08:20

## 2018-08-31 RX ADMIN — RANOLAZINE 500 MG: 500 TABLET, FILM COATED, EXTENDED RELEASE ORAL at 08:19

## 2018-08-31 RX ADMIN — MELATONIN TAB 3 MG 3 MG: 3 TAB at 21:45

## 2018-08-31 RX ADMIN — FUROSEMIDE 20 MG: 20 TABLET ORAL at 08:19

## 2018-08-31 RX ADMIN — NICOTINE 1 PATCH: 14 PATCH, EXTENDED RELEASE TRANSDERMAL at 08:17

## 2018-08-31 RX ADMIN — POTASSIUM CHLORIDE 20 MEQ: 1500 TABLET, EXTENDED RELEASE ORAL at 08:21

## 2018-08-31 RX ADMIN — LISINOPRIL 20 MG: 20 TABLET ORAL at 08:20

## 2018-08-31 RX ADMIN — ACETAMINOPHEN 650 MG: 325 TABLET, FILM COATED ORAL at 17:05

## 2018-08-31 RX ADMIN — TAMSULOSIN HYDROCHLORIDE 0.4 MG: 0.4 CAPSULE ORAL at 08:21

## 2018-08-31 RX ADMIN — POTASSIUM PHOSPHATE, MONOBASIC AND POTASSIUM PHOSPHATE, DIBASIC 6 MMOL: 224; 236 INJECTION, SOLUTION INTRAVENOUS at 10:34

## 2018-08-31 RX ADMIN — PANTOPRAZOLE SODIUM 40 MG: 40 TABLET, DELAYED RELEASE ORAL at 05:35

## 2018-08-31 RX ADMIN — METOPROLOL SUCCINATE 100 MG: 100 TABLET, EXTENDED RELEASE ORAL at 17:05

## 2018-08-31 RX ADMIN — POTASSIUM CHLORIDE 40 MEQ: 1500 TABLET, EXTENDED RELEASE ORAL at 17:05

## 2018-08-31 RX ADMIN — POTASSIUM CHLORIDE 20 MEQ: 200 INJECTION, SOLUTION INTRAVENOUS at 08:20

## 2018-08-31 RX ADMIN — FUROSEMIDE 20 MG: 20 TABLET ORAL at 17:05

## 2018-08-31 RX ADMIN — ACETAMINOPHEN 650 MG: 325 TABLET, FILM COATED ORAL at 08:50

## 2018-08-31 RX ADMIN — DILTIAZEM HYDROCHLORIDE 180 MG: 180 CAPSULE, COATED, EXTENDED RELEASE ORAL at 08:19

## 2018-08-31 RX ADMIN — PREGABALIN 300 MG: 75 CAPSULE ORAL at 17:05

## 2018-08-31 RX ADMIN — RANOLAZINE 500 MG: 500 TABLET, FILM COATED, EXTENDED RELEASE ORAL at 17:04

## 2018-08-31 RX ADMIN — TIOTROPIUM BROMIDE 18 MCG: 18 CAPSULE ORAL; RESPIRATORY (INHALATION) at 08:28

## 2018-08-31 RX ADMIN — POTASSIUM CHLORIDE 20 MEQ: 1500 TABLET, EXTENDED RELEASE ORAL at 00:44

## 2018-08-31 RX ADMIN — PREGABALIN 300 MG: 75 CAPSULE ORAL at 08:19

## 2018-08-31 NOTE — ASSESSMENT & PLAN NOTE
Lab Results   Component Value Date    HGBA1C 5 4 08/29/2018     -Glucose at 112  -Hold metformin  -Sliding scale glucose, Fingerstick glucose 4 times daily AC/HS  -AM CMP

## 2018-08-31 NOTE — ASSESSMENT & PLAN NOTE
Currently appears euvolemic  Most recent echo reveals an ejection fraction of 45-50%  Continue ACE-inhibitor and Lasix  Low-salt diet, daily weights

## 2018-08-31 NOTE — PLAN OF CARE
Problem: PHYSICAL THERAPY ADULT  Goal: Performs mobility at highest level of function for planned discharge setting  See evaluation for individualized goals  Outcome: Progressing  Prognosis: Good  Problem List: Decreased strength, Decreased range of motion, Decreased endurance, Impaired balance, Decreased mobility, Pain, Impaired sensation  Assessment: Pt is a 74year old male presenting to [facility] with GI bleed weakness and anemia with hgb 7 4 with unstable hgb between 7 4 and 7 9 with continued monitoring  Pt presents with decreased ROM sensation strength balance endurance and functional mobility requiring min(A) for all bed mobility transfers and ambulation  Pt limited by dizziness weakness fatigue and increased fall risk  Pt with complex medical history contributing to current condition and is in need of continued activity in PT to improve ROM strength balance endurance mobility transfers ambulation and stair negotiation  Anticipate pt will return home with home health care services on discharge  Recommendation: Home PT     PT - OK to Discharge:  (when medically stable for discharge)    See flowsheet documentation for full assessment

## 2018-08-31 NOTE — ASSESSMENT & PLAN NOTE
-No evidence of respiratory distress or acute exacerbation  -Respiratory protocol  -Continue oxygen at 4 LPM  -Continue home medication  -Close monitoring

## 2018-08-31 NOTE — PLAN OF CARE
Problem: DISCHARGE PLANNING - CARE MANAGEMENT  Goal: Discharge to post-acute care or home with appropriate resources  INTERVENTIONS:  - Conduct assessment to determine patient/family and health care team treatment goals, and need for post-acute services based on payer coverage, community resources, and patient preferences, and barriers to discharge  - Address psychosocial, clinical, and financial barriers to discharge as identified in assessment in conjunction with the patient/family and health care team  - Arrange appropriate level of post-acute services according to patient's   needs and preference and payer coverage in collaboration with the physician and health care team  - Communicate with and update the patient/family, physician, and health care team regarding progress on the discharge plan  - Arrange appropriate transportation to post-acute venues  Outcome: Progressing  -outpatient follow up dc
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
## 2018-08-31 NOTE — ASSESSMENT & PLAN NOTE
-No evidence of acute exacerbation  -last ECHO on 7/9/2018  reviewed EF at 45%  to 50 %  -Continue home medciation  -Continue outpatient cardiology follow up

## 2018-08-31 NOTE — PHYSICAL THERAPY NOTE
PT Evaluation     08/31/18 1142   Note Type   Note type Eval only   Pain Assessment   Pain Score Worst Possible Pain   Pain Location Back   Pain Orientation Lower   Home Living   Type of [address] One level; Able to live on main level with bedroom/bathroom   Home Equipment Walker;Cane;Wheelchair-manual   Prior Function   Level of [place] Independent with ADLs and functional mobility  ((I) ambulation with SPC)   Lives With Friend(s)  (caregiver)   Receives Help From Friend(s)  (caregiver)   ADL Assistance Needs assistance  (pt states he sponge bathes)   IADLs Needs assistance  (caregivers perform most IADL's, pt does his laundry)   Comments caregivers/friends drive   Restrictions/Precautions   Other Precautions Contact/isolation;Multiple lines;Telemetry;Pain; Fall Risk   General   Family/Caregiver Present No   Cognition   Arousal/Participation Alert   Orientation Level Oriented X4   Following Commands Follows all commands and directions without difficulty   RLE Assessment   RLE Assessment X  (limited hip knee and ankle ROM 2+ to 3- )   LLE Assessment   LLE Assessment WFL  (4 to 4+/5)   Coordination   Sensation X   Light Touch   RLE Light Touch Impaired   RLE Light Touch Comments decreased sensation to light touch R LE   LLE Light Touch Grossly intact   Bed Mobility   Supine to Sit 4  Minimal assistance   Additional items Assist x 1;Bedrails  (hand held assist)   Sit to Supine (seated EOB eating lunch with call bell in reach)   Additional Comments Pt on room air when PT entered room  Pt requiring min(A) for bed mobility due to back pain  Pt with good sitting balance at EOB and offering no complaint of SOB lightheadedness and dizziness  Transfers   Sit to Stand (CGA with bedrail and SPC)   Additional items Verbal cues; Bedrails  (with SPC)   Stand to Sit (CGA )   Additional items Bedrails;Verbal cues  (with SPC)   Stand pivot (CGA with SPC )   Additional Comments Pt complaining of dizziness in standing limiting ambulation  Pt able to use SPC for ambulation with min(A) and Spo2 on room air 95-96% HR 73  Ambulation/Elevation   Gait pattern Short stride;Narrow BRAULIO; Decreased foot clearance   Gait Assistance 4  Minimal assist   Additional items Assist x 1   Assistive Device Straight cane   Distance 20ft with SPC min(A)x1 limited by dizziness and back pain with increased fall risk  Balance   Static Sitting Good   Dynamic Sitting Good   Static Standing Fair  (with SPC)   Dynamic Standing Fair  (with SPC)   Ambulatory Fair  (with SPC)   Endurance Deficit   Endurance Deficit Yes   Endurance Deficit Description limited by dizziness back pain and weakness with fear of falling   Activity Tolerance   Activity Tolerance Patient limited by fatigue;Patient limited by pain   Assessment   Prognosis Good   Problem List Decreased strength;Decreased range of motion;Decreased endurance; Impaired balance;Decreased mobility;Pain; Impaired sensation   Assessment Pt is a 74year old male presenting to [facility] with GI bleed weakness and anemia with hgb 7 4 with unstable hgb between 7 4 and 7 9 with continued monitoring  Pt presents with decreased ROM sensation strength balance endurance and functional mobility requiring min(A) for all bed mobility transfers and ambulation  Pt limited by dizziness weakness fatigue and increased fall risk  Pt with complex medical history contributing to current condition and is in need of continued activity in PT to improve ROM strength balance endurance mobility transfers ambulation and stair negotiation  Anticipate pt will return home with home health care services on discharge      Goals   Patient Goals To feel better and return home   LTG Expiration Date 09/14/18   Long Term Goal #1 Increase bilateral LE strength by 1/2 muscle grade to improve bed mobility and transfers to (S) with SPC   Long Term Goal #2 Improve balance to fair+ with standing and ambulation to improve ambulation tolerance to 200ft with SPC (S) and to improve stair negotiation to 4 steps with HR (S)   Plan   Treatment/Interventions Functional transfer training;LE strengthening/ROM; Elevations; Therapeutic exercise; Endurance training;Patient/family training;Bed mobility;Gait training   PT Frequency (3-5x/wk)   Recommendation   Recommendation Home PT   PT - OK to Discharge (when medically stable for discharge)   No SCD's  Pt returned to sitting at EOB to eat lunch with call bell in reach

## 2018-08-31 NOTE — ASSESSMENT & PLAN NOTE
Patient is heme-positive in the emergency room  Continue to trend hemoglobin and hold anticoagulation  Follow-up GI consult

## 2018-08-31 NOTE — PROGRESS NOTES
Progress Note - Massiel Plascencia 1945, 74 y o  male MRN: [id number]    Unit/Bed#: 401-01 Encounter: [id number]    Primary Care Provider: Erum Boyle DO   Date and time admitted to hospital: 8/30/2018  5:48 PM        * GI bleed   Assessment & Plan    Patient is heme-positive in the emergency room  Continue to trend hemoglobin and hold anticoagulation  Follow-up GI consult        Persistent atrial fibrillation (HCC)   Assessment & Plan    Rate controlled  Monitor on tele  Hold Eliquis  Continue Cardizem and metoprolo          Type 2 diabetes mellitus without complication, without long-term current use of insulin (HCC)   Assessment & Plan    Continue to hold metformin  Accu-Cheks AC and HS        Chronic combined systolic and diastolic CHF (congestive heart failure) ([address] )   Assessment & Plan    Currently appears euvolemic  Most recent echo reveals an ejection fraction of 45-50%  Continue ACE-inhibitor and Lasix  Low-salt diet, daily weights        Hypokalemia   Assessment & Plan    Replaced with 40 mEq of potassium t i d  with meals  Recheck Mag and BMP in the morning            Progress Note - Massiel Plascencia 74 y o  male MRN: [id number]    Unit/Bed#: 401-01 Encounter: [id number]        Subjective:   Seen and examined @ bedside  He feels dizzy but that is chronic for him    Objective:     Vitals:   Vitals:    08/31/18 0733   BP: 159/97   Pulse: 79   Resp: 20   Temp: 97 7 °F (36 5 °C)   SpO2: 98%     Body mass index is 28 kg/m²      Intake/Output Summary (Last 24 hours) at 08/31/18 1153  Last data filed at 08/31/18 0826   Gross per 24 hour   Intake             1730 ml   Output              300 ml   Net             1430 ml       Physical Exam:   /97 (BP Location: Right arm)   Pulse 79   Temp 97 7 °F (36 5 °C) (Temporal)   Resp 20   Ht 5' 9" (1 753 m)   Wt 86 kg (189 lb 9 5 oz)   SpO2 98%   BMI 28 00 kg/m²   General appearance: alert and oriented, in no acute distress  Head: Normocephalic, without obvious abnormality, atraumatic  Lungs: clear to auscultation bilaterally  Heart: regularly irregular rhythm  Abdomen: soft, non-tender; bowel sounds normal; no masses,  no organomegaly  Extremities: extremities normal, warm and well-perfused; no cyanosis, clubbing, or edema  Pulses: 2+ and symmetric  Neurologic: Grossly normal     Invasive Devices     Peripheral Intravenous Line            Peripheral IV 08/30/18 Left;Ventral (anterior) Antecubital less than 1 day                  Results from last 7 days  Lab Units 08/31/18  0404 08/30/18 2212 08/30/18  1821 08/29/18  1159   WBC Thousand/uL 6 77  --  7 42 5 91   HEMOGLOBIN g/dL 7 4* 7 8* 7 6* 7 9*   HEMATOCRIT % 25 3* 26 9* 26 1* 28 2*   PLATELETS Thousands/uL 216  --  212 228         Results from last 7 days  Lab Units 08/31/18  0404 08/30/18 2212 08/30/18  1821 08/29/18  1159   SODIUM mmol/L 145 144 146* 143   POTASSIUM mmol/L 2 9* 3 3* 3 2* 3 0*   CHLORIDE mmol/L 108 106 110* 107   CO2 mmol/L 29 28 29 29   BUN mg/dL 14 15 15 15   CREATININE mg/dL 0 96 0 98 1 07 1 03   CALCIUM mg/dL 8 3 8 7 8 9 9 0   ALK PHOS U/L 79  --  91 93   ALT U/L 11*  --  17 18   AST U/L 13  --  13 12       Medication Administration - last 24 hours from 08/30/2018 1153 to 08/31/2018 1153       Date/Time Order Dose Route Action Action by     08/30/2018 1928 sodium chloride 0 9 % bolus 1,000 mL 0 mL Intravenous Stopped Rylee Hattie Andes, RN     08/30/2018 1828 sodium chloride 0 9 % bolus 1,000 mL 1,000 mL Intravenous [facility]     08/30/2018 2001 potassium chloride (K-DUR,KLOR-CON) CR tablet 40 mEq 40 mEq Oral Given Mona Pierce RN     08/31/2018 0820 atorvastatin (LIPITOR) tablet 40 mg 40 mg Oral Given Marisol De Santiago RN     08/31/2018 0819 diltiazem (CARDIZEM CD) 24 hr capsule 180 mg 180 mg Oral Given Marisol De Santiago RN     08/31/2018 0535 pantoprazole (PROTONIX) EC tablet 40 mg 40 mg Oral Given Kirstin Austin RN     08/31/2018 0819 furosemide (LASIX) tablet 20 mg 20 mg Oral Given Stephen Lynn, RN     08/30/2018 2300 furosemide (LASIX) tablet 20 mg 20 mg Oral Given Tabitha Cook RN     08/31/2018 0820 lisinopril (ZESTRIL) tablet 20 mg 20 mg Oral Given Marisol De Santiago, RN     08/30/2018 2300 melatonin tablet 3 mg 3 mg Oral Given Tabitha Cook RN     08/31/2018 0820 metoprolol succinate (TOPROL-XL) 24 hr tablet 100 mg 100 mg Oral Given Marisol De Santiago, RN     08/30/2018 2300 metoprolol succinate (TOPROL-XL) 24 hr tablet 100 mg 100 mg Oral Given Tabitha Cook RN     08/31/2018 0819 pregabalin (LYRICA) capsule 300 mg 300 mg Oral Given Marisol De Santiago, RN     08/30/2018 2301 pregabalin (LYRICA) capsule 300 mg 300 mg Oral Given Tabitha Cook RN     08/31/2018 0819 ranolazine (RANEXA) 12 hr tablet 500 mg 500 mg Oral Given Marisol De Santiago RN     08/30/2018 2300 ranolazine (RANEXA) 12 hr tablet 500 mg 500 mg Oral Given Tabitha Cook RN     08/31/2018 4224 tamsulosin (FLOMAX) capsule 0 4 mg 0 4 mg Oral Given Marisol De Santiago RN     08/31/2018 0828 tiotropium (SPIRIVA) capsule for inhaler 18 mcg 18 mcg Inhalation Given Marisol De Santiago RN     08/31/2018 0817 nicotine (NICODERM CQ) 14 mg/24hr TD 24 hr patch 1 patch 1 patch Transdermal Medication Applied Marisol De Santiago, RN     08/31/2018 1141 insulin lispro (HumaLOG) 100 units/mL subcutaneous injection 1-6 Units 0 Units Subcutaneous Not Given Mellissaer Leah, RN     08/31/2018 0816 insulin lispro (HumaLOG) 100 units/mL subcutaneous injection 1-6 Units 0 Units Subcutaneous Not Given Stephen Lynn, RN     08/30/2018 2210 insulin lispro (HumaLOG) 100 units/mL subcutaneous injection 1-6 Units 1 Units Subcutaneous Not Given Tabitha Cook RN     08/31/2018 0850 acetaminophen (TYLENOL) tablet 650 mg 650 mg Oral Given Marisol De Santiago RN     08/31/2018 0044 potassium chloride 20 mEq IVPB (premix) 20 mEq Intravenous Not Given Tabitha Cook RN     08/31/2018 0044 potassium chloride (K-DUR,KLOR-CON) CR tablet 20 mEq 20 mEq Oral Given Tabitha Cook RN     08/31/2018 1034 potassium chloride 20 mEq IVPB (premix) 0 mEq Intravenous Stopped Marisol De Santiago RN     08/31/2018 0820 potassium chloride 20 mEq IVPB (premix) 20 mEq Intravenous New Bag Marisol De Santiago RN     08/31/2018 4731 potassium chloride (K-DUR,KLOR-CON) CR tablet 20 mEq 20 mEq Oral Given Marisol De Santiago RN     08/31/2018 1034 potassium phosphate 6 mmol in sodium chloride 0 9 % 100 mL Infusion 6 mmol Intravenous New Bag Marisol De Santiago RN            Lab, Imaging and other studies: I have personally reviewed pertinent reports      VTE Pharmacologic Prophylaxis: none 2/2 anemia  VTE Mechanical Prophylaxis: sequential compression device     Maykel Dalal MD  8/31/2018,11:53 AM

## 2018-08-31 NOTE — ASSESSMENT & PLAN NOTE
-Rate currently controlled  -Anticoagulated on eliquis  -Telemetry monitoring  -Will hold eliquis and plavix in presence of possible GI bleed with hemoglobin of 7 6  -Continue cardizem, metoprolol

## 2018-08-31 NOTE — SOCIAL WORK
Cm met with the patient to evaluate the patients prior function and living situation and any barriers to d/c and form a safe d/c plan  Cm also evaluated the patient for any services in the home or needs for services  Pt resides at home with his [employer] [place] in a house in [facility]  0 [address] or inside  Pt has a walker, cane, wc, 02 and cpap at home  Uses the cane for ambulation (02 is from [facility])  [facility]  Pt's caregiver Pavan Antoine does the driving  PCP is Marquis Leos and re:pharmacy pt states its out of [place] and they deliver his medications to his home (?[facility] out of [place], pt not quite sure of the name)  Pt plans on returning home on dc with outpatient follow up  Cm will continue to follow and assist in dc planning

## 2018-08-31 NOTE — ASSESSMENT & PLAN NOTE
-Possible GI bleed  -reports blood in stool  -he also reports feeling weak and dizzy     -He was sent by his PCP to follow on low hemoglobin of 7 9   -Hemoglobin at 7 6 in ER  -Guaiac positive stool in ER  -Admit to med/surg  -Telemetry monitoring  -Check H&H at 2200, then Q4hrs  -Occult blood 1-3 stool pending  - VS currently,will  transfusion if hemoglobin drops below 7  -GI consult  -Supportive care

## 2018-08-31 NOTE — CASE MANAGEMENT
Thank you,  [address]  Utilization Review Department  Phone: [phone]; Fax [phone]  ATTENTION: Please call with any questions or concerns to [phone]  and carefully follow the prompts so that you are directed to the right person  Send all requests for admission clinical reviews, approved or denied determinations and any other requests to fax [phone]  All voicemails are confidential    Initial Clinical Review    Admission: Date/Time/Statement:INPT  8/30/18 @ 2011     Orders Placed This Encounter   Procedures    Inpatient Admission (expected length of stay for this patient is greater than two midnights)     Standing Status:   Standing     Number of Occurrences:   1     Order Specific Question:   Admitting Physician     Answer:   Laurel Tee     Order Specific Question:   Level of Care     Answer:   Med Surg [16]     Order Specific Question:   Estimated length of stay     Answer:   More than 2 Midnights     Order Specific Question:   Certification     Answer:   I certify that inpatient services are medically necessary for this patient for a duration of greater than two midnights  See H&P and MD Progress Notes for additional information about the patient's course of treatment  ED: Date/Time/Mode of Arrival:   ED Arrival Information     Expected Arrival Acuity Means of Arrival Escorted By Service Admission Type    - 8/30/2018 17:48 Urgent Ambulance [facility] Urgent    Arrival Complaint    Shortness of Breath          Chief Complaint:   Chief Complaint   Patient presents with    Anemia     Was seen by PCP  Had blood work  Hgb low  Sent to ED for evaluation  History of Illness: Joe Fernando is a 74 y o  male who presents to the ER complaining of generalized weakness  He was sent to ER by PCP for further evaluation because his hemoglobin and potassium was low  He admits to having episode of blood in his stool   He also reports that he has frequent nose bleeds  Which may be contributing to anemia  Labs completed in ER shows Potassium of 3 2 and hemoglobin of 7 6  CT abdomin and pelvis completed with results as shown below  He received potassium chloride 40 meq PO and Sodium chloride 1L in ER    ED Vital Signs:   ED Triage Vitals [08/30/18 1752]   Temperature Pulse Respirations Blood Pressure SpO2   98 2 °F (36 8 °C) 78 20 143/63 96 %      Temp Source Heart Rate Source Patient Position - Orthostatic VS BP Location FiO2 (%)   Temporal Monitor Sitting Left arm --      Pain Score       No Pain        Wt Readings from Last 1 Encounters:   08/31/18 86 kg (189 lb 9 5 oz)       Vital Signs (abnormal):   08/30/18 2130  97 6 °F (36 4 °C)   116  18   176/84  --  98 %  None (Room air)  Lying   08/30/18 2100  --  75  19  153/74  --  98 %  None (Room air)  Lying   08/30/18 2030  --  69  22  123/67  --  98 %  None (Room air)  Sitting   08/30/18 1830  --  --  --  --  --  --  None (Room air)  --   08/30/18 1800  --  84  --  125/60  86  95 %  --  --   08/30/18 1752  98 2 °F (36 8 °C)  78  20  143/63  --  96 %  None (Room air)           Abnormal Labs/Diagnostic Test Results:   hgb 7 6, hct 26 1; hgb 7 4, hct 25 3  Na 146  k 3 2  Chlor 110  INR 1 51    CT ABD:  No acute intra-abdominal abnormality  No free air, free fluid, mesenteric inflammatory process, or bowel wall thickening        Increase size of right pleural effusion now moderate  Stable small left pleural effusion        Stable calcifications in the mesentery       · EKG: A-Fib at rate of 83 bpm       ED Treatment:   Medication Administration from 08/30/2018 1702 to 08/30/2018 2120       Date/Time Order Dose Route Action Action by Comments     08/30/2018 1928 sodium chloride 0 9 % bolus 1,000 mL 0 mL Intravenous Stopped Rylee Alfredia Cockayne, RN      08/30/2018 1828 sodium chloride 0 9 % bolus 1,000 mL 1,000 mL Intravenous Yeet Esdras Veronica [place], [place]      08/30/2018 2001 potassium chloride (K-DUR,KLOR-CON) CR tablet 40 mEq 40 mEq Oral Given Shiva Feliciano RN           Past Medical/Surgical History:    Active Ambulatory Problems     Diagnosis Date Noted    Intractable diarrhea 10/26/2016    Weakness 10/26/2016    Hypokalemia 10/26/2016    COPD [facility]chronic obstructive [facility] disease) [facility] 10/26/2016    Abdominal pain 10/26/2016    Benign essential hypertension 10/26/2016    Dyslipidemia 10/26/2016    Depressive disorder 10/26/2016    Persistent atrial fibrillation ([facility] ) 10/26/2016    Migraines 10/26/2016    Chronic pain 10/30/2016    Sepsis ([facility] ) 10/30/2016    Colitis 10/30/2016    NSTEMI (non-ST elevated myocardial infarction) ([place] [address] ) 10/30/2016    Dehydration, mild 06/03/2017    Ambulatory dysfunction 06/04/2017    Tobacco abuse 06/04/2017    Pulmonary nodule 04/03/2018    Lactic acidosis 04/05/2018    Acute cystitis without hematuria 04/06/2018    Leukocytosis 04/06/2018    Marijuana abuse 05/06/2018    Healthcare-associated pneumonia 05/06/2018    Decreased left ventricular systolic function 20/92/9527    Dysphagia 05/07/2018    Type 2 diabetes mellitus without complication, without long-term current use of insulin ([facility] ) 05/08/2018    Dilated cardiomyopathy secondary to tachycardia ([place] [address] ) 05/18/2018    Coronary artery disease involving native coronary artery of native heart without angina pectoris 05/18/2018    Syncope and collapse 07/07/2018    Acute respiratory failure with hypoxia and hypercapnia ([facility]) 71/32/5626    Acute systolic CHF (congestive heart failure) ([facility] ) 07/11/2018    CHF exacerbation ([place] [address] ) 07/17/2018    Right knee injury, initial encounter 07/17/2018    Noncompliance 07/19/2018    Obstructive sleep apnea (adult) (pediatric)     Anterior epistaxis 07/27/2018    Chronic combined systolic and diastolic CHF (congestive heart failure) ([facility] ) 08/17/2018    Urge incontinence of urine 08/29/2018     Resolved Ambulatory Problems     Diagnosis Date Noted    Shortness of breath 10/26/2016    Chronic pain 10/26/2016    Hx of transient ischemic attack (TIA) 10/26/2016    Acute kidney injury ([facility] ) 10/30/2016    Hypernatremia 07/07/2018     Past Medical History:   Diagnosis Date    Aortic stenosis     Arthritis     Asthma     Atrial fibrillation (HCC)     Back pain     Cervical radiculopathy     CH[facility]congest[facility] failure) ([facility]     Chronic pain     Chronic pain 10/26/2016    COPD (chronic obstructive pulmonary disease) ([facility])     Coronary artery disease     CVA (cerebral vascular accident) ([place] [address] )     Depressive disorder     Diabetes mellitus ([place] [address] )     Encephalopathy     GERD (gastroesophageal reflux disease)     Head injury     Hearing loss     Hx of transient ischemic attack (TIA) 10/26/2016    Hyperlipidemia     Hypertension     Kidney stones     Migraines     MRSA (methicillin resistant Staphylococcus aureus) infection     MRSA (methicillin resistant staphylococcus aureus) pneumonia ([facility]     Osteoarthritis     Partial small bowel obstruction ([facility])     Peripheral neuropathy     Psychiatric disorder     PVD (peripheral vascular disease) ([place] [address] )     Renal disorder     Shortness of breath 10/26/2016    TIA (transient ischemic attack) 2014    Vertigo        Admitting Diagnosis: GI bleeding [K92 2]  Weakness [R53 1]  Anemia [D64 9]    Age/Sex: 74 y o  male    Assessment/Plan: 73 yo male c/o GI bleed, weak and dizzy, decreasing hgb 7 6 in ED,now hgb 7 4, guaiac positive stool in ED, Tele, h&h q4h, GI consult  DM type 2 SS coverage QID, cmp, hold metformin  Persistent Afib hold eliquis and plavix d/t GI blled, Tele  Hypokalemia with K 3 2, bmp, replace K  Anticipated Length of Stay:  Patient will be admitted on an Inpatient basis with an anticipated length of stay of  > 2 midnights     Justification for Hospital Stay: Possible GI bleed, Anemia,  Hypokalemia,     Admission Orders:  INPT  PT/OT  TELE  OOB  O2  DAILY WIEGHTS  SEQ COMP DEVICE  CONSULT GASTROENTEROLOGY  CONS CARB DIET  Scheduled Meds:   Current Facility-Administered Medications:  acetaminophen 650 mg Oral Q6H PRN Eyal Yang PA-C    albuterol 2 puff Inhalation Q6H PRN Mo Lehman DO    albuterol 2 5 mg Nebulization Q4H PRN Eyal Yang PA-C    atorvastatin 40 mg Oral Daily Eyal Yang, CECE    diltiazem 180 mg Oral Daily Eyal Yang, CECE    furosemide 20 mg Oral BID Eyal Yang, CECE    insulin lispro 1-6 Units Subcutaneous TID AC Eyal Yang, CECE    insulin lispro 1-6 Units Subcutaneous HS Eyal Yang PA-C    lisinopril 20 mg Oral Daily Eyal Yang, CECE    melatonin 3 mg Oral HS Eyal Yang, CECE    metoprolol succinate 100 mg Oral BID Eyal Yang, CECE    nicotine 1 patch Transdermal Daily Eyal Yang PA-C    ondansetron 4 mg Intravenous Q6H PRN Eyal Yang PA-C    pantoprazole 40 mg Oral Early Morning Eyal Yang PA-C    potassium chloride 20 mEq Intravenous Once [place] Y Swank, CRNP Last Rate: 20 mEq (08/31/18 0820)   potassium phosphate 6 mmol Intravenous Once [place] Y Swank, CRNP    pregabalin 300 mg Oral BID Eyal Yang PA-C    ranolazine 500 mg Oral BID Eyal Yang PA-C    tamsulosin 0 4 mg Oral Daily Eyal Yang PA-C    tiotropium 18 mcg Inhalation Daily Eyal Yang PA-C      Continuous Infusions:    PRN Meds:   Acetaminophen X1    albuterol    albuterol    ondansetron
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
## 2018-09-01 LAB
ANION GAP SERPL CALCULATED.3IONS-SCNC: 3 MMOL/L (ref 4–13)
BASOPHILS # BLD AUTO: 0.08 THOUSANDS/ΜL (ref 0–0.1)
BASOPHILS NFR BLD AUTO: 1 % (ref 0–1)
BUN SERPL-MCNC: 12 MG/DL (ref 5–25)
CALCIUM SERPL-MCNC: 8.5 MG/DL (ref 8.3–10.1)
CHLORIDE SERPL-SCNC: 112 MMOL/L (ref 100–108)
CO2 SERPL-SCNC: 28 MMOL/L (ref 21–32)
CREAT SERPL-MCNC: 1.02 MG/DL (ref 0.6–1.3)
EOSINOPHIL # BLD AUTO: 0.47 THOUSAND/ΜL (ref 0–0.61)
EOSINOPHIL NFR BLD AUTO: 6 % (ref 0–6)
ERYTHROCYTE [DISTWIDTH] IN BLOOD BY AUTOMATED COUNT: 16.5 % (ref 11.6–15.1)
FERRITIN SERPL-MCNC: 62 NG/ML (ref 8–388)
GFR SERPL CREATININE-BSD FRML MDRD: 72 ML/MIN/1.73SQ M
GLUCOSE SERPL-MCNC: 115 MG/DL (ref 65–140)
GLUCOSE SERPL-MCNC: 128 MG/DL (ref 65–140)
GLUCOSE SERPL-MCNC: 134 MG/DL (ref 65–140)
GLUCOSE SERPL-MCNC: 148 MG/DL (ref 65–140)
GLUCOSE SERPL-MCNC: 164 MG/DL (ref 65–140)
HCT VFR BLD AUTO: 27.6 % (ref 36.5–49.3)
HGB BLD-MCNC: 8 G/DL (ref 12–17)
IMM GRANULOCYTES # BLD AUTO: 0.03 THOUSAND/UL (ref 0–0.2)
IMM GRANULOCYTES NFR BLD AUTO: 0 % (ref 0–2)
INR PPP: 1.13 (ref 0.86–1.17)
IRON SATN MFR SERPL: 5 %
IRON SERPL-MCNC: 23 UG/DL (ref 65–175)
LYMPHOCYTES # BLD AUTO: 2.06 THOUSANDS/ΜL (ref 0.6–4.47)
LYMPHOCYTES NFR BLD AUTO: 28 % (ref 14–44)
MAGNESIUM SERPL-MCNC: 1.8 MG/DL (ref 1.6–2.6)
MCH RBC QN AUTO: 20.1 PG (ref 26.8–34.3)
MCHC RBC AUTO-ENTMCNC: 29 G/DL (ref 31.4–37.4)
MCV RBC AUTO: 69 FL (ref 82–98)
MONOCYTES # BLD AUTO: 1.1 THOUSAND/ΜL (ref 0.17–1.22)
MONOCYTES NFR BLD AUTO: 15 % (ref 4–12)
NEUTROPHILS # BLD AUTO: 3.74 THOUSANDS/ΜL (ref 1.85–7.62)
NEUTS SEG NFR BLD AUTO: 50 % (ref 43–75)
NRBC BLD AUTO-RTO: 0 /100 WBCS
PHOSPHATE SERPL-MCNC: 2.4 MG/DL (ref 2.3–4.1)
PLATELET # BLD AUTO: 227 THOUSANDS/UL (ref 149–390)
PMV BLD AUTO: 10.4 FL (ref 8.9–12.7)
POTASSIUM SERPL-SCNC: 4.7 MMOL/L (ref 3.5–5.3)
PROTHROMBIN TIME: 14 SECONDS (ref 11.8–14.2)
RBC # BLD AUTO: 3.98 MILLION/UL (ref 3.88–5.62)
SODIUM SERPL-SCNC: 143 MMOL/L (ref 136–145)
TIBC SERPL-MCNC: 490 UG/DL (ref 250–450)
TROPONIN I SERPL-MCNC: <0.02 NG/ML
WBC # BLD AUTO: 7.48 THOUSAND/UL (ref 4.31–10.16)

## 2018-09-01 PROCEDURE — 83550 IRON BINDING TEST: CPT | Performed by: FAMILY MEDICINE

## 2018-09-01 PROCEDURE — 85025 COMPLETE CBC W/AUTO DIFF WBC: CPT | Performed by: NURSE PRACTITIONER

## 2018-09-01 PROCEDURE — 94760 N-INVAS EAR/PLS OXIMETRY 1: CPT

## 2018-09-01 PROCEDURE — 85610 PROTHROMBIN TIME: CPT | Performed by: NURSE PRACTITIONER

## 2018-09-01 PROCEDURE — 82948 REAGENT STRIP/BLOOD GLUCOSE: CPT

## 2018-09-01 PROCEDURE — 83540 ASSAY OF IRON: CPT | Performed by: FAMILY MEDICINE

## 2018-09-01 PROCEDURE — 80048 BASIC METABOLIC PNL TOTAL CA: CPT | Performed by: NURSE PRACTITIONER

## 2018-09-01 PROCEDURE — 94660 CPAP INITIATION&MGMT: CPT

## 2018-09-01 PROCEDURE — 84100 ASSAY OF PHOSPHORUS: CPT | Performed by: NURSE PRACTITIONER

## 2018-09-01 PROCEDURE — 82728 ASSAY OF FERRITIN: CPT | Performed by: FAMILY MEDICINE

## 2018-09-01 PROCEDURE — 83735 ASSAY OF MAGNESIUM: CPT | Performed by: NURSE PRACTITIONER

## 2018-09-01 PROCEDURE — 99232 SBSQ HOSP IP/OBS MODERATE 35: CPT | Performed by: FAMILY MEDICINE

## 2018-09-01 PROCEDURE — 84484 ASSAY OF TROPONIN QUANT: CPT | Performed by: NURSE PRACTITIONER

## 2018-09-01 RX ORDER — TRAMADOL HYDROCHLORIDE 50 MG/1
50 TABLET ORAL EVERY 6 HOURS PRN
Status: DISCONTINUED | OUTPATIENT
Start: 2018-09-01 | End: 2018-09-02 | Stop reason: HOSPADM

## 2018-09-01 RX ADMIN — APIXABAN 5 MG: 5 TABLET, FILM COATED ORAL at 10:23

## 2018-09-01 RX ADMIN — PREGABALIN 300 MG: 75 CAPSULE ORAL at 08:43

## 2018-09-01 RX ADMIN — DILTIAZEM HYDROCHLORIDE 180 MG: 180 CAPSULE, COATED, EXTENDED RELEASE ORAL at 08:43

## 2018-09-01 RX ADMIN — TIOTROPIUM BROMIDE 18 MCG: 18 CAPSULE ORAL; RESPIRATORY (INHALATION) at 08:47

## 2018-09-01 RX ADMIN — LISINOPRIL 20 MG: 20 TABLET ORAL at 08:42

## 2018-09-01 RX ADMIN — TAMSULOSIN HYDROCHLORIDE 0.4 MG: 0.4 CAPSULE ORAL at 08:43

## 2018-09-01 RX ADMIN — ATORVASTATIN CALCIUM 40 MG: 40 TABLET, FILM COATED ORAL at 08:42

## 2018-09-01 RX ADMIN — RANOLAZINE 500 MG: 500 TABLET, FILM COATED, EXTENDED RELEASE ORAL at 08:42

## 2018-09-01 RX ADMIN — APIXABAN 5 MG: 5 TABLET, FILM COATED ORAL at 17:16

## 2018-09-01 RX ADMIN — TRAMADOL HYDROCHLORIDE 50 MG: 50 TABLET, COATED ORAL at 21:19

## 2018-09-01 RX ADMIN — FUROSEMIDE 20 MG: 20 TABLET ORAL at 08:43

## 2018-09-01 RX ADMIN — NICOTINE 1 PATCH: 14 PATCH, EXTENDED RELEASE TRANSDERMAL at 08:43

## 2018-09-01 RX ADMIN — METOPROLOL SUCCINATE 100 MG: 100 TABLET, EXTENDED RELEASE ORAL at 17:16

## 2018-09-01 RX ADMIN — MELATONIN TAB 3 MG 3 MG: 3 TAB at 21:14

## 2018-09-01 RX ADMIN — METOPROLOL SUCCINATE 100 MG: 100 TABLET, EXTENDED RELEASE ORAL at 08:43

## 2018-09-01 RX ADMIN — RANOLAZINE 500 MG: 500 TABLET, FILM COATED, EXTENDED RELEASE ORAL at 17:16

## 2018-09-01 RX ADMIN — TRAMADOL HYDROCHLORIDE 50 MG: 50 TABLET, COATED ORAL at 11:38

## 2018-09-01 RX ADMIN — FUROSEMIDE 20 MG: 20 TABLET ORAL at 17:16

## 2018-09-01 RX ADMIN — POTASSIUM CHLORIDE 40 MEQ: 1500 TABLET, EXTENDED RELEASE ORAL at 07:44

## 2018-09-01 RX ADMIN — PREGABALIN 300 MG: 75 CAPSULE ORAL at 17:16

## 2018-09-01 RX ADMIN — PANTOPRAZOLE SODIUM 40 MG: 40 TABLET, DELAYED RELEASE ORAL at 06:21

## 2018-09-01 NOTE — PROGRESS NOTES
Progress Note - Aviva Tse 1945, 74 y o  male MRN: [id number]    Unit/Bed#: 401-01 Encounter: [id number]    Primary Care Provider: Edgar Puente DO   Date and time admitted to hospital: 8/30/2018  5:48 PM        * GI bleed   Assessment & Plan    Patient is heme-positive in the emergency room  Hemoglobin stable  Case discussed with ALKA BRADFORD, I will resume eliquis today and continue to hold plavix until outpatient EGD / colonoscopy         Persistent atrial fibrillation ([facility] [address] )   Assessment & Plan    Rate controlled  Monitor on tele  resume Eliquis  Continue Cardizem and metoprolol          Type 2 diabetes mellitus without complication, without long-term current use of insulin (HCC)   Assessment & Plan    Continue to hold metformin  Accu-Cheks AC and HS        Chronic combined systolic and diastolic CHF (congestive heart failure) ([facility] [address] )   Assessment & Plan    Currently appears euvolemic  Most recent echo reveals an ejection fraction of 45-50%  Continue ACE-inhibitor and Lasix  Low-salt diet, daily weights            Progress Note - Aviva Tse 74 y o  male MRN: [id number]    Unit/Bed#: 401-01 Encounter: [id number]        Subjective:   Seen and examined @ bedside   No acute changes     Objective:     Vitals:   Vitals:    09/01/18 0729   BP: 132/68   Pulse: 68   Resp: 20   Temp: (!) 97 2 °F (36 2 °C)   SpO2: 96%     Body mass index is 27 93 kg/m²      Intake/Output Summary (Last 24 hours) at 09/01/18 0950  Last data filed at 09/01/18 0729   Gross per 24 hour   Intake              660 ml   Output             1220 ml   Net             -560 ml       Physical Exam:   /68 (BP Location: Left arm)   Pulse 68   Temp (!) 97 2 °F (36 2 °C) (Temporal)   Resp 20   Ht 5' 9" (1 753 m)   Wt 85 8 kg (189 lb 2 5 oz)   SpO2 96%   BMI 27 93 kg/m²   General appearance: alert and oriented, in no acute distress  Head: Normocephalic, without obvious abnormality, atraumatic  Lungs: clear to auscultation bilaterally  Heart: regularly irregular rhythm  Abdomen: soft, non-tender; bowel sounds normal; no masses,  no organomegaly  Extremities: extremities normal, warm and well-perfused; no cyanosis, clubbing, or edema  Pulses: 2+ and symmetric  Neurologic: Grossly normal     Invasive Devices     Peripheral Intravenous Line            Peripheral IV 08/30/18 Left;Ventral (anterior) Antecubital 1 day                  Results from last 7 days  Lab Units 09/01/18  0036 08/31/18  0404 08/30/18 2212 08/30/18  1821   WBC Thousand/uL 7 48 6 77  --  7 42   HEMOGLOBIN g/dL 8 0* 7 4* 7 8* 7 6*   HEMATOCRIT % 27 6* 25 3* 26 9* 26 1*   PLATELETS Thousands/uL 227 216  --  212         Results from last 7 days  Lab Units 09/01/18  0036 08/31/18  0404 08/30/18  2212 08/30/18  1821 08/29/18  1159   SODIUM mmol/L 143 145 144 146* 143   POTASSIUM mmol/L 4 7 2 9* 3 3* 3 2* 3 0*   CHLORIDE mmol/L 112* 108 106 110* 107   CO2 mmol/L 28 29 28 29 29   BUN mg/dL 12 14 15 15 15   CREATININE mg/dL 1 02 0 96 0 98 1 07 1 03   CALCIUM mg/dL 8 5 8 3 8 7 8 9 9 0   ALK PHOS U/L  --  79  --  91 93   ALT U/L  --  11*  --  17 18   AST U/L  --  13  --  13 12       Medication Administration - last 24 hours from 08/31/2018 0950 to 09/01/2018 0950       Date/Time Order Dose Route Action Action by     09/01/2018 0842 atorvastatin (LIPITOR) tablet 40 mg 40 mg Oral Given Luis Charles, RN     09/01/2018 0843 diltiazem (CARDIZEM CD) 24 hr capsule 180 mg 180 mg Oral Given Luis Charles, RN     09/01/2018 5382 pantoprazole (PROTONIX) EC tablet 40 mg 40 mg Oral Given Jacinto Donkiko, RN     09/01/2018 0843 furosemide (LASIX) tablet 20 mg 20 mg Oral Given Luis Charles, RN     08/31/2018 1705 furosemide (LASIX) tablet 20 mg 20 mg Oral Given Hodges Ou, RN     09/01/2018 0842 lisinopril (ZESTRIL) tablet 20 mg 20 mg Oral Given Luis Charles, RN     08/31/2018 2145 melatonin tablet 3 mg 3 mg Oral Given Carroll Garcia RN     09/01/2018 6071 metoprolol succinate (TOPROL-XL) 24 hr tablet 100 mg 100 mg Oral Given Juwan Nichols RN     08/31/2018 1705 metoprolol succinate (TOPROL-XL) 24 hr tablet 100 mg 100 mg Oral Given Abbey Forbes RN     09/01/2018 0843 pregabalin (LYRICA) capsule 300 mg 300 mg Oral Given Juwan Nichols RN     08/31/2018 1705 pregabalin (LYRICA) capsule 300 mg 300 mg Oral Given Abbey Forbes RN     09/01/2018 5936 ranolazine (RANEXA) 12 hr tablet 500 mg 500 mg Oral Given Juwan Nichols RN     08/31/2018 1704 ranolazine (RANEXA) 12 hr tablet 500 mg 500 mg Oral Given Abbey Forbes RN     09/01/2018 9047 tamsulosin (FLOMAX) capsule 0 4 mg 0 4 mg Oral Given Juwan Nichols RN     09/01/2018 0847 tiotropium (SPIRIVA) capsule for inhaler 18 mcg 18 mcg Inhalation Given Juwan Nichols RN     09/01/2018 0843 nicotine (NICODERM CQ) 14 mg/24hr TD 24 hr patch 1 patch 1 patch Transdermal Medication Applied Juwan Nichols RN     09/01/2018 0742 nicotine (NICODERM CQ) 14 mg/24hr TD 24 hr patch 1 patch 1 patch Transdermal Patch Removed Juwan Nichols, LEIGH     09/01/2018 0747 insulin lispro (HumaLOG) 100 units/mL subcutaneous injection 1-6 Units 1 Units Subcutaneous Not Given Juwan Nichols RN     08/31/2018 1631 insulin lispro (HumaLOG) 100 units/mL subcutaneous injection 1-6 Units 1 Units Subcutaneous Not Given Abbey Forbes RN     08/31/2018 1141 insulin lispro (HumaLOG) 100 units/mL subcutaneous injection 1-6 Units 0 Units Subcutaneous Not Given Pratik Doan RN     08/31/2018 2152 insulin lispro (HumaLOG) 100 units/mL subcutaneous injection 1-6 Units 1 Units Subcutaneous Not Given Abbey Forbes RN     08/31/2018 1705 acetaminophen (TYLENOL) tablet 650 mg 650 mg Oral Given Abbey Forbes RN     08/31/2018 1034 potassium chloride 20 mEq IVPB (premix) 0 mEq Intravenous Stopped Marisol De Santiago RN     08/31/2018 1034 potassium phosphate 6 mmol in sodium chloride 0 9 % 100 mL Infusion 6 mmol Intravenous R Arianne Howell [address] [place], LEIGH     09/01/2018 0744 potassium chloride (K-DUR,KLOR-CON) CR tablet 40 mEq 40 mEq Oral Given Laymon Fabry, RN     08/31/2018 1705 potassium chloride (K-DUR,KLOR-CON) CR tablet 40 mEq 40 mEq Oral Given John Howard RN     08/31/2018 1236 potassium chloride (K-DUR,KLOR-CON) CR tablet 40 mEq 40 mEq Oral Given oHney Restrepo RN            Lab, Imaging and other studies: I have personally reviewed pertinent reports      VTE Pharmacologic Prophylaxis: none   VTE Mechanical Prophylaxis: sequential compression device     Brandi Hernandez MD  9/1/2018,9:50 AM

## 2018-09-01 NOTE — NURSING NOTE
Labs ordered by NATALIIA  Drawn and sent to lab  Reported to this RN by CNA that patient is considering signing out AMA in the AM     0126-NATALIIA made aware that lab results were back and patient threats to sign out AMA

## 2018-09-01 NOTE — ASSESSMENT & PLAN NOTE
Patient is heme-positive in the emergency room  Hemoglobin stable  Case discussed with ALKA BRADFORD, I will resume eliquis today and continue to hold plavix until outpatient EGD / colonoscopy

## 2018-09-01 NOTE — NURSING NOTE
Patient c/o that his "hemoglobin is 'zero' and you're not taking anymore blood from me if I can't eat  I came here to the hospital to get blood and you aren't giving me any " Explained to patient that blood isn't just given arbitrarily at random, blood work is necessary to establish that you are at the acceptable parameters to receive blood  Patient then c/o shortness of breath, Lung sounds decreased, SaO2 96-97% on room air  Nasal cannula offered and patient said "NO  I could have a heart attack from my low hemoglobin and you wouldn't do a damned thing"  Explained to patient that he is on a cardiac monitor, his heart rate is 55-67 and sinus, also that his blood pressure was just taken and he had a systolic bp of 178 and a diastolic of 62  Respiratory called to come evaluate patient  Will inform PA of patient complaints

## 2018-09-01 NOTE — PLAN OF CARE
Problem: Potential for Falls  Goal: Patient will remain free of falls  INTERVENTIONS:  - Assess patient frequently for physical needs  -  Identify cognitive and physical deficits and behaviors that affect risk of falls    -  [place] fall precautions as indicated by assessment   - Educate patient/family on patient safety including physical limitations  - Instruct patient to call for assistance with activity based on assessment  - Modify environment to reduce risk of injury  - Consider OT/PT consult to assist with strengthening/mobility   Outcome: Progressing      Problem: PAIN - ADULT  Goal: Verbalizes/displays adequate comfort level or baseline comfort level  Interventions:  - Encourage patient to monitor pain and request assistance  - Assess pain using appropriate pain scale  - Administer analgesics based on type and severity of pain and evaluate response  - Implement non-pharmacological measures as appropriate and evaluate response  - Consider cultural and social influences on pain and pain management  - Notify physician/advanced practitioner if interventions unsuccessful or patient reports new pain   Outcome: Progressing      Problem: INFECTION - ADULT  Goal: Absence or prevention of progression during hospitalization  INTERVENTIONS:  - Assess and monitor for signs and symptoms of infection  - Monitor lab/diagnostic results  - Monitor all insertion sites, i e  indwelling lines, tubes, and drains      - Administer medications as ordered  - Instruct and encourage patient and family to use good hand hygiene technique  - Identify and instruct in appropriate isolation precautions for identified infection/condition    Outcome: Progressing      Problem: SAFETY ADULT  Goal: Maintain or return to baseline ADL function  INTERVENTIONS:  -  Assess patient's ability to carry out ADLs; assess patient's baseline for ADL function and identify physical deficits which impact ability to perform ADLs (bathing, care of mouth/teeth, toileting, grooming, dressing, etc )  - Assess/evaluate cause of self-care deficits   - Assess range of motion  - Assess patient's mobility; develop plan if impaired  - Assess patient's need for assistive devices and provide as appropriate  - Encourage maximum independence but intervene and supervise when necessary  ¯ Involve family in performance of ADLs  ¯ Assess for home care needs following discharge   ¯ Request OT consult to assist with ADL evaluation and planning for discharge  ¯ Provide patient education as appropriate   Outcome: Progressing    Goal: Maintain or return mobility status to optimal level  INTERVENTIONS:  - Assess patient's baseline mobility status (ambulation, transfers, stairs, etc )    - Identify cognitive and physical deficits and behaviors that affect mobility  - Identify mobility aids required to assist with transfers and/or ambulation (gait belt, sit-to-stand, lift, walker, cane, etc )  - [place] fall precautions as indicated by assessment  - Record patient progress and toleration of activity level on Mobility SBAR; progress patient to next Phase/Stage  - Instruct patient to call for assistance with activity based on assessment  - Request Rehabilitation consult to assist with strengthening/weightbearing, etc    Outcome: Progressing      Problem: DISCHARGE PLANNING  Goal: Discharge to home or other facility with appropriate resources  INTERVENTIONS:  - Identify barriers to discharge w/patient and caregiver  - Arrange for needed discharge resources and transportation as appropriate  - Identify discharge learning needs (meds, wound care, etc )    - Refer to Case Management Department for coordinating discharge planning if the patient needs post-hospital services based on physician/advanced practitioner order or complex needs related to functional status, cognitive ability, or social support system    Outcome: Progressing      Problem: Knowledge Deficit  Goal: Patient/family/caregiver demonstrates understanding of disease process, treatment plan, medications, and discharge instructions  Complete learning assessment and assess knowledge base    Interventions:  - Provide teaching at level of understanding  - Provide teaching via preferred learning methods   Outcome: Progressing      Problem: DISCHARGE PLANNING - CARE MANAGEMENT  Goal: Discharge to post-acute care or home with appropriate resources  INTERVENTIONS:  - Conduct assessment to determine patient/family and health care team treatment goals, and need for post-acute services based on payer coverage, community resources, and patient preferences, and barriers to discharge  - Address psychosocial, clinical, and financial barriers to discharge as identified in assessment in conjunction with the patient/family and health care team  - Arrange appropriate level of post-acute services according to patient's   needs and preference and payer coverage in collaboration with the physician and health care team  - Communicate with and update the patient/family, physician, and health care team regarding progress on the discharge plan  - Arrange appropriate transportation to post-acute venues   Outcome: Progressing

## 2018-09-01 NOTE — PROGRESS NOTES
Pt refusing accucheck at bedtime d/t not having a regular diet, he said that he will allow us to stick his finger once he gets a regular diet   Accucheck not done

## 2018-09-02 VITALS
DIASTOLIC BLOOD PRESSURE: 86 MMHG | WEIGHT: 188.05 LBS | HEIGHT: 69 IN | BODY MASS INDEX: 27.85 KG/M2 | HEART RATE: 73 BPM | RESPIRATION RATE: 18 BRPM | OXYGEN SATURATION: 100 % | SYSTOLIC BLOOD PRESSURE: 147 MMHG | TEMPERATURE: 97.8 F

## 2018-09-02 LAB
ANION GAP SERPL CALCULATED.3IONS-SCNC: 8 MMOL/L (ref 4–13)
BASOPHILS # BLD AUTO: 0.09 THOUSANDS/ΜL (ref 0–0.1)
BASOPHILS NFR BLD AUTO: 1 % (ref 0–1)
BUN SERPL-MCNC: 14 MG/DL (ref 5–25)
CALCIUM SERPL-MCNC: 8.9 MG/DL (ref 8.3–10.1)
CHLORIDE SERPL-SCNC: 108 MMOL/L (ref 100–108)
CO2 SERPL-SCNC: 26 MMOL/L (ref 21–32)
CREAT SERPL-MCNC: 0.97 MG/DL (ref 0.6–1.3)
EOSINOPHIL # BLD AUTO: 0.45 THOUSAND/ΜL (ref 0–0.61)
EOSINOPHIL NFR BLD AUTO: 6 % (ref 0–6)
ERYTHROCYTE [DISTWIDTH] IN BLOOD BY AUTOMATED COUNT: 16.3 % (ref 11.6–15.1)
GFR SERPL CREATININE-BSD FRML MDRD: 76 ML/MIN/1.73SQ M
GLUCOSE SERPL-MCNC: 110 MG/DL (ref 65–140)
GLUCOSE SERPL-MCNC: 130 MG/DL (ref 65–140)
HCT VFR BLD AUTO: 27.9 % (ref 36.5–49.3)
HGB BLD-MCNC: 8.1 G/DL (ref 12–17)
IMM GRANULOCYTES # BLD AUTO: 0.05 THOUSAND/UL (ref 0–0.2)
IMM GRANULOCYTES NFR BLD AUTO: 1 % (ref 0–2)
LYMPHOCYTES # BLD AUTO: 1.85 THOUSANDS/ΜL (ref 0.6–4.47)
LYMPHOCYTES NFR BLD AUTO: 23 % (ref 14–44)
MCH RBC QN AUTO: 19.9 PG (ref 26.8–34.3)
MCHC RBC AUTO-ENTMCNC: 29 G/DL (ref 31.4–37.4)
MCV RBC AUTO: 68 FL (ref 82–98)
MONOCYTES # BLD AUTO: 1.25 THOUSAND/ΜL (ref 0.17–1.22)
MONOCYTES NFR BLD AUTO: 15 % (ref 4–12)
MRSA NOSE QL CULT: NORMAL
NEUTROPHILS # BLD AUTO: 4.48 THOUSANDS/ΜL (ref 1.85–7.62)
NEUTS SEG NFR BLD AUTO: 54 % (ref 43–75)
NRBC BLD AUTO-RTO: 0 /100 WBCS
PLATELET # BLD AUTO: 236 THOUSANDS/UL (ref 149–390)
PMV BLD AUTO: 10.4 FL (ref 8.9–12.7)
POTASSIUM SERPL-SCNC: 4 MMOL/L (ref 3.5–5.3)
RBC # BLD AUTO: 4.08 MILLION/UL (ref 3.88–5.62)
SODIUM SERPL-SCNC: 142 MMOL/L (ref 136–145)
WBC # BLD AUTO: 8.17 THOUSAND/UL (ref 4.31–10.16)

## 2018-09-02 PROCEDURE — 99239 HOSP IP/OBS DSCHRG MGMT >30: CPT | Performed by: FAMILY MEDICINE

## 2018-09-02 PROCEDURE — 80048 BASIC METABOLIC PNL TOTAL CA: CPT | Performed by: FAMILY MEDICINE

## 2018-09-02 PROCEDURE — 85025 COMPLETE CBC W/AUTO DIFF WBC: CPT | Performed by: FAMILY MEDICINE

## 2018-09-02 PROCEDURE — 82948 REAGENT STRIP/BLOOD GLUCOSE: CPT

## 2018-09-02 PROCEDURE — 94760 N-INVAS EAR/PLS OXIMETRY 1: CPT

## 2018-09-02 PROCEDURE — 94660 CPAP INITIATION&MGMT: CPT

## 2018-09-02 RX ORDER — ESOMEPRAZOLE MAGNESIUM 40 MG/1
40 CAPSULE, DELAYED RELEASE ORAL
Qty: 30 CAPSULE | Refills: 0 | Status: SHIPPED | OUTPATIENT
Start: 2018-09-02 | End: 2018-12-26 | Stop reason: SDUPTHER

## 2018-09-02 RX ORDER — FERROUS SULFATE TAB EC 324 MG (65 MG FE EQUIVALENT) 324 (65 FE) MG
324 TABLET DELAYED RESPONSE ORAL
Qty: 60 TABLET | Refills: 0 | Status: SHIPPED | OUTPATIENT
Start: 2018-09-02 | End: 2018-12-26 | Stop reason: SDUPTHER

## 2018-09-02 RX ADMIN — PREGABALIN 300 MG: 75 CAPSULE ORAL at 08:37

## 2018-09-02 RX ADMIN — FUROSEMIDE 20 MG: 20 TABLET ORAL at 08:37

## 2018-09-02 RX ADMIN — TAMSULOSIN HYDROCHLORIDE 0.4 MG: 0.4 CAPSULE ORAL at 08:37

## 2018-09-02 RX ADMIN — NICOTINE 1 PATCH: 14 PATCH, EXTENDED RELEASE TRANSDERMAL at 08:40

## 2018-09-02 RX ADMIN — TIOTROPIUM BROMIDE 18 MCG: 18 CAPSULE ORAL; RESPIRATORY (INHALATION) at 08:39

## 2018-09-02 RX ADMIN — RANOLAZINE 500 MG: 500 TABLET, FILM COATED, EXTENDED RELEASE ORAL at 08:37

## 2018-09-02 RX ADMIN — PANTOPRAZOLE SODIUM 40 MG: 40 TABLET, DELAYED RELEASE ORAL at 05:24

## 2018-09-02 RX ADMIN — LISINOPRIL 20 MG: 20 TABLET ORAL at 08:37

## 2018-09-02 RX ADMIN — TRAMADOL HYDROCHLORIDE 50 MG: 50 TABLET, COATED ORAL at 03:39

## 2018-09-02 RX ADMIN — ATORVASTATIN CALCIUM 40 MG: 40 TABLET, FILM COATED ORAL at 08:37

## 2018-09-02 RX ADMIN — METOPROLOL SUCCINATE 100 MG: 100 TABLET, EXTENDED RELEASE ORAL at 08:37

## 2018-09-02 RX ADMIN — DILTIAZEM HYDROCHLORIDE 180 MG: 180 CAPSULE, COATED, EXTENDED RELEASE ORAL at 08:37

## 2018-09-02 RX ADMIN — APIXABAN 5 MG: 5 TABLET, FILM COATED ORAL at 08:37

## 2018-09-02 NOTE — DISCHARGE SUMMARY
Discharge Summary - Joel Mendiola 74 y o  male MRN: [id number]    Unit/Bed#: 401-01 Encounter: [id number]    Admission Date:   Admission Orders     Ordered        08/30/18 2011  Inpatient Admission (expected length of stay for this patient is greater than two midnights)  Once               Admitting Diagnosis: GI bleeding [K92 2]  Weakness [R53 1]  Anemia [D64 9]    HPI: Joel Mendiola is a 74 y o  male who presents to the ER complaining of generalized weakness  He was sent to ER by PCP for further evaluation because his hemoglobin and potassium was low  He admits to having episode of blood in his stool  He also reports that he has frequent nose bleeds  Which may be contributing to anemia  Labs completed in ER shows Potassium of 3 2 and hemoglobin of 7 6  CT abdomin and pelvis completed with results as shown below  He received potassium chloride 40 meq PO and Sodium chloride 1L in ER  Procedures Performed:   Orders Placed This Encounter   Procedures    ED ECG Documentation Only       Summary of Hospital Course:     Acute on chronic Microcytic anemia / possible GI bleed /   Patient had his hemoglobin checked as an outpatient and was noted to be anemic in the sevens  He was referred to the ER for evaluation and was recommended for inpatient admission  Eliquis, aspirin, Plavix were held  There were no obvious signs of bleeding and Eliquis was resumed on 09/01/2018  His hemoglobin remained stable and there was no other episodes of epistaxis or blood in the stool  The case was discussed with Gastroenterology on 08/31/2018, and if hemoglobin remained stable the patient can be discharged home for outpatient EGD and colonoscopy  I instructed the patient to continue to hold his Plavix  He may resume his aspirin and continue on his Eliquis  He will contact the office of Dr Kerr and Dr Ngoc Jesus in [place] on Tuesday to confirm appointment for EGD and colonoscopy on Friday    He will hold Eliquis and aspirin 48 hours prior to procedure  An iron panel was obtained and showed evidence of iron deficiency  Patient will be discharged on parenteral iron b i d  I also elected to increase his PPI to b i d  dosing    Persistent atrial fibrillation  Patient was monitored on telemetry  Rate was appropriate  Eliquis was resumed on 09/01/2018 and hemoglobin remained stable without evidence of bleeding  No other medication changes were made    Chronic combined CHF  No changes in the patient's medication regimen were made        Significant Findings, Care, Treatment and Services Provided:     CT A/P  No acute intra-abdominal abnormality   No free air, free fluid, mesenteric inflammatory process, or bowel wall thickening  Increase size of right pleural effusion now moderate   Stable small left pleural effusion  Stable calcifications in the mesentery  Complications: none    Discharge Diagnosis: see above    Resolved Problems  Date Reviewed: 8/30/2018    None          Condition at Discharge: good         Discharge instructions/Information to patient and family:   See after visit summary for information provided to patient and family  Provisions for Follow-Up Care:  See after visit summary for information related to follow-up care and any pertinent home health orders  PCP: Bahman Schultz DO    Disposition: Home    Planned Readmission: No    Discharge Statement   I spent 45 minutes discharging the patient  This time was spent on the day of discharge  I had direct contact with the patient on the day of discharge  Additional documentation is required if more than 30 minutes were spent on discharge  Discharge Medications:  See after visit summary for reconciled discharge medications provided to patient and family

## 2018-09-02 NOTE — SOCIAL WORK
Cm spoke with the patient and they are for d/c to home today  Cm is taking into account that the patient has preferences while planning the d/c needs  Cm plan was discussed with the patient and the patient is agreeable to the plan the patient does understand the importance of follow up care and taking the medications as prescribed  The patient is aware of any symptoms that he should look for when d/c  The patient is a readmission risk and he was  referred to the outpatient coordinator for follow up he will be for a scope with GI on Friday and I will follow up with GI on Tuesday to see when they will scope him and I will call the patient    I will also arrange follow up with pcp, the patient is aware of his right to appeal the d/c and he signed the imm and he is agreeable to the d/c plan I sent a referral to [facility] and they did accept the patient

## 2018-09-02 NOTE — PLAN OF CARE
Problem: DISCHARGE PLANNING - CARE MANAGEMENT  Goal: Discharge to post-acute care or home with appropriate resources  INTERVENTIONS:  - Conduct assessment to determine patient/family and health care team treatment goals, and need for post-acute services based on payer coverage, community resources, and patient preferences, and barriers to discharge  - Address psychosocial, clinical, and financial barriers to discharge as identified in assessment in conjunction with the patient/family and health care team  - Arrange appropriate level of post-acute services according to patient's   needs and preference and payer coverage in collaboration with the physician and health care team  - Communicate with and update the patient/family, physician, and health care team regarding progress on the discharge plan  - Arrange appropriate transportation to post-acute venues   Outcome: Completed Date Met: 09/02/18  lovely with the out patient coordinator and with the [facility]

## 2018-09-02 NOTE — DISCHARGE INSTRUCTIONS
1) Continue to not take your plavix until the procedure    2) do not take aspirin / Eliquis 48 hours prior to your procedure

## 2018-09-04 ENCOUNTER — TRANSITIONAL CARE MANAGEMENT (OUTPATIENT)
Dept: FAMILY MEDICINE CLINIC | Facility: CLINIC | Age: 73
End: 2018-09-04

## 2018-09-04 DIAGNOSIS — R19.7 INTRACTABLE DIARRHEA: Primary | ICD-10-CM

## 2018-09-05 ENCOUNTER — PATIENT OUTREACH (OUTPATIENT)
Dept: FAMILY MEDICINE CLINIC | Facility: CLINIC | Age: 73
End: 2018-09-05

## 2018-09-05 ENCOUNTER — TELEPHONE (OUTPATIENT)
Dept: GASTROENTEROLOGY | Facility: MEDICAL CENTER | Age: 73
End: 2018-09-05

## 2018-09-05 NOTE — PROGRESS NOTES
Called pt RE; still needs transportation for Friday 9/7/18   Not accepting calls at this time  unable to leave message

## 2018-09-05 NOTE — PROGRESS NOTES
called pt to review appts   pt has colonoscopy and pcp appt on same day 9/7/18   rescheduled pcp appt to 9/10/18 and called [facility] for transportation  Pt does not know how he will get to [facility] for colonoscopy  CM called GI and they will call pt day before procedure with time   He would have to know 2 days in advance to schedule [facility] and have someone accompany him  On the bus   Cm called silvana per pt request  and she is unable to provide transportation  CM also called Elkin Kowalski ,his care giver left message on voice mail to return call  Radhao asked pt to have Jagdeep call CM when he gets home to see if he can provide transportation  Advised pt to call GI offic eif he does not recive his prep in mail from [place]  verbalizes he will ,mail did not come today yet

## 2018-09-05 NOTE — TELEPHONE ENCOUNTER
----- Message from Tika Cardenas MD sent at 9/3/2018  8:54 PM EDT -----  Regarding: please schedule this patient for egd and colonoscopy ASAP  Ru Parson,  This pt has anemia  On plavix, eliquis and ASA  He was discharged over the weekend from [facility]  Please schedule him for egd and colon for 9/7/17 at [email or web address] with one of our physician  He already stopped plavix and will hold eliquis 2 days before his procedure  Please keep me updated      Thanks

## 2018-09-05 NOTE — PROGRESS NOTES
Re: pt needs ride for colonoscopy on Friday 9/7/18             13:17 Left message to call   Pt reports [place] would be home after 1  At 2:29 called Jagdeep back no answer, unable to leave message

## 2018-09-05 NOTE — TELEPHONE ENCOUNTER
Egd/colon rescheduled with Dr Cruz at [facility] on 9/7/2018  Re Valdes faxed suprep instructions to [phone] [facility] in [facility]

## 2018-09-05 NOTE — LETTER
Cardiology Pre Operative Clearance      PRE OPERATIVE CARDIAC RISK ASSESSMENT    09/05/18    Maryesi [facility]  1945  [id number]    Date of Surgery: 9/11/2018    Type of Surgery: colonoscopy    Surgeon: Gastroenterology    No Cardiac Contraindication for Planned Surgical Procedures    Anticoagulation: Eliquis    Physician Comment: OK to hold Eliquis 2 days prior to procedure and resume ASAP post-procedure         Electronically Signed: Candelario Patiño DO

## 2018-09-06 ENCOUNTER — TELEPHONE (OUTPATIENT)
Dept: FAMILY MEDICINE CLINIC | Facility: CLINIC | Age: 73
End: 2018-09-06

## 2018-09-06 ENCOUNTER — ANESTHESIA EVENT (OUTPATIENT)
Dept: PERIOP | Facility: HOSPITAL | Age: 73
End: 2018-09-06

## 2018-09-06 NOTE — TELEPHONE ENCOUNTER
ESA  He is non comliant with his meds   they prefill  they went in today and he said he did not take due to colonoscopy but she called hteir office and they said they did not give instructions yet  He takes his meds when ever he wants   she cannot keep coming and take responsibility for his med non compliance

## 2018-09-07 ENCOUNTER — ANESTHESIA (OUTPATIENT)
Dept: PERIOP | Facility: HOSPITAL | Age: 73
End: 2018-09-07

## 2018-09-07 NOTE — ANESTHESIA PREPROCEDURE EVALUATION
Review of Systems/Medical History          Cardiovascular  Hyperlipidemia, Hypertension controlled, Past MI Unspecified site/ episode of care, CAD , Dysrhythmias , atrial fibrillation, CHF ,    Pulmonary  Smoker , Pneumonia (Hx), COPD moderate- medication dependent , Asthma , well controlled/ stable , Shortness of breath, Sleep apnea CPAP,        GI/Hepatic    GERD well controlled, Bowel prep       Kidney stones,        Endo/Other  Negative endo/other ROS Diabetes ,   Comment: Recent nosebleeds    GYN       Hematology  Anemia (hb 7) ,     Musculoskeletal    Arthritis     Neurology    TIA, CVA , Headaches (hx, head injury 30 years ago; migraines),    Psychology   Depression , being treated for depression,                   Anesthesia Plan  ASA Score- 4     Anesthesia Type- IV sedation with anesthesia with ASA Monitors  Additional Monitors:   Airway Plan:     Comment: Instructed to stop ASA and eliquis 2 days before surgery  Plan Factors-    Induction- intravenous      Postoperative Plan-     Informed Consent-

## 2018-09-10 ENCOUNTER — OFFICE VISIT (OUTPATIENT)
Dept: FAMILY MEDICINE CLINIC | Facility: CLINIC | Age: 73
End: 2018-09-10
Payer: COMMERCIAL

## 2018-09-10 ENCOUNTER — PATIENT OUTREACH (OUTPATIENT)
Dept: FAMILY MEDICINE CLINIC | Facility: CLINIC | Age: 73
End: 2018-09-10

## 2018-09-10 ENCOUNTER — TELEPHONE (OUTPATIENT)
Dept: FAMILY MEDICINE CLINIC | Facility: CLINIC | Age: 73
End: 2018-09-10

## 2018-09-10 ENCOUNTER — APPOINTMENT (OUTPATIENT)
Dept: LAB | Facility: MEDICAL CENTER | Age: 73
End: 2018-09-10
Payer: COMMERCIAL

## 2018-09-10 VITALS
WEIGHT: 188 LBS | DIASTOLIC BLOOD PRESSURE: 74 MMHG | HEIGHT: 69 IN | SYSTOLIC BLOOD PRESSURE: 130 MMHG | BODY MASS INDEX: 27.85 KG/M2

## 2018-09-10 DIAGNOSIS — K92.2 GASTROINTESTINAL HEMORRHAGE, UNSPECIFIED GASTROINTESTINAL HEMORRHAGE TYPE: Primary | ICD-10-CM

## 2018-09-10 DIAGNOSIS — S90.424A BLISTER OF TOE OF RIGHT FOOT WITHOUT INFECTION, INITIAL ENCOUNTER: ICD-10-CM

## 2018-09-10 LAB
ANION GAP SERPL CALCULATED.3IONS-SCNC: 6 MMOL/L (ref 4–13)
BASOPHILS # BLD AUTO: 0.11 THOUSANDS/ΜL (ref 0–0.1)
BASOPHILS NFR BLD AUTO: 2 % (ref 0–1)
BUN SERPL-MCNC: 15 MG/DL (ref 5–25)
CALCIUM SERPL-MCNC: 8.9 MG/DL (ref 8.3–10.1)
CHLORIDE SERPL-SCNC: 108 MMOL/L (ref 100–108)
CO2 SERPL-SCNC: 27 MMOL/L (ref 21–32)
CREAT SERPL-MCNC: 0.86 MG/DL (ref 0.6–1.3)
EOSINOPHIL # BLD AUTO: 0.37 THOUSAND/ΜL (ref 0–0.61)
EOSINOPHIL NFR BLD AUTO: 6 % (ref 0–6)
ERYTHROCYTE [DISTWIDTH] IN BLOOD BY AUTOMATED COUNT: 16.4 % (ref 11.6–15.1)
GFR SERPL CREATININE-BSD FRML MDRD: 85 ML/MIN/1.73SQ M
GLUCOSE P FAST SERPL-MCNC: 99 MG/DL (ref 65–99)
HCT VFR BLD AUTO: 29.4 % (ref 36.5–49.3)
HGB BLD-MCNC: 8.3 G/DL (ref 12–17)
IMM GRANULOCYTES # BLD AUTO: 0.03 THOUSAND/UL (ref 0–0.2)
IMM GRANULOCYTES NFR BLD AUTO: 1 % (ref 0–2)
LYMPHOCYTES # BLD AUTO: 1.62 THOUSANDS/ΜL (ref 0.6–4.47)
LYMPHOCYTES NFR BLD AUTO: 25 % (ref 14–44)
MCH RBC QN AUTO: 19.3 PG (ref 26.8–34.3)
MCHC RBC AUTO-ENTMCNC: 28.2 G/DL (ref 31.4–37.4)
MCV RBC AUTO: 68 FL (ref 82–98)
MONOCYTES # BLD AUTO: 0.73 THOUSAND/ΜL (ref 0.17–1.22)
MONOCYTES NFR BLD AUTO: 11 % (ref 4–12)
NEUTROPHILS # BLD AUTO: 3.67 THOUSANDS/ΜL (ref 1.85–7.62)
NEUTS SEG NFR BLD AUTO: 55 % (ref 43–75)
NRBC BLD AUTO-RTO: 0 /100 WBCS
PLATELET # BLD AUTO: 296 THOUSANDS/UL (ref 149–390)
PMV BLD AUTO: 10.2 FL (ref 8.9–12.7)
POTASSIUM SERPL-SCNC: 3.7 MMOL/L (ref 3.5–5.3)
RBC # BLD AUTO: 4.31 MILLION/UL (ref 3.88–5.62)
SODIUM SERPL-SCNC: 141 MMOL/L (ref 136–145)
WBC # BLD AUTO: 6.53 THOUSAND/UL (ref 4.31–10.16)

## 2018-09-10 PROCEDURE — 1111F DSCHRG MED/CURRENT MED MERGE: CPT | Performed by: FAMILY MEDICINE

## 2018-09-10 PROCEDURE — 99214 OFFICE O/P EST MOD 30 MIN: CPT | Performed by: FAMILY MEDICINE

## 2018-09-10 PROCEDURE — 80048 BASIC METABOLIC PNL TOTAL CA: CPT | Performed by: FAMILY MEDICINE

## 2018-09-10 PROCEDURE — 36415 COLL VENOUS BLD VENIPUNCTURE: CPT | Performed by: FAMILY MEDICINE

## 2018-09-10 PROCEDURE — 85025 COMPLETE CBC W/AUTO DIFF WBC: CPT | Performed by: FAMILY MEDICINE

## 2018-09-10 NOTE — PROGRESS NOTES
pt emergency contact called back  Emy Jay reports he will give a ride if colonoscopy rescheduled but is not pt care giver and cant make him follow instructions for a prep  he says pt is very non compliant and does what he wants  Emy Jay suggested calling pt right now to discuss rescheduling as he is home

## 2018-09-10 NOTE — TELEPHONE ENCOUNTER
I called  3 different numbers to reach this pt today  I will try from landline in your office on wed,maybe he will recognize that number and answer the phone
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
## 2018-09-10 NOTE — TELEPHONE ENCOUNTER
Patient is still needing help with transportation to get his EGD/colonoscopy  Also has a blister on his right 1st toe  Hopefully home health can take care of this  I did put in a referral to podiatry, Dr Kolton King    Thank you for any help you can give to this patient  -Eloise Sandra

## 2018-09-11 DIAGNOSIS — K92.2 GASTROINTESTINAL HEMORRHAGE, UNSPECIFIED GASTROINTESTINAL HEMORRHAGE TYPE: Primary | ICD-10-CM

## 2018-09-12 ENCOUNTER — PATIENT OUTREACH (OUTPATIENT)
Dept: FAMILY MEDICINE CLINIC | Facility: CLINIC | Age: 73
End: 2018-09-12

## 2018-09-12 ENCOUNTER — TELEPHONE (OUTPATIENT)
Dept: GASTROENTEROLOGY | Facility: CLINIC | Age: 73
End: 2018-09-12
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
## 2018-09-12 NOTE — TELEPHONE ENCOUNTER
Dr Tania Cruz pt    Please call the pt to r/s their colonoscopy that was cancelled on 9/7  [phone]

## 2018-09-12 NOTE — PROGRESS NOTES
called [id number] [facility] and spoke with Jade Lozano  She will have  reach out to pt to reschedule colonoscopy at [facility]

## 2018-09-13 ENCOUNTER — TELEPHONE (OUTPATIENT)
Dept: FAMILY MEDICINE CLINIC | Facility: CLINIC | Age: 73
End: 2018-09-13

## 2018-09-13 ENCOUNTER — HOSPITAL ENCOUNTER (EMERGENCY)
Facility: HOSPITAL | Age: 73
Discharge: HOME | End: 2018-09-14
Attending: EMERGENCY MEDICINE | Admitting: EMERGENCY MEDICINE
Payer: COMMERCIAL

## 2018-09-13 ENCOUNTER — TELEPHONE (OUTPATIENT)
Dept: CARDIOLOGY CLINIC | Facility: CLINIC | Age: 73
End: 2018-09-13

## 2018-09-13 DIAGNOSIS — R04.0 EPISTAXIS: Primary | ICD-10-CM

## 2018-09-13 DIAGNOSIS — D64.9 SYMPTOMATIC ANEMIA: ICD-10-CM

## 2018-09-13 LAB
ABO GROUP BLD: NORMAL
APTT PPP: 42 SECONDS (ref 24–36)
BASOPHILS # BLD AUTO: 0.1 THOUSANDS/ΜL (ref 0–0.1)
BASOPHILS NFR BLD AUTO: 1 % (ref 0–1)
BLD GP AB SCN SERPL QL: NEGATIVE
EOSINOPHIL # BLD AUTO: 0.44 THOUSAND/ΜL (ref 0–0.61)
EOSINOPHIL NFR BLD AUTO: 4 % (ref 0–6)
ERYTHROCYTE [DISTWIDTH] IN BLOOD BY AUTOMATED COUNT: 16.6 % (ref 11.6–15.1)
HCT VFR BLD AUTO: 24.6 % (ref 36.5–49.3)
HGB BLD-MCNC: 7 G/DL (ref 12–17)
IMM GRANULOCYTES # BLD AUTO: 0.04 THOUSAND/UL (ref 0–0.2)
IMM GRANULOCYTES NFR BLD AUTO: 0 % (ref 0–2)
INR PPP: 1.49 (ref 0.86–1.17)
LYMPHOCYTES # BLD AUTO: 1.73 THOUSANDS/ΜL (ref 0.6–4.47)
LYMPHOCYTES NFR BLD AUTO: 17 % (ref 14–44)
MCH RBC QN AUTO: 19.1 PG (ref 26.8–34.3)
MCHC RBC AUTO-ENTMCNC: 28.5 G/DL (ref 31.4–37.4)
MCV RBC AUTO: 67 FL (ref 82–98)
MONOCYTES # BLD AUTO: 1.14 THOUSAND/ΜL (ref 0.17–1.22)
MONOCYTES NFR BLD AUTO: 11 % (ref 4–12)
NEUTROPHILS # BLD AUTO: 6.6 THOUSANDS/ΜL (ref 1.85–7.62)
NEUTS SEG NFR BLD AUTO: 67 % (ref 43–75)
NRBC BLD AUTO-RTO: 0 /100 WBCS
PLATELET # BLD AUTO: 253 THOUSANDS/UL (ref 149–390)
PMV BLD AUTO: 9.4 FL (ref 8.9–12.7)
PROTHROMBIN TIME: 17.3 SECONDS (ref 11.8–14.2)
RBC # BLD AUTO: 3.66 MILLION/UL (ref 3.88–5.62)
RH BLD: POSITIVE
SPECIMEN EXPIRATION DATE: NORMAL
WBC # BLD AUTO: 10.05 THOUSAND/UL (ref 4.31–10.16)

## 2018-09-13 PROCEDURE — 85610 PROTHROMBIN TIME: CPT | Performed by: EMERGENCY MEDICINE

## 2018-09-13 PROCEDURE — 86901 BLOOD TYPING SEROLOGIC RH(D): CPT | Performed by: EMERGENCY MEDICINE

## 2018-09-13 PROCEDURE — 86850 RBC ANTIBODY SCREEN: CPT | Performed by: EMERGENCY MEDICINE

## 2018-09-13 PROCEDURE — 36415 COLL VENOUS BLD VENIPUNCTURE: CPT | Performed by: EMERGENCY MEDICINE

## 2018-09-13 PROCEDURE — 85730 THROMBOPLASTIN TIME PARTIAL: CPT | Performed by: EMERGENCY MEDICINE

## 2018-09-13 PROCEDURE — P9021 RED BLOOD CELLS UNIT: HCPCS

## 2018-09-13 PROCEDURE — 86900 BLOOD TYPING SEROLOGIC ABO: CPT | Performed by: EMERGENCY MEDICINE

## 2018-09-13 PROCEDURE — 85025 COMPLETE CBC W/AUTO DIFF WBC: CPT | Performed by: EMERGENCY MEDICINE

## 2018-09-13 PROCEDURE — 86920 COMPATIBILITY TEST SPIN: CPT

## 2018-09-13 RX ORDER — TRANEXAMIC ACID 100 MG/ML
1000 INJECTION, SOLUTION INTRAVENOUS ONCE
Status: COMPLETED | OUTPATIENT
Start: 2018-09-13 | End: 2018-09-13

## 2018-09-13 RX ORDER — ACETAMINOPHEN 325 MG/1
650 TABLET ORAL ONCE
Status: COMPLETED | OUTPATIENT
Start: 2018-09-13 | End: 2018-09-13

## 2018-09-13 RX ORDER — CEPHALEXIN 500 MG/1
500 CAPSULE ORAL EVERY 6 HOURS SCHEDULED
Qty: 12 CAPSULE | Refills: 0 | Status: SHIPPED | OUTPATIENT
Start: 2018-09-13 | End: 2018-09-16

## 2018-09-13 RX ADMIN — ACETAMINOPHEN 650 MG: 325 TABLET, FILM COATED ORAL at 23:01

## 2018-09-13 RX ADMIN — TRANEXAMIC ACID 1000 MG: 100 INJECTION, SOLUTION INTRAVENOUS at 18:35

## 2018-09-13 NOTE — TELEPHONE ENCOUNTER
He will no longer be getting skilled nursing from [facility]  he is so non compliant he does not want them,does not take meds and did not get his colonoscopy      FYI

## 2018-09-13 NOTE — TELEPHONE ENCOUNTER
Scheduled colonoscopy 9/25/18 at [facility] with Dr Kiarra Cameron patient still has prep insformation from lasty procedure faxed blood thinner clearance to Dr Mati Banerjee office
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
## 2018-09-14 VITALS
HEART RATE: 76 BPM | OXYGEN SATURATION: 98 % | BODY MASS INDEX: 28.31 KG/M2 | TEMPERATURE: 97.7 F | RESPIRATION RATE: 16 BRPM | SYSTOLIC BLOOD PRESSURE: 173 MMHG | HEIGHT: 69 IN | WEIGHT: 191.14 LBS | DIASTOLIC BLOOD PRESSURE: 84 MMHG

## 2018-09-14 LAB
ABO GROUP BLD BPU: NORMAL
ABO GROUP BLD BPU: NORMAL
BPU ID: NORMAL
BPU ID: NORMAL
CROSSMATCH: NORMAL
CROSSMATCH: NORMAL
UNIT DISPENSE STATUS: NORMAL
UNIT DISPENSE STATUS: NORMAL
UNIT PRODUCT CODE: NORMAL
UNIT PRODUCT CODE: NORMAL
UNIT RH: NORMAL
UNIT RH: NORMAL

## 2018-09-14 PROCEDURE — 99284 EMERGENCY DEPT VISIT MOD MDM: CPT

## 2018-09-14 PROCEDURE — 36430 TRANSFUSION BLD/BLD COMPNT: CPT

## 2018-09-14 NOTE — ED NOTES
Blood tubing flushed with normal saline at a rate of 350ml/hr for 100mls        Rosa Garces RN  09/13/18 7924

## 2018-09-14 NOTE — DISCHARGE INSTRUCTIONS
Epistaxis   WHAT YOU SHOULD KNOW:   Epistaxis is a nosebleed  A nosebleed occurs when the blood vessels near the surface of the nasal cavity are injured or damaged  AFTER YOU LEAVE:   Medicines:   · Nasal sprays:  Vasoconstrictor nasal spray is a medicine that helps make nasal blood vessels narrower  This limits the blood flow and stops the bleeding  This medicine also decreases the swelling inside your nose and helps you breathe easier  You may also be directed to use saline or other nasal sprays to add moisture to your nose  · Antibiotics: This medicine is given to help treat or prevent an infection caused by bacteria  · Take your medicine as directed  Call your healthcare provider if you think your medicine is not helping or if you have side effects  Tell him if you are allergic to any medicine  Keep a list of the medicines, vitamins, and herbs you take  Include the amounts, and when and why you take them  Bring the list or the pill bottles to follow-up visits  Carry your medicine list with you in case of an emergency  Follow up with your primary healthcare provider or otolaryngologist within 2 to 3 days or as directed: Any packing in your nose should be removed within 2 to 3 days  Write down your questions so you remember to ask them during your visits  First aid:   · Sit up and lean forward: This will help prevent you from swallowing blood  Spit blood and saliva into a bowl  · Apply pressure to your nose:  Use 2 fingers to pinch your nose shut for 10 minutes  This will help stop the bleeding  Breathe through your mouth  · Apply ice:  Use a cold pack or put crushed ice in a bag, cover with a towel, and place on the bridge of your nose  · Nasal packing:  Pack your nose with a cotton ball, tissue, tampon, or gauze bandage to stop the bleeding  Prevent epistaxis:  · Avoid nose picking and blowing your nose too hard: You can irritate or damage your nose if you pick it   Blowing your nose too hard may cause the bleeding to start again  · Avoid irritants:  Substances that can irritate your nose should be avoided  These include tobacco smoke and chemical sprays such as  that contain ammonia  · Use a cool mist humidifier in your home: This will add the moisture to the air and help keep your nose moist      · Put a small amount of petroleum jelly inside your nostrils: You may apply a small amount of petroleum jelly if you do not have a nasal packing  This will help keep your nose from drying out or getting irritated  Do not put anything else inside your nose unless your primary healthcare provider tells you to do so  Contact your primary healthcare provider or otolaryngologist if:   · You have a fever and are vomiting  · You have pain in and around your nose that is getting worse even after you take pain medicines  · Your nasal pack is loose  · You have questions or concerns about your condition or care  Seek care immediately if:   · Your nasal packing is soaked with blood  · Your nose is still bleeding after 20 minutes, even after you pinch it  · You have a foul-smelling discharge coming out of your nose  · You feel so weak and dizzy that you have trouble standing up  · You have trouble breathing or talking  © 2014 [address] is for End User's use only and may not be sold, redistributed or otherwise used for commercial purposes  All illustrations and images included in CareNotes® are the copyrighted property of [employer]  or [employer]  The above information is an  only  It is not intended as medical advice for individual conditions or treatments  Talk to your doctor, nurse or pharmacist before following any medical regimen to see if it is safe and effective for you      Anemia   WHAT YOU NEED TO KNOW:   Anemia is a low number of red blood cells or a low amount of hemoglobin in your red blood cells  Hemoglobin is a protein that helps carry oxygen throughout your body  Red blood cells use iron to create hemoglobin  Anemia may develop if your body does not have enough iron  It may also develop if your body does not make enough red blood cells or they die faster than your body can make them  DISCHARGE INSTRUCTIONS:   Call 911 or have someone call 911 for any of the following:   · You lose consciousness  · You have severe chest pain  Seek care immediately if:   · You have dark or bloody bowel movements  Contact your healthcare provider if:   · Your symptoms are worse, even after treatment  · You have questions or concerns about your condition or care  Medicines:   · Iron or folic acid supplements  help increase your red blood cell and hemoglobin levels  · Vitamin B12 injections  may help boost your red blood cell level and decrease your symptoms  Ask your healthcare provider how to inject B12  · Take your medicine as directed  Contact your healthcare provider if you think your medicine is not helping or if you have side effects  Tell him of her if you are allergic to any medicine  Keep a list of the medicines, vitamins, and herbs you take  Include the amounts, and when and why you take them  Bring the list or the pill bottles to follow-up visits  Carry your medicine list with you in case of an emergency  Prevent anemia:  Eat healthy foods rich in iron and vitamin C  Nuts, meat, dark leafy green vegetables, and beans are high in iron and protein  Vitamin C helps your body absorb iron  Foods rich in vitamin C include oranges and other citrus fruits  Ask your healthcare provider for a list of other foods that are high in iron or vitamin C  Ask if you need to be on a special diet  Follow up with your healthcare provider as directed:  Write down your questions so you remember to ask them during your visits     © 2017 [employer] Information is for End User's use only and may not be sold, redistributed or otherwise used for commercial purposes  All illustrations and images included in CareNotes® are the copyrighted property of [employer]  or [employer] Villarreal  The above information is an  only  It is not intended as medical advice for individual conditions or treatments  Talk to your doctor, nurse or pharmacist before following any medical regimen to see if it is safe and effective for you  Blood Transfusion   GENERAL INFORMATION:   What is a blood transfusion? A blood transfusion is a procedure used to give you blood through an IV  The IV is placed into a large vein, usually in your arm  You may get only part of the blood, such as red blood cells, platelets, or plasma  Transfusions are usually done in the hospital or in a transfusion center  Why do I need a blood transfusion? A blood transfusion can help replace blood you lost through illness, injury, or surgery  · You may need a transfusion to increase your blood volume after surgery or hemorrhage (uncontrolled bleeding)  Platelets and plasma may be used during surgery because they help your body stop bleeding  · You may get a transfusion of red blood cells for chronic anemia (lack of red blood cells)  You may get platelets or plasma to treat a condition that affects blood clotting, such as hemophilia  You may need platelets if you had chemotherapy to treat cancer  What do I need to know about blood transfusions? · Donor blood is tested for HIV, hepatitis, syphilis, West Nile virus, and other diseases before transfusion  · Blood transfusions are usually not painful  Caregivers will use the smallest needle possible so the procedure is comfortable  They will also try to find a large vein to use  You may feel some pain or see bruises near the site for a few days after the transfusion       · Most people do not need to make changes to what they eat or to their activities before or after a transfusion  Ask your caregiver if you need to make any changes  · A transfusion can last 1 to 4 hours  Ask your caregiver how long your transfusion should take  · You may need to arrange for transportation home if your transfusion is at a transfusion center  Ask your caregiver if you will be able to drive home  · Ask what you can bring into the transfusion room  You may be able to eat, read, or watch TV during the transfusion  You may also be able to go to the restroom with help from a caregiver  · You may be able to supply your own blood for transfusion during a planned surgery  This is called autologous blood donation  Your blood will need to be drawn and stored a few weeks before the surgery  What happens before a blood transfusion? The caregiver will explain the procedure to you and answer your questions  · Informed consent  is a legal document that explains the tests, treatments, or procedures that you may need  Informed consent means you understand what will be done and can make decisions about what you want  You give your permission when you sign the consent form  You can have someone sign this form for you if you are not able to sign it  You have the right to understand your medical care in words you know  Before you sign the consent form, understand the risks and benefits of what will be done  Make sure all your questions are answered  · Identification:  Caregivers will verify the written order for a transfusion  Two caregivers must check that your name, birth date, and ID number match the blood ordered for you  A caregiver may scan the barcodes on the blood bag and on your ID bracelet to ensure a match  · Blood sample:  Before a planned transfusion, caregivers will take a sample of your blood to learn your blood type and Rh factor  The 4 blood types are O, A, B, and AB  Your Rh factor is either positive or negative   The transfused blood must be the right type and Rh factor for you  You can get sick if your immune system tries to destroy blood that is not right for you  This is called a blood transfusion reaction  Ask your caregiver for more information about blood transfusion reactions  · Medicines: You can have an allergic reaction to the blood, even if it is the right type and Rh factor  Tell the caregiver if you have ever developed a fever, itching, swelling, or hives during a blood transfusion  You may be given antihistamines or fever reducers to help prevent an allergic reaction  · IV:  An IV will be placed in your vein if you do not already have a central catheter  · Vital signs:  Caregivers will check your blood pressure, heart rate, breathing rate, and temperature before the transfusion starts  They will ask if you feel any pain  What happens during a blood transfusion? · Equipment:  The bag that contains blood will hang next to your bed or chair during the transfusion  There will also be a bag that contains saline  Tubing connects the blood and saline bags to your IV  The caregiver will let saline run through the tubing first  He will also run saline through the line in between blood bags if the first empties before the second arrives  · Transfusion starts: The caregiver will open a clamp so the blood can enter your IV  The blood transfusion will start slowly so caregivers can watch for signs of a reaction  Even a small amount of donor blood can cause a reaction  A caregiver will stay with you for at least 15 minutes after the transfusion starts  · Vital signs:  Caregivers will check your vital signs at least once every hour until the transfusion is done  Tell them if you have signs of a reaction, such as pain, nausea, or itching  They will stop the transfusion immediately  If you are not awake or alert, caregivers will check for a drop in blood pressure, blood in your urine, and bleeding  What happens after a blood transfusion?    · Assessment:  Caregivers will check your vital signs  You may have blood taken to check that your body accepted the donor blood  You will have to stay a short time after the transfusion ends so caregivers can watch you for signs of a reaction  · Equipment:  The tubing is flushed with saline  Your IV will be removed if you do not need it for other treatment  What are the risks of blood transfusion? · Transfusion side effects may be immediate or days later  Fever, chills, or mild allergic reactions such as itching, hives, or swelling can happen within hours of transfusion  You may develop shortness of breath or other breathing problems, sometimes severe  A very rare allergic reaction called anaphylaxis may cause you to go into shock and stop breathing  Delayed reactions include bruising, tiredness, or weakness that develops within 10 days of transfusion  Delayed reactions may cause mild to severe effects the next time you receive blood  · If you do not have a blood transfusion, your condition may worsen, or it may take longer than expected to recover  Refusal of a blood transfusion in an emergency can be life-threatening  Ask your caregiver about other treatment options if you are unsure about blood transfusion  When should I contact my caregiver? Contact your caregiver if:  · You have questions or concerns about blood transfusions  When should I seek immediate care? Seek care immediately or call 911 if:  · You develop a high fever and chills  · You feel dizzy or faint  · You cannot urinate, or you urinate very little  · You develop headaches or double vision  · You have a seizure  · You have chest pain or feel short of breath  · You feel tired and weak  · You see pinpoint purple spots or purple patches on your body  · Your skin or eyes look yellow  CARE AGREEMENT:   You have the right to help plan your care  Learn about your health condition and how it may be treated   Discuss treatment options with your caregivers to decide what care you want to receive  You always have the right to refuse treatment  The above information is an  only  It is not intended as medical advice for individual conditions or treatments  Talk to your doctor, nurse or pharmacist before following any medical regimen to see if it is safe and effective for you  © 2014 [address] is for End User's use only and may not be sold, redistributed or otherwise used for commercial purposes  All illustrations and images included in CareNotes® are the copyrighted property of A [employer] A M  Inc  or [employer] Villarreal

## 2018-09-14 NOTE — ED NOTES
Patient complaining of right "kidney pain"  Vitals remain stable throughout blood administration   Dr Dominique Hernández made aware      Charmaine Doll RN  09/13/18 0638

## 2018-09-14 NOTE — ED NOTES
Eating box lunch at present - no complaints offered  Packing intact to left nares - no bleeding noted from same       René Tang RN  09/13/18 [id number]

## 2018-09-18 NOTE — TELEPHONE ENCOUNTER
Tried to reach pateint to relay message about blood thinner VM says "memory full"  Cell number goes straight to voicemail, the voicemail is not set up to receive messages

## 2018-09-19 ENCOUNTER — PATIENT OUTREACH (OUTPATIENT)
Dept: FAMILY MEDICINE CLINIC | Facility: CLINIC | Age: 73
End: 2018-09-19

## 2018-09-19 NOTE — PROGRESS NOTES
called for follow up  voice mail message states unavailable to take call and unable to leave message

## 2018-09-25 ENCOUNTER — HOSPITAL ENCOUNTER (OUTPATIENT)
Facility: HOSPITAL | Age: 73
Setting detail: OUTPATIENT SURGERY
End: 2018-09-25
Attending: INTERNAL MEDICINE | Admitting: INTERNAL MEDICINE
Payer: COMMERCIAL

## 2018-09-25 ENCOUNTER — HOSPITAL ENCOUNTER (OUTPATIENT)
Facility: HOSPITAL | Age: 73
Setting detail: OBSERVATION
LOS: 1 days | Discharge: HOME | End: 2018-09-26
Attending: INTERNAL MEDICINE | Admitting: FAMILY MEDICINE
Payer: COMMERCIAL

## 2018-09-25 VITALS
SYSTOLIC BLOOD PRESSURE: 168 MMHG | HEART RATE: 87 BPM | HEIGHT: 69 IN | DIASTOLIC BLOOD PRESSURE: 90 MMHG | BODY MASS INDEX: 28.29 KG/M2 | RESPIRATION RATE: 20 BRPM | OXYGEN SATURATION: 96 % | WEIGHT: 191 LBS | TEMPERATURE: 97.8 F

## 2018-09-25 DIAGNOSIS — R10.9 ABDOMINAL PAIN: Primary | ICD-10-CM

## 2018-09-25 DIAGNOSIS — I48.91 ATRIAL FIBRILLATION WITH RAPID VENTRICULAR RESPONSE (HCC): Chronic | ICD-10-CM

## 2018-09-25 DIAGNOSIS — R19.7 INTRACTABLE DIARRHEA: ICD-10-CM

## 2018-09-25 PROBLEM — I10 ACCELERATED HYPERTENSION: Status: ACTIVE | Noted: 2018-09-25

## 2018-09-25 PROBLEM — R19.5 HEME POSITIVE STOOL: Status: ACTIVE | Noted: 2018-09-25

## 2018-09-25 LAB
GLUCOSE SERPL-MCNC: 100 MG/DL (ref 65–140)
GLUCOSE SERPL-MCNC: 103 MG/DL (ref 65–140)
GLUCOSE SERPL-MCNC: 119 MG/DL (ref 65–140)
GLUCOSE SERPL-MCNC: 89 MG/DL (ref 65–140)

## 2018-09-25 PROCEDURE — 99219 PR INITIAL OBSERVATION CARE/DAY 50 MINUTES: CPT | Performed by: FAMILY MEDICINE

## 2018-09-25 PROCEDURE — 94664 DEMO&/EVAL PT USE INHALER: CPT

## 2018-09-25 PROCEDURE — 94660 CPAP INITIATION&MGMT: CPT

## 2018-09-25 PROCEDURE — 82948 REAGENT STRIP/BLOOD GLUCOSE: CPT

## 2018-09-25 RX ORDER — LEVALBUTEROL 1.25 MG/.5ML
1.25 SOLUTION, CONCENTRATE RESPIRATORY (INHALATION)
Status: DISCONTINUED | OUTPATIENT
Start: 2018-09-25 | End: 2018-09-26 | Stop reason: HOSPADM

## 2018-09-25 RX ORDER — MAGNESIUM CARB/ALUMINUM HYDROX 105-160MG
296 TABLET,CHEWABLE ORAL ONCE
Status: COMPLETED | OUTPATIENT
Start: 2018-09-26 | End: 2018-09-26

## 2018-09-25 RX ORDER — SODIUM CHLORIDE FOR INHALATION 0.9 %
3 VIAL, NEBULIZER (ML) INHALATION
Status: DISCONTINUED | OUTPATIENT
Start: 2018-09-25 | End: 2018-09-26 | Stop reason: HOSPADM

## 2018-09-25 RX ORDER — CLONIDINE HYDROCHLORIDE 0.1 MG/1
0.1 TABLET ORAL 2 TIMES DAILY
Status: DISCONTINUED | OUTPATIENT
Start: 2018-09-25 | End: 2018-09-26 | Stop reason: HOSPADM

## 2018-09-25 RX ORDER — MAGNESIUM CARB/ALUMINUM HYDROX 105-160MG
296 TABLET,CHEWABLE ORAL ONCE
Status: COMPLETED | OUTPATIENT
Start: 2018-09-25 | End: 2018-09-25

## 2018-09-25 RX ORDER — METOPROLOL SUCCINATE 100 MG/1
100 TABLET, EXTENDED RELEASE ORAL 2 TIMES DAILY
Status: DISCONTINUED | OUTPATIENT
Start: 2018-09-25 | End: 2018-09-26 | Stop reason: HOSPADM

## 2018-09-25 RX ORDER — PANTOPRAZOLE SODIUM 40 MG/1
40 TABLET, DELAYED RELEASE ORAL
Status: DISCONTINUED | OUTPATIENT
Start: 2018-09-25 | End: 2018-09-26 | Stop reason: HOSPADM

## 2018-09-25 RX ORDER — HEPARIN SODIUM 5000 [USP'U]/ML
5000 INJECTION, SOLUTION INTRAVENOUS; SUBCUTANEOUS EVERY 8 HOURS SCHEDULED
Status: DISCONTINUED | OUTPATIENT
Start: 2018-09-25 | End: 2018-09-26 | Stop reason: HOSPADM

## 2018-09-25 RX ORDER — LISINOPRIL 20 MG/1
20 TABLET ORAL DAILY
Status: DISCONTINUED | OUTPATIENT
Start: 2018-09-25 | End: 2018-09-26 | Stop reason: HOSPADM

## 2018-09-25 RX ORDER — ATORVASTATIN CALCIUM 40 MG/1
40 TABLET, FILM COATED ORAL EVERY EVENING
Status: DISCONTINUED | OUTPATIENT
Start: 2018-09-25 | End: 2018-09-26 | Stop reason: HOSPADM

## 2018-09-25 RX ORDER — HYDRALAZINE HYDROCHLORIDE 20 MG/ML
10 INJECTION INTRAMUSCULAR; INTRAVENOUS EVERY 6 HOURS PRN
Status: DISCONTINUED | OUTPATIENT
Start: 2018-09-25 | End: 2018-09-26 | Stop reason: HOSPADM

## 2018-09-25 RX ORDER — RANOLAZINE 500 MG/1
500 TABLET, EXTENDED RELEASE ORAL 2 TIMES DAILY
Status: DISCONTINUED | OUTPATIENT
Start: 2018-09-25 | End: 2018-09-26 | Stop reason: HOSPADM

## 2018-09-25 RX ORDER — NICOTINE 21 MG/24HR
1 PATCH, TRANSDERMAL 24 HOURS TRANSDERMAL EVERY 24 HOURS
Status: DISCONTINUED | OUTPATIENT
Start: 2018-09-25 | End: 2018-09-26 | Stop reason: HOSPADM

## 2018-09-25 RX ORDER — TAMSULOSIN HYDROCHLORIDE 0.4 MG/1
0.4 CAPSULE ORAL
Status: DISCONTINUED | OUTPATIENT
Start: 2018-09-25 | End: 2018-09-26 | Stop reason: HOSPADM

## 2018-09-25 RX ORDER — FUROSEMIDE 20 MG/1
20 TABLET ORAL 2 TIMES DAILY
Status: DISCONTINUED | OUTPATIENT
Start: 2018-09-25 | End: 2018-09-26 | Stop reason: HOSPADM

## 2018-09-25 RX ORDER — TAMSULOSIN HYDROCHLORIDE 0.4 MG/1
0.4 CAPSULE ORAL DAILY
Status: DISCONTINUED | OUTPATIENT
Start: 2018-09-25 | End: 2018-09-25

## 2018-09-25 RX ORDER — PREGABALIN 75 MG/1
300 CAPSULE ORAL 2 TIMES DAILY
Status: DISCONTINUED | OUTPATIENT
Start: 2018-09-25 | End: 2018-09-26 | Stop reason: HOSPADM

## 2018-09-25 RX ORDER — DILTIAZEM HYDROCHLORIDE 180 MG/1
180 CAPSULE, COATED, EXTENDED RELEASE ORAL DAILY
Status: DISCONTINUED | OUTPATIENT
Start: 2018-09-25 | End: 2018-09-26 | Stop reason: HOSPADM

## 2018-09-25 RX ADMIN — TIOTROPIUM BROMIDE 18 MCG: 18 CAPSULE ORAL; RESPIRATORY (INHALATION) at 14:23

## 2018-09-25 RX ADMIN — NICOTINE 1 PATCH: 14 PATCH, EXTENDED RELEASE TRANSDERMAL at 14:14

## 2018-09-25 RX ADMIN — TAMSULOSIN HYDROCHLORIDE 0.4 MG: 0.4 CAPSULE ORAL at 16:54

## 2018-09-25 RX ADMIN — METOPROLOL SUCCINATE 100 MG: 100 TABLET, EXTENDED RELEASE ORAL at 22:10

## 2018-09-25 RX ADMIN — CLONIDINE HYDROCHLORIDE 0.1 MG: 0.1 TABLET ORAL at 14:13

## 2018-09-25 RX ADMIN — LEVALBUTEROL 1.25 MG: 1.25 SOLUTION, CONCENTRATE RESPIRATORY (INHALATION) at 22:06

## 2018-09-25 RX ADMIN — DILTIAZEM HYDROCHLORIDE 180 MG: 180 CAPSULE, COATED, EXTENDED RELEASE ORAL at 14:13

## 2018-09-25 RX ADMIN — PREGABALIN 300 MG: 75 CAPSULE ORAL at 14:12

## 2018-09-25 RX ADMIN — ISODIUM CHLORIDE 3 ML: 0.03 SOLUTION RESPIRATORY (INHALATION) at 22:05

## 2018-09-25 RX ADMIN — METOPROLOL SUCCINATE 100 MG: 100 TABLET, EXTENDED RELEASE ORAL at 14:12

## 2018-09-25 RX ADMIN — FUROSEMIDE 20 MG: 20 TABLET ORAL at 22:06

## 2018-09-25 RX ADMIN — RANOLAZINE 500 MG: 500 TABLET, FILM COATED, EXTENDED RELEASE ORAL at 22:06

## 2018-09-25 RX ADMIN — PANTOPRAZOLE SODIUM 40 MG: 40 TABLET, DELAYED RELEASE ORAL at 16:54

## 2018-09-25 RX ADMIN — FUROSEMIDE 20 MG: 20 TABLET ORAL at 14:13

## 2018-09-25 RX ADMIN — PREGABALIN 300 MG: 75 CAPSULE ORAL at 22:06

## 2018-09-25 RX ADMIN — LISINOPRIL 20 MG: 20 TABLET ORAL at 14:13

## 2018-09-25 RX ADMIN — CLONIDINE HYDROCHLORIDE 0.1 MG: 0.1 TABLET ORAL at 22:08

## 2018-09-25 RX ADMIN — MAGESIUM CITRATE 296 ML: 1.75 LIQUID ORAL at 19:28

## 2018-09-25 RX ADMIN — HEPARIN SODIUM 5000 UNITS: 5000 INJECTION, SOLUTION INTRAVENOUS; SUBCUTANEOUS at 22:06

## 2018-09-25 RX ADMIN — ATORVASTATIN CALCIUM 40 MG: 40 TABLET, FILM COATED ORAL at 22:08

## 2018-09-25 RX ADMIN — RANOLAZINE 500 MG: 500 TABLET, FILM COATED, EXTENDED RELEASE ORAL at 14:12
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
## 2018-09-25 NOTE — PROGRESS NOTES
Due to patient being hypertensive in the range of 220s/112, anesthesia would like to hold off on doing the procedure as yet since that is elective  Patient has not taken his antihypertensives  I discussed with the patient that we could possibly do a tomorrow  In the interim we will admit him to the hospital for hypertensive urgency and plan to continue his antihypertensives but continue to hold his anticoagulants  He will be on clear liquid diet and magnesium citrate 10 oz tonight  Plan for NPO after midnight with EGD and colonoscopy tomorrow  He could potentially be discharged after the procedures tomorrow

## 2018-09-25 NOTE — ASSESSMENT & PLAN NOTE
Patient did not take his daily oral antihypertensive medications in the morning  This would explain his elevated blood pressure  Resume oral lisinopril, metoprolol, Cardizem, clonidine  P r n  hydralazine for systolic blood pressure greater than 180

## 2018-09-25 NOTE — ASSESSMENT & PLAN NOTE
Patient was scheduled for outpatient colonoscopy  Will make NPO after midnight  Clear liquids today  Mag citrate
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
## 2018-09-26 ENCOUNTER — ANESTHESIA EVENT (OUTPATIENT)
Dept: PERIOP | Facility: HOSPITAL | Age: 73
End: 2018-09-26
Payer: COMMERCIAL

## 2018-09-26 ENCOUNTER — ANESTHESIA (OUTPATIENT)
Dept: PERIOP | Facility: HOSPITAL | Age: 73
End: 2018-09-26
Payer: COMMERCIAL

## 2018-09-26 VITALS
DIASTOLIC BLOOD PRESSURE: 80 MMHG | WEIGHT: 186.29 LBS | SYSTOLIC BLOOD PRESSURE: 167 MMHG | TEMPERATURE: 97.2 F | HEIGHT: 69 IN | OXYGEN SATURATION: 97 % | RESPIRATION RATE: 20 BRPM | HEART RATE: 80 BPM | BODY MASS INDEX: 27.59 KG/M2

## 2018-09-26 LAB
ANION GAP SERPL CALCULATED.3IONS-SCNC: 5 MMOL/L (ref 4–13)
BUN SERPL-MCNC: 8 MG/DL (ref 5–25)
CALCIUM SERPL-MCNC: 8.9 MG/DL (ref 8.3–10.1)
CHLORIDE SERPL-SCNC: 107 MMOL/L (ref 100–108)
CO2 SERPL-SCNC: 29 MMOL/L (ref 21–32)
CREAT SERPL-MCNC: 0.87 MG/DL (ref 0.6–1.3)
ERYTHROCYTE [DISTWIDTH] IN BLOOD BY AUTOMATED COUNT: 22.3 % (ref 11.6–15.1)
GFR SERPL CREATININE-BSD FRML MDRD: 84 ML/MIN/1.73SQ M
GLUCOSE SERPL-MCNC: 108 MG/DL (ref 65–140)
GLUCOSE SERPL-MCNC: 91 MG/DL (ref 65–140)
GLUCOSE SERPL-MCNC: 93 MG/DL (ref 65–140)
HCT VFR BLD AUTO: 29.5 % (ref 36.5–49.3)
HGB BLD-MCNC: 8.9 G/DL (ref 12–17)
INR PPP: 1.29 (ref 0.86–1.17)
MAGNESIUM SERPL-MCNC: 1.8 MG/DL (ref 1.6–2.6)
MCH RBC QN AUTO: 20.8 PG (ref 26.8–34.3)
MCHC RBC AUTO-ENTMCNC: 30.2 G/DL (ref 31.4–37.4)
MCV RBC AUTO: 69 FL (ref 82–98)
PLATELET # BLD AUTO: 305 THOUSANDS/UL (ref 149–390)
PMV BLD AUTO: 9.9 FL (ref 8.9–12.7)
POTASSIUM SERPL-SCNC: 2.9 MMOL/L (ref 3.5–5.3)
PROTHROMBIN TIME: 15.5 SECONDS (ref 11.8–14.2)
RBC # BLD AUTO: 4.27 MILLION/UL (ref 3.88–5.62)
SODIUM SERPL-SCNC: 141 MMOL/L (ref 136–145)
WBC # BLD AUTO: 6.6 THOUSAND/UL (ref 4.31–10.16)

## 2018-09-26 PROCEDURE — 94660 CPAP INITIATION&MGMT: CPT

## 2018-09-26 PROCEDURE — 88305 TISSUE EXAM BY PATHOLOGIST: CPT | Performed by: PATHOLOGY

## 2018-09-26 PROCEDURE — 45385 COLONOSCOPY W/LESION REMOVAL: CPT | Performed by: INTERNAL MEDICINE

## 2018-09-26 PROCEDURE — 82948 REAGENT STRIP/BLOOD GLUCOSE: CPT

## 2018-09-26 PROCEDURE — 80048 BASIC METABOLIC PNL TOTAL CA: CPT | Performed by: FAMILY MEDICINE

## 2018-09-26 PROCEDURE — 85610 PROTHROMBIN TIME: CPT | Performed by: FAMILY MEDICINE

## 2018-09-26 PROCEDURE — 43239 EGD BIOPSY SINGLE/MULTIPLE: CPT | Performed by: INTERNAL MEDICINE

## 2018-09-26 PROCEDURE — 99217 PR OBSERVATION CARE DISCHARGE MANAGEMENT: CPT | Performed by: FAMILY MEDICINE

## 2018-09-26 PROCEDURE — 94640 AIRWAY INHALATION TREATMENT: CPT

## 2018-09-26 PROCEDURE — 83735 ASSAY OF MAGNESIUM: CPT | Performed by: FAMILY MEDICINE

## 2018-09-26 PROCEDURE — 85027 COMPLETE CBC AUTOMATED: CPT | Performed by: FAMILY MEDICINE

## 2018-09-26 RX ORDER — SODIUM CHLORIDE, SODIUM LACTATE, POTASSIUM CHLORIDE, CALCIUM CHLORIDE 600; 310; 30; 20 MG/100ML; MG/100ML; MG/100ML; MG/100ML
INJECTION, SOLUTION INTRAVENOUS CONTINUOUS PRN
Status: DISCONTINUED | OUTPATIENT
Start: 2018-09-26 | End: 2018-09-26 | Stop reason: SURG

## 2018-09-26 RX ORDER — METOCLOPRAMIDE HYDROCHLORIDE 5 MG/ML
10 INJECTION INTRAMUSCULAR; INTRAVENOUS ONCE AS NEEDED
Status: DISCONTINUED | OUTPATIENT
Start: 2018-09-26 | End: 2018-09-26 | Stop reason: HOSPADM

## 2018-09-26 RX ORDER — PROPOFOL 10 MG/ML
INJECTION, EMULSION INTRAVENOUS AS NEEDED
Status: DISCONTINUED | OUTPATIENT
Start: 2018-09-26 | End: 2018-09-26 | Stop reason: SURG

## 2018-09-26 RX ORDER — POTASSIUM CHLORIDE 14.9 MG/ML
20 INJECTION INTRAVENOUS
Status: ACTIVE | OUTPATIENT
Start: 2018-09-26 | End: 2018-09-26

## 2018-09-26 RX ORDER — SODIUM CHLORIDE, SODIUM LACTATE, POTASSIUM CHLORIDE, CALCIUM CHLORIDE 600; 310; 30; 20 MG/100ML; MG/100ML; MG/100ML; MG/100ML
50 INJECTION, SOLUTION INTRAVENOUS CONTINUOUS
Status: DISCONTINUED | OUTPATIENT
Start: 2018-09-26 | End: 2018-09-26 | Stop reason: HOSPADM

## 2018-09-26 RX ORDER — ONDANSETRON 2 MG/ML
4 INJECTION INTRAMUSCULAR; INTRAVENOUS ONCE AS NEEDED
Status: DISCONTINUED | OUTPATIENT
Start: 2018-09-26 | End: 2018-09-26 | Stop reason: HOSPADM

## 2018-09-26 RX ORDER — POTASSIUM CHLORIDE 20 MEQ/1
40 TABLET, EXTENDED RELEASE ORAL ONCE
Status: DISCONTINUED | OUTPATIENT
Start: 2018-09-26 | End: 2018-09-26 | Stop reason: HOSPADM

## 2018-09-26 RX ADMIN — PROPOFOL 50 MG: 10 INJECTION, EMULSION INTRAVENOUS at 15:22

## 2018-09-26 RX ADMIN — SODIUM CHLORIDE, SODIUM LACTATE, POTASSIUM CHLORIDE, AND CALCIUM CHLORIDE 50 ML/HR: .6; .31; .03; .02 INJECTION, SOLUTION INTRAVENOUS at 15:48

## 2018-09-26 RX ADMIN — PROPOFOL 50 MG: 10 INJECTION, EMULSION INTRAVENOUS at 15:35

## 2018-09-26 RX ADMIN — PROPOFOL 100 MG: 10 INJECTION, EMULSION INTRAVENOUS at 15:18

## 2018-09-26 RX ADMIN — PROPOFOL 50 MG: 10 INJECTION, EMULSION INTRAVENOUS at 15:28

## 2018-09-26 RX ADMIN — LEVALBUTEROL 1.25 MG: 1.25 SOLUTION, CONCENTRATE RESPIRATORY (INHALATION) at 14:08

## 2018-09-26 RX ADMIN — PROPOFOL 50 MG: 10 INJECTION, EMULSION INTRAVENOUS at 15:40

## 2018-09-26 RX ADMIN — PROPOFOL 100 MG: 10 INJECTION, EMULSION INTRAVENOUS at 15:13

## 2018-09-26 RX ADMIN — LEVALBUTEROL 1.25 MG: 1.25 SOLUTION, CONCENTRATE RESPIRATORY (INHALATION) at 08:51

## 2018-09-26 RX ADMIN — ISODIUM CHLORIDE 3 ML: 0.03 SOLUTION RESPIRATORY (INHALATION) at 14:08

## 2018-09-26 RX ADMIN — ISODIUM CHLORIDE 3 ML: 0.03 SOLUTION RESPIRATORY (INHALATION) at 08:51

## 2018-09-26 RX ADMIN — MAGESIUM CITRATE 296 ML: 1.75 LIQUID ORAL at 07:01

## 2018-09-26 RX ADMIN — PROPOFOL 50 MG: 10 INJECTION, EMULSION INTRAVENOUS at 15:31

## 2018-09-26 RX ADMIN — PANTOPRAZOLE SODIUM 40 MG: 40 TABLET, DELAYED RELEASE ORAL at 07:05

## 2018-09-26 RX ADMIN — SODIUM CHLORIDE, SODIUM LACTATE, POTASSIUM CHLORIDE, AND CALCIUM CHLORIDE: .6; .31; .03; .02 INJECTION, SOLUTION INTRAVENOUS at 14:15

## 2018-09-26 RX ADMIN — TIOTROPIUM BROMIDE 18 MCG: 18 CAPSULE ORAL; RESPIRATORY (INHALATION) at 11:36

## 2018-09-26 RX ADMIN — PANTOPRAZOLE SODIUM 40 MG: 40 TABLET, DELAYED RELEASE ORAL at 05:48

## 2018-09-26 RX ADMIN — PROPOFOL 50 MG: 10 INJECTION, EMULSION INTRAVENOUS at 15:25

## 2018-09-26 RX ADMIN — HEPARIN SODIUM 5000 UNITS: 5000 INJECTION, SOLUTION INTRAVENOUS; SUBCUTANEOUS at 05:48

## 2018-09-26 NOTE — CASE MANAGEMENT
Initial Clinical Review  Admission: Date/Time/Statement:   Admission Orders     Ordered        09/25/18 1156  Place in Observation  Once             Orders Placed This Encounter   Procedures    Place in Observation     Standing Status:   Standing     Number of Occurrences:   1     Order Specific Question:   Admitting Physician     Answer:   Kip Kc     Order Specific Question:   Level of Care     Answer:   Med Surg [16]   History of Illness:   40-year-old male from the GI lab recommended for inpatient admission due to accelerated hypertension  Patient is a history of GI bleed and has been difficult to arrange endoscopy and colonoscopy given social situation difficulty in arranging transportation  The patient states he did not take his oral medications this morning  He has no acute complaints and wishes to sleep  He specifically denies any shortness of breath or chest pain  ED Vital Signs:   ED Triage Vitals   Temperature Pulse Respirations Blood Pressure SpO2   09/25/18 1153 09/25/18 1153 09/25/18 1153 09/25/18 1153 09/25/18 1153   97 8 °F (36 6 °C) 82 20 (!) 183/79 95 %      Temp Source Heart Rate Source Patient Position - Orthostatic VS BP Location FiO2 (%)   09/25/18 1153 -- 09/25/18 1153 09/25/18 1153 --   Temporal  Lying Left arm       Pain Score       09/25/18 1201       Worst Possible Pain        Wt Readings from Last 1 Encounters:   09/25/18 84 5 kg (186 lb 4 6 oz)   Vital Signs (abnormal):   /90, 183/79  Abnormal Labs/Diagnostic Test Results:   HGB 8 9  Past Medical/Surgical History:    Active Ambulatory Problems     Diagnosis Date Noted    Intractable diarrhea 10/26/2016    Weakness 10/26/2016    Hypokalemia 10/26/2016    COPD (chronic obstructive pulmonary disease) (HCC) 10/26/2016    Abdominal pain 10/26/2016    Benign essential hypertension 10/26/2016    Dyslipidemia 10/26/2016    Depressive disorder 10/26/2016    Persistent atrial fibrillation ([facility] ) 10/26/2016    Migraines 10/26/2016    Chronic pain 10/30/2016    Sepsis ([facility] ) 10/30/2016    Colitis 10/30/2016    NSTEMI (non-ST elevated myocardial infarction) ([facility] ) 10/30/2016    Dehydration, mild 06/03/2017    Ambulatory dysfunction 06/04/2017    Tobacco abuse 06/04/2017    Pulmonary nodule 04/03/2018    Lactic acidosis 04/05/2018    Acute cystitis without hematuria 04/06/2018    Leukocytosis 04/06/2018    Marijuana abuse 05/06/2018    Healthcare-associated pneumonia 05/06/2018    Decreased left ventricular systolic function 16/61/3602    Dysphagia 05/07/2018    Type 2 diabetes mellitus without complication, without long-term current use of insulin ([place] [address] ) 05/08/2018    Dilated cardiomyopathy secondary to tachycardia ([place] [address] ) 05/18/2018    Coronary artery disease involving native coronary artery of native heart without angina pectoris 05/18/2018    Syncope and collapse 07/07/2018    Acute respiratory failure with hypoxia and hypercapnia ([facility]) 13/62/8513    Acute systolic CHF (congestive heart failure) ([place] [address] ) 07/11/2018    CHF exacerbation ([place] [address] ) 07/17/2018    Right knee injury, initial encounter 07/17/2018    Noncompliance 07/19/2018    Obstructive sleep apnea (adult) (pediatric)     Anterior epistaxis 07/27/2018    Chronic combined systolic and diastolic CHF (congestive heart failure) ([place] [address] ) 08/17/2018    Urge incontinence of urine 08/29/2018    GI bleed 08/30/2018     Resolved Ambulatory Problems     Diagnosis Date Noted    Shortness of breath 10/26/2016    Chronic pain 10/26/2016    Hx of transient ischemic attack (TIA) 10/26/2016    Acute kidney injury ([facility] ) 10/30/2016    Hypernatremia 07/07/2018     Past Medical History:   Diagnosis Date    Aortic stenosis     Arthritis     Asthma     Atrial fibrillation ([facility])     Back pain     Cervical radiculopathy     CHF (congestive heart failure) ([facility])     Chronic pain     Chronic pain 10/26/2016    COPD ([facility] pulmonary disease[facility])     Coronary artery disease     CVA (cerebral vascular accident) ([facility] )     Depressive disorder     Diabetes mellitus ([place] [address] )     Encephalopathy     GERD (gastroesophageal reflux disease)     Head injury     Hearing loss     Hx of transient ischemic attack (TIA) 10/26/2016    Hyperlipidemia     Hypertension     Kidney stones     Migraines     MRSA (methicillin resistant Staphylococcus aureus) infection     MRSA (methicillin resistant staphylococcus aureus) pneumonia (HCC)     Osteoarthritis     Partial small bowel obstruction (HCC)     Peripheral neuropathy     Psychiatric disorder     PVD (peripheral vascular disease) ([place] [address] )     Renal disorder     Shortness of breath 10/26/2016    TIA (transient ischemic attack) 2014    Vertigo    Admitting Diagnosis: Essential (primary) hypertension [I10]  Age/Sex: 74 y o  male  Assessment/Plan:   Accelerated hypertension   Assessment & Plan     Patient did not take his daily oral antihypertensive medications in the morning  This would explain his elevated blood pressure  Resume oral lisinopril, metoprolol, Cardizem, clonidine  P r n  hydralazine for systolic blood pressure greater than 180       Heme positive stool   Assessment & Plan     Patient was scheduled for outpatient colonoscopy  Will make NPO after midnight  Clear liquids today  Mag citrate at night       Persistent atrial fibrillation (HCC)   Assessment & Plan     Hold Eliquis and aspirin until colonoscopy tomorrow  Continue metoprolol   Admission Orders:  MED SURG  ACCUCHECKS WITH COVERAGE SCALE  Scheduled Meds:   Current Facility-Administered Medications:  atorvastatin 40 mg Oral QPM Darrin Quintanilla MD   cloNIDine 0 1 mg Oral BID Darrin Quintanilla MD   diltiazem 180 mg Oral Daily Darrin Quintanilla MD   furosemide 20 mg Oral BID Feroz Cheek MD   heparin (porcine) 5,000 Units Subcutaneous Q8H [facility] Darrin Quintanilla MD   hydrALAZINE 10 mg Intravenous Q6H PRN Darrin Quintanilla MD   insulin lispro 1-6 Units Subcutaneous TID AC Darrin Quintanilla MD   levalbuterol 1 25 mg Nebulization TID [employer], DO   lisinopril 20 mg Oral Daily Darrin Quintanilla MD   magnesium citrate 296 mL Oral Once Bc Perez MD   metoprolol succinate 100 mg Oral BID Johnnye Castleman, MD   nicotine 1 patch Transdermal Q24H Darrin Quintanilla MD   pantoprazole 40 mg Oral BID AC Johnnye Castleman, MD   pregabalin 300 mg Oral BID Darrin Quintanilla MD   ranolazine 500 mg Oral BID Darrin Quintanilla MD   sodium chloride 3 mL Nebulization TID [employer], DO   tamsulosin 0 4 mg Oral Daily With Santosh Page MD   tiotropium 18 mcg Inhalation Daily Johnnye Castleman, MD     Continuous Infusions:    PRN Meds: hydrALAZINE

## 2018-09-26 NOTE — OP NOTE
OPERATIVE REPORT  PATIENT NAME: Marge Drain    :  1945  MRN: [id number]  Pt Location: [facility] OR ROOM 03    SURGERY DATE: 2018    Surgeon(s) and Role:     * Sandra Harris DO - Primary    Preop Diagnosis:  Abdominal pain [R10 9]  Intractable diarrhea [R19 7]    Post-Op Diagnosis Codes:     * Abdominal pain [R10 9]     * Intractable diarrhea [R19 7]    Procedure(s) (LRB):  EGD AND COLONOSCOPY (N/A)    Specimen(s):  ID Type Source Tests Collected by Time Destination   1 : bx    normal Tissue Duodenum TISSUE EXAM Sandra Steven,  2018 1517    2 : polyp x2 retrieved by cold snare Tissue Large Intestine, Left/Descending Colon TISSUE EXAM Sandra Harris,  2018 1535        Estimated Blood Loss:   Minimal    Drains:       Anesthesia Type:   Choice    Operative Indications:  Abdominal pain [R10 9]  Intractable diarrhea [R19 7]      Operative Findings:    ESOPHAGOGASTRODUODENOSCOPY    PROCEDURE: EGD    SEDATION: Monitored anesthesia care, check anesthesia records    ASA Class: 3    INDICATIONS: anemia, abdominal pain, hematochezia    CONSENT:  Informed consent was obtained for the procedure, including sedation after explaining the risks and benefits of the procedure  Risks including but not limited to bleeding, perforation, infection, and missed lesion  PREPARATION:   Telemetry, pulse oximetry, blood pressure were monitored throughout the procedure  Patient was identified by myself both verbally and by visual inspection of ID band  DESCRIPTION:   Patient was placed in the left lateral decubitus position and was sedated with the above medication  The gastroscope was introduced in to the oropharynx and the esophagus was intubated under direct visualization  Scope was passed down the esophagus up to 2nd part of the duodenum  A careful inspection was made as the gastroscope was withdrawn, including a retroflexed view of the stomach; findings and interventions are described below  FINDINGS:    #1  Esophagus- normal esophagus    #2  Stomach- normal stomach on direct and retroflexed views    #3  Duodenum- normal 1st and 2nd part of the duodenum, biopsied with cold biopsy forceps         IMPRESSIONS:      Normal upper endoscopy  Biopsied  RECOMMENDATIONS:     Await biopsy results  Proceed with colonoscopy  COMPLICATIONS:  None; patient tolerated the procedure well  DISPOSITION: PACU           CONDITION: Stable    Colonoscopy Procedure Note    Procedure: Colonoscopy    Sedation: Monitored anesthesia care, check anesthesia records      ASA Class: 3    INDICATIONS: anemia, abdominal pain    POST-OP DIAGNOSIS: See the impression below    Procedure Details     Prior colonoscopy: 5 years ago  Informed consent was obtained for the procedure, including sedation  Risks of perforation, hemorrhage, adverse drug reaction and aspiration were discussed  The patient was placed in the left lateral decubitus position  Based on the pre-procedure assessment, including review of the patient's medical history, medications, allergies, and review of systems, he had been deemed to be an appropriate candidate for conscious sedation; he was therefore sedated with the medications listed below  The patient was monitored continuously with telemetry, pulse oximetry, blood pressure monitoring, and direct observations  A rectal examination was performed  The colonoscope was inserted into the rectum and advanced under direct vision to the cecum, which was identified by the ileocecal valve and appendiceal orifice  The quality of the colonic preparation was good  A careful inspection was made as the colonoscope was withdrawn, including a retroflexed view of the rectum; findings and interventions are described below  Findings:  Two less than 1 cm sessile polyp in the descending colon  These were removed with cold snare polypectomy  They were retrieved    Internal hemorrhoids found on retroflexion  Complications: None; patient tolerated the procedure well  Impression:    Two less than 1 cm sessile polyp in the descending colon  These were removed with cold snare polypectomy  They were retrieved  Internal hemorrhoids found on retroflexion  Recommendations:  Await biopsy results  Repeat colonoscopy based on pathology  If small bowel biopsies negative for bowels or to state then would recommend outpatient capsule endoscopy to evaluate small bowel due to iron deficiency anemia          SIGNATURE: Jacque Larson DO  DATE: September 26, 2018  TIME: 3:55 PM

## 2018-09-26 NOTE — CASE MANAGEMENT
Notification of Observation Care Status/Observation Authorization Request    This is a Notification of Observation Care Status to our facility [address]  Please be advised that this patient is currently in our facility under Observation Status  Below you will find the Attending Physician and Facilitys information including NPI#  and contact information for the Utilization Review Department where the patient is receiving care services  Place of Service Code: 25  Place of Service Name: [facility]  CPT Code for Observation:     Presentation Date & Time: 9/25/2018 11:48 AM  Observation Admission Date & Time:  Hours in Observation:29 hours  Discharge Date & Time: No discharge date for patient encounter  Discharge Disposition (if discharged): [facility]  Attending Physician:   FAY Thomas  Specialty- Hospitalist,   [facility] ID- [id number]  [address]  Phone 1: [phone]  Fax: [phone]    Admission Orders:   Admission Orders     Ordered        09/25/18 1156  Place in Observation  Once               Facility: [address]  Address: [address]  Phone: [phone]  Tax ID: [id number]  NPI: [id number]  Medicare ID: [id number]  Thank you,  [facility] Utilization Review Department  Phone: [phone]; Fax [phone]  ATTENTION: Please call with any questions or concerns to [phone]  and carefully follow the prompts so that you are directed to the right person  Send all requests for admission clinical reviews, approved or denied determinations and any other requests to fax [phone]   All voicemails are confidential

## 2018-09-26 NOTE — ANESTHESIA PREPROCEDURE EVALUATION
Review of Systems/Medical History  Patient summary reviewed  Chart reviewed      Cardiovascular  Hyperlipidemia, Hypertension , Past MI > 6 months, CAD , CHF compensated CHF,    Pulmonary  Pneumonia, COPD moderate- medication dependent , Asthma , Shortness of breath, Sleep apnea ,        GI/Hepatic    GERD well controlled,        Kidney stones,        Endo/Other  Diabetes well controlled type 2 Oral agent,      GYN       Hematology   Musculoskeletal    Arthritis     Neurology    TIA, CVA ,    Psychology   Depression , being treated for depression,              Physical Exam    Airway    Mallampati score: II  TM Distance: >3 FB  Neck ROM: full     Dental       Cardiovascular  Cardiovascular exam normal    Pulmonary  Pulmonary exam normal     Other Findings        Anesthesia Plan  ASA Score- 3     Anesthesia Type- IV sedation with anesthesia with ASA Monitors  Additional Monitors:   Airway Plan:         Plan Factors-    Induction- intravenous  Postoperative Plan-     Informed Consent- Anesthetic plan and risks discussed with patient

## 2018-09-26 NOTE — DISCHARGE SUMMARY
Discharge Summary - Milad Merrill 74 y o  male MRN: [id number]    Unit/Bed#: 039-92 Encounter: [id number]    Admission Date:   Admission Orders     Ordered        09/25/18 1156  Place in Observation  Once               Admitting Diagnosis: Essential (primary) hypertension [I10]    HPI: 78-year-old male from the GI lab recommended for inpatient admission due to accelerated hypertension  Patient is a history of GI bleed and has been difficult to arrange endoscopy and colonoscopy given social situation difficulty in arranging transportation  The patient states he did not take his oral medications this morning  He has no acute complaints and wishes to sleep  He specifically denies any shortness of breath or chest pain  Procedures Performed: No orders of the defined types were placed in this encounter  Summary of Hospital Course:       Accelerated  hypertension:  Patient's oral antihypertensive regimen Was resumed upon arrival to the medical floor  He had no complaints of shortness of breath or chest pain  His blood pressure responded appropriately  He was monitored overnight and underwent EGD and colonoscopy  Persistent atrial fibrillation:  Rate was appropriate, patient will resume Eliquis tomorrow    Heme-positive stool:  Patient underwent colonoscopy and endoscopy, he will follow up with GI as an outpatient for results of biopsies obtained  Significant Findings, Care, Treatment and Services Provided: none    Complications: none     Discharge Diagnosis: see above    Resolved Problems  Date Reviewed: 9/26/2018    None          Condition at Discharge: good         Discharge instructions/Information to patient and family:   See after visit summary for information provided to patient and family  Provisions for Follow-Up Care:  See after visit summary for information related to follow-up care and any pertinent home health orders        PCP: Tru Morse DO    Disposition: Home    Planned Readmission: No    Discharge Statement   I spent 25 minutes discharging the patient  This time was spent on the day of discharge  I had direct contact with the patient on the day of discharge  Additional documentation is required if more than 30 minutes were spent on discharge  Discharge Medications:  See after visit summary for reconciled discharge medications provided to patient and family

## 2018-09-26 NOTE — DISCHARGE INSTR - AVS FIRST PAGE
OPERATIVE REPORT  PATIENT NAME: Jose E Simeon    :  1945  MRN: [id number]  Pt Location: [facility] 03    SURGERY DATE: 2018    Surgeon(s) and Role:     * Davonte Navarro DO - Primary    Preop Diagnosis:  Abdominal pain [R10 9]  Intractable diarrhea [R19 7]    Post-Op Diagnosis Codes:     * Abdominal pain [R10 9]     * Intractable diarrhea [R19 7]    Procedure(s) (LRB):  EGD AND COLONOSCOPY (N/A)    Specimen(s):  ID Type Source Tests Collected by Time Destination   1 : bx    normal Tissue Duodenum TISSUE EXAM Davonte RomanDO glenna 2018 1517    2 : polyp x2 retrieved by cold snare Tissue Large Intestine, Left/Descending Colon TISSUE EXAM Davonte Navarro DO 2018 1535        Estimated Blood Loss:   Minimal    Drains:       Anesthesia Type:   Choice    Operative Indications:  Abdominal pain [R10 9]  Intractable diarrhea [R19 7]      Operative Findings:    ESOPHAGOGASTRODUODENOSCOPY    PROCEDURE: EGD    SEDATION: Monitored anesthesia care, check anesthesia records    ASA Class: 3    INDICATIONS: anemia, abdominal pain, hematochezia    CONSENT:  Informed consent was obtained for the procedure, including sedation after explaining the risks and benefits of the procedure  Risks including but not limited to bleeding, perforation, infection, and missed lesion  PREPARATION:   Telemetry, pulse oximetry, blood pressure were monitored throughout the procedure  Patient was identified by myself both verbally and by visual inspection of ID band  DESCRIPTION:   Patient was placed in the left lateral decubitus position and was sedated with the above medication  The gastroscope was introduced in to the oropharynx and the esophagus was intubated under direct visualization  Scope was passed down the esophagus up to 2nd part of the duodenum  A careful inspection was made as the gastroscope was withdrawn, including a retroflexed view of the stomach; findings and interventions are described below  FINDINGS:    #1  Esophagus- normal esophagus    #2  Stomach- normal stomach on direct and retroflexed views    #3  Duodenum- normal 1st and 2nd part of the duodenum, biopsied with cold biopsy forceps         IMPRESSIONS:      Normal upper endoscopy  Biopsied  RECOMMENDATIONS:     Await biopsy results  Proceed with colonoscopy  COMPLICATIONS:  None; patient tolerated the procedure well  DISPOSITION: PACU           CONDITION: Stable    Colonoscopy Procedure Note    Procedure: Colonoscopy    Sedation: Monitored anesthesia care, check anesthesia records      ASA Class: 3    INDICATIONS: anemia, abdominal pain    POST-OP DIAGNOSIS: See the impression below    Procedure Details     Prior colonoscopy: 5 years ago  Informed consent was obtained for the procedure, including sedation  Risks of perforation, hemorrhage, adverse drug reaction and aspiration were discussed  The patient was placed in the left lateral decubitus position  Based on the pre-procedure assessment, including review of the patient's medical history, medications, allergies, and review of systems, he had been deemed to be an appropriate candidate for conscious sedation; he was therefore sedated with the medications listed below  The patient was monitored continuously with telemetry, pulse oximetry, blood pressure monitoring, and direct observations  A rectal examination was performed  The colonoscope was inserted into the rectum and advanced under direct vision to the cecum, which was identified by the ileocecal valve and appendiceal orifice  The quality of the colonic preparation was good  A careful inspection was made as the colonoscope was withdrawn, including a retroflexed view of the rectum; findings and interventions are described below  Findings:  Two less than 1 cm sessile polyp in the descending colon  These were removed with cold snare polypectomy  They were retrieved    Internal hemorrhoids found on retroflexion  Complications: None; patient tolerated the procedure well  Impression:    Two less than 1 cm sessile polyp in the descending colon  These were removed with cold snare polypectomy  They were retrieved  Internal hemorrhoids found on retroflexion  Recommendations:  Await biopsy results  Repeat colonoscopy based on pathology  If small bowel biopsies negative for bowels or to state then would recommend outpatient capsule endoscopy to evaluate small bowel due to iron deficiency anemia          SIGNATURE: Mulugeta Smith DO  DATE: September 26, 2018  TIME: 3:55 PM

## 2018-09-26 NOTE — SOCIAL WORK
Pt is a probable dc home today after his colonoscopy  Cm spoke with Moe Reyna with whom pt resides and Shirin Black states he is available to come pick pt up later and to just call him  [phone]

## 2018-09-26 NOTE — ANESTHESIA POSTPROCEDURE EVALUATION
Post-Op Assessment Note      CV Status:  Stable    Mental Status:  Alert and awake    Hydration Status:  Euvolemic    PONV Controlled:  Controlled    Airway Patency:  Patent    Post Op Vitals Reviewed: Yes          Staff: CRNA           BP   125/69   Temp  97 6   Pulse  80   Resp   18   SpO2   95%

## 2018-09-26 NOTE — SOCIAL WORK
Cm met with the patient to evaluate the patients prior function and living situation and any barriers to d/c and form a safe d/c plan  Cm also evaluated the patient for any services in the home or needs for services  Pt resides at home with his caregiver:Jagdeep in a house in [facility]  [address] [facility] or inside  Pt's caregiver:Jagdeep assists with adls and does the cooking, cleaning, driving    Hx of [facility](pt was noncompliant at home and cancelled homecare services as he did not want them any longer) Pt is followed by OP care coordinator Narda Gannon to provide transport on dc or pt gets home via [facility]  Re: DME pt has walker, cane, wc, 02 and cpap (02 is from [address])  Pt uses the cane for ambulation although he should use his walker  Plans are home on dc and continue to be followed by [facility] care coordinator, and homehealth care if pt is receptive  CM will continue to follow
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
## 2018-09-26 NOTE — NURSING NOTE
Patient discharged on 9/26/18  D/c instructions given and explained to patient with adequate understanding verbalized  No s/s of distress upon discharge

## 2018-09-27 ENCOUNTER — TRANSITIONAL CARE MANAGEMENT (OUTPATIENT)
Dept: FAMILY MEDICINE CLINIC | Facility: CLINIC | Age: 73
End: 2018-09-27

## 2018-09-27 NOTE — DISCHARGE INSTR - APPOINTMENTS
Dr Zofia Mast follow up  Thursday Oct  4th at 8:30am    GI follow up: Gabriela Urbina  Nov 13th at 2pm

## 2018-09-27 NOTE — SOCIAL WORK
Late entry:pt was dc'd to home on 9/26/18  Cm called this am and scheduled pt's follow up appointments with GI and his PCP:Dr Daugherty Mu and put on AVS and will call pt this am to inform him of his appointments and discuss rides to appointments to ensure pt goes to his follow ups  Cm also notified Thereasa Poppy on 9/26 that pt was being dc'd home and to continue tollowing him  No homehealth care on dc as pt is not receptive

## 2018-10-03 NOTE — CASE MANAGEMENT
Notification of Discharge  This is a Notification of Discharge from our facility [address]  Please be advised that this patient has been discharge from our facility  Below you will find the admission and discharge date and time including the patients disposition  PRESENTATION DATE: 9/25/2018 11:48 AM  IP ADMISSION DATE: 9/25/18 1148  DISCHARGE DATE: 9/26/2018  6:04 PM  DISPOSITION: [facility] in the [facility] Review Department  Phone: [phone]; Fax [phone]  ATTENTION: The Network Utilization Review Department is now centralized for our 9 Facilities  Make a note that we have a new phone and fax numbers for our Department  Please call with any questions or concerns to [phone] and carefully follow the prompts so that you are directed to the right person  All voicemails are confidential  Fax any determinations, approvals, denials, and requests for initial or continue stay review clinical to [phone]  Due to HIGH CALL volume, it would be easier if you could please send faxed requests to expedite your requests and in part, help us provide discharge notifications faster

## 2018-10-08 ENCOUNTER — OFFICE VISIT (OUTPATIENT)
Dept: FAMILY MEDICINE CLINIC | Facility: CLINIC | Age: 73
End: 2018-10-08
Payer: COMMERCIAL

## 2018-10-08 ENCOUNTER — APPOINTMENT (OUTPATIENT)
Dept: LAB | Facility: MEDICAL CENTER | Age: 73
End: 2018-10-08
Payer: COMMERCIAL

## 2018-10-08 VITALS
SYSTOLIC BLOOD PRESSURE: 152 MMHG | DIASTOLIC BLOOD PRESSURE: 90 MMHG | WEIGHT: 187 LBS | HEIGHT: 69 IN | BODY MASS INDEX: 27.7 KG/M2

## 2018-10-08 DIAGNOSIS — I10 BENIGN ESSENTIAL HYPERTENSION: Primary | ICD-10-CM

## 2018-10-08 DIAGNOSIS — J44.9 CHRONIC OBSTRUCTIVE PULMONARY DISEASE, UNSPECIFIED COPD TYPE (HCC): ICD-10-CM

## 2018-10-08 DIAGNOSIS — E11.9 TYPE 2 DIABETES MELLITUS WITHOUT COMPLICATION, WITHOUT LONG-TERM CURRENT USE OF INSULIN (HCC): ICD-10-CM

## 2018-10-08 DIAGNOSIS — N39.41 URGE INCONTINENCE OF URINE: ICD-10-CM

## 2018-10-08 LAB
ANION GAP SERPL CALCULATED.3IONS-SCNC: 7 MMOL/L (ref 4–13)
BASOPHILS # BLD AUTO: 0.15 THOUSANDS/ΜL (ref 0–0.1)
BASOPHILS NFR BLD AUTO: 2 % (ref 0–1)
BUN SERPL-MCNC: 15 MG/DL (ref 5–25)
CALCIUM SERPL-MCNC: 9.3 MG/DL (ref 8.3–10.1)
CHLORIDE SERPL-SCNC: 108 MMOL/L (ref 100–108)
CO2 SERPL-SCNC: 30 MMOL/L (ref 21–32)
CREAT SERPL-MCNC: 0.96 MG/DL (ref 0.6–1.3)
EOSINOPHIL # BLD AUTO: 0.28 THOUSAND/ΜL (ref 0–0.61)
EOSINOPHIL NFR BLD AUTO: 4 % (ref 0–6)
ERYTHROCYTE [DISTWIDTH] IN BLOOD BY AUTOMATED COUNT: 22 % (ref 11.6–15.1)
GFR SERPL CREATININE-BSD FRML MDRD: 77 ML/MIN/1.73SQ M
GLUCOSE SERPL-MCNC: 117 MG/DL (ref 65–140)
HCT VFR BLD AUTO: 34 % (ref 36.5–49.3)
HGB BLD-MCNC: 9.5 G/DL (ref 12–17)
IMM GRANULOCYTES # BLD AUTO: 0.01 THOUSAND/UL (ref 0–0.2)
IMM GRANULOCYTES NFR BLD AUTO: 0 % (ref 0–2)
LYMPHOCYTES # BLD AUTO: 1.35 THOUSANDS/ΜL (ref 0.6–4.47)
LYMPHOCYTES NFR BLD AUTO: 20 % (ref 14–44)
MCH RBC QN AUTO: 19.7 PG (ref 26.8–34.3)
MCHC RBC AUTO-ENTMCNC: 27.9 G/DL (ref 31.4–37.4)
MCV RBC AUTO: 70 FL (ref 82–98)
MONOCYTES # BLD AUTO: 0.92 THOUSAND/ΜL (ref 0.17–1.22)
MONOCYTES NFR BLD AUTO: 14 % (ref 4–12)
NEUTROPHILS # BLD AUTO: 3.99 THOUSANDS/ΜL (ref 1.85–7.62)
NEUTS SEG NFR BLD AUTO: 60 % (ref 43–75)
NRBC BLD AUTO-RTO: 0 /100 WBCS
PLATELET # BLD AUTO: 327 THOUSANDS/UL (ref 149–390)
PMV BLD AUTO: 10 FL (ref 8.9–12.7)
POTASSIUM SERPL-SCNC: 3.6 MMOL/L (ref 3.5–5.3)
RBC # BLD AUTO: 4.83 MILLION/UL (ref 3.88–5.62)
SODIUM SERPL-SCNC: 145 MMOL/L (ref 136–145)
WBC # BLD AUTO: 6.7 THOUSAND/UL (ref 4.31–10.16)

## 2018-10-08 PROCEDURE — 80048 BASIC METABOLIC PNL TOTAL CA: CPT | Performed by: FAMILY MEDICINE

## 2018-10-08 PROCEDURE — 99495 TRANSJ CARE MGMT MOD F2F 14D: CPT | Performed by: FAMILY MEDICINE

## 2018-10-08 PROCEDURE — 36415 COLL VENOUS BLD VENIPUNCTURE: CPT | Performed by: FAMILY MEDICINE

## 2018-10-08 PROCEDURE — 85025 COMPLETE CBC W/AUTO DIFF WBC: CPT | Performed by: FAMILY MEDICINE

## 2018-10-08 PROCEDURE — 1111F DSCHRG MED/CURRENT MED MERGE: CPT | Performed by: FAMILY MEDICINE
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
## 2018-10-10 ENCOUNTER — APPOINTMENT (OUTPATIENT)
Dept: RADIOLOGY | Facility: MEDICAL CENTER | Age: 73
End: 2018-10-10
Payer: COMMERCIAL

## 2018-10-10 ENCOUNTER — OFFICE VISIT (OUTPATIENT)
Dept: OBGYN CLINIC | Facility: CLINIC | Age: 73
End: 2018-10-10
Payer: COMMERCIAL

## 2018-10-10 VITALS — BODY MASS INDEX: 27.4 KG/M2 | HEIGHT: 69 IN | WEIGHT: 185 LBS

## 2018-10-10 DIAGNOSIS — M19.011 PRIMARY OSTEOARTHRITIS OF RIGHT SHOULDER: ICD-10-CM

## 2018-10-10 DIAGNOSIS — M25.551 PAIN IN RIGHT HIP: ICD-10-CM

## 2018-10-10 DIAGNOSIS — M75.41 IMPINGEMENT SYNDROME OF RIGHT SHOULDER: Primary | ICD-10-CM

## 2018-10-10 DIAGNOSIS — M16.11 PRIMARY LOCALIZED OSTEOARTHRITIS OF RIGHT HIP: ICD-10-CM

## 2018-10-10 PROCEDURE — 73521 X-RAY EXAM HIPS BI 2 VIEWS: CPT

## 2018-10-10 PROCEDURE — 20610 DRAIN/INJ JOINT/BURSA W/O US: CPT | Performed by: ORTHOPAEDIC SURGERY

## 2018-10-10 PROCEDURE — 99213 OFFICE O/P EST LOW 20 MIN: CPT | Performed by: ORTHOPAEDIC SURGERY

## 2018-10-10 RX ORDER — DILTIAZEM HYDROCHLORIDE 120 MG/1
CAPSULE, COATED, EXTENDED RELEASE ORAL
COMMUNITY
Start: 2018-09-19 | End: 2018-12-14

## 2018-10-10 RX ORDER — DULOXETIN HYDROCHLORIDE 60 MG/1
CAPSULE, DELAYED RELEASE ORAL
COMMUNITY
Start: 2018-09-19 | End: 2019-02-05

## 2018-10-10 RX ORDER — BETAMETHASONE SODIUM PHOSPHATE AND BETAMETHASONE ACETATE 3; 3 MG/ML; MG/ML
12 INJECTION, SUSPENSION INTRA-ARTICULAR; INTRALESIONAL; INTRAMUSCULAR; SOFT TISSUE
Status: COMPLETED | OUTPATIENT
Start: 2018-10-10 | End: 2018-10-10

## 2018-10-10 RX ORDER — BUPIVACAINE HYDROCHLORIDE 5 MG/ML
6 INJECTION, SOLUTION EPIDURAL; INTRACAUDAL
Status: COMPLETED | OUTPATIENT
Start: 2018-10-10 | End: 2018-10-10

## 2018-10-10 RX ORDER — CLOPIDOGREL BISULFATE 75 MG/1
TABLET ORAL
COMMUNITY
Start: 2018-09-19 | End: 2019-02-05

## 2018-10-10 RX ADMIN — BETAMETHASONE SODIUM PHOSPHATE AND BETAMETHASONE ACETATE 12 MG: 3; 3 INJECTION, SUSPENSION INTRA-ARTICULAR; INTRALESIONAL; INTRAMUSCULAR; SOFT TISSUE at 11:04

## 2018-10-10 RX ADMIN — BUPIVACAINE HYDROCHLORIDE 6 ML: 5 INJECTION, SOLUTION EPIDURAL; INTRACAUDAL at 11:04

## 2018-10-10 NOTE — PATIENT INSTRUCTIONS
Exercises for Shoulder Abduction and Adduction   AMBULATORY CARE:   Shoulder abduction and adduction exercises  work the muscles at the back of your shoulder and your upper back  Before you exercise:  Warm up and stretch before you exercise  Walk or ride a stationary bike for 5 to 10 minutes to help you warm up  Stretching helps increase range of motion  It may also decrease muscle soreness and help prevent another injury  Your healthcare provider will tell you which of the following stretches to do:  · Crossover arm stretch:  Relax your shoulders  Hold your upper arm with the opposite hand  Pull your arm across your chest until you feel a stretch  Hold the stretch for 30 seconds  Return to the starting position  · Shoulder flexion stretch:  Stand facing a wall  Slowly walk your fingers up the wall until you feel a stretch  Hold the stretch for 30 seconds  Return to the starting position  · Sleeper stretch:  Lie on your injured side on a firm, flat surface  Bend the elbow of your injured arm 90° with your hand facing up  Use your arm that is not injured to slowly push your injured arm down  Stop when you feel a stretch at the back of your injured shoulder  Hold the stretch for 30 seconds  Slowly return to the starting position  Contact your healthcare provider if:   · You have sharp or worsening pain during exercise or at rest     · You have questions or concerns about your shoulder exercises  How to exercise with a weight:  Your healthcare provider will tell you how much weight to use  · Shoulder abduction:  Stand and hold a weight in your hand with your palm facing your body  Slowly raise your arm to the side with your thumb pointing up  Then raise your arm over your head as far as you can without pain  Hold this position for as long as directed  Do not raise your arm over your head unless your healthcare provider says it is okay  · Shoulder adduction:  Lie on your back on a firm surface   Extend your arm out to a "T " Bend your elbow so your forearm in the air  Hold a weight in your hand  Slowly raise your arm toward the ceiling and straighten your elbow  Hold this position for as long as directed  Slowly return to the starting position  How to exercise with an exercise band:   · Shoulder abduction:  Wrap the exercise band around a heavy, stable object near your foot  Grab the band with the hand of your injured shoulder  Keep your arm straight  Slowly raise your arm to the side with your thumb pointing up  Then, slowly pull the band over your head as far as you can without pain  Do not raise your arm over your head unless your healthcare provider says it is okay  Do not let your shoulder shrug  Hold this position for as long as directed  Slowly return to the starting position  · Shoulder adduction:  Wrap the exercise band around a heavy, stable object  Stand and face away from where the band is anchored  Hold each end of the band in both hands with your elbows bent  Your elbows should not be behind your body  Keep your arms parallel to the floor and slowly straighten your elbows  Hold this position for as long as directed  Slowly return to the starting position  Follow up with your physical therapist as directed:  Write down your questions so you remember to ask them at your visits  © 2017 [facility] Information is for End User's use only and may not be sold, redistributed or otherwise used for commercial purposes  All illustrations and images included in CareNotes® are the copyrighted property of A D A M , Inc  or Ez Villarreal  The above information is an  only  It is not intended as medical advice for individual conditions or treatments  Talk to your doctor, nurse or pharmacist before following any medical regimen to see if it is safe and effective for you
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
## 2018-10-16 ENCOUNTER — HOSPITAL ENCOUNTER (OUTPATIENT)
Dept: RADIOLOGY | Facility: HOSPITAL | Age: 73
Discharge: HOME | End: 2018-10-16
Attending: ORTHOPAEDIC SURGERY
Payer: COMMERCIAL

## 2018-10-16 DIAGNOSIS — M16.11 PRIMARY LOCALIZED OSTEOARTHRITIS OF RIGHT HIP: ICD-10-CM

## 2018-10-16 PROCEDURE — 20610 DRAIN/INJ JOINT/BURSA W/O US: CPT

## 2018-10-16 PROCEDURE — 77002 NEEDLE LOCALIZATION BY XRAY: CPT

## 2018-10-16 RX ORDER — METHYLPREDNISOLONE ACETATE 80 MG/ML
80 INJECTION, SUSPENSION INTRA-ARTICULAR; INTRALESIONAL; INTRAMUSCULAR; SOFT TISSUE
Status: COMPLETED | OUTPATIENT
Start: 2018-10-16 | End: 2018-10-16

## 2018-10-16 RX ORDER — LIDOCAINE HYDROCHLORIDE 10 MG/ML
5 INJECTION, SOLUTION INFILTRATION; PERINEURAL
Status: COMPLETED | OUTPATIENT
Start: 2018-10-16 | End: 2018-10-16

## 2018-10-16 RX ORDER — BUPIVACAINE HYDROCHLORIDE 2.5 MG/ML
2 INJECTION, SOLUTION EPIDURAL; INFILTRATION; INTRACAUDAL
Status: COMPLETED | OUTPATIENT
Start: 2018-10-16 | End: 2018-10-16

## 2018-10-16 RX ADMIN — BUPIVACAINE HYDROCHLORIDE 2 ML: 2.5 INJECTION, SOLUTION EPIDURAL; INFILTRATION; INTRACAUDAL; PERINEURAL at 10:28

## 2018-10-16 RX ADMIN — IOHEXOL 2 ML: 180 INJECTION INTRAVENOUS at 10:28

## 2018-10-16 RX ADMIN — LIDOCAINE HYDROCHLORIDE 5 ML: 10 INJECTION, SOLUTION INFILTRATION; PERINEURAL at 10:28

## 2018-10-16 RX ADMIN — METHYLPREDNISOLONE ACETATE 80 MG: 80 INJECTION, SUSPENSION INTRA-ARTICULAR; INTRALESIONAL; INTRAMUSCULAR; SOFT TISSUE at 10:28

## 2018-10-29 ENCOUNTER — OFFICE VISIT (OUTPATIENT)
Dept: FAMILY MEDICINE CLINIC | Facility: CLINIC | Age: 73
End: 2018-10-29
Payer: COMMERCIAL

## 2018-10-29 VITALS
DIASTOLIC BLOOD PRESSURE: 90 MMHG | WEIGHT: 189.2 LBS | BODY MASS INDEX: 28.02 KG/M2 | SYSTOLIC BLOOD PRESSURE: 142 MMHG | HEIGHT: 69 IN

## 2018-10-29 DIAGNOSIS — Z00.00 MEDICARE ANNUAL WELLNESS VISIT, SUBSEQUENT: Primary | ICD-10-CM

## 2018-10-29 DIAGNOSIS — E11.9 TYPE 2 DIABETES MELLITUS WITHOUT COMPLICATION, WITHOUT LONG-TERM CURRENT USE OF INSULIN (HCC): ICD-10-CM

## 2018-10-29 DIAGNOSIS — R06.02 SHORTNESS OF BREATH: ICD-10-CM

## 2018-10-29 DIAGNOSIS — N39.41 URGE INCONTINENCE OF URINE: ICD-10-CM

## 2018-10-29 PROCEDURE — 3008F BODY MASS INDEX DOCD: CPT | Performed by: FAMILY MEDICINE

## 2018-10-29 PROCEDURE — 1125F AMNT PAIN NOTED PAIN PRSNT: CPT | Performed by: FAMILY MEDICINE

## 2018-10-29 PROCEDURE — 1170F FXNL STATUS ASSESSED: CPT | Performed by: FAMILY MEDICINE

## 2018-10-29 PROCEDURE — G0439 PPPS, SUBSEQ VISIT: HCPCS | Performed by: FAMILY MEDICINE

## 2018-10-29 RX ORDER — ALBUTEROL SULFATE 90 UG/1
1-2 AEROSOL, METERED RESPIRATORY (INHALATION) EVERY 6 HOURS PRN
Qty: 1 INHALER | Refills: 3 | Status: SHIPPED | OUTPATIENT
Start: 2018-10-29 | End: 2019-02-05

## 2018-10-29 NOTE — PATIENT INSTRUCTIONS
Obesity   AMBULATORY CARE:   Obesity  is when your body mass index (BMI) is greater than 30  Your healthcare provider will use your height and weight to measure your BMI  The risks of obesity include  many health problems, such as injuries or physical disability  You may need tests to check for the following:  · Diabetes     · High blood pressure or high cholesterol     · Heart disease     · Gallbladder or liver disease     · Cancer of the colon, breast, prostate, liver, or kidney     · Sleep apnea     · Arthritis or gout  Seek care immediately if:   · You have a severe headache, confusion, or difficulty speaking  · You have weakness on one side of your body  · You have chest pain, sweating, or shortness of breath  Contact your healthcare provider if:   · You have symptoms of gallbladder or liver disease, such as pain in your upper abdomen  · You have knee or hip pain and discomfort while walking  · You have symptoms of diabetes, such as intense hunger and thirst, and frequent urination  · You have symptoms of sleep apnea, such as snoring or daytime sleepiness  · You have questions or concerns about your condition or care  Treatment for obesity  focuses on helping you lose weight to improve your health  Even a small decrease in BMI can reduce the risk for many health problems  Your healthcare provider will help you set a weight-loss goal   · Lifestyle changes  are the first step in treating obesity  These include making healthy food choices and getting regular physical activity  Your healthcare provider may suggest a weight-loss program that involves coaching, education, and therapy  · Medicine  may help you lose weight when it is used with a healthy diet and physical activity  · Surgery  can help you lose weight if you are very obese and have other health problems  There are several types of weight-loss surgery  Ask your healthcare provider for more information    Be successful losing weight:   · Set small, realistic goals  An example of a small goal is to walk for 20 minutes 5 days a week  Anther goal is to lose 5% of your body weight  · Tell friends, family members, and coworkers about your goals  and ask for their support  Ask a friend to lose weight with you, or join a weight-loss support group  · Identify foods or triggers that may cause you to overeat , and find ways to avoid them  Remove tempting high-calorie foods from your home and workplace  Place a bowl of fresh fruit on your kitchen counter  If stress causes you to eat, then find other ways to cope with stress  · Keep a diary to track what you eat and drink  Also write down how many minutes of physical activity you do each day  Weigh yourself once a week and record it in your diary  Eating changes: You will need to eat 500 to 1,000 fewer calories each day than you currently eat to lose 1 to 2 pounds a week  The following changes will help you cut calories:  · Eat smaller portions  Use small plates, no larger than 9 inches in diameter  Fill your plate half full of fruits and vegetables  Measure your food using measuring cups until you know what a serving size looks like  · Eat 3 meals and 1 or 2 snacks each day  Plan your meals in advance  Luis Paula and eat at home most of the time  Eat slowly  · Eat fruits and vegetables at every meal   They are low in calories and high in fiber, which makes you feel full  Do not add butter, margarine, or cream sauce to vegetables  Use herbs to season steamed vegetables  · Eat less fat and fewer fried foods  Eat more baked or grilled chicken and fish  These protein sources are lower in calories and fat than red meat  Limit fast food  Dress your salads with olive oil and vinegar instead of bottled dressing  · Limit the amount of sugar you eat  Do not drink sugary beverages  Limit alcohol  Activity changes:  Physical activity is good for your body in many ways   It helps you burn calories and build strong muscles  It decreases stress and depression, and improves your mood  It can also help you sleep better  Talk to your healthcare provider before you begin an exercise program   · Exercise for at least 30 minutes 5 days a week  Start slowly  Set aside time each day for physical activity that you enjoy and that is convenient for you  It is best to do both weight training and an activity that increases your heart rate, such as walking, bicycling, or swimming  · Find ways to be more active  Do yard work and housecleaning  Walk up the stairs instead of using elevators  Spend your leisure time going to events that require walking, such as outdoor festivals or fairs  This extra physical activity can help you lose weight and keep it off  Follow up with your healthcare provider as directed: You may need to meet with a dietitian  Write down your questions so you remember to ask them during your visits  © 2017 [address] [employer] Information is for End User's use only and may not be sold, redistributed or otherwise used for commercial purposes  All illustrations and images included in CareNotes® are the copyrighted property of [employer]  or Ez Villarreal  The above information is an  only  It is not intended as medical advice for individual conditions or treatments  Talk to your doctor, nurse or pharmacist before following any medical regimen to see if it is safe and effective for you  Urinary Incontinence   WHAT YOU NEED TO KNOW:   What is urinary incontinence? Urinary incontinence (UI) is when you lose control of your bladder  What causes UI? UI occurs because your bladder cannot store or empty urine properly  The following are the most common types of UI:  · Stress incontinence  is when you leak urine due to increased bladder pressure  This may happen when you cough, sneeze, or exercise       · Urge incontinence  is when you feel the need to urinate right away and leak urine accidentally  · Mixed incontinence  is when you have both stress and urge UI  What are the signs and symptoms of UI?   · You feel like your bladder does not empty completely when you urinate  · You urinate often and need to urinate immediately  · You leak urine when you sleep, or you wake up with the urge to urinate  · You leak urine when you cough, sneeze, exercise, or laugh  How is UI diagnosed? Your healthcare provider will ask how often you leak urine and whether you have stress or urge symptoms  Tell him which medicines you take, how often you urinate, and how much liquid you drink each day  You may need any of the following tests:  · Urine tests  may show infection or kidney function  · A pelvic exam  may be done to check for blockages  A pelvic exam will also show if your bladder, uterus, or other organs have moved out of place  · An x-ray, ultrasound, or CT  may show problems with parts of your urinary system  You may be given contrast liquid to help your organs show up better in the pictures  Tell the healthcare provider if you have ever had an allergic reaction to contrast liquid  Do not enter the MRI room with anything metal  Metal can cause serious injury  Tell the healthcare provider if you have any metal in or on your body  · A bladder scan  will show how much urine is left in your bladder after you urinate  You will be asked to urinate and then healthcare providers will use a small ultrasound machine to check the urine left in your bladder  · Cystometry  is used to check the function of your urinary system  Your healthcare provider checks the pressure in your bladder while filling it with fluid  Your bladder pressure may also be tested when your bladder is full and while you urinate  How is UI treated? · Medicines  can help strengthen your bladder control      · Electrical stimulation  is used to send a small amount of electrical energy to your pelvic floor muscles  This helps control your bladder function  Electrodes may be placed outside your body or in your rectum  For women, the electrodes may be placed in the vagina  · A bulking agent  may be injected into the wall of your urethra to make it thicker  This helps keep your urethra closed and decreases urine leakage  · Devices  such as a clamp, pessary, or tampon may help stop urine leaks  Ask your healthcare provider for more information about these and other devices  · Surgery  may be needed if other treatments do not work  Several types of surgery can help improve your bladder control  Ask your healthcare provider for more information about the surgery you may need  How can I manage my symptoms? · Do pelvic muscle exercises often  Your pelvic muscles help you stop urinating  Squeeze these muscles tight for 5 seconds, then relax for 5 seconds  Gradually work up to squeezing for 10 seconds  Do 3 sets of 15 repetitions a day, or as directed  This will help strengthen your pelvic muscles and improve bladder control  · A catheter  may be used to help empty your bladder  A catheter is a tiny, plastic tube that is put into your bladder to drain your urine  Your healthcare provider may tell you to use a catheter to prevent your bladder from getting too full and leaking urine  · Keep a UI record  Write down how often you leak urine and how much you leak  Make a note of what you were doing when you leaked urine  · Train your bladder  Go to the bathroom at set times, such as every 2 hours, even if you do not feel the urge to go  You can also try to hold your urine when you feel the urge to go  For example, hold your urine for 5 minutes when you feel the urge to go  As that becomes easier, hold your urine for 10 minutes  · Drink liquids as directed  Ask your healthcare provider how much liquid to drink each day and which liquids are best for you   You may need to limit the amount of liquid you drink to help control your urine leakage  Limit or do not have drinks that contain caffeine or alcohol  Do not drink any liquid right before you go to bed  · Prevent constipation  Eat a variety of high-fiber foods  Good examples are high-fiber cereals, beans, vegetables, and whole-grain breads  Prune juice may help make your bowel movement softer  Walking is the best way to trigger your intestines to have a bowel movement  · Exercise regularly and maintain a healthy weight  Ask your healthcare provider how much you should weigh and about the best exercise plan for you  Weight loss and exercise will decrease pressure on your bladder and help you control your leakage  Ask him to help you create a weight loss plan if you are overweight  When should I seek immediate care? · You have severe pain  · You are confused or cannot think clearly  When should I contact my healthcare provider? · You have a fever  · You see blood in your urine  · You have pain when you urinate  · You have new or worse pain, even after treatment  · Your mouth feels dry or you have vision changes  · Your urine is cloudy or smells bad  · You have questions or concerns about your condition or care  CARE AGREEMENT:   You have the right to help plan your care  Learn about your health condition and how it may be treated  Discuss treatment options with your caregivers to decide what care you want to receive  You always have the right to refuse treatment  The above information is an  only  It is not intended as medical advice for individual conditions or treatments  Talk to your doctor, nurse or pharmacist before following any medical regimen to see if it is safe and effective for you  © 2017 [address] [employer] Information is for End User's use only and may not be sold, redistributed or otherwise used for commercial purposes   All illustrations and images included in CareNotes® are the copyrighted property of [employer] M [employer] Inc  or Ez Villarreal  Cigarette Smoking and Your Health   AMBULATORY CARE:   Risks to your health if you smoke:  Nicotine and other chemicals found in tobacco damage every cell in your body  Even if you are a light smoker, you have an increased risk for cancer, heart disease, and lung disease  If you are pregnant or have diabetes, smoking increases your risk for complications  Benefits to your health if you stop smoking:   · You decrease respiratory symptoms such as coughing, wheezing, and shortness of breath  · You reduce your risk for cancers of the lung, mouth, throat, kidney, bladder, pancreas, stomach, and cervix  If you already have cancer, you increase the benefits of chemotherapy  You also reduce your risk for cancer returning or a second cancer from developing  · You reduce your risk for heart disease, blood clots, heart attack, and stroke  · You reduce your risk for lung infections, and diseases such as pneumonia, asthma, chronic bronchitis, and emphysema  · Your circulation improves  More oxygen can be delivered to your body  If you have diabetes, you lower your risk for complications, such as kidney, artery, and eye diseases  You also lower your risk for nerve damage  Nerve damage can lead to amputations, poor vision, and blindness  · You improve your body's ability to heal and to fight infections  Benefits to the health of others if you stop smoking:  Tobacco is harmful to nonsmokers who breathe in your secondhand smoke  The following are ways the health of others around you may improve when you stop smoking:  · You lower the risks for lung cancer and heart disease in nonsmoking adults  · If you are pregnant, you lower the risk for miscarriage, early delivery, low birth weight, and stillbirth  You also lower your baby's risk for SIDS, obesity, developmental delay, and neurobehavioral problems, such as ADHD  · If you have children, you lower their risk for ear infections, colds, pneumonia, bronchitis, and asthma  For more information and support to stop smoking:   · Smokefree  gov  Phone: 2- 868 - 460-6394  Web Address: www smokefr[employer]  Follow up with your healthcare provider as directed:  Write down your questions so you remember to ask them during your visits  © 2017 [address] Information is for End User's use only and may not be sold, redistributed or otherwise used for commercial purposes  All illustrations and images included in CareNotes® are the copyrighted property of [employer] M [employer]  or Ez Villarreal  The above information is an  only  It is not intended as medical advice for individual conditions or treatments  Talk to your doctor, nurse or pharmacist before following any medical regimen to see if it is safe and effective for you  Fall Prevention   WHAT YOU NEED TO KNOW:   What is fall prevention? Fall prevention includes ways to make your home and other areas safer  It also includes ways you can move more carefully to prevent a fall  What increases my risk for falls? · Lack of vitamin D    · Not getting enough sleep each night    · Trouble walking or keeping your balance, or foot problems    · Health conditions that cause changes in your blood pressure, vision, or muscle strength and coordination    · Medicines that make you dizzy, weak, or sleepy    · Problems seeing clearly    · Shoes that have high heels or are not supportive    · Tripping hazards, such as items left on the floor, no handrails on the stairs, or broken steps  How can I help protect myself from falls? · Stand or sit up slowly  This may help you keep your balance and prevent falls  If you need to get up during the night, sit up first  Be sure you are fully awake before you stand  Turn on the light before you start walking  Go slowly in case you are still sleepy   Make sure you will not trip over any pets sleeping in the bedroom  · Use assistive devices as directed  Your healthcare provider may suggest that you use a cane or walker to help you keep your balance  You may need to have grab bars put in your bathroom near the toilet or in the shower  · Wear shoes that fit well and have soles that   Wear shoes both inside and outside  Use slippers with good   Do not wear shoes with high heels  · Wear a personal alarm  This is a device that allows you to call 911 if you fall and need help  Ask your healthcare provider for more information  · Stay active  Exercise can help strengthen your muscles and improve your balance  Your healthcare provider may recommend water aerobics or walking  He or she may also recommend physical therapy to improve your coordination  Never start an exercise program without talking to your healthcare provider first      · Manage medical conditions  Keep all appointments with your healthcare providers  Visit your eye doctor as directed  How can I make my home safer? · Add items to prevent falls in the bathroom  Put nonslip strips on your bath or shower floor to prevent you from slipping  Use a bath mat if you do not have carpet in the bathroom  This will prevent you from falling when you step out of the bath or shower  Use a shower seat so you do not need to stand while you shower  Sit on the toilet or a chair in your bathroom to dry yourself and put on clothing  This will prevent you from losing your balance from drying or dressing yourself while you are standing  · Keep paths clear  Remove books, shoes, and other objects from walkways and stairs  Place cords for telephones and lamps out of the way so that you do not need to walk over them  Tape them down if you cannot move them  Remove small rugs  If you cannot remove a rug, secure it with double-sided tape  This will prevent you from tripping  · Install bright lights in your home  Use night lights to help light paths to the bathroom or kitchen  Always turn on the light before you start walking  · Keep items you use often on shelves within reach  Do not use a step stool to help you reach an item  · Paint or place reflective tape on the edges of your stairs  This will help you see the stairs better  Call 911 or have someone else call if:   · You have fallen and are unconscious  · You have fallen and cannot move part of your body  Contact your healthcare provider if:   · You have fallen and have pain or a headache  · You have questions or concerns about your condition or care  CARE AGREEMENT:   You have the right to help plan your care  Learn about your health condition and how it may be treated  Discuss treatment options with your caregivers to decide what care you want to receive  You always have the right to refuse treatment  The above information is an  only  It is not intended as medical advice for individual conditions or treatments  Talk to your doctor, nurse or pharmacist before following any medical regimen to see if it is safe and effective for you  © 2017 [address] [facility] Information is for End User's use only and may not be sold, redistributed or otherwise used for commercial purposes  All illustrations and images included in CareNotes® are the copyrighted property of [employer]  or Ez Villarreal  Advance Directives   WHAT YOU NEED TO KNOW:   What are advance directives? Advance directives are legal documents that state your wishes and plans for medical care  These plans are made ahead of time in case you lose your ability to make decisions for yourself  Advance directives can apply to any medical decision, such as the treatments you want, and if you want to donate organs  What are the types of advance directives? There are many types of advance directives, and each state has rules about how to use them   You may choose a combination of any of the following:  · Living will: This is a written record of the treatment you want  You can also choose which treatments you do not want, which to limit, and which to stop at a certain time  This includes surgery, medicine, IV fluid, and tube feedings  · Durable power of  for healthcare [facility]): This is a written record that states who you want to make healthcare choices for you when you are unable to make them for yourself  This person, called a proxy, is usually a family member or a friend  You may choose more than 1 proxy  · Do not resuscitate (DNR) order:  A DNR order is used in case your heart stops beating or you stop breathing  It is a request not to have certain forms of treatment, such as CPR  A DNR order may be included in other types of advance directives  · Medical directive: This covers the care that you want if you are in a coma, near death, or unable to make decisions for yourself  You can list the treatments you want for each condition  Treatment may include pain medicine, surgery, blood transfusions, dialysis, IV or tube feedings, and a ventilator (breathing machine)  · Values history: This document has questions about your views, beliefs, and how you feel and think about life  This information can help others choose the care that you would choose  Why are advance directives important? An advance directive helps you control your care  Although spoken wishes may be used, it is better to have your wishes written down  Spoken wishes can be misunderstood, or not followed  Treatments may be given even if you do not want them  An advance directive may make it easier for your family to make difficult choices about your care  How do I decide what to put in my advance directives? · Make informed decisions:  Make sure you fully understand treatments or care you may receive   Think about the benefits and problems your decisions could cause for you or your family  Talk to healthcare providers if you have concerns or questions before you write down your wishes  You may also want to talk with your Sikhism or , or a   Check your state laws to make sure that what you put in your advance directive is legal      · Sign all forms:  Sign and date your advance directive when you have finished  You may also need 2 witnesses to sign the forms  Witnesses cannot be your doctor or his staff, your spouse, heirs or beneficiaries, people you owe money to, or your chosen proxy  Talk to your family, proxy, and healthcare providers about your advance directive  Give each person a copy, and keep one for yourself in a place you can get to easily  Do not keep it hidden or locked away  · Review and revise your plans: You can revise your advance directive at any time, as long as you are able to make decisions  Review your plan every year, and when there are changes in your life, or your health  When you make changes, let your family, proxy, and healthcare providers know  Give each a new copy  Where can I find more information? · [employer]  [address] , [address]   Phone: 6- 195 - [phone]  Phone: 8- 704 - [phone]  Web Address: http://www  aafp org  · [address] [facility]  [address] [facility], [address] [facility] , [address] [place] [address]  Phone: [phone] - [phone]  Phone: [phone]  Web Address: Matt bailon  [facility] AGREEMENT:   You have the right to help plan your care  To help with this plan, you must learn about your health condition and treatment options  You must also learn about advance directives and how they are used  Work with your healthcare providers to decide what care will be used to treat you  You always have the right to refuse treatment  The above information is an  only   It is not intended as medical advice for individual conditions or treatments  Talk to your doctor, nurse or pharmacist before following any medical regimen to see if it is safe and effective for you  © 2017 [address] [employer] Information is for End User's use only and may not be sold, redistributed or otherwise used for commercial purposes  All illustrations and images included in CareNotes® are the copyrighted property of [employer] M [employer]  or Ez Villarreal
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
## 2018-11-07 ENCOUNTER — PATIENT OUTREACH (OUTPATIENT)
Dept: FAMILY MEDICINE CLINIC | Facility: CLINIC | Age: 73
End: 2018-11-07

## 2018-11-14 ENCOUNTER — PATIENT OUTREACH (OUTPATIENT)
Dept: FAMILY MEDICINE CLINIC | Facility: CLINIC | Age: 73
End: 2018-11-14

## 2018-11-20 ENCOUNTER — PATIENT OUTREACH (OUTPATIENT)
Dept: FAMILY MEDICINE CLINIC | Facility: CLINIC | Age: 73
End: 2018-11-20

## 2018-11-29 ENCOUNTER — OFFICE VISIT (OUTPATIENT)
Dept: UROLOGY | Facility: CLINIC | Age: 73
End: 2018-11-29
Payer: COMMERCIAL

## 2018-11-29 VITALS
BODY MASS INDEX: 28.35 KG/M2 | SYSTOLIC BLOOD PRESSURE: 124 MMHG | HEIGHT: 69 IN | HEART RATE: 72 BPM | DIASTOLIC BLOOD PRESSURE: 74 MMHG

## 2018-11-29 DIAGNOSIS — Z91.19 NONCOMPLIANCE: ICD-10-CM

## 2018-11-29 DIAGNOSIS — N39.41 URGE INCONTINENCE OF URINE: Primary | ICD-10-CM

## 2018-11-29 DIAGNOSIS — N52.9 ERECTILE DYSFUNCTION, UNSPECIFIED ERECTILE DYSFUNCTION TYPE: ICD-10-CM

## 2018-11-29 LAB — POST-VOID RESIDUAL VOLUME, ML POC: 63 ML

## 2018-11-29 PROCEDURE — 51798 US URINE CAPACITY MEASURE: CPT | Performed by: UROLOGY

## 2018-11-29 PROCEDURE — 99214 OFFICE O/P EST MOD 30 MIN: CPT | Performed by: UROLOGY

## 2018-11-29 RX ORDER — FINASTERIDE 5 MG/1
5 TABLET, FILM COATED ORAL DAILY
Qty: 30 TABLET | Refills: 6 | Status: SHIPPED | OUTPATIENT
Start: 2018-11-29 | End: 2019-02-05

## 2018-11-29 RX ORDER — TAMSULOSIN HYDROCHLORIDE 0.4 MG/1
0.4 CAPSULE ORAL 2 TIMES DAILY
Qty: 60 CAPSULE | Refills: 6 | Status: SHIPPED | OUTPATIENT
Start: 2018-11-29 | End: 2019-04-24 | Stop reason: SDUPTHER
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
## 2018-12-04 ENCOUNTER — TELEPHONE (OUTPATIENT)
Dept: FAMILY MEDICINE CLINIC | Facility: CLINIC | Age: 73
End: 2018-12-04

## 2018-12-12 ENCOUNTER — OFFICE VISIT (OUTPATIENT)
Dept: FAMILY MEDICINE CLINIC | Facility: CLINIC | Age: 73
End: 2018-12-12
Payer: COMMERCIAL

## 2018-12-12 VITALS
BODY MASS INDEX: 29.33 KG/M2 | DIASTOLIC BLOOD PRESSURE: 62 MMHG | HEIGHT: 69 IN | SYSTOLIC BLOOD PRESSURE: 130 MMHG | WEIGHT: 198 LBS

## 2018-12-12 DIAGNOSIS — E11.9 TYPE 2 DIABETES MELLITUS WITHOUT COMPLICATION, WITHOUT LONG-TERM CURRENT USE OF INSULIN (HCC): ICD-10-CM

## 2018-12-12 DIAGNOSIS — L02.32 BOIL OF BUTTOCK: Primary | ICD-10-CM

## 2018-12-12 DIAGNOSIS — I25.10 CORONARY ARTERY DISEASE INVOLVING NATIVE CORONARY ARTERY OF NATIVE HEART WITHOUT ANGINA PECTORIS: ICD-10-CM

## 2018-12-12 PROCEDURE — 99213 OFFICE O/P EST LOW 20 MIN: CPT | Performed by: FAMILY MEDICINE

## 2018-12-12 PROCEDURE — 3008F BODY MASS INDEX DOCD: CPT | Performed by: FAMILY MEDICINE

## 2018-12-12 PROCEDURE — 1160F RVW MEDS BY RX/DR IN RCRD: CPT | Performed by: FAMILY MEDICINE

## 2018-12-12 RX ORDER — DOXYCYCLINE HYCLATE 100 MG/1
100 CAPSULE ORAL EVERY 12 HOURS SCHEDULED
Qty: 14 CAPSULE | Refills: 0 | Status: SHIPPED | OUTPATIENT
Start: 2018-12-12 | End: 2018-12-21 | Stop reason: HOSPADM

## 2018-12-12 NOTE — PROGRESS NOTES
Assessment/Plan:  Rx for doxycycline to help his buttock lesion  He will call us if symptoms continue or increase  Follow up with Cardiology next week  He will get blood work today  We will see him back in 2 months or p r n  No problem-specific Assessment & Plan notes found for this encounter  Diagnoses and all orders for this visit:    Boil of buttock  -     doxycycline hyclate (VIBRAMYCIN) 100 mg capsule; Take 1 capsule (100 mg total) by mouth every 12 (twelve) hours for 7 days    Coronary artery disease involving native coronary artery of native heart without angina pectoris    Type 2 diabetes mellitus without complication, without long-term current use of insulin ([facility])          Subjective:      Patient ID: Klaudia Cota is a 74 y o  male  Patient here today for follow-up  Patient states gets winded when he gets up the stairs  Sometimes gets chest pain with it  This is been going on a while  He is going to get a stress test next week  Patient also states has a sore lesion on his left buttock for the past week or so  Is not draining at all  No fever chills  The following portions of the patient's history were reviewed and updated as appropriate:   He  has a past medical history of Aortic stenosis; Arthritis; Asthma; Atrial fibrillation ([facility] ); Back pain; Cervical radiculopathy; CHF (congestive heart failure) ([facility] ); Chronic pain; Chronic pain (10/26/2016); COPD (chronic obstructive pulmonary disease) ([facility] ); Coronary artery disease; CVA (cerebral vascular accident) ([facility] ); Depressive disorder; Diabetes mellitus ([facility] ); Encephalopathy; GERD (gastroesophageal reflux disease); Head injury; Hearing loss; transient ischemic attack (TIA) (10/26/2016); Hyperlipidemia; Hypertension; Kidney stones; Migraines; MRSA (methicillin resistant Staphylococcus aureus) infection; MRSA (methicillin resistant staphylococcus aureus) pneumonia ([facility] );  Osteoarthritis; Partial small bowel obstruction ([address] ); Peripheral neuropathy; Psychiatric disorder; PVD (peripheral vascular disease) ([place] [address] ); Renal disorder; Shortness of breath (10/26/2016); TIA (transient ischemic attack) (2014); and Vertigo    He   Patient Active Problem List    Diagnosis Date Noted    Erectile dysfunction 11/29/2018    Accelerated hypertension 09/25/2018    Heme positive stool 09/25/2018    GI bleed 08/30/2018    Urge incontinence of urine 08/29/2018    Chronic combined systolic and diastolic CHF (congestive heart failure) ([facility] ) 08/17/2018    Anterior epistaxis 07/27/2018    Obstructive sleep apnea (adult) (pediatric)     Noncompliance 07/19/2018    CHF exacerbation ([place] [address] ) 07/17/2018    Right knee injury, initial encounter 62/13/4836    Acute systolic CHF (congestive heart failure) ([place] [address] ) 07/11/2018    Acute respiratory failure with hypoxia and hypercapnia ([place] [address] ) 07/09/2018    Syncope and collapse 07/07/2018    Dilated cardiomyopathy secondary to tachycardia ([place] [address] ) 05/18/2018    Coronary artery disease involving native coronary artery of native heart without angina pectoris 05/18/2018    Type 2 diabetes mellitus without complication, without long-term current use of insulin ([place] [address] ) 05/08/2018    Dysphagia 05/07/2018    Marijuana abuse 05/06/2018    Healthcare-associated pneumonia 05/06/2018    Decreased left ventricular systolic function 83/42/9435    Acute cystitis without hematuria 04/06/2018    Leukocytosis 04/06/2018    Lactic acidosis 04/05/2018    Pulmonary nodule 04/03/2018    Ambulatory dysfunction 06/04/2017    Tobacco abuse 06/04/2017    Dehydration, mild 06/03/2017    Chronic pain 10/30/2016    Sepsis ([facility] ) 10/30/2016    Colitis 10/30/2016    NSTEMI (non-ST elevated myocardial infarction) ([place] [address] ) 10/30/2016    Intractable diarrhea 10/26/2016    Weakness 10/26/2016    Hypokalemia 10/26/2016    COPD (chronic obstructive pulmonary disease) ([place] [address] ) 10/26/2016    Abdominal pain 10/26/2016    Benign essential hypertension 10/26/2016    Dyslipidemia 10/26/2016    Depressive disorder 10/26/2016    Persistent atrial fibrillation ([address] ) 10/26/2016    Migraines 10/26/2016     He  has a past surgical history that includes Tonsillectomy; Appendectomy; Cholecystectomy; pr cardioversion elective arrhythmia external (N/A, 4/4/2018); pr echo transesophag r-t 2d w/prb img acquisj i&r (N/A, 4/4/2018); Cardiac catheterization; EGD AND COLONOSCOPY (N/A, 9/26/2018); and FL injection right hip (non arthrogram) (10/16/2018)  His family history includes Alcohol abuse in his father; Cancer in his mother; Coronary artery disease in his mother; Diabetes in his brother and maternal aunt; Heart disease in his brother and maternal aunt; Hypertension in his mother  He  reports that he has been smoking Cigarettes  He has a 30 00 pack-year smoking history  He has never used smokeless tobacco  He reports that he does not drink alcohol or use drugs    Current Outpatient Prescriptions   Medication Sig Dispense Refill    albuterol (PROVENTIL HFA,VENTOLIN HFA) 90 mcg/act inhaler Inhale 1-2 puffs every 6 (six) hours as needed for wheezing 1 Inhaler 3    apixaban (ELIQUIS) 5 mg Take 1 tablet (5 mg total) by mouth 2 (two) times a day 60 tablet 0    aspirin 81 MG tablet Take 1 tablet by mouth daily      atorvastatin (LIPITOR) 40 mg tablet Take 40 mg by mouth daily      cloNIDine (CATAPRES) 0 1 mg tablet Take 1 tablet (0 1 mg total) by mouth 2 (two) times a day for 30 days 20 tablet 0    cloNIDine (CATAPRES) 0 1 mg tablet Take 0 1 mg by mouth every 12 (twelve) hours        clopidogrel (PLAVIX) 75 mg tablet       diltiazem (CARDIZEM CD) 120 mg 24 hr capsule       diltiazem (CARDIZEM CD) 180 mg 24 hr capsule Take 1 capsule (180 mg total) by mouth daily 30 capsule 0    doxycycline hyclate (VIBRAMYCIN) 100 mg capsule Take 1 capsule (100 mg total) by mouth every 12 (twelve) hours for 7 days 14 capsule 0    DULoxetine (CYMBALTA) 60 mg delayed release capsule       esomeprazole (NexIUM) 40 MG capsule Take 1 capsule (40 mg total) by mouth 2 (two) times a day before meals 30 capsule 0    ferrous sulfate 324 (65 Fe) mg Take 1 tablet (324 mg total) by mouth 2 (two) times a day before meals 60 tablet 0    finasteride (PROSCAR) 5 mg tablet Take 1 tablet (5 mg total) by mouth daily for 30 doses 30 tablet 6    furosemide (LASIX) 20 mg tablet Take 1 tablet (20 mg total) by mouth 2 (two) times a day 30 tablet 0    lisinopril (ZESTRIL) 20 mg tablet Take 1 tablet (20 mg total) by mouth daily 90 tablet 3    Melatonin 3 MG CAPS Take 20 mg by mouth daily        metFORMIN (GLUCOPHAGE) 500 mg tablet Take 1 tablet (500 mg total) by mouth 2 (two) times a day with meals 60 tablet 0    metoprolol succinate (TOPROL-XL) 100 mg 24 hr tablet Take 1 tablet (100 mg total) by mouth 2 (two) times a day 60 tablet 0    Mirabegron ER 25 MG TB24 Take 25 mg by mouth daily for 30 doses 30 tablet 6    Na Sulfate-K Sulfate-Mg Sulf 17 5-3 13-1 6 GM/180ML SOLN Dispense one bowel kit use as directed by office (Patient not taking: Reported on 11/29/2018 ) 2 Bottle 0    pregabalin (LYRICA) 300 MG capsule Take 300 mg by mouth 2 (two) times a day [employer] pharmacist stated last script was 7/8/17       ranolazine (RANEXA) 500 mg 12 hr tablet Take 500 mg by mouth 2 (two) times a day [facility] stated script was Bid 7/7/18       tamsulosin (FLOMAX) 0 4 mg Take 1 capsule (0 4 mg total) by mouth 2 (two) times a day for 30 doses 60 capsule 6    tiotropium (SPIRIVA) 18 mcg inhalation capsule Place 1 capsule (18 mcg total) into inhaler and inhale daily 30 capsule 0     No current facility-administered medications for this visit        Current Outpatient Prescriptions on File Prior to Visit   Medication Sig    albuterol (PROVENTIL HFA,VENTOLIN HFA) 90 mcg/act inhaler Inhale 1-2 puffs every 6 (six) hours as needed for wheezing    apixaban (ELIQUIS) 5 mg Take 1 tablet (5 mg total) by mouth 2 (two) times a day    aspirin 81 MG tablet Take 1 tablet by mouth daily    atorvastatin (LIPITOR) 40 mg tablet Take 40 mg by mouth daily    cloNIDine (CATAPRES) 0 1 mg tablet Take 1 tablet (0 1 mg total) by mouth 2 (two) times a day for 30 days    cloNIDine (CATAPRES) 0 1 mg tablet Take 0 1 mg by mouth every 12 (twelve) hours      clopidogrel (PLAVIX) 75 mg tablet     diltiazem (CARDIZEM CD) 120 mg 24 hr capsule     diltiazem (CARDIZEM CD) 180 mg 24 hr capsule Take 1 capsule (180 mg total) by mouth daily    DULoxetine (CYMBALTA) 60 mg delayed release capsule     esomeprazole (NexIUM) 40 MG capsule Take 1 capsule (40 mg total) by mouth 2 (two) times a day before meals    ferrous sulfate 324 (65 Fe) mg Take 1 tablet (324 mg total) by mouth 2 (two) times a day before meals    finasteride (PROSCAR) 5 mg tablet Take 1 tablet (5 mg total) by mouth daily for 30 doses    furosemide (LASIX) 20 mg tablet Take 1 tablet (20 mg total) by mouth 2 (two) times a day    lisinopril (ZESTRIL) 20 mg tablet Take 1 tablet (20 mg total) by mouth daily    Melatonin 3 MG CAPS Take 20 mg by mouth daily      metFORMIN (GLUCOPHAGE) 500 mg tablet Take 1 tablet (500 mg total) by mouth 2 (two) times a day with meals    metoprolol succinate (TOPROL-XL) 100 mg 24 hr tablet Take 1 tablet (100 mg total) by mouth 2 (two) times a day    Mirabegron ER 25 MG TB24 Take 25 mg by mouth daily for 30 doses    Na Sulfate-K Sulfate-Mg Sulf 17 5-3 13-1 6 GM/180ML SOLN Dispense one bowel kit use as directed by office (Patient not taking: Reported on 11/29/2018 )    pregabalin (LYRICA) 300 MG capsule Take 300 mg by mouth 2 (two) times a day [employer] pharmacist stated last script was 7/8/17     ranolazine (RANEXA) 500 mg 12 hr tablet Take 500 mg by mouth 2 (two) times a day [facility] stated script was Bid 7/7/18     tamsulosin (FLOMAX) 0 4 mg Take 1 capsule (0 4 mg total) by mouth 2 (two) times a day for 30 doses    tiotropium (SPIRIVA) 18 mcg inhalation capsule Place 1 capsule (18 mcg total) into inhaler and inhale daily     No current facility-administered medications on file prior to visit  He is allergic to other; demerol [meperidine]; iodinated diagnostic agents; iodine; ketorolac; meperidine and related; and sulfa antibiotics       Review of Systems   Constitutional: Negative  Respiratory: Positive for chest tightness and shortness of breath  Cardiovascular: Positive for chest pain  Gastrointestinal: Negative  Genitourinary: Negative  Skin:        As per HPI         Objective:      /62   Ht 5' 8 5" (1 74 m)   Wt 89 8 kg (198 lb)   BMI 29 67 kg/m²          Physical Exam   Constitutional: He is oriented to person, place, and time  He appears well-developed and well-nourished  No distress  HENT:   Head: Normocephalic and atraumatic  Eyes: Conjunctivae are normal    Cardiovascular: Normal rate, regular rhythm and normal heart sounds  Exam reveals no gallop and no friction rub  No murmur heard  Pulmonary/Chest: Effort normal and breath sounds normal  No respiratory distress  He has no wheezes  He has no rales  Musculoskeletal: He exhibits no edema  Neurological: He is alert and oriented to person, place, and time  Skin: He is not diaphoretic  Psychiatric: He has a normal mood and affect  His behavior is normal  Judgment and thought content normal    Vitals reviewed

## 2018-12-14 ENCOUNTER — OFFICE VISIT (OUTPATIENT)
Dept: GASTROENTEROLOGY | Facility: HOSPITAL | Age: 73
End: 2018-12-14
Payer: COMMERCIAL

## 2018-12-14 VITALS — TEMPERATURE: 69 F | HEART RATE: 79 BPM | DIASTOLIC BLOOD PRESSURE: 66 MMHG | SYSTOLIC BLOOD PRESSURE: 141 MMHG

## 2018-12-14 DIAGNOSIS — R19.8 ALTERNATING CONSTIPATION AND DIARRHEA: ICD-10-CM

## 2018-12-14 DIAGNOSIS — D50.9 IRON DEFICIENCY ANEMIA, UNSPECIFIED IRON DEFICIENCY ANEMIA TYPE: ICD-10-CM

## 2018-12-14 DIAGNOSIS — R10.84 GENERALIZED ABDOMINAL PAIN: Primary | ICD-10-CM

## 2018-12-14 DIAGNOSIS — K21.9 GASTROESOPHAGEAL REFLUX DISEASE WITHOUT ESOPHAGITIS: ICD-10-CM

## 2018-12-14 PROCEDURE — 99214 OFFICE O/P EST MOD 30 MIN: CPT | Performed by: PHYSICIAN ASSISTANT

## 2018-12-14 PROCEDURE — 4040F PNEUMOC VAC/ADMIN/RCVD: CPT | Performed by: PHYSICIAN ASSISTANT

## 2018-12-14 RX ORDER — DICYCLOMINE HCL 20 MG
10 TABLET ORAL EVERY 6 HOURS
Qty: 60 TABLET | Refills: 1 | Status: SHIPPED | OUTPATIENT
Start: 2018-12-14 | End: 2019-02-05 | Stop reason: SDUPTHER
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
## 2018-12-18 ENCOUNTER — HOSPITAL ENCOUNTER (INPATIENT)
Facility: HOSPITAL | Age: 73
LOS: 2 days | Discharge: HOME | DRG: 811 | End: 2018-12-21
Attending: INTERNAL MEDICINE | Admitting: INTERNAL MEDICINE
Payer: COMMERCIAL

## 2018-12-18 ENCOUNTER — APPOINTMENT (EMERGENCY)
Dept: RADIOLOGY | Facility: HOSPITAL | Age: 73
DRG: 811 | End: 2018-12-18
Payer: COMMERCIAL

## 2018-12-18 DIAGNOSIS — I50.9 CONGESTIVE HEART FAILURE OF UNKNOWN ETIOLOGY (HCC): ICD-10-CM

## 2018-12-18 DIAGNOSIS — D64.9 SYMPTOMATIC ANEMIA: Primary | ICD-10-CM

## 2018-12-18 PROBLEM — R04.0 ANTERIOR EPISTAXIS: Status: RESOLVED | Noted: 2018-07-27 | Resolved: 2018-12-18

## 2018-12-18 PROBLEM — S89.91XA RIGHT KNEE INJURY, INITIAL ENCOUNTER: Status: RESOLVED | Noted: 2018-07-17 | Resolved: 2018-12-18

## 2018-12-18 PROBLEM — E87.2 LACTIC ACIDOSIS: Status: RESOLVED | Noted: 2018-04-05 | Resolved: 2018-12-18

## 2018-12-18 PROBLEM — N30.00 ACUTE CYSTITIS WITHOUT HEMATURIA: Status: RESOLVED | Noted: 2018-04-06 | Resolved: 2018-12-18

## 2018-12-18 PROBLEM — R26.2 AMBULATORY DYSFUNCTION: Status: RESOLVED | Noted: 2017-06-04 | Resolved: 2018-12-18

## 2018-12-18 PROBLEM — I50.43 ACUTE ON CHRONIC COMBINED SYSTOLIC AND DIASTOLIC CONGESTIVE HEART FAILURE (HCC): Status: ACTIVE | Noted: 2018-08-17

## 2018-12-18 PROBLEM — J18.9 HEALTHCARE-ASSOCIATED PNEUMONIA: Status: RESOLVED | Noted: 2018-05-06 | Resolved: 2018-12-18

## 2018-12-18 PROBLEM — E87.20 LACTIC ACIDOSIS: Status: RESOLVED | Noted: 2018-04-05 | Resolved: 2018-12-18

## 2018-12-18 PROBLEM — J96.01 ACUTE RESPIRATORY FAILURE WITH HYPOXIA AND HYPERCAPNIA (HCC): Status: RESOLVED | Noted: 2018-07-09 | Resolved: 2018-12-18

## 2018-12-18 PROBLEM — J96.02 ACUTE RESPIRATORY FAILURE WITH HYPOXIA AND HYPERCAPNIA (HCC): Status: RESOLVED | Noted: 2018-07-09 | Resolved: 2018-12-18

## 2018-12-18 PROBLEM — D72.829 LEUKOCYTOSIS: Status: RESOLVED | Noted: 2018-04-06 | Resolved: 2018-12-18

## 2018-12-18 PROBLEM — R55 SYNCOPE AND COLLAPSE: Status: RESOLVED | Noted: 2018-07-07 | Resolved: 2018-12-18

## 2018-12-18 PROBLEM — I10 ACCELERATED HYPERTENSION: Status: RESOLVED | Noted: 2018-09-25 | Resolved: 2018-12-18

## 2018-12-18 PROBLEM — E86.0 DEHYDRATION, MILD: Chronic | Status: RESOLVED | Noted: 2017-06-03 | Resolved: 2018-12-18

## 2018-12-18 LAB
ABO GROUP BLD: NORMAL
ALBUMIN SERPL BCP-MCNC: 3.5 G/DL (ref 3.5–5.7)
ALP SERPL-CCNC: 67 U/L (ref 55–165)
ALT SERPL W P-5'-P-CCNC: 9 U/L (ref 7–52)
ANION GAP SERPL CALCULATED.3IONS-SCNC: 6 MMOL/L (ref 4–13)
ANISOCYTOSIS BLD QL SMEAR: PRESENT
APTT PPP: 48 SECONDS (ref 26–38)
AST SERPL W P-5'-P-CCNC: 11 U/L (ref 13–39)
BACTERIA UR QL AUTO: ABNORMAL /HPF
BASOPHILS # BLD AUTO: 0.18 THOUSAND/UL (ref 0–0.1)
BASOPHILS NFR MAR MANUAL: 3 % (ref 0–1)
BILIRUB SERPL-MCNC: 0.6 MG/DL (ref 0.2–1)
BILIRUB UR QL STRIP: NEGATIVE
BLD GP AB SCN SERPL QL: NEGATIVE
BNP SERPL-MCNC: 581 PG/ML (ref 1–100)
BUN SERPL-MCNC: 10 MG/DL (ref 7–25)
CALCIUM SERPL-MCNC: 9 MG/DL (ref 8.6–10.5)
CHLORIDE SERPL-SCNC: 106 MMOL/L (ref 98–107)
CLARITY UR: CLEAR
CO2 SERPL-SCNC: 30 MMOL/L (ref 21–31)
COLOR UR: YELLOW
CREAT SERPL-MCNC: 0.82 MG/DL (ref 0.7–1.3)
DACRYOCYTES BLD QL SMEAR: PRESENT
EOSINOPHIL # BLD AUTO: 0.24 THOUSAND/UL (ref 0–0.61)
EOSINOPHIL NFR BLD MANUAL: 4 % (ref 0–6)
ERYTHROCYTE [DISTWIDTH] IN BLOOD BY AUTOMATED COUNT: 20.4 % (ref 11.5–14.5)
GFR SERPL CREATININE-BSD FRML MDRD: 87 ML/MIN/1.73SQ M
GLUCOSE SERPL-MCNC: 102 MG/DL (ref 65–99)
GLUCOSE UR STRIP-MCNC: NEGATIVE MG/DL
HCT VFR BLD AUTO: 25.3 % (ref 36.5–49.3)
HCT VFR BLD AUTO: 26.3 % (ref 36.5–49.3)
HGB BLD-MCNC: 7.6 G/DL (ref 14–18)
HGB BLD-MCNC: 8 G/DL (ref 14–18)
HGB UR QL STRIP.AUTO: NEGATIVE
HYPERCHROMIA BLD QL SMEAR: PRESENT
INR PPP: 2.03 (ref 0.9–1.5)
KETONES UR STRIP-MCNC: NEGATIVE MG/DL
LACTATE SERPL-SCNC: 1.1 MMOL/L (ref 0.5–2)
LEUKOCYTE ESTERASE UR QL STRIP: ABNORMAL
LYMPHOCYTES # BLD AUTO: 1.22 THOUSAND/UL (ref 0.6–4.47)
LYMPHOCYTES # BLD AUTO: 20 % (ref 20–51)
MCH RBC QN AUTO: 17.5 PG (ref 26–34)
MCHC RBC AUTO-ENTMCNC: 29.9 G/DL (ref 31–37)
MCV RBC AUTO: 59 FL (ref 81–99)
MICROCYTES BLD QL AUTO: PRESENT
MONOCYTES # BLD AUTO: 0.92 THOUSAND/UL (ref 0–1.22)
MONOCYTES NFR BLD AUTO: 15 % (ref 4–12)
MUCOUS THREADS UR QL AUTO: ABNORMAL
NEUTS SEG # BLD: 3.54 THOUSAND/UL (ref 1.81–6.82)
NEUTS SEG NFR BLD AUTO: 58 % (ref 42–75)
NITRITE UR QL STRIP: NEGATIVE
NON-SQ EPI CELLS URNS QL MICRO: ABNORMAL /HPF
NRBC BLD AUTO-RTO: 0 /100 WBCS
OVALOCYTES BLD QL SMEAR: PRESENT
PH UR STRIP.AUTO: 6.5 [PH] (ref 5–8)
PLATELET # BLD AUTO: 247 THOUSANDS/UL (ref 149–390)
PLATELET BLD QL SMEAR: ADEQUATE
PMV BLD AUTO: 8.4 FL (ref 8.6–11.7)
POTASSIUM SERPL-SCNC: 3.2 MMOL/L (ref 3.5–5.5)
PROT SERPL-MCNC: 5.3 G/DL (ref 6.4–8.9)
PROT UR STRIP-MCNC: ABNORMAL MG/DL
PROTHROMBIN TIME: 23.7 SECONDS (ref 10.2–13)
RBC # BLD AUTO: 4.33 MILLION/UL (ref 4.3–5.9)
RBC #/AREA URNS AUTO: ABNORMAL /HPF
RH BLD: POSITIVE
SODIUM SERPL-SCNC: 142 MMOL/L (ref 134–143)
SP GR UR STRIP.AUTO: 1.02 (ref 1–1.03)
SPECIMEN EXPIRATION DATE: NORMAL
TOTAL CELLS COUNTED SPEC: 100
TROPONIN I SERPL-MCNC: <0.03 NG/ML
TROPONIN I SERPL-MCNC: <0.03 NG/ML
UROBILINOGEN UR QL STRIP.AUTO: 0.2 E.U./DL
WBC # BLD AUTO: 6.1 THOUSAND/UL (ref 4.8–10.8)
WBC #/AREA URNS AUTO: ABNORMAL /HPF

## 2018-12-18 PROCEDURE — 87086 URINE CULTURE/COLONY COUNT: CPT | Performed by: INTERNAL MEDICINE

## 2018-12-18 PROCEDURE — 86900 BLOOD TYPING SEROLOGIC ABO: CPT | Performed by: INTERNAL MEDICINE

## 2018-12-18 PROCEDURE — 86920 COMPATIBILITY TEST SPIN: CPT

## 2018-12-18 PROCEDURE — 94760 N-INVAS EAR/PLS OXIMETRY 1: CPT

## 2018-12-18 PROCEDURE — 93005 ELECTROCARDIOGRAM TRACING: CPT

## 2018-12-18 PROCEDURE — 85007 BL SMEAR W/DIFF WBC COUNT: CPT | Performed by: INTERNAL MEDICINE

## 2018-12-18 PROCEDURE — 83036 HEMOGLOBIN GLYCOSYLATED A1C: CPT | Performed by: PHYSICIAN ASSISTANT

## 2018-12-18 PROCEDURE — 87040 BLOOD CULTURE FOR BACTERIA: CPT | Performed by: INTERNAL MEDICINE

## 2018-12-18 PROCEDURE — 83880 ASSAY OF NATRIURETIC PEPTIDE: CPT | Performed by: INTERNAL MEDICINE

## 2018-12-18 PROCEDURE — 86901 BLOOD TYPING SEROLOGIC RH(D): CPT | Performed by: INTERNAL MEDICINE

## 2018-12-18 PROCEDURE — P9021 RED BLOOD CELLS UNIT: HCPCS

## 2018-12-18 PROCEDURE — 84484 ASSAY OF TROPONIN QUANT: CPT | Performed by: INTERNAL MEDICINE

## 2018-12-18 PROCEDURE — 36415 COLL VENOUS BLD VENIPUNCTURE: CPT | Performed by: INTERNAL MEDICINE

## 2018-12-18 PROCEDURE — 85027 COMPLETE CBC AUTOMATED: CPT | Performed by: INTERNAL MEDICINE

## 2018-12-18 PROCEDURE — 85610 PROTHROMBIN TIME: CPT | Performed by: INTERNAL MEDICINE

## 2018-12-18 PROCEDURE — 30233N1 TRANSFUSION OF NONAUTOLOGOUS RED BLOOD CELLS INTO PERIPHERAL VEIN, PERCUTANEOUS APPROACH: ICD-10-PCS | Performed by: INTERNAL MEDICINE

## 2018-12-18 PROCEDURE — 83605 ASSAY OF LACTIC ACID: CPT | Performed by: INTERNAL MEDICINE

## 2018-12-18 PROCEDURE — 85014 HEMATOCRIT: CPT | Performed by: PHYSICIAN ASSISTANT

## 2018-12-18 PROCEDURE — 85018 HEMOGLOBIN: CPT | Performed by: PHYSICIAN ASSISTANT

## 2018-12-18 PROCEDURE — 81001 URINALYSIS AUTO W/SCOPE: CPT | Performed by: INTERNAL MEDICINE

## 2018-12-18 PROCEDURE — 85730 THROMBOPLASTIN TIME PARTIAL: CPT | Performed by: INTERNAL MEDICINE

## 2018-12-18 PROCEDURE — 86850 RBC ANTIBODY SCREEN: CPT | Performed by: INTERNAL MEDICINE

## 2018-12-18 PROCEDURE — 80053 COMPREHEN METABOLIC PANEL: CPT | Performed by: INTERNAL MEDICINE

## 2018-12-18 PROCEDURE — 71045 X-RAY EXAM CHEST 1 VIEW: CPT

## 2018-12-18 PROCEDURE — 99285 EMERGENCY DEPT VISIT HI MDM: CPT

## 2018-12-18 PROCEDURE — P9040 RBC LEUKOREDUCED IRRADIATED: HCPCS

## 2018-12-18 PROCEDURE — 94664 DEMO&/EVAL PT USE INHALER: CPT

## 2018-12-18 RX ORDER — ALBUTEROL SULFATE 90 UG/1
2 AEROSOL, METERED RESPIRATORY (INHALATION) EVERY 4 HOURS PRN
Status: DISCONTINUED | OUTPATIENT
Start: 2018-12-18 | End: 2018-12-21 | Stop reason: HOSPADM

## 2018-12-18 RX ORDER — FINASTERIDE 5 MG/1
5 TABLET, FILM COATED ORAL DAILY
Status: DISCONTINUED | OUTPATIENT
Start: 2018-12-19 | End: 2018-12-21 | Stop reason: HOSPADM

## 2018-12-18 RX ORDER — RANOLAZINE 500 MG/1
500 TABLET, EXTENDED RELEASE ORAL
Status: DISCONTINUED | OUTPATIENT
Start: 2018-12-18 | End: 2018-12-21 | Stop reason: HOSPADM

## 2018-12-18 RX ORDER — CLONIDINE HYDROCHLORIDE 0.1 MG/1
0.1 TABLET ORAL EVERY 12 HOURS SCHEDULED
Status: DISCONTINUED | OUTPATIENT
Start: 2018-12-18 | End: 2018-12-21 | Stop reason: HOSPADM

## 2018-12-18 RX ORDER — LISINOPRIL 20 MG/1
20 TABLET ORAL DAILY
Status: DISCONTINUED | OUTPATIENT
Start: 2018-12-19 | End: 2018-12-21 | Stop reason: HOSPADM

## 2018-12-18 RX ORDER — DICYCLOMINE HCL 20 MG
10 TABLET ORAL EVERY 6 HOURS
Status: DISCONTINUED | OUTPATIENT
Start: 2018-12-18 | End: 2018-12-21 | Stop reason: HOSPADM

## 2018-12-18 RX ORDER — NICOTINE 21 MG/24HR
1 PATCH, TRANSDERMAL 24 HOURS TRANSDERMAL DAILY
Status: DISCONTINUED | OUTPATIENT
Start: 2018-12-19 | End: 2018-12-21 | Stop reason: HOSPADM

## 2018-12-18 RX ORDER — DILTIAZEM HYDROCHLORIDE 180 MG/1
180 CAPSULE, COATED, EXTENDED RELEASE ORAL DAILY
Status: DISCONTINUED | OUTPATIENT
Start: 2018-12-19 | End: 2018-12-21 | Stop reason: HOSPADM

## 2018-12-18 RX ORDER — POTASSIUM CHLORIDE 20 MEQ/1
20 TABLET, EXTENDED RELEASE ORAL DAILY
Status: DISCONTINUED | OUTPATIENT
Start: 2018-12-19 | End: 2018-12-21 | Stop reason: HOSPADM

## 2018-12-18 RX ORDER — FUROSEMIDE 10 MG/ML
20 INJECTION INTRAMUSCULAR; INTRAVENOUS ONCE
Status: COMPLETED | OUTPATIENT
Start: 2018-12-18 | End: 2018-12-19

## 2018-12-18 RX ORDER — LANOLIN ALCOHOL/MO/W.PET/CERES
3 CREAM (GRAM) TOPICAL
Status: DISCONTINUED | OUTPATIENT
Start: 2018-12-18 | End: 2018-12-21 | Stop reason: HOSPADM

## 2018-12-18 RX ORDER — PANTOPRAZOLE SODIUM 40 MG/1
40 TABLET, DELAYED RELEASE ORAL
Status: DISCONTINUED | OUTPATIENT
Start: 2018-12-19 | End: 2018-12-21 | Stop reason: HOSPADM

## 2018-12-18 RX ORDER — POTASSIUM CHLORIDE 20 MEQ/1
40 TABLET, EXTENDED RELEASE ORAL ONCE
Status: COMPLETED | OUTPATIENT
Start: 2018-12-18 | End: 2018-12-19

## 2018-12-18 RX ORDER — METOPROLOL SUCCINATE 50 MG/1
100 TABLET, EXTENDED RELEASE ORAL 2 TIMES DAILY
Status: DISCONTINUED | OUTPATIENT
Start: 2018-12-19 | End: 2018-12-20

## 2018-12-18 RX ORDER — DULOXETIN HYDROCHLORIDE 60 MG/1
60 CAPSULE, DELAYED RELEASE ORAL DAILY
Status: DISCONTINUED | OUTPATIENT
Start: 2018-12-19 | End: 2018-12-21 | Stop reason: HOSPADM

## 2018-12-18 RX ORDER — FERROUS SULFATE 325(65) MG
325 TABLET ORAL 2 TIMES DAILY WITH MEALS
Status: DISCONTINUED | OUTPATIENT
Start: 2018-12-19 | End: 2018-12-21 | Stop reason: HOSPADM

## 2018-12-18 RX ORDER — ONDANSETRON 2 MG/ML
4 INJECTION INTRAMUSCULAR; INTRAVENOUS EVERY 6 HOURS PRN
Status: DISCONTINUED | OUTPATIENT
Start: 2018-12-18 | End: 2018-12-21 | Stop reason: HOSPADM

## 2018-12-18 RX ORDER — ATORVASTATIN CALCIUM 40 MG/1
40 TABLET, FILM COATED ORAL DAILY
Status: DISCONTINUED | OUTPATIENT
Start: 2018-12-19 | End: 2018-12-21 | Stop reason: HOSPADM

## 2018-12-18 RX ORDER — FUROSEMIDE 10 MG/ML
40 INJECTION INTRAMUSCULAR; INTRAVENOUS
Status: DISCONTINUED | OUTPATIENT
Start: 2018-12-19 | End: 2018-12-19

## 2018-12-18 RX ORDER — CLOPIDOGREL BISULFATE 75 MG/1
75 TABLET ORAL DAILY
Status: DISCONTINUED | OUTPATIENT
Start: 2018-12-19 | End: 2018-12-19

## 2018-12-18 RX ORDER — TAMSULOSIN HYDROCHLORIDE 0.4 MG/1
0.4 CAPSULE ORAL
Status: DISCONTINUED | OUTPATIENT
Start: 2018-12-19 | End: 2018-12-21 | Stop reason: HOSPADM

## 2018-12-18 RX ORDER — ASPIRIN 81 MG/1
81 TABLET, CHEWABLE ORAL DAILY
Status: DISCONTINUED | OUTPATIENT
Start: 2018-12-19 | End: 2018-12-19

## 2018-12-18 RX ORDER — IPRATROPIUM BROMIDE AND ALBUTEROL SULFATE 2.5; .5 MG/3ML; MG/3ML
3 SOLUTION RESPIRATORY (INHALATION)
Status: DISCONTINUED | OUTPATIENT
Start: 2018-12-18 | End: 2018-12-18

## 2018-12-18 RX ORDER — PREGABALIN 100 MG/1
300 CAPSULE ORAL 2 TIMES DAILY
Status: DISCONTINUED | OUTPATIENT
Start: 2018-12-19 | End: 2018-12-21 | Stop reason: HOSPADM

## 2018-12-18 RX ORDER — ALBUTEROL SULFATE 90 UG/1
2 AEROSOL, METERED RESPIRATORY (INHALATION) EVERY 6 HOURS PRN
Status: DISCONTINUED | OUTPATIENT
Start: 2018-12-18 | End: 2018-12-18 | Stop reason: SDUPTHER

## 2018-12-18 RX ADMIN — IPRATROPIUM BROMIDE AND ALBUTEROL SULFATE 3 ML: .5; 3 SOLUTION RESPIRATORY (INHALATION) at 19:46

## 2018-12-19 ENCOUNTER — APPOINTMENT (OUTPATIENT)
Dept: NON INVASIVE DIAGNOSTICS | Facility: HOSPITAL | Age: 73
DRG: 811 | End: 2018-12-19
Payer: COMMERCIAL

## 2018-12-19 PROBLEM — R06.00 DYSPNEA: Status: ACTIVE | Noted: 2018-12-19

## 2018-12-19 PROBLEM — R07.89 OTHER CHEST PAIN: Status: ACTIVE | Noted: 2018-12-19

## 2018-12-19 LAB
ALBUMIN SERPL BCP-MCNC: 3.6 G/DL (ref 3.5–5.7)
ALP SERPL-CCNC: 69 U/L (ref 55–165)
ALT SERPL W P-5'-P-CCNC: 9 U/L (ref 7–52)
ANION GAP SERPL CALCULATED.3IONS-SCNC: 6 MMOL/L (ref 4–13)
AST SERPL W P-5'-P-CCNC: 12 U/L (ref 13–39)
ATRIAL RATE: 258 BPM
ATRIAL RATE: 81 BPM
BACTERIA UR CULT: NORMAL
BASOPHILS # BLD AUTO: 0.2 THOUSANDS/ΜL (ref 0–0.1)
BASOPHILS NFR BLD AUTO: 2 % (ref 0–2)
BILIRUB SERPL-MCNC: 1.3 MG/DL (ref 0.2–1)
BNP SERPL-MCNC: 332 PG/ML (ref 1–100)
BUN SERPL-MCNC: 11 MG/DL (ref 7–25)
CALCIUM SERPL-MCNC: 9 MG/DL (ref 8.6–10.5)
CHLORIDE SERPL-SCNC: 105 MMOL/L (ref 98–107)
CO2 SERPL-SCNC: 30 MMOL/L (ref 21–31)
CREAT SERPL-MCNC: 0.96 MG/DL (ref 0.7–1.3)
EOSINOPHIL # BLD AUTO: 0.3 THOUSAND/ΜL (ref 0–0.61)
EOSINOPHIL NFR BLD AUTO: 3 % (ref 0–5)
ERYTHROCYTE [DISTWIDTH] IN BLOOD BY AUTOMATED COUNT: 24.3 % (ref 11.5–14.5)
EST. AVERAGE GLUCOSE BLD GHB EST-MCNC: 100 MG/DL
GFR SERPL CREATININE-BSD FRML MDRD: 77 ML/MIN/1.73SQ M
GLUCOSE SERPL-MCNC: 103 MG/DL (ref 65–140)
GLUCOSE SERPL-MCNC: 114 MG/DL (ref 65–140)
GLUCOSE SERPL-MCNC: 128 MG/DL (ref 65–140)
GLUCOSE SERPL-MCNC: 131 MG/DL (ref 65–99)
GLUCOSE SERPL-MCNC: 135 MG/DL (ref 65–140)
HBA1C MFR BLD: 5.1 % (ref 4.2–6.3)
HCT VFR BLD AUTO: 30.1 % (ref 36.5–49.3)
HGB BLD-MCNC: 9.4 G/DL (ref 14–18)
LYMPHOCYTES # BLD AUTO: 1.7 THOUSANDS/ΜL (ref 0.6–4.47)
LYMPHOCYTES NFR BLD AUTO: 18 % (ref 21–51)
MCH RBC QN AUTO: 19.5 PG (ref 26–34)
MCHC RBC AUTO-ENTMCNC: 31.2 G/DL (ref 31–37)
MCV RBC AUTO: 63 FL (ref 81–99)
MONOCYTES # BLD AUTO: 1.4 THOUSAND/ΜL (ref 0.17–1.22)
MONOCYTES NFR BLD AUTO: 15 % (ref 2–12)
NEUTROPHILS # BLD AUTO: 5.8 THOUSANDS/ΜL (ref 1.4–6.5)
NEUTS SEG NFR BLD AUTO: 62 % (ref 42–75)
NRBC BLD AUTO-RTO: 0 /100 WBCS
PLATELET # BLD AUTO: 238 THOUSANDS/UL (ref 149–390)
PMV BLD AUTO: 8.4 FL (ref 8.6–11.7)
POTASSIUM SERPL-SCNC: 3.1 MMOL/L (ref 3.5–5.5)
PROT SERPL-MCNC: 5.4 G/DL (ref 6.4–8.9)
QRS AXIS: 58 DEGREES
QRS AXIS: 90 DEGREES
QRSD INTERVAL: 82 MS
QRSD INTERVAL: 82 MS
QT INTERVAL: 406 MS
QT INTERVAL: 422 MS
QTC INTERVAL: 441 MS
QTC INTERVAL: 455 MS
RBC # BLD AUTO: 4.81 MILLION/UL (ref 4.3–5.9)
SODIUM SERPL-SCNC: 141 MMOL/L (ref 134–143)
T WAVE AXIS: 19 DEGREES
T WAVE AXIS: 49 DEGREES
TROPONIN I SERPL-MCNC: <0.03 NG/ML
VENTRICULAR RATE: 70 BPM
VENTRICULAR RATE: 71 BPM
WBC # BLD AUTO: 9.4 THOUSAND/UL (ref 4.8–10.8)

## 2018-12-19 PROCEDURE — 99222 1ST HOSP IP/OBS MODERATE 55: CPT | Performed by: INTERNAL MEDICINE

## 2018-12-19 PROCEDURE — 93010 ELECTROCARDIOGRAM REPORT: CPT | Performed by: INTERNAL MEDICINE

## 2018-12-19 PROCEDURE — 93306 TTE W/DOPPLER COMPLETE: CPT | Performed by: INTERNAL MEDICINE

## 2018-12-19 PROCEDURE — 80053 COMPREHEN METABOLIC PANEL: CPT | Performed by: PHYSICIAN ASSISTANT

## 2018-12-19 PROCEDURE — 82948 REAGENT STRIP/BLOOD GLUCOSE: CPT

## 2018-12-19 PROCEDURE — 99222 1ST HOSP IP/OBS MODERATE 55: CPT | Performed by: HOSPITALIST

## 2018-12-19 PROCEDURE — 93306 TTE W/DOPPLER COMPLETE: CPT

## 2018-12-19 PROCEDURE — 83880 ASSAY OF NATRIURETIC PEPTIDE: CPT | Performed by: INTERNAL MEDICINE

## 2018-12-19 PROCEDURE — 85025 COMPLETE CBC W/AUTO DIFF WBC: CPT | Performed by: PHYSICIAN ASSISTANT

## 2018-12-19 PROCEDURE — 93005 ELECTROCARDIOGRAM TRACING: CPT

## 2018-12-19 PROCEDURE — 94760 N-INVAS EAR/PLS OXIMETRY 1: CPT

## 2018-12-19 PROCEDURE — 84484 ASSAY OF TROPONIN QUANT: CPT | Performed by: INTERNAL MEDICINE

## 2018-12-19 RX ORDER — ACETAMINOPHEN 325 MG/1
650 TABLET ORAL EVERY 6 HOURS PRN
Status: DISCONTINUED | OUTPATIENT
Start: 2018-12-19 | End: 2018-12-21 | Stop reason: HOSPADM

## 2018-12-19 RX ADMIN — LISINOPRIL 20 MG: 20 TABLET ORAL at 08:15

## 2018-12-19 RX ADMIN — POTASSIUM CHLORIDE 40 MEQ: 1500 TABLET, EXTENDED RELEASE ORAL at 00:12

## 2018-12-19 RX ADMIN — CLONIDINE HYDROCHLORIDE 0.1 MG: 0.1 TABLET ORAL at 08:14

## 2018-12-19 RX ADMIN — RANOLAZINE 500 MG: 500 TABLET, FILM COATED, EXTENDED RELEASE ORAL at 21:21

## 2018-12-19 RX ADMIN — POTASSIUM CHLORIDE 20 MEQ: 1500 TABLET, EXTENDED RELEASE ORAL at 08:15

## 2018-12-19 RX ADMIN — PANTOPRAZOLE SODIUM 40 MG: 40 TABLET, DELAYED RELEASE ORAL at 08:13

## 2018-12-19 RX ADMIN — ATORVASTATIN CALCIUM 40 MG: 40 TABLET, FILM COATED ORAL at 08:14

## 2018-12-19 RX ADMIN — CLONIDINE HYDROCHLORIDE 0.1 MG: 0.1 TABLET ORAL at 00:11

## 2018-12-19 RX ADMIN — DICYCLOMINE HYDROCHLORIDE 10 MG: 20 TABLET ORAL at 17:20

## 2018-12-19 RX ADMIN — TIOTROPIUM BROMIDE 18 MCG: 18 CAPSULE ORAL; RESPIRATORY (INHALATION) at 11:47

## 2018-12-19 RX ADMIN — DICYCLOMINE HYDROCHLORIDE 10 MG: 20 TABLET ORAL at 11:47

## 2018-12-19 RX ADMIN — PREGABALIN 300 MG: 100 CAPSULE ORAL at 08:13

## 2018-12-19 RX ADMIN — DICYCLOMINE HYDROCHLORIDE 10 MG: 20 TABLET ORAL at 22:35

## 2018-12-19 RX ADMIN — MELATONIN 3 MG: at 00:12

## 2018-12-19 RX ADMIN — ACETAMINOPHEN 650 MG: 325 TABLET ORAL at 21:21

## 2018-12-19 RX ADMIN — METFORMIN HYDROCHLORIDE 500 MG: 500 TABLET ORAL at 08:14

## 2018-12-19 RX ADMIN — FERROUS SULFATE TAB 325 MG (65 MG ELEMENTAL FE) 325 MG: 325 (65 FE) TAB at 17:20

## 2018-12-19 RX ADMIN — METFORMIN HYDROCHLORIDE 500 MG: 500 TABLET ORAL at 17:19

## 2018-12-19 RX ADMIN — TAMSULOSIN HYDROCHLORIDE 0.4 MG: 0.4 CAPSULE ORAL at 17:20

## 2018-12-19 RX ADMIN — PANTOPRAZOLE SODIUM 40 MG: 40 TABLET, DELAYED RELEASE ORAL at 17:20

## 2018-12-19 RX ADMIN — PREGABALIN 300 MG: 100 CAPSULE ORAL at 17:19

## 2018-12-19 RX ADMIN — MELATONIN 3 MG: at 21:21

## 2018-12-19 RX ADMIN — ASPIRIN 81 MG 81 MG: 81 TABLET ORAL at 08:15

## 2018-12-19 RX ADMIN — CLONIDINE HYDROCHLORIDE 0.1 MG: 0.1 TABLET ORAL at 21:21

## 2018-12-19 RX ADMIN — DILTIAZEM HYDROCHLORIDE 180 MG: 180 CAPSULE, COATED, EXTENDED RELEASE ORAL at 08:14

## 2018-12-19 RX ADMIN — FERROUS SULFATE TAB 325 MG (65 MG ELEMENTAL FE) 325 MG: 325 (65 FE) TAB at 08:15

## 2018-12-19 RX ADMIN — METOPROLOL SUCCINATE 100 MG: 50 TABLET, EXTENDED RELEASE ORAL at 08:15

## 2018-12-19 RX ADMIN — METOPROLOL SUCCINATE 100 MG: 50 TABLET, EXTENDED RELEASE ORAL at 17:19

## 2018-12-19 RX ADMIN — FINASTERIDE 5 MG: 5 TABLET, FILM COATED ORAL at 08:15

## 2018-12-19 RX ADMIN — APIXABAN 5 MG: 2.5 TABLET, FILM COATED ORAL at 17:19

## 2018-12-19 RX ADMIN — APIXABAN 5 MG: 2.5 TABLET, FILM COATED ORAL at 08:14

## 2018-12-19 RX ADMIN — DULOXETINE HYDROCHLORIDE 60 MG: 60 CAPSULE, DELAYED RELEASE ORAL at 08:15

## 2018-12-19 RX ADMIN — FUROSEMIDE 40 MG: 10 INJECTION, SOLUTION INTRAMUSCULAR; INTRAVENOUS at 08:15

## 2018-12-19 RX ADMIN — RANOLAZINE 500 MG: 500 TABLET, FILM COATED, EXTENDED RELEASE ORAL at 00:13

## 2018-12-19 RX ADMIN — FUROSEMIDE 20 MG: 10 INJECTION, SOLUTION INTRAMUSCULAR; INTRAVENOUS at 03:51

## 2018-12-19 RX ADMIN — CLOPIDOGREL BISULFATE 75 MG: 75 TABLET ORAL at 08:15

## 2018-12-19 NOTE — ASSESSMENT & PLAN NOTE
Will give K-Dur 40 mEq p o  Now then 20 mEq p o  Daily  This is likely secondary to enteric losses given his history of chronic diarrhea and exacerbated by his chronic Lasix use

## 2018-12-19 NOTE — UTILIZATION REVIEW
Initial Clinical Review    ADMIT  OBS   On  12/18 @ 2139    CHANGED to INPT status  On  12/19  @  2149  Monitor of resp status/rx of COPD;  Serial H/H;  Continued diagnostics to identify source of anemia     ED: Date/Time/Mode of Arrival:   ED Arrival Information     Expected Arrival Acuity Means of Arrival Escorted By Service Admission Type    - 12/18/2018 18:37 Emergent [address] [facility] General Medicine Emergency    Arrival Complaint    stomach & chest pain        Chief Complaint:   Chief Complaint   Patient presents with    Abdominal Pain     pt began with chest and abd pain yesterday with diarrhea and no appetite   Chest Pain       History of Illness:     74 y o  male who presents with chest pain and shortness of breath x2 days    Wears CPAP @  HS   -  extensive cardiac history to include combined systolic and diastolic congestive heart failure on chronic Lasix therapy  - (+)  dyspnea at rest as well as midsternal chest pain    Dhaliwal Waldo   pain was accompanied with nausea, vomiting and diarrhea   -  continues to smoke,  home O2 as needed-    -  C/o right lower quadrant abdominal pain as well as chronic diarrhea,  currently being worked up by GI outpt   - h/o GIB, on chronic anticoagulation secondary to his chronic atrial fibrillation    12/18/18 2000  --  71  20  --  96 %  --  --   12/18/18 1946  --  --  --  --  --  None (Room air)  --   12/18/18 1945  --  74  22  --  93 %  --  --   12/18/18 1900  --  74  22  --  95 %  --  --   12/18/18 1848  97 5 °F (36 4 °C)  78   25  158/74  97 %  None (Room air)  Lying     81 7 kg (180 lb 1 9 oz)    Abnormal Labs/Diagnostic Test Results:   K  3 2    3 1  Trop  <0 03  (x3)   bnp  581   332  hgb  7 6   8 0     9 4  hct   25 3   26 3   30 1  UA  sg 1 025,  1+ leuks, 20-30 wbc,  occ bacteria      ED Treatment:   Duo neb     IV Lasix 20 mg given 12/19   @  0351      PO  Kdur 40 meq given 12/19  @  0012      Past Medical/Surgical History:    Active Ambulatory Problems Diagnosis Date Noted    Intractable diarrhea 10/26/2016    Dyspnea on exertion 10/26/2016    Hypokalemia 10/26/2016    COPD (chronic obstructive pulmonary disease) (HCC) 10/26/2016    Abdominal pain 10/26/2016    Benign essential hypertension 10/26/2016    Dyslipidemia 10/26/2016    Depressive disorder 10/26/2016    Persistent atrial fibrillation ([facility] ) 10/26/2016    Migraines 10/26/2016    Chronic pain 10/30/2016    Tobacco abuse 06/04/2017    Pulmonary nodule 04/03/2018    Marijuana abuse 05/06/2018    Decreased left ventricular systolic function 41/27/8000    Dysphagia 05/07/2018    Type 2 diabetes mellitus without complication, without long-term current use of insulin ([facility] ) 05/08/2018    Dilated cardiomyopathy secondary to tachycardia ([facility] ) 05/18/2018    Coronary artery disease involving native coronary artery of native heart without angina pectoris 05/18/2018    Noncompliance 07/19/2018    Obstructive sleep apnea (adult) (pediatric)     Acute on chronic combined systolic and diastolic congestive heart failure ([facility] ) 08/17/2018    Urge incontinence of urine 08/29/2018    GI bleed 08/30/2018    Heme positive stool 09/25/2018    Erectile dysfunction 11/29/2018     Resolved Ambulatory Problems     Diagnosis Date Noted    Weakness 10/26/2016    Chronic pain 10/26/2016    Hx of transient ischemic attack (TIA) 10/26/2016    Acute kidney injury ([facility] ) 10/30/2016    Sepsis ([facility] ) 10/30/2016    Colitis 10/30/2016    NSTEMI (non-ST elevated myocardial infarction) ([facility] ) 10/30/2016    Dehydration, mild 06/03/2017    Ambulatory dysfunction 06/04/2017    Lactic acidosis 04/05/2018    Acute cystitis without hematuria 04/06/2018    Leukocytosis 04/06/2018    Healthcare-associated pneumonia 05/06/2018    Syncope and collapse 07/07/2018    Hypernatremia 07/07/2018    Acute respiratory failure with hypoxia and hypercapnia ([facility] ) 07/09/2018    CHF exacerbation ([facility] ) 07/17/2018    Right knee injury, initial encounter 07/17/2018    Anterior epistaxis 07/27/2018    Accelerated hypertension 09/25/2018     Past Medical History:   Diagnosis Date    Aortic stenosis     Arthritis     Asthma     Atrial fibrillation (HCC)     Back pain     Cervical radiculopathy     [facility]congestive [facility] failure) [facility]     Chronic pain     Chronic pain 10/26/2016    COPD (chronic obstructive pulmonary disease) ([facility]     Coronary artery disease     CVA (cerebral vascular accident) ([facility] )     Depressive disorder     Diabetes mellitus ([place] [address] )     Encephalopathy     GERD (gastroesophageal reflux disease)     Head injury     Hearing loss     Hx of transient ischemic attack (TIA) 10/26/2016    Hyperlipidemia     Hypertension     Kidney stones     Migraines     MRSA (methicillin resistant Staphylococcus aureus) infection     MRSA (methicillin resistant staphylococcus aureus) pneumonia ([facility]     Osteoarthritis     Partial small bowel obstruction ([facility])     Peripheral neuropathy     Psychiatric disorder     PVD (peripheral vascular disease) ([place] [address] )     Renal disorder     Shortness of breath 10/26/2016    TIA (transient ischemic attack) 2014    Vertigo        Admitting Diagnosis: Chest pain [R07 9]  Congestive heart failure of unknown etiology ([place] [address] ) [I50 9]  Symptomatic anemia [D64 9]    Assessment/Plan:   Symptomatic anemia -  Tele, transfuse 2 U PRBC with repeat H&H,  IV lasix 20 mg between units  AOC Combined Systolic and diastolic CHF:  IV Lasix, serial trops, Cardiology consult  Hypokalemia, likely 2/2 diarrhea + lasix  -  Give Kdur 40 po now then 20 meq po daily  JIMENA -  Limited compliance at home;   Will place on oxygen protocol while in hospital  T2DM -  Get Hgb A1C, lo carb diet, cont metformin,   accucks qid w/sliding scale insulin  HTN -  Cont cardizem, lisinopril, lopressor, catapres  COPD - cont home meds, place on respiratory protocol  Diarrhea -  Chronic, fup GI OP     Admission Orders:  Admit telemetry  Sequential compression device to b/l LE  Transfuse   2 U RBC's  Send stool for occult blood  Daily wgt  accucks qid w/sliding scale insulin  Lo carb diet  IV Lasix 40 mg bid (given  @  0815,  Then Lasix dc'ed)   Duo neb q 6 hrs    12/19/2018  96 7   60   18   150/74   91 - 92%   sat  On 2L NC     12/19  Cardiology consult  Dyspnea  -  chronic and longstanding and seems more likely to be related to underlying COPD then to heart failure;   On lasix (dc'ed) ;   ck BNP;    Relatively recent echo revealed normal LV function  Other CP -  Very atypical;  Stress test last year was non ischemic and current symptoms do not seem cardiac related  PAF - on eliquis;  not clear to me that he has any compelling reason to be on anti-platelet agents and given his significant anemia likely iron deficient, these anti-platelet agents will be discontinued    12/19  Scheduled Meds:   Current Facility-Administered Medications:  albuterol 2 puff Inhalation Q4H PRN Eveline Rodriguez MD   apixaban 5 mg Oral BID Doraine Handler, PA-C   atorvastatin 40 mg Oral Daily Doraine Handler, PA-C   cloNIDine 0 1 mg Oral Q12H [address] Doraine Handler, PA-C   dicyclomine 10 mg Oral Q6H Doraine Handler, PA-C   diltiazem 180 mg Oral Daily Doraine Handler, PA-C   DULoxetine 60 mg Oral Daily Doraine Handler, PA-C   ferrous sulfate 325 mg Oral BID With Meals Doraine Handler, PA-C   finasteride 5 mg Oral Daily Doraine Handler, PA-C   insulin lispro 1-6 Units Subcutaneous TID AC Doraine Handler, PA-C   insulin lispro 1-6 Units Subcutaneous HS Doraine Handler, PA-C   lisinopril 20 mg Oral Daily Doraine Handler, PA-C   melatonin 3 mg Oral HS Doraine Handler, PA-C   metFORMIN 500 mg Oral BID With Meals Doraine Handler, PA-C   metoprolol succinate 100 mg Oral BID Doraine Handler, PA-C   nicotine 1 patch Transdermal Daily Doraine Handler, PA-C   ondansetron 4 mg Intravenous Q6H PRN Doraine Handler, PA-C   pantoprazole 40 mg Oral BID AC Doraine Handler, PA-C   potassium chloride 20 mEq Oral Daily Doraine Handler, PA-C pregabalin 300 mg Oral BID Yuko Reynolds PA-C   ranolazine 500 mg Oral HS Yuko Reynolds PA-C   tamsulosin 0 4 mg Oral Daily With Lori De León PA-C   tiotropium 18 mcg Inhalation Daily Yuko Reynolds PA-C       12/20/2018  97 2   65    18   124/70   Sat 92% at rest on 2L NC     Tele= Afib   hgb  10 1     hct  31 4     K 3 3    12/20  GI consult  -   patient recently had an EGD and colonoscopy without evidence of the cause of his symptoms  He is to have an outpatient capsule endoscopy scheduled     Will order a small bowel follow through x-ray now for further evaluation  Continue all current medications  Continue to monitor H&H and transfuse if necessary  H&H has improved since transfusion

## 2018-12-19 NOTE — ASSESSMENT & PLAN NOTE
This is been an ongoing problem with recent evaluation by GI and pending capsule endoscopy, will continue pre-hospital medication for same

## 2018-12-19 NOTE — ASSESSMENT & PLAN NOTE
Lab Results   Component Value Date    HGBA1C 5 4 08/29/2018       No results for input(s): POCGLU in the last 72 hours  Blood Sugar Average: Last 72 hrs:     Obtain a repeat hemoglobin A1c, place on [facility] step 2 diet, continue pre-hospital metformin and obtain Accu-Cheks AC and HS with algorithm 3 correction dose a c   And HS

## 2018-12-19 NOTE — ASSESSMENT & PLAN NOTE
This is very atypical   I see that he had a nonischemic stress test last year  There is a report of a heart catheterization in 2014 revealing an occluded right coronary artery  I believe he has been on Ranexa since then  At some point it may be worth reconsidering  In any event, nuclear stress test last year was nonischemic and current symptoms do not seem coronary related

## 2018-12-19 NOTE — ASSESSMENT & PLAN NOTE
Patient does not have his CPAP here he states that he has limited compliance with at home is unsure of the settings will place on O2 protocol

## 2018-12-19 NOTE — SOCIAL WORK
Evaluated the pt at the bedside  Pt states he lives with the CG and her   Pt states they provide assistance with all ADL's and IADL's  Pt states he uses a cane in the home and walker outside the home  Pt reports he has a WC and CPAP which he uses at   Pt indicated he can cook some meals and his medications are blister packed which he takes by himself  Pt gets his medicaitons from [facility] in [facility]  Pt did not feel he needed any other services  Pt states he was supposed to have an appointment today with his cardiologist at [facility]   Pt indicated his CG will transport home upon discharge  CM reviewed d/c planning process including the following: identifying help at home, patient preference for d/c planning needs, availability of treatment team to discuss questions or concerns patient and/or family may have regarding understanding medications and recognizing signs and symptoms once discharged  CM also encouraged patient to follow up with all recommended appointments after discharge  Patient advised of importance for patient and family to participate in managing patients medical well being

## 2018-12-19 NOTE — PLAN OF CARE
Problem: DISCHARGE PLANNING - CARE MANAGEMENT  Goal: Discharge to post-acute care or home with appropriate resources  INTERVENTIONS:  - Conduct assessment to determine patient/family and health care team treatment goals, and need for post-acute services based on payer coverage, community resources, and patient preferences, and barriers to discharge  - Address psychosocial, clinical, and financial barriers to discharge as identified in assessment in conjunction with the patient/family and health care team  - Arrange appropriate level of post-acute services according to patient's   needs and preference and payer coverage in collaboration with the physician and health care team  - Communicate with and update the patient/family, physician, and health care team regarding progress on the discharge plan  - Arrange appropriate transportation to post-acute venues  Discharge home with CG services  CG will transport home    Outcome: Progressing

## 2018-12-19 NOTE — ASSESSMENT & PLAN NOTE
Will give Lasix 40 mg IV b i d , trend cardiac enzymes, repeat echocardiogram and EKG in a m  And continue pre-hospital medications consult cardiology in a m  Isra Haas

## 2018-12-19 NOTE — ASSESSMENT & PLAN NOTE
This is chronic and longstanding and seems more likely to be related to underlying COPD then to heart failure  Nonetheless, as I see he is on diuretic, I will check a BNP  Relatively recent echo revealed normal left ventricular function

## 2018-12-19 NOTE — PLAN OF CARE
CARDIOVASCULAR - ADULT     Maintains optimal cardiac output and hemodynamic stability Progressing     Absence of cardiac dysrhythmias or at baseline rhythm Progressing        DISCHARGE PLANNING     Discharge to home or other facility with appropriate resources Progressing        GASTROINTESTINAL - ADULT     Minimal or absence of nausea and/or vomiting Progressing     Maintains or returns to baseline bowel function Progressing     Maintains adequate nutritional intake Progressing        HEMATOLOGIC - ADULT     Maintains hematologic stability Progressing        INFECTION - ADULT     Absence or prevention of progression during hospitalization Progressing     Absence of fever/infection during neutropenic period Progressing        Knowledge Deficit     Patient/family/caregiver demonstrates understanding of disease process, treatment plan, medications, and discharge instructions Progressing        METABOLIC, FLUID AND ELECTROLYTES - ADULT     Electrolytes maintained within normal limits Progressing     Fluid balance maintained Progressing     Glucose maintained within target range Progressing        MUSCULOSKELETAL - ADULT     Maintain or return mobility to safest level of function Progressing     Maintain proper alignment of affected body part Progressing        Nutrition/Hydration-ADULT     Nutrient/Hydration intake appropriate for improving, restoring or maintaining nutritional needs Progressing        PAIN - ADULT     Verbalizes/displays adequate comfort level or baseline comfort level Progressing        Potential for Falls     Patient will remain free of falls Progressing        RESPIRATORY - ADULT     Achieves optimal ventilation and oxygenation Progressing        SAFETY ADULT     Maintain or return to baseline ADL function Progressing     Maintain or return mobility status to optimal level Progressing        SKIN/TISSUE INTEGRITY - ADULT     Skin integrity remains intact Progressing     Incision(s), wounds(s) or drain site(s) healing without S/S of infection Progressing     Oral mucous membranes remain intact Progressing
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
## 2018-12-19 NOTE — ASSESSMENT & PLAN NOTE
This is chronic  He is on Eliquis  Not clear to me that he has any compelling reason to be on anti-platelet agents and given his significant anemia likely iron deficient, these anti-platelet agents will be discontinued
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
## 2018-12-19 NOTE — ASSESSMENT & PLAN NOTE
Continue pre-hospital diltiazem 180 mg p o  Daily, lisinopril 20 mg p o   Daily, metoprolol succinate 100 mg p o  B i d  and Catapres 0 1 mg p o  Q 12

## 2018-12-19 NOTE — ASSESSMENT & PLAN NOTE
Will give Lasix 40 mg IV b i d , trend cardiac enzymes, repeat echocardiogram and EKG in a m  And continue pre-hospital medications consult cardiology in a m  Briana Chirinos
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
## 2018-12-19 NOTE — ASSESSMENT & PLAN NOTE
Will place in observation telemetry, transfuse with 2 units of packed red blood cells with repeat H and H status post transfusion  Will give Lasix 20 mg IV between units  Will continue pre-hospital ferrous sulfate 325 mg p o  B i d  and Hemoccult stools

## 2018-12-20 ENCOUNTER — DOCUMENTATION (OUTPATIENT)
Dept: GASTROENTEROLOGY | Facility: CLINIC | Age: 73
End: 2018-12-20

## 2018-12-20 LAB
ANION GAP SERPL CALCULATED.3IONS-SCNC: 9 MMOL/L (ref 4–13)
BASOPHILS # BLD AUTO: 0.2 THOUSANDS/ΜL (ref 0–0.1)
BASOPHILS NFR BLD AUTO: 3 % (ref 0–2)
BUN SERPL-MCNC: 16 MG/DL (ref 7–25)
CALCIUM SERPL-MCNC: 9.1 MG/DL (ref 8.6–10.5)
CHLORIDE SERPL-SCNC: 105 MMOL/L (ref 98–107)
CO2 SERPL-SCNC: 29 MMOL/L (ref 21–31)
CREAT SERPL-MCNC: 0.94 MG/DL (ref 0.7–1.3)
EOSINOPHIL # BLD AUTO: 0.3 THOUSAND/ΜL (ref 0–0.61)
EOSINOPHIL NFR BLD AUTO: 4 % (ref 0–5)
ERYTHROCYTE [DISTWIDTH] IN BLOOD BY AUTOMATED COUNT: 24.7 % (ref 11.5–14.5)
GFR SERPL CREATININE-BSD FRML MDRD: 79 ML/MIN/1.73SQ M
GLUCOSE SERPL-MCNC: 102 MG/DL (ref 65–99)
GLUCOSE SERPL-MCNC: 127 MG/DL (ref 65–140)
GLUCOSE SERPL-MCNC: 148 MG/DL (ref 65–140)
GLUCOSE SERPL-MCNC: 170 MG/DL (ref 65–140)
GLUCOSE SERPL-MCNC: 97 MG/DL (ref 65–140)
HCT VFR BLD AUTO: 31.4 % (ref 36.5–49.3)
HGB BLD-MCNC: 10.1 G/DL (ref 14–18)
LYMPHOCYTES # BLD AUTO: 2.2 THOUSANDS/ΜL (ref 0.6–4.47)
LYMPHOCYTES NFR BLD AUTO: 31 % (ref 21–51)
MAGNESIUM SERPL-MCNC: 1.5 MG/DL (ref 1.9–2.7)
MCH RBC QN AUTO: 20.4 PG (ref 26–34)
MCHC RBC AUTO-ENTMCNC: 32.2 G/DL (ref 31–37)
MCV RBC AUTO: 63 FL (ref 81–99)
MONOCYTES # BLD AUTO: 0.9 THOUSAND/ΜL (ref 0.17–1.22)
MONOCYTES NFR BLD AUTO: 13 % (ref 2–12)
NEUTROPHILS # BLD AUTO: 3.5 THOUSANDS/ΜL (ref 1.4–6.5)
NEUTS SEG NFR BLD AUTO: 49 % (ref 42–75)
NRBC BLD AUTO-RTO: 0 /100 WBCS
PLATELET # BLD AUTO: 220 THOUSANDS/UL (ref 149–390)
PMV BLD AUTO: 8.8 FL (ref 8.6–11.7)
POTASSIUM SERPL-SCNC: 3.3 MMOL/L (ref 3.5–5.5)
RBC # BLD AUTO: 4.97 MILLION/UL (ref 4.3–5.9)
SODIUM SERPL-SCNC: 143 MMOL/L (ref 134–143)
WBC # BLD AUTO: 7.1 THOUSAND/UL (ref 4.8–10.8)

## 2018-12-20 PROCEDURE — 83735 ASSAY OF MAGNESIUM: CPT | Performed by: PHYSICIAN ASSISTANT

## 2018-12-20 PROCEDURE — 85025 COMPLETE CBC W/AUTO DIFF WBC: CPT | Performed by: HOSPITALIST

## 2018-12-20 PROCEDURE — 94760 N-INVAS EAR/PLS OXIMETRY 1: CPT

## 2018-12-20 PROCEDURE — 99232 SBSQ HOSP IP/OBS MODERATE 35: CPT | Performed by: INTERNAL MEDICINE

## 2018-12-20 PROCEDURE — 80048 BASIC METABOLIC PNL TOTAL CA: CPT | Performed by: HOSPITALIST

## 2018-12-20 PROCEDURE — 82948 REAGENT STRIP/BLOOD GLUCOSE: CPT

## 2018-12-20 PROCEDURE — 99232 SBSQ HOSP IP/OBS MODERATE 35: CPT | Performed by: PHYSICIAN ASSISTANT

## 2018-12-20 PROCEDURE — 87081 CULTURE SCREEN ONLY: CPT | Performed by: PHYSICIAN ASSISTANT

## 2018-12-20 RX ORDER — FUROSEMIDE 40 MG/1
40 TABLET ORAL DAILY PRN
Status: DISCONTINUED | OUTPATIENT
Start: 2018-12-20 | End: 2018-12-21 | Stop reason: HOSPADM

## 2018-12-20 RX ORDER — POTASSIUM CHLORIDE 20 MEQ/1
40 TABLET, EXTENDED RELEASE ORAL ONCE
Status: COMPLETED | OUTPATIENT
Start: 2018-12-20 | End: 2018-12-20

## 2018-12-20 RX ORDER — METOPROLOL SUCCINATE 50 MG/1
100 TABLET, EXTENDED RELEASE ORAL DAILY
Status: DISCONTINUED | OUTPATIENT
Start: 2018-12-21 | End: 2018-12-21 | Stop reason: HOSPADM

## 2018-12-20 RX ORDER — PREDNISONE 20 MG/1
40 TABLET ORAL DAILY
Status: DISCONTINUED | OUTPATIENT
Start: 2018-12-20 | End: 2018-12-21 | Stop reason: HOSPADM

## 2018-12-20 RX ADMIN — FERROUS SULFATE TAB 325 MG (65 MG ELEMENTAL FE) 325 MG: 325 (65 FE) TAB at 15:44

## 2018-12-20 RX ADMIN — DILTIAZEM HYDROCHLORIDE 180 MG: 180 CAPSULE, COATED, EXTENDED RELEASE ORAL at 09:22

## 2018-12-20 RX ADMIN — PANTOPRAZOLE SODIUM 40 MG: 40 TABLET, DELAYED RELEASE ORAL at 15:45

## 2018-12-20 RX ADMIN — METFORMIN HYDROCHLORIDE 500 MG: 500 TABLET ORAL at 09:24

## 2018-12-20 RX ADMIN — DICYCLOMINE HYDROCHLORIDE 10 MG: 20 TABLET ORAL at 05:01

## 2018-12-20 RX ADMIN — CLONIDINE HYDROCHLORIDE 0.1 MG: 0.1 TABLET ORAL at 09:22

## 2018-12-20 RX ADMIN — DICYCLOMINE HYDROCHLORIDE 10 MG: 20 TABLET ORAL at 11:30

## 2018-12-20 RX ADMIN — INSULIN LISPRO 1 UNITS: 100 INJECTION, SOLUTION INTRAVENOUS; SUBCUTANEOUS at 21:49

## 2018-12-20 RX ADMIN — FINASTERIDE 5 MG: 5 TABLET, FILM COATED ORAL at 09:23

## 2018-12-20 RX ADMIN — DICYCLOMINE HYDROCHLORIDE 10 MG: 20 TABLET ORAL at 15:47

## 2018-12-20 RX ADMIN — FERROUS SULFATE TAB 325 MG (65 MG ELEMENTAL FE) 325 MG: 325 (65 FE) TAB at 09:23

## 2018-12-20 RX ADMIN — PREGABALIN 300 MG: 100 CAPSULE ORAL at 17:02

## 2018-12-20 RX ADMIN — CLONIDINE HYDROCHLORIDE 0.1 MG: 0.1 TABLET ORAL at 20:12

## 2018-12-20 RX ADMIN — POTASSIUM CHLORIDE 40 MEQ: 1500 TABLET, EXTENDED RELEASE ORAL at 14:27

## 2018-12-20 RX ADMIN — POTASSIUM CHLORIDE 20 MEQ: 1500 TABLET, EXTENDED RELEASE ORAL at 09:25

## 2018-12-20 RX ADMIN — MELATONIN 3 MG: at 21:50

## 2018-12-20 RX ADMIN — METOPROLOL SUCCINATE 100 MG: 50 TABLET, EXTENDED RELEASE ORAL at 09:24

## 2018-12-20 RX ADMIN — TAMSULOSIN HYDROCHLORIDE 0.4 MG: 0.4 CAPSULE ORAL at 15:45

## 2018-12-20 RX ADMIN — DULOXETINE HYDROCHLORIDE 60 MG: 60 CAPSULE, DELAYED RELEASE ORAL at 09:23

## 2018-12-20 RX ADMIN — DICYCLOMINE HYDROCHLORIDE 10 MG: 20 TABLET ORAL at 21:50

## 2018-12-20 RX ADMIN — METFORMIN HYDROCHLORIDE 500 MG: 500 TABLET ORAL at 15:45

## 2018-12-20 RX ADMIN — APIXABAN 5 MG: 2.5 TABLET, FILM COATED ORAL at 09:22

## 2018-12-20 RX ADMIN — PANTOPRAZOLE SODIUM 40 MG: 40 TABLET, DELAYED RELEASE ORAL at 09:25

## 2018-12-20 RX ADMIN — PREDNISONE 40 MG: 20 TABLET ORAL at 14:27

## 2018-12-20 RX ADMIN — APIXABAN 5 MG: 2.5 TABLET, FILM COATED ORAL at 17:01

## 2018-12-20 RX ADMIN — ACETAMINOPHEN 650 MG: 325 TABLET ORAL at 23:58

## 2018-12-20 RX ADMIN — LISINOPRIL 20 MG: 20 TABLET ORAL at 09:24

## 2018-12-20 RX ADMIN — TIOTROPIUM BROMIDE 18 MCG: 18 CAPSULE ORAL; RESPIRATORY (INHALATION) at 09:26

## 2018-12-20 RX ADMIN — PREGABALIN 300 MG: 100 CAPSULE ORAL at 09:25

## 2018-12-20 RX ADMIN — RANOLAZINE 500 MG: 500 TABLET, FILM COATED, EXTENDED RELEASE ORAL at 21:50

## 2018-12-20 RX ADMIN — ATORVASTATIN CALCIUM 40 MG: 40 TABLET, FILM COATED ORAL at 09:22

## 2018-12-20 NOTE — ASSESSMENT & PLAN NOTE
Appreciate cardiology input  Echo with overall normal LV function  Cardiology suspects SOB likely d/t underlying COPD

## 2018-12-20 NOTE — ASSESSMENT & PLAN NOTE
S/P 2 units PRBC  H/gb stable at 10 1 today  Appreciate GI input  Recently had EGD and colonoscopy on 9/26/18 that did not reveal any bleeding source  For outpatient capsule endoscopy   Will continue pre-hospital ferrous sulfate 325 mg p o  B i d  and Hemoccult stools

## 2018-12-20 NOTE — PROGRESS NOTES
Progress Note - Beverley Wagoner 1945, 74 y o  male MRN: [id number]    Unit/Bed#: -01 Encounter: [id number]    Primary Care Provider: Gwen Elmore DO   Date and time admitted to hospital: 12/18/2018  6:37 PM        Dyspnea   Assessment & Plan    This is chronic and longstanding and seems more likely to be related to underlying COPD then to heart failure  BNP is not significantly elevated  Echo revealed overall normal LV function  Will back off on diuretic to prn weight gain  Other chest pain   Assessment & Plan    This is very atypical   I see that he had a nonischemic stress test last year  There is a report of a heart catheterization in 2014 revealing an occluded right coronary artery  I believe he has been on Ranexa since then  At some point it may be worth reconsidering  In any event, nuclear stress test last year was nonischemic and current symptoms do not seem coronary related  No new symptoms overnight  Persistent atrial fibrillation [facility])   Assessment & Plan    This is chronic  He is on Eliquis  Not clear to me that he has any compelling reason to be on anti-platelet agents and given his significant anemia likely iron deficient, these anti-platelet agents will be discontinued  Also, heart rate is borderline low and will cut the beta blocker dose  * Symptomatic anemia   Assessment & Plan    Work up ongoing  He received blood yesterday  Again, anti-platelet agents have been discontinued  Subjective:   Patient seen and examined  No significant events overnight  Seems to be brighter today  There is mild stable dyspnea  No recurrence of chest pain  Summary comments:  Again, echocardiogram revealed normal left ventricular systolic function and the BNP was not elevated  Thus diuretic use can be as needed for weight gain  I cut back on his beta-blocker dose as his heart rate is on the lower side    From a cardiac perspective at this point telemetry can be discontinued and follow-up will be with his usual cardiologist, Dr Roger Shaikh, in [facility]  Telemetry/ECG/Cardiac testing:   Afib with slow to moderate response  Echo yesterday:  LEFT VENTRICLE:  Mild hypokinesia of the inferior wall towards the base  Systolic function was normal  Ejection fraction was estimated to be 60 %  There was mild concentric hypertrophy      LEFT ATRIUM:  The atrium was mildly dilated      MITRAL VALVE:  There was mild annular calcification  There was mild regurgitation      AORTIC VALVE:  Leaflets exhibited moderate calcification and mildly reduced cuspal separation        Vitals: Blood pressure 124/70, pulse 65, temperature (!) 97 2 °F (36 2 °C), temperature source Tympanic, resp  rate 18, height 5' 9" (1 753 m), weight 81 kg (178 lb 9 2 oz), SpO2 94 % ,   Orthostatic Blood Pressures      Most Recent Value   Blood Pressure  124/70 filed at 12/20/2018 0719   Patient Position - Orthostatic VS  Lying filed at 12/20/2018 0719      ,   Weight (last 2 days)     Date/Time   Weight    12/20/18 0600  81 (178 57)    12/19/18 0559  81 7 (180 12)    12/18/18 2221  81 7 (180 1)    12/18/18 1848  83 5 (184)              Physical Exam:    General:  Normal appearance in no distress  Eyes:  Anicteric  Oral mucosa:  Moist   Neck:  No JV D  Carotid upstrokes are brisk without bruits  No masses  Chest:  Mild decreased throughout  No rales  Cardiac:  Normal PMI  Normal S1 and S2  No murmur gallop or rub  Abdomen:  Soft and nontender  No palpable organomegaly or aortic enlargement  Extremities:  No peripheral edema  Neuro:  Grossly symmetric  Psych:  Alert and oriented x3        Medications:      Current Facility-Administered Medications:     acetaminophen (TYLENOL) tablet 650 mg, 650 mg, Oral, Q6H PRN, Lise Betts PA-C, 650 mg at 12/19/18 2121    albuterol (PROVENTIL HFA,VENTOLIN HFA) inhaler 2 puff, 2 puff, Inhalation, Q4H PRN, Wiliam Barlow MD    apixaban Daina Shi) tablet 5 mg, 5 mg, Oral, BID, Doraine Handler, PA-C, 5 mg at 12/20/18 8814    atorvastatin (LIPITOR) tablet 40 mg, 40 mg, Oral, Daily, Doraine Handler, PA-C, 40 mg at 12/20/18 9769    cloNIDine (CATAPRES) tablet 0 1 mg, 0 1 mg, Oral, Q12H [address], Doraine Handler, PA-C, 0 1 mg at 12/20/18 7623    dicyclomine (BENTYL) tablet 10 mg, 10 mg, Oral, Q6H, Doraine Handler, PA-C, 10 mg at 12/20/18 0501    diltiazem (CARDIZEM CD) 24 hr capsule 180 mg, 180 mg, Oral, Daily, Doraine Handler, PA-C, 180 mg at 12/20/18 4641    DULoxetine (CYMBALTA) delayed release capsule 60 mg, 60 mg, Oral, Daily, Doraine Handler, PA-C, 60 mg at 12/20/18 7727    ferrous sulfate tablet 325 mg, 325 mg, Oral, BID With Meals, Doraine Handler, PA-C, 325 mg at 12/20/18 5973    finasteride (PROSCAR) tablet 5 mg, 5 mg, Oral, Daily, Doraine Handler, PA-C, 5 mg at 12/20/18 9602    furosemide (LASIX) tablet 40 mg, 40 mg, Oral, Daily PRN, Edgardo Meek MD    insulin lispro (HumaLOG) 100 units/mL subcutaneous injection 1-6 Units, 1-6 Units, Subcutaneous, TID AC **AND** Fingerstick Glucose (POCT), , , TID AC, Doraine Handler, PA-C    insulin lispro (HumaLOG) 100 units/mL subcutaneous injection 1-6 Units, 1-6 Units, Subcutaneous, HS, Doraine Handler, PA-C    lisinopril (ZESTRIL) tablet 20 mg, 20 mg, Oral, Daily, Doraine Handler, PA-C, 20 mg at 12/20/18 1405    melatonin tablet 3 mg, 3 mg, Oral, HS, Doraine Handler, PA-C, 3 mg at 12/19/18 2121    metFORMIN (GLUCOPHAGE) tablet 500 mg, 500 mg, Oral, BID With Meals, Doraine Handler, PA-C, 500 mg at 12/20/18 0924    [START ON 12/21/2018] metoprolol succinate (TOPROL-XL) 24 hr tablet 100 mg, 100 mg, Oral, Daily, MD Carmen Pena  nicotine (NICODERM CQ) 21 mg/24 hr TD 24 hr patch 1 patch, 1 patch, Transdermal, Daily, Obed Souza PA-C    ondansetron [facility]) injection 4 mg, 4 mg, Intravenous, Q6H PRN, Obed Souza PA-C    pantoprazole (PROTONIX) EC tablet 40 mg, 40 mg, Oral, BID AC, Obed Souza PA-C, 40 mg at 12/20/18 0925    potassium chloride (K-DUR,KLOR-CON) CR tablet 20 mEq, 20 mEq, Oral, Daily, Lenora Brink, PA-C, 20 mEq at 12/20/18 0483    pregabalin (LYRICA) capsule 300 mg, 300 mg, Oral, BID, Lenora Brink, PA-C, 300 mg at 12/20/18 6905    ranolazine (RANEXA) 12 hr tablet 500 mg, 500 mg, Oral, HS, Lenora Brink, PA-C, 500 mg at 12/19/18 2121    tamsulosin (FLOMAX) capsule 0 4 mg, 0 4 mg, Oral, Daily With Dinner, Lenora Brink, PA-C, 0 4 mg at 12/19/18 1720    tiotropium (SPIRIVA) capsule for inhaler 18 mcg, 18 mcg, Inhalation, Daily, Lenora Brink, PA-C, 18 mcg at 12/20/18 0926     Labs & Results:    Troponins:     Results from last 7 days  Lab Units 12/19/18  0505 12/18/18 2248 12/1945   TROPONIN I ng/mL <0 03 <0 03 <0 03      BNP:332    CBC with diff:     Results from last 7 days  Lab Units 12/20/18  0506 12/19/18  0505   WBC Thousand/uL 7 10 9 40   HEMOGLOBIN g/dL 10 1* 9 4*   HEMATOCRIT % 31 4* 30 1*   MCV fL 63* 63*   PLATELETS Thousands/uL 220 238     TSH:     CMP:     Results from last 7 days  Lab Units 12/20/18  0506 12/19/18  0505 12/1945   POTASSIUM mmol/L 3 3* 3 1* 3 2*   CHLORIDE mmol/L 105 105 106   CO2 mmol/L 29 30 30   BUN mg/dL 16 11 10   CREATININE mg/dL 0 94 0 96 0 82   AST U/L  --  12* 11*   ALT U/L  --  9 9   EGFR ml/min/1 73sq m 79 77 87     Lipid Profile:     Coags:     Results from last 7 days  Lab Units 12/1945   INR  2 03*

## 2018-12-20 NOTE — ASSESSMENT & PLAN NOTE
This is very atypical   I see that he had a nonischemic stress test last year  There is a report of a heart catheterization in 2014 revealing an occluded right coronary artery  I believe he has been on Ranexa since then  At some point it may be worth reconsidering  In any event, nuclear stress test last year was nonischemic and current symptoms do not seem coronary related  No new symptoms overnight

## 2018-12-20 NOTE — ASSESSMENT & PLAN NOTE
Lab Results   Component Value Date    HGBA1C 5 1 12/18/2018       Recent Labs      12/19/18   1605  12/19/18   2035  12/20/18   0648  12/20/18   1100   POCGLU  103  128  97  127       Blood Sugar Average: Last 72 hrs:  (P) 303 [id number]   Repeat hemoglobin A1c 5 1%, cont CCH step 2 diet, continue pre-hospital metformin and obtain Accu-Cheks AC and HS with algorithm 3 correction dose a c   And HS
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
## 2018-12-20 NOTE — ASSESSMENT & PLAN NOTE
Likely secondary to enteric losses given his history of chronic diarrhea and exacerbated by his chronic Lasix use    Replete and follow

## 2018-12-20 NOTE — PROGRESS NOTES
Prior authorization started for cpt code 76505 capsule endoscopy, dos 12/28/2018  Auth requires clinical review  All clinical information faxed to [phone]  Case ref  # is [id number]  Will call [phone] for update

## 2018-12-20 NOTE — PROGRESS NOTES
Progress Note - Negro Tomas 1945, 74 y o  male MRN: [id number]    Unit/Bed#: -01 Encounter: [id number]    Primary Care Provider: Jose Bennett DO   Date and time admitted to hospital: 12/18/2018  6:37 PM        * Symptomatic anemia   Assessment & Plan    S/P 2 units PRBC  H/gb stable at 10 1 today  Appreciate GI input  Recently had EGD and colonoscopy on 9/26/18 that did not reveal any bleeding source  For outpatient capsule endoscopy   Will continue pre-hospital ferrous sulfate 325 mg p o  B i d  and Hemoccult stools  Other chest pain   Assessment & Plan    Appreciate cardiology input  Atypical per cardiology  Continue ranexa     Intractable diarrhea   Assessment & Plan    This is been an ongoing problem with recent evaluation by GI and pending capsule endoscopy, will continue pre-hospital medication for same    Check stool studies     COPD (chronic obstructive pulmonary disease) ([facility])   Assessment & Plan    Suspect mild COPD exacerbation  Will start PO prednisone  On Chronic supplemental oxygen  Continue pre-hospital Spiriva, albuterol, and respiratory protocol       Acute on chronic combined systolic and diastolic congestive heart failure ([address] )   Assessment & Plan    Appreciate cardiology input  Echo with overall normal LV function  Cardiology suspects SOB likely d/t underlying COPD     Obstructive sleep apnea (adult) (pediatric)   Assessment & Plan    Patient does not have his CPAP here he states that he has limited compliance with at home is unsure of the settings will place on O2 protocol     Type 2 diabetes mellitus without complication, without long-term current use of insulin [facility])   Assessment & Plan    Lab Results   Component Value Date    HGBA1C 5 1 12/18/2018       Recent Labs      12/19/18   1605  12/19/18   2035  12/20/18   0648  12/20/18   1100   POCGLU  103  128  97  127       Blood Sugar Average: Last 72 hrs:  (P) 110 [id number]   Repeat hemoglobin A1c 5 1%, cont [facility] step 2 diet, continue pre-hospital metformin and obtain Accu-Cheks AC and HS with algorithm 3 correction dose a c  And HS     Tobacco abuse   Assessment & Plan    Give nicotine 21 mg transdermal daily consult for smoking cessation education in a m  Chronic pain   Assessment & Plan    Continue pre-hospital Lyrica 300 mg p o  B i d  Persistent atrial fibrillation (HCC)   Assessment & Plan    Continue pre-hospital Eliquis and metoprolol as per above     Depressive disorder   Assessment & Plan    Continue pre-hospital Cymbalta 60 mg p o  Daily     Dyslipidemia   Assessment & Plan    Continue pre-hospital atorvastatin 40 mg p o  Daily     Benign essential hypertension   Assessment & Plan    Continue pre-hospital diltiazem 180 mg p o  Daily, lisinopril 20 mg p o  Daily, metoprolol succinate 100 mg p o  B i d  and Catapres 0 1 mg p o  Q 12     Hypokalemia   Assessment & Plan    Likely secondary to enteric losses given his history of chronic diarrhea and exacerbated by his chronic Lasix use  Replete and follow       VTE Pharmacologic Prophylaxis: Pharmacologic: Apixaban (Eliquis)    Patient Centered Rounds: I have performed bedside rounds with nursing staff today  Discussions with Specialists or Other Care Team Provider: Nursing and cardiology  Education and Discussions with Family / Patient: Patient and called patient's caretaker, Ladonna Arangoirmer for update [phone]    Time Spent for Care: 30 minutes  More than 50% of total time spent on counseling and coordination of care as described above  Current Length of Stay: 1 day(s)    Current Patient Status: Inpatient   Certification Statement: The patient will continue to require additional inpatient hospital stay due to further workup for anemia    Discharge Plan: PT/OT eval    Code Status: Level 1 - Full Code    Subjective:   Patient reports back and shoulder pain, worse than his usual chronic pain   +Pain in the center of his chest that has been ongoing for last 8 hours  SOB also worse than usual  Had >5 episodes of watery diarrhea overnight  +Abdominal pain and tightness  Objective:     Vitals:   Temp (24hrs), Av 4 °F (36 3 °C), Min:96 9 °F (36 1 °C), Max:98 1 °F (36 7 °C)    Temp:  [96 9 °F (36 1 °C)-98 1 °F (36 7 °C)] 97 2 °F (36 2 °C)  HR:  [58-82] 65  Resp:  [16-19] 18  BP: (109-127)/(54-74) 124/70  SpO2:  [91 %-97 %] 94 %  Body mass index is 26 37 kg/m²  Input and Output Summary (last 24 hours): Intake/Output Summary (Last 24 hours) at 18 1219  Last data filed at 18 2310   Gross per 24 hour   Intake              660 ml   Output               50 ml   Net              610 ml       Physical Exam:     Physical Exam   Constitutional: No distress  HENT:   Head: Normocephalic and atraumatic  Mouth/Throat: Oropharynx is clear and moist    Neck: Neck supple  Cardiovascular: Normal rate  Irregularly irregular rhythm   Pulmonary/Chest: No respiratory distress  He has wheezes  He has no rales  Abdominal: Soft  Bowel sounds are normal  He exhibits no distension  There is tenderness  Musculoskeletal: He exhibits no edema  Lymphadenopathy:     He has no cervical adenopathy  Neurological: He is alert  Skin: Skin is warm and dry         Additional Data:     Labs:      Results from last 7 days  Lab Units 18  0506   WBC Thousand/uL 7 10   HEMOGLOBIN g/dL 10 1*   HEMATOCRIT % 31 4*   PLATELETS Thousands/uL 220   NEUTROS PCT % 49   LYMPHS PCT % 31   MONOS PCT % 13*   EOS PCT % 4       Results from last 7 days  Lab Units 18  0506 18  0505   POTASSIUM mmol/L 3 3* 3 1*   CHLORIDE mmol/L 105 105   CO2 mmol/L 29 30   BUN mg/dL 16 11   CREATININE mg/dL 0 94 0 96   CALCIUM mg/dL 9 1 9 0   ALK PHOS U/L  --  69   ALT U/L  --  9   AST U/L  --  12*       Results from last 7 days  Lab Units 18  1943   INR  2 03*       Results from last 7 days  Lab Units 18  1100 18  0648 18  2035 18  1605 18  1040 12/19/18  0640   POC GLUCOSE mg/dl 127 97 128 103 114 135       Results from last 7 days  Lab Units 12/18/18  2249   HEMOGLOBIN A1C % 5 1       * I Have Reviewed All Lab Data Listed Above  * Additional Pertinent Lab Tests Reviewed: Adriel Robles Admission  Reviewed    Recent Cultures (last 7 days):       Results from last 7 days  Lab Units 12/18/18 2011 12/18/18  1943/18  1942   BLOOD CULTURE   --  No Growth at 24 hrs  No Growth at 24 hrs     URINE CULTURE  40,000-49,000 cfu/ml   --   --        Last 24 Hours Medication List:     Current Facility-Administered Medications:  acetaminophen 650 mg Oral Q6H PRN Sara Arriaga PA-C   albuterol 2 puff Inhalation Q4H PRN Majo Nichols MD   apixaban 5 mg Oral BID Sara Arriaga PA-C   atorvastatin 40 mg Oral Daily Sara Arriaga PA-C   cloNIDine 0 1 mg Oral Q12H [address] Sara Arriaga PA-C   dicyclomine 10 mg Oral Q6H Sara Arriaga PA-C   diltiazem 180 mg Oral Daily Sara Arriaga PA-C   DULoxetine 60 mg Oral Daily Sara Arriaga PA-C   ferrous sulfate 325 mg Oral BID With Meals Sara Arriaga PA-C   finasteride 5 mg Oral Daily Sara Arriaga PA-C   furosemide 40 mg Oral Daily PRN Elizabeth Lisa MD   insulin lispro 1-6 Units Subcutaneous TID [facility] Sara Arriaga PA-C   insulin lispro 1-6 Units Subcutaneous HS Sara Arriaga PA-C   lisinopril 20 mg Oral Daily Sara Arriaga PA-C   melatonin 3 mg Oral HS Sara Arriaga PA-C   metFORMIN 500 mg Oral BID With Meals Sara Arriaga PA-C   [START ON 12/21/2018] metoprolol succinate 100 mg Oral Daily Elizabeth Lisa MD   nicotine 1 patch Transdermal Daily Sara Arriaga PA-C   ondansetron 4 mg Intravenous Q6H PRN Sara Arriaga PA-C   pantoprazole 40 mg Oral BID AC Sara Arriaga PA-C   potassium chloride 20 mEq Oral Daily Sara Arriaga PA-C   pregabalin 300 mg Oral BID Sara Arriaga PA-C   ranolazine 500 mg Oral HS Sara Arriaga PA-C   tamsulosin 0 4 mg Oral Daily With Wilian Land PA-C   tiotropium 18 mcg Inhalation Daily Krystin Bowen PA-C        Today, Patient Was Seen By: Stephen Fischer PA-C    ** Please Note: Dictation voice to text software may have been used in the creation of this document   **

## 2018-12-20 NOTE — ASSESSMENT & PLAN NOTE
This is chronic  He is on Eliquis  Not clear to me that he has any compelling reason to be on anti-platelet agents and given his significant anemia likely iron deficient, these anti-platelet agents will be discontinued  Also, heart rate is borderline low and will cut the beta blocker dose

## 2018-12-20 NOTE — ASSESSMENT & PLAN NOTE
This is chronic and longstanding and seems more likely to be related to underlying COPD then to heart failure  BNP is not significantly elevated  Echo revealed overall normal LV function  Will back off on diuretic to prn weight gain
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
## 2018-12-20 NOTE — ASSESSMENT & PLAN NOTE
This is been an ongoing problem with recent evaluation by GI and pending capsule endoscopy, will continue pre-hospital medication for same    Check stool studies

## 2018-12-20 NOTE — ASSESSMENT & PLAN NOTE
Suspect mild COPD exacerbation  Will start PO prednisone  On Chronic supplemental oxygen  Continue pre-hospital Spiriva, albuterol, and respiratory protocol

## 2018-12-21 ENCOUNTER — APPOINTMENT (INPATIENT)
Dept: RADIOLOGY | Facility: HOSPITAL | Age: 73
DRG: 811 | End: 2018-12-21
Payer: COMMERCIAL

## 2018-12-21 VITALS
TEMPERATURE: 97.2 F | OXYGEN SATURATION: 98 % | SYSTOLIC BLOOD PRESSURE: 142 MMHG | WEIGHT: 181.8 LBS | BODY MASS INDEX: 26.93 KG/M2 | DIASTOLIC BLOOD PRESSURE: 69 MMHG | HEART RATE: 74 BPM | RESPIRATION RATE: 18 BRPM | HEIGHT: 69 IN

## 2018-12-21 LAB
ANION GAP SERPL CALCULATED.3IONS-SCNC: 9 MMOL/L (ref 4–13)
BASOPHILS # BLD AUTO: 0.1 THOUSANDS/ΜL (ref 0–0.1)
BASOPHILS NFR BLD AUTO: 1 % (ref 0–2)
BUN SERPL-MCNC: 21 MG/DL (ref 7–25)
CALCIUM SERPL-MCNC: 9.5 MG/DL (ref 8.6–10.5)
CHLORIDE SERPL-SCNC: 103 MMOL/L (ref 98–107)
CO2 SERPL-SCNC: 28 MMOL/L (ref 21–31)
CREAT SERPL-MCNC: 0.97 MG/DL (ref 0.7–1.3)
EOSINOPHIL # BLD AUTO: 0 THOUSAND/ΜL (ref 0–0.61)
EOSINOPHIL NFR BLD AUTO: 0 % (ref 0–5)
ERYTHROCYTE [DISTWIDTH] IN BLOOD BY AUTOMATED COUNT: 25.6 % (ref 11.5–14.5)
GFR SERPL CREATININE-BSD FRML MDRD: 76 ML/MIN/1.73SQ M
GLUCOSE SERPL-MCNC: 122 MG/DL (ref 65–140)
GLUCOSE SERPL-MCNC: 124 MG/DL (ref 65–140)
GLUCOSE SERPL-MCNC: 131 MG/DL (ref 65–99)
GLUCOSE SERPL-MCNC: 143 MG/DL (ref 65–140)
HCT VFR BLD AUTO: 33 % (ref 36.5–49.3)
HGB BLD-MCNC: 10.1 G/DL (ref 14–18)
LYMPHOCYTES # BLD AUTO: 1.2 THOUSANDS/ΜL (ref 0.6–4.47)
LYMPHOCYTES NFR BLD AUTO: 13 % (ref 21–51)
MCH RBC QN AUTO: 19.4 PG (ref 26–34)
MCHC RBC AUTO-ENTMCNC: 30.6 G/DL (ref 31–37)
MCV RBC AUTO: 63 FL (ref 81–99)
MONOCYTES # BLD AUTO: 0.7 THOUSAND/ΜL (ref 0.17–1.22)
MONOCYTES NFR BLD AUTO: 8 % (ref 2–12)
NEUTROPHILS # BLD AUTO: 7.3 THOUSANDS/ΜL (ref 1.4–6.5)
NEUTS SEG NFR BLD AUTO: 79 % (ref 42–75)
NRBC BLD AUTO-RTO: 0 /100 WBCS
PLATELET # BLD AUTO: 236 THOUSANDS/UL (ref 149–390)
PMV BLD AUTO: 8.6 FL (ref 8.6–11.7)
POTASSIUM SERPL-SCNC: 4.2 MMOL/L (ref 3.5–5.5)
RBC # BLD AUTO: 5.2 MILLION/UL (ref 4.3–5.9)
SODIUM SERPL-SCNC: 140 MMOL/L (ref 134–143)
WBC # BLD AUTO: 9.3 THOUSAND/UL (ref 4.8–10.8)

## 2018-12-21 PROCEDURE — 99239 HOSP IP/OBS DSCHRG MGMT >30: CPT | Performed by: HOSPITALIST

## 2018-12-21 PROCEDURE — 82948 REAGENT STRIP/BLOOD GLUCOSE: CPT

## 2018-12-21 PROCEDURE — 80048 BASIC METABOLIC PNL TOTAL CA: CPT | Performed by: PHYSICIAN ASSISTANT

## 2018-12-21 PROCEDURE — 94760 N-INVAS EAR/PLS OXIMETRY 1: CPT

## 2018-12-21 PROCEDURE — 85025 COMPLETE CBC W/AUTO DIFF WBC: CPT | Performed by: PHYSICIAN ASSISTANT

## 2018-12-21 PROCEDURE — 74250 X-RAY XM SM INT 1CNTRST STD: CPT

## 2018-12-21 RX ADMIN — PREDNISONE 40 MG: 20 TABLET ORAL at 11:56

## 2018-12-21 RX ADMIN — DICYCLOMINE HYDROCHLORIDE 10 MG: 20 TABLET ORAL at 05:11

## 2018-12-21 RX ADMIN — METOPROLOL SUCCINATE 100 MG: 50 TABLET, EXTENDED RELEASE ORAL at 11:55

## 2018-12-21 RX ADMIN — TAMSULOSIN HYDROCHLORIDE 0.4 MG: 0.4 CAPSULE ORAL at 17:01

## 2018-12-21 RX ADMIN — PREGABALIN 300 MG: 100 CAPSULE ORAL at 11:55

## 2018-12-21 RX ADMIN — CLONIDINE HYDROCHLORIDE 0.1 MG: 0.1 TABLET ORAL at 11:56

## 2018-12-21 RX ADMIN — DULOXETINE HYDROCHLORIDE 60 MG: 60 CAPSULE, DELAYED RELEASE ORAL at 11:54

## 2018-12-21 RX ADMIN — POTASSIUM CHLORIDE 20 MEQ: 1500 TABLET, EXTENDED RELEASE ORAL at 11:56

## 2018-12-21 RX ADMIN — METFORMIN HYDROCHLORIDE 500 MG: 500 TABLET ORAL at 17:01

## 2018-12-21 RX ADMIN — DILTIAZEM HYDROCHLORIDE 180 MG: 180 CAPSULE, COATED, EXTENDED RELEASE ORAL at 11:56

## 2018-12-21 RX ADMIN — TIOTROPIUM BROMIDE 18 MCG: 18 CAPSULE ORAL; RESPIRATORY (INHALATION) at 11:56

## 2018-12-21 RX ADMIN — FINASTERIDE 5 MG: 5 TABLET, FILM COATED ORAL at 11:55

## 2018-12-21 RX ADMIN — LISINOPRIL 20 MG: 20 TABLET ORAL at 11:56

## 2018-12-21 RX ADMIN — PANTOPRAZOLE SODIUM 40 MG: 40 TABLET, DELAYED RELEASE ORAL at 17:01

## 2018-12-21 RX ADMIN — PANTOPRAZOLE SODIUM 40 MG: 40 TABLET, DELAYED RELEASE ORAL at 05:11

## 2018-12-21 RX ADMIN — FERROUS SULFATE TAB 325 MG (65 MG ELEMENTAL FE) 325 MG: 325 (65 FE) TAB at 17:01

## 2018-12-21 RX ADMIN — FERROUS SULFATE TAB 325 MG (65 MG ELEMENTAL FE) 325 MG: 325 (65 FE) TAB at 12:09

## 2018-12-21 RX ADMIN — DICYCLOMINE HYDROCHLORIDE 10 MG: 20 TABLET ORAL at 17:01

## 2018-12-21 RX ADMIN — ATORVASTATIN CALCIUM 40 MG: 40 TABLET, FILM COATED ORAL at 11:56

## 2018-12-21 RX ADMIN — DICYCLOMINE HYDROCHLORIDE 10 MG: 20 TABLET ORAL at 11:55

## 2018-12-21 RX ADMIN — PREGABALIN 300 MG: 100 CAPSULE ORAL at 17:02

## 2018-12-21 RX ADMIN — APIXABAN 5 MG: 2.5 TABLET, FILM COATED ORAL at 11:54

## 2018-12-21 RX ADMIN — APIXABAN 5 MG: 2.5 TABLET, FILM COATED ORAL at 18:11

## 2018-12-21 NOTE — NURSING NOTE
Pt sleeping comfortably most of shift, one episode of confusion, pt insisting he went to basement earlier for a ciggarette and wanted to go again, easily reorientated and pt back to bed  Denied pain, no s/s of pain or distress  Call bell within reach  Pt is NPO for test tomorrow

## 2018-12-21 NOTE — ASSESSMENT & PLAN NOTE
· Atypical  · Resolved  · Status post cardiology evaluation  · Possibility related to the symptomatic anemia

## 2018-12-21 NOTE — ASSESSMENT & PLAN NOTE
Lab Results   Component Value Date    HGBA1C 5 1 12/18/2018       Recent Labs      12/20/18   1623  12/20/18   2023  12/21/18   0629  12/21/18   1135   POCGLU  148*  170*  143*  124       Blood Sugar Average: Last 72 hrs:  (P) 128 9   Repeat hemoglobin A1c 5 1%, cont C[facility] step 2 diet, continue pre-hospital metformin and obtain Accu-Cheks AC and HS with algorithm 3 correction dose a c   And HS  DC home on pre-admit meds at pre-admit dosages

## 2018-12-21 NOTE — NURSING NOTE
Pt went down to xray earlier for test and tolerated well, presently back in bed in no acute distress watching tv and sleeping on/off, callbell in reach of the pt, encouraged to ring with any needs and the pt agrees

## 2018-12-21 NOTE — DISCHARGE INSTRUCTIONS
Acute Diarrhea   WHAT YOU NEED TO KNOW:   Acute diarrhea starts quickly and lasts a short time, usually 1 to 3 days  It can last up to 2 weeks  You may not be able to control your diarrhea  Acute diarrhea usually stops on its own  DISCHARGE INSTRUCTIONS:   Return to the emergency department if:   · You feel confused  · Your heartbeat is faster than normal      · Your eyes look deeply sunken, or you have no tears when you cry  · You urinate less than usual, or your urine is dark yellow  · You have blood or mucus in your stools  · You have severe abdominal pain  · You are unable to drink any liquids  Contact your healthcare provider if:   · Your symptoms do not get better with treatment  · You have a fever higher than 101 3°F (38 5°C)  · You have trouble eating and drinking because you are vomiting  · You are thirsty or have a dry mouth  · Your diarrhea does not get better in 7 days  · You have questions or concerns about your condition or care  Follow up with your healthcare provider as directed:  Write down your questions so you remember to ask them during your visits  Medicines:  · Diarrhea medicine  is an over-the-counter medicine that helps slow or stop your diarrhea  If you take other medicines, talk to your healthcare provider before you take diarrhea medicine  · Antibiotics  may be given to help treat an infection caused by bacteria  · Antiparasitics  may be given to treat an infection caused by parasites  · Take your medicine as directed  Contact your healthcare provider if you think your medicine is not helping or if you have side effects  Tell him of her if you are allergic to any medicine  Keep a list of the medicines, vitamins, and herbs you take  Include the amounts, and when and why you take them  Bring the list or the pill bottles to follow-up visits  Carry your medicine list with you in case of an emergency    Self-care: · Drink liquids as directed  Liquids will help prevent dehydration caused by diarrhea  Ask your healthcare provider how much liquid to drink each day and which liquids are best for you  You may need to drink an oral rehydration solution (ORS)  An ORS has the right amounts of water, salts, and sugar you need to replace body fluids  You can buy an ORS at most grocery stores and pharmacies  · Eat foods that are easy to digest   Examples include rice, lentils, cereal, bananas, potatoes, and bread  It also includes some fruits (bananas, melon), well-cooked vegetables, and lean meats  Avoid foods high in fiber, fat, and sugar  Also avoid caffeine, alcohol, dairy, and red meat until your diarrhea is gone  Prevent acute diarrhea:   · Wash your hands often  Use soap and water  Wash your hands before you eat or prepare food  Also wash your hands after you use the bathroom  Use an alcohol-based hand gel when soap and water are not available  · Keep bathroom surfaces clean  This helps prevent the spread of germs that cause acute diarrhea  · Wash fruits and vegetables well before you eat them  This can help remove germs that cause diarrhea  If possible, remove the skin from fruits and vegetables, or cook them well before you eat them  · Cook meat as directed  ¨ Cook ground meat  to 160°F      ¨ Cook ground poultry, whole poultry, or cuts of poultry  to at least 165°F  Remove the meat from heat  Let it stand for 3 minutes before you eat it  ¨ Cook whole cuts of meat other than poultry  to at least 145°F  Remove the meat from heat  Let it stand for 3 minutes before you eat it  · Wash dishes that have touched raw meat with hot water and soap  This includes cutting boards, utensils, dishes, and serving containers  · Place raw or cooked meat in the refrigerator as soon as possible  Bacteria can grow in meat that is left at room temperature too long       · Do not eat raw or undercooked oysters, clams, or mussels  These foods may be contaminated and cause infection  · Drink filtered or treated water only when you travel  Do not put ice in your drinks  Drink bottled water whenever possible  © 2017 [facility] Information is for End User's use only and may not be sold, redistributed or otherwise used for commercial purposes  All illustrations and images included in CareNotes® are the copyrighted property of [employer]  or [employer]  The above information is an  only  It is not intended as medical advice for individual conditions or treatments  Talk to your doctor, nurse or pharmacist before following any medical regimen to see if it is safe and effective for you  Metoprolol (By mouth)   Metoprolol (met-oh-PROE-lol)  Treats high blood pressure, angina (chest pain), and heart failure  May lower the risk of death after a heart attack  This medicine is a beta-blocker  Brand Name(s): Lopressor, Toprol XL   There may be other brand names for this medicine  When This Medicine Should Not Be Used: This medicine is not right for everyone  Do not use if you had an allergic reaction to metoprolol or similar medicines  Do not use this medicine if you have certain blood circulation or heart problems  Ask your doctor about these problems  How to Use This Medicine:   Tablet, Long Acting Tablet  · Take your medicine as directed  Your dose may need to be changed several times to find what works best for you  · Take this medicine with a meal or right after a meal  Take this medicine the same way every day, at the same time  · Swallow the tablet whole with a glass of water  You may break the extended-release tablet in half, but do not chew or crush it  · Missed dose: Take a dose as soon as you remember  If it is almost time for your next dose, wait until then and take a regular dose  Do not take extra medicine to make up for a missed dose    · Store the medicine in a closed container at room temperature, away from heat, moisture, and direct light  Drugs and Foods to Avoid:   Ask your doctor or pharmacist before using any other medicine, including over-the-counter medicines, vitamins, and herbal products  · Some medicines can affect how metoprolol works  Tell your doctor if you are taking any of the following:   ¨ Digoxin, dipyridamole, hydralazine, hydroxychloroquine, methyldopa, quinidine  ¨ Medicine to treat depression (such as bupropion, clomipramine, desipramine, fluoxetine, fluvoxamine, paroxetine, sertraline), medicine to treat mental illness (such as chlorpromazine, fluphenazine, haloperidol, thioridazine), medicine for heart rhythm problems (such as propafenone), HIV/AIDS medicine (such as ritonavir), medicine to treat a fungus infection (such as terbinafine), a monoamine oxidase inhibitor (MAOI), an ergot medicine for headaches, a calcium channel blocker (such as amlodipine, diltiazem, verapamil), or an alpha blocker (such as clonidine, prazosin, reserpine, guanethidine)  Warnings While Using This Medicine:   · Tell your doctor if you are pregnant or breastfeeding, or if you have blood vessel, heart, or circulation problems (such as heart failure, rhythm problems, or slow heartbeat)  Tell your doctor if you have kidney disease, liver disease, diabetes, lung disease (such as asthma), an overactive thyroid, or a history of allergies  · This medicine may cause worse symptoms of heart failure while the dose is being adjusted  · Do not stop using this medicine suddenly  Your doctor will need to slowly decrease your dose before you stop it completely  · Tell any doctor or dentist who treats you that you are using this medicine  You may need to stop using this medicine several days before you have surgery or medical tests  · This medicine could lower your blood pressure too much, especially when you first use it or if you are dehydrated   Stand or sit up slowly if you feel lightheaded or dizzy  · This medicine may make you dizzy or drowsy  Do not drive or do anything else that could be dangerous until you know how this medicine affects you  · Your doctor will check your progress and the effects of this medicine at regular visits  Keep all appointments  · Keep all medicine out of the reach of children  Never share your medicine with anyone  Possible Side Effects While Using This Medicine:   Call your doctor right away if you notice any of these side effects:  · Allergic reaction: Itching or hives, swelling in your face or hands, swelling or tingling in your mouth or throat, chest tightness, trouble breathing  · Lightheadedness, dizziness, or fainting  · Slow heartbeat  · Swelling in your hands, ankles, or feet, trouble breathing, tiredness  · Worsening chest pain  If you notice these less serious side effects, talk with your doctor:   · Diarrhea  · Mild dizziness or tiredness  If you notice other side effects that you think are caused by this medicine, tell your doctor  Call your doctor for medical advice about side effects  You may report side effects to FDA at 7-940-FDA-3433  © 2017 [address] [employer] Information is for End User's use only and may not be sold, redistributed or otherwise used for commercial purposes  The above information is an  only  It is not intended as medical advice for individual conditions or treatments  Talk to your doctor, nurse or pharmacist before following any medical regimen to see if it is safe and effective for you  Diltiazem (By mouth)   Diltiazem ([id number])  Treats high blood pressure and angina (chest pain)  This medicine is a calcium channel blocker  Brand Name(s): Cardizem, Cardizem CD, Cardizem LA, Cartia XT, Dilt-XR, Matzim LA, Taztia XT, Tiazac   There may be other brand names for this medicine  When This Medicine Should Not Be Used: This medicine is not right for everyone   Do not use it if you had an allergic reaction to diltiazem or similar medicines  How to Use This Medicine:   Long Acting Capsule, 12 Hour Capsule, 24 Hour Capsule, Tablet, Long Acting Tablet  · Take your medicine as directed  Your dose may need to be changed several times to find what works best for you  · It is best to take this medicine on an empty stomach  · Swallow this medicine whole  Do not crush, break, chew, or open the capsule or tablet  · Missed dose: Take a dose as soon as you remember  If it is almost time for your next dose, wait until then and take a regular dose  Do not take extra medicine to make up for a missed dose  · Store the medicine in a closed container at room temperature, away from heat, moisture, and direct light  Drugs and Foods to Avoid:   Ask your doctor or pharmacist before using any other medicine, including over-the-counter medicines, vitamins, and herbal products  · Some medicines can affect how diltiazem works  Tell your doctor if you are using the following:  ¨ Buspirone, carbamazepine, cimetidine, clonidine, cyclosporine, digoxin, ivabradine, lovastatin, midazolam, quinidine, rifampin, simvastatin, triazolam  ¨ Other blood pressure medicine, including a beta-blocker    Warnings While Using This Medicine:   · Tell your doctor if you are pregnant or breastfeeding, or if you have kidney disease, liver disease, or digestion problems  Tell your doctor about all heart problems that you have, including heart failure or rhythm problems  · This medicine may cause the following problems:  ¨ Slow heartbeat  ¨ Worsening of heart failure  ¨ Serious skin reactions  · This medicine could lower your blood pressure too much, especially when you first use it or if you are dehydrated  Stand or sit up slowly if you feel lightheaded or dizzy  Do not drive or do anything else that could be dangerous until you know how this medicine affects you    · Do not stop using this medicine without asking your doctor, even if you feel well  This medicine will not cure high blood pressure, but it will help keep it in normal range  You may have to take blood pressure medicine for the rest of your life  · Your doctor will do lab tests at regular visits to check on the effects of this medicine  Keep all appointments  · Keep all medicine out of the reach of children  Never share your medicine with anyone  Possible Side Effects While Using This Medicine:   Call your doctor right away if you notice any of these side effects:  · Allergic reaction: Itching or hives, swelling in your face or hands, swelling or tingling in your mouth or throat, chest tightness, trouble breathing  · Blistering, peeling, red skin rash  · Dark urine or pale stools, nausea, vomiting, loss of appetite, stomach pain, yellow skin or eyes  · Fast, slow, uneven, or pounding heartbeat  · Rapid weight gain, swelling in your hands, ankles, or feet  If you notice other side effects that you think are caused by this medicine, tell your doctor  Call your doctor for medical advice about side effects  You may report side effects to FDA at 7-333-FDA-1998  © 2017 [address] Information is for End User's use only and may not be sold, redistributed or otherwise used for commercial purposes  The above information is an  only  It is not intended as medical advice for individual conditions or treatments  Talk to your doctor, nurse or pharmacist before following any medical regimen to see if it is safe and effective for you  Apixaban (By mouth)   Apixaban (a-PIX-a-ban)  Treats and prevents blood clots  This medicine is a blood thinner  Brand Name(s): Eliquis   There may be other brand names for this medicine  When This Medicine Should Not Be Used: This medicine is not right for everyone  Do not use it if you had an allergic reaction to apixaban or you have active bleeding    How to Use This Medicine:   Tablet  · Your doctor will tell you how much medicine to use  Do not use more than directed  · If you are not able to swallow the tablets whole, they may be crushed and mixed in water, 5% dextrose in water (D5W), apple juice, or applesauce  The crushed tablets may be mixed with 60 mL of water or D5W dose and given through a nasogastric tube (NGT)  · This medicine should come with a Medication Guide  Ask your pharmacist for a copy if you do not have one  · Missed dose: Take a dose as soon as you remember  If it is almost time for your next dose, wait until then and take a regular dose  Do not take extra medicine to make up for a missed dose  · Store the medicine in a closed container at room temperature, away from heat, moisture, and direct light  Drugs and Foods to Avoid:   Ask your doctor or pharmacist before using any other medicine, including over-the-counter medicines, vitamins, and herbal products  · Some medicines can affect how apixaban works  Tell your doctor if you are using any of the following:   ¨ Carbamazepine, clarithromycin, itraconazole, ketoconazole, phenytoin, rifampin, ritonavir, Carlos A's wort  ¨ Blood thinner (including clopidogrel, heparin, prasugrel, warfarin)  ¨ Medicine to treat depression  ¨ NSAID pain or arthritis medicine (including aspirin, celecoxib, diclofenac, ibuprofen, naproxen)  Warnings While Using This Medicine:   · Tell your doctor if you are pregnant or breastfeeding, or if you have kidney disease, liver disease, bleeding problems, or an artificial heart valve  · Do not stop using this medicine suddenly without asking your doctor  You might have a higher risk of stroke for a short time after you stop using this medicine  · This medicine increases your risk for bleeding that can become serious if not controlled  You may also bruise easily, and it may take longer than usual for bleeding to stop    · This medicine may increase your risk for blood clots in your spine or back if you undergo an epidural or spinal puncture  This could lead to paralysis  Tell your doctor if you ever had spine problems or back surgery  · Tell any doctor or dentist who treats you that you are using this medicine  With your doctor's supervision, you may need to stop using this medicine several days before you have surgery or medical tests  · Your doctor will do lab tests at regular visits to check on the effects of this medicine  Keep all appointments  · Keep all medicine out of the reach of children  Never share your medicine with anyone  Possible Side Effects While Using This Medicine:   Call your doctor right away if you notice any of these side effects:  · Allergic reaction: Itching or hives, swelling in your face or hands, swelling or tingling in your mouth or throat, chest tightness, trouble breathing  · Change in how much or how often you urinate, red or pink urine  · Chest pain, trouble breathing  · Coughing up blood, vomiting blood or material that looks like coffee grounds  · Numbness, tingling, or muscle weakness in your legs or feet  · Red or black, tarry stools  · Unusual bleeding, bruising, or weakness  If you notice other side effects that you think are caused by this medicine, tell your doctor  Call your doctor for medical advice about side effects  You may report side effects to FDA at 4-208-FDA-8240  © 2017 [address] [employer] Information is for End User's use only and may not be sold, redistributed or otherwise used for commercial purposes  The above information is an  only  It is not intended as medical advice for individual conditions or treatments  Talk to your doctor, nurse or pharmacist before following any medical regimen to see if it is safe and effective for you

## 2018-12-21 NOTE — PLAN OF CARE
CARDIOVASCULAR - ADULT     Maintains optimal cardiac output and hemodynamic stability Adequate for Discharge     Absence of cardiac dysrhythmias or at baseline rhythm Adequate for Discharge        DISCHARGE PLANNING     Discharge to home or other facility with appropriate resources Adequate for Discharge        DISCHARGE PLANNING - CARE MANAGEMENT     Discharge to post-acute care or home with appropriate resources Adequate for Discharge        GASTROINTESTINAL - ADULT     Minimal or absence of nausea and/or vomiting Adequate for Discharge     Maintains or returns to baseline bowel function Adequate for Discharge     Maintains adequate nutritional intake Adequate for Discharge        HEMATOLOGIC - ADULT     Maintains hematologic stability Adequate for Discharge        INFECTION - ADULT     Absence or prevention of progression during hospitalization Adequate for Discharge     Absence of fever/infection during neutropenic period Adequate for Discharge        Knowledge Deficit     Patient/family/caregiver demonstrates understanding of disease process, treatment plan, medications, and discharge instructions Adequate for Discharge        METABOLIC, FLUID AND ELECTROLYTES - ADULT     Electrolytes maintained within normal limits Adequate for Discharge     Fluid balance maintained Adequate for Discharge     Glucose maintained within target range Adequate for Discharge        MUSCULOSKELETAL - ADULT     Maintain or return mobility to safest level of function Adequate for Discharge     Maintain proper alignment of affected body part Adequate for Discharge        Nutrition/Hydration-ADULT     Nutrient/Hydration intake appropriate for improving, restoring or maintaining nutritional needs Adequate for Discharge        PAIN - ADULT     Verbalizes/displays adequate comfort level or baseline comfort level Adequate for Discharge        Potential for Falls     Patient will remain free of falls Adequate for Discharge RESPIRATORY - ADULT     Achieves optimal ventilation and oxygenation Adequate for Discharge        SAFETY ADULT     Maintain or return to baseline ADL function Adequate for Discharge     Maintain or return mobility status to optimal level Adequate for Discharge        SKIN/TISSUE INTEGRITY - ADULT     Skin integrity remains intact Adequate for Discharge     Incision(s), wounds(s) or drain site(s) healing without S/S of infection Adequate for Discharge     Oral mucous membranes remain intact Adequate for Discharge

## 2018-12-21 NOTE — DISCHARGE SUMMARY
Discharge- Yancy Quivers 1945, 74 y o  male MRN: [id number]    Unit/Bed#: -01 Encounter: [id number]    Primary Care Provider: Berlin Naranjo DO   Date and time admitted to hospital: 12/18/2018  6:37 PM        * Symptomatic anemia   Assessment & Plan    · S/P 2 units PRBC  · H&H remained stable  · Secondary to suspected very slow GI bleed  · Status post GI evaluation  · Patient recently had an endoscopy and colonoscopy an outside hospital results were within normal limits  · Patient needs an outpatient video capsule endoscopy  · Small-bowel follow-through from earlier today was perfectly within normal limits  · DC home  · Outpatient follow-up with PCP and GI     Other chest pain   Assessment & Plan    · Atypical  · Resolved  · Status post cardiology evaluation  · Possibility related to the symptomatic anemia     Intractable diarrhea   Assessment & Plan    · This is been an ongoing problem with recent evaluation by GI and pending capsule endoscopy, will continue pre-hospital medication for same  · Outpatient follow-up with GI  · Currently resolved     Hypokalemia   Assessment & Plan    · Resolved with repletion     COPD (chronic obstructive pulmonary disease) ([facility] [address] )   Assessment & Plan    · DC home on pre-admit meds at pre-admit dosages  · Patient is on room air  · No need for further steroid       Benign essential hypertension   Assessment & Plan    · DC home on pre-admit meds at pre-admit dosages     Dyslipidemia   Assessment & Plan    · Continue pre-hospital atorvastatin 40 mg p o   Daily     Persistent atrial fibrillation (HCC)   Assessment & Plan    · Continue pre-hospital Eliquis and metoprolol as per above     Tobacco abuse   Assessment & Plan    · Patient not ready to quit smoking     Type 2 diabetes mellitus without complication, without long-term current use of insulin [facility])   Assessment & Plan    Lab Results   Component Value Date    HGBA1C 5 1 12/18/2018       Recent Labs      12/20/18 1623  12/20/18 2023  12/21/18   0629  12/21/18   1135   POCGLU  148*  170*  143*  124       Blood Sugar Average: Last 72 hrs:  (P) 128 9   Repeat hemoglobin A1c 5 1%, cont [facility] step 2 diet, continue pre-hospital metformin and obtain Accu-Cheks AC and HS with algorithm 3 correction dose a c  And HS  DC home on pre-admit meds at pre-admit dosages     Obstructive sleep apnea (adult) (pediatric)   Assessment & Plan    · Advised patient to resume home CPAP     Acute on chronic combined systolic and diastolic congestive heart failure ([facility] [address] )   Assessment & Plan    · Appreciate cardiology input  Echo with overall normal LV function  Cardiology suspects SOB likely d/t underlying COPD versus symptomatic anemia  Currently shortness of breath has resolved     Dyspnea   Assessment & Plan    · Resolved     Chronic pain   Assessment & Plan    · Stable DC home on pre-admit meds at pre-admit dosages     Depressive disorder   Assessment & Plan    · Stable Continue pre-hospital Cymbalta 60 mg p o  Daily           Discharging Physician / Practitioner: Karina Fraser MD  PCP: Berlin Naranjo DO  Admission Date:   Admission Orders     Ordered        12/19/18 2149  Inpatient Admission  Once         12/18/18 2138  Place in Observation (expected length of stay for this patient is less than two midnights)  Once             Discharge Date: 12/21/18    Resolved Problems  Date Reviewed: 12/20/2018    None          Consultations During Hospital Stay:  · Cardiology  · Gastroenterology    Procedures Performed:   · None    Significant Findings / Test Results:   · Small-bowel follow-through-non acute non obstructed small bowel series  · Chest x-ray - mild cardiomegaly, bilateral pleural effusions with adjacent volume loss and mild edema    Incidental Findings:   · None     Test Results Pending at Discharge (will require follow up):    · The patient will need an outpatient video capsule endoscopy to be arranged by GI     Outpatient Tests Requested:  · None    Complications:  None    Reason for Admission:  symptomatic anemia / chest pain and shortness of breath    Hospital Course:     Jose E Simeon is a 74 y o  male patient who originally presented to the hospital on 12/18/2018 due to chest pain and shortness of breath  He was found to be anemic with an arrival hemoglobin 7 6  Please refer to the initial H&P completed by Carolina Alaniz for the remaining details of the initial presentation  Patient was admitted to med surge telemetry  He was given 2 units of PRBCs  Cardiology consultation was obtained for the chest pain  It was deemed that the chest pain was atypical and could be secondary to a variant of COPD verses being anemic  He had no further chest pain after the transfusions  A GI consultation was obtained  Patient recently had a normal endoscopy and colonoscopy at an outside hospital   GI ordered a small-bowel follow-through  That was within normal limits  Patient's hemoglobin hematocrit remained stable  Patient did get a dose of steroids because of a suspected COPD exacerbation however that was not the case  Patient will be discharged home on all of his pre-admission medications at the preadmission dosages with the specific instructions to follow up with GI in the outpatient setting for the aforementioned video capsule endoscopy  No changes were made to any of his pre-admission medications and or the preadmission dosages  Please see above list of diagnoses and related plan for additional information       Condition at Discharge: good     Discharge Day Visit / Exam:     Subjective:  Patient seen and examined, no new complaints no distress  Vitals: Blood Pressure: 142/69 (12/21/18 1508)  Pulse: 74 (12/21/18 1508)  Temperature: (!) 97 2 °F (36 2 °C) (12/21/18 1508)  Temp Source: Tympanic (12/21/18 1508)  Respirations: 18 (12/21/18 1508)  Height: 5' 9" (175 3 cm) (12/18/18 2221)  Weight - Scale: 82 5 kg (181 lb 12 8 oz) (12/21/18 0600)  SpO2: 98 % (12/21/18 1508)  Exam:   Physical Exam   Constitutional: He is oriented to person, place, and time  He appears well-developed and well-nourished  HENT:   Head: Normocephalic and atraumatic  Nose: Nose normal    Mouth/Throat: Oropharynx is clear and moist    Eyes: Pupils are equal, round, and reactive to light  Conjunctivae and EOM are normal    Neck: Normal range of motion  Neck supple  No JVD present  No thyromegaly present  Cardiovascular: Normal rate, regular rhythm and intact distal pulses  Exam reveals no gallop and no friction rub  No murmur heard  Pulmonary/Chest: Effort normal and breath sounds normal  No respiratory distress  Abdominal: Soft  Bowel sounds are normal  He exhibits no distension and no mass  There is no tenderness  There is no guarding  Musculoskeletal: Normal range of motion  He exhibits no edema  Lymphadenopathy:     He has no cervical adenopathy  Neurological: He is alert and oriented to person, place, and time  No cranial nerve deficit  Skin: Skin is warm  No rash noted  No erythema  Psychiatric: He has a normal mood and affect  His behavior is normal    Vitals reviewed  Discussion with Family:  No family was present at the time of my discharge eval    Discharge instructions/Information to patient and family:   See after visit summary for information provided to patient and family  Provisions for Follow-Up Care:  See after visit summary for information related to follow-up care and any pertinent home health orders  Disposition:     Home    For Discharges to [address] [facility]:   · Not Applicable to this Patient - Not Applicable to this Patient    Planned Readmission:  None     Discharge Statement:  I spent 35 minutes discharging the patient  This time was spent on the day of discharge  I had direct contact with the patient on the day of discharge   Greater than 50% of the total time was spent examining patient, answering all patient questions, arranging and discussing plan of care with patient as well as directly providing post-discharge instructions  Additional time then spent on discharge activities  Discharge Medications:  See after visit summary for reconciled discharge medications provided to patient and family        ** Please Note: This note has been constructed using a voice recognition system **

## 2018-12-21 NOTE — ASSESSMENT & PLAN NOTE
· Appreciate cardiology input  Echo with overall normal LV function  Cardiology suspects SOB likely d/t underlying COPD versus symptomatic anemia  Currently shortness of breath has resolved

## 2018-12-21 NOTE — ASSESSMENT & PLAN NOTE
· DC home on pre-admit meds at pre-admit dosages  · Patient is on room air  · No need for further steroid

## 2018-12-21 NOTE — ASSESSMENT & PLAN NOTE
· S/P 2 units PRBC  · H&H remained stable  · Secondary to suspected very slow GI bleed  · Status post GI evaluation  · Patient recently had an endoscopy and colonoscopy an outside hospital results were within normal limits  · Patient needs an outpatient video capsule endoscopy  · Small-bowel follow-through from earlier today was perfectly within normal limits  · DC home  · Outpatient follow-up with PCP and GI

## 2018-12-21 NOTE — PLAN OF CARE
CARDIOVASCULAR - ADULT     Maintains optimal cardiac output and hemodynamic stability Progressing     Absence of cardiac dysrhythmias or at baseline rhythm Progressing        DISCHARGE PLANNING     Discharge to home or other facility with appropriate resources Progressing        DISCHARGE PLANNING - CARE MANAGEMENT     Discharge to post-acute care or home with appropriate resources Progressing        GASTROINTESTINAL - ADULT     Minimal or absence of nausea and/or vomiting Progressing     Maintains or returns to baseline bowel function Progressing     Maintains adequate nutritional intake Progressing        HEMATOLOGIC - ADULT     Maintains hematologic stability Progressing        INFECTION - ADULT     Absence or prevention of progression during hospitalization Progressing     Absence of fever/infection during neutropenic period Progressing        Knowledge Deficit     Patient/family/caregiver demonstrates understanding of disease process, treatment plan, medications, and discharge instructions Progressing        METABOLIC, FLUID AND ELECTROLYTES - ADULT     Electrolytes maintained within normal limits Progressing     Fluid balance maintained Progressing     Glucose maintained within target range Progressing        MUSCULOSKELETAL - ADULT     Maintain or return mobility to safest level of function Progressing     Maintain proper alignment of affected body part Progressing        Nutrition/Hydration-ADULT     Nutrient/Hydration intake appropriate for improving, restoring or maintaining nutritional needs Progressing        PAIN - ADULT     Verbalizes/displays adequate comfort level or baseline comfort level Progressing        Potential for Falls     Patient will remain free of falls Progressing        RESPIRATORY - ADULT     Achieves optimal ventilation and oxygenation Progressing        SAFETY ADULT     Maintain or return to baseline ADL function Progressing     Maintain or return mobility status to optimal level Progressing        SKIN/TISSUE INTEGRITY - ADULT     Skin integrity remains intact Progressing     Incision(s), wounds(s) or drain site(s) healing without S/S of infection Progressing     Oral mucous membranes remain intact Progressing

## 2018-12-21 NOTE — NURSING NOTE
hospitalist was in to see and assess the pt earlier in the day and he is ok for d/c to home, iv site removed with the tip intact, avs reviewed with the pt and the caregiver, all questions answered and they both verbalized understanding, pt taken to the caregivers car via w/c and the pt was d/c'd from the hospital

## 2018-12-21 NOTE — ASSESSMENT & PLAN NOTE
· This is been an ongoing problem with recent evaluation by GI and pending capsule endoscopy, will continue pre-hospital medication for same  · Outpatient follow-up with GI  · Currently resolved

## 2018-12-21 NOTE — OCCUPATIONAL THERAPY NOTE
OT/PT Orders received/noted  [place] Gaines [place] Gaines approached pt X 3 this date for OT/PT evals, N/A in the am r/t testing, refused eval prior to lunch and N/A after noon meal   Will attempt evals at a later times   Juvencio Chapa, OT

## 2018-12-23 LAB
BACTERIA BLD CULT: NORMAL
BACTERIA BLD CULT: NORMAL

## 2018-12-24 LAB — MRSA NOSE QL CULT: NORMAL

## 2018-12-24 NOTE — UTILIZATION REVIEW
Notification of Discharge  This is a Notification of Discharge from our facility [address]  Please be advised that this patient has been discharge from our facility  Below you will find the admission and discharge date and time including the patients disposition  PRESENTATION DATE: 12/18/2018  6:37 PM  IP ADMISSION DATE: 12/19/18 2149  DISCHARGE DATE: 12/21/2018  6:35 PM  DISPOSITION: [id number] Utilization Review Department  Phone: [phone]; Fax [phone]  ATTENTION: Please call with any questions or concerns to [phone]  and carefully listen to the prompts so that you are directed to the right person  Send all requests for admission clinical reviews, approved or denied determinations and any other requests to fax [phone]   All voicemails are confidential

## 2018-12-26 ENCOUNTER — TELEPHONE (OUTPATIENT)
Dept: FAMILY MEDICINE CLINIC | Facility: CLINIC | Age: 73
End: 2018-12-26

## 2018-12-26 ENCOUNTER — TRANSITIONAL CARE MANAGEMENT (OUTPATIENT)
Dept: FAMILY MEDICINE CLINIC | Facility: CLINIC | Age: 73
End: 2018-12-26

## 2018-12-26 DIAGNOSIS — I48.91 ATRIAL FIBRILLATION WITH RAPID VENTRICULAR RESPONSE (HCC): Chronic | ICD-10-CM

## 2018-12-26 DIAGNOSIS — I10 ESSENTIAL HYPERTENSION: ICD-10-CM

## 2018-12-26 DIAGNOSIS — I10 BENIGN ESSENTIAL HYPERTENSION: ICD-10-CM

## 2018-12-26 DIAGNOSIS — K92.2 GASTROINTESTINAL HEMORRHAGE, UNSPECIFIED GASTROINTESTINAL HEMORRHAGE TYPE: ICD-10-CM

## 2018-12-26 PROCEDURE — 4010F ACE/ARB THERAPY RXD/TAKEN: CPT | Performed by: FAMILY MEDICINE

## 2018-12-26 RX ORDER — DILTIAZEM HYDROCHLORIDE 120 MG/1
120 CAPSULE, COATED, EXTENDED RELEASE ORAL DAILY
Qty: 30 CAPSULE | Refills: 5 | Status: SHIPPED | OUTPATIENT
Start: 2018-12-26 | End: 2019-02-05

## 2018-12-26 RX ORDER — RANOLAZINE 500 MG/1
500 TABLET, EXTENDED RELEASE ORAL 2 TIMES DAILY
Qty: 60 TABLET | Refills: 5 | Status: SHIPPED | OUTPATIENT
Start: 2018-12-26 | End: 2018-12-27 | Stop reason: SDUPTHER

## 2018-12-26 RX ORDER — CLONIDINE HYDROCHLORIDE 0.1 MG/1
0.1 TABLET ORAL DAILY
Qty: 30 TABLET | Refills: 5 | Status: SHIPPED | OUTPATIENT
Start: 2018-12-26 | End: 2019-04-24 | Stop reason: SDUPTHER

## 2018-12-26 RX ORDER — PREGABALIN 300 MG/1
300 CAPSULE ORAL 2 TIMES DAILY
Qty: 60 CAPSULE | Refills: 1 | Status: SHIPPED | OUTPATIENT
Start: 2018-12-26 | End: 2019-04-11 | Stop reason: SDUPTHER

## 2018-12-26 RX ORDER — ATORVASTATIN CALCIUM 40 MG/1
40 TABLET, FILM COATED ORAL DAILY
Qty: 30 TABLET | Refills: 5 | Status: SHIPPED | OUTPATIENT
Start: 2018-12-26 | End: 2019-04-24 | Stop reason: SDUPTHER

## 2018-12-26 RX ORDER — LISINOPRIL 20 MG/1
20 TABLET ORAL DAILY
Qty: 90 TABLET | Refills: 5 | Status: SHIPPED | OUTPATIENT
Start: 2018-12-26 | End: 2019-02-05

## 2018-12-26 RX ORDER — METOPROLOL SUCCINATE 100 MG/1
100 TABLET, EXTENDED RELEASE ORAL 2 TIMES DAILY
Qty: 60 TABLET | Refills: 5 | Status: SHIPPED | OUTPATIENT
Start: 2018-12-26 | End: 2018-12-27

## 2018-12-26 RX ORDER — ESOMEPRAZOLE MAGNESIUM 40 MG/1
40 CAPSULE, DELAYED RELEASE ORAL
Qty: 30 CAPSULE | Refills: 5 | Status: SHIPPED | OUTPATIENT
Start: 2018-12-26 | End: 2018-12-27 | Stop reason: SDUPTHER

## 2018-12-26 RX ORDER — FERROUS SULFATE TAB EC 324 MG (65 MG FE EQUIVALENT) 324 (65 FE) MG
324 TABLET DELAYED RESPONSE ORAL
Qty: 60 TABLET | Refills: 5 | Status: SHIPPED | OUTPATIENT
Start: 2018-12-26 | End: 2019-02-05

## 2018-12-26 NOTE — TELEPHONE ENCOUNTER
He said  He needed refill on all meds but this is all ne named  diltiazem says 180 he said 120  Which is it?

## 2018-12-27 ENCOUNTER — TELEPHONE (OUTPATIENT)
Dept: FAMILY MEDICINE CLINIC | Facility: CLINIC | Age: 73
End: 2018-12-27

## 2018-12-27 DIAGNOSIS — K92.2 GASTROINTESTINAL HEMORRHAGE, UNSPECIFIED GASTROINTESTINAL HEMORRHAGE TYPE: ICD-10-CM

## 2018-12-27 DIAGNOSIS — I48.91 ATRIAL FIBRILLATION WITH RAPID VENTRICULAR RESPONSE (HCC): Chronic | ICD-10-CM

## 2018-12-27 RX ORDER — METOPROLOL TARTRATE 50 MG/1
25 TABLET, FILM COATED ORAL EVERY 12 HOURS SCHEDULED
Refills: 0 | Status: CANCELLED | OUTPATIENT
Start: 2018-12-27

## 2018-12-27 NOTE — TELEPHONE ENCOUNTER
Per pharmacy  ranexa has only been once daily  [employer] insurance will only pay bob daily  Also patient usually takes the lopressor bid in stead of xl/  Pharmacy sending rx as before due to [employer] totally out  If you agree and want me to change all meds for next refill I can change for you

## 2018-12-31 ENCOUNTER — APPOINTMENT (OUTPATIENT)
Dept: LAB | Facility: MEDICAL CENTER | Age: 73
End: 2018-12-31
Payer: COMMERCIAL

## 2018-12-31 ENCOUNTER — TELEPHONE (OUTPATIENT)
Dept: CARDIOLOGY CLINIC | Facility: CLINIC | Age: 73
End: 2018-12-31

## 2018-12-31 ENCOUNTER — OFFICE VISIT (OUTPATIENT)
Dept: FAMILY MEDICINE CLINIC | Facility: CLINIC | Age: 73
End: 2018-12-31
Payer: COMMERCIAL

## 2018-12-31 VITALS
BODY MASS INDEX: 27.7 KG/M2 | WEIGHT: 187 LBS | HEIGHT: 69 IN | SYSTOLIC BLOOD PRESSURE: 160 MMHG | DIASTOLIC BLOOD PRESSURE: 80 MMHG

## 2018-12-31 DIAGNOSIS — I10 BENIGN ESSENTIAL HYPERTENSION: ICD-10-CM

## 2018-12-31 DIAGNOSIS — K92.2 GASTROINTESTINAL HEMORRHAGE, UNSPECIFIED GASTROINTESTINAL HEMORRHAGE TYPE: Primary | ICD-10-CM

## 2018-12-31 LAB
ALBUMIN SERPL BCP-MCNC: 3.6 G/DL (ref 3.5–5)
ALP SERPL-CCNC: 93 U/L (ref 46–116)
ALT SERPL W P-5'-P-CCNC: 32 U/L (ref 12–78)
ANION GAP SERPL CALCULATED.3IONS-SCNC: 7 MMOL/L (ref 4–13)
AST SERPL W P-5'-P-CCNC: 12 U/L (ref 5–45)
BASOPHILS # BLD AUTO: 0.13 THOUSANDS/ΜL (ref 0–0.1)
BASOPHILS NFR BLD AUTO: 1 % (ref 0–1)
BILIRUB SERPL-MCNC: 0.48 MG/DL (ref 0.2–1)
BUN SERPL-MCNC: 12 MG/DL (ref 5–25)
CALCIUM SERPL-MCNC: 8.5 MG/DL (ref 8.3–10.1)
CHLORIDE SERPL-SCNC: 107 MMOL/L (ref 100–108)
CO2 SERPL-SCNC: 29 MMOL/L (ref 21–32)
CREAT SERPL-MCNC: 0.82 MG/DL (ref 0.6–1.3)
EOSINOPHIL # BLD AUTO: 0.31 THOUSAND/ΜL (ref 0–0.61)
EOSINOPHIL NFR BLD AUTO: 3 % (ref 0–6)
ERYTHROCYTE [DISTWIDTH] IN BLOOD BY AUTOMATED COUNT: 25.6 % (ref 11.6–15.1)
EST. AVERAGE GLUCOSE BLD GHB EST-MCNC: 108 MG/DL
GFR SERPL CREATININE-BSD FRML MDRD: 87 ML/MIN/1.73SQ M
GLUCOSE SERPL-MCNC: 130 MG/DL (ref 65–140)
HBA1C MFR BLD: 5.4 % (ref 4.2–6.3)
HCT VFR BLD AUTO: 37.7 % (ref 36.5–49.3)
HGB BLD-MCNC: 10.8 G/DL (ref 12–17)
IMM GRANULOCYTES # BLD AUTO: 0.04 THOUSAND/UL (ref 0–0.2)
IMM GRANULOCYTES NFR BLD AUTO: 0 % (ref 0–2)
LYMPHOCYTES # BLD AUTO: 1.74 THOUSANDS/ΜL (ref 0.6–4.47)
LYMPHOCYTES NFR BLD AUTO: 17 % (ref 14–44)
MCH RBC QN AUTO: 19.8 PG (ref 26.8–34.3)
MCHC RBC AUTO-ENTMCNC: 28.6 G/DL (ref 31.4–37.4)
MCV RBC AUTO: 69 FL (ref 82–98)
MONOCYTES # BLD AUTO: 1.42 THOUSAND/ΜL (ref 0.17–1.22)
MONOCYTES NFR BLD AUTO: 14 % (ref 4–12)
NEUTROPHILS # BLD AUTO: 6.61 THOUSANDS/ΜL (ref 1.85–7.62)
NEUTS SEG NFR BLD AUTO: 65 % (ref 43–75)
NRBC BLD AUTO-RTO: 0 /100 WBCS
PLATELET # BLD AUTO: 271 THOUSANDS/UL (ref 149–390)
PMV BLD AUTO: 10 FL (ref 8.9–12.7)
POTASSIUM SERPL-SCNC: 3.5 MMOL/L (ref 3.5–5.3)
PROT SERPL-MCNC: 6.4 G/DL (ref 6.4–8.2)
RBC # BLD AUTO: 5.46 MILLION/UL (ref 3.88–5.62)
SODIUM SERPL-SCNC: 143 MMOL/L (ref 136–145)
WBC # BLD AUTO: 10.25 THOUSAND/UL (ref 4.31–10.16)

## 2018-12-31 PROCEDURE — 85025 COMPLETE CBC W/AUTO DIFF WBC: CPT | Performed by: FAMILY MEDICINE

## 2018-12-31 PROCEDURE — 1111F DSCHRG MED/CURRENT MED MERGE: CPT | Performed by: FAMILY MEDICINE

## 2018-12-31 PROCEDURE — 80053 COMPREHEN METABOLIC PANEL: CPT | Performed by: FAMILY MEDICINE

## 2018-12-31 PROCEDURE — 83036 HEMOGLOBIN GLYCOSYLATED A1C: CPT | Performed by: FAMILY MEDICINE

## 2018-12-31 PROCEDURE — 1160F RVW MEDS BY RX/DR IN RCRD: CPT | Performed by: FAMILY MEDICINE

## 2018-12-31 PROCEDURE — 99495 TRANSJ CARE MGMT MOD F2F 14D: CPT | Performed by: FAMILY MEDICINE

## 2018-12-31 PROCEDURE — 36415 COLL VENOUS BLD VENIPUNCTURE: CPT | Performed by: FAMILY MEDICINE

## 2018-12-31 RX ORDER — ESOMEPRAZOLE MAGNESIUM 40 MG/1
40 CAPSULE, DELAYED RELEASE ORAL DAILY
Qty: 30 CAPSULE | Refills: 5 | Status: SHIPPED | OUTPATIENT
Start: 2018-12-31 | End: 2019-02-05

## 2018-12-31 RX ORDER — RANOLAZINE 500 MG/1
500 TABLET, EXTENDED RELEASE ORAL DAILY
Qty: 30 TABLET | Refills: 5 | Status: SHIPPED | OUTPATIENT
Start: 2018-12-31 | End: 2019-01-24 | Stop reason: SDUPTHER

## 2018-12-31 RX ORDER — METOPROLOL TARTRATE 50 MG/1
25 TABLET, FILM COATED ORAL EVERY 12 HOURS SCHEDULED
Qty: 30 TABLET | Refills: 5 | Status: SHIPPED | OUTPATIENT
Start: 2018-12-31 | End: 2019-04-24 | Stop reason: SDUPTHER

## 2018-12-31 RX ORDER — METOPROLOL TARTRATE 100 MG/1
100 TABLET ORAL EVERY 12 HOURS SCHEDULED
COMMUNITY
Start: 2018-12-26 | End: 2019-02-05

## 2018-12-31 RX ORDER — FERROUS SULFATE 325(65) MG
TABLET ORAL
COMMUNITY
Start: 2018-12-26 | End: 2019-02-05

## 2018-12-31 NOTE — TELEPHONE ENCOUNTER
P/C for OV with Dr Kirsten Reyes only in [place] office  Placed on cancellation list  C/O increased SOB, chest tightness and pounding heartbeat  Also LLE, which he has had for awhile  Advised pt to go to ER   Verbally understood but he does not want to go because he was just in the hospital several weeks ago  Again advised him to go to the ER, He states if he gets worse he will go  He is not real sure what meds he is taking, feels they have changed , but last AVS from hospital seem to have all the meds on it  Did try to contact aide that helps him out, but no answer        Please advise
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
## 2019-01-02 NOTE — TELEPHONE ENCOUNTER
I can see him at 3:40 PM on January 23rd  If he cannot wait he can be seen by anyone       Ester Kearney

## 2019-01-24 ENCOUNTER — OFFICE VISIT (OUTPATIENT)
Dept: CARDIOLOGY CLINIC | Facility: HOSPITAL | Age: 74
End: 2019-01-24
Payer: COMMERCIAL

## 2019-01-24 VITALS
BODY MASS INDEX: 27.96 KG/M2 | HEART RATE: 64 BPM | HEIGHT: 69 IN | WEIGHT: 188.8 LBS | DIASTOLIC BLOOD PRESSURE: 82 MMHG | SYSTOLIC BLOOD PRESSURE: 145 MMHG

## 2019-01-24 DIAGNOSIS — I48.19 PERSISTENT ATRIAL FIBRILLATION (HCC): Primary | ICD-10-CM

## 2019-01-24 DIAGNOSIS — E78.5 DYSLIPIDEMIA: ICD-10-CM

## 2019-01-24 DIAGNOSIS — I10 BENIGN ESSENTIAL HYPERTENSION: ICD-10-CM

## 2019-01-24 DIAGNOSIS — Z72.0 TOBACCO ABUSE: ICD-10-CM

## 2019-01-24 DIAGNOSIS — G47.33 OBSTRUCTIVE SLEEP APNEA: ICD-10-CM

## 2019-01-24 DIAGNOSIS — R00.0 DILATED CARDIOMYOPATHY SECONDARY TO TACHYCARDIA (HCC): ICD-10-CM

## 2019-01-24 DIAGNOSIS — I48.91 ATRIAL FIBRILLATION WITH RAPID VENTRICULAR RESPONSE (HCC): Chronic | ICD-10-CM

## 2019-01-24 DIAGNOSIS — I43 DILATED CARDIOMYOPATHY SECONDARY TO TACHYCARDIA (HCC): ICD-10-CM

## 2019-01-24 DIAGNOSIS — I25.118 CORONARY ARTERY DISEASE OF NATIVE ARTERY OF NATIVE HEART WITH STABLE ANGINA PECTORIS (HCC): ICD-10-CM

## 2019-01-24 PROBLEM — I50.43 ACUTE ON CHRONIC COMBINED SYSTOLIC AND DIASTOLIC CONGESTIVE HEART FAILURE (HCC): Status: RESOLVED | Noted: 2018-08-17 | Resolved: 2019-01-24

## 2019-01-24 PROBLEM — I51.9 DECREASED LEFT VENTRICULAR SYSTOLIC FUNCTION: Status: RESOLVED | Noted: 2018-05-06 | Resolved: 2019-01-24

## 2019-01-24 PROCEDURE — 99214 OFFICE O/P EST MOD 30 MIN: CPT | Performed by: INTERNAL MEDICINE

## 2019-01-24 PROCEDURE — 93000 ELECTROCARDIOGRAM COMPLETE: CPT | Performed by: INTERNAL MEDICINE

## 2019-01-24 RX ORDER — RANOLAZINE 1000 MG/1
1000 TABLET, EXTENDED RELEASE ORAL 2 TIMES DAILY
Qty: 30 TABLET | Refills: 11 | Status: SHIPPED | OUTPATIENT
Start: 2019-01-24 | End: 2019-01-24 | Stop reason: CLARIF

## 2019-01-24 RX ORDER — RANOLAZINE 1000 MG/1
1000 TABLET, EXTENDED RELEASE ORAL 2 TIMES DAILY
Qty: 60 TABLET | Refills: 11 | Status: SHIPPED | OUTPATIENT
Start: 2019-01-24 | End: 2019-04-24 | Stop reason: SDUPTHER
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
## 2019-01-25 DIAGNOSIS — I25.118 CORONARY ARTERY DISEASE OF NATIVE ARTERY OF NATIVE HEART WITH STABLE ANGINA PECTORIS (HCC): ICD-10-CM

## 2019-02-05 ENCOUNTER — TRANSITIONAL CARE MANAGEMENT (OUTPATIENT)
Dept: FAMILY MEDICINE CLINIC | Facility: CLINIC | Age: 74
End: 2019-02-05

## 2019-02-05 ENCOUNTER — OFFICE VISIT (OUTPATIENT)
Dept: FAMILY MEDICINE CLINIC | Facility: CLINIC | Age: 74
End: 2019-02-05
Payer: COMMERCIAL

## 2019-02-05 ENCOUNTER — TELEPHONE (OUTPATIENT)
Dept: FAMILY MEDICINE CLINIC | Facility: CLINIC | Age: 74
End: 2019-02-05

## 2019-02-05 VITALS
WEIGHT: 177 LBS | DIASTOLIC BLOOD PRESSURE: 80 MMHG | BODY MASS INDEX: 26.22 KG/M2 | SYSTOLIC BLOOD PRESSURE: 150 MMHG | HEIGHT: 69 IN

## 2019-02-05 DIAGNOSIS — I10 BENIGN ESSENTIAL HYPERTENSION: ICD-10-CM

## 2019-02-05 DIAGNOSIS — G45.9 TIA (TRANSIENT ISCHEMIC ATTACK): Primary | ICD-10-CM

## 2019-02-05 DIAGNOSIS — D64.9 SYMPTOMATIC ANEMIA: ICD-10-CM

## 2019-02-05 DIAGNOSIS — R10.84 GENERALIZED ABDOMINAL PAIN: ICD-10-CM

## 2019-02-05 PROBLEM — F10.10 ALCOHOL ABUSE: Status: ACTIVE | Noted: 2019-01-26

## 2019-02-05 PROBLEM — F43.10 PTSD (POST-TRAUMATIC STRESS DISORDER): Status: ACTIVE | Noted: 2019-01-31

## 2019-02-05 PROCEDURE — 99496 TRANSJ CARE MGMT HIGH F2F 7D: CPT | Performed by: FAMILY MEDICINE

## 2019-02-05 PROCEDURE — 1160F RVW MEDS BY RX/DR IN RCRD: CPT | Performed by: FAMILY MEDICINE

## 2019-02-05 RX ORDER — ZOLPIDEM TARTRATE 10 MG/1
10 TABLET ORAL
COMMUNITY
End: 2019-02-21

## 2019-02-05 RX ORDER — TRAMADOL HYDROCHLORIDE 50 MG/1
50 TABLET ORAL EVERY 6 HOURS PRN
COMMUNITY
End: 2019-02-21

## 2019-02-05 RX ORDER — CALCIUM CARBONATE 200(500)MG
1 TABLET,CHEWABLE ORAL 3 TIMES DAILY PRN
Qty: 30 TABLET | Refills: 5 | Status: SHIPPED | OUTPATIENT
Start: 2019-02-05 | End: 2019-10-02 | Stop reason: HOSPADM

## 2019-02-05 RX ORDER — OMEPRAZOLE 20 MG/1
20 CAPSULE, DELAYED RELEASE ORAL DAILY
COMMUNITY
End: 2019-04-24 | Stop reason: SDUPTHER

## 2019-02-05 RX ORDER — DICYCLOMINE HCL 20 MG
20 TABLET ORAL EVERY 6 HOURS
Qty: 120 TABLET | Refills: 1 | Status: SHIPPED | OUTPATIENT
Start: 2019-02-05 | End: 2019-10-02 | Stop reason: HOSPADM

## 2019-02-05 RX ORDER — UREA 10 %
500 LOTION (ML) TOPICAL 4 TIMES DAILY PRN
COMMUNITY
Start: 2019-02-04 | End: 2020-01-16 | Stop reason: HOSPADM

## 2019-02-05 RX ORDER — AMLODIPINE BESYLATE 10 MG/1
10 TABLET ORAL DAILY
Qty: 30 TABLET | Refills: 5 | Status: SHIPPED | OUTPATIENT
Start: 2019-02-05 | End: 2019-04-24 | Stop reason: SDUPTHER

## 2019-02-05 RX ORDER — FERROUS SULFATE 325(65) MG
325 TABLET ORAL
COMMUNITY
Start: 2019-02-04 | End: 2019-02-05 | Stop reason: SDUPTHER

## 2019-02-05 RX ORDER — AMLODIPINE BESYLATE 10 MG/1
10 TABLET ORAL
COMMUNITY
Start: 2019-02-04 | End: 2019-02-05 | Stop reason: SDUPTHER

## 2019-02-05 RX ORDER — FERROUS SULFATE 325(65) MG
325 TABLET ORAL 2 TIMES DAILY WITH MEALS
Qty: 60 TABLET | Refills: 2 | Status: SHIPPED | OUTPATIENT
Start: 2019-02-05 | End: 2019-04-24 | Stop reason: SDUPTHER
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
## 2019-02-19 ENCOUNTER — TELEPHONE (OUTPATIENT)
Dept: FAMILY MEDICINE CLINIC | Facility: CLINIC | Age: 74
End: 2019-02-19

## 2019-02-19 NOTE — TELEPHONE ENCOUNTER
WENT IN TODAY FOR START OF CARE, SKILLED, OT, AND PT  WANTED TO CONFIRM DX OF PT, HAS ON ONE OF HER LISTS CHF, DIABETES AND USING OXYGEN WHICH HE DOES NOT HAVE IN THE HOME  WANTED TO KNOW IF HE IS ON OXYGEN CAN SHE HAVE THE DOSE

## 2019-02-19 NOTE — TELEPHONE ENCOUNTER
I am unaware if he supposed to be on oxygen or not    If patient is still smoking, he probably should not be on it

## 2019-02-21 ENCOUNTER — TELEPHONE (OUTPATIENT)
Dept: FAMILY MEDICINE CLINIC | Facility: CLINIC | Age: 74
End: 2019-02-21

## 2019-02-21 DIAGNOSIS — N39.41 URGE INCONTINENCE OF URINE: Primary | ICD-10-CM

## 2019-02-21 NOTE — TELEPHONE ENCOUNTER
He keeps asking for something for pain but had and addiction in the past had tramadol in nursing home he did not ask for anything for a year but now since he had it in nursing home he keeps asking fo it

## 2019-02-21 NOTE — TELEPHONE ENCOUNTER
PT WAS IN NURSING AND IS NOW HOME  HOME HEALTH CAME IN AND PT REFUSED ALL SERVICES, IS NON COMPLIANT, NOT WITH IT, WHAT TO DO? HE IS WETTING THE BED, SAID HE CAN'T FEEL IT COMING OUT  WANTS SOMETHING FOR HIS NERVES AND TO SLEEP  HE WAS ADDICTED TO THINGS IN THE PAST  HE WAS SENT HOME WITH AMBIEN AND TRAMADOL  SHE DOES NOT WANT TO GIVE THEM TO HIM   WHAT DO YOU THINK THEY CAN DO?

## 2019-02-21 NOTE — TELEPHONE ENCOUNTER
They can try to give him Benadryl 25 mg at bedtime to help him sleep  We can refer him to Urology, if he is willing to go, for his incontinence of urine    Let me know what they say

## 2019-02-23 ENCOUNTER — APPOINTMENT (EMERGENCY)
Dept: RADIOLOGY | Facility: HOSPITAL | Age: 74
DRG: 292 | End: 2019-02-23
Payer: COMMERCIAL

## 2019-02-23 ENCOUNTER — APPOINTMENT (EMERGENCY)
Dept: CT IMAGING | Facility: HOSPITAL | Age: 74
DRG: 292 | End: 2019-02-23
Payer: COMMERCIAL

## 2019-02-23 ENCOUNTER — APPOINTMENT (EMERGENCY)
Dept: ULTRASOUND IMAGING | Facility: HOSPITAL | Age: 74
DRG: 292 | End: 2019-02-23
Payer: COMMERCIAL

## 2019-02-23 ENCOUNTER — HOSPITAL ENCOUNTER (INPATIENT)
Facility: HOSPITAL | Age: 74
LOS: 9 days | Discharge: NURSING, REHAB OR LONG-TERM CARE | DRG: 292 | End: 2019-03-04
Attending: EMERGENCY MEDICINE | Admitting: INTERNAL MEDICINE
Payer: COMMERCIAL

## 2019-02-23 DIAGNOSIS — K59.00 CONSTIPATION, UNSPECIFIED CONSTIPATION TYPE: ICD-10-CM

## 2019-02-23 DIAGNOSIS — Z72.0 TOBACCO ABUSE: ICD-10-CM

## 2019-02-23 DIAGNOSIS — R26.9 NEUROLOGIC GAIT DYSFUNCTION: ICD-10-CM

## 2019-02-23 DIAGNOSIS — R07.89 OTHER CHEST PAIN: ICD-10-CM

## 2019-02-23 DIAGNOSIS — R93.1 REGIONAL WALL MOTION ABNORMALITY OF HEART: ICD-10-CM

## 2019-02-23 DIAGNOSIS — R07.9 CHEST PAIN: ICD-10-CM

## 2019-02-23 DIAGNOSIS — R51.9 NONINTRACTABLE HEADACHE, UNSPECIFIED CHRONICITY PATTERN, UNSPECIFIED HEADACHE TYPE: ICD-10-CM

## 2019-02-23 DIAGNOSIS — N39.0 UTI (URINARY TRACT INFECTION): Primary | ICD-10-CM

## 2019-02-23 PROBLEM — R41.89: Status: ACTIVE | Noted: 2019-02-23

## 2019-02-23 PROBLEM — I10 SEVERE UNCONTROLLED HYPERTENSION: Status: ACTIVE | Noted: 2019-02-23

## 2019-02-23 LAB
ALBUMIN SERPL BCP-MCNC: 3.5 G/DL (ref 3.5–5)
ALP SERPL-CCNC: 103 U/L (ref 46–116)
ALT SERPL W P-5'-P-CCNC: 24 U/L (ref 12–78)
AMPHETAMINES SERPL QL SCN: NEGATIVE
ANION GAP SERPL CALCULATED.3IONS-SCNC: 8 MMOL/L (ref 4–13)
APAP SERPL-MCNC: <2 UG/ML (ref 10–30)
APTT PPP: 36 SECONDS (ref 26–38)
AST SERPL W P-5'-P-CCNC: 14 U/L (ref 5–45)
BACTERIA UR QL AUTO: ABNORMAL /HPF
BARBITURATES UR QL: NEGATIVE
BASOPHILS # BLD AUTO: 0.13 THOUSANDS/ΜL (ref 0–0.1)
BASOPHILS NFR BLD AUTO: 1 % (ref 0–1)
BENZODIAZ UR QL: NEGATIVE
BILIRUB SERPL-MCNC: 0.5 MG/DL (ref 0.2–1)
BILIRUB UR QL STRIP: NEGATIVE
BUN SERPL-MCNC: 17 MG/DL (ref 5–25)
CALCIUM SERPL-MCNC: 9.4 MG/DL (ref 8.3–10.1)
CHLORIDE SERPL-SCNC: 107 MMOL/L (ref 100–108)
CLARITY UR: CLEAR
CO2 SERPL-SCNC: 30 MMOL/L (ref 21–32)
COCAINE UR QL: NEGATIVE
COLOR UR: YELLOW
CREAT SERPL-MCNC: 0.98 MG/DL (ref 0.6–1.3)
EOSINOPHIL # BLD AUTO: 0.23 THOUSAND/ΜL (ref 0–0.61)
EOSINOPHIL NFR BLD AUTO: 3 % (ref 0–6)
ERYTHROCYTE [DISTWIDTH] IN BLOOD BY AUTOMATED COUNT: 28 % (ref 11.6–15.1)
ETHANOL SERPL-MCNC: <3 MG/DL (ref 0–3)
GFR SERPL CREATININE-BSD FRML MDRD: 75 ML/MIN/1.73SQ M
GLUCOSE SERPL-MCNC: 139 MG/DL (ref 65–140)
GLUCOSE UR STRIP-MCNC: NEGATIVE MG/DL
HCT VFR BLD AUTO: 46.9 % (ref 36.5–49.3)
HGB BLD-MCNC: 13.9 G/DL (ref 12–17)
HGB UR QL STRIP.AUTO: NEGATIVE
IMM GRANULOCYTES # BLD AUTO: 0.06 THOUSAND/UL (ref 0–0.2)
IMM GRANULOCYTES NFR BLD AUTO: 1 % (ref 0–2)
INR PPP: 1.31 (ref 0.86–1.17)
KETONES UR STRIP-MCNC: NEGATIVE MG/DL
LEUKOCYTE ESTERASE UR QL STRIP: ABNORMAL
LYMPHOCYTES # BLD AUTO: 1.51 THOUSANDS/ΜL (ref 0.6–4.47)
LYMPHOCYTES NFR BLD AUTO: 17 % (ref 14–44)
MCH RBC QN AUTO: 21.1 PG (ref 26.8–34.3)
MCHC RBC AUTO-ENTMCNC: 29.6 G/DL (ref 31.4–37.4)
MCV RBC AUTO: 71 FL (ref 82–98)
METHADONE UR QL: NEGATIVE
MONOCYTES # BLD AUTO: 1.11 THOUSAND/ΜL (ref 0.17–1.22)
MONOCYTES NFR BLD AUTO: 12 % (ref 4–12)
NEUTROPHILS # BLD AUTO: 6.12 THOUSANDS/ΜL (ref 1.85–7.62)
NEUTS SEG NFR BLD AUTO: 66 % (ref 43–75)
NITRITE UR QL STRIP: NEGATIVE
NON-SQ EPI CELLS URNS QL MICRO: ABNORMAL /HPF
NRBC BLD AUTO-RTO: 0 /100 WBCS
OPIATES UR QL SCN: NEGATIVE
PCP UR QL: NEGATIVE
PH UR STRIP.AUTO: 5 [PH] (ref 4.5–8)
PLATELET # BLD AUTO: 277 THOUSANDS/UL (ref 149–390)
POTASSIUM SERPL-SCNC: 3.6 MMOL/L (ref 3.5–5.3)
PROT SERPL-MCNC: 6.5 G/DL (ref 6.4–8.2)
PROT UR STRIP-MCNC: NEGATIVE MG/DL
PROTHROMBIN TIME: 15.7 SECONDS (ref 11.8–14.2)
RBC # BLD AUTO: 6.59 MILLION/UL (ref 3.88–5.62)
RBC #/AREA URNS AUTO: ABNORMAL /HPF
SALICYLATES SERPL-MCNC: <3 MG/DL (ref 3–20)
SODIUM SERPL-SCNC: 145 MMOL/L (ref 136–145)
SP GR UR STRIP.AUTO: >=1.03 (ref 1–1.03)
THC UR QL: NEGATIVE
TROPONIN I SERPL-MCNC: <0.02 NG/ML
UROBILINOGEN UR QL STRIP.AUTO: 0.2 E.U./DL
WBC # BLD AUTO: 9.16 THOUSAND/UL (ref 4.31–10.16)
WBC #/AREA URNS AUTO: ABNORMAL /HPF

## 2019-02-23 PROCEDURE — 93005 ELECTROCARDIOGRAM TRACING: CPT

## 2019-02-23 PROCEDURE — 99222 1ST HOSP IP/OBS MODERATE 55: CPT | Performed by: PHYSICIAN ASSISTANT

## 2019-02-23 PROCEDURE — 99285 EMERGENCY DEPT VISIT HI MDM: CPT

## 2019-02-23 PROCEDURE — 93970 EXTREMITY STUDY: CPT

## 2019-02-23 PROCEDURE — 71046 X-RAY EXAM CHEST 2 VIEWS: CPT

## 2019-02-23 PROCEDURE — 80329 ANALGESICS NON-OPIOID 1 OR 2: CPT | Performed by: EMERGENCY MEDICINE

## 2019-02-23 PROCEDURE — 85025 COMPLETE CBC W/AUTO DIFF WBC: CPT | Performed by: EMERGENCY MEDICINE

## 2019-02-23 PROCEDURE — 85730 THROMBOPLASTIN TIME PARTIAL: CPT | Performed by: EMERGENCY MEDICINE

## 2019-02-23 PROCEDURE — 80320 DRUG SCREEN QUANTALCOHOLS: CPT | Performed by: EMERGENCY MEDICINE

## 2019-02-23 PROCEDURE — 80053 COMPREHEN METABOLIC PANEL: CPT | Performed by: EMERGENCY MEDICINE

## 2019-02-23 PROCEDURE — 36415 COLL VENOUS BLD VENIPUNCTURE: CPT | Performed by: EMERGENCY MEDICINE

## 2019-02-23 PROCEDURE — 84484 ASSAY OF TROPONIN QUANT: CPT | Performed by: EMERGENCY MEDICINE

## 2019-02-23 PROCEDURE — 80307 DRUG TEST PRSMV CHEM ANLYZR: CPT | Performed by: EMERGENCY MEDICINE

## 2019-02-23 PROCEDURE — 70450 CT HEAD/BRAIN W/O DYE: CPT

## 2019-02-23 PROCEDURE — 85610 PROTHROMBIN TIME: CPT | Performed by: EMERGENCY MEDICINE

## 2019-02-23 PROCEDURE — 81001 URINALYSIS AUTO W/SCOPE: CPT | Performed by: EMERGENCY MEDICINE

## 2019-02-23 RX ORDER — NICOTINE 21 MG/24HR
1 PATCH, TRANSDERMAL 24 HOURS TRANSDERMAL DAILY
Status: DISCONTINUED | OUTPATIENT
Start: 2019-02-24 | End: 2019-03-04 | Stop reason: HOSPADM

## 2019-02-23 RX ORDER — TAMSULOSIN HYDROCHLORIDE 0.4 MG/1
0.4 CAPSULE ORAL DAILY
Status: DISCONTINUED | OUTPATIENT
Start: 2019-02-24 | End: 2019-02-24

## 2019-02-23 RX ORDER — RANOLAZINE 500 MG/1
1000 TABLET, EXTENDED RELEASE ORAL 2 TIMES DAILY
Status: DISCONTINUED | OUTPATIENT
Start: 2019-02-23 | End: 2019-03-04 | Stop reason: HOSPADM

## 2019-02-23 RX ORDER — LANOLIN ALCOHOL/MO/W.PET/CERES
6 CREAM (GRAM) TOPICAL DAILY
Status: DISCONTINUED | OUTPATIENT
Start: 2019-02-24 | End: 2019-02-24

## 2019-02-23 RX ORDER — ASPIRIN 81 MG/1
81 TABLET, CHEWABLE ORAL DAILY
Status: DISCONTINUED | OUTPATIENT
Start: 2019-02-24 | End: 2019-03-04 | Stop reason: HOSPADM

## 2019-02-23 RX ORDER — FUROSEMIDE 20 MG/1
20 TABLET ORAL 2 TIMES DAILY
Status: DISCONTINUED | OUTPATIENT
Start: 2019-02-23 | End: 2019-02-28

## 2019-02-23 RX ORDER — CLONIDINE HYDROCHLORIDE 0.1 MG/1
0.1 TABLET ORAL DAILY
Status: DISCONTINUED | OUTPATIENT
Start: 2019-02-23 | End: 2019-03-04 | Stop reason: HOSPADM

## 2019-02-23 RX ORDER — PREGABALIN 75 MG/1
300 CAPSULE ORAL 2 TIMES DAILY
Status: DISCONTINUED | OUTPATIENT
Start: 2019-02-23 | End: 2019-03-04 | Stop reason: HOSPADM

## 2019-02-23 RX ORDER — ALBUTEROL SULFATE 90 UG/1
2 AEROSOL, METERED RESPIRATORY (INHALATION) EVERY 6 HOURS PRN
COMMUNITY
End: 2020-01-16 | Stop reason: HOSPADM

## 2019-02-23 RX ORDER — PANTOPRAZOLE SODIUM 40 MG/1
40 TABLET, DELAYED RELEASE ORAL
Status: DISCONTINUED | OUTPATIENT
Start: 2019-02-24 | End: 2019-03-04 | Stop reason: HOSPADM

## 2019-02-23 RX ORDER — CEPHALEXIN 250 MG/1
500 CAPSULE ORAL ONCE
Status: COMPLETED | OUTPATIENT
Start: 2019-02-23 | End: 2019-02-23

## 2019-02-23 RX ORDER — AMLODIPINE BESYLATE 10 MG/1
10 TABLET ORAL DAILY
Status: DISCONTINUED | OUTPATIENT
Start: 2019-02-24 | End: 2019-03-04 | Stop reason: HOSPADM

## 2019-02-23 RX ORDER — ACETAMINOPHEN 325 MG/1
650 TABLET ORAL EVERY 6 HOURS PRN
Status: DISCONTINUED | OUTPATIENT
Start: 2019-02-23 | End: 2019-02-26

## 2019-02-23 RX ORDER — ATORVASTATIN CALCIUM 40 MG/1
40 TABLET, FILM COATED ORAL DAILY
Status: DISCONTINUED | OUTPATIENT
Start: 2019-02-24 | End: 2019-02-24

## 2019-02-23 RX ADMIN — RANOLAZINE 1000 MG: 500 TABLET, FILM COATED, EXTENDED RELEASE ORAL at 22:22

## 2019-02-23 RX ADMIN — APIXABAN 5 MG: 5 TABLET, FILM COATED ORAL at 21:45

## 2019-02-23 RX ADMIN — PREGABALIN 300 MG: 75 CAPSULE ORAL at 21:43

## 2019-02-23 RX ADMIN — CLONIDINE HYDROCHLORIDE 0.1 MG: 0.1 TABLET ORAL at 21:44

## 2019-02-23 RX ADMIN — CEPHALEXIN 500 MG: 250 CAPSULE ORAL at 20:14

## 2019-02-23 RX ADMIN — METOPROLOL TARTRATE 25 MG: 25 TABLET, FILM COATED ORAL at 21:44

## 2019-02-23 RX ADMIN — FUROSEMIDE 20 MG: 40 TABLET ORAL at 21:43

## 2019-02-23 NOTE — ED NOTES
Crisis called and stated someone will be here in about 1 hour        Douglas Range, RN  02/23/19 [id number]
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
## 2019-02-24 LAB
ANION GAP SERPL CALCULATED.3IONS-SCNC: 8 MMOL/L (ref 4–13)
APTT PPP: 40 SECONDS (ref 26–38)
BASOPHILS # BLD AUTO: 0.17 THOUSANDS/ΜL (ref 0–0.1)
BASOPHILS NFR BLD AUTO: 2 % (ref 0–1)
BUN SERPL-MCNC: 18 MG/DL (ref 5–25)
CALCIUM SERPL-MCNC: 9.1 MG/DL (ref 8.3–10.1)
CHLORIDE SERPL-SCNC: 106 MMOL/L (ref 100–108)
CO2 SERPL-SCNC: 28 MMOL/L (ref 21–32)
CREAT SERPL-MCNC: 1.09 MG/DL (ref 0.6–1.3)
EOSINOPHIL # BLD AUTO: 0.43 THOUSAND/ΜL (ref 0–0.61)
EOSINOPHIL NFR BLD AUTO: 5 % (ref 0–6)
ERYTHROCYTE [DISTWIDTH] IN BLOOD BY AUTOMATED COUNT: 27.8 % (ref 11.6–15.1)
GFR SERPL CREATININE-BSD FRML MDRD: 66 ML/MIN/1.73SQ M
GLUCOSE SERPL-MCNC: 101 MG/DL (ref 65–140)
HCT VFR BLD AUTO: 40.4 % (ref 36.5–49.3)
HGB BLD-MCNC: 12 G/DL (ref 12–17)
IMM GRANULOCYTES # BLD AUTO: 0.07 THOUSAND/UL (ref 0–0.2)
IMM GRANULOCYTES NFR BLD AUTO: 1 % (ref 0–2)
INR PPP: 1.45 (ref 0.86–1.17)
LYMPHOCYTES # BLD AUTO: 2.41 THOUSANDS/ΜL (ref 0.6–4.47)
LYMPHOCYTES NFR BLD AUTO: 26 % (ref 14–44)
MAGNESIUM SERPL-MCNC: 1.7 MG/DL (ref 1.6–2.6)
MCH RBC QN AUTO: 20.9 PG (ref 26.8–34.3)
MCHC RBC AUTO-ENTMCNC: 29.7 G/DL (ref 31.4–37.4)
MCV RBC AUTO: 71 FL (ref 82–98)
MONOCYTES # BLD AUTO: 1.15 THOUSAND/ΜL (ref 0.17–1.22)
MONOCYTES NFR BLD AUTO: 12 % (ref 4–12)
NEUTROPHILS # BLD AUTO: 5.05 THOUSANDS/ΜL (ref 1.85–7.62)
NEUTS SEG NFR BLD AUTO: 54 % (ref 43–75)
NRBC BLD AUTO-RTO: 0 /100 WBCS
PHOSPHATE SERPL-MCNC: 3.8 MG/DL (ref 2.3–4.1)
PLATELET # BLD AUTO: 236 THOUSANDS/UL (ref 149–390)
PMV BLD AUTO: 9.3 FL (ref 8.9–12.7)
POTASSIUM SERPL-SCNC: 3.2 MMOL/L (ref 3.5–5.3)
PROTHROMBIN TIME: 16.9 SECONDS (ref 11.8–14.2)
RBC # BLD AUTO: 5.73 MILLION/UL (ref 3.88–5.62)
SODIUM SERPL-SCNC: 142 MMOL/L (ref 136–145)
WBC # BLD AUTO: 9.28 THOUSAND/UL (ref 4.31–10.16)

## 2019-02-24 PROCEDURE — 85730 THROMBOPLASTIN TIME PARTIAL: CPT | Performed by: PHYSICIAN ASSISTANT

## 2019-02-24 PROCEDURE — 85025 COMPLETE CBC W/AUTO DIFF WBC: CPT | Performed by: PHYSICIAN ASSISTANT

## 2019-02-24 PROCEDURE — 80048 BASIC METABOLIC PNL TOTAL CA: CPT | Performed by: PHYSICIAN ASSISTANT

## 2019-02-24 PROCEDURE — 84100 ASSAY OF PHOSPHORUS: CPT | Performed by: PHYSICIAN ASSISTANT

## 2019-02-24 PROCEDURE — 93970 EXTREMITY STUDY: CPT | Performed by: SURGERY

## 2019-02-24 PROCEDURE — 83735 ASSAY OF MAGNESIUM: CPT | Performed by: PHYSICIAN ASSISTANT

## 2019-02-24 PROCEDURE — 85610 PROTHROMBIN TIME: CPT | Performed by: PHYSICIAN ASSISTANT

## 2019-02-24 PROCEDURE — 36415 COLL VENOUS BLD VENIPUNCTURE: CPT | Performed by: PHYSICIAN ASSISTANT

## 2019-02-24 PROCEDURE — 99232 SBSQ HOSP IP/OBS MODERATE 35: CPT | Performed by: INTERNAL MEDICINE

## 2019-02-24 RX ORDER — ATORVASTATIN CALCIUM 40 MG/1
40 TABLET, FILM COATED ORAL
Status: DISCONTINUED | OUTPATIENT
Start: 2019-02-24 | End: 2019-03-04 | Stop reason: HOSPADM

## 2019-02-24 RX ORDER — TAMSULOSIN HYDROCHLORIDE 0.4 MG/1
0.4 CAPSULE ORAL
Status: DISCONTINUED | OUTPATIENT
Start: 2019-02-24 | End: 2019-03-04 | Stop reason: HOSPADM

## 2019-02-24 RX ORDER — LANOLIN ALCOHOL/MO/W.PET/CERES
6 CREAM (GRAM) TOPICAL
Status: DISCONTINUED | OUTPATIENT
Start: 2019-02-24 | End: 2019-03-04 | Stop reason: HOSPADM

## 2019-02-24 RX ADMIN — APIXABAN 5 MG: 5 TABLET, FILM COATED ORAL at 09:06

## 2019-02-24 RX ADMIN — TAMSULOSIN HYDROCHLORIDE 0.4 MG: 0.4 CAPSULE ORAL at 17:30

## 2019-02-24 RX ADMIN — PREGABALIN 300 MG: 75 CAPSULE ORAL at 17:40

## 2019-02-24 RX ADMIN — FUROSEMIDE 20 MG: 40 TABLET ORAL at 09:07

## 2019-02-24 RX ADMIN — CLONIDINE HYDROCHLORIDE 0.1 MG: 0.1 TABLET ORAL at 09:07

## 2019-02-24 RX ADMIN — PANTOPRAZOLE SODIUM 40 MG: 40 TABLET, DELAYED RELEASE ORAL at 05:57

## 2019-02-24 RX ADMIN — PREGABALIN 300 MG: 75 CAPSULE ORAL at 09:11

## 2019-02-24 RX ADMIN — TIOTROPIUM BROMIDE 18 MCG: 18 CAPSULE ORAL; RESPIRATORY (INHALATION) at 09:08

## 2019-02-24 RX ADMIN — ASPIRIN 81 MG 81 MG: 81 TABLET ORAL at 09:06

## 2019-02-24 RX ADMIN — AMLODIPINE BESYLATE 10 MG: 10 TABLET ORAL at 09:06

## 2019-02-24 RX ADMIN — ATORVASTATIN CALCIUM 40 MG: 40 TABLET, FILM COATED ORAL at 17:30

## 2019-02-24 RX ADMIN — APIXABAN 5 MG: 5 TABLET, FILM COATED ORAL at 17:41

## 2019-02-24 RX ADMIN — METOPROLOL TARTRATE 25 MG: 25 TABLET, FILM COATED ORAL at 09:10

## 2019-02-24 RX ADMIN — RANOLAZINE 1000 MG: 500 TABLET, FILM COATED, EXTENDED RELEASE ORAL at 09:11

## 2019-02-24 RX ADMIN — RANOLAZINE 1000 MG: 500 TABLET, FILM COATED, EXTENDED RELEASE ORAL at 18:12

## 2019-02-24 RX ADMIN — METOPROLOL TARTRATE 25 MG: 25 TABLET, FILM COATED ORAL at 22:00

## 2019-02-24 RX ADMIN — MELATONIN TAB 3 MG 6 MG: 3 TAB at 22:04

## 2019-02-24 RX ADMIN — FUROSEMIDE 20 MG: 40 TABLET ORAL at 17:40

## 2019-02-24 NOTE — ASSESSMENT & PLAN NOTE
- continue anticoagulation with Eliquis  - resume beta-blockers for rate control  - continue monitor patient on telemetry

## 2019-02-24 NOTE — ASSESSMENT & PLAN NOTE
- patient refused to take home medications  - patient requesting admission into skilled nursing facility  - family has contacted [facility]  - patient will need case management involvement for placement

## 2019-02-24 NOTE — ASSESSMENT & PLAN NOTE
- resume bronchodilators as ordered and p r n   - monitor pulse oximetry   - maintain oxygen saturation 80-92%  - use incentive spirometry

## 2019-02-24 NOTE — PLAN OF CARE
Problem: Potential for Falls  Goal: Patient will remain free of falls  Description  INTERVENTIONS:  - Assess patient frequently for physical needs  -  Identify cognitive and physical deficits and behaviors that affect risk of falls    -  [place] fall precautions as indicated by assessment   - Educate patient/family on patient safety including physical limitations  - Instruct patient to call for assistance with activity based on assessment  - Modify environment to reduce risk of injury  - Consider OT/PT consult to assist with strengthening/mobility  Outcome: Progressing     Problem: PAIN - ADULT  Goal: Verbalizes/displays adequate comfort level or baseline comfort level  Description  Interventions:  - Encourage patient to monitor pain and request assistance  - Assess pain using appropriate pain scale  - Administer analgesics based on type and severity of pain and evaluate response  - Implement non-pharmacological measures as appropriate and evaluate response  - Consider cultural and social influences on pain and pain management  - Notify physician/advanced practitioner if interventions unsuccessful or patient reports new pain  Outcome: Progressing     Problem: INFECTION - ADULT  Goal: Absence or prevention of progression during hospitalization  Description  INTERVENTIONS:  - Assess and monitor for signs and symptoms of infection  - Monitor lab/diagnostic results  - Monitor all insertion sites, i e  indwelling lines, tubes, and drains  - Monitor endotracheal (as able) and nasal secretions for changes in amount and color  - [place] appropriate cooling/warming therapies per order  - Administer medications as ordered  - Instruct and encourage patient and family to use good hand hygiene technique  - Identify and instruct in appropriate isolation precautions for identified infection/condition  Outcome: Progressing  Goal: Absence of fever/infection during neutropenic period  Description  INTERVENTIONS:  - Monitor WBC  - Implement neutropenic guidelines  Outcome: Progressing     Problem: SAFETY ADULT  Goal: Maintain or return to baseline ADL function  Description  INTERVENTIONS:  -  Assess patient's ability to carry out ADLs; assess patient's baseline for ADL function and identify physical deficits which impact ability to perform ADLs (bathing, care of mouth/teeth, toileting, grooming, dressing, etc )  - Assess/evaluate cause of self-care deficits   - Assess range of motion  - Assess patient's mobility; develop plan if impaired  - Assess patient's need for assistive devices and provide as appropriate  - Encourage maximum independence but intervene and supervise when necessary  ¯ Involve family in performance of ADLs  ¯ Assess for home care needs following discharge   ¯ Request OT consult to assist with ADL evaluation and planning for discharge  ¯ Provide patient education as appropriate  Outcome: Progressing  Goal: Maintain or return mobility status to optimal level  Description  INTERVENTIONS:  - Assess patient's baseline mobility status (ambulation, transfers, stairs, etc )    - Identify cognitive and physical deficits and behaviors that affect mobility  - Identify mobility aids required to assist with transfers and/or ambulation (gait belt, sit-to-stand, lift, walker, cane, etc )  - [place] fall precautions as indicated by assessment  - Record patient progress and toleration of activity level on Mobility SBAR; progress patient to next Phase/Stage  - Instruct patient to call for assistance with activity based on assessment  - Request Rehabilitation consult to assist with strengthening/weightbearing, etc   Outcome: Progressing     Problem: DISCHARGE PLANNING  Goal: Discharge to home or other facility with appropriate resources  Description  INTERVENTIONS:  - Identify barriers to discharge w/patient and caregiver  - Arrange for needed discharge resources and transportation as appropriate  - Identify discharge learning needs (meds, wound care, etc )  - Arrange for interpretive services to assist at discharge as needed  - Refer to Case Management Department for coordinating discharge planning if the patient needs post-hospital services based on physician/advanced practitioner order or complex needs related to functional status, cognitive ability, or social support system  Outcome: Progressing     Problem: Knowledge Deficit  Goal: Patient/family/caregiver demonstrates understanding of disease process, treatment plan, medications, and discharge instructions  Description  Complete learning assessment and assess knowledge base    Interventions:  - Provide teaching at level of understanding  - Provide teaching via preferred learning methods  Outcome: Progressing
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
## 2019-02-24 NOTE — ASSESSMENT & PLAN NOTE
- blood pressure 175/85 upon entry into the emergency department  - patient's family states that patient has not been compliant with taking any of his medications  - [facility] agency on Aging has been contacted by family  - will resume ordered medications for hypertension  - monitor blood pressure closely

## 2019-02-24 NOTE — SOCIAL WORK
CM met with pt who is requesting to go to [facility] on dc  Referral made and CM will need to work on insurance authorization tomorrow with Loly Hope on PT/OT see pt

## 2019-02-25 LAB
ALBUMIN SERPL BCP-MCNC: 2.8 G/DL (ref 3.5–5)
ALP SERPL-CCNC: 81 U/L (ref 46–116)
ALT SERPL W P-5'-P-CCNC: 20 U/L (ref 12–78)
ANION GAP SERPL CALCULATED.3IONS-SCNC: 8 MMOL/L (ref 4–13)
AST SERPL W P-5'-P-CCNC: 11 U/L (ref 5–45)
BASOPHILS # BLD AUTO: 0.16 THOUSANDS/ΜL (ref 0–0.1)
BASOPHILS NFR BLD AUTO: 2 % (ref 0–1)
BILIRUB SERPL-MCNC: 0.4 MG/DL (ref 0.2–1)
BUN SERPL-MCNC: 23 MG/DL (ref 5–25)
CALCIUM SERPL-MCNC: 9.2 MG/DL (ref 8.3–10.1)
CHLORIDE SERPL-SCNC: 105 MMOL/L (ref 100–108)
CO2 SERPL-SCNC: 29 MMOL/L (ref 21–32)
CREAT SERPL-MCNC: 1.19 MG/DL (ref 0.6–1.3)
EOSINOPHIL # BLD AUTO: 0.54 THOUSAND/ΜL (ref 0–0.61)
EOSINOPHIL NFR BLD AUTO: 6 % (ref 0–6)
ERYTHROCYTE [DISTWIDTH] IN BLOOD BY AUTOMATED COUNT: 28 % (ref 11.6–15.1)
GFR SERPL CREATININE-BSD FRML MDRD: 59 ML/MIN/1.73SQ M
GLUCOSE SERPL-MCNC: 113 MG/DL (ref 65–140)
HCT VFR BLD AUTO: 39.9 % (ref 36.5–49.3)
HGB BLD-MCNC: 11.8 G/DL (ref 12–17)
IMM GRANULOCYTES # BLD AUTO: 0.08 THOUSAND/UL (ref 0–0.2)
IMM GRANULOCYTES NFR BLD AUTO: 1 % (ref 0–2)
INR PPP: 1.35 (ref 0.86–1.17)
LYMPHOCYTES # BLD AUTO: 2.51 THOUSANDS/ΜL (ref 0.6–4.47)
LYMPHOCYTES NFR BLD AUTO: 28 % (ref 14–44)
MAGNESIUM SERPL-MCNC: 1.7 MG/DL (ref 1.6–2.6)
MCH RBC QN AUTO: 21 PG (ref 26.8–34.3)
MCHC RBC AUTO-ENTMCNC: 29.6 G/DL (ref 31.4–37.4)
MCV RBC AUTO: 71 FL (ref 82–98)
MONOCYTES # BLD AUTO: 1.14 THOUSAND/ΜL (ref 0.17–1.22)
MONOCYTES NFR BLD AUTO: 13 % (ref 4–12)
NEUTROPHILS # BLD AUTO: 4.48 THOUSANDS/ΜL (ref 1.85–7.62)
NEUTS SEG NFR BLD AUTO: 50 % (ref 43–75)
NRBC BLD AUTO-RTO: 0 /100 WBCS
PHOSPHATE SERPL-MCNC: 4.1 MG/DL (ref 2.3–4.1)
PLATELET # BLD AUTO: 219 THOUSANDS/UL (ref 149–390)
POTASSIUM SERPL-SCNC: 3.2 MMOL/L (ref 3.5–5.3)
PROT SERPL-MCNC: 5.4 G/DL (ref 6.4–8.2)
PROTHROMBIN TIME: 16 SECONDS (ref 11.8–14.2)
RBC # BLD AUTO: 5.61 MILLION/UL (ref 3.88–5.62)
SODIUM SERPL-SCNC: 142 MMOL/L (ref 136–145)
WBC # BLD AUTO: 8.91 THOUSAND/UL (ref 4.31–10.16)

## 2019-02-25 PROCEDURE — 97167 OT EVAL HIGH COMPLEX 60 MIN: CPT

## 2019-02-25 PROCEDURE — 99232 SBSQ HOSP IP/OBS MODERATE 35: CPT | Performed by: PHYSICIAN ASSISTANT

## 2019-02-25 PROCEDURE — 84100 ASSAY OF PHOSPHORUS: CPT | Performed by: INTERNAL MEDICINE

## 2019-02-25 PROCEDURE — 80053 COMPREHEN METABOLIC PANEL: CPT | Performed by: INTERNAL MEDICINE

## 2019-02-25 PROCEDURE — 85610 PROTHROMBIN TIME: CPT | Performed by: INTERNAL MEDICINE

## 2019-02-25 PROCEDURE — 97163 PT EVAL HIGH COMPLEX 45 MIN: CPT | Performed by: PHYSICAL THERAPIST

## 2019-02-25 PROCEDURE — G8987 SELF CARE CURRENT STATUS: HCPCS

## 2019-02-25 PROCEDURE — G8979 MOBILITY GOAL STATUS: HCPCS | Performed by: PHYSICAL THERAPIST

## 2019-02-25 PROCEDURE — 83735 ASSAY OF MAGNESIUM: CPT | Performed by: INTERNAL MEDICINE

## 2019-02-25 PROCEDURE — 85025 COMPLETE CBC W/AUTO DIFF WBC: CPT | Performed by: INTERNAL MEDICINE

## 2019-02-25 PROCEDURE — G8988 SELF CARE GOAL STATUS: HCPCS

## 2019-02-25 PROCEDURE — G8978 MOBILITY CURRENT STATUS: HCPCS | Performed by: PHYSICAL THERAPIST

## 2019-02-25 RX ORDER — POTASSIUM CHLORIDE 20 MEQ/1
20 TABLET, EXTENDED RELEASE ORAL ONCE
Status: COMPLETED | OUTPATIENT
Start: 2019-02-25 | End: 2019-02-25

## 2019-02-25 RX ADMIN — FUROSEMIDE 20 MG: 40 TABLET ORAL at 19:52

## 2019-02-25 RX ADMIN — ATORVASTATIN CALCIUM 40 MG: 40 TABLET, FILM COATED ORAL at 19:52

## 2019-02-25 RX ADMIN — CLONIDINE HYDROCHLORIDE 0.1 MG: 0.1 TABLET ORAL at 10:11

## 2019-02-25 RX ADMIN — APIXABAN 5 MG: 5 TABLET, FILM COATED ORAL at 10:10

## 2019-02-25 RX ADMIN — RANOLAZINE 1000 MG: 500 TABLET, FILM COATED, EXTENDED RELEASE ORAL at 19:54

## 2019-02-25 RX ADMIN — PREGABALIN 300 MG: 75 CAPSULE ORAL at 19:53

## 2019-02-25 RX ADMIN — TAMSULOSIN HYDROCHLORIDE 0.4 MG: 0.4 CAPSULE ORAL at 19:51

## 2019-02-25 RX ADMIN — METOPROLOL TARTRATE 25 MG: 25 TABLET, FILM COATED ORAL at 21:06

## 2019-02-25 RX ADMIN — APIXABAN 5 MG: 5 TABLET, FILM COATED ORAL at 19:52

## 2019-02-25 RX ADMIN — MELATONIN TAB 3 MG 6 MG: 3 TAB at 21:06

## 2019-02-25 RX ADMIN — PREGABALIN 300 MG: 75 CAPSULE ORAL at 10:13

## 2019-02-25 RX ADMIN — ASPIRIN 81 MG 81 MG: 81 TABLET ORAL at 10:11

## 2019-02-25 RX ADMIN — RANOLAZINE 1000 MG: 500 TABLET, FILM COATED, EXTENDED RELEASE ORAL at 10:15

## 2019-02-25 RX ADMIN — PANTOPRAZOLE SODIUM 40 MG: 40 TABLET, DELAYED RELEASE ORAL at 05:17

## 2019-02-25 RX ADMIN — FUROSEMIDE 20 MG: 40 TABLET ORAL at 10:11

## 2019-02-25 RX ADMIN — METOPROLOL TARTRATE 25 MG: 25 TABLET, FILM COATED ORAL at 10:12

## 2019-02-25 RX ADMIN — POTASSIUM CHLORIDE 20 MEQ: 1500 TABLET, EXTENDED RELEASE ORAL at 10:12

## 2019-02-25 RX ADMIN — AMLODIPINE BESYLATE 10 MG: 10 TABLET ORAL at 10:10

## 2019-02-25 RX ADMIN — NICOTINE 1 PATCH: 21 PATCH, EXTENDED RELEASE TRANSDERMAL at 10:12

## 2019-02-25 RX ADMIN — TIOTROPIUM BROMIDE 18 MCG: 18 CAPSULE ORAL; RESPIRATORY (INHALATION) at 10:15

## 2019-02-25 NOTE — SOCIAL WORK
Built a case with Loly Hope for SNF/STR on dc  Pending reference number is [id number]  Once therapy evaluates pt and put their notes in Cm will send off to the insurance

## 2019-02-25 NOTE — PROGRESS NOTES
Progress Note - Karma Estrada 1945, 66 y o  male MRN: [id number]    Unit/Bed#:  Encounter: [id number]    Primary Care Provider: Shyam Robledo DO   Date and time admitted to hospital: 2019  3:25 PM        * Self-care deficit for medication administration  Assessment & Plan  Patient noncompliant with home medications, blood pressure improved after re-initiation of p o  Meds  Patient requires nursing and rehab placement  [employer] authorized pt to go to SNF on dc  Case managment has made referrals  Severe uncontrolled hypertension  Assessment & Plan  Blood pressure improved, continue current meds    COPD (chronic obstructive pulmonary disease) (HCC)  Assessment & Plan  No acute exacerbation, continue Spiriva, continue respiratory protocol    Persistent atrial fibrillation (HCC)  Assessment & Plan  Continue medical management      VTE Pharmacologic Prophylaxis:   Pharmacologic: Apixaban (Eliquis)  Mechanical VTE Prophylaxis in Place: Yes    Patient Centered Rounds: I have performed bedside rounds with nursing staff today  Discussions with Specialists or Other Care Team Provider: nursing, CM, and attending      Time Spent for Care: 30 minutes  More than 50% of total time spent on counseling and coordination of care as described above  Current Length of Stay: 2 day(s)    Current Patient Status: Inpatient   Certification Statement: The patient will continue to require additional inpatient hospital stay due to placement to SNF  Discharge Plan: Insurance approved for SNF on discharge, case management has made referrals  Code Status: Level 1 - Full Code      Subjective: The patient was seen and examined  No complaints      Objective:     Vitals:   Temp (24hrs), Av 1 °F (36 7 °C), Min:98 °F (36 7 °C), Max:98 2 °F (36 8 °C)    Temp:  [98 °F (36 7 °C)-98 2 °F (36 8 °C)] 98 2 °F (36 8 °C)  HR:  [75-96] 75  Resp:  [16-19] 18  BP: (100-130)/(54-74) 130/71  SpO2:  [98 %-100 %] 100 %  Body mass index is 26 08 kg/m²  Input and Output Summary (last 24 hours): Intake/Output Summary (Last 24 hours) at 2/25/2019 1510  Last data filed at 2/25/2019 1300  Gross per 24 hour   Intake 780 ml   Output 650 ml   Net 130 ml       Physical Exam:     Physical Exam   Constitutional: He is oriented to person, place, and time  Vital signs are normal  He appears well-developed and well-nourished  He is active and cooperative  Nasal cannula in place  Cardiovascular: Normal rate and regular rhythm  Pulmonary/Chest: Effort normal and breath sounds normal  He has no wheezes  He has no rhonchi  He has no rales  Abdominal: Soft  Normal appearance and bowel sounds are normal  He exhibits no distension  There is no tenderness  Neurological: He is alert and oriented to person, place, and time  No cranial nerve deficit  Skin: Skin is warm, dry and intact  Nursing note and vitals reviewed  Additional Data:     Labs:    Results from last 7 days   Lab Units 02/25/19  0516   WBC Thousand/uL 8 91   HEMOGLOBIN g/dL 11 8*   HEMATOCRIT % 39 9   PLATELETS Thousands/uL 219   NEUTROS PCT % 50   LYMPHS PCT % 28   MONOS PCT % 13*   EOS PCT % 6     Results from last 7 days   Lab Units 02/25/19  0516   SODIUM mmol/L 142   POTASSIUM mmol/L 3 2*   CHLORIDE mmol/L 105   CO2 mmol/L 29   BUN mg/dL 23   CREATININE mg/dL 1 19   ANION GAP mmol/L 8   CALCIUM mg/dL 9 2   ALBUMIN g/dL 2 8*   TOTAL BILIRUBIN mg/dL 0 40   ALK PHOS U/L 81   ALT U/L 20   AST U/L 11   GLUCOSE RANDOM mg/dL 113     Results from last 7 days   Lab Units 02/25/19  0516   INR  1 35*                       * I Have Reviewed All Lab Data Listed Above  * Additional Pertinent Lab Tests Reviewed:  All Labs Within Last 24 Hours Reviewed    Imaging:    Imaging Reports Reviewed Today Include: none  Imaging Personally Reviewed by Myself Includes:  none    Recent Cultures (last 7 days):           Last 24 Hours Medication List:     Current Facility-Administered Medications:  [[facility] Hold] acetaminophen 650 mg Oral Q6H PRN Raad Charles PA-C   [facility] Hold] amLODIPine 10 mg Oral Daily Raad Charles PA-C   [facility] Hold] apixaban 5 mg Oral BID Raad Charles PA-C   [facility] Hold] aspirin 81 mg Oral Daily Raad Charles PA-C   [facility] Hold] atorvastatin 40 mg Oral Daily With Dinner Sameer Grace MD   [facility] Hold] cloNIDine 0 1 mg Oral Daily Raad Charles PA-C   [facility] Hold] furosemide 20 mg Oral BID Raad Charles PA-C   [facility] Hold] melatonin 6 mg Oral HS Sameer Grace MD   [facility] Hold] metoprolol tartrate 25 mg Oral Q12H Ericka Figueroa PA-C   [facility] Hold] nicotine 1 patch Transdermal Daily Raad Charles PA-C   [facility] Hold] pantoprazole 40 mg Oral Early Morning Raad Charles PA-C   [facility] Hold] pregabalin 300 mg Oral BID Raad Charles PA-C   [facility] Hold] ranolazine 1,000 mg Oral BID Raad Charles PA-C   [facility] Hold] tamsulosin 0 4 mg Oral Daily With Dinner Sameer Grace MD   [facility] Hold] tiotropium 18 mcg Inhalation Daily Raad Charles PA-C        Today, Patient Was Seen By: Estella Nair PA-C    ** Please Note: Dictation voice to text software may have been used in the creation of this document   **

## 2019-02-25 NOTE — ASSESSMENT & PLAN NOTE
Patient noncompliant with home medications, blood pressure control improved after re-initiation of p o  Meds      Patient requires nursing and rehab placement

## 2019-02-25 NOTE — SOCIAL WORK
Spoke with Eden at [facility]  Authorized pt to go to SNF on dc, pt approved for initial 7 days  From 2/25-3/4/19  Update is due on 3/4  Updates go to Lizandro mann at [phone] and fax is [phone]  Cm spoke with Trino Caceres at [facility] who stated there with issues when pt was recently with them last time and they are unsure if they will be able to take pt back  Trino Caceres will check with her  and get back to

## 2019-02-25 NOTE — ASSESSMENT & PLAN NOTE
Patient noncompliant with home medications, blood pressure improved after re-initiation of p o  Meds  Patient requires nursing and rehab placement  [employer] authorized pt to go to SNF on dc  Case managment has made referrals

## 2019-02-25 NOTE — PLAN OF CARE
Problem: OCCUPATIONAL THERAPY ADULT  Goal: Performs self-care activities at highest level of function for planned discharge setting  See evaluation for individualized goals  Description  Treatment Interventions: ADL retraining, Functional transfer training, UE strengthening/ROM, Endurance training, Patient/family training, Fine motor coordination activities, Activityengagement          See flowsheet documentation for full assessment, interventions and recommendations  Note:   Limitation: Decreased ADL status, Decreased UE ROM, Decreased UE strength, Decreased Safe judgement during ADL, Decreased endurance, Decreased self-care trans, Decreased fine motor control, Decreased high-level ADLs     Assessment: Pt is a 66 y o  male seen for OT evaluation s/p admit to [facility] on 2/23/2019 w/ Self-care deficit for medication administration  Comorbidities affecting pt's functional performance at time of assessment include: DM, HTN, obesity, COPD, CHF and arthritis, CVA, TIA, a-fib, chronic pain, osteoarthritis  Personal factors affecting pt at time of IE include:steps to enter environment, behavioral pattern, difficulty performing ADLS, difficulty performing IADLS , limited insight into deficits, compliance, flat affect, decreased initiation and engagement  and health management   Prior to admission, pt was (A) with ADL and IADL performance with use of SPC during functional mobility  Upon evaluation: Pt requires min-max (A) x1-2 with use of SPC for functional mobility with short distance 2* the following deficits impacting occupational performance: weakness, decreased ROM, decreased strength, decreased balance, decreased tolerance, impaired GMC, impaired [address], impaired initiation, impaired problem solving, impulsivity, decreased safety awareness and increased pain   Pt to benefit from continued skilled OT tx while in the hospital to address deficits as defined above and maximize level of functional independence w ADL's and functional mobility  Occupational Performance areas to address include: grooming, bathing/shower, toilet hygiene, dressing, functional mobility, community mobility and clothing management  From OT standpoint, recommendation at time of d/c would be short term rehab and 24 hr (A) and (S)         OT Discharge Recommendation: Short Term Rehab
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
## 2019-02-25 NOTE — PLAN OF CARE
Problem: PHYSICAL THERAPY ADULT  Goal: Performs mobility at highest level of function for planned discharge setting  See evaluation for individualized goals  Outcome: Progressing  Note:   Prognosis: Good  Problem List: Decreased strength, Decreased range of motion, Decreased endurance, Impaired balance, Decreased mobility, Pain, Impaired sensation, Decreased safety awareness, Decreased coordination  Assessment: Pt is a 66year old male with complex PMH including stroke contributing to current condition presenting to [facility] due to stopping his home medications and presenting with elevated BP  Pt seen for high complexity PT evaluation presenting with increased back and L leg pain with decreased ROM strength balance endurance and mobility requiring min(A) for all bed mobility due to decreased ROM and strength bilateral LE's and requiring CGA for all transfers and short distance ambulation with unsteadiness in standing and during short distance ambulation 5ft with SPC  Pt at increased risk for falls due to unsteadiness and due to increased pain and back and L leg/hip  Pt is in need of continued activity in PT to improve pain ROM strength balance safety awareness endurance mobility transfers and ambulation to decrease fall risk and maximize current LOF  Pt would benefit from short term rehab on discharge and is unsafe to return home        Recommendation: Short-term skilled PT     PT - OK to Discharge: Yes(to next level of care when medically stable for discharge)    See flowsheet documentation for full assessment

## 2019-02-25 NOTE — SOCIAL WORK
April Tipton is our contact at [facility] for the SNF/STR request  Her phone number is [phone] x [phone] and fax is [phone]

## 2019-02-25 NOTE — UTILIZATION REVIEW
Initial Clinical Review    Admission: Date/Time/Statement: 2/23/19 @ 1838   Orders Placed This Encounter   Procedures    Inpatient Admission (expected length of stay for this patient Order details is greater than two midnights)     Standing Status:   Standing     Number of Occurrences:   1     Order Specific Question:   Admitting Physician     Answer:   Uyen Meza [[id number]]     Order Specific Question:   Level of Care     Answer:   Med Surg [[id number]]     Order Specific Question:   Estimated length of stay     Answer:   More than 2 Midnights     Order Specific Question:   Certification     Answer:   I certify that inpatient services are medically necessary for this patient for a duration of greater than two midnights  See H&P and MD Progress Notes for additional information about the patient's course of treatment  ED: Date/Time/Mode of Arrival:   ED Arrival Information     Expected Arrival Acuity Means of Arrival Escorted By Service Admission Type    - 2/23/2019 15:25 Emergent [facility] General Medicine Emergency    Arrival Complaint    anxiety        Chief Complaint:   Chief Complaint   Patient presents with    Chest Pain     Patient presents to ED today, because a few days ago he made a statement "I Dejuan Myers go back to the nursing home " Patient wants placement in nursing home due to recent stroke and having a hard time using right side  Patient also continues to have chest pain     History of Illness: 66 y o  male with past medical history of psychiatric disorder, peripheral neuropathy, osteoarthritis, GERD, encephalopathy, CVA, chronic obstructive pulmonary disease, CHF, atrial fibrillation, and others as listed below who presented to the emergency department stating that he wants placement into a nursing home due to recent stroke having hard time using his right side  Patient's family states that he has been noncompliant with medications and was hypertensive on presentation to the ED  Family also states that they have contacted the [facility] agency on Aging to assist with this process, they had a crisis worker come to the patient's resident at found otherwise no indication for 302 involuntary committal and they had asked that he be sent to the ED for medical evaluation  Patient was found to be conscious alert orient x3 and in proper sensorium  Patient was admitted by emergency department attending pending  assistance  ED Vital Signs:   ED Triage Vitals [02/23/19 1531]   Temperature Pulse Respirations Blood Pressure SpO2   98 1 °F (36 7 °C) 81 21 (!) 175/85 97 %      Temp Source Heart Rate Source Patient Position - Orthostatic VS BP Location FiO2 (%)   Temporal Monitor Sitting Left arm --      Pain Score       4        Wt Readings from Last 1 Encounters:   02/24/19 80 1 kg (176 lb 9 4 oz)     Vital Signs (abnormal): //85  Pertinent Labs/Diagnostic Test Results: RBC 6 59  UA -- tr leuks  CT head -- No acute intracranial abnormality  CXR -- Near complete interval resolution of prior right basilar fluid and consolidation  Minimal residual blunting  No acute cardiopulmonary disease    EKG -- Atrial fib  VAS B/L lower extrem -- no acute findings    ED Treatment:   Medication Administration from 02/23/2019 1525 to 02/24/2019 1318       Date/Time Order Dose Route Action     02/23/2019 2014 cephalexin (KEFLEX) capsule 500 mg 500 mg Oral Given     02/24/2019 0906 amLODIPine (NORVASC) tablet 10 mg 10 mg Oral Given     02/24/2019 0906 apixaban (ELIQUIS) tablet 5 mg 5 mg Oral Given     02/23/2019 2145 apixaban (ELIQUIS) tablet 5 mg 5 mg Oral Given     02/24/2019 6053 aspirin chewable tablet 81 mg 81 mg Oral Given     02/24/2019 0907 cloNIDine (CATAPRES) tablet 0 1 mg 0 1 mg Oral Given     02/23/2019 2144 cloNIDine (CATAPRES) tablet 0 1 mg 0 1 mg Oral Given     02/24/2019 0907 furosemide (LASIX) tablet 20 mg 20 mg Oral Given     02/23/2019 2143 furosemide (LASIX) tablet 20 mg 20 mg Oral Given     02/24/2019 0910 metoprolol tartrate (LOPRESSOR) tablet 25 mg 25 mg Oral Given     02/23/2019 2144 metoprolol tartrate (LOPRESSOR) tablet 25 mg 25 mg Oral Given     02/24/2019 0557 pantoprazole (PROTONIX) EC tablet 40 mg 40 mg Oral Given     02/24/2019 0911 pregabalin (LYRICA) capsule 300 mg 300 mg Oral Given     02/23/2019 2143 pregabalin (LYRICA) capsule 300 mg 300 mg Oral Given     02/24/2019 0911 ranolazine (RANEXA) 12 hr tablet 1,000 mg 1,000 mg Oral Given     02/23/2019 2222 ranolazine (RANEXA) 12 hr tablet 1,000 mg 1,000 mg Oral Given     02/24/2019 0908 tiotropium (SPIRIVA) capsule for inhaler 18 mcg 18 mcg Inhalation Given     Past Medical/Surgical History:   Past Medical History:   Diagnosis Date    Aortic stenosis     Arthritis     Asthma     [facility] fibrillation [facility]     Back pain     Cervical radiculopathy     CH[facility]congestive heart failure) [facility]     Chronic pain     Chronic pain 10/26/2016    COPD (chronic obstructive pulmonary disease) [facility]     Coronary artery disease     [facility]     Depressive disorder     Diabetes mellitus [facility]     Encephalopathy     GERD (gastroesophageal reflux disease)     Head injury     Hearing loss     Hx of transient ischemic attack (TIA) 10/26/2016    Hyperlipidemia     Hypertension     Kidney stones     Migraines     MRSA (methicillin resistant Staphylococcus aureus) infection     MRSA (methicillin resistant staphylococcus aureus) pneumonia [facility]     Osteoarthritis     Partial small bowel obstruction ([facility])     Peripheral neuropathy     Psychiatric disorder     [facility]     Renal disorder     Shortness of breath 10/26/2016    TIA (transient ischemic attack) 2014    Vertigo      Admitting Diagnosis:  Anxiety [F41 9]  UTI (urinary tract infection) [N39 0]  Chest pain [R07 9]  Neurologic gait dysfunction [R26 9]  Age/Sex: 66 y o  male  Assessment/Plan: 65 y/o male presented to ED after caregiver called Area on Aging for pt refusing to take his meds, as well as having difficult time with using his right side s/p recent stroke  BP elevated on ED, resume po antihypertensives as well as other medications for Afib and COPD  Pt  Requesting admission into a skilled nursing facility for rehab  Admission Orders:  Scheduled Meds:   Current Facility-Administered Medications:  acetaminophen 650 mg Oral Q6H PRN   amLODIPine 10 mg Oral Daily   apixaban 5 mg Oral BID   aspirin 81 mg Oral Daily   atorvastatin 40 mg Oral Daily With Dinner   cloNIDine 0 1 mg Oral Daily   furosemide 20 mg Oral BID   melatonin 6 mg Oral HS   metoprolol tartrate 25 mg Oral Q12H KELLY   nicotine 1 patch Transdermal Daily   pantoprazole 40 mg Oral Early Morning   potassium chloride 20 mEq Oral Once   pregabalin 300 mg Oral BID   ranolazine 1,000 mg Oral BID   tamsulosin 0 4 mg Oral Daily With Dinner   tiotropium 18 mcg Inhalation Daily     Telem  Reg diet  O2 prn  I/O  Up with assist  SCD's  PT/OT liza    PT   Recommendation   Recommendation Short-term skilled PT           Network Utilization Review Department  Phone: [phone]; Fax [phone]  [email or web address]  ATTENTION: Please call with any questions or concerns to [phone]  and carefully listen to the prompts so that you are directed to the right person  Send all requests for admission clinical reviews, approved or denied determinations and any other requests to fax [phone]   All voicemails are confidential

## 2019-02-25 NOTE — PROGRESS NOTES
Progress Note - Sarmad Alu 1945, 66 y o  male MRN: [id number]    Unit/Bed#:  Encounter: [id number]    Primary Care Provider: Migel Barragan DO   Date and time admitted to hospital: 2019  3:25 PM        * Self-care deficit for medication administration  Assessment & Plan  Patient noncompliant with home medications, blood pressure control improved after re-initiation of p o  Meds  Patient requires nursing and rehab placement    Severe uncontrolled hypertension  Assessment & Plan  Blood pressure improved, continue current meds    COPD (chronic obstructive pulmonary disease) (HCC)  Assessment & Plan  No acute exacerbation, continue Spiriva, continue respiratory protocol    Persistent atrial fibrillation (HCC)  Assessment & Plan  Continue medical management      VTE Pharmacologic Prophylaxis:       Code Status: Level 1 - Full Code      Subjective:   No pain    Objective:     Vitals:   Temp (24hrs), Av 6 °F (36 4 °C), Min:97 °F (36 1 °C), Max:98 1 °F (36 7 °C)    Temp:  [97 °F (36 1 °C)-98 1 °F (36 7 °C)] 98 1 °F (36 7 °C)  HR:  [] 92  Resp:  [16-22] 19  BP: ()/(57-81) 123/62  SpO2:  [97 %-100 %] 98 %  Body mass index is 26 08 kg/m²  Input and Output Summary (last 24 hours): Intake/Output Summary (Last 24 hours) at 2019 1928  Last data filed at 2019 1713  Gross per 24 hour   Intake 660 ml   Output    Net 660 ml       Physical Exam:     Physical Exam   Constitutional: He is oriented to person, place, and time  He appears well-developed and well-nourished  No distress  Cardiovascular: Normal rate, regular rhythm, normal heart sounds and intact distal pulses  Pulmonary/Chest: Effort normal and breath sounds normal  No stridor  No respiratory distress  Abdominal: Soft  Bowel sounds are normal  He exhibits no distension  There is no tenderness  Musculoskeletal: Normal range of motion  Neurological: He is alert and oriented to person, place, and time  No cranial nerve deficit  Coordination normal    Skin: He is not diaphoretic  Nursing note and vitals reviewed  Additional Data:     Labs:    Results from last 7 days   Lab Units 02/24/19  0556   WBC Thousand/uL 9 28   HEMOGLOBIN g/dL 12 0   HEMATOCRIT % 40 4   PLATELETS Thousands/uL 236   NEUTROS PCT % 54   LYMPHS PCT % 26   MONOS PCT % 12   EOS PCT % 5     Results from last 7 days   Lab Units 02/24/19  0556 02/23/19  1716   POTASSIUM mmol/L 3 2* 3 6   CHLORIDE mmol/L 106 107   CO2 mmol/L 28 30   BUN mg/dL 18 17   CREATININE mg/dL 1 09 0 98   CALCIUM mg/dL 9 1 9 4   ALK PHOS U/L  --  103   ALT U/L  --  24   AST U/L  --  14     Results from last 7 days   Lab Units 02/24/19  0004   INR  1 45*       * I Have Reviewed All Lab Data Listed Above  * Additional Pertinent Lab Tests Reviewed:  Adriel 64 Admission Reviewed      Recent Cultures (last 7 days):           Last 24 Hours Medication List:     Current Facility-Administered Medications:  acetaminophen 650 mg Oral Q6H PRN Margoth Gallegos PA-C   amLODIPine 10 mg Oral Daily KIARA Morataya-KHLOE   apixaban 5 mg Oral BID KIARA Morataya-C   aspirin 81 mg Oral Daily Margoth Gallegos PA-C   atorvastatin 40 mg Oral Daily With Dinner Aminah Whiteside MD   cloNIDine 0 1 mg Oral Daily KIARA Morataya-C   furosemide 20 mg Oral BID KIARA Morataya-C   melatonin 6 mg Oral HS Aminah Whiteside MD   metoprolol tartrate 25 mg Oral Q12H [address] Margoth Gallegos PA-C   nicotine 1 patch Transdermal Daily KIARA Morataya-KHLOE   pantoprazole 40 mg Oral Early Morning Margoth CECE Gallegos   pregabalin 300 mg Oral BID Margoth Gallegos PA-C   ranolazine 1,000 mg Oral BID Margoth Gallegos PA-C   tamsulosin 0 4 mg Oral Daily With Dinner Aminah Whiteside MD   tiotropium 18 mcg Inhalation Daily Margoth Gallegos PA-C        Today, Patient Was Seen By: Viral Cam DO

## 2019-02-26 PROBLEM — K59.00 CONSTIPATION: Status: ACTIVE | Noted: 2019-02-26

## 2019-02-26 LAB
ANION GAP SERPL CALCULATED.3IONS-SCNC: 8 MMOL/L (ref 4–13)
ATRIAL RATE: 300 BPM
BASOPHILS # BLD AUTO: 0.16 THOUSANDS/ΜL (ref 0–0.1)
BASOPHILS NFR BLD AUTO: 2 % (ref 0–1)
BUN SERPL-MCNC: 24 MG/DL (ref 5–25)
CALCIUM SERPL-MCNC: 9.2 MG/DL (ref 8.3–10.1)
CHLORIDE SERPL-SCNC: 107 MMOL/L (ref 100–108)
CO2 SERPL-SCNC: 28 MMOL/L (ref 21–32)
CREAT SERPL-MCNC: 1.12 MG/DL (ref 0.6–1.3)
EOSINOPHIL # BLD AUTO: 0.48 THOUSAND/ΜL (ref 0–0.61)
EOSINOPHIL NFR BLD AUTO: 5 % (ref 0–6)
ERYTHROCYTE [DISTWIDTH] IN BLOOD BY AUTOMATED COUNT: 27.2 % (ref 11.6–15.1)
GFR SERPL CREATININE-BSD FRML MDRD: 63 ML/MIN/1.73SQ M
GLUCOSE SERPL-MCNC: 118 MG/DL (ref 65–140)
HCT VFR BLD AUTO: 39.5 % (ref 36.5–49.3)
HGB BLD-MCNC: 11.8 G/DL (ref 12–17)
IMM GRANULOCYTES # BLD AUTO: 0.09 THOUSAND/UL (ref 0–0.2)
IMM GRANULOCYTES NFR BLD AUTO: 1 % (ref 0–2)
LYMPHOCYTES # BLD AUTO: 2.3 THOUSANDS/ΜL (ref 0.6–4.47)
LYMPHOCYTES NFR BLD AUTO: 25 % (ref 14–44)
MCH RBC QN AUTO: 21.4 PG (ref 26.8–34.3)
MCHC RBC AUTO-ENTMCNC: 29.9 G/DL (ref 31.4–37.4)
MCV RBC AUTO: 72 FL (ref 82–98)
MONOCYTES # BLD AUTO: 1.28 THOUSAND/ΜL (ref 0.17–1.22)
MONOCYTES NFR BLD AUTO: 14 % (ref 4–12)
NEUTROPHILS # BLD AUTO: 4.92 THOUSANDS/ΜL (ref 1.85–7.62)
NEUTS SEG NFR BLD AUTO: 53 % (ref 43–75)
NRBC BLD AUTO-RTO: 0 /100 WBCS
PLATELET # BLD AUTO: 244 THOUSANDS/UL (ref 149–390)
PMV BLD AUTO: 9.6 FL (ref 8.9–12.7)
POTASSIUM SERPL-SCNC: 3.5 MMOL/L (ref 3.5–5.3)
QRS AXIS: 70 DEGREES
QRSD INTERVAL: 74 MS
QT INTERVAL: 350 MS
QTC INTERVAL: 467 MS
RBC # BLD AUTO: 5.51 MILLION/UL (ref 3.88–5.62)
SODIUM SERPL-SCNC: 143 MMOL/L (ref 136–145)
T WAVE AXIS: 36 DEGREES
VENTRICULAR RATE: 107 BPM
WBC # BLD AUTO: 9.23 THOUSAND/UL (ref 4.31–10.16)

## 2019-02-26 PROCEDURE — 93010 ELECTROCARDIOGRAM REPORT: CPT | Performed by: INTERNAL MEDICINE

## 2019-02-26 PROCEDURE — 99232 SBSQ HOSP IP/OBS MODERATE 35: CPT | Performed by: PHYSICIAN ASSISTANT

## 2019-02-26 PROCEDURE — 80048 BASIC METABOLIC PNL TOTAL CA: CPT | Performed by: NURSE PRACTITIONER

## 2019-02-26 PROCEDURE — 85025 COMPLETE CBC W/AUTO DIFF WBC: CPT | Performed by: NURSE PRACTITIONER

## 2019-02-26 RX ORDER — POLYETHYLENE GLYCOL 3350 17 G/17G
17 POWDER, FOR SOLUTION ORAL DAILY PRN
Status: DISCONTINUED | OUTPATIENT
Start: 2019-02-26 | End: 2019-03-04 | Stop reason: HOSPADM

## 2019-02-26 RX ORDER — DOCUSATE SODIUM 100 MG/1
100 CAPSULE, LIQUID FILLED ORAL 2 TIMES DAILY
Status: DISCONTINUED | OUTPATIENT
Start: 2019-02-26 | End: 2019-03-04 | Stop reason: HOSPADM

## 2019-02-26 RX ORDER — MAGNESIUM CARB/ALUMINUM HYDROX 105-160MG
296 TABLET,CHEWABLE ORAL ONCE
Status: COMPLETED | OUTPATIENT
Start: 2019-02-26 | End: 2019-02-26

## 2019-02-26 RX ORDER — BUTALBITAL, ACETAMINOPHEN AND CAFFEINE 50; 325; 40 MG/1; MG/1; MG/1
1 TABLET ORAL EVERY 4 HOURS PRN
Status: DISCONTINUED | OUTPATIENT
Start: 2019-02-26 | End: 2019-03-01

## 2019-02-26 RX ORDER — ALPRAZOLAM 0.25 MG/1
0.25 TABLET ORAL 3 TIMES DAILY PRN
Status: DISCONTINUED | OUTPATIENT
Start: 2019-02-26 | End: 2019-03-04 | Stop reason: HOSPADM

## 2019-02-26 RX ORDER — ONDANSETRON 2 MG/ML
4 INJECTION INTRAMUSCULAR; INTRAVENOUS EVERY 4 HOURS PRN
Status: DISCONTINUED | OUTPATIENT
Start: 2019-02-26 | End: 2019-03-02

## 2019-02-26 RX ADMIN — MAGNESIUM CITRATE 296 ML: 1.75 LIQUID ORAL at 17:20

## 2019-02-26 RX ADMIN — ALPRAZOLAM 0.25 MG: 0.25 TABLET ORAL at 17:22

## 2019-02-26 RX ADMIN — METOPROLOL TARTRATE 25 MG: 25 TABLET, FILM COATED ORAL at 22:03

## 2019-02-26 RX ADMIN — TAMSULOSIN HYDROCHLORIDE 0.4 MG: 0.4 CAPSULE ORAL at 17:20

## 2019-02-26 RX ADMIN — FUROSEMIDE 20 MG: 40 TABLET ORAL at 17:21

## 2019-02-26 RX ADMIN — RANOLAZINE 1000 MG: 500 TABLET, FILM COATED, EXTENDED RELEASE ORAL at 09:45

## 2019-02-26 RX ADMIN — METOPROLOL TARTRATE 25 MG: 25 TABLET, FILM COATED ORAL at 09:45

## 2019-02-26 RX ADMIN — APIXABAN 5 MG: 5 TABLET, FILM COATED ORAL at 17:22

## 2019-02-26 RX ADMIN — PREGABALIN 300 MG: 75 CAPSULE ORAL at 17:22

## 2019-02-26 RX ADMIN — BUTALBITAL, ACETAMINOPHEN AND CAFFEINE 1 TABLET: 50; 325; 40 TABLET ORAL at 17:21

## 2019-02-26 RX ADMIN — MELATONIN TAB 3 MG 6 MG: 3 TAB at 22:32

## 2019-02-26 RX ADMIN — AMLODIPINE BESYLATE 10 MG: 10 TABLET ORAL at 09:45

## 2019-02-26 RX ADMIN — DOCUSATE SODIUM 100 MG: 100 CAPSULE, LIQUID FILLED ORAL at 17:22

## 2019-02-26 RX ADMIN — PREGABALIN 300 MG: 75 CAPSULE ORAL at 09:45

## 2019-02-26 RX ADMIN — ATORVASTATIN CALCIUM 40 MG: 40 TABLET, FILM COATED ORAL at 17:22

## 2019-02-26 RX ADMIN — ASPIRIN 81 MG 81 MG: 81 TABLET ORAL at 09:45

## 2019-02-26 RX ADMIN — FUROSEMIDE 20 MG: 40 TABLET ORAL at 09:44

## 2019-02-26 RX ADMIN — CLONIDINE HYDROCHLORIDE 0.1 MG: 0.1 TABLET ORAL at 09:45

## 2019-02-26 RX ADMIN — RANOLAZINE 1000 MG: 500 TABLET, FILM COATED, EXTENDED RELEASE ORAL at 17:22

## 2019-02-26 RX ADMIN — TIOTROPIUM BROMIDE 18 MCG: 18 CAPSULE ORAL; RESPIRATORY (INHALATION) at 09:30

## 2019-02-26 RX ADMIN — PANTOPRAZOLE SODIUM 40 MG: 40 TABLET, DELAYED RELEASE ORAL at 05:48

## 2019-02-26 RX ADMIN — APIXABAN 5 MG: 5 TABLET, FILM COATED ORAL at 09:45

## 2019-02-26 NOTE — PROGRESS NOTES
Bedside rounding completed with Hospitalist  Patient reports anxiety concerning placement and missing his dog as well as having a headache and not having a bowel movement  See new orders

## 2019-02-26 NOTE — PROGRESS NOTES
Progress Note - Misael Bennett 1945, 66 y o  male MRN: [id number]    Unit/Bed#:  Encounter: [id number]    Primary Care Provider: Santiago Santana DO   Date and time admitted to hospital: 2/23/2019  3:25 PM        * Self-care deficit for medication administration  Assessment & Plan  Patient noncompliant with home medications, blood pressure improved after re-initiation of p o  Meds  Patient requires nursing and rehab placement  [employer] authorized pt to go to SNF on discharge  Case managment has made referrals, so far patient has not been accepted to any facilities  Severe uncontrolled hypertension  Assessment & Plan  Blood pressure improved, continue current meds    COPD [facility]chronic obstructive pulmonary disease) ([facility])  Assessment & Plan  No acute exacerbation, continue Spiriva, continue respiratory protocol    Persistent atrial fibrillation [facility])  Assessment & Plan  Continue medical management    Constipation  Assessment & Plan  Patient complaining of constipation  Will order magnesium citrate now  Colace BID, MiraLax prn  VTE Pharmacologic Prophylaxis:   Pharmacologic: Apixaban (Eliquis)  Mechanical VTE Prophylaxis in Place: Yes    Patient Centered Rounds: I have performed bedside rounds with nursing staff today  Discussions with Specialists or Other Care Team Provider: nursing, CM, and attending      Time Spent for Care: 30 minutes  More than 50% of total time spent on counseling and coordination of care as described above  Current Length of Stay: 3 day(s)    Current Patient Status: Inpatient   Certification Statement: The patient will continue to require additional inpatient hospital stay due to placement to STR  Discharge Plan: CM consulted and have made multiple referrals for STR, so far patient has not been accepted  Code Status: Level 1 - Full Code      Subjective: The patient was seen and examined  The patient states he feels constipated   He is also complaining of a headache and is anxious about if he will be accepted at a facility for short-term rehab  Objective:     Vitals:   Temp (24hrs), Av 5 °F (36 4 °C), Min:96 8 °F (36 °C), Max:97 9 °F (36 6 °C)    Temp:  [96 8 °F (36 °C)-97 9 °F (36 6 °C)] 97 9 °F (36 6 °C)  HR:  [63-87] 63  Resp:  [18] 18  BP: (120-134)/(58-70) 129/58  SpO2:  [98 %] 98 %  Body mass index is 26 14 kg/m²  Input and Output Summary (last 24 hours): Intake/Output Summary (Last 24 hours) at 2019 1743  Last data filed at 2019 1716  Gross per 24 hour   Intake 1300 ml   Output 1425 ml   Net -125 ml       Physical Exam:     Physical Exam   Constitutional: He is oriented to person, place, and time  Vital signs are normal  He appears well-developed and well-nourished  He is active and cooperative  Cardiovascular: Normal rate and regular rhythm  Pulmonary/Chest: Effort normal  He has wheezes (mild scattered wheezes)  He has no rhonchi  He has no rales  Abdominal: Soft  Normal appearance and bowel sounds are normal  He exhibits no distension  There is no tenderness  Neurological: He is alert and oriented to person, place, and time  No cranial nerve deficit  Skin: Skin is warm, dry and intact  Nursing note and vitals reviewed        Additional Data:     Labs:    Results from last 7 days   Lab Units 19  0447   WBC Thousand/uL 9 23   HEMOGLOBIN g/dL 11 8*   HEMATOCRIT % 39 5   PLATELETS Thousands/uL 244   NEUTROS PCT % 53   LYMPHS PCT % 25   MONOS PCT % 14*   EOS PCT % 5     Results from last 7 days   Lab Units 19  0447 19  0516   SODIUM mmol/L 143 142   POTASSIUM mmol/L 3 5 3 2*   CHLORIDE mmol/L 107 105   CO2 mmol/L 28 29   BUN mg/dL 24 23   CREATININE mg/dL 1 12 1 19   ANION GAP mmol/L 8 8   CALCIUM mg/dL 9 2 9 2   ALBUMIN g/dL  --  2 8*   TOTAL BILIRUBIN mg/dL  --  0 40   ALK PHOS U/L  --  81   ALT U/L  --  20   AST U/L  --  11   GLUCOSE RANDOM mg/dL 118 113     Results from last 7 days   Lab Units 02/25/19  0516   INR  1 35*                       * I Have Reviewed All Lab Data Listed Above  * Additional Pertinent Lab Tests Reviewed: All Labs Within Last 24 Hours Reviewed    Imaging:    Imaging Reports Reviewed Today Include: none  Imaging Personally Reviewed by Myself Includes:  none    Recent Cultures (last 7 days):           Last 24 Hours Medication List:     Current Facility-Administered Medications:  ALPRAZolam 0 25 mg Oral TID PRN Cory Boas, PA-C   amLODIPine 10 mg Oral Daily Saundra Y Swank, CRNP   apixaban 5 mg Oral BID Saundra Y Swank, CRNP   aspirin 81 mg Oral Daily Saundra Y Swank, CRNP   atorvastatin 40 mg Oral Daily With Dinner Nedra Weber, CRNP   butalbital-acetaminophen-caffeine 1 tablet Oral Q4H PRN Cory Boas, PA-C   cloNIDine 0 1 mg Oral Daily Saundra Y Swank, CRNP   docusate sodium 100 mg Oral BID Cory Boas, PA-C   furosemide 20 mg Oral BID Saundra Y Swank, CRNP   melatonin 6 mg Oral HS Saundra Y Swank, CRNP   metoprolol tartrate 25 mg Oral Q12H [address] Nedra Adas, CRNP   nicotine 1 patch Transdermal Daily Saundra Y Swank, CRNP   ondansetron 4 mg Intravenous Q4H PRN Cory Boas, PA-C   pantoprazole 40 mg Oral Early Morning Saundra Y Swank, CRNP   polyethylene glycol 17 g Oral Daily PRN Cory Boas, PA-C   pregabalin 300 mg Oral BID Saundra Y Swank, CRNP   ranolazine 1,000 mg Oral BID Saundra Y Swank, CRNP   tamsulosin 0 4 mg Oral Daily With Dinner Saundra Y Swank, CRNP   tiotropium 18 mcg Inhalation Daily Nedra Weber, NATALIIA        Today, Patient Was Seen By: Cory Boas, PA-C    ** Please Note: Dictation voice to text software may have been used in the creation of this document   **

## 2019-02-26 NOTE — NURSING NOTE
Patients MAR for meds states auto relaase this RN not able to administer medications at this time, MD  And pharmacist notified of the same

## 2019-02-26 NOTE — SOCIAL WORK
Called [facility] to see what other nursing homes are in Network  [address] [facility] and [facility] are both in network  Referrals made to both

## 2019-02-27 LAB
ANION GAP SERPL CALCULATED.3IONS-SCNC: 2 MMOL/L (ref 4–13)
BASOPHILS # BLD AUTO: 0.16 THOUSANDS/ΜL (ref 0–0.1)
BASOPHILS NFR BLD AUTO: 2 % (ref 0–1)
BUN SERPL-MCNC: 21 MG/DL (ref 5–25)
CALCIUM SERPL-MCNC: 9 MG/DL (ref 8.3–10.1)
CHLORIDE SERPL-SCNC: 105 MMOL/L (ref 100–108)
CO2 SERPL-SCNC: 35 MMOL/L (ref 21–32)
CREAT SERPL-MCNC: 1.11 MG/DL (ref 0.6–1.3)
EOSINOPHIL # BLD AUTO: 0.51 THOUSAND/ΜL (ref 0–0.61)
EOSINOPHIL NFR BLD AUTO: 6 % (ref 0–6)
ERYTHROCYTE [DISTWIDTH] IN BLOOD BY AUTOMATED COUNT: 27.7 % (ref 11.6–15.1)
GFR SERPL CREATININE-BSD FRML MDRD: 64 ML/MIN/1.73SQ M
GLUCOSE SERPL-MCNC: 136 MG/DL (ref 65–140)
HCT VFR BLD AUTO: 40.4 % (ref 36.5–49.3)
HGB BLD-MCNC: 11.9 G/DL (ref 12–17)
IMM GRANULOCYTES # BLD AUTO: 0.11 THOUSAND/UL (ref 0–0.2)
IMM GRANULOCYTES NFR BLD AUTO: 1 % (ref 0–2)
LYMPHOCYTES # BLD AUTO: 2.25 THOUSANDS/ΜL (ref 0.6–4.47)
LYMPHOCYTES NFR BLD AUTO: 26 % (ref 14–44)
MCH RBC QN AUTO: 21.3 PG (ref 26.8–34.3)
MCHC RBC AUTO-ENTMCNC: 29.5 G/DL (ref 31.4–37.4)
MCV RBC AUTO: 72 FL (ref 82–98)
MONOCYTES # BLD AUTO: 1.26 THOUSAND/ΜL (ref 0.17–1.22)
MONOCYTES NFR BLD AUTO: 15 % (ref 4–12)
NEUTROPHILS # BLD AUTO: 4.32 THOUSANDS/ΜL (ref 1.85–7.62)
NEUTS SEG NFR BLD AUTO: 50 % (ref 43–75)
NRBC BLD AUTO-RTO: 0 /100 WBCS
PLATELET # BLD AUTO: 253 THOUSANDS/UL (ref 149–390)
PMV BLD AUTO: 10 FL (ref 8.9–12.7)
POTASSIUM SERPL-SCNC: 3.3 MMOL/L (ref 3.5–5.3)
RBC # BLD AUTO: 5.59 MILLION/UL (ref 3.88–5.62)
SODIUM SERPL-SCNC: 142 MMOL/L (ref 136–145)
WBC # BLD AUTO: 8.61 THOUSAND/UL (ref 4.31–10.16)

## 2019-02-27 PROCEDURE — 97110 THERAPEUTIC EXERCISES: CPT

## 2019-02-27 PROCEDURE — 80048 BASIC METABOLIC PNL TOTAL CA: CPT | Performed by: PHYSICIAN ASSISTANT

## 2019-02-27 PROCEDURE — 99232 SBSQ HOSP IP/OBS MODERATE 35: CPT | Performed by: PHYSICIAN ASSISTANT

## 2019-02-27 PROCEDURE — 85025 COMPLETE CBC W/AUTO DIFF WBC: CPT | Performed by: PHYSICIAN ASSISTANT

## 2019-02-27 RX ORDER — POTASSIUM CHLORIDE 20 MEQ/1
20 TABLET, EXTENDED RELEASE ORAL DAILY
Status: DISCONTINUED | OUTPATIENT
Start: 2019-02-27 | End: 2019-02-28

## 2019-02-27 RX ADMIN — PREGABALIN 300 MG: 75 CAPSULE ORAL at 08:41

## 2019-02-27 RX ADMIN — DOCUSATE SODIUM 100 MG: 100 CAPSULE, LIQUID FILLED ORAL at 17:01

## 2019-02-27 RX ADMIN — FUROSEMIDE 20 MG: 40 TABLET ORAL at 17:01

## 2019-02-27 RX ADMIN — PREGABALIN 300 MG: 75 CAPSULE ORAL at 21:37

## 2019-02-27 RX ADMIN — METOPROLOL TARTRATE 25 MG: 25 TABLET, FILM COATED ORAL at 08:41

## 2019-02-27 RX ADMIN — BUTALBITAL, ACETAMINOPHEN AND CAFFEINE 1 TABLET: 50; 325; 40 TABLET ORAL at 17:00

## 2019-02-27 RX ADMIN — MELATONIN TAB 3 MG 6 MG: 3 TAB at 21:37

## 2019-02-27 RX ADMIN — PANTOPRAZOLE SODIUM 40 MG: 40 TABLET, DELAYED RELEASE ORAL at 05:20

## 2019-02-27 RX ADMIN — DOCUSATE SODIUM 100 MG: 100 CAPSULE, LIQUID FILLED ORAL at 08:41

## 2019-02-27 RX ADMIN — APIXABAN 5 MG: 5 TABLET, FILM COATED ORAL at 18:55

## 2019-02-27 RX ADMIN — ASPIRIN 81 MG 81 MG: 81 TABLET ORAL at 08:41

## 2019-02-27 RX ADMIN — APIXABAN 5 MG: 5 TABLET, FILM COATED ORAL at 08:41

## 2019-02-27 RX ADMIN — FUROSEMIDE 20 MG: 40 TABLET ORAL at 08:41

## 2019-02-27 RX ADMIN — TAMSULOSIN HYDROCHLORIDE 0.4 MG: 0.4 CAPSULE ORAL at 16:52

## 2019-02-27 RX ADMIN — CLONIDINE HYDROCHLORIDE 0.1 MG: 0.1 TABLET ORAL at 08:42

## 2019-02-27 RX ADMIN — POTASSIUM CHLORIDE 20 MEQ: 1500 TABLET, EXTENDED RELEASE ORAL at 08:47

## 2019-02-27 RX ADMIN — BUTALBITAL, ACETAMINOPHEN AND CAFFEINE 1 TABLET: 50; 325; 40 TABLET ORAL at 12:29

## 2019-02-27 RX ADMIN — TIOTROPIUM BROMIDE 18 MCG: 18 CAPSULE ORAL; RESPIRATORY (INHALATION) at 08:49

## 2019-02-27 RX ADMIN — AMLODIPINE BESYLATE 10 MG: 10 TABLET ORAL at 08:42

## 2019-02-27 RX ADMIN — ATORVASTATIN CALCIUM 40 MG: 40 TABLET, FILM COATED ORAL at 16:52

## 2019-02-27 RX ADMIN — BUTALBITAL, ACETAMINOPHEN AND CAFFEINE 1 TABLET: 50; 325; 40 TABLET ORAL at 05:20

## 2019-02-27 RX ADMIN — METOPROLOL TARTRATE 25 MG: 25 TABLET, FILM COATED ORAL at 21:37

## 2019-02-27 RX ADMIN — RANOLAZINE 1000 MG: 500 TABLET, FILM COATED, EXTENDED RELEASE ORAL at 08:49

## 2019-02-27 RX ADMIN — RANOLAZINE 1000 MG: 500 TABLET, FILM COATED, EXTENDED RELEASE ORAL at 18:45

## 2019-02-27 NOTE — OCCUPATIONAL THERAPY NOTE
[address] Progress Note     Patient Name: Dave Whitney  Today's Date: 2/27/2019  Problem List  Patient Active Problem List   Diagnosis    Intractable diarrhea    Dyspnea on exertion    Hypokalemia    COPD (chronic obstructive pulmonary disease) (HCC)    Abdominal pain    Benign essential hypertension    Dyslipidemia    Depressive disorder    Persistent atrial fibrillation (HCC)    Migraines    Chronic pain    Tobacco abuse    Pulmonary nodule    Marijuana abuse    Dysphagia    Type 2 diabetes mellitus without complication, without long-term current use of insulin ([facility])    [facility] of dilated cardiomyopathy secondary to tachycardia ([facility] [address] [facility]    Coronary artery disease of native artery of native heart with stable angina pectoris ([facility] [address] [facility]    Noncompliance    Obstructive sleep apnea    Urge incontinence of urine    GI bleed    Heme positive stool    Erectile dysfunction    Symptomatic anemia    Other chest pain    Dyspnea    TIA (transient ischemic attack)    PTSD (post-traumatic stress disorder)    Major neurocognitive disorder    Alcohol abuse    Severe uncontrolled hypertension    Self-care deficit for medication administration    Constipation               02/27/19 1138   Restrictions/Precautions   Other Precautions Fall Risk   Pain Assessment   Pain Assessment 0-10   Pain Score 8   Pain Location Back   ADL   Grooming Comments already completed this date   UB Bathing Comments alredy completed this date   LB Bathing Comments already complete at this time   UB Dressing Comments already completed at this time   LB Dressing Comments already completed at this time   Toileting Comments declined at this time   Transfers   Additional Comments declined at this time to get out of bedside shaquille   Functional Mobility   Additional Comments declined at this time   Therapeutic Exercise - ROM   UE-ROM Yes   ROM- Right Upper Extremities   R Elbow PROM   R Wrist AROM   R Hand AROM   RUE ROM Comment pt declined PROM of elbow after one attempt of therapist trying to range elbow  Therapist continueing to encourage pt to participate and explain reasons why therapy is important in pts scenario  ROM - Left Upper Extremities    LUE ROM Comment pt declined   Cognition   Overall Cognitive Status WFL   Arousal/Participation Alert   Attention Within functional limits   Orientation Level Oriented to person;Oriented to place   Memory Within functional limits   Following Commands Follows all commands and directions without difficulty   Activity Tolerance   Activity Tolerance Patient limited by pain   Assessment   Assessment Pt presents reclined in bedside chair this date with eyes closed  Pt required increased encouragement to participate in therapy this date  Therapist throughout session explaining why therpy is important for pts current situation as well as explaining the benefit of therapy to the pt himself  Therapist attempting to complete PROM on RUE this date, with pt stopping therapist due to pain  Pt completed wrist and hand AROM  Pt refused TE with LUE  Upon consulting with Elizabeth Moore is STR  Plan   Goal Expiration Date 03/11/19   Treatment Day 1   Recommendation   OT Discharge Recommendation Short Term Rehab     Pt left seated in chair at end of session; all needs within reach; all lines intact; scds connected and turned on  Therapist providing continued encouragement upon exiting room

## 2019-02-27 NOTE — PLAN OF CARE
Problem: PHYSICAL THERAPY ADULT  Goal: Performs mobility at highest level of function for planned discharge setting  See evaluation for individualized goals  Outcome: Progressing  Note:   Prognosis: Good  Problem List: Decreased strength, Decreased endurance, Impaired balance, Decreased mobility, Decreased safety awareness, Pain  Assessment: Pt  completed therapeutic exercises as above with rest breaks as needed  Verbal cues for technique  Pt is in need of continued activity in PT to improve strength balance endurance mobility transfers and ambulation with return to maximize LOF  From PT/mobility standpoint, recommendation at time of d/c would be STR in order to promote return to PLOF and independence  Recommendation: Short-term skilled PT     PT - OK to Discharge: Yes(to next level of care when medically stable for discharge)    See flowsheet documentation for full assessment

## 2019-02-27 NOTE — PHYSICAL THERAPY NOTE
PT Treatment Note      02/27/19 0801   Pain Assessment   Pain Score 8   Pain Location Back   Restrictions/Precautions   Other Precautions   (Chair Alarm; Bed Alarm;O2;Fall Risk;Pain)   Cognition   Overall Cognitive Status WFL   Arousal/Participation Alert   Following Commands Follows all commands and directions without difficulty   Subjective   Subjective c/o fatigue from lack of sleep  pt  refused gait training  States he just got OOB and is not walking  Agreeable to therapeutic exercises  Transfers   Additional Comments declined gait training   Endurance Deficit   Endurance Deficit Yes   Activity Tolerance   Activity Tolerance Patient limited by fatigue;Patient limited by pain   Exercises   Hip Flexion Sitting;20 reps   Hip Abduction Sitting;20 reps   Hip Adduction Sitting;20 reps   Knee AROM Long Arc Quad Sitting;20 reps   Ankle Pumps Sitting;20 reps   Assessment   Prognosis Good   Problem List Decreased strength;Decreased endurance; Impaired balance;Decreased mobility; Decreased safety awareness;Pain   Assessment Pt  completed therapeutic exercises as above with rest breaks as needed  Verbal cues for technique  Pt is in need of continued activity in PT to improve strength balance endurance mobility transfers and ambulation with return to maximize LOF  From PT/mobility standpoint, recommendation at time of d/c would be STR in order to promote return to PLOF and independence  Plan   Treatment/Interventions Functional transfer training;LE strengthening/ROM; Therapeutic exercise; Endurance training;Bed mobility;Gait training   Progress Slow progress, decreased activity tolerance   Recommendation   Recommendation Short-term skilled PT   Pt  In bed  with call bell within reach and alarm on at end of PT session  Discussed with Mariann Bowman, PT today's treatment and patient's current level of function for care coordination

## 2019-02-27 NOTE — PLAN OF CARE
Problem: OCCUPATIONAL THERAPY ADULT  Goal: Performs self-care activities at highest level of function for planned discharge setting  See evaluation for individualized goals  Description  Treatment Interventions: ADL retraining, Functional transfer training, UE strengthening/ROM, Endurance training, Patient/family training, Fine motor coordination activities, Activityengagement          See flowsheet documentation for full assessment, interventions and recommendations  Note:   Limitation: Decreased ADL status, Decreased UE ROM, Decreased UE strength, Decreased Safe judgement during ADL, Decreased endurance, Decreased self-care trans, Decreased fine motor control, Decreased high-level ADLs     Assessment: Pt presents reclined in bedside chair this date with eyes closed  Pt required increased encouragement to participate in therapy this date  Therapist throughout session explaining why therpy is important for pts current situation as well as explaining the benefit of therapy to the pt himself  Therapist attempting to complete PROM on RUE this date, with pt stopping therapist due to pain  Pt completed wrist and hand AROM  Pt refused TE with YONATAN  Upon consulting with Keyla Spivey is STR        OT Discharge Recommendation: Short Term Rehab

## 2019-02-27 NOTE — PROGRESS NOTES
Progress Note - Narcisa Hammonds 1945, 66 y o  male MRN: [id number]    Unit/Bed#:  Encounter: [id number]    Primary Care Provider: Fred Rossi DO   Date and time admitted to hospital: 2019  3:25 PM        * Self-care deficit for medication administration  Assessment & Plan  Patient noncompliant with home medications, blood pressure improved after re-initiation of p o  Meds  Patient requires nursing and rehab placement  [employer] authorized pt to go to SNF on discharge  Case managment has made referrals, so far patient has not been accepted to any facilities  Severe uncontrolled hypertension  Assessment & Plan  Blood pressure elevated on admission,  now stable in the 120's  Continue current meds    COPD (chronic obstructive pulmonary disease) ([facility] [address] )  Assessment & Plan  No acute exacerbation, continue Spiriva, continue respiratory protocol  Persistent atrial fibrillation [facility])  Assessment & Plan  Continue medical management    Constipation  Assessment & Plan  Resolved with magnesium citrate  Continue colace BID, MiraLax prn  VTE Pharmacologic Prophylaxis:   Pharmacologic: Apixaban (Eliquis)  Mechanical VTE Prophylaxis in Place: Yes    Patient Centered Rounds: I have performed bedside rounds with nursing staff today  Discussions with Specialists or Other Care Team Provider: nursing, CM, and attending      Time Spent for Care: 30 minutes  More than 50% of total time spent on counseling and coordination of care as described above  Current Length of Stay: 4 day(s)    Current Patient Status: Inpatient   Certification Statement: The patient will continue to require additional inpatient hospital stay due to placement to short-term rehab  Discharge Plan: TBD    Code Status: Level 1 - Full Code      Subjective: The patient was seen and examined  The patient states he had a bowel movement last night       Objective:     Vitals:   Temp (24hrs), Av 6 °F (36 4 °C), Min:97 3 °F (36 3 °C), Max:98 °F (36 7 °C)    Temp:  [97 3 °F (36 3 °C)-98 °F (36 7 °C)] 97 6 °F (36 4 °C)  HR:  [68-89] 77  Resp:  [18] 18  BP: (103-137)/(59-83) 127/77  SpO2:  [96 %-100 %] 100 %  Body mass index is 27 51 kg/m²  Input and Output Summary (last 24 hours): Intake/Output Summary (Last 24 hours) at 2/27/2019 1619  Last data filed at 2/27/2019 1300  Gross per 24 hour   Intake 1080 ml   Output 450 ml   Net 630 ml       Physical Exam:     Physical Exam   Constitutional: He is oriented to person, place, and time  Vital signs are normal  He appears well-developed and well-nourished  He is active and cooperative  Cardiovascular: Normal rate and regular rhythm  Pulmonary/Chest: Effort normal and breath sounds normal  He has no wheezes  He has no rhonchi  He has no rales  Abdominal: Soft  Normal appearance and bowel sounds are normal  He exhibits no distension  There is no tenderness  Neurological: He is alert and oriented to person, place, and time  Skin: Skin is warm, dry and intact  Nursing note and vitals reviewed  Additional Data:     Labs:    Results from last 7 days   Lab Units 02/27/19  0443   WBC Thousand/uL 8 61   HEMOGLOBIN g/dL 11 9*   HEMATOCRIT % 40 4   PLATELETS Thousands/uL 253   NEUTROS PCT % 50   LYMPHS PCT % 26   MONOS PCT % 15*   EOS PCT % 6     Results from last 7 days   Lab Units 02/27/19 0443  02/25/19  0516   SODIUM mmol/L 142   < > 142   POTASSIUM mmol/L 3 3*   < > 3 2*   CHLORIDE mmol/L 105   < > 105   CO2 mmol/L 35*   < > 29   BUN mg/dL 21   < > 23   CREATININE mg/dL 1 11   < > 1 19   ANION GAP mmol/L 2*   < > 8   CALCIUM mg/dL 9 0   < > 9 2   ALBUMIN g/dL  --   --  2 8*   TOTAL BILIRUBIN mg/dL  --   --  0 40   ALK PHOS U/L  --   --  81   ALT U/L  --   --  20   AST U/L  --   --  11   GLUCOSE RANDOM mg/dL 136   < > 113    < > = values in this interval not displayed       Results from last 7 days   Lab Units 02/25/19  0516   INR  1 35*                       * I Have Reviewed All Lab Data Listed Above  * Additional Pertinent Lab Tests Reviewed: All Labs Within Last 24 Hours Reviewed    Imaging:    Imaging Reports Reviewed Today Include: none  Imaging Personally Reviewed by Myself Includes:  none    Recent Cultures (last 7 days):           Last 24 Hours Medication List:     Current Facility-Administered Medications:  ALPRAZolam 0 25 mg Oral TID PRN Sandro Lopez PA-C   amLODIPine 10 mg Oral Daily Savannah Y Swank, CRNP   apixaban 5 mg Oral BID Savannah Y Swank, CRNP   aspirin 81 mg Oral Daily Savannah Y Swank, CRNP   atorvastatin 40 mg Oral Daily With Dinner Verenice Daily, CRRUSS   butalbital-acetaminophen-caffeine 1 tablet Oral Q4H PRN Sandro Lopez PA-C   cloNIDine 0 1 mg Oral Daily Savannah Y Swank, CRNP   docusate sodium 100 mg Oral BID Sandro Lopez PA-C   furosemide 20 mg Oral BID Savannah Y Swank, CRNP   melatonin 6 mg Oral HS Savannah Y Swank, CRNP   metoprolol tartrate 25 mg Oral Q12H [facility] Verenice Ambrosio, NATALIIA   nicotine 1 patch Transdermal Daily Savannah Y Swank, CRNP   ondansetron 4 mg Intravenous Q4H PRN Sandro Lopez PA-C   pantoprazole 40 mg Oral Early Morning Savannah Y Swank, CRNP   polyethylene glycol 17 g Oral Daily PRN Sandro Lopez PA-C   potassium chloride 20 mEq Oral Daily Sandro Lopez PA-C   pregabalin 300 mg Oral BID Savannah Y Swank, CRNP   ranolazine 1,000 mg Oral BID Savannah Y Swank, CRNP   tamsulosin 0 4 mg Oral Daily With Dinner Savannah Y Swank, CRNP   tiotropium 18 mcg Inhalation Daily NATALIIA Giordano        Today, Patient Was Seen By: Sandro Lopez PA-C    ** Please Note: Dictation voice to text software may have been used in the creation of this document   **

## 2019-02-27 NOTE — SOCIAL WORK
Anderson Tracey from [facility] is out here now and will be meeting with pt and will then let us know if they can accept him or not

## 2019-02-27 NOTE — ASSESSMENT & PLAN NOTE
Patient noncompliant with home medications, blood pressure improved after re-initiation of p o  Meds  Patient requires nursing and rehab placement  [employer] authorized pt to go to SNF on discharge  Case managment has made referrals, so far patient has not been accepted to any facilities

## 2019-02-28 ENCOUNTER — APPOINTMENT (INPATIENT)
Dept: RADIOLOGY | Facility: HOSPITAL | Age: 74
DRG: 292 | End: 2019-02-28
Payer: COMMERCIAL

## 2019-02-28 ENCOUNTER — APPOINTMENT (INPATIENT)
Dept: NON INVASIVE DIAGNOSTICS | Facility: HOSPITAL | Age: 74
DRG: 292 | End: 2019-02-28
Payer: COMMERCIAL

## 2019-02-28 PROBLEM — D50.9 MICROCYTIC ANEMIA: Status: ACTIVE | Noted: 2018-12-18

## 2019-02-28 PROBLEM — R31.29 MICROSCOPIC HEMATURIA: Status: ACTIVE | Noted: 2019-02-28

## 2019-02-28 PROBLEM — I95.9 HYPOTENSION: Status: ACTIVE | Noted: 2019-02-28

## 2019-02-28 LAB
ANION GAP SERPL CALCULATED.3IONS-SCNC: 4 MMOL/L (ref 4–13)
ATRIAL RATE: 170 BPM
BUN SERPL-MCNC: 23 MG/DL (ref 5–25)
CALCIUM SERPL-MCNC: 9 MG/DL (ref 8.3–10.1)
CHLORIDE SERPL-SCNC: 107 MMOL/L (ref 100–108)
CO2 SERPL-SCNC: 30 MMOL/L (ref 21–32)
CREAT SERPL-MCNC: 0.97 MG/DL (ref 0.6–1.3)
GFR SERPL CREATININE-BSD FRML MDRD: 76 ML/MIN/1.73SQ M
GLUCOSE SERPL-MCNC: 118 MG/DL (ref 65–140)
POTASSIUM SERPL-SCNC: 3.8 MMOL/L (ref 3.5–5.3)
QRS AXIS: 68 DEGREES
QRSD INTERVAL: 86 MS
QT INTERVAL: 444 MS
QTC INTERVAL: 446 MS
SODIUM SERPL-SCNC: 141 MMOL/L (ref 136–145)
T WAVE AXIS: 54 DEGREES
TROPONIN I SERPL-MCNC: <0.02 NG/ML
VENTRICULAR RATE: 61 BPM

## 2019-02-28 PROCEDURE — 71045 X-RAY EXAM CHEST 1 VIEW: CPT

## 2019-02-28 PROCEDURE — 93306 TTE W/DOPPLER COMPLETE: CPT | Performed by: INTERNAL MEDICINE

## 2019-02-28 PROCEDURE — 87081 CULTURE SCREEN ONLY: CPT | Performed by: INTERNAL MEDICINE

## 2019-02-28 PROCEDURE — 93306 TTE W/DOPPLER COMPLETE: CPT

## 2019-02-28 PROCEDURE — 93005 ELECTROCARDIOGRAM TRACING: CPT

## 2019-02-28 PROCEDURE — 94640 AIRWAY INHALATION TREATMENT: CPT

## 2019-02-28 PROCEDURE — 84484 ASSAY OF TROPONIN QUANT: CPT | Performed by: INTERNAL MEDICINE

## 2019-02-28 PROCEDURE — 99232 SBSQ HOSP IP/OBS MODERATE 35: CPT | Performed by: INTERNAL MEDICINE

## 2019-02-28 PROCEDURE — 80048 BASIC METABOLIC PNL TOTAL CA: CPT | Performed by: PHYSICIAN ASSISTANT

## 2019-02-28 PROCEDURE — 93010 ELECTROCARDIOGRAM REPORT: CPT | Performed by: INTERNAL MEDICINE

## 2019-02-28 PROCEDURE — 94760 N-INVAS EAR/PLS OXIMETRY 1: CPT

## 2019-02-28 RX ORDER — ALBUTEROL SULFATE 2.5 MG/3ML
2.5 SOLUTION RESPIRATORY (INHALATION) EVERY 6 HOURS PRN
Status: DISCONTINUED | OUTPATIENT
Start: 2019-02-28 | End: 2019-03-04 | Stop reason: HOSPADM

## 2019-02-28 RX ORDER — SODIUM CHLORIDE FOR INHALATION 0.9 %
3 VIAL, NEBULIZER (ML) INHALATION
Status: DISCONTINUED | OUTPATIENT
Start: 2019-02-28 | End: 2019-03-04 | Stop reason: HOSPADM

## 2019-02-28 RX ORDER — LEVALBUTEROL 1.25 MG/.5ML
1.25 SOLUTION, CONCENTRATE RESPIRATORY (INHALATION)
Status: DISCONTINUED | OUTPATIENT
Start: 2019-02-28 | End: 2019-03-04 | Stop reason: HOSPADM

## 2019-02-28 RX ADMIN — LEVALBUTEROL 1.25 MG: 1.25 SOLUTION, CONCENTRATE RESPIRATORY (INHALATION) at 19:32

## 2019-02-28 RX ADMIN — APIXABAN 5 MG: 5 TABLET, FILM COATED ORAL at 18:22

## 2019-02-28 RX ADMIN — ASPIRIN 81 MG 81 MG: 81 TABLET ORAL at 08:35

## 2019-02-28 RX ADMIN — TIOTROPIUM BROMIDE 18 MCG: 18 CAPSULE ORAL; RESPIRATORY (INHALATION) at 08:35

## 2019-02-28 RX ADMIN — SODIUM CHLORIDE 500 ML: 0.9 INJECTION, SOLUTION INTRAVENOUS at 11:07

## 2019-02-28 RX ADMIN — ISODIUM CHLORIDE 3 ML: 0.03 SOLUTION RESPIRATORY (INHALATION) at 19:28

## 2019-02-28 RX ADMIN — RANOLAZINE 1000 MG: 500 TABLET, FILM COATED, EXTENDED RELEASE ORAL at 08:35

## 2019-02-28 RX ADMIN — ISODIUM CHLORIDE 3 ML: 0.03 SOLUTION RESPIRATORY (INHALATION) at 12:44

## 2019-02-28 RX ADMIN — CLONIDINE HYDROCHLORIDE 0.1 MG: 0.1 TABLET ORAL at 08:35

## 2019-02-28 RX ADMIN — METOPROLOL TARTRATE 25 MG: 25 TABLET, FILM COATED ORAL at 08:35

## 2019-02-28 RX ADMIN — PREGABALIN 300 MG: 75 CAPSULE ORAL at 08:35

## 2019-02-28 RX ADMIN — FUROSEMIDE 20 MG: 40 TABLET ORAL at 08:35

## 2019-02-28 RX ADMIN — AMLODIPINE BESYLATE 10 MG: 10 TABLET ORAL at 08:35

## 2019-02-28 RX ADMIN — LEVALBUTEROL 1.25 MG: 1.25 SOLUTION, CONCENTRATE RESPIRATORY (INHALATION) at 12:44

## 2019-02-28 RX ADMIN — APIXABAN 5 MG: 5 TABLET, FILM COATED ORAL at 08:35

## 2019-02-28 RX ADMIN — POTASSIUM CHLORIDE 20 MEQ: 1500 TABLET, EXTENDED RELEASE ORAL at 08:35

## 2019-02-28 RX ADMIN — BUTALBITAL, ACETAMINOPHEN AND CAFFEINE 1 TABLET: 50; 325; 40 TABLET ORAL at 19:56

## 2019-02-28 RX ADMIN — PREGABALIN 300 MG: 75 CAPSULE ORAL at 20:03

## 2019-02-28 RX ADMIN — TAMSULOSIN HYDROCHLORIDE 0.4 MG: 0.4 CAPSULE ORAL at 15:39

## 2019-02-28 RX ADMIN — ALPRAZOLAM 0.25 MG: 0.25 TABLET ORAL at 18:22

## 2019-02-28 RX ADMIN — BUTALBITAL, ACETAMINOPHEN AND CAFFEINE 1 TABLET: 50; 325; 40 TABLET ORAL at 15:39

## 2019-02-28 RX ADMIN — PANTOPRAZOLE SODIUM 40 MG: 40 TABLET, DELAYED RELEASE ORAL at 06:17

## 2019-02-28 RX ADMIN — RANOLAZINE 1000 MG: 500 TABLET, FILM COATED, EXTENDED RELEASE ORAL at 18:22

## 2019-02-28 RX ADMIN — ONDANSETRON 4 MG: 2 INJECTION INTRAMUSCULAR; INTRAVENOUS at 19:02

## 2019-02-28 RX ADMIN — MELATONIN TAB 3 MG 6 MG: 3 TAB at 21:34

## 2019-02-28 RX ADMIN — DOCUSATE SODIUM 100 MG: 100 CAPSULE, LIQUID FILLED ORAL at 18:23

## 2019-02-28 RX ADMIN — DOCUSATE SODIUM 100 MG: 100 CAPSULE, LIQUID FILLED ORAL at 08:35

## 2019-02-28 RX ADMIN — ATORVASTATIN CALCIUM 40 MG: 40 TABLET, FILM COATED ORAL at 15:39

## 2019-02-28 RX ADMIN — ONDANSETRON 4 MG: 2 INJECTION INTRAMUSCULAR; INTRAVENOUS at 11:02

## 2019-02-28 NOTE — SOCIAL WORK
CM exhausted list of SNF's in the area that are in network with pt's insurance [facility])  No facility is willing to accept pt  Cm spoke with pt and discussed personal care/assisted living facilities as pt has the money to go into such a facility  CM will be looking into [facility] in [address] [place] [address]

## 2019-02-28 NOTE — ASSESSMENT & PLAN NOTE
· Discontinue PO lasix  · NSS IV fluid bolus of 500 ml over 2 hours  · Follow the blood pressure trend

## 2019-02-28 NOTE — ASSESSMENT & PLAN NOTE
· Check troponin levels x 3 sets  · Check an echocardiogram  · Telemetry monitoring  · He may require a nuclear stress test prior to discharge
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
## 2019-02-28 NOTE — SOCIAL WORK
Pt aware no SNF's in the area will accept him  Pt receives a good monthly income (approximately $4600) and can afford to go to an assisted living and is agreeable  [facility] in [facility] has a waiting list, [facility] and [facility] do have beds available  Joaquin Cargo from [facility] will be over today to evaluate pt and will let Cm know if they can accept him  If [facility] cannot accept pt, then [facility] will evaluate pt

## 2019-02-28 NOTE — PROGRESS NOTES
Pt now appears to be resting comfortably  Fluid bolus infusing   Current vitals are HR-63,BP-103/56,respirations-16, sp02 99% on 2L NC

## 2019-02-28 NOTE — ASSESSMENT & PLAN NOTE
· Continue PO metoprolol for heart rate control  · Continue eliquis 5 mg PO BID for full anti-coagulation

## 2019-02-28 NOTE — PROGRESS NOTES
Pt rang call bell complaining of mid-sternal,crushing chest pain, nausea and was noted to be diaphoretic  HR-56, BP-91/53,Sp02-98% on RA  Dr Trino Brooke notified-EKG, troponin, CXR, and fluid bolus ordered  Zofran administered for nausea

## 2019-02-28 NOTE — SOCIAL WORK
Amber President from [facility] is out here now to meet with pt and evaluate him to see if he is appropriate to go to their facility  Cm also spoke with Trecia Goldmann at [facility] in [facility] and she has a semi private room available for pt and she will be out tomorrow morning to evaluate pt to see if he is appropriate for their facility also

## 2019-02-28 NOTE — ASSESSMENT & PLAN NOTE
· The patient was hypotensive this morning  · NSS IV fluid bolus of 500 ml over 2 hours  · Follow the blood pressure trend closely

## 2019-03-01 ENCOUNTER — APPOINTMENT (INPATIENT)
Dept: NUCLEAR MEDICINE | Facility: HOSPITAL | Age: 74
DRG: 292 | End: 2019-03-01
Payer: COMMERCIAL

## 2019-03-01 LAB
ALBUMIN SERPL BCP-MCNC: 2.8 G/DL (ref 3.5–5)
ALP SERPL-CCNC: 78 U/L (ref 46–116)
ALT SERPL W P-5'-P-CCNC: 25 U/L (ref 12–78)
ANION GAP SERPL CALCULATED.3IONS-SCNC: 3 MMOL/L (ref 4–13)
AST SERPL W P-5'-P-CCNC: 11 U/L (ref 5–45)
BASOPHILS # BLD AUTO: 0.12 THOUSANDS/ΜL (ref 0–0.1)
BASOPHILS NFR BLD AUTO: 1 % (ref 0–1)
BILIRUB SERPL-MCNC: 0.2 MG/DL (ref 0.2–1)
BUN SERPL-MCNC: 26 MG/DL (ref 5–25)
CALCIUM SERPL-MCNC: 8.9 MG/DL (ref 8.3–10.1)
CHLORIDE SERPL-SCNC: 106 MMOL/L (ref 100–108)
CO2 SERPL-SCNC: 30 MMOL/L (ref 21–32)
CREAT SERPL-MCNC: 0.98 MG/DL (ref 0.6–1.3)
EOSINOPHIL # BLD AUTO: 0.76 THOUSAND/ΜL (ref 0–0.61)
EOSINOPHIL NFR BLD AUTO: 8 % (ref 0–6)
ERYTHROCYTE [DISTWIDTH] IN BLOOD BY AUTOMATED COUNT: 27.4 % (ref 11.6–15.1)
FERRITIN SERPL-MCNC: 24 NG/ML (ref 8–388)
FOLATE SERPL-MCNC: 12.4 NG/ML (ref 3.1–17.5)
GFR SERPL CREATININE-BSD FRML MDRD: 75 ML/MIN/1.73SQ M
GLUCOSE SERPL-MCNC: 121 MG/DL (ref 65–140)
HCT VFR BLD AUTO: 38.4 % (ref 36.5–49.3)
HGB BLD-MCNC: 11.2 G/DL (ref 12–17)
IMM GRANULOCYTES # BLD AUTO: 0.14 THOUSAND/UL (ref 0–0.2)
IMM GRANULOCYTES NFR BLD AUTO: 2 % (ref 0–2)
IRON SATN MFR SERPL: 8 %
IRON SERPL-MCNC: 27 UG/DL (ref 65–175)
LYMPHOCYTES # BLD AUTO: 2.31 THOUSANDS/ΜL (ref 0.6–4.47)
LYMPHOCYTES NFR BLD AUTO: 25 % (ref 14–44)
MAGNESIUM SERPL-MCNC: 1.7 MG/DL (ref 1.6–2.6)
MCH RBC QN AUTO: 21.3 PG (ref 26.8–34.3)
MCHC RBC AUTO-ENTMCNC: 29.2 G/DL (ref 31.4–37.4)
MCV RBC AUTO: 73 FL (ref 82–98)
MONOCYTES # BLD AUTO: 1.29 THOUSAND/ΜL (ref 0.17–1.22)
MONOCYTES NFR BLD AUTO: 14 % (ref 4–12)
MRSA NOSE QL CULT: NORMAL
NEUTROPHILS # BLD AUTO: 4.61 THOUSANDS/ΜL (ref 1.85–7.62)
NEUTS SEG NFR BLD AUTO: 50 % (ref 43–75)
NRBC BLD AUTO-RTO: 0 /100 WBCS
PHOSPHATE SERPL-MCNC: 3.1 MG/DL (ref 2.3–4.1)
PLATELET # BLD AUTO: 235 THOUSANDS/UL (ref 149–390)
PMV BLD AUTO: 9.6 FL (ref 8.9–12.7)
POTASSIUM SERPL-SCNC: 4 MMOL/L (ref 3.5–5.3)
PROCALCITONIN SERPL-MCNC: <0.05 NG/ML
PROT SERPL-MCNC: 5.1 G/DL (ref 6.4–8.2)
RBC # BLD AUTO: 5.27 MILLION/UL (ref 3.88–5.62)
SODIUM SERPL-SCNC: 139 MMOL/L (ref 136–145)
TIBC SERPL-MCNC: 351 UG/DL (ref 250–450)
TSH SERPL DL<=0.05 MIU/L-ACNC: 1.54 UIU/ML (ref 0.36–3.74)
VIT B12 SERPL-MCNC: 658 PG/ML (ref 100–900)
WBC # BLD AUTO: 9.23 THOUSAND/UL (ref 4.31–10.16)

## 2019-03-01 PROCEDURE — 93018 CV STRESS TEST I&R ONLY: CPT | Performed by: INTERNAL MEDICINE

## 2019-03-01 PROCEDURE — 80053 COMPREHEN METABOLIC PANEL: CPT | Performed by: INTERNAL MEDICINE

## 2019-03-01 PROCEDURE — 97530 THERAPEUTIC ACTIVITIES: CPT

## 2019-03-01 PROCEDURE — 99232 SBSQ HOSP IP/OBS MODERATE 35: CPT | Performed by: PHYSICIAN ASSISTANT

## 2019-03-01 PROCEDURE — 93016 CV STRESS TEST SUPVJ ONLY: CPT | Performed by: INTERNAL MEDICINE

## 2019-03-01 PROCEDURE — 84100 ASSAY OF PHOSPHORUS: CPT | Performed by: INTERNAL MEDICINE

## 2019-03-01 PROCEDURE — A9502 TC99M TETROFOSMIN: HCPCS

## 2019-03-01 PROCEDURE — 94760 N-INVAS EAR/PLS OXIMETRY 1: CPT

## 2019-03-01 PROCEDURE — 82607 VITAMIN B-12: CPT | Performed by: INTERNAL MEDICINE

## 2019-03-01 PROCEDURE — 84443 ASSAY THYROID STIM HORMONE: CPT | Performed by: INTERNAL MEDICINE

## 2019-03-01 PROCEDURE — 94640 AIRWAY INHALATION TREATMENT: CPT

## 2019-03-01 PROCEDURE — 83550 IRON BINDING TEST: CPT | Performed by: INTERNAL MEDICINE

## 2019-03-01 PROCEDURE — 85025 COMPLETE CBC W/AUTO DIFF WBC: CPT | Performed by: INTERNAL MEDICINE

## 2019-03-01 PROCEDURE — 83735 ASSAY OF MAGNESIUM: CPT | Performed by: INTERNAL MEDICINE

## 2019-03-01 PROCEDURE — 83540 ASSAY OF IRON: CPT | Performed by: INTERNAL MEDICINE

## 2019-03-01 PROCEDURE — 93017 CV STRESS TEST TRACING ONLY: CPT

## 2019-03-01 PROCEDURE — 84145 PROCALCITONIN (PCT): CPT | Performed by: INTERNAL MEDICINE

## 2019-03-01 PROCEDURE — 78452 HT MUSCLE IMAGE SPECT MULT: CPT | Performed by: INTERNAL MEDICINE

## 2019-03-01 PROCEDURE — 78452 HT MUSCLE IMAGE SPECT MULT: CPT

## 2019-03-01 PROCEDURE — 82746 ASSAY OF FOLIC ACID SERUM: CPT | Performed by: INTERNAL MEDICINE

## 2019-03-01 PROCEDURE — 82728 ASSAY OF FERRITIN: CPT | Performed by: INTERNAL MEDICINE

## 2019-03-01 RX ORDER — ACETAMINOPHEN 325 MG/1
650 TABLET ORAL EVERY 6 HOURS PRN
Status: DISCONTINUED | OUTPATIENT
Start: 2019-03-01 | End: 2019-03-04 | Stop reason: HOSPADM

## 2019-03-01 RX ORDER — SIMETHICONE 80 MG
80 TABLET,CHEWABLE ORAL EVERY 6 HOURS PRN
Status: DISCONTINUED | OUTPATIENT
Start: 2019-03-01 | End: 2019-03-04 | Stop reason: HOSPADM

## 2019-03-01 RX ORDER — MAGNESIUM HYDROXIDE/ALUMINUM HYDROXICE/SIMETHICONE 120; 1200; 1200 MG/30ML; MG/30ML; MG/30ML
30 SUSPENSION ORAL EVERY 4 HOURS PRN
Status: DISCONTINUED | OUTPATIENT
Start: 2019-03-01 | End: 2019-03-04 | Stop reason: HOSPADM

## 2019-03-01 RX ORDER — OXYCODONE HYDROCHLORIDE 5 MG/1
5 TABLET ORAL EVERY 4 HOURS PRN
Status: DISCONTINUED | OUTPATIENT
Start: 2019-03-01 | End: 2019-03-04 | Stop reason: HOSPADM

## 2019-03-01 RX ORDER — OXYCODONE HYDROCHLORIDE 10 MG/1
10 TABLET ORAL EVERY 4 HOURS PRN
Status: DISCONTINUED | OUTPATIENT
Start: 2019-03-01 | End: 2019-03-04 | Stop reason: HOSPADM

## 2019-03-01 RX ADMIN — REGADENOSON 0.4 MG: 0.08 INJECTION, SOLUTION INTRAVENOUS at 09:40

## 2019-03-01 RX ADMIN — ATORVASTATIN CALCIUM 40 MG: 40 TABLET, FILM COATED ORAL at 16:33

## 2019-03-01 RX ADMIN — TAMSULOSIN HYDROCHLORIDE 0.4 MG: 0.4 CAPSULE ORAL at 16:33

## 2019-03-01 RX ADMIN — PREGABALIN 300 MG: 75 CAPSULE ORAL at 21:05

## 2019-03-01 RX ADMIN — OXYCODONE HYDROCHLORIDE 10 MG: 10 TABLET ORAL at 18:56

## 2019-03-01 RX ADMIN — PANTOPRAZOLE SODIUM 40 MG: 40 TABLET, DELAYED RELEASE ORAL at 06:27

## 2019-03-01 RX ADMIN — DOCUSATE SODIUM 100 MG: 100 CAPSULE, LIQUID FILLED ORAL at 11:20

## 2019-03-01 RX ADMIN — DOCUSATE SODIUM 100 MG: 100 CAPSULE, LIQUID FILLED ORAL at 18:55

## 2019-03-01 RX ADMIN — MELATONIN TAB 3 MG 6 MG: 3 TAB at 21:54

## 2019-03-01 RX ADMIN — BUTALBITAL, ACETAMINOPHEN AND CAFFEINE 1 TABLET: 50; 325; 40 TABLET ORAL at 16:32

## 2019-03-01 RX ADMIN — RANOLAZINE 1000 MG: 500 TABLET, FILM COATED, EXTENDED RELEASE ORAL at 11:20

## 2019-03-01 RX ADMIN — RANOLAZINE 1000 MG: 500 TABLET, FILM COATED, EXTENDED RELEASE ORAL at 18:55

## 2019-03-01 RX ADMIN — ASPIRIN 81 MG 81 MG: 81 TABLET ORAL at 11:20

## 2019-03-01 RX ADMIN — ISODIUM CHLORIDE 3 ML: 0.03 SOLUTION RESPIRATORY (INHALATION) at 07:18

## 2019-03-01 RX ADMIN — PREGABALIN 300 MG: 75 CAPSULE ORAL at 11:21

## 2019-03-01 RX ADMIN — LEVALBUTEROL 1.25 MG: 1.25 SOLUTION, CONCENTRATE RESPIRATORY (INHALATION) at 19:36

## 2019-03-01 RX ADMIN — METOPROLOL TARTRATE 25 MG: 25 TABLET, FILM COATED ORAL at 21:05

## 2019-03-01 RX ADMIN — ALUMINUM HYDROXIDE, MAGNESIUM HYDROXIDE, AND SIMETHICONE 30 ML: 200; 200; 20 SUSPENSION ORAL at 18:55

## 2019-03-01 RX ADMIN — ISODIUM CHLORIDE 3 ML: 0.03 SOLUTION RESPIRATORY (INHALATION) at 19:36

## 2019-03-01 RX ADMIN — AMLODIPINE BESYLATE 10 MG: 10 TABLET ORAL at 11:20

## 2019-03-01 RX ADMIN — LEVALBUTEROL 1.25 MG: 1.25 SOLUTION, CONCENTRATE RESPIRATORY (INHALATION) at 07:18

## 2019-03-01 RX ADMIN — CLONIDINE HYDROCHLORIDE 0.1 MG: 0.1 TABLET ORAL at 11:21

## 2019-03-01 RX ADMIN — APIXABAN 5 MG: 5 TABLET, FILM COATED ORAL at 18:55

## 2019-03-01 RX ADMIN — APIXABAN 5 MG: 5 TABLET, FILM COATED ORAL at 11:20

## 2019-03-01 RX ADMIN — METOPROLOL TARTRATE 25 MG: 25 TABLET, FILM COATED ORAL at 11:20

## 2019-03-01 RX ADMIN — ONDANSETRON 4 MG: 2 INJECTION INTRAMUSCULAR; INTRAVENOUS at 23:09

## 2019-03-01 NOTE — SOCIAL WORK
Cm spoke with Molly Rodriguez at [facility] assisted living  She was out this am and evaluated pt and they are able to accept him and have a room available  Issues are just trying to coordinate paperwork getting signed ;getting pt's home oxygen and home cpap delivered to [facility] and reaching out to pt's POA: Filiberto Rodriguez is waiting to hear back from her owner and then she will be reaching out to pt's POA:Kym Lorenzo [facility] can take pt is on Monday

## 2019-03-01 NOTE — PHYSICAL THERAPY NOTE
PT Treatment note      03/01/19 0758   Restrictions/Precautions   Other Precautions   (Contact/isolation; Chair Alarm; Bed Alarm;O2;Fall Risk;Pain)   General   Family/Caregiver Present No   Subjective   Subjective States he can't use his R arm  Bed Mobility   Rolling R 2  Maximal assistance   Additional items Assist x 2; Increased time required;Verbal cues;LE management   Rolling L 2  Maximal assistance   Additional items Assist x 2; Increased time required;Verbal cues;LE management   Supine to Sit 3  Moderate assistance   Additional items Assist x 1;HOB elevated; Increased time required;Verbal cues;LE management   Additional Comments Minimal particpation with bed mobility/rolling for brief change  Transfers   Sit to Stand 4  Minimal assistance   Additional items Assist x 1   Stand to Sit 4  Minimal assistance   Additional items Assist x 1; Armrests; Verbal cues   Stand pivot 4  Minimal assistance   Additional items Assist x 1;Verbal cues   Ambulation/Elevation   Gait pattern Wide BRAULIO   Gait Assistance 4  Minimal assist   Additional items Assist x 1;Verbal cues   Assistive Device Straight cane   Distance 3' bed to chair    Balance   Static Sitting Good   Dynamic Sitting Good   Static Standing Fair   Dynamic Standing Fair   Ambulatory Fair   Endurance Deficit   Endurance Deficit Yes   Activity Tolerance   Activity Tolerance Patient limited by fatigue;Patient limited by pain   Assessment   Prognosis Good   Problem List Decreased strength;Decreased endurance;Decreased mobility; Impaired balance;Decreased range of motion;Decreased safety awareness;Decreased coordination;Pain   Assessment Initially pt  was resistant to particpating with therapy  Requiring max x 2 for rolling in bed  Encouraged participation  Performing sit<>stand tx and short distance ambulation at (min) x1 level of function  limited by unsteadiness fatigue and weakness with increased fall risk    Pt is in need of continued activity in PT to improve strength balance endurance mobility transfers and ambulation with return to maximize LOF  From PT/mobility standpoint, recommendation at time of d/c would be STR in order to promote return to PLOF and independence  Plan   Treatment/Interventions Functional transfer training;LE strengthening/ROM; Therapeutic exercise; Endurance training;Bed mobility;Gait training   Progress Slow progress, decreased activity tolerance   Recommendation   Recommendation Short-term skilled PT   Pt  OOB in wc to be transported to test with PCA  at end of PT session  Discussed with Inocencio Medrano PT today's treatment and patient's current level of function for care coordination

## 2019-03-01 NOTE — ASSESSMENT & PLAN NOTE
· troponin levels x 3 sets - negative  · Echocardiogram was unremarkable, stress test was negative  · Telemetry monitoring has been unremarkable

## 2019-03-01 NOTE — ASSESSMENT & PLAN NOTE
· The patient was hypotensive yesterday  · This is improved with IVF and has remained stable    · Follow the blood pressure trend closely

## 2019-03-01 NOTE — OCCUPATIONAL THERAPY NOTE
[address] Progress Note     Patient Name: Ryan Baugh  Today's Date: 3/1/2019  Problem List  Patient Active Problem List   Diagnosis    Intractable diarrhea    Dyspnea on exertion    Hypokalemia    COPD (chronic obstructive pulmonary disease) (HCC)    Abdominal pain    Benign essential hypertension    Dyslipidemia    Depressive disorder    Persistent atrial fibrillation (HCC)    Migraines    Chronic pain    Tobacco abuse    Pulmonary nodule    Marijuana abuse    Dysphagia    Type 2 diabetes mellitus without complication, without long-term current use of insulin (HCC)    History of dilated cardiomyopathy secondary to tachycardia (HCC)    Coronary artery disease of native artery of native heart with stable angina pectoris ([address] )    Noncompliance    Obstructive sleep apnea    Urge incontinence of urine    GI bleed    Heme positive stool    Erectile dysfunction    Microcytic anemia    Other chest pain    Dyspnea    TIA (transient ischemic attack)    PTSD (post-traumatic stress disorder)    Major neurocognitive disorder    Alcohol abuse    Essential hypertension    Self-care deficit for medication administration    Constipation    Hypotension    Microscopic hematuria               03/01/19 1330   Restrictions/Precautions   Weight Bearing Precautions Per Order No   Other Precautions Contact/isolation; Chair Alarm;O2;Fall Risk   Pain Assessment   Pain Assessment No/denies pain   Bed Mobility   Sit to Supine   (pt seated at bedside after tx )   Transfers   Sit to Stand 4  Minimal assistance   Additional items Assist x 1; Armrests; Verbal cues   Stand to Sit 4  Minimal assistance   Additional items Bedrails;Assist x 1;Verbal cues   Stand pivot 4  Minimal assistance   Additional items Assist x 1;Verbal cues; Bedrails   Functional Mobility   Functional Mobility   (CG x1)   Additional Comments pt completed functional mobility 15 ft with SPC   Initial unsteadiness resolves and pt ambulates with steady pace  Safety cues needed as pt attempts to reach for furniture/walls  Cognition   Overall Cognitive Status WFL   Arousal/Participation Alert   Attention Within functional limits   Orientation Level Oriented X4   Memory Within functional limits   Following Commands Follows all commands and directions without difficulty   Activity Tolerance   Activity Tolerance Patient tolerated treatment well   Assessment   Assessment Pt presents OOB in chair and agrees to functional mobility  Pt completes transfers and mobility with supervision to [facility] for safety awareness  Pt activity tolerance good for limited session  Recommend continued OT intervention to meet POC goals maximal (I) level  Recommend STR upon dc  Spoke with OTR in regards to treatment session and progress thus far in POC, agreed with disposition  Plan   Goal Expiration Date 03/11/19   Treatment Day 2   Recommendation   OT Discharge Recommendation Short Term Rehab   Pt will benefit from continued OT services in order to maximize (I) c ADL performance, FM c RW, and improve overall endurance/strength required to complete functional tasks in preparation for d/c  Pt left at EOB by his request  All needs within reach

## 2019-03-01 NOTE — PLAN OF CARE
Problem: Potential for Falls  Goal: Patient will remain free of falls  Description  INTERVENTIONS:  - Assess patient frequently for physical needs  -  Identify cognitive and physical deficits and behaviors that affect risk of falls  -  [place] fall precautions as indicated by assessment   - Educate patient/family on patient safety including physical limitations  - Instruct patient to call for assistance with activity based on assessment  - Modify environment to reduce risk of injury  - Consider OT/PT consult to assist with strengthening/mobility  Outcome: Progressing     Problem: PAIN - ADULT  Goal: Verbalizes/displays adequate comfort level or baseline comfort level  Description  Interventions:  - Encourage patient to monitor pain and request assistance  - Assess pain using appropriate pain scale  - Administer analgesics based on type and severity of pain and evaluate response  - Implement non-pharmacological measures as appropriate and evaluate response  - Consider cultural and social influences on pain and pain management  - Notify physician/advanced practitioner if interventions unsuccessful or patient reports new pain  Outcome: Progressing     Problem: INFECTION - ADULT  Goal: Absence or prevention of progression during hospitalization  Description  INTERVENTIONS:  - Assess and monitor for signs and symptoms of infection  - Monitor lab/diagnostic results  - Monitor all insertion sites, i e  indwelling lines, tubes, and drains  - Monitor nasal secretions for changes in amount and color  - [place] appropriate cooling/warming therapies per order  - Administer medications as ordered  - Instruct and encourage patient and family to use good hand hygiene technique  - Identify and instruct in appropriate isolation precautions for identified infection/condition: History of MRSA; nasal swab sent and pending as of 2/28/19  Contact Isolation in place     Outcome: Progressing     Problem: SAFETY ADULT  Goal: Maintain or return to baseline ADL function  Description  INTERVENTIONS:  -  Assess patient's ability to carry out ADLs; assess patient's baseline for ADL function and identify physical deficits which impact ability to perform ADLs (bathing, care of mouth/teeth, toileting, grooming, dressing, etc )  - Assess/evaluate cause of self-care deficits   - Assess range of motion  - Assess patient's mobility; develop plan if impaired  - Assess patient's need for assistive devices and provide as appropriate  - Encourage maximum independence but intervene and supervise when necessary  ¯ Involve family in performance of ADLs  ¯ Assess for home care needs following discharge   ¯ Request OT consult to assist with ADL evaluation and planning for discharge  ¯ Provide patient education as appropriate  Outcome: Progressing  Goal: Maintain or return mobility status to optimal level  Description  INTERVENTIONS:  - Assess patient's baseline mobility status (ambulation, transfers, stairs, etc )    - Identify cognitive and physical deficits and behaviors that affect mobility  - Identify mobility aids required to assist with transfers and/or ambulation (gait belt, sit-to-stand, lift, walker, cane, etc )  - [place] fall precautions as indicated by assessment  - Record patient progress and toleration of activity level on Mobility SBAR; progress patient to next Phase/Stage  - Instruct patient to call for assistance with activity based on assessment  - Request Rehabilitation consult to assist with strengthening/weightbearing, etc   Outcome: Progressing     Problem: Knowledge Deficit  Goal: Patient/family/caregiver demonstrates understanding of disease process, treatment plan, medications, and discharge instructions  Description  Complete learning assessment and assess knowledge base    Interventions:  - Provide teaching at level of understanding  - Provide teaching via preferred learning methods  Outcome: Progressing     Problem: Prexisting or High Potential for Compromised Skin Integrity  Goal: Skin integrity is maintained or improved  Description  INTERVENTIONS:  - Identify patients at risk for skin breakdown  - Assess and monitor skin integrity  - Assess and monitor nutrition and hydration status  - Monitor labs (i e  albumin)  - Assess for incontinence   - Turn and reposition patient  - Assist with mobility/ambulation  - Relieve pressure over bony prominences  - Avoid friction and shearing  - Provide appropriate hygiene as needed including keeping skin clean and dry  - Evaluate need for skin moisturizer/barrier cream  - Collaborate with interdisciplinary team (i e  Nutrition, Rehabilitation, etc )   - Patient/family teaching  Outcome: Progressing

## 2019-03-01 NOTE — SOCIAL WORK
Pt has oxygen from  [address], I called [address] [place] [address] and made them aware that the pt is going to be d/c to a [facility], I was instructed to have the family move his equipment to the facility and when he is d/c we need to call them with the address for the [facility]

## 2019-03-01 NOTE — ASSESSMENT & PLAN NOTE
· Discontinued PO lasix  · NSS IV fluid bolus of 500 ml over 2 hours  · Follow the blood pressure trend  · Consider adding back lasix at lower dose

## 2019-03-01 NOTE — NURSING NOTE
Dr Jonas Rivas made aware that pt's frontal headache relief is 9/10 after receiving fioricet at [phone] 13 & pt is c/o upset stomach after eating a double serving on his supper tray;awaiting orders

## 2019-03-01 NOTE — PLAN OF CARE
Problem: OCCUPATIONAL THERAPY ADULT  Goal: Performs self-care activities at highest level of function for planned discharge setting  See evaluation for individualized goals  Description  Treatment Interventions: ADL retraining, Functional transfer training, UE strengthening/ROM, Endurance training, Patient/family training, Fine motor coordination activities, Activityengagement          See flowsheet documentation for full assessment, interventions and recommendations  Outcome: Progressing  Note:   Limitation: Decreased ADL status, Decreased UE ROM, Decreased UE strength, Decreased Safe judgement during ADL, Decreased endurance, Decreased self-care trans, Decreased fine motor control, Decreased high-level ADLs     Assessment: Pt presents OOB in chair and agrees to functional mobility  Pt completes transfers and mobility with supervision to [facility] for safety awareness  Pt activity tolerance good for limited session  Recommend continued OT intervention to meet POC goals maximal (I) level  Recommend STR upon dc  Spoke with OTR in regards to treatment session and progress thus far in POC, agreed with disposition        OT Discharge Recommendation: Short Term Rehab

## 2019-03-01 NOTE — PROGRESS NOTES
Patient ambulated around room with cane supervised by RN  Did not appear to have gross deficits/impairments of R side but does report weakness and decreased sensation  He was instructed to use call bell for assistance prior to getting out of bed

## 2019-03-01 NOTE — PLAN OF CARE
Problem: PHYSICAL THERAPY ADULT  Goal: Performs mobility at highest level of function for planned discharge setting  See evaluation for individualized goals  Outcome: Progressing  Note:   Prognosis: Good  Problem List: Decreased strength, Decreased endurance, Decreased mobility, Impaired balance, Decreased range of motion, Decreased safety awareness, Decreased coordination, Pain  Assessment: Initially pt  was resistant to particpating with therapy  Requiring max x 2 for rolling in bed  Encouraged participation  Performing sit<>stand tx and short distance ambulation at (min) x1 level of function  limited by unsteadiness fatigue and weakness with increased fall risk  Pt is in need of continued activity in PT to improve strength balance endurance mobility transfers and ambulation with return to maximize LOF  From PT/mobility standpoint, recommendation at time of d/c would be STR in order to promote return to PLOF and independence  Recommendation: Short-term skilled PT     PT - OK to Discharge: Yes(to next level of care when medically stable for discharge)    See flowsheet documentation for full assessment

## 2019-03-01 NOTE — SOCIAL WORK
Issue arose that [facility] may now not have a room for pt, they were working on a room change but the resident as of now doesn't want to move  Cm was instructed to call Saturday am and speak with Lilliana Walker at [facility] to follow up on status  [phone]

## 2019-03-01 NOTE — ASSESSMENT & PLAN NOTE
· iron panel showing low ferritin and iron levels - will add iron supplementation    · Follow the CBC  · Transfuse for a hemoglobin less than 7 g/dl

## 2019-03-02 PROBLEM — R11.0 NAUSEA: Status: ACTIVE | Noted: 2019-03-02

## 2019-03-02 PROCEDURE — 94640 AIRWAY INHALATION TREATMENT: CPT

## 2019-03-02 PROCEDURE — 94760 N-INVAS EAR/PLS OXIMETRY 1: CPT

## 2019-03-02 PROCEDURE — 99232 SBSQ HOSP IP/OBS MODERATE 35: CPT | Performed by: PHYSICIAN ASSISTANT

## 2019-03-02 RX ORDER — METOCLOPRAMIDE HYDROCHLORIDE 5 MG/ML
10 INJECTION INTRAMUSCULAR; INTRAVENOUS EVERY 6 HOURS PRN
Status: DISCONTINUED | OUTPATIENT
Start: 2019-03-02 | End: 2019-03-04 | Stop reason: HOSPADM

## 2019-03-02 RX ADMIN — ALUMINUM HYDROXIDE, MAGNESIUM HYDROXIDE, AND SIMETHICONE 30 ML: 200; 200; 20 SUSPENSION ORAL at 21:17

## 2019-03-02 RX ADMIN — ISODIUM CHLORIDE 3 ML: 0.03 SOLUTION RESPIRATORY (INHALATION) at 08:57

## 2019-03-02 RX ADMIN — ISODIUM CHLORIDE 3 ML: 0.03 SOLUTION RESPIRATORY (INHALATION) at 21:21

## 2019-03-02 RX ADMIN — OXYCODONE HYDROCHLORIDE 5 MG: 5 TABLET ORAL at 16:07

## 2019-03-02 RX ADMIN — DOCUSATE SODIUM 100 MG: 100 CAPSULE, LIQUID FILLED ORAL at 18:26

## 2019-03-02 RX ADMIN — METOCLOPRAMIDE 10 MG: 5 INJECTION, SOLUTION INTRAMUSCULAR; INTRAVENOUS at 16:21

## 2019-03-02 RX ADMIN — LEVALBUTEROL 1.25 MG: 1.25 SOLUTION, CONCENTRATE RESPIRATORY (INHALATION) at 08:57

## 2019-03-02 RX ADMIN — APIXABAN 5 MG: 5 TABLET, FILM COATED ORAL at 09:31

## 2019-03-02 RX ADMIN — ONDANSETRON 4 MG: 2 INJECTION INTRAMUSCULAR; INTRAVENOUS at 08:22

## 2019-03-02 RX ADMIN — METOPROLOL TARTRATE 25 MG: 25 TABLET, FILM COATED ORAL at 09:31

## 2019-03-02 RX ADMIN — DOCUSATE SODIUM 100 MG: 100 CAPSULE, LIQUID FILLED ORAL at 09:31

## 2019-03-02 RX ADMIN — TIOTROPIUM BROMIDE 18 MCG: 18 CAPSULE ORAL; RESPIRATORY (INHALATION) at 09:37

## 2019-03-02 RX ADMIN — APIXABAN 5 MG: 5 TABLET, FILM COATED ORAL at 18:26

## 2019-03-02 RX ADMIN — MELATONIN TAB 3 MG 6 MG: 3 TAB at 21:19

## 2019-03-02 RX ADMIN — METOPROLOL TARTRATE 25 MG: 25 TABLET, FILM COATED ORAL at 21:19

## 2019-03-02 RX ADMIN — ASPIRIN 81 MG 81 MG: 81 TABLET ORAL at 09:31

## 2019-03-02 RX ADMIN — RANOLAZINE 1000 MG: 500 TABLET, FILM COATED, EXTENDED RELEASE ORAL at 09:31

## 2019-03-02 RX ADMIN — CLONIDINE HYDROCHLORIDE 0.1 MG: 0.1 TABLET ORAL at 09:32

## 2019-03-02 RX ADMIN — AMLODIPINE BESYLATE 10 MG: 10 TABLET ORAL at 09:31

## 2019-03-02 RX ADMIN — PREGABALIN 300 MG: 75 CAPSULE ORAL at 09:31

## 2019-03-02 RX ADMIN — PANTOPRAZOLE SODIUM 40 MG: 40 TABLET, DELAYED RELEASE ORAL at 05:51

## 2019-03-02 RX ADMIN — ATORVASTATIN CALCIUM 40 MG: 40 TABLET, FILM COATED ORAL at 16:07

## 2019-03-02 RX ADMIN — PREGABALIN 300 MG: 75 CAPSULE ORAL at 21:18

## 2019-03-02 RX ADMIN — RANOLAZINE 1000 MG: 500 TABLET, FILM COATED, EXTENDED RELEASE ORAL at 18:26

## 2019-03-02 RX ADMIN — TAMSULOSIN HYDROCHLORIDE 0.4 MG: 0.4 CAPSULE ORAL at 16:07

## 2019-03-02 RX ADMIN — LEVALBUTEROL 1.25 MG: 1.25 SOLUTION, CONCENTRATE RESPIRATORY (INHALATION) at 21:21

## 2019-03-02 NOTE — ASSESSMENT & PLAN NOTE
· Discontinued PO lasix  · Hypotension resolved with NSS IV fluid bolus of 500 ml  · Follow the blood pressure trend  · Consider adding back lasix at lower dose

## 2019-03-02 NOTE — NURSING NOTE
Patient received from CCU  Report received from Fanny Vera  Patient laying in bed, no signs of distress  Call bell and room phone in reach

## 2019-03-02 NOTE — SOCIAL WORK
Called Trecia Goldmann at [facility] and they are definitely are not able to make the room switch so they are unable to take [facility]  Trecia Goldmann said that she knows the [facility] in [place] does have availability for me   I will attempt to call [facility] today

## 2019-03-02 NOTE — PROGRESS NOTES
Progress Note - Marge Drain 1945, 66 y o  male MRN: [id number]    Unit/Bed#: 417-01 Encounter: [id number]    Primary Care Provider: Tere Puga DO   Date and time admitted to hospital: 2/23/2019  3:25 PM        * Self-care deficit for medication administration  Assessment & Plan  · The patient requires SNF placement, Case management has placed referrals  Essential hypertension  Assessment & Plan  · The patient was hypotensive on 2/28/19  · This is improved with IVF bolus and has remained stable  · Follow the blood pressure trend closely    Persistent atrial fibrillation (HCC)  Assessment & Plan  · Continue PO metoprolol for heart rate control  · Continue eliquis 5 mg PO BID for full anti-coagulation    Nausea  Assessment & Plan  · Patient complaining of nausea, denies vomiting  · The patient has good po intake with 100% of meal eaten, question if nausea is due to overeating  · Zofran has been ineffective  · Will try IV Reglan  Microscopic hematuria  Assessment & Plan  · Outpatient follow-up with Urology    Hypotension  Assessment & Plan  · Discontinued PO lasix  · Hypotension resolved with NSS IV fluid bolus of 500 ml  · Follow the blood pressure trend  · Consider adding back lasix at lower dose  Constipation  Assessment & Plan  · Resolved with magnesium citrate   · Continue PO colace and PRN PO miralax    Other chest pain  Assessment & Plan  · troponin levels x 3 sets - negative  · Echocardiogram was unremarkable, stress test was negative  · Telemetry monitoring has been unremarkable  Microcytic anemia  Assessment & Plan  · iron panel showing low ferritin and iron levels - continue iron supplementation    · Follow the CBC  · Transfuse for a hemoglobin less than 7 g/dl    Obstructive sleep apnea  Assessment & Plan  · Continue CPAP QHS and anytime while sleeping      VTE Pharmacologic Prophylaxis:   Pharmacologic: Apixaban (Eliquis)  Mechanical VTE Prophylaxis in Place: Yes    Patient Centered Rounds: I have performed bedside rounds with nursing staff today  Discussions with Specialists or Other Care Team Provider: nursing, CM, and attending      Time Spent for Care: 30 minutes  More than 50% of total time spent on counseling and coordination of care as described above  Current Length of Stay: 7 day(s)    Current Patient Status: Inpatient   Certification Statement: The patient will continue to require additional inpatient hospital stay due to placement to SNF  Discharge Plan: patient medically stable for discharge to SNF, waiting placement    Code Status: Level 1 - Full Code      Subjective: The patient was seen and examined  The patient is complaining of nausea, he denies vomiting  Nursing states he has been eating  He states Zofran has not helped  Objective:     Vitals:   Temp (24hrs), Av 5 °F (36 4 °C), Min:96 8 °F (36 °C), Max:98 1 °F (36 7 °C)    Temp:  [96 8 °F (36 °C)-98 1 °F (36 7 °C)] 97 5 °F (36 4 °C)  HR:  [68-96] 68  Resp:  [14-20] 19  BP: (106-145)/(52-67) 114/56  SpO2:  [97 %-100 %] 100 %  Body mass index is 30 15 kg/m²  Input and Output Summary (last 24 hours): Intake/Output Summary (Last 24 hours) at 3/2/2019 1149  Last data filed at 3/2/2019 6750  Gross per 24 hour   Intake 1080 ml   Output 1515 ml   Net -435 ml       Physical Exam:     Physical Exam   Constitutional: He is oriented to person, place, and time  Vital signs are normal  He appears well-developed and well-nourished  He is active and cooperative  Cardiovascular: Normal rate and regular rhythm  Pulmonary/Chest: Effort normal and breath sounds normal  He has no wheezes  He has no rhonchi  He has no rales  Abdominal: Soft  Normal appearance and bowel sounds are normal  There is no tenderness  Neurological: He is alert and oriented to person, place, and time  No cranial nerve deficit  Skin: Skin is warm, dry and intact     Nursing note and vitals reviewed  Additional Data:     Labs:    Results from last 7 days   Lab Units 03/01/19  0626   WBC Thousand/uL 9 23   HEMOGLOBIN g/dL 11 2*   HEMATOCRIT % 38 4   PLATELETS Thousands/uL 235   NEUTROS PCT % 50   LYMPHS PCT % 25   MONOS PCT % 14*   EOS PCT % 8*     Results from last 7 days   Lab Units 03/01/19  0627   SODIUM mmol/L 139   POTASSIUM mmol/L 4 0   CHLORIDE mmol/L 106   CO2 mmol/L 30   BUN mg/dL 26*   CREATININE mg/dL 0 98   ANION GAP mmol/L 3*   CALCIUM mg/dL 8 9   ALBUMIN g/dL 2 8*   TOTAL BILIRUBIN mg/dL 0 20   ALK PHOS U/L 78   ALT U/L 25   AST U/L 11   GLUCOSE RANDOM mg/dL 121     Results from last 7 days   Lab Units 02/25/19  0516   INR  1 35*             Results from last 7 days   Lab Units 03/01/19  0626   PROCALCITONIN ng/ml <0 05           * I Have Reviewed All Lab Data Listed Above  * Additional Pertinent Lab Tests Reviewed:  All Labs Within Last 24 Hours Reviewed    Imaging:    Imaging Reports Reviewed Today Include: none  Imaging Personally Reviewed by Myself Includes:  none    Recent Cultures (last 7 days):           Last 24 Hours Medication List:     Current Facility-Administered Medications:  acetaminophen 650 mg Oral Q6H PRN Johann Zazueta DO   albuterol 2 5 mg Nebulization Q6H PRN Glynn Hwang MD   ALPRAZolam 0 25 mg Oral TID PRN Marshal Valles PA-C   aluminum-magnesium hydroxide-simethicone 30 mL Oral Q4H PRN Johann Zazueta,    amLODIPine 10 mg Oral Daily Savannah Y Swank, CRNP   apixaban 5 mg Oral BID Savannah Y Swank, CRNP   aspirin 81 mg Oral Daily Savannah Y Swank, CRNP   atorvastatin 40 mg Oral Daily With Dinner Savannah Y Swank, CRNP   cloNIDine 0 1 mg Oral Daily Savannah Y Swank, CRNP   docusate sodium 100 mg Oral BID Johann Zazueta, DO   levalbuterol 1 25 mg Nebulization BID Glynn Hwang MD   melatonin 6 mg Oral HS Savannah Y Swank, CRNP   metoclopramide 10 mg Intravenous Q6H PRN Marshal Valles PA-C   metoprolol tartrate 25 mg Oral Q12H 6225 Westley Salazar DO   nicotine 1 patch Transdermal Daily Savannah Y Swank, CRNP   oxyCODONE 10 mg Oral Q4H PRN Destiny Black, DO   oxyCODONE 5 mg Oral Q4H PRN Destiny Black, DO   pantoprazole 40 mg Oral Early Morning Savannah Y Swank, CRNP   polyethylene glycol 17 g Oral Daily PRN Tammy Pineda PA-C   pregabalin 300 mg Oral BID Savannah Y Swank, CRNP   ranolazine 1,000 mg Oral BID Savannah Y Swank, CRNP   simethicone 80 mg Oral Q6H PRN Destiny Black, DO   sodium chloride 3 mL Nebulization BID Gemma Kirby MD   tamsulosin 0 4 mg Oral Daily With Dinner Savannah Y Swank, NATALIIA   tiotropium 18 mcg Inhalation Daily NATALIIA Bauer        Today, Patient Was Seen By: Tammy Pineda PA-C    ** Please Note: Dictation voice to text software may have been used in the creation of this document   **

## 2019-03-02 NOTE — ASSESSMENT & PLAN NOTE
· The patient was hypotensive on 2/28/19  · This is improved with IVF bolus and has remained stable    · Follow the blood pressure trend closely

## 2019-03-02 NOTE — SOCIAL WORK
Found 2 more facilities within 40 miles for SNF that our with in network for patient  I will place referral to  [facility] and [facility]   Will call [facility] and [facility] on Monday to see if they have any availability for patient

## 2019-03-02 NOTE — NURSING NOTE
Pt transferred to 4th floor;all belongings sent w/pt  Transferred via bed by this writer & receiving RN,Poncho Moreno  Transferred to room 417  Transfer report given to JUVE Brian RN at [facility]

## 2019-03-02 NOTE — ASSESSMENT & PLAN NOTE
· iron panel showing low ferritin and iron levels - continue iron supplementation    · Follow the CBC  · Transfuse for a hemoglobin less than 7 g/dl

## 2019-03-02 NOTE — ASSESSMENT & PLAN NOTE
· Patient complaining of nausea, denies vomiting  · The patient has good po intake with 100% of meal eaten, question if nausea is due to overeating  · Zofran has been ineffective  · Will try IV Reglan

## 2019-03-03 PROBLEM — R93.1 REGIONAL WALL MOTION ABNORMALITY OF HEART: Status: ACTIVE | Noted: 2019-03-03

## 2019-03-03 PROBLEM — R51.9 HEADACHE: Status: ACTIVE | Noted: 2019-03-03

## 2019-03-03 LAB
ANION GAP SERPL CALCULATED.3IONS-SCNC: 6 MMOL/L (ref 4–13)
BASOPHILS # BLD AUTO: 0.08 THOUSANDS/ΜL (ref 0–0.1)
BASOPHILS NFR BLD AUTO: 1 % (ref 0–1)
BUN SERPL-MCNC: 24 MG/DL (ref 5–25)
CALCIUM SERPL-MCNC: 9.2 MG/DL (ref 8.3–10.1)
CHLORIDE SERPL-SCNC: 106 MMOL/L (ref 100–108)
CO2 SERPL-SCNC: 31 MMOL/L (ref 21–32)
CREAT SERPL-MCNC: 0.99 MG/DL (ref 0.6–1.3)
EOSINOPHIL # BLD AUTO: 0.47 THOUSAND/ΜL (ref 0–0.61)
EOSINOPHIL NFR BLD AUTO: 6 % (ref 0–6)
ERYTHROCYTE [DISTWIDTH] IN BLOOD BY AUTOMATED COUNT: 26.8 % (ref 11.6–15.1)
GFR SERPL CREATININE-BSD FRML MDRD: 74 ML/MIN/1.73SQ M
GLUCOSE SERPL-MCNC: 164 MG/DL (ref 65–140)
HCT VFR BLD AUTO: 39.5 % (ref 36.5–49.3)
HGB BLD-MCNC: 11.4 G/DL (ref 12–17)
IMM GRANULOCYTES # BLD AUTO: 0.14 THOUSAND/UL (ref 0–0.2)
IMM GRANULOCYTES NFR BLD AUTO: 2 % (ref 0–2)
LYMPHOCYTES # BLD AUTO: 1.69 THOUSANDS/ΜL (ref 0.6–4.47)
LYMPHOCYTES NFR BLD AUTO: 20 % (ref 14–44)
MCH RBC QN AUTO: 21.2 PG (ref 26.8–34.3)
MCHC RBC AUTO-ENTMCNC: 28.9 G/DL (ref 31.4–37.4)
MCV RBC AUTO: 73 FL (ref 82–98)
MONOCYTES # BLD AUTO: 1.04 THOUSAND/ΜL (ref 0.17–1.22)
MONOCYTES NFR BLD AUTO: 12 % (ref 4–12)
NEUTROPHILS # BLD AUTO: 5.04 THOUSANDS/ΜL (ref 1.85–7.62)
NEUTS SEG NFR BLD AUTO: 59 % (ref 43–75)
NRBC BLD AUTO-RTO: 0 /100 WBCS
PLATELET # BLD AUTO: 231 THOUSANDS/UL (ref 149–390)
PMV BLD AUTO: 9.2 FL (ref 8.9–12.7)
POTASSIUM SERPL-SCNC: 3.8 MMOL/L (ref 3.5–5.3)
RBC # BLD AUTO: 5.38 MILLION/UL (ref 3.88–5.62)
SODIUM SERPL-SCNC: 143 MMOL/L (ref 136–145)
WBC # BLD AUTO: 8.46 THOUSAND/UL (ref 4.31–10.16)

## 2019-03-03 PROCEDURE — 80048 BASIC METABOLIC PNL TOTAL CA: CPT | Performed by: PHYSICIAN ASSISTANT

## 2019-03-03 PROCEDURE — 94760 N-INVAS EAR/PLS OXIMETRY 1: CPT

## 2019-03-03 PROCEDURE — 94640 AIRWAY INHALATION TREATMENT: CPT

## 2019-03-03 PROCEDURE — 85025 COMPLETE CBC W/AUTO DIFF WBC: CPT | Performed by: PHYSICIAN ASSISTANT

## 2019-03-03 PROCEDURE — 99232 SBSQ HOSP IP/OBS MODERATE 35: CPT | Performed by: PHYSICIAN ASSISTANT

## 2019-03-03 RX ORDER — DIPHENHYDRAMINE HYDROCHLORIDE 50 MG/ML
25 INJECTION INTRAMUSCULAR; INTRAVENOUS ONCE
Status: COMPLETED | OUTPATIENT
Start: 2019-03-03 | End: 2019-03-03

## 2019-03-03 RX ORDER — MAGNESIUM SULFATE HEPTAHYDRATE 40 MG/ML
2 INJECTION, SOLUTION INTRAVENOUS ONCE
Status: COMPLETED | OUTPATIENT
Start: 2019-03-03 | End: 2019-03-03

## 2019-03-03 RX ORDER — ONDANSETRON 2 MG/ML
4 INJECTION INTRAMUSCULAR; INTRAVENOUS EVERY 4 HOURS PRN
Status: DISCONTINUED | OUTPATIENT
Start: 2019-03-03 | End: 2019-03-04 | Stop reason: HOSPADM

## 2019-03-03 RX ADMIN — PREGABALIN 300 MG: 75 CAPSULE ORAL at 21:03

## 2019-03-03 RX ADMIN — RANOLAZINE 1000 MG: 500 TABLET, FILM COATED, EXTENDED RELEASE ORAL at 08:42

## 2019-03-03 RX ADMIN — PREGABALIN 300 MG: 75 CAPSULE ORAL at 08:42

## 2019-03-03 RX ADMIN — OXYCODONE HYDROCHLORIDE 10 MG: 10 TABLET ORAL at 22:42

## 2019-03-03 RX ADMIN — RANOLAZINE 1000 MG: 500 TABLET, FILM COATED, EXTENDED RELEASE ORAL at 17:55

## 2019-03-03 RX ADMIN — APIXABAN 5 MG: 5 TABLET, FILM COATED ORAL at 17:55

## 2019-03-03 RX ADMIN — CLONIDINE HYDROCHLORIDE 0.1 MG: 0.1 TABLET ORAL at 08:42

## 2019-03-03 RX ADMIN — ATORVASTATIN CALCIUM 40 MG: 40 TABLET, FILM COATED ORAL at 16:01

## 2019-03-03 RX ADMIN — METOPROLOL TARTRATE 25 MG: 25 TABLET, FILM COATED ORAL at 08:42

## 2019-03-03 RX ADMIN — DIPHENHYDRAMINE HYDROCHLORIDE 25 MG: 50 INJECTION, SOLUTION INTRAMUSCULAR; INTRAVENOUS at 13:20

## 2019-03-03 RX ADMIN — ISODIUM CHLORIDE 3 ML: 0.03 SOLUTION RESPIRATORY (INHALATION) at 08:12

## 2019-03-03 RX ADMIN — PANTOPRAZOLE SODIUM 40 MG: 40 TABLET, DELAYED RELEASE ORAL at 06:01

## 2019-03-03 RX ADMIN — LEVALBUTEROL 1.25 MG: 1.25 SOLUTION, CONCENTRATE RESPIRATORY (INHALATION) at 20:25

## 2019-03-03 RX ADMIN — APIXABAN 5 MG: 5 TABLET, FILM COATED ORAL at 08:42

## 2019-03-03 RX ADMIN — DOCUSATE SODIUM 100 MG: 100 CAPSULE, LIQUID FILLED ORAL at 08:42

## 2019-03-03 RX ADMIN — METOPROLOL TARTRATE 25 MG: 25 TABLET, FILM COATED ORAL at 21:03

## 2019-03-03 RX ADMIN — ISODIUM CHLORIDE 3 ML: 0.03 SOLUTION RESPIRATORY (INHALATION) at 20:25

## 2019-03-03 RX ADMIN — AMLODIPINE BESYLATE 10 MG: 10 TABLET ORAL at 08:42

## 2019-03-03 RX ADMIN — TIOTROPIUM BROMIDE 18 MCG: 18 CAPSULE ORAL; RESPIRATORY (INHALATION) at 08:54

## 2019-03-03 RX ADMIN — MAGNESIUM SULFATE HEPTAHYDRATE 2 G: 40 INJECTION, SOLUTION INTRAVENOUS at 14:13

## 2019-03-03 RX ADMIN — METOCLOPRAMIDE 10 MG: 5 INJECTION, SOLUTION INTRAMUSCULAR; INTRAVENOUS at 12:24

## 2019-03-03 RX ADMIN — LEVALBUTEROL 1.25 MG: 1.25 SOLUTION, CONCENTRATE RESPIRATORY (INHALATION) at 08:12

## 2019-03-03 RX ADMIN — ASPIRIN 81 MG 81 MG: 81 TABLET ORAL at 08:42

## 2019-03-03 RX ADMIN — DOCUSATE SODIUM 100 MG: 100 CAPSULE, LIQUID FILLED ORAL at 17:55

## 2019-03-03 RX ADMIN — SODIUM CHLORIDE 500 MG: 0.9 INJECTION, SOLUTION INTRAVENOUS at 13:21

## 2019-03-03 RX ADMIN — MELATONIN TAB 3 MG 6 MG: 3 TAB at 21:03

## 2019-03-03 NOTE — ASSESSMENT & PLAN NOTE
· Patient complaining of nausea, denies vomiting  · The patient has good po intake with 100% of meal eaten, question if nausea is due to overeating  · Patient feels nausea improved with Zofran and not the Reglan   Will reorder prn zofran

## 2019-03-03 NOTE — ASSESSMENT & PLAN NOTE
· PO lasix discontinued on 2/28/19, will continue to hold given low normal BP    · Echo: normal LV systolic function with EF 60%  · Hypotension resolved with NSS IV fluid bolus of 500 ml  · Follow the blood pressure trend

## 2019-03-03 NOTE — ASSESSMENT & PLAN NOTE
· The patient is complaining of a headache, he states he has a history of migraines  · Will order IV magnesium, IV benadryl, IV Reglan and IV solu-medrol x 1 dose  · Patient has allergy to Toradol

## 2019-03-03 NOTE — PROGRESS NOTES
Progress Note - Humberto Schneider 1945, 66 y o  male MRN: [id number]    Unit/Bed#: 417-01 Encounter: [id number]    Primary Care Provider: Brissa Torres DO   Date and time admitted to hospital: 2/23/2019  3:25 PM        * Self-care deficit for medication administration  Assessment & Plan  · The patient requires SNF placement, Case management has placed referrals  Essential hypertension  Assessment & Plan  · The patient was hypotensive on 2/28/19  · This is improved with IVF bolus and has remained stable  · Follow the blood pressure trend closely    Persistent atrial fibrillation (HCC)  Assessment & Plan  · Continue PO metoprolol for heart rate control  · Continue eliquis 5 mg PO BID for full anti-coagulation    Headache  Assessment & Plan  · The patient is complaining of a headache, he states he has a history of migraines  · Will order IV magnesium, IV benadryl, IV Reglan and IV solu-medrol x 1 dose  · Patient has allergy to Toradol  Nausea  Assessment & Plan  · Patient complaining of nausea, denies vomiting  · The patient has good po intake with 100% of meal eaten, question if nausea is due to overeating  · Patient feels nausea improved with Zofran and not the Reglan  Will reorder prn zofran    Microscopic hematuria  Assessment & Plan  · Outpatient follow-up with Urology    Hypotension  Assessment & Plan  · PO lasix discontinued on 2/28/19, will continue to hold given low normal BP  · Echo: normal LV systolic function with EF 60%  · Hypotension resolved with NSS IV fluid bolus of 500 ml  · Follow the blood pressure trend      Constipation  Assessment & Plan  · Resolved with magnesium citrate   · Continue PO colace and PRN PO miralax    Other chest pain  Assessment & Plan  · troponin levels x 3 sets - negative  · Echocardiogram was unremarkable, stress test was negative  · Telemetry monitoring has been unremarkable        Microcytic anemia  Assessment & Plan  · iron panel showing low ferritin and iron levels - continue iron supplementation  · Follow the CBC  · Transfuse for a hemoglobin less than 7 g/dl    Obstructive sleep apnea  Assessment & Plan  · Continue CPAP QHS and anytime while sleeping      VTE Pharmacologic Prophylaxis:   Pharmacologic: Apixaban (Eliquis)  Mechanical VTE Prophylaxis in Place: Yes    Patient Centered Rounds: I have performed bedside rounds with nursing staff today  Discussions with Specialists or Other Care Team Provider: nursing, CM and attending      Time Spent for Care: 30 minutes  More than 50% of total time spent on counseling and coordination of care as described above  Current Length of Stay: 8 day(s)    Current Patient Status: Inpatient   Certification Statement: The patient will continue to require additional inpatient hospital stay due to need for placement to SNF  Discharge Plan: TBD    Code Status: Level 1 - Full Code      Subjective: The patient was seen and examined  The patient states he's having nausea although he is eating his lunch while I am talking to him  He denies vomiting  He is also complaining of a headache which he states is a migraine  Objective:     Vitals:   Temp (24hrs), Av 4 °F (36 9 °C), Min:97 8 °F (36 6 °C), Max:98 7 °F (37 1 °C)    Temp:  [97 8 °F (36 6 °C)-98 7 °F (37 1 °C)] 97 8 °F (36 6 °C)  HR:  [69-82] 79  Resp:  [18] 18  BP: (104-129)/(52-60) 116/56  SpO2:  [97 %-99 %] 98 %  Body mass index is 30 15 kg/m²  Input and Output Summary (last 24 hours): Intake/Output Summary (Last 24 hours) at 3/3/2019 1303  Last data filed at 3/3/2019 1235  Gross per 24 hour   Intake 900 ml   Output 1050 ml   Net -150 ml       Physical Exam:     Physical Exam   Constitutional: He is oriented to person, place, and time  Vital signs are normal  He appears well-developed and well-nourished  He is active and cooperative  Cardiovascular: Normal rate and regular rhythm     Pulmonary/Chest: Effort normal  He has decreased breath sounds  He has no wheezes  He has no rhonchi  He has no rales  Abdominal: Soft  Normal appearance and bowel sounds are normal  He exhibits no distension  There is no tenderness  Neurological: He is alert and oriented to person, place, and time  No cranial nerve deficit  Skin: Skin is warm and intact  Nursing note and vitals reviewed  Additional Data:      Labs:    Results from last 7 days   Lab Units 03/03/19  1236   WBC Thousand/uL 8 46   HEMOGLOBIN g/dL 11 4*   HEMATOCRIT % 39 5   PLATELETS Thousands/uL 231   NEUTROS PCT % 59   LYMPHS PCT % 20   MONOS PCT % 12   EOS PCT % 6     Results from last 7 days   Lab Units 03/03/19  1236 03/01/19  0627   SODIUM mmol/L 143 139   POTASSIUM mmol/L 3 8 4 0   CHLORIDE mmol/L 106 106   CO2 mmol/L 31 30   BUN mg/dL 24 26*   CREATININE mg/dL 0 99 0 98   ANION GAP mmol/L 6 3*   CALCIUM mg/dL 9 2 8 9   ALBUMIN g/dL  --  2 8*   TOTAL BILIRUBIN mg/dL  --  0 20   ALK PHOS U/L  --  78   ALT U/L  --  25   AST U/L  --  11   GLUCOSE RANDOM mg/dL 164* 121     Results from last 7 days   Lab Units 02/25/19  0516   INR  1 35*             Results from last 7 days   Lab Units 03/01/19  0626   PROCALCITONIN ng/ml <0 05           * I Have Reviewed All Lab Data Listed Above  * Additional Pertinent Lab Tests Reviewed:  All Labs Within Last 24 Hours Reviewed    Imaging:    Imaging Reports Reviewed Today Include: none  Imaging Personally Reviewed by Myself Includes:  none    Recent Cultures (last 7 days):           Last 24 Hours Medication List:     Current Facility-Administered Medications:  acetaminophen 650 mg Oral Q6H PRN Chaitanya Pearl,    albuterol 2 5 mg Nebulization Q6H PRN Coral Us MD   ALPRAZolam 0 25 mg Oral TID PRN Elyssa Avery PA-C   aluminum-magnesium hydroxide-simethicone 30 mL Oral Q4H PRN Chaitanya Pearl,    amLODIPine 10 mg Oral Daily Savannah Y Swank, CRNP   apixaban 5 mg Oral BID Savannah Y Swank, CRNP   aspirin 81 mg Oral Daily Savannah Y Swank, CRNP   atorvastatin 40 mg Oral Daily With Dinner Sarah Arana, NATALIIA   cloNIDine 0 1 mg Oral Daily Savannah Y Swank, CRNP   diphenhydrAMINE 25 mg Intravenous Once Arron Chapman PA-C   docusate sodium 100 mg Oral BID Hollace Spurr, DO   levalbuterol 1 25 mg Nebulization BID Leo Cristina MD   magnesium sulfate 2 g Intravenous Once Arron Chapman, CECE   melatonin 6 mg Oral HS Savannah Y Swank, CRNP   methylPREDNISolone sodium succinate 500 mg Intravenous Once Arron Chapman, CECE   metoclopramide 10 mg Intravenous Q6H PRN Arron Chapman, CECE   metoprolol tartrate 25 mg Oral Q12H [facility] Sp[facility], DO   nicotine 1 patch Transdermal Daily Saundra Y Swank, CRNP   ondansetron 4 mg Intravenous Q4H PRN Arron Chapman PA-C   oxyCODONE 10 mg Oral Q4H PRN [facility] Sp[facility], DO   oxyCODONE 5 mg Oral Q4H PRN [facility] Sp[facility], DO   pantoprazole 40 mg Oral Early Morning Savannah Y Swank, CRNP   polyethylene glycol 17 g Oral Daily PRN Arron Chapman PA-C   pregabalin 300 mg Oral BID Saundra Y Swank, CRNP   ranolazine 1,000 mg Oral BID Savannah Y Swank, CRNP   simethicone 80 mg Oral Q6H PRN [facility] Spur[facility], DO   sodium chloride 3 mL Nebulization BID Leo Cristina MD   tamsulosin 0 4 mg Oral Daily With Dinner Saundra Y Swank, CRNP   tiotropium 18 mcg Inhalation Daily NATALIIA Nolasco        Today, Patient Was Seen By: Arron Chapman PA-C    ** Please Note: Dictation voice to text software may have been used in the creation of this document   **

## 2019-03-03 NOTE — NURSING NOTE
Pt  Refusing morning lab work to be obtained  Pt  Educated on the importance of morning lab work  Pt  Continues to refuse   CECE Yang notified of refusal

## 2019-03-04 VITALS
HEART RATE: 72 BPM | HEIGHT: 69 IN | DIASTOLIC BLOOD PRESSURE: 72 MMHG | TEMPERATURE: 97.2 F | BODY MASS INDEX: 30.27 KG/M2 | OXYGEN SATURATION: 96 % | WEIGHT: 204.37 LBS | SYSTOLIC BLOOD PRESSURE: 156 MMHG | RESPIRATION RATE: 16 BRPM

## 2019-03-04 LAB
CHEST PAIN STATEMENT: NORMAL
MAX DIASTOLIC BP: 78 MMHG
MAX HEART RATE: 83 BPM
MAX PREDICTED HEART RATE: 144 BPM
MAX. SYSTOLIC BP: 112 MMHG
PROTOCOL NAME: NORMAL
REASON FOR TERMINATION: NORMAL
TARGET HR FORMULA: NORMAL
TEST INDICATION: NORMAL
TIME IN EXERCISE PHASE: NORMAL

## 2019-03-04 PROCEDURE — 94640 AIRWAY INHALATION TREATMENT: CPT

## 2019-03-04 PROCEDURE — 94760 N-INVAS EAR/PLS OXIMETRY 1: CPT

## 2019-03-04 PROCEDURE — 97116 GAIT TRAINING THERAPY: CPT

## 2019-03-04 PROCEDURE — 99239 HOSP IP/OBS DSCHRG MGMT >30: CPT | Performed by: PHYSICIAN ASSISTANT

## 2019-03-04 PROCEDURE — 97535 SELF CARE MNGMENT TRAINING: CPT

## 2019-03-04 PROCEDURE — 97530 THERAPEUTIC ACTIVITIES: CPT

## 2019-03-04 RX ORDER — ACETAMINOPHEN 325 MG/1
650 TABLET ORAL EVERY 6 HOURS PRN
Qty: 30 TABLET | Refills: 0 | Status: SHIPPED | OUTPATIENT
Start: 2019-03-04 | End: 2020-01-16 | Stop reason: HOSPADM

## 2019-03-04 RX ORDER — BUTALBITAL, ACETAMINOPHEN AND CAFFEINE 50; 325; 40 MG/1; MG/1; MG/1
1 TABLET ORAL EVERY 4 HOURS PRN
Qty: 30 TABLET | Refills: 0
Start: 2019-03-04 | End: 2019-04-11

## 2019-03-04 RX ORDER — NICOTINE 21 MG/24HR
1 PATCH, TRANSDERMAL 24 HOURS TRANSDERMAL DAILY
Qty: 28 PATCH | Refills: 0 | Status: SHIPPED | OUTPATIENT
Start: 2019-03-05 | End: 2019-03-18

## 2019-03-04 RX ORDER — BUTALBITAL, ACETAMINOPHEN AND CAFFEINE 50; 325; 40 MG/1; MG/1; MG/1
1 TABLET ORAL EVERY 4 HOURS PRN
Status: DISCONTINUED | OUTPATIENT
Start: 2019-03-04 | End: 2019-03-04 | Stop reason: HOSPADM

## 2019-03-04 RX ORDER — DOCUSATE SODIUM 100 MG/1
100 CAPSULE, LIQUID FILLED ORAL 2 TIMES DAILY
Qty: 10 CAPSULE | Refills: 0 | Status: ON HOLD | OUTPATIENT
Start: 2019-03-04 | End: 2020-01-14 | Stop reason: CLARIF

## 2019-03-04 RX ADMIN — LEVALBUTEROL 1.25 MG: 1.25 SOLUTION, CONCENTRATE RESPIRATORY (INHALATION) at 08:29

## 2019-03-04 RX ADMIN — OXYCODONE HYDROCHLORIDE 10 MG: 10 TABLET ORAL at 03:06

## 2019-03-04 RX ADMIN — METOPROLOL TARTRATE 25 MG: 25 TABLET, FILM COATED ORAL at 09:24

## 2019-03-04 RX ADMIN — PANTOPRAZOLE SODIUM 40 MG: 40 TABLET, DELAYED RELEASE ORAL at 06:33

## 2019-03-04 RX ADMIN — METOCLOPRAMIDE 10 MG: 5 INJECTION, SOLUTION INTRAMUSCULAR; INTRAVENOUS at 12:27

## 2019-03-04 RX ADMIN — BUTALBITAL, ACETAMINOPHEN AND CAFFEINE 1 TABLET: 50; 325; 40 TABLET ORAL at 11:37

## 2019-03-04 RX ADMIN — TIOTROPIUM BROMIDE 18 MCG: 18 CAPSULE ORAL; RESPIRATORY (INHALATION) at 09:20

## 2019-03-04 RX ADMIN — DOCUSATE SODIUM 100 MG: 100 CAPSULE, LIQUID FILLED ORAL at 17:19

## 2019-03-04 RX ADMIN — DOCUSATE SODIUM 100 MG: 100 CAPSULE, LIQUID FILLED ORAL at 09:24

## 2019-03-04 RX ADMIN — ISODIUM CHLORIDE 3 ML: 0.03 SOLUTION RESPIRATORY (INHALATION) at 08:29

## 2019-03-04 RX ADMIN — ATORVASTATIN CALCIUM 40 MG: 40 TABLET, FILM COATED ORAL at 17:19

## 2019-03-04 RX ADMIN — APIXABAN 5 MG: 5 TABLET, FILM COATED ORAL at 17:19

## 2019-03-04 RX ADMIN — ONDANSETRON 4 MG: 2 INJECTION INTRAMUSCULAR; INTRAVENOUS at 11:37

## 2019-03-04 RX ADMIN — CLONIDINE HYDROCHLORIDE 0.1 MG: 0.1 TABLET ORAL at 09:23

## 2019-03-04 RX ADMIN — RANOLAZINE 1000 MG: 500 TABLET, FILM COATED, EXTENDED RELEASE ORAL at 09:23

## 2019-03-04 RX ADMIN — APIXABAN 5 MG: 5 TABLET, FILM COATED ORAL at 09:23

## 2019-03-04 RX ADMIN — PREGABALIN 300 MG: 75 CAPSULE ORAL at 09:24

## 2019-03-04 RX ADMIN — AMLODIPINE BESYLATE 10 MG: 10 TABLET ORAL at 09:24

## 2019-03-04 RX ADMIN — RANOLAZINE 1000 MG: 500 TABLET, FILM COATED, EXTENDED RELEASE ORAL at 17:20

## 2019-03-04 RX ADMIN — ASPIRIN 81 MG 81 MG: 81 TABLET ORAL at 09:24

## 2019-03-04 NOTE — PHYSICAL THERAPY NOTE
PT Treatment note      03/04/19 0932   Restrictions/Precautions   Weight Bearing Precautions Per Order No   Other Precautions Chair Alarm; Bed Alarm; Fall Risk;Pain   General   Family/Caregiver Present No   Cognition   Overall Cognitive Status WFL   Arousal/Participation Alert   Following Commands Follows all commands and directions without difficulty   Subjective   Subjective c/o R arm discomfort with movement  Agreeable to therapy  Bed Mobility   Additional Comments seated EOB at start of PT session   Transfers   Sit to Stand 4  Minimal assistance   Additional items Assist x 1;Verbal cues   Stand to Sit 4  Minimal assistance   Additional items Assist x 1; Armrests; Verbal cues   Stand pivot 4  Minimal assistance   Additional items Assist x 1;Verbal cues   Ambulation/Elevation   Gait pattern Short stride; Wide BRAULIO   Gait Assistance 4  Minimal assist   Additional items Assist x 1;Verbal cues   Assistive Device Straight cane   Distance 15'  unsteady   Balance   Static Sitting Good   Dynamic Sitting Good   Static Standing Fair   Dynamic Standing Fair   Ambulatory Fair -   Endurance Deficit   Endurance Deficit Yes   Activity Tolerance   Activity Tolerance Patient limited by fatigue;Patient limited by pain   Assessment   Prognosis Good   Problem List Decreased strength;Decreased endurance; Impaired balance;Decreased mobility; Decreased safety awareness;Pain   Assessment Pt  presents with decreased ROM strength balance endurance and mobility requiring min(A)  for tx/ambulation  Gait is unsteady with WBOS  Distance limited by weakness and fatigue  Verbal cues for safety  Pt is in need of continued activity in PT to improve strength balance endurance mobility transfers and ambulation with return to maximize LOF  From PT/mobility standpoint, recommendation at time of d/c would be STR in order to promote return to PLOF and independence  Plan   Treatment/Interventions Functional transfer training;LE strengthening/ROM; Therapeutic exercise; Endurance training;Bed mobility;Gait training   Progress Slow progress, decreased activity tolerance   Recommendation   Recommendation Short-term skilled PT   Pt  OOB in chair with call bell within reach, scd's connected and turned on and alarm on at end of PT session  Discussed with Venessa Smart PT today's treatment and patient's current level of function for care coordination

## 2019-03-04 NOTE — ASSESSMENT & PLAN NOTE
Mild hypokinesis of the basal inferior walls  Outpatient follow up with cardiology appropriate  EF 60%

## 2019-03-04 NOTE — ASSESSMENT & PLAN NOTE
· iron panel showing low ferritin and iron levels - continue iron supplementation with stool softener to prevent constipation     · Hemoglobin remains stable at 11 4

## 2019-03-04 NOTE — ASSESSMENT & PLAN NOTE
· Patient complaining of nausea, denies vomiting  · The patient has good po intake with 100% of meal eaten, question if nausea is due to overeating  · May need outpatient GI evaluation

## 2019-03-04 NOTE — OCCUPATIONAL THERAPY NOTE
[address] Progress Note     Patient Name: Dave Whitney  Today's Date: 3/4/2019  Problem List  Patient Active Problem List   Diagnosis    Intractable diarrhea    Dyspnea on exertion    Hypokalemia    COPD (chronic obstructive pulmonary disease) (HCC)    Abdominal pain    Benign essential hypertension    Dyslipidemia    Depressive disorder    Persistent atrial fibrillation (HCC)    Migraines    Chronic pain    Tobacco abuse    Pulmonary nodule    Marijuana abuse    Dysphagia    Type 2 diabetes mellitus without complication, without long-term current use of insulin ([facility])    History of dilated cardiomyopathy secondary to tachycardia ([facility] [address] [facility]    Coronary artery disease of native artery of native heart with stable angina pectoris ([facility] [address] )    Noncompliance    Obstructive sleep apnea    Urge incontinence of urine    GI bleed    Heme positive stool    Erectile dysfunction    Microcytic anemia    Other chest pain    Dyspnea    TIA (transient ischemic attack)    PTSD (post-traumatic stress disorder)    Major neurocognitive disorder    Alcohol abuse    Essential hypertension    Self-care deficit for medication administration    Constipation    Hypotension    Microscopic hematuria    Nausea    Headache    Regional wall motion abnormality of heart               03/04/19 0944   Restrictions/Precautions   Weight Bearing Precautions Per Order No   Other Precautions Chair Alarm; Fall Risk   Pain Assessment   Pain Assessment No/denies pain   ADL   Where Assessed Edge of bed   Grooming Assistance 4  Minimal Assistance   Grooming Deficit Setup; Increased time to complete;Wash/dry hands; Wash/dry face;Brushing hair   Grooming Comments limited use of RUE   UB Bathing Assistance 3  Moderate Assistance   UB Bathing Deficit Supervision/safety;Verbal cueing; Increased time to complete   UB Bathing Comments due to limitations of RUE   LB Dressing Assistance   (min-mod A LLE, max A RLE)   LB Dressing Deficit   (cheri and doff socks)   Transfers   Sit to Stand 4  Minimal assistance   Additional items Assist x 1;Bedrails; Increased time required;Verbal cues   Stand to Sit 4  Minimal assistance   Additional items Bedrails;Assist x 1;Verbal cues   Cognition   Overall Cognitive Status WFL   Arousal/Participation Alert   Attention Within functional limits   Orientation Level Oriented X4   Memory Within functional limits   Following Commands Follows all commands and directions without difficulty   Assessment   Assessment Pt presents at EOB  Agrees to tx   Pt needed encouragement to engage in ADL tasks, stating he need assist and cannot perform on own  Pt did demonstrate ability to reach to head to wash face , comb hair using LUE  RUE /shoulder old injury with limitations and decreased functional use and pt protective of same, needing encouragement and cues  Mod A with UE bathing and max A with LE using LUE only  LE dressing with with min to mod A on LLE, though max A on RLE, with pt declining to attempt due to reported RLE limits from old injury  Pt did cooperate with tx when given increased time and cues  Recommend continued OT intervention to meet POC goals maximal (I) level  Recommend STR upon dc  Spoke with OTR in regards to treatment session and progress thus far in POC, agreed with disposition  Plan   Goal Expiration Date 03/11/19   Treatment Day 3   Recommendation   OT Discharge Recommendation Short Term Rehab   Pt will benefit from continued OT services in order to maximize (I) c ADL performance, FM c SPC, and improve overall endurance/strength required to complete functional tasks in preparation for d/c

## 2019-03-04 NOTE — ASSESSMENT & PLAN NOTE
· The patient is complaining of a headache, he states he has a history of migraines  · Will order IV magnesium, IV benadryl, IV Reglan and IV solu-medrol x 1 dose was mildly effective  · Suspect also caffeine withdrawal headache  · Will order PRN Fioricet on discharge

## 2019-03-04 NOTE — PLAN OF CARE
Problem: Potential for Falls  Goal: Patient will remain free of falls  Description  INTERVENTIONS:  - Assess patient frequently for physical needs  -  Identify cognitive and physical deficits and behaviors that affect risk of falls  -  [place] fall precautions as indicated by assessment   - Educate patient/family on patient safety including physical limitations  - Instruct patient to call for assistance with activity based on assessment  - Modify environment to reduce risk of injury  - Consider OT/PT consult to assist with strengthening/mobility  Outcome: Progressing     Problem: PAIN - ADULT  Goal: Verbalizes/displays adequate comfort level or baseline comfort level  Description  Interventions:  - Encourage patient to monitor pain and request assistance  - Assess pain using appropriate pain scale  - Administer analgesics based on type and severity of pain and evaluate response  - Implement non-pharmacological measures as appropriate and evaluate response  - Consider cultural and social influences on pain and pain management  - Notify physician/advanced practitioner if interventions unsuccessful or patient reports new pain  Outcome: Progressing     Problem: INFECTION - ADULT  Goal: Absence or prevention of progression during hospitalization  Description  INTERVENTIONS:  - Assess and monitor for signs and symptoms of infection  - Monitor lab/diagnostic results  - Monitor all insertion sites, i e  indwelling lines, tubes, and drains  - Monitor nasal secretions for changes in amount and color  - [place] appropriate cooling/warming therapies per order  - Administer medications as ordered  - Instruct and encourage patient and family to use good hand hygiene technique  - Identify and instruct in appropriate isolation precautions for identified infection/condition: History of MRSA; nasal swab sent and pending as of 2/28/19  Contact Isolation in place     Outcome: Progressing     Problem: SAFETY ADULT  Goal: Maintain or return to baseline ADL function  Description  INTERVENTIONS:  -  Assess patient's ability to carry out ADLs; assess patient's baseline for ADL function and identify physical deficits which impact ability to perform ADLs (bathing, care of mouth/teeth, toileting, grooming, dressing, etc )  - Assess/evaluate cause of self-care deficits   - Assess range of motion  - Assess patient's mobility; develop plan if impaired  - Assess patient's need for assistive devices and provide as appropriate  - Encourage maximum independence but intervene and supervise when necessary  ¯ Involve family in performance of ADLs  ¯ Assess for home care needs following discharge   ¯ Request OT consult to assist with ADL evaluation and planning for discharge  ¯ Provide patient education as appropriate  Outcome: Progressing  Goal: Maintain or return mobility status to optimal level  Description  INTERVENTIONS:  - Assess patient's baseline mobility status (ambulation, transfers, stairs, etc )    - Identify cognitive and physical deficits and behaviors that affect mobility  - Identify mobility aids required to assist with transfers and/or ambulation (gait belt, sit-to-stand, lift, walker, cane, etc )  - [place] fall precautions as indicated by assessment  - Record patient progress and toleration of activity level on Mobility SBAR; progress patient to next Phase/Stage  - Instruct patient to call for assistance with activity based on assessment  - Request Rehabilitation consult to assist with strengthening/weightbearing, etc   Outcome: Progressing     Problem: DISCHARGE PLANNING  Goal: Discharge to home or other facility with appropriate resources  Description  INTERVENTIONS:  - Identify barriers to discharge w/patient and caregiver  - Arrange for needed discharge resources and transportation as appropriate  - Identify discharge learning needs (meds, wound care, etc )  - Refer to Case Management Department for coordinating discharge planning if the patient needs post-hospital services based on physician/advanced practitioner order or complex needs related to functional status, cognitive ability, or social support system   Outcome: Progressing     Problem: Knowledge Deficit  Goal: Patient/family/caregiver demonstrates understanding of disease process, treatment plan, medications, and discharge instructions  Description  Complete learning assessment and assess knowledge base    Interventions:  - Provide teaching at level of understanding  - Provide teaching via preferred learning methods  Outcome: Progressing     Problem: Prexisting or High Potential for Compromised Skin Integrity  Goal: Skin integrity is maintained or improved  Description  INTERVENTIONS:  - Identify patients at risk for skin breakdown  - Assess and monitor skin integrity  - Assess and monitor nutrition and hydration status  - Monitor labs (i e  albumin)  - Assess for incontinence   - Turn and reposition patient  - Assist with mobility/ambulation  - Relieve pressure over bony prominences  - Avoid friction and shearing  - Provide appropriate hygiene as needed including keeping skin clean and dry  - Evaluate need for skin moisturizer/barrier cream  - Collaborate with interdisciplinary team (i e  Nutrition, Rehabilitation, etc )   - Patient/family teaching  Outcome: Progressing

## 2019-03-04 NOTE — SOCIAL WORK
Additional referrals were made over the weekend to SNF's  Pt was accepted at [facility] for today pending insurance approval with Marquis ELLISON awaiting updated PT/OT notes from today and will then send off to [facility]

## 2019-03-04 NOTE — SOCIAL WORK
Hold up with authorization is a whole new case had to be built as initial authorization we built was only good for 72 hours  New Pending authorization number is [id number]  Clinical faxed over to [phone]  Waiting for a call back with authorization for pt to go to [facility] today

## 2019-03-04 NOTE — PROGRESS NOTES
Pt had c/o nausea  Zofran given PRN as ordered  Dose was ineffective  Reglan given to pt PRN as ordered  Will reassess

## 2019-03-04 NOTE — PLAN OF CARE
Problem: PHYSICAL THERAPY ADULT  Goal: Performs mobility at highest level of function for planned discharge setting  See evaluation for individualized goals  3/4/2019 1008 by Abida Foster PTA  Outcome: Progressing  Note:   Prognosis: Good  Problem List: Decreased strength, Decreased endurance, Impaired balance, Decreased mobility, Decreased safety awareness, Pain  Assessment: Pt  presents with decreased ROM strength balance endurance and mobility requiring min(A)  for tx/ambulation  Gait is unsteady with WBOS  Distance limited by weakness and fatigue  Verbal cues for safety  Pt is in need of continued activity in PT to improve strength balance endurance mobility transfers and ambulation with return to maximize LOF  From PT/mobility standpoint, recommendation at time of d/c would be STR in order to promote return to PLOF and independence  Recommendation: Short-term skilled PT     PT - OK to Discharge: Yes(to next level of care when medically stable for discharge)    See flowsheet documentation for full assessment  3/4/2019 1007 by Abida Foster PTA  Outcome: Progressing  Note:   Prognosis: Good  Problem List: Decreased strength, Decreased endurance, Impaired balance, Decreased mobility, Decreased safety awareness, Pain  Assessment: Pt  presents with decreased ROM strength balance endurance and mobility requiring min(A)  for tx/ambulation  Gait is unsteady with WBOS  Distance limited by weakness and fatigue  Verbal cues for safety  Pt is in need of continued activity in PT to improve strength balance endurance mobility transfers and ambulation with return to maximize LOF  From PT/mobility standpoint, recommendation at time of d/c would be STR in order to promote return to PLOF and independence  Recommendation: Short-term skilled PT     PT - OK to Discharge: Yes(to next level of care when medically stable for discharge)    See flowsheet documentation for full assessment       3/4/2019 1007 by Alicia Stovall, JHONATHAN  Outcome: Progressing  Note:   Prognosis: Good  Problem List: Decreased strength, Decreased endurance, Impaired balance, Decreased mobility, Decreased safety awareness, Pain  Assessment: Pt  presents with decreased ROM strength balance endurance and mobility requiring min(A)  for tx/ambulation  Gait is unsteady with WBOS  Distance limited by weakness and fatigue  Verbal cues for safety  Pt is in need of continued activity in PT to improve strength balance endurance mobility transfers and ambulation with return to maximize LOF  From PT/mobility standpoint, recommendation at time of d/c would be STR in order to promote return to PLOF and independence  Recommendation: Short-term skilled PT     PT - OK to Discharge: Yes(to next level of care when medically stable for discharge)    See flowsheet documentation for full assessment

## 2019-03-04 NOTE — ASSESSMENT & PLAN NOTE
Patient has a history of mild anxiety and mild depression  No personality disorder  Continue Xanax as needed

## 2019-03-04 NOTE — DISCHARGE SUMMARY
Discharge- Sukumar Kern 1945, 66 y o  male MRN: [id number]    Unit/Bed#: 417-01 Encounter: [id number]    Primary Care Provider: Placido Eddy DO   Date and time admitted to hospital: 2/23/2019  3:25 PM        * Self-care deficit for medication administration  Assessment & Plan  · The patient requires SNF placement due to inability to care for self and long history of non-compliance  · He has been accepted at [facility] for further care  Other chest pain  Assessment & Plan  · troponin levels x 3 sets - negative  · Echocardiogram was unremarkable, stress test was negative  · Telemetry monitoring has been unremarkable  Persistent atrial fibrillation (HCC)  Assessment & Plan  · Continue PO metoprolol for heart rate control  · Continue eliquis 5 mg PO BID for full anti-coagulation    Regional wall motion abnormality of heart  Assessment & Plan  Mild hypokinesis of the basal inferior walls  Outpatient follow up with cardiology appropriate  EF 60%  Headache  Assessment & Plan  · The patient is complaining of a headache, he states he has a history of migraines  · Will order IV magnesium, IV benadryl, IV Reglan and IV solu-medrol x 1 dose was mildly effective  · Suspect also caffeine withdrawal headache  · Will order PRN Fioricet on discharge  Nausea  Assessment & Plan  · Patient complaining of nausea, denies vomiting  · The patient has good po intake with 100% of meal eaten, question if nausea is due to overeating  · May need outpatient GI evaluation  Microscopic hematuria  Assessment & Plan  · Outpatient follow-up with Urology    Hypotension  Assessment & Plan  · PO lasix discontinued on 2/28/19, held for several days given low normal BP  · Past cardiology evaluation indicate SOB likely due to COPD vs  CHF    · Echo: normal LV systolic function with EF 60%  · Follow the blood pressure trend      Constipation  Assessment & Plan  · Resolved with magnesium citrate · Continue PO colace and PRN PO miralax    Essential hypertension  Assessment & Plan  · The patient was hypotensive on 2/28/19  · This is improved with IVF bolus and has remained stable  · Follow the blood pressure trend closely  Microcytic anemia  Assessment & Plan  · iron panel showing low ferritin and iron levels - continue iron supplementation with stool softener to prevent constipation     · Hemoglobin remains stable at 11 4  Obstructive sleep apnea  Assessment & Plan  · Continue CPAP QHS and anytime while sleeping    Depressive disorder  Assessment & Plan  Patient has a history of mild anxiety and mild depression  No personality disorder  Continue Xanax as needed  Discharging Physician / Practitioner: Stefany Liu PA-C  PCP: Jamir Higuera DO  Admission Date:   Admission Orders (From admission, onward)    Ordered        02/23/19 1838  Inpatient Admission (expected length of stay for this patient Order details is greater than two midnights)  Once     Order ID Start Status   [id number] 02/23/19 1839 Completed              Discharge Date: 03/04/19    Resolved Problems  Date Reviewed: 3/4/2019    None          Consultations During Hospital Stay:  · Case management    Procedures Performed:   · none    Significant Findings / Test Results:   Xr Chest Portable  Result Date: 2/28/2019  Impression: Hypoventilatory changes without acute cardiopulmonary findings  Workstation performed: [id number]     Xr Chest 2 Views  Result Date: 2/24/2019  Impression: Near complete interval resolution of prior right basilar fluid and consolidation  Minimal residual blunting  No acute cardiopulmonary disease  Workstation performed: [id number]     Ct Head Without Contrast  Result Date: 2/23/2019  Impression: No acute intracranial abnormality  Workstation performed: [id number]       ECHOCARDIOGRAM - Summary (2/28/19)  LEFT VENTRICLE:  Systolic function was normal  Ejection fraction was estimated to be 60 %    There was mild hypokinesis of the basal inferior wall(s)  Cardiac wall motion was otherwise normal      RIGHT VENTRICLE:  The size was normal   Systolic function was normal      LEFT ATRIUM:  The atrium was mildly dilated      RIGHT ATRIUM:  The atrium was mildly dilated      MITRAL VALVE:  There was trace regurgitation      IVC, HEPATIC VEINS:  Respirophasic changes were blunted (less than 50% variation)  Nuclear stress testing - 3/1/19  SUMMARY:  -  Stress results: Target heart rate was achieved  There was no chest pain during stress  -  ECG conclusions: The stress ECG was negative for ischemia and normal   -  Perfusion imaging: There were no perfusion defects   -  Gated SPECT: The calculated left ventricular ejection fraction was 54 %  There was no left ventricular regional abnormality  IMPRESSIONS: Normal study after pharmacologic vasodilation without reproduction of symptoms  Myocardial perfusion imaging was normal at rest and with stress  Incidental Findings:   · none    Test Results Pending at Discharge (will require follow up):   · none     Outpatient Tests Requested:  · none    Complications:  none    Reason for Admission: chest pain    Hospital Course:     August Daigle is a 66 y o  male patient who originally presented to the hospital on 2/23/2019 due to Chest Pain (Patient presents to ED today, because a few days ago he made a statement "I Moe Jansen go back to the nursing home " Patient wants placement in nursing home due to recent stroke and having a hard time using right side  Patient also continues to have chest pain)    Patient was admitted essentially due to inability to care for himself with severe noncompliance, multiple hospitalizations  He did complain of some chest pain, which warranted an ischemic workup  This was negative  Additionally, he showed some hypotension for which blood pressure medications were adjusted, particularly discontinuation of lasix   Will need close non-urgent follow up with multiple specialists as noted above  Please see above list of diagnoses and related plan for additional information  Condition at Discharge: good     Discharge Day Visit / Exam:   Subjective:  Patient complains of ongoing headache which was improved with PRN medications  Feels like migraine  No CP, SOB  Complains of nausea but eating 100% of his meals  Vitals: Blood Pressure: 139/67 (03/04/19 0923)  Pulse: 81 (03/04/19 0923)  Temperature: 98 1 °F (36 7 °C) (03/04/19 0008)  Temp Source: Temporal (03/04/19 0008)  Respirations: 18 (03/04/19 0719)  Height: 5' 9" (175 3 cm) (02/24/19 1319)  Weight - Scale: 92 7 kg (204 lb 5 9 oz) (03/04/19 0600)  SpO2: 97 % (03/04/19 0831)  Exam:   Physical Exam   Constitutional: He is oriented to person, place, and time  He appears well-developed and well-nourished  HENT:   Head: Normocephalic and atraumatic  Right Ear: External ear normal    Mouth/Throat: Oropharynx is clear and moist    Neck: Normal range of motion  Neck supple  Cardiovascular: Normal rate, regular rhythm and normal heart sounds  No murmur heard  Pulmonary/Chest: Effort normal  No stridor  No respiratory distress  Decreased air movement but in no acute distress, chronic  Abdominal: Soft  Bowel sounds are normal    Musculoskeletal: He exhibits no edema  Neurological: He is alert and oriented to person, place, and time  Skin: Skin is warm and dry  Multiple tattoos noted  Nursing note and vitals reviewed  Discharge instructions/Information to patient and family:   See after visit summary for information provided to patient and family  Provisions for Follow-Up Care:  See after visit summary for information related to follow-up care and any pertinent home health orders  Disposition:   Other: hometown SNF        Planned Readmission: no     Discharge Statement:  I spent 40 minutes discharging the patient  This time was spent on the day of discharge   I had direct contact with the patient on the day of discharge  Greater than 50% of the total time was spent examining patient, answering all patient questions, arranging and discussing plan of care with patient as well as directly providing post-discharge instructions  Additional time then spent on discharge activities  Discharge Medications:  See after visit summary for reconciled discharge medications provided to patient and family        ** Please Note: This note has been constructed using a voice recognition system **

## 2019-03-04 NOTE — ASSESSMENT & PLAN NOTE
· The patient requires SNF placement due to inability to care for self and long history of non-compliance  · He has been accepted at [facility] for further care

## 2019-03-04 NOTE — ASSESSMENT & PLAN NOTE
· PO lasix discontinued on 2/28/19, held for several days given low normal BP  · Past cardiology evaluation indicate SOB likely due to COPD vs  CHF    · Echo: normal LV systolic function with EF 60%  · Follow the blood pressure trend

## 2019-03-04 NOTE — PLAN OF CARE
Problem: OCCUPATIONAL THERAPY ADULT  Goal: Performs self-care activities at highest level of function for planned discharge setting  See evaluation for individualized goals  Description  Treatment Interventions: ADL retraining, Functional transfer training, UE strengthening/ROM, Endurance training, Patient/family training, Fine motor coordination activities, Activityengagement          See flowsheet documentation for full assessment, interventions and recommendations  Outcome: Progressing  Note:   Limitation: Decreased ADL status, Decreased UE ROM, Decreased UE strength, Decreased Safe judgement during ADL, Decreased endurance, Decreased self-care trans, Decreased fine motor control, Decreased high-level ADLs     Assessment: Pt presents at EOB  Agrees to tx   Pt needed encouragement to engage in ADL tasks, stating he need assist and cannot perform on own  Pt did demonstrate ability to reach to head to wash face , comb hair using LUE  RUE /shoulder old injury with limitations and decreased functional use and pt protective of same, needing encouragement and cues  Mod A with UE bathing and max A with LE using LUE only  LE dressing with with min to mod A on LLE, though max A on RLE, with pt declining to attempt due to reported RLE limits from old injury  Pt did cooperate with tx when given increased time and cues  Recommend continued OT intervention to meet POC goals maximal (I) level  Recommend STR upon dc  Spoke with OTR in regards to treatment session and progress thus far in POC, agreed with disposition        OT Discharge Recommendation: Short Term Rehab

## 2019-03-04 NOTE — SOCIAL WORK
Received authorization from [facility]  Authorization number is [id number]  Approved from today and need an update on 3/8  Updates go to [phone] : Natalia Bowles and fax is [phone]  [facility] to transport pt at 5-5:30pm  Jace Catherine in admissions dept at [facility] aware
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
## 2019-03-06 ENCOUNTER — TELEPHONE (OUTPATIENT)
Dept: UROLOGY | Facility: CLINIC | Age: 74
End: 2019-03-06

## 2019-03-06 ENCOUNTER — TELEPHONE (OUTPATIENT)
Dept: UROLOGY | Facility: MEDICAL CENTER | Age: 74
End: 2019-03-06

## 2019-03-06 ENCOUNTER — OFFICE VISIT (OUTPATIENT)
Dept: UROLOGY | Facility: CLINIC | Age: 74
End: 2019-03-06
Payer: COMMERCIAL

## 2019-03-06 VITALS
BODY MASS INDEX: 29.68 KG/M2 | WEIGHT: 201 LBS | HEART RATE: 64 BPM | DIASTOLIC BLOOD PRESSURE: 80 MMHG | SYSTOLIC BLOOD PRESSURE: 120 MMHG

## 2019-03-06 DIAGNOSIS — N39.41 URGE INCONTINENCE OF URINE: Primary | ICD-10-CM

## 2019-03-06 DIAGNOSIS — N52.8 OTHER MALE ERECTILE DYSFUNCTION: ICD-10-CM

## 2019-03-06 LAB — POST-VOID RESIDUAL VOLUME, ML POC: 73 ML

## 2019-03-06 PROCEDURE — 99213 OFFICE O/P EST LOW 20 MIN: CPT | Performed by: PHYSICIAN ASSISTANT

## 2019-03-06 PROCEDURE — 51798 US URINE CAPACITY MEASURE: CPT | Performed by: PHYSICIAN ASSISTANT

## 2019-03-06 RX ORDER — FINASTERIDE 5 MG/1
5 TABLET, FILM COATED ORAL DAILY
Qty: 30 TABLET | Refills: 11 | Status: SHIPPED | OUTPATIENT
Start: 2019-03-06 | End: 2019-03-06 | Stop reason: SDUPTHER

## 2019-03-06 RX ORDER — FINASTERIDE 5 MG/1
5 TABLET, FILM COATED ORAL DAILY
Qty: 30 TABLET | Refills: 11 | Status: SHIPPED | OUTPATIENT
Start: 2019-03-06 | End: 2019-04-24 | Stop reason: SDUPTHER

## 2019-03-06 NOTE — TELEPHONE ENCOUNTER
Spoke with pharmacist at [employer]  [employer]  Patient is a nursing home resident and prescription needs to be sent to [facility] not   [facility]  Spoke with Robert Taveras at [facility]  Medication written on consult sheet written prescription not needed per Robert Taveras

## 2019-03-18 ENCOUNTER — HOSPITAL ENCOUNTER (EMERGENCY)
Facility: HOSPITAL | Age: 74
Discharge: HOME | End: 2019-03-18
Attending: EMERGENCY MEDICINE | Admitting: EMERGENCY MEDICINE
Payer: COMMERCIAL

## 2019-03-18 ENCOUNTER — APPOINTMENT (EMERGENCY)
Dept: CT IMAGING | Facility: HOSPITAL | Age: 74
End: 2019-03-18
Payer: COMMERCIAL

## 2019-03-18 VITALS
SYSTOLIC BLOOD PRESSURE: 143 MMHG | HEART RATE: 102 BPM | OXYGEN SATURATION: 97 % | BODY MASS INDEX: 28.7 KG/M2 | DIASTOLIC BLOOD PRESSURE: 83 MMHG | RESPIRATION RATE: 22 BRPM | WEIGHT: 193.78 LBS | TEMPERATURE: 98.4 F | HEIGHT: 69 IN

## 2019-03-18 DIAGNOSIS — J18.9 PNEUMONITIS: ICD-10-CM

## 2019-03-18 DIAGNOSIS — R05.9 COUGH: Primary | ICD-10-CM

## 2019-03-18 LAB
ALBUMIN SERPL BCP-MCNC: 3.3 G/DL (ref 3.5–5)
ALP SERPL-CCNC: 109 U/L (ref 46–116)
ALT SERPL W P-5'-P-CCNC: 23 U/L (ref 12–78)
ANION GAP SERPL CALCULATED.3IONS-SCNC: 7 MMOL/L (ref 4–13)
APTT PPP: 47 SECONDS (ref 26–38)
AST SERPL W P-5'-P-CCNC: 16 U/L (ref 5–45)
ATRIAL RATE: 113 BPM
BASOPHILS # BLD AUTO: 0.08 THOUSANDS/ΜL (ref 0–0.1)
BASOPHILS NFR BLD AUTO: 1 % (ref 0–1)
BILIRUB SERPL-MCNC: 0.7 MG/DL (ref 0.2–1)
BUN SERPL-MCNC: 15 MG/DL (ref 5–25)
CALCIUM SERPL-MCNC: 9.1 MG/DL (ref 8.3–10.1)
CHLORIDE SERPL-SCNC: 102 MMOL/L (ref 100–108)
CO2 SERPL-SCNC: 28 MMOL/L (ref 21–32)
CREAT SERPL-MCNC: 1.01 MG/DL (ref 0.6–1.3)
EOSINOPHIL # BLD AUTO: 0.08 THOUSAND/ΜL (ref 0–0.61)
EOSINOPHIL NFR BLD AUTO: 1 % (ref 0–6)
ERYTHROCYTE [DISTWIDTH] IN BLOOD BY AUTOMATED COUNT: 29.9 % (ref 11.6–15.1)
GFR SERPL CREATININE-BSD FRML MDRD: 72 ML/MIN/1.73SQ M
GLUCOSE SERPL-MCNC: 126 MG/DL (ref 65–140)
HCT VFR BLD AUTO: 43.4 % (ref 36.5–49.3)
HGB BLD-MCNC: 13.4 G/DL (ref 12–17)
IMM GRANULOCYTES # BLD AUTO: 0.18 THOUSAND/UL (ref 0–0.2)
IMM GRANULOCYTES NFR BLD AUTO: 2 % (ref 0–2)
INR PPP: 1.58 (ref 0.86–1.17)
LACTATE SERPL-SCNC: 1.5 MMOL/L (ref 0.5–2)
LYMPHOCYTES # BLD AUTO: 1.02 THOUSANDS/ΜL (ref 0.6–4.47)
LYMPHOCYTES NFR BLD AUTO: 11 % (ref 14–44)
MAGNESIUM SERPL-MCNC: 1.4 MG/DL (ref 1.6–2.6)
MCH RBC QN AUTO: 22.9 PG (ref 26.8–34.3)
MCHC RBC AUTO-ENTMCNC: 30.9 G/DL (ref 31.4–37.4)
MCV RBC AUTO: 74 FL (ref 82–98)
MONOCYTES # BLD AUTO: 1.29 THOUSAND/ΜL (ref 0.17–1.22)
MONOCYTES NFR BLD AUTO: 14 % (ref 4–12)
NEUTROPHILS # BLD AUTO: 6.91 THOUSANDS/ΜL (ref 1.85–7.62)
NEUTS SEG NFR BLD AUTO: 71 % (ref 43–75)
NRBC BLD AUTO-RTO: 0 /100 WBCS
NT-PROBNP SERPL-MCNC: 848 PG/ML
PLATELET # BLD AUTO: 185 THOUSANDS/UL (ref 149–390)
POTASSIUM SERPL-SCNC: 3.8 MMOL/L (ref 3.5–5.3)
PROT SERPL-MCNC: 6.3 G/DL (ref 6.4–8.2)
PROTHROMBIN TIME: 18.1 SECONDS (ref 11.8–14.2)
QRS AXIS: 90 DEGREES
QRSD INTERVAL: 78 MS
QT INTERVAL: 362 MS
QTC INTERVAL: 471 MS
RBC # BLD AUTO: 5.85 MILLION/UL (ref 3.88–5.62)
SODIUM SERPL-SCNC: 137 MMOL/L (ref 136–145)
T WAVE AXIS: 53 DEGREES
TROPONIN I SERPL-MCNC: <0.02 NG/ML
VENTRICULAR RATE: 102 BPM
WBC # BLD AUTO: 9.56 THOUSAND/UL (ref 4.31–10.16)

## 2019-03-18 PROCEDURE — 83605 ASSAY OF LACTIC ACID: CPT | Performed by: PHYSICIAN ASSISTANT

## 2019-03-18 PROCEDURE — 85730 THROMBOPLASTIN TIME PARTIAL: CPT | Performed by: PHYSICIAN ASSISTANT

## 2019-03-18 PROCEDURE — 93005 ELECTROCARDIOGRAM TRACING: CPT

## 2019-03-18 PROCEDURE — 99285 EMERGENCY DEPT VISIT HI MDM: CPT

## 2019-03-18 PROCEDURE — 84484 ASSAY OF TROPONIN QUANT: CPT | Performed by: PHYSICIAN ASSISTANT

## 2019-03-18 PROCEDURE — 83735 ASSAY OF MAGNESIUM: CPT | Performed by: PHYSICIAN ASSISTANT

## 2019-03-18 PROCEDURE — 71250 CT THORAX DX C-: CPT

## 2019-03-18 PROCEDURE — 80053 COMPREHEN METABOLIC PANEL: CPT | Performed by: PHYSICIAN ASSISTANT

## 2019-03-18 PROCEDURE — 93010 ELECTROCARDIOGRAM REPORT: CPT | Performed by: INTERNAL MEDICINE

## 2019-03-18 PROCEDURE — 83880 ASSAY OF NATRIURETIC PEPTIDE: CPT | Performed by: PHYSICIAN ASSISTANT

## 2019-03-18 PROCEDURE — 85025 COMPLETE CBC W/AUTO DIFF WBC: CPT | Performed by: PHYSICIAN ASSISTANT

## 2019-03-18 PROCEDURE — 85610 PROTHROMBIN TIME: CPT | Performed by: PHYSICIAN ASSISTANT

## 2019-03-18 PROCEDURE — 96367 TX/PROPH/DG ADDL SEQ IV INF: CPT

## 2019-03-18 PROCEDURE — 87040 BLOOD CULTURE FOR BACTERIA: CPT | Performed by: PHYSICIAN ASSISTANT

## 2019-03-18 PROCEDURE — 96365 THER/PROPH/DIAG IV INF INIT: CPT

## 2019-03-18 PROCEDURE — 94640 AIRWAY INHALATION TREATMENT: CPT

## 2019-03-18 PROCEDURE — 96366 THER/PROPH/DIAG IV INF ADDON: CPT

## 2019-03-18 RX ORDER — METHYLPREDNISOLONE SOD SUCC 125 MG
1 VIAL (EA) INJECTION ONCE
Status: COMPLETED | OUTPATIENT
Start: 2019-03-18 | End: 2019-03-18

## 2019-03-18 RX ORDER — LEVOFLOXACIN 5 MG/ML
750 INJECTION, SOLUTION INTRAVENOUS ONCE
Status: COMPLETED | OUTPATIENT
Start: 2019-03-18 | End: 2019-03-18

## 2019-03-18 RX ORDER — ALBUTEROL SULFATE 2.5 MG/3ML
2.5 SOLUTION RESPIRATORY (INHALATION) ONCE
Status: COMPLETED | OUTPATIENT
Start: 2019-03-18 | End: 2019-03-18

## 2019-03-18 RX ORDER — LEVOFLOXACIN 500 MG/1
500 TABLET, FILM COATED ORAL DAILY
Qty: 7 TABLET | Refills: 0 | Status: SHIPPED | OUTPATIENT
Start: 2019-03-18 | End: 2019-03-25

## 2019-03-18 RX ORDER — IPRATROPIUM BROMIDE AND ALBUTEROL SULFATE .5; 3 MG/3ML; MG/3ML
2 SOLUTION RESPIRATORY (INHALATION) ONCE
Status: COMPLETED | OUTPATIENT
Start: 2019-03-18 | End: 2019-03-18

## 2019-03-18 RX ORDER — MAGNESIUM SULFATE 1 G/100ML
1 INJECTION INTRAVENOUS ONCE
Status: COMPLETED | OUTPATIENT
Start: 2019-03-18 | End: 2019-03-18

## 2019-03-18 RX ADMIN — ALBUTEROL SULFATE 2.5 MG: 2.5 SOLUTION RESPIRATORY (INHALATION) at 13:14

## 2019-03-18 RX ADMIN — MAGNESIUM SULFATE HEPTAHYDRATE 1 G: 1 INJECTION, SOLUTION INTRAVENOUS at 15:05

## 2019-03-18 RX ADMIN — LEVOFLOXACIN 750 MG: 5 INJECTION, SOLUTION INTRAVENOUS at 16:27

## 2019-03-18 RX ADMIN — IPRATROPIUM BROMIDE 0.5 MG: 0.5 SOLUTION RESPIRATORY (INHALATION) at 13:13

## 2019-03-18 NOTE — ED PROCEDURE NOTE
Procedure  ECG 12 Lead Documentation  Date/Time: 3/18/2019 1:16 PM  Performed by: Genaro Johnston PA-C  Authorized by: Genaro Johnston PA-C     Indications / Diagnosis:  Shortness of breath  ECG reviewed by me, the ED Provider: yes    Patient location:  ED  Previous ECG:     Comparison to cardiac monitor: Yes    Interpretation:     Interpretation: normal    Rate:     ECG rate:  102    ECG rate assessment: tachycardic    Rhythm:     Rhythm: atrial fibrillation    Ectopy:     Ectopy: none    QRS:     QRS axis:  Normal    QRS intervals:  Normal  Conduction:     Conduction: normal    ST segments:     ST segments:  Normal  T waves:     T waves: normal                       Genaro Johnston PA-C  03/18/19 1316

## 2019-03-18 NOTE — ED NOTES
Emergency contact notified of patient being discharged, earliest she can  patient is at [address] due to work        Jf Angel RN  03/18/19 3678
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
## 2019-03-19 ENCOUNTER — TELEPHONE (OUTPATIENT)
Dept: FAMILY MEDICINE CLINIC | Facility: CLINIC | Age: 74
End: 2019-03-19

## 2019-03-19 DIAGNOSIS — F43.10 PTSD (POST-TRAUMATIC STRESS DISORDER): Primary | ICD-10-CM

## 2019-03-19 DIAGNOSIS — J42 CHRONIC BRONCHITIS, UNSPECIFIED CHRONIC BRONCHITIS TYPE (HCC): Primary | ICD-10-CM

## 2019-03-19 RX ORDER — DULOXETIN HYDROCHLORIDE 30 MG/1
30 CAPSULE, DELAYED RELEASE ORAL DAILY
Qty: 30 CAPSULE | Refills: 1 | Status: SHIPPED | OUTPATIENT
Start: 2019-03-19 | End: 2019-04-24 | Stop reason: SDUPTHER

## 2019-03-19 RX ORDER — IPRATROPIUM BROMIDE AND ALBUTEROL SULFATE 2.5; .5 MG/3ML; MG/3ML
3 SOLUTION RESPIRATORY (INHALATION) 4 TIMES DAILY PRN
Qty: 120 VIAL | Refills: 3 | Status: SHIPPED | OUTPATIENT
Start: 2019-03-19 | End: 2019-04-11 | Stop reason: SDUPTHER

## 2019-03-19 NOTE — TELEPHONE ENCOUNTER
LOYDA FROM [facility]  SAW HIM TODAY  WAS IN ER YESTERDAY WITH PNUEMONITIS  HE WAS GIVEN LEVAQUIN 500 FOR 7 DAYS  HIS CAREGIVER FELT LIKE THIS WAS NOT ENOUGH  APPT 04/11 HERE   ALSO THINKS HE SHOULD BE ON CYMBALTA  ALSO NOT HAPPY THAT HE IS ON CLONODINE 0 1 MG  WAS ON LASIX 20 MG BID BEFORE BUT NOT ON D C LIST ALSO IS HE TO BE ON ASPIRIN AND ELOQUIS? HE COULD ALSO BENEFIT FROM [facility] NEB ONLY HAS 4 ALLBUTERAL LEFT AT HOME

## 2019-03-19 NOTE — TELEPHONE ENCOUNTER
7 days of Levaquin should be enough treat the pneumonitis  I did put in for Cymbalta this morning  Patient has follow-up with Cardiology next week    They can discuss the clonidine, Lasix, aspirin and Eliquis at that appointment

## 2019-03-22 ENCOUNTER — TELEPHONE (OUTPATIENT)
Dept: FAMILY MEDICINE CLINIC | Facility: CLINIC | Age: 74
End: 2019-03-22

## 2019-03-22 NOTE — TELEPHONE ENCOUNTER
She talked to caregiver Melba Bledsoely  She is concerned he bout excedrin and 2 bottles of melatonin 200 tabs each and 10 mg  wednesdat   He took melatonin and slept the whole next day   She coulted them and thinks he took 6   60mg total   They are not sure what to do with him

## 2019-03-22 NOTE — TELEPHONE ENCOUNTER
They are not sure what they can do with [facility]ada  They have someone sebastian does it but not certified

## 2019-03-22 NOTE — TELEPHONE ENCOUNTER
Advise to take any nonprescription medication away from him  If he is in distress, have him go to the ER  Do they have psychiatric nursing available?

## 2019-03-23 LAB
BACTERIA BLD CULT: NORMAL
BACTERIA BLD CULT: NORMAL

## 2019-03-25 PROBLEM — I25.82 CHRONIC TOTAL OCCLUSION OF NATIVE CORONARY ARTERY: Status: ACTIVE | Noted: 2019-03-25

## 2019-03-25 PROBLEM — I25.10 CHRONIC TOTAL OCCLUSION OF NATIVE CORONARY ARTERY: Status: ACTIVE | Noted: 2019-03-25

## 2019-04-02 ENCOUNTER — APPOINTMENT (EMERGENCY)
Dept: CT IMAGING | Facility: HOSPITAL | Age: 74
End: 2019-04-02
Payer: COMMERCIAL

## 2019-04-02 ENCOUNTER — HOSPITAL ENCOUNTER (EMERGENCY)
Facility: HOSPITAL | Age: 74
Discharge: HOME | End: 2019-04-02
Attending: EMERGENCY MEDICINE
Payer: COMMERCIAL

## 2019-04-02 VITALS
HEIGHT: 69 IN | RESPIRATION RATE: 20 BRPM | HEART RATE: 98 BPM | OXYGEN SATURATION: 97 % | BODY MASS INDEX: 28.7 KG/M2 | TEMPERATURE: 98.2 F | DIASTOLIC BLOOD PRESSURE: 74 MMHG | SYSTOLIC BLOOD PRESSURE: 136 MMHG | WEIGHT: 193.78 LBS

## 2019-04-02 DIAGNOSIS — R11.0 NAUSEA: ICD-10-CM

## 2019-04-02 DIAGNOSIS — R19.7 VOMITING AND DIARRHEA: Primary | ICD-10-CM

## 2019-04-02 DIAGNOSIS — R11.10 VOMITING AND DIARRHEA: Primary | ICD-10-CM

## 2019-04-02 LAB
ALBUMIN SERPL BCP-MCNC: 3.6 G/DL (ref 3.5–5)
ALP SERPL-CCNC: 138 U/L (ref 46–116)
ALT SERPL W P-5'-P-CCNC: 20 U/L (ref 12–78)
ANION GAP SERPL CALCULATED.3IONS-SCNC: 8 MMOL/L (ref 4–13)
AST SERPL W P-5'-P-CCNC: 12 U/L (ref 5–45)
BASOPHILS # BLD AUTO: 0.08 THOUSANDS/ΜL (ref 0–0.1)
BASOPHILS NFR BLD AUTO: 1 % (ref 0–1)
BILIRUB SERPL-MCNC: 1.2 MG/DL (ref 0.2–1)
BUN SERPL-MCNC: 15 MG/DL (ref 5–25)
CALCIUM SERPL-MCNC: 9.8 MG/DL (ref 8.3–10.1)
CHLORIDE SERPL-SCNC: 104 MMOL/L (ref 100–108)
CO2 SERPL-SCNC: 30 MMOL/L (ref 21–32)
CREAT SERPL-MCNC: 0.91 MG/DL (ref 0.6–1.3)
EOSINOPHIL # BLD AUTO: 0.19 THOUSAND/ΜL (ref 0–0.61)
EOSINOPHIL NFR BLD AUTO: 1 % (ref 0–6)
ERYTHROCYTE [DISTWIDTH] IN BLOOD BY AUTOMATED COUNT: 26.7 % (ref 11.6–15.1)
GFR SERPL CREATININE-BSD FRML MDRD: 82 ML/MIN/1.73SQ M
GLUCOSE SERPL-MCNC: 91 MG/DL (ref 65–140)
HCT VFR BLD AUTO: 51.3 % (ref 36.5–49.3)
HGB BLD-MCNC: 16.3 G/DL (ref 12–17)
IMM GRANULOCYTES # BLD AUTO: 0.09 THOUSAND/UL (ref 0–0.2)
IMM GRANULOCYTES NFR BLD AUTO: 1 % (ref 0–2)
LIPASE SERPL-CCNC: 84 U/L (ref 73–393)
LYMPHOCYTES # BLD AUTO: 1.9 THOUSANDS/ΜL (ref 0.6–4.47)
LYMPHOCYTES NFR BLD AUTO: 12 % (ref 14–44)
MCH RBC QN AUTO: 24.1 PG (ref 26.8–34.3)
MCHC RBC AUTO-ENTMCNC: 31.8 G/DL (ref 31.4–37.4)
MCV RBC AUTO: 76 FL (ref 82–98)
MONOCYTES # BLD AUTO: 2.82 THOUSAND/ΜL (ref 0.17–1.22)
MONOCYTES NFR BLD AUTO: 18 % (ref 4–12)
NEUTROPHILS # BLD AUTO: 10.42 THOUSANDS/ΜL (ref 1.85–7.62)
NEUTS SEG NFR BLD AUTO: 67 % (ref 43–75)
NRBC BLD AUTO-RTO: 0 /100 WBCS
PLATELET # BLD AUTO: 197 THOUSANDS/UL (ref 149–390)
POTASSIUM SERPL-SCNC: 3.6 MMOL/L (ref 3.5–5.3)
PROT SERPL-MCNC: 7.2 G/DL (ref 6.4–8.2)
RBC # BLD AUTO: 6.77 MILLION/UL (ref 3.88–5.62)
SODIUM SERPL-SCNC: 142 MMOL/L (ref 136–145)
TROPONIN I SERPL-MCNC: <0.02 NG/ML
WBC # BLD AUTO: 15.5 THOUSAND/UL (ref 4.31–10.16)

## 2019-04-02 PROCEDURE — 96361 HYDRATE IV INFUSION ADD-ON: CPT

## 2019-04-02 PROCEDURE — 85025 COMPLETE CBC W/AUTO DIFF WBC: CPT | Performed by: EMERGENCY MEDICINE

## 2019-04-02 PROCEDURE — 96375 TX/PRO/DX INJ NEW DRUG ADDON: CPT

## 2019-04-02 PROCEDURE — 83690 ASSAY OF LIPASE: CPT | Performed by: EMERGENCY MEDICINE

## 2019-04-02 PROCEDURE — 93005 ELECTROCARDIOGRAM TRACING: CPT

## 2019-04-02 PROCEDURE — 99284 EMERGENCY DEPT VISIT MOD MDM: CPT | Performed by: EMERGENCY MEDICINE

## 2019-04-02 PROCEDURE — 36415 COLL VENOUS BLD VENIPUNCTURE: CPT | Performed by: EMERGENCY MEDICINE

## 2019-04-02 PROCEDURE — 84484 ASSAY OF TROPONIN QUANT: CPT | Performed by: EMERGENCY MEDICINE

## 2019-04-02 PROCEDURE — 96374 THER/PROPH/DIAG INJ IV PUSH: CPT

## 2019-04-02 PROCEDURE — 74176 CT ABD & PELVIS W/O CONTRAST: CPT

## 2019-04-02 PROCEDURE — 99285 EMERGENCY DEPT VISIT HI MDM: CPT

## 2019-04-02 PROCEDURE — 80053 COMPREHEN METABOLIC PANEL: CPT | Performed by: EMERGENCY MEDICINE

## 2019-04-02 RX ORDER — SODIUM CHLORIDE 9 MG/ML
250 INJECTION, SOLUTION INTRAVENOUS CONTINUOUS
Status: DISCONTINUED | OUTPATIENT
Start: 2019-04-02 | End: 2019-04-02 | Stop reason: HOSPADM

## 2019-04-02 RX ORDER — MORPHINE SULFATE 4 MG/ML
4 INJECTION, SOLUTION INTRAMUSCULAR; INTRAVENOUS ONCE
Status: COMPLETED | OUTPATIENT
Start: 2019-04-02 | End: 2019-04-02

## 2019-04-02 RX ORDER — ONDANSETRON 2 MG/ML
4 INJECTION INTRAMUSCULAR; INTRAVENOUS ONCE
Status: COMPLETED | OUTPATIENT
Start: 2019-04-02 | End: 2019-04-02

## 2019-04-02 RX ORDER — ONDANSETRON 4 MG/1
4 TABLET, FILM COATED ORAL EVERY 6 HOURS
Qty: 15 TABLET | Refills: 0 | Status: SHIPPED | OUTPATIENT
Start: 2019-04-02 | End: 2019-09-27 | Stop reason: ALTCHOICE

## 2019-04-02 RX ADMIN — ONDANSETRON 4 MG: 2 INJECTION INTRAMUSCULAR; INTRAVENOUS at 18:09

## 2019-04-02 RX ADMIN — MORPHINE SULFATE 4 MG: 4 INJECTION INTRAVENOUS at 18:09

## 2019-04-02 RX ADMIN — SODIUM CHLORIDE 250 ML/HR: 0.9 INJECTION, SOLUTION INTRAVENOUS at 18:11

## 2019-04-06 LAB
ATRIAL RATE: 115 BPM
QRS AXIS: 90 DEGREES
QRSD INTERVAL: 78 MS
QT INTERVAL: 346 MS
QTC INTERVAL: 454 MS
T WAVE AXIS: 59 DEGREES
VENTRICULAR RATE: 104 BPM

## 2019-04-06 PROCEDURE — 93010 ELECTROCARDIOGRAM REPORT: CPT | Performed by: INTERNAL MEDICINE

## 2019-04-08 ENCOUNTER — TELEPHONE (OUTPATIENT)
Dept: FAMILY MEDICINE CLINIC | Facility: CLINIC | Age: 74
End: 2019-04-08

## 2019-04-10 ENCOUNTER — TELEPHONE (OUTPATIENT)
Dept: OBGYN CLINIC | Facility: HOSPITAL | Age: 74
End: 2019-04-10

## 2019-04-10 ENCOUNTER — OFFICE VISIT (OUTPATIENT)
Dept: OBGYN CLINIC | Facility: CLINIC | Age: 74
End: 2019-04-10
Payer: COMMERCIAL

## 2019-04-10 VITALS
HEIGHT: 67 IN | DIASTOLIC BLOOD PRESSURE: 81 MMHG | HEART RATE: 103 BPM | SYSTOLIC BLOOD PRESSURE: 134 MMHG | BODY MASS INDEX: 27.47 KG/M2 | WEIGHT: 175 LBS

## 2019-04-10 DIAGNOSIS — M75.41 IMPINGEMENT SYNDROME OF RIGHT SHOULDER: Primary | ICD-10-CM

## 2019-04-10 DIAGNOSIS — M16.11 PRIMARY LOCALIZED OSTEOARTHRITIS OF RIGHT HIP: ICD-10-CM

## 2019-04-10 PROCEDURE — 20610 DRAIN/INJ JOINT/BURSA W/O US: CPT | Performed by: ORTHOPAEDIC SURGERY

## 2019-04-10 PROCEDURE — 99213 OFFICE O/P EST LOW 20 MIN: CPT | Performed by: ORTHOPAEDIC SURGERY

## 2019-04-10 RX ORDER — BETAMETHASONE SODIUM PHOSPHATE AND BETAMETHASONE ACETATE 3; 3 MG/ML; MG/ML
12 INJECTION, SUSPENSION INTRA-ARTICULAR; INTRALESIONAL; INTRAMUSCULAR; SOFT TISSUE
Status: COMPLETED | OUTPATIENT
Start: 2019-04-10 | End: 2019-04-10

## 2019-04-10 RX ORDER — LIDOCAINE HYDROCHLORIDE 10 MG/ML
4 INJECTION, SOLUTION INFILTRATION; PERINEURAL
Status: COMPLETED | OUTPATIENT
Start: 2019-04-10 | End: 2019-04-10

## 2019-04-10 RX ADMIN — BETAMETHASONE SODIUM PHOSPHATE AND BETAMETHASONE ACETATE 12 MG: 3; 3 INJECTION, SUSPENSION INTRA-ARTICULAR; INTRALESIONAL; INTRAMUSCULAR; SOFT TISSUE at 09:42

## 2019-04-10 RX ADMIN — LIDOCAINE HYDROCHLORIDE 4 ML: 10 INJECTION, SOLUTION INFILTRATION; PERINEURAL at 09:42

## 2019-04-11 ENCOUNTER — OFFICE VISIT (OUTPATIENT)
Dept: FAMILY MEDICINE CLINIC | Facility: CLINIC | Age: 74
End: 2019-04-11
Payer: COMMERCIAL

## 2019-04-11 VITALS
HEIGHT: 67 IN | BODY MASS INDEX: 29.07 KG/M2 | WEIGHT: 185.2 LBS | DIASTOLIC BLOOD PRESSURE: 84 MMHG | SYSTOLIC BLOOD PRESSURE: 138 MMHG

## 2019-04-11 DIAGNOSIS — I25.118 CORONARY ARTERY DISEASE OF NATIVE ARTERY OF NATIVE HEART WITH STABLE ANGINA PECTORIS (HCC): ICD-10-CM

## 2019-04-11 DIAGNOSIS — G89.29 OTHER CHRONIC PAIN: Primary | Chronic | ICD-10-CM

## 2019-04-11 DIAGNOSIS — J42 CHRONIC BRONCHITIS, UNSPECIFIED CHRONIC BRONCHITIS TYPE (HCC): ICD-10-CM

## 2019-04-11 DIAGNOSIS — I48.91 ATRIAL FIBRILLATION WITH RAPID VENTRICULAR RESPONSE (HCC): Chronic | ICD-10-CM

## 2019-04-11 DIAGNOSIS — E11.42 TYPE 2 DIABETES MELLITUS WITH DIABETIC POLYNEUROPATHY, WITHOUT LONG-TERM CURRENT USE OF INSULIN (HCC): Primary | ICD-10-CM

## 2019-04-11 DIAGNOSIS — I10 ESSENTIAL HYPERTENSION: ICD-10-CM

## 2019-04-11 PROCEDURE — 1160F RVW MEDS BY RX/DR IN RCRD: CPT | Performed by: FAMILY MEDICINE

## 2019-04-11 PROCEDURE — 3075F SYST BP GE 130 - 139MM HG: CPT | Performed by: FAMILY MEDICINE

## 2019-04-11 PROCEDURE — 99213 OFFICE O/P EST LOW 20 MIN: CPT | Performed by: FAMILY MEDICINE

## 2019-04-11 PROCEDURE — 3079F DIAST BP 80-89 MM HG: CPT | Performed by: FAMILY MEDICINE

## 2019-04-11 PROCEDURE — 4004F PT TOBACCO SCREEN RCVD TLK: CPT | Performed by: FAMILY MEDICINE

## 2019-04-11 RX ORDER — PREGABALIN 300 MG/1
300 CAPSULE ORAL 2 TIMES DAILY
Qty: 60 CAPSULE | Refills: 0 | Status: ON HOLD | OUTPATIENT
Start: 2019-04-11 | End: 2020-01-15 | Stop reason: CLARIF

## 2019-04-11 RX ORDER — IPRATROPIUM BROMIDE AND ALBUTEROL SULFATE 2.5; .5 MG/3ML; MG/3ML
3 SOLUTION RESPIRATORY (INHALATION) 4 TIMES DAILY PRN
Qty: 120 VIAL | Refills: 3 | Status: SHIPPED | OUTPATIENT
Start: 2019-04-11 | End: 2020-01-16 | Stop reason: HOSPADM

## 2019-04-17 ENCOUNTER — TELEPHONE (OUTPATIENT)
Dept: FAMILY MEDICINE CLINIC | Facility: CLINIC | Age: 74
End: 2019-04-17

## 2019-04-24 ENCOUNTER — TELEPHONE (OUTPATIENT)
Dept: FAMILY MEDICINE CLINIC | Facility: CLINIC | Age: 74
End: 2019-04-24

## 2019-04-24 DIAGNOSIS — I25.118 CORONARY ARTERY DISEASE OF NATIVE ARTERY OF NATIVE HEART WITH STABLE ANGINA PECTORIS (HCC): ICD-10-CM

## 2019-04-24 DIAGNOSIS — I48.91 ATRIAL FIBRILLATION WITH RAPID VENTRICULAR RESPONSE (HCC): Chronic | ICD-10-CM

## 2019-04-24 DIAGNOSIS — K92.2 GASTROINTESTINAL HEMORRHAGE, UNSPECIFIED GASTROINTESTINAL HEMORRHAGE TYPE: Primary | ICD-10-CM

## 2019-04-24 DIAGNOSIS — D64.9 SYMPTOMATIC ANEMIA: ICD-10-CM

## 2019-04-24 DIAGNOSIS — N39.41 URGE INCONTINENCE OF URINE: ICD-10-CM

## 2019-04-24 DIAGNOSIS — F43.10 PTSD (POST-TRAUMATIC STRESS DISORDER): ICD-10-CM

## 2019-04-24 DIAGNOSIS — I10 BENIGN ESSENTIAL HYPERTENSION: ICD-10-CM

## 2019-04-24 RX ORDER — CLONIDINE HYDROCHLORIDE 0.1 MG/1
0.1 TABLET ORAL DAILY
Qty: 30 TABLET | Refills: 5 | Status: SHIPPED | OUTPATIENT
Start: 2019-04-24 | End: 2019-10-02 | Stop reason: HOSPADM

## 2019-04-24 RX ORDER — OMEPRAZOLE 20 MG/1
20 CAPSULE, DELAYED RELEASE ORAL DAILY
Qty: 30 CAPSULE | Refills: 5 | Status: ON HOLD | OUTPATIENT
Start: 2019-04-24 | End: 2020-01-15 | Stop reason: CLARIF

## 2019-04-24 RX ORDER — ATORVASTATIN CALCIUM 40 MG/1
40 TABLET, FILM COATED ORAL DAILY
Qty: 30 TABLET | Refills: 5 | Status: SHIPPED | OUTPATIENT
Start: 2019-04-24 | End: 2019-05-15 | Stop reason: SDUPTHER

## 2019-04-24 RX ORDER — FINASTERIDE 5 MG/1
5 TABLET, FILM COATED ORAL DAILY
Qty: 30 TABLET | Refills: 5 | Status: ON HOLD | OUTPATIENT
Start: 2019-04-24 | End: 2020-01-15 | Stop reason: CLARIF

## 2019-04-24 RX ORDER — TAMSULOSIN HYDROCHLORIDE 0.4 MG/1
0.4 CAPSULE ORAL DAILY
Qty: 30 CAPSULE | Refills: 5 | Status: SHIPPED | OUTPATIENT
Start: 2019-04-24 | End: 2020-01-16 | Stop reason: HOSPADM

## 2019-04-24 RX ORDER — FERROUS SULFATE 325(65) MG
325 TABLET ORAL 2 TIMES DAILY WITH MEALS
Qty: 60 TABLET | Refills: 5 | Status: SHIPPED | OUTPATIENT
Start: 2019-04-24 | End: 2019-09-27 | Stop reason: ALTCHOICE

## 2019-04-24 RX ORDER — AMLODIPINE BESYLATE 10 MG/1
10 TABLET ORAL DAILY
Qty: 30 TABLET | Refills: 5 | Status: SHIPPED | OUTPATIENT
Start: 2019-04-24 | End: 2020-01-16 | Stop reason: HOSPADM

## 2019-04-24 RX ORDER — DULOXETIN HYDROCHLORIDE 30 MG/1
30 CAPSULE, DELAYED RELEASE ORAL DAILY
Qty: 30 CAPSULE | Refills: 5 | Status: SHIPPED | OUTPATIENT
Start: 2019-04-24 | End: 2019-09-27 | Stop reason: ALTCHOICE

## 2019-04-24 RX ORDER — RANOLAZINE 1000 MG/1
1000 TABLET, EXTENDED RELEASE ORAL 2 TIMES DAILY
Qty: 60 TABLET | Refills: 5 | Status: SHIPPED | OUTPATIENT
Start: 2019-04-24 | End: 2020-01-16 | Stop reason: HOSPADM

## 2019-05-15 DIAGNOSIS — I48.91 ATRIAL FIBRILLATION WITH RAPID VENTRICULAR RESPONSE (HCC): Chronic | ICD-10-CM

## 2019-05-15 RX ORDER — ATORVASTATIN CALCIUM 40 MG/1
40 TABLET, FILM COATED ORAL DAILY
Qty: 90 TABLET | Refills: 3 | Status: SHIPPED | OUTPATIENT
Start: 2019-05-15 | End: 2019-05-15 | Stop reason: SDUPTHER

## 2019-05-15 RX ORDER — ATORVASTATIN CALCIUM 40 MG/1
40 TABLET, FILM COATED ORAL DAILY
Qty: 90 TABLET | Refills: 3 | Status: SHIPPED | OUTPATIENT
Start: 2019-05-15 | End: 2020-01-16 | Stop reason: HOSPADM

## 2019-06-12 ENCOUNTER — TELEPHONE (OUTPATIENT)
Dept: FAMILY MEDICINE CLINIC | Facility: CLINIC | Age: 74
End: 2019-06-12

## 2019-07-24 ENCOUNTER — APPOINTMENT (EMERGENCY)
Dept: CT IMAGING | Facility: HOSPITAL | Age: 74
End: 2019-07-24
Payer: COMMERCIAL

## 2019-07-24 ENCOUNTER — APPOINTMENT (EMERGENCY)
Dept: RADIOLOGY | Facility: HOSPITAL | Age: 74
End: 2019-07-24
Payer: COMMERCIAL

## 2019-07-24 ENCOUNTER — HOSPITAL ENCOUNTER (EMERGENCY)
Facility: HOSPITAL | Age: 74
Discharge: HOME | End: 2019-07-24
Attending: EMERGENCY MEDICINE | Admitting: EMERGENCY MEDICINE
Payer: COMMERCIAL

## 2019-07-24 VITALS
RESPIRATION RATE: 23 BRPM | HEIGHT: 69 IN | WEIGHT: 184 LBS | TEMPERATURE: 98 F | OXYGEN SATURATION: 98 % | BODY MASS INDEX: 27.25 KG/M2 | HEART RATE: 81 BPM | DIASTOLIC BLOOD PRESSURE: 86 MMHG | SYSTOLIC BLOOD PRESSURE: 128 MMHG

## 2019-07-24 DIAGNOSIS — R10.9 CHRONIC ABDOMINAL PAIN: ICD-10-CM

## 2019-07-24 DIAGNOSIS — K85.90 PANCREATITIS: ICD-10-CM

## 2019-07-24 DIAGNOSIS — I25.118 CORONARY ARTERY DISEASE OF NATIVE ARTERY OF NATIVE HEART WITH STABLE ANGINA PECTORIS (HCC): ICD-10-CM

## 2019-07-24 DIAGNOSIS — G89.29 CHRONIC ABDOMINAL PAIN: ICD-10-CM

## 2019-07-24 DIAGNOSIS — R42 DIZZINESS: Primary | ICD-10-CM

## 2019-07-24 LAB
ALBUMIN SERPL BCP-MCNC: 3.8 G/DL (ref 3.5–5.7)
ALP SERPL-CCNC: 73 U/L (ref 55–165)
ALT SERPL W P-5'-P-CCNC: 16 U/L (ref 7–52)
ANION GAP SERPL CALCULATED.3IONS-SCNC: 6 MMOL/L (ref 4–13)
AST SERPL W P-5'-P-CCNC: 14 U/L (ref 13–39)
ATRIAL RATE: 119 BPM
BASOPHILS # BLD AUTO: 0.3 THOUSANDS/ΜL (ref 0–0.1)
BASOPHILS NFR BLD AUTO: 3 % (ref 0–2)
BILIRUB SERPL-MCNC: 0.7 MG/DL (ref 0.2–1)
BUN SERPL-MCNC: 16 MG/DL (ref 7–25)
CALCIUM SERPL-MCNC: 9.9 MG/DL (ref 8.6–10.5)
CHLORIDE SERPL-SCNC: 102 MMOL/L (ref 98–107)
CO2 SERPL-SCNC: 34 MMOL/L (ref 21–31)
CREAT SERPL-MCNC: 0.98 MG/DL (ref 0.7–1.3)
EOSINOPHIL # BLD AUTO: 0.5 THOUSAND/ΜL (ref 0–0.61)
EOSINOPHIL NFR BLD AUTO: 5 % (ref 0–5)
ERYTHROCYTE [DISTWIDTH] IN BLOOD BY AUTOMATED COUNT: 16.7 % (ref 11.5–14.5)
GFR SERPL CREATININE-BSD FRML MDRD: 75 ML/MIN/1.73SQ M
GLUCOSE SERPL-MCNC: 139 MG/DL (ref 65–99)
HCT VFR BLD AUTO: 49.6 % (ref 42–47)
HGB BLD-MCNC: 17.1 G/DL (ref 14–18)
LIPASE SERPL-CCNC: 149 U/L (ref 11–82)
LYMPHOCYTES # BLD AUTO: 2.4 THOUSANDS/ΜL (ref 0.6–4.47)
LYMPHOCYTES NFR BLD AUTO: 20 % (ref 21–51)
MCH RBC QN AUTO: 29.5 PG (ref 26–34)
MCHC RBC AUTO-ENTMCNC: 34.4 G/DL (ref 31–37)
MCV RBC AUTO: 86 FL (ref 81–99)
MONOCYTES # BLD AUTO: 1.5 THOUSAND/ΜL (ref 0.17–1.22)
MONOCYTES NFR BLD AUTO: 12 % (ref 2–12)
NEUTROPHILS # BLD AUTO: 7.2 THOUSANDS/ΜL (ref 1.4–6.5)
NEUTS SEG NFR BLD AUTO: 60 % (ref 42–75)
PLATELET # BLD AUTO: 209 THOUSANDS/UL (ref 149–390)
PMV BLD AUTO: 7.4 FL (ref 8.6–11.7)
POTASSIUM SERPL-SCNC: 3.2 MMOL/L (ref 3.5–5.5)
PROT SERPL-MCNC: 5.9 G/DL (ref 6.4–8.9)
QRS AXIS: 58 DEGREES
QRSD INTERVAL: 78 MS
QT INTERVAL: 392 MS
QTC INTERVAL: 457 MS
RBC # BLD AUTO: 5.78 MILLION/UL (ref 4.3–5.9)
SODIUM SERPL-SCNC: 142 MMOL/L (ref 134–143)
T WAVE AXIS: 70 DEGREES
TROPONIN I SERPL-MCNC: <0.03 NG/ML
VENTRICULAR RATE: 82 BPM
WBC # BLD AUTO: 11.9 THOUSAND/UL (ref 4.8–10.8)

## 2019-07-24 PROCEDURE — 80053 COMPREHEN METABOLIC PANEL: CPT | Performed by: EMERGENCY MEDICINE

## 2019-07-24 PROCEDURE — 93010 ELECTROCARDIOGRAM REPORT: CPT | Performed by: INTERNAL MEDICINE

## 2019-07-24 PROCEDURE — 36415 COLL VENOUS BLD VENIPUNCTURE: CPT | Performed by: EMERGENCY MEDICINE

## 2019-07-24 PROCEDURE — 85025 COMPLETE CBC W/AUTO DIFF WBC: CPT | Performed by: EMERGENCY MEDICINE

## 2019-07-24 PROCEDURE — 93005 ELECTROCARDIOGRAM TRACING: CPT

## 2019-07-24 PROCEDURE — 70450 CT HEAD/BRAIN W/O DYE: CPT

## 2019-07-24 PROCEDURE — 74176 CT ABD & PELVIS W/O CONTRAST: CPT

## 2019-07-24 PROCEDURE — 83690 ASSAY OF LIPASE: CPT | Performed by: EMERGENCY MEDICINE

## 2019-07-24 PROCEDURE — 96361 HYDRATE IV INFUSION ADD-ON: CPT

## 2019-07-24 PROCEDURE — 84484 ASSAY OF TROPONIN QUANT: CPT | Performed by: EMERGENCY MEDICINE

## 2019-07-24 PROCEDURE — 96360 HYDRATION IV INFUSION INIT: CPT

## 2019-07-24 PROCEDURE — 71046 X-RAY EXAM CHEST 2 VIEWS: CPT

## 2019-07-24 PROCEDURE — 99285 EMERGENCY DEPT VISIT HI MDM: CPT

## 2019-07-24 RX ORDER — ACETAMINOPHEN 325 MG/1
650 TABLET ORAL ONCE
Status: COMPLETED | OUTPATIENT
Start: 2019-07-24 | End: 2019-07-24

## 2019-07-24 RX ADMIN — ACETAMINOPHEN 650 MG: 325 TABLET ORAL at 15:21

## 2019-07-24 RX ADMIN — SODIUM CHLORIDE 1000 ML: 0.9 INJECTION, SOLUTION INTRAVENOUS at 11:56
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
## 2019-09-26 ENCOUNTER — APPOINTMENT (EMERGENCY)
Dept: RADIOLOGY | Facility: HOSPITAL | Age: 74
DRG: 202 | End: 2019-09-26
Payer: COMMERCIAL

## 2019-09-26 ENCOUNTER — APPOINTMENT (EMERGENCY)
Dept: CT IMAGING | Facility: HOSPITAL | Age: 74
DRG: 202 | End: 2019-09-26
Payer: COMMERCIAL

## 2019-09-26 ENCOUNTER — HOSPITAL ENCOUNTER (INPATIENT)
Facility: HOSPITAL | Age: 74
LOS: 2 days | Discharge: NURSING, REHAB OR LONG-TERM CARE | DRG: 202 | End: 2019-10-02
Admitting: INTERNAL MEDICINE
Payer: COMMERCIAL

## 2019-09-26 DIAGNOSIS — R51.9 NONINTRACTABLE HEADACHE, UNSPECIFIED CHRONICITY PATTERN, UNSPECIFIED HEADACHE TYPE: ICD-10-CM

## 2019-09-26 DIAGNOSIS — F43.10 PTSD (POST-TRAUMATIC STRESS DISORDER): ICD-10-CM

## 2019-09-26 DIAGNOSIS — R07.9 CHEST PAIN, UNSPECIFIED: ICD-10-CM

## 2019-09-26 DIAGNOSIS — R50.9 FEVER, UNSPECIFIED FEVER CAUSE: ICD-10-CM

## 2019-09-26 DIAGNOSIS — I48.19 PERSISTENT ATRIAL FIBRILLATION (HCC): Primary | ICD-10-CM

## 2019-09-26 DIAGNOSIS — R42 DIZZINESS: ICD-10-CM

## 2019-09-26 LAB
ALBUMIN SERPL BCP-MCNC: 3.8 G/DL (ref 3.5–5.7)
ALP SERPL-CCNC: 97 U/L (ref 55–165)
ALT SERPL W P-5'-P-CCNC: 12 U/L (ref 7–52)
ANION GAP SERPL CALCULATED.3IONS-SCNC: 5 MMOL/L (ref 4–13)
APTT PPP: 44 SECONDS (ref 23–37)
AST SERPL W P-5'-P-CCNC: 11 U/L (ref 13–39)
ATRIAL RATE: 357 BPM
BILIRUB SERPL-MCNC: 0.9 MG/DL (ref 0.2–1)
BUN SERPL-MCNC: 16 MG/DL (ref 7–25)
CALCIUM SERPL-MCNC: 9.6 MG/DL (ref 8.6–10.5)
CHLORIDE SERPL-SCNC: 103 MMOL/L (ref 98–107)
CO2 SERPL-SCNC: 31 MMOL/L (ref 21–31)
CREAT SERPL-MCNC: 0.88 MG/DL (ref 0.7–1.3)
EOSINOPHIL # BLD AUTO: 0.12 THOUSAND/UL (ref 0–0.61)
EOSINOPHIL NFR BLD MANUAL: 1 % (ref 0–6)
ERYTHROCYTE [DISTWIDTH] IN BLOOD BY AUTOMATED COUNT: 15.3 % (ref 11.5–14.5)
ETHANOL SERPL-MCNC: <10 MG/DL
GFR SERPL CREATININE-BSD FRML MDRD: 83 ML/MIN/1.73SQ M
GLUCOSE SERPL-MCNC: 105 MG/DL (ref 65–99)
HCT VFR BLD AUTO: 46.5 % (ref 42–47)
HGB BLD-MCNC: 15.9 G/DL (ref 14–18)
INR PPP: 1.84 (ref 0.9–1.5)
LYMPHOCYTES # BLD AUTO: 0.81 THOUSAND/UL (ref 0.6–4.47)
LYMPHOCYTES # BLD AUTO: 7 % (ref 20–51)
MCH RBC QN AUTO: 29.2 PG (ref 26–34)
MCHC RBC AUTO-ENTMCNC: 34.2 G/DL (ref 31–37)
MCV RBC AUTO: 85 FL (ref 81–99)
MONOCYTES # BLD AUTO: 2.53 THOUSAND/UL (ref 0–1.22)
MONOCYTES NFR BLD AUTO: 22 % (ref 4–12)
NEUTS BAND NFR BLD MANUAL: 5 % (ref 0–8)
NEUTS SEG # BLD: 8.05 THOUSAND/UL (ref 1.81–6.82)
NEUTS SEG NFR BLD AUTO: 65 % (ref 42–75)
PLATELET # BLD AUTO: 215 THOUSANDS/UL (ref 149–390)
PLATELET BLD QL SMEAR: ADEQUATE
PMV BLD AUTO: 7.4 FL (ref 8.6–11.7)
POTASSIUM SERPL-SCNC: 4.1 MMOL/L (ref 3.5–5.5)
PROT SERPL-MCNC: 6 G/DL (ref 6.4–8.9)
PROTHROMBIN TIME: 21.5 SECONDS (ref 10.2–13)
QRS AXIS: 67 DEGREES
QRSD INTERVAL: 84 MS
QT INTERVAL: 370 MS
QTC INTERVAL: 462 MS
RBC # BLD AUTO: 5.45 MILLION/UL (ref 4.3–5.9)
RBC MORPH BLD: NORMAL
SODIUM SERPL-SCNC: 139 MMOL/L (ref 134–143)
T WAVE AXIS: 59 DEGREES
TOTAL CELLS COUNTED SPEC: 100
TROPONIN I SERPL-MCNC: <0.03 NG/ML
VENTRICULAR RATE: 94 BPM
WBC # BLD AUTO: 11.5 THOUSAND/UL (ref 4.8–10.8)

## 2019-09-26 PROCEDURE — 70450 CT HEAD/BRAIN W/O DYE: CPT

## 2019-09-26 PROCEDURE — 96375 TX/PRO/DX INJ NEW DRUG ADDON: CPT

## 2019-09-26 PROCEDURE — 85027 COMPLETE CBC AUTOMATED: CPT

## 2019-09-26 PROCEDURE — 80320 DRUG SCREEN QUANTALCOHOLS: CPT

## 2019-09-26 PROCEDURE — 80053 COMPREHEN METABOLIC PANEL: CPT

## 2019-09-26 PROCEDURE — 93010 ELECTROCARDIOGRAM REPORT: CPT | Performed by: INTERNAL MEDICINE

## 2019-09-26 PROCEDURE — 85610 PROTHROMBIN TIME: CPT

## 2019-09-26 PROCEDURE — 85007 BL SMEAR W/DIFF WBC COUNT: CPT

## 2019-09-26 PROCEDURE — 71045 X-RAY EXAM CHEST 1 VIEW: CPT

## 2019-09-26 PROCEDURE — 85730 THROMBOPLASTIN TIME PARTIAL: CPT

## 2019-09-26 PROCEDURE — 36600 WITHDRAWAL OF ARTERIAL BLOOD: CPT

## 2019-09-26 PROCEDURE — 99285 EMERGENCY DEPT VISIT HI MDM: CPT

## 2019-09-26 PROCEDURE — 96374 THER/PROPH/DIAG INJ IV PUSH: CPT

## 2019-09-26 PROCEDURE — 93005 ELECTROCARDIOGRAM TRACING: CPT

## 2019-09-26 PROCEDURE — 36415 COLL VENOUS BLD VENIPUNCTURE: CPT

## 2019-09-26 PROCEDURE — 84484 ASSAY OF TROPONIN QUANT: CPT

## 2019-09-26 RX ORDER — DIPHENHYDRAMINE HYDROCHLORIDE 50 MG/ML
25 INJECTION INTRAMUSCULAR; INTRAVENOUS ONCE
Status: COMPLETED | OUTPATIENT
Start: 2019-09-26 | End: 2019-09-26

## 2019-09-26 RX ORDER — METOCLOPRAMIDE HYDROCHLORIDE 5 MG/ML
10 INJECTION INTRAMUSCULAR; INTRAVENOUS ONCE
Status: COMPLETED | OUTPATIENT
Start: 2019-09-26 | End: 2019-09-26

## 2019-09-26 RX ADMIN — METOCLOPRAMIDE 10 MG: 5 INJECTION, SOLUTION INTRAMUSCULAR; INTRAVENOUS at 14:39

## 2019-09-26 RX ADMIN — DIPHENHYDRAMINE HYDROCHLORIDE 25 MG: 50 INJECTION, SOLUTION INTRAMUSCULAR; INTRAVENOUS at 14:40
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
## 2019-09-26 NOTE — SOCIAL WORK
Cm dept received a call from the director of cm asking for a  to go see this pt, I met with the pt at the bedside in rm#4 in the er dept, pt states he has no place to stay, he stated he is not able to stay at the place he was staying, I tried to reach his  whom he states is his care taker at # [phone] and it is a non-functioning phone # I tried the cell# [phone] and the message box was full , I attempted to reach someone from the [facility] from 16:15 to 16:50 I was finally able to speak with Luis Miguel betts and she then tried to get me to the  and she was unsuccessful, she gave me Yuliya's # [phone] I was able to reach Arlon Lesch at 16:55 she stated I need to get him a ride up there to [facility] and he may be able to find emergency shelter for tonight and then in the morning he can go to the 8th floor of the social workers room from 9:30 am-11am & 1pm-3 pm and they can try to find placement, I called [facility] @16:10 and spoke with [address] and she stated the pt needs to be able to walk a fieight of steps to the emergency shelter, the pt is not able to that may steps, pt uses a cane and walker,[facility]  [facility] back at 17:05 and instructed me to call Arlon Lesch and I explained I already spoke with her, I attempted to reach [place] at 17:40 and I was able to speak with her, she has been helping this man and he was living with her and her  but they are remodeling their home and living in a camper and they do not have any room now,  the pt has been staying at the [address] [place] [address] and he has not  paid his bill , the pt did show me his disability pay check that he needs to cash, but there is no plascencia open, pt stated the Yanira from the [facility] dropped him off at the hotel and he was not allowed to stay there, I spoke to Lanita Cooks at the [facility]" he staeed he needs the pt' # and he will send a message and the pt will get a phone call tomorrow and they will try to help him, the pt does not have a working cell phone, the hotel ahas been called several times to see if he could stay tonight and get paid tomorrow then the pt to go to the homeless center or I did call Luisito rodriguez  At [facility] and she would be able to take him at her independent living facility for $700-$900/month, I did explain this to the pt, the p needs to do 5 steps and then walk a little and then 4 steps inside, pt states he has his cpap and oxygen [facility] up and he states it is not being released, call have been placed to the [facility] by the director of cm and the he has not called back, I placed a call to the area of aging at 18:36 to make a report and I was told they do not have to let him back, but I explained the have his cpap and oxygen, I was told that the officer would call the er dept,  If the pt ias able to get to the [facility] [place] his friend can take him, pt can not go to a shelter without his oxygen and cpap machine, cm will follow in the morning

## 2019-09-26 NOTE — SOCIAL WORK
Area of aging was called to see if they can do an emergency placement tonight, they andrew need to get the cpap and his oxygen from the hotel and then tomorrow the pt needs to pay his bill to get his other  Belongings, he could go to [facility] tomorrow if the pt is in agreement

## 2019-09-27 PROBLEM — J96.11 CHRONIC RESPIRATORY FAILURE WITH HYPOXIA (HCC): Chronic | Status: ACTIVE | Noted: 2019-09-27

## 2019-09-27 LAB
AMPHETAMINES SERPL QL SCN: NEGATIVE
BACTERIA UR QL AUTO: ABNORMAL /HPF
BARBITURATES UR QL: NEGATIVE
BENZODIAZ UR QL: NEGATIVE
BILIRUB UR QL STRIP: NEGATIVE
CLARITY UR: ABNORMAL
COCAINE UR QL: NEGATIVE
COLOR UR: ABNORMAL
GLUCOSE UR STRIP-MCNC: NEGATIVE MG/DL
HGB UR QL STRIP.AUTO: NEGATIVE
KETONES UR STRIP-MCNC: ABNORMAL MG/DL
LEUKOCYTE ESTERASE UR QL STRIP: ABNORMAL
METHADONE UR QL: NEGATIVE
MUCOUS THREADS UR QL AUTO: ABNORMAL
NITRITE UR QL STRIP: NEGATIVE
NON-SQ EPI CELLS URNS QL MICRO: ABNORMAL /HPF
OPIATES UR QL SCN: NEGATIVE
OTHER STN SPEC: ABNORMAL
PCP UR QL: NEGATIVE
PH UR STRIP.AUTO: 7 [PH]
PROT UR STRIP-MCNC: NEGATIVE MG/DL
RBC #/AREA URNS AUTO: ABNORMAL /HPF
SP GR UR STRIP.AUTO: 1.01 (ref 1–1.03)
THC UR QL: NEGATIVE
UROBILINOGEN UR QL STRIP.AUTO: 4 E.U./DL
WBC #/AREA URNS AUTO: ABNORMAL /HPF

## 2019-09-27 PROCEDURE — 94640 AIRWAY INHALATION TREATMENT: CPT

## 2019-09-27 PROCEDURE — 94760 N-INVAS EAR/PLS OXIMETRY 1: CPT

## 2019-09-27 PROCEDURE — 97116 GAIT TRAINING THERAPY: CPT

## 2019-09-27 PROCEDURE — 87086 URINE CULTURE/COLONY COUNT: CPT | Performed by: PHYSICIAN ASSISTANT

## 2019-09-27 PROCEDURE — 97163 PT EVAL HIGH COMPLEX 45 MIN: CPT

## 2019-09-27 PROCEDURE — G8979 MOBILITY GOAL STATUS: HCPCS

## 2019-09-27 PROCEDURE — 81003 URINALYSIS AUTO W/O SCOPE: CPT

## 2019-09-27 PROCEDURE — 97530 THERAPEUTIC ACTIVITIES: CPT

## 2019-09-27 PROCEDURE — 94664 DEMO&/EVAL PT USE INHALER: CPT

## 2019-09-27 PROCEDURE — G8978 MOBILITY CURRENT STATUS: HCPCS

## 2019-09-27 PROCEDURE — 81001 URINALYSIS AUTO W/SCOPE: CPT

## 2019-09-27 PROCEDURE — 99218 PR INITIAL OBSERVATION CARE/DAY 30 MINUTES: CPT | Performed by: INTERNAL MEDICINE

## 2019-09-27 PROCEDURE — 80307 DRUG TEST PRSMV CHEM ANLYZR: CPT

## 2019-09-27 RX ORDER — IPRATROPIUM BROMIDE AND ALBUTEROL SULFATE 2.5; .5 MG/3ML; MG/3ML
3 SOLUTION RESPIRATORY (INHALATION)
Status: DISCONTINUED | OUTPATIENT
Start: 2019-09-27 | End: 2019-09-30

## 2019-09-27 RX ORDER — RANOLAZINE 500 MG/1
1000 TABLET, EXTENDED RELEASE ORAL 2 TIMES DAILY
Status: DISCONTINUED | OUTPATIENT
Start: 2019-09-27 | End: 2019-10-02 | Stop reason: HOSPADM

## 2019-09-27 RX ORDER — DULOXETIN HYDROCHLORIDE 60 MG/1
60 CAPSULE, DELAYED RELEASE ORAL DAILY
COMMUNITY
End: 2020-01-16 | Stop reason: HOSPADM

## 2019-09-27 RX ORDER — OMEPRAZOLE 20 MG/1
40 CAPSULE, DELAYED RELEASE ORAL
COMMUNITY
Start: 2019-07-30 | End: 2019-10-02 | Stop reason: HOSPADM

## 2019-09-27 RX ORDER — DICYCLOMINE HCL 20 MG
20 TABLET ORAL ONCE
Status: COMPLETED | OUTPATIENT
Start: 2019-09-27 | End: 2019-09-27

## 2019-09-27 RX ORDER — DULOXETIN HYDROCHLORIDE 60 MG/1
60 CAPSULE, DELAYED RELEASE ORAL DAILY
Status: DISCONTINUED | OUTPATIENT
Start: 2019-09-28 | End: 2019-09-27

## 2019-09-27 RX ORDER — ISOSORBIDE MONONITRATE 30 MG/1
30 TABLET, EXTENDED RELEASE ORAL DAILY
Status: DISCONTINUED | OUTPATIENT
Start: 2019-09-28 | End: 2019-09-27

## 2019-09-27 RX ORDER — ATORVASTATIN CALCIUM 40 MG/1
40 TABLET, FILM COATED ORAL DAILY
COMMUNITY
Start: 2019-09-13 | End: 2019-10-02 | Stop reason: HOSPADM

## 2019-09-27 RX ORDER — ALBUTEROL SULFATE 90 UG/1
1 AEROSOL, METERED RESPIRATORY (INHALATION) EVERY 6 HOURS PRN
COMMUNITY
Start: 2019-07-30 | End: 2019-10-02 | Stop reason: HOSPADM

## 2019-09-27 RX ORDER — PREGABALIN 100 MG/1
300 CAPSULE ORAL 2 TIMES DAILY
Status: DISCONTINUED | OUTPATIENT
Start: 2019-09-27 | End: 2019-10-02 | Stop reason: HOSPADM

## 2019-09-27 RX ORDER — TAMSULOSIN HYDROCHLORIDE 0.4 MG/1
0.4 CAPSULE ORAL
COMMUNITY
Start: 2019-09-13 | End: 2019-10-02 | Stop reason: HOSPADM

## 2019-09-27 RX ORDER — FINASTERIDE 5 MG/1
5 TABLET, FILM COATED ORAL DAILY
Status: DISCONTINUED | OUTPATIENT
Start: 2019-09-28 | End: 2019-10-02 | Stop reason: HOSPADM

## 2019-09-27 RX ORDER — DULOXETIN HYDROCHLORIDE 60 MG/1
60 CAPSULE, DELAYED RELEASE ORAL DAILY
Status: DISCONTINUED | OUTPATIENT
Start: 2019-09-28 | End: 2019-10-02 | Stop reason: HOSPADM

## 2019-09-27 RX ORDER — CLONIDINE HYDROCHLORIDE 0.1 MG/1
0.1 TABLET ORAL ONCE
Status: COMPLETED | OUTPATIENT
Start: 2019-09-27 | End: 2019-09-27

## 2019-09-27 RX ORDER — ATORVASTATIN CALCIUM 40 MG/1
40 TABLET, FILM COATED ORAL
Status: DISCONTINUED | OUTPATIENT
Start: 2019-09-27 | End: 2019-09-27

## 2019-09-27 RX ORDER — ASPIRIN 81 MG/1
81 TABLET, CHEWABLE ORAL ONCE
Status: COMPLETED | OUTPATIENT
Start: 2019-09-27 | End: 2019-09-27

## 2019-09-27 RX ORDER — IPRATROPIUM BROMIDE AND ALBUTEROL SULFATE 2.5; .5 MG/3ML; MG/3ML
3 SOLUTION RESPIRATORY (INHALATION) 4 TIMES DAILY PRN
COMMUNITY
Start: 2019-07-30 | End: 2019-10-02 | Stop reason: HOSPADM

## 2019-09-27 RX ORDER — IPRATROPIUM BROMIDE AND ALBUTEROL SULFATE 2.5; .5 MG/3ML; MG/3ML
3 SOLUTION RESPIRATORY (INHALATION) 4 TIMES DAILY PRN
Status: DISCONTINUED | OUTPATIENT
Start: 2019-09-27 | End: 2019-09-27

## 2019-09-27 RX ORDER — BUTALBITAL, ACETAMINOPHEN AND CAFFEINE 50; 325; 40 MG/1; MG/1; MG/1
1 TABLET ORAL ONCE
Status: COMPLETED | OUTPATIENT
Start: 2019-09-27 | End: 2019-09-27

## 2019-09-27 RX ORDER — ISOSORBIDE MONONITRATE 30 MG/1
30 TABLET, EXTENDED RELEASE ORAL DAILY
Status: DISCONTINUED | OUTPATIENT
Start: 2019-09-28 | End: 2019-10-02 | Stop reason: HOSPADM

## 2019-09-27 RX ORDER — ALBUTEROL SULFATE 2.5 MG/3ML
2.5 SOLUTION RESPIRATORY (INHALATION) ONCE
Status: COMPLETED | OUTPATIENT
Start: 2019-09-27 | End: 2019-09-27

## 2019-09-27 RX ORDER — NICOTINE 21 MG/24HR
1 PATCH, TRANSDERMAL 24 HOURS TRANSDERMAL DAILY
Status: DISCONTINUED | OUTPATIENT
Start: 2019-09-28 | End: 2019-10-02 | Stop reason: HOSPADM

## 2019-09-27 RX ORDER — PANTOPRAZOLE SODIUM 40 MG/1
40 TABLET, DELAYED RELEASE ORAL
Status: DISCONTINUED | OUTPATIENT
Start: 2019-09-28 | End: 2019-10-02 | Stop reason: HOSPADM

## 2019-09-27 RX ORDER — ATORVASTATIN CALCIUM 40 MG/1
40 TABLET, FILM COATED ORAL DAILY
Status: DISCONTINUED | OUTPATIENT
Start: 2019-09-28 | End: 2019-10-02 | Stop reason: HOSPADM

## 2019-09-27 RX ORDER — ASPIRIN 81 MG/1
81 TABLET, CHEWABLE ORAL DAILY
Status: DISCONTINUED | OUTPATIENT
Start: 2019-09-28 | End: 2019-10-02 | Stop reason: HOSPADM

## 2019-09-27 RX ORDER — CLONIDINE HYDROCHLORIDE 0.1 MG/1
0.1 TABLET ORAL 2 TIMES DAILY
COMMUNITY
Start: 2019-07-30 | End: 2019-10-02 | Stop reason: HOSPADM

## 2019-09-27 RX ORDER — DULOXETIN HYDROCHLORIDE 60 MG/1
60 CAPSULE, DELAYED RELEASE ORAL DAILY
COMMUNITY
Start: 2019-07-30 | End: 2019-10-02 | Stop reason: HOSPADM

## 2019-09-27 RX ORDER — AMLODIPINE BESYLATE 10 MG/1
10 TABLET ORAL DAILY
COMMUNITY
Start: 2019-07-30 | End: 2019-10-02 | Stop reason: HOSPADM

## 2019-09-27 RX ORDER — TAMSULOSIN HYDROCHLORIDE 0.4 MG/1
0.4 CAPSULE ORAL DAILY
Status: DISCONTINUED | OUTPATIENT
Start: 2019-09-28 | End: 2019-10-02 | Stop reason: HOSPADM

## 2019-09-27 RX ORDER — AMLODIPINE BESYLATE 5 MG/1
10 TABLET ORAL DAILY
Status: DISCONTINUED | OUTPATIENT
Start: 2019-09-28 | End: 2019-10-02 | Stop reason: HOSPADM

## 2019-09-27 RX ORDER — ACETAMINOPHEN 325 MG/1
650 TABLET ORAL ONCE
Status: COMPLETED | OUTPATIENT
Start: 2019-09-27 | End: 2019-09-27

## 2019-09-27 RX ORDER — RANOLAZINE 1000 MG/1
1000 TABLET, EXTENDED RELEASE ORAL 2 TIMES DAILY
COMMUNITY
Start: 2019-07-30 | End: 2019-10-02 | Stop reason: HOSPADM

## 2019-09-27 RX ORDER — ALBUTEROL SULFATE 90 UG/1
2 AEROSOL, METERED RESPIRATORY (INHALATION) EVERY 6 HOURS PRN
Status: DISCONTINUED | OUTPATIENT
Start: 2019-09-27 | End: 2019-10-02 | Stop reason: HOSPADM

## 2019-09-27 RX ORDER — DOCUSATE SODIUM 100 MG/1
100 CAPSULE, LIQUID FILLED ORAL 2 TIMES DAILY
Status: DISCONTINUED | OUTPATIENT
Start: 2019-09-27 | End: 2019-10-02 | Stop reason: HOSPADM

## 2019-09-27 RX ORDER — AMLODIPINE BESYLATE 5 MG/1
10 TABLET ORAL ONCE
Status: COMPLETED | OUTPATIENT
Start: 2019-09-27 | End: 2019-09-27

## 2019-09-27 RX ORDER — ALBUTEROL SULFATE 1.25 MG/3ML
1 SOLUTION RESPIRATORY (INHALATION) EVERY 6 HOURS PRN
COMMUNITY
End: 2019-09-27

## 2019-09-27 RX ORDER — ACETAMINOPHEN 325 MG/1
650 TABLET ORAL EVERY 6 HOURS PRN
Status: DISCONTINUED | OUTPATIENT
Start: 2019-09-27 | End: 2019-09-29

## 2019-09-27 RX ADMIN — AMLODIPINE BESYLATE 10 MG: 5 TABLET ORAL at 17:43

## 2019-09-27 RX ADMIN — ATORVASTATIN CALCIUM 40 MG: 40 TABLET, FILM COATED ORAL at 20:37

## 2019-09-27 RX ADMIN — BUTALBITAL, ACETAMINOPHEN, AND CAFFEINE 1 TABLET: 50; 325; 40 TABLET ORAL at 10:46

## 2019-09-27 RX ADMIN — ACETAMINOPHEN 650 MG: 325 TABLET ORAL at 07:15

## 2019-09-27 RX ADMIN — METOPROLOL TARTRATE 25 MG: 25 TABLET, FILM COATED ORAL at 21:15

## 2019-09-27 RX ADMIN — PREGABALIN 300 MG: 100 CAPSULE ORAL at 21:15

## 2019-09-27 RX ADMIN — METOPROLOL TARTRATE 25 MG: 25 TABLET, FILM COATED ORAL at 17:43

## 2019-09-27 RX ADMIN — RANOLAZINE 1000 MG: 500 TABLET, FILM COATED, EXTENDED RELEASE ORAL at 21:15

## 2019-09-27 RX ADMIN — ASPIRIN 81 MG 81 MG: 81 TABLET ORAL at 17:43

## 2019-09-27 RX ADMIN — DOCUSATE SODIUM 100 MG: 100 CAPSULE, LIQUID FILLED ORAL at 21:16

## 2019-09-27 RX ADMIN — APIXABAN 5 MG: 2.5 TABLET, FILM COATED ORAL at 20:37

## 2019-09-27 RX ADMIN — DICYCLOMINE HYDROCHLORIDE 20 MG: 20 TABLET ORAL at 17:43

## 2019-09-27 RX ADMIN — ALBUTEROL SULFATE 2.5 MG: 2.5 SOLUTION RESPIRATORY (INHALATION) at 15:01

## 2019-09-27 RX ADMIN — CLONIDINE HYDROCHLORIDE 0.1 MG: 0.1 TABLET ORAL at 17:43

## 2019-09-27 RX ADMIN — IPRATROPIUM BROMIDE AND ALBUTEROL SULFATE 3 ML: 2.5; .5 SOLUTION RESPIRATORY (INHALATION) at 21:05

## 2019-09-27 NOTE — PLAN OF CARE
Problem: PHYSICAL THERAPY ADULT  Goal: Performs mobility at highest level of function for planned discharge setting  See evaluation for individualized goals  Description  Treatment/Interventions: Functional transfer training, LE strengthening/ROM, Elevations, Therapeutic exercise, Endurance training, Patient/family training, Equipment eval/education, Bed mobility, Gait training, Spoke to nursing, Spoke to case management, Spoke to advanced practitioner  Equipment Recommended: (pt may benefit from RW trial next session, refused today)       See flowsheet documentation for full assessment, interventions and recommendations  Note:   Prognosis: Guarded  Problem List: Decreased strength, Decreased endurance, Impaired balance, Decreased mobility, Decreased safety awareness, Impaired judgement, Pain  Assessment: Pt is 66 y o  male seen for PT evaluation on 9/27/2019 s/p admit to [facility] on 9/26/2019 w/ <principal problem not specified>  PT consulted to assess pt's functional mobility and d/c needs  Order placed for PT eval and tx  Performed at least 2 patient identifiers during session: Name and wristband  Comorbidities affecting pt's physical performance at time of assessment include: AFib, CHF, chronic pain, COPD, CAD, h/o CVA, GERD, h/o TIA, HLD, HTN, migraines, OA, peripheral neuropathy, depression, anxiety, personality d/o, PTSD, PVD, h/o vertigo  PTA, pt was independent w/ all functional mobility w/ SPC vs rollator vs electric w/c, ambulates household distances and is currently homeless after being kicked out of [facility] for not paying rent   Personal factors affecting pt at time of IE include: ambulating w/ assistive device, inability to navigate level surfaces w/o external assistance, unable to perform dynamic tasks in community, limited home support, positive fall history, decreased initiation and engagement, unable to perform physical activity, limited insight into impairments, inability to perform IADLs and inability to perform ADLs  Please find objective findings from PT assessment regarding body systems outlined above with impairments and limitations including weakness, impaired balance, decreased endurance, pain, decreased activity tolerance, decreased functional mobility tolerance, decreased safety awareness and fall risk, as well as mobility assessment (need for cueing for mobility technique)  The following objective measures performed on IE also reveal limitations: Barthel Index: 35/100  Pt's clinical presentation is currently unstable/unpredictable seen in pt's presentation of need for input for task focus and mobility technique and ongoing medical assessment  Pt to benefit from continued PT tx to address deficits as defined above and maximize level of functional independent mobility and consistency  From PT/mobility standpoint, recommendation at time of d/c would be STR pending progress in order to facilitate return to PLOF  Barriers to Discharge: Other (Comment)(pt homeless)     Recommendation: (S) Short-term skilled PT     PT - OK to Discharge: Yes(when medically cleared, if to STR)    See flowsheet documentation for full assessment

## 2019-09-27 NOTE — SOCIAL WORK
10:15 I came to the ER dept to explain we are trying to get emergency placement to a snf, pt is a target and needs to be optioned and we are trying to get emergency placement as an exceptional admission,  11 am a message was left by Bobby Gambino from the [place], 11:30 am I was able to reach Bobby Gambino and I was told that the [place] can not place him in their [place] because he is not service connected, he is only eligible for services, I had attempted to call S[place] at 08:25 and I was unable to leave a message on her phone, I called again at 13:40 and left a message, I finally was able to reach S[place]hire at 16:40 and she stated she is unable to help and th pt has family but out of state and they want nothing to do with him,  11:45 am I received a call from Doroteo Salgado at [facility] and U was told he would be at the hospital at 1 pm and the er dept was made aware, I sent referrals to snf's for emergency placement to [address] [place] [address], [address] [place] [address], [address] [place] [address], [address] [place] [address] [facility], , I received a call from Xochitl Aldridge at [facility] stating they would like to help us ,but he is a target and they need him optioned, she stated he can not be an exceptional admission from the ER dept , I called the Director of  and made her aware, Doroteo Salgado came to my office to discuss the case, I reviewed everything that I had done and how I had a place for today , but it is for some who is independent, when I had reviewed the therapy notes this am with Teri Canas she stated he could not go to that facility and she does not have any beds in her [facility] facility, I called [facility] at 14:23 and they are unable to accept the pt   I received a call from Doroteo Salgado at 15:35 and he has not been able to find a place that will accept the pt, I received a call from the nurse stating the pt is on oxygen and needed neb treatments, I asked her to find out if he has a nebulizer or if I would need to get him one, she asked the pt and she stated it is locked up at the hotel that has his belongings until he pays his bill, Chicho Teresa was her at 17:00 helping to find a place, [facility] would have been willing to accept if they had a determination letter with in this year and they checked and they do not, I was told at 17:30 the they can not find a place willing to take the pt without being optioned, I spoke to my boss and supervisor of nursing, the supervisor called the  on call and the pt will be coming in as an obs, Naz from the area of aging was made aware she needs to come early on Monday to start the option process so the pt can go to a snf

## 2019-09-27 NOTE — ED NOTES
Case management in to see patient this AM  Requested a PT order be placed for patient to see how he ambulates and navigated stairs        Alvarez Vilchis RN  09/27/19 5065

## 2019-09-27 NOTE — ED NOTES
PT at bedside for assessment of patient  Patient was completely saturated, brief appeared very old  Patient cleaned, saulo-care completed  Clean paper scrubs placed on patient and linens changed and bed cleaned  Patient was unable to dress himself, stating "it hurts too much " Patient needed a wheelchair to get to stairs as he refused to walk  Patient only completed three stairs and states he is not able to do more  PT made patient aware she recommends rehab, patient states, "No means no!" "Just leave me in my bed to lay there like I did before " Patient uncooperative and agitated  States he is having pain all over his body, headache, right arm, right thigh and left back, pain is a "40"        Ilia Purdy RN  09/27/19 7896

## 2019-09-27 NOTE — SOCIAL WORK
Care Management Director made several calls in attempts to place this patient from the ED  Patient's belongings including his clothing, oxygen, and C-Pap machine in at [facility]  Patient is not aloud to return there due to not paying his bill  Patient will not be given his belongings until he pays his bill  Multiple calls placed to [facility] and spoke to Josh spoke with her manager who confirmed that patient will not be able to return  However, they will give him his oxygen and C-PAP  Several calls also placed to patients friend, Erasto Ernst, @ [phone]  Sapphire reports that patient lived with her several months ago  She can not take him back to her home at the time  She is living a mobil trailer at this time due to having repairs done on her home  Spoke to on call  at the [facility] [place] @ [phone]  She could not confirm any information on this patient at this time  However, she indicated that patient can go to the Walk in Homeless unit at the [facility] on the 8th floor tomorrow from 9am   They will help to find shelter for him then  Telephone calls placed to the [address] [place] [address] on Aging in attempts to reach an on call worker @ [phone] and was prompted to option #2 (call center)  I was contacted by [employer] who indicated that this is not a criminal issue and directed me back to the Aging  He reported that the [facility] [place] has the right to not allow this patient to return if they choose  Telephone call placed again to the Call Center and they contacted [facility]  She took a report and was going to seek guidance from her   I requested that she  patient from the ED and place him in emergency shelter  I also spoke to Dr Kushal Nicholson who confirmed that patient was stable for discharged  However, due to patients need for oxygen and meds and inability to obtain safe shelter, he is being allowed to stay in the ED until tomorrow   I confirmed this plan with Supervisor, Constanza Liu  [facility]  is advised to follow up with patient tomorrow

## 2019-09-27 NOTE — ASSESSMENT & PLAN NOTE
· Nonspecific symptoms  · Has resolved now after being given a meal  · CT scan of the head unremarkable   Recent admission at [facility] with similar symptoms and workup at that time was unremarkable "MRI of brain and MRA of head and neck were sub-optimal due to motion but didn't reveal any acute findings"  · Patient's presenting symptom more so due to homelessness and having no where else to go  · Patient being admitted under [facility] care to help coordinate placement as patient is homeless

## 2019-09-27 NOTE — ED NOTES
Pt's friend, Andrew Thomson ([phone]) who [facility] advised he has a place to go in the a m  ([facility], owned by [employer])  Pt to stay in hospital bed in ER until "Maureen" arrives in a m  to transport pt        Lexi Ledesma RN  09/26/19 2056
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
## 2019-09-27 NOTE — SOCIAL WORK
I met with pt at 07:10 to discuss plan for him to go to the independent home of [address] [place] [address], pt dept assessed the pt and they are recommending rehab, the pt is not able to climb the needed steps to this facility and he is not able to care for himself,I called the [facility] @08:20 am and I was told to call the [place], [facility] and [facility] had to [facility] a message and then I was able to speak with Eric Drivers @09:10 am and she stated the the pt is not services connected , bit he is [facility], she stated that the pt told their  that he had a different hotel to go to and then he was going to a friends's home, pt had no money to go to a hotel, pt is not safe to go to this independent living as planned,I was told I would get a call back from [employer] and she would see what, I left a message @09:30 to have [employer] Drivers call me, 09:55 went to the er and explained to the pt and staff that I am working on emergency placement for this patient, I place another call to the [place] to speak with Eric Guadarrama that left a message to call her

## 2019-09-27 NOTE — ED NOTES
Pt made aware that agency on aging rep is coming to talk with him at 2pm Pt verbalized understanding       Kiran Sawant RN  09/27/19 2724

## 2019-09-27 NOTE — ED NOTES
Patient fed , refusing meal, tray left in room in case pt gets hungry  Pt C/O Feeling SOB and asking for nebulizer RX, dr Kath Stinson notified    Minal Zafar RN  09/27/19 401 Modesta Arnold RN  09/27/19 9230
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
## 2019-09-28 LAB — BACTERIA UR CULT: NORMAL

## 2019-09-28 PROCEDURE — 99225 PR SBSQ OBSERVATION CARE/DAY 25 MINUTES: CPT | Performed by: INTERNAL MEDICINE

## 2019-09-28 PROCEDURE — 99203 OFFICE O/P NEW LOW 30 MIN: CPT | Performed by: NURSE PRACTITIONER

## 2019-09-28 PROCEDURE — 94664 DEMO&/EVAL PT USE INHALER: CPT

## 2019-09-28 PROCEDURE — 94640 AIRWAY INHALATION TREATMENT: CPT

## 2019-09-28 PROCEDURE — 94760 N-INVAS EAR/PLS OXIMETRY 1: CPT

## 2019-09-28 RX ADMIN — APIXABAN 5 MG: 5 TABLET, FILM COATED ORAL at 17:20

## 2019-09-28 RX ADMIN — RANOLAZINE 1000 MG: 500 TABLET, FILM COATED, EXTENDED RELEASE ORAL at 09:30

## 2019-09-28 RX ADMIN — RANOLAZINE 1000 MG: 500 TABLET, FILM COATED, EXTENDED RELEASE ORAL at 17:20

## 2019-09-28 RX ADMIN — AMLODIPINE BESYLATE 10 MG: 5 TABLET ORAL at 09:31

## 2019-09-28 RX ADMIN — METOPROLOL TARTRATE 25 MG: 25 TABLET, FILM COATED ORAL at 09:31

## 2019-09-28 RX ADMIN — ASPIRIN 81 MG 81 MG: 81 TABLET ORAL at 09:30

## 2019-09-28 RX ADMIN — METOPROLOL TARTRATE 25 MG: 25 TABLET, FILM COATED ORAL at 22:09

## 2019-09-28 RX ADMIN — PREGABALIN 300 MG: 100 CAPSULE ORAL at 09:31

## 2019-09-28 RX ADMIN — FINASTERIDE 5 MG: 5 TABLET, FILM COATED ORAL at 09:30

## 2019-09-28 RX ADMIN — PREGABALIN 300 MG: 100 CAPSULE ORAL at 17:20

## 2019-09-28 RX ADMIN — IPRATROPIUM BROMIDE AND ALBUTEROL SULFATE 3 ML: 2.5; .5 SOLUTION RESPIRATORY (INHALATION) at 07:07

## 2019-09-28 RX ADMIN — ISOSORBIDE MONONITRATE 30 MG: 30 TABLET, EXTENDED RELEASE ORAL at 09:31

## 2019-09-28 RX ADMIN — ACETAMINOPHEN 650 MG: 325 TABLET ORAL at 17:22

## 2019-09-28 RX ADMIN — IPRATROPIUM BROMIDE AND ALBUTEROL SULFATE 3 ML: 2.5; .5 SOLUTION RESPIRATORY (INHALATION) at 20:44

## 2019-09-28 RX ADMIN — TIOTROPIUM BROMIDE 18 MCG: 18 CAPSULE ORAL; RESPIRATORY (INHALATION) at 09:31

## 2019-09-28 RX ADMIN — DULOXETINE HYDROCHLORIDE 60 MG: 60 CAPSULE, DELAYED RELEASE ORAL at 09:30

## 2019-09-28 RX ADMIN — PANTOPRAZOLE SODIUM 40 MG: 40 TABLET, DELAYED RELEASE ORAL at 05:32

## 2019-09-28 RX ADMIN — IPRATROPIUM BROMIDE AND ALBUTEROL SULFATE 3 ML: 2.5; .5 SOLUTION RESPIRATORY (INHALATION) at 13:49

## 2019-09-28 RX ADMIN — ATORVASTATIN CALCIUM 40 MG: 40 TABLET, FILM COATED ORAL at 09:30

## 2019-09-28 RX ADMIN — TAMSULOSIN HYDROCHLORIDE 0.4 MG: 0.4 CAPSULE ORAL at 09:30

## 2019-09-28 NOTE — RESPIRATORY THERAPY NOTE
Pt  Found sleeping with the CPAP intact(CPAP [id number] +2lpm)  Sa02 was a bit low on the 2lpm =89%  02 was increased to 3lpm  R N  Aware

## 2019-09-28 NOTE — NURSING NOTE
Pt admitted from ER to room 121  Pt is alert and orientated, pleasant and cooperative  Heart is regular on auscultation, trace edema noted  Lungs are decrease with rhonchi and wheezes present, on 2L, cpap at night, occasionally productive cough with white thin sputum  Pt stated he is dizzy, does not change with movement, asked for 'zofran to fix it'  Right sided weakness from previous stroke  Pain rated as 3/10, stated he had 'whatever they gave me in the ER'  Hourly rounding discussed  Orientated to room, call bell, tv  Call bell within reach  2 large bags of home medications sent to pharmacy  2 packs of cigarettes also sent

## 2019-09-28 NOTE — SOCIAL WORK
Pt signed paperwork for the option process  Pt mostly has been lying in bed  Pt has been evaluated by Blane Johnson for the otpiotn process  Faxed all required paperwork to the [employer]

## 2019-09-28 NOTE — PLAN OF CARE
Problem: Potential for Falls  Goal: Patient will remain free of falls  Description  INTERVENTIONS:  - Assess patient frequently for physical needs  -  Identify cognitive and physical deficits and behaviors that affect risk of falls    -  [place] fall precautions as indicated by assessment   - Educate patient/family on patient safety including physical limitations  - Instruct patient to call for assistance with activity based on assessment  - Modify environment to reduce risk of injury  - Consider OT/PT consult to assist with strengthening/mobility  Outcome: Progressing     Problem: PAIN - ADULT  Goal: Verbalizes/displays adequate comfort level or baseline comfort level  Description  Interventions:  - Encourage patient to monitor pain and request assistance  - Assess pain using appropriate pain scale  - Administer analgesics based on type and severity of pain and evaluate response  - Implement non-pharmacological measures as appropriate and evaluate response  - Consider cultural and social influences on pain and pain management  - Notify physician/advanced practitioner if interventions unsuccessful or patient reports new pain  Outcome: Progressing     Problem: INFECTION - ADULT  Goal: Absence or prevention of progression during hospitalization  Description  INTERVENTIONS:  - Assess and monitor for signs and symptoms of infection  - Monitor lab/diagnostic results  - Monitor all insertion sites, i e  indwelling lines, tubes, and drains  - Monitor endotracheal if appropriate and nasal secretions for changes in amount and color  - [place] appropriate cooling/warming therapies per order  - Administer medications as ordered  - Instruct and encourage patient and family to use good hand hygiene technique  - Identify and instruct in appropriate isolation precautions for identified infection/condition  Outcome: Progressing  Goal: Absence of fever/infection during neutropenic period  Description  INTERVENTIONS:  - Monitor WBC    Outcome: Progressing     Problem: SAFETY ADULT  Goal: Maintain or return to baseline ADL function  Description  INTERVENTIONS:  -  Assess patient's ability to carry out ADLs; assess patient's baseline for ADL function and identify physical deficits which impact ability to perform ADLs (bathing, care of mouth/teeth, toileting, grooming, dressing, etc )  - Assess/evaluate cause of self-care deficits   - Assess range of motion  - Assess patient's mobility; develop plan if impaired  - Assess patient's need for assistive devices and provide as appropriate  - Encourage maximum independence but intervene and supervise when necessary  - Involve family in performance of ADLs  - Assess for home care needs following discharge   - Consider OT consult to assist with ADL evaluation and planning for discharge  - Provide patient education as appropriate  Outcome: Progressing  Goal: Maintain or return mobility status to optimal level  Description  INTERVENTIONS:  - Assess patient's baseline mobility status (ambulation, transfers, stairs, etc )    - Identify cognitive and physical deficits and behaviors that affect mobility  - Identify mobility aids required to assist with transfers and/or ambulation (gait belt, sit-to-stand, lift, walker, cane, etc )  - [place] fall precautions as indicated by assessment  - Record patient progress and toleration of activity level on Mobility SBAR; progress patient to next Phase/Stage  - Instruct patient to call for assistance with activity based on assessment  - Consider rehabilitation consult to assist with strengthening/weightbearing, etc   Outcome: Progressing     Problem: DISCHARGE PLANNING  Goal: Discharge to home or other facility with appropriate resources  Description  INTERVENTIONS:  - Identify barriers to discharge w/patient and caregiver  - Arrange for needed discharge resources and transportation as appropriate  - Identify discharge learning needs (meds, wound care, etc )  - Arrange for interpretive services to assist at discharge as needed  - Refer to Case Management Department for coordinating discharge planning if the patient needs post-hospital services based on physician/advanced practitioner order or complex needs related to functional status, cognitive ability, or social support system  Outcome: Progressing     Problem: Knowledge Deficit  Goal: Patient/family/caregiver demonstrates understanding of disease process, treatment plan, medications, and discharge instructions  Description  Complete learning assessment and assess knowledge base    Interventions:  - Provide teaching at level of understanding  - Provide teaching via preferred learning methods  Outcome: Progressing     Problem: CARDIOVASCULAR - ADULT  Goal: Maintains optimal cardiac output and hemodynamic stability  Description  INTERVENTIONS:  - Monitor I/O, vital signs and rhythm  - Monitor for S/S and trends of decreased cardiac output  - Administer and titrate ordered vasoactive medications to optimize hemodynamic stability  - Assess quality of pulses, skin color and temperature  - Assess for signs of decreased coronary artery perfusion  - Instruct patient to report change in severity of symptoms  Outcome: Progressing     Problem: RESPIRATORY - ADULT  Goal: Achieves optimal ventilation and oxygenation  Description  INTERVENTIONS:  - Assess for changes in respiratory status  - Assess for changes in mentation and behavior  - Position to facilitate oxygenation and minimize respiratory effort  - Oxygen administered by appropriate delivery if ordered  - Initiate smoking cessation education as indicated  - Encourage broncho-pulmonary hygiene including cough, deep breathe, Incentive Spirometry  - Assess the need for suctioning and aspirate as needed  - Assess and instruct to report SOB or any respiratory difficulty  - Respiratory Therapy support as indicated  Outcome: Progressing     Problem: MUSCULOSKELETAL - ADULT  Goal: Maintain or return mobility to safest level of function  Description  INTERVENTIONS:  - Assess patient's ability to carry out ADLs; assess patient's baseline for ADL function and identify physical deficits which impact ability to perform ADLs (bathing, care of mouth/teeth, toileting, grooming, dressing, etc )  - Assess/evaluate cause of self-care deficits   - Assess range of motion  - Assess patient's mobility  - Assess patient's need for assistive devices and provide as appropriate  - Encourage maximum independence but intervene and supervise when necessary  - Involve family in performance of ADLs  - Assess for home care needs following discharge   - Consider OT consult to assist with ADL evaluation and planning for discharge  - Provide patient education as appropriate  Outcome: Progressing

## 2019-09-28 NOTE — PROGRESS NOTES
Progress Note - Nelly Liu 1945, 66 y o  male MRN: [id number]    Unit/Bed#: -02 Encounter: [id number]    Primary Care Provider: Jessica Vasquez DO   Date and time admitted to hospital: 9/26/2019  2:11 PM        * Dizziness  Assessment & Plan  · Nonspecific symptoms  · Has resolved now after being given a meal  · CT scan of the head unremarkable  Recent admission at [facility] with similar symptoms and workup at that time was unremarkable "MRI of brain and MRA of head and neck were sub-optimal due to motion but didn't reveal any acute findings"  · Patient's presenting symptom more so due to homelessness and having no where else to go  · Patient being admitted under charitable care to help coordinate placement as patient is homeless     Chronic respiratory failure with hypoxia ([facility] [address] )  Assessment & Plan  · Patient wears 2 L of oxygen at home chronically which will continue    Essential hypertension  Assessment & Plan  · Blood pressure well controlled  · Continue home regimen of antihypertensives    PTSD (post-traumatic stress disorder)  Assessment & Plan  · Psychiatry consult    Obstructive sleep apnea  Assessment & Plan  · Continue home CPAP settings    Persistent atrial fibrillation (HCC)  Assessment & Plan  · Continue home regimen of metoprolol and Eliquis      VTE Pharmacologic Prophylaxis: Pharmacologic: Enoxaparin (Lovenox)    Patient Centered Rounds: I have performed bedside rounds with nursing staff today  Discussions with Specialists or Other Care Team Provider: case managment  Education and Discussions with Family / Patient: patient    Current Length of Stay: 0 day(s)    Current Patient Status: Observation   Certification Statement: The patient will continue to require additional inpatient hospital stay due to placement    Discharge Plan: placement pending    Code Status: Level 1 - Full Code    Subjective:   Patient seen examined  No acute events overnight    Patient notes he is at his baseline    Objective:     Vitals:   Temp (24hrs), Av 8 °F (37 1 °C), Min:98 4 °F (36 9 °C), Max:99 3 °F (37 4 °C)    Temp:  [98 4 °F (36 9 °C)-99 3 °F (37 4 °C)] 98 7 °F (37 1 °C)  HR:  [59-99] 88  Resp:  [16-20] 20  BP: (116-174)/(64-97) 116/72  SpO2:  [89 %-99 %] 99 %  Body mass index is 25 88 kg/m²  Input and Output Summary (last 24 hours):     No intake or output data in the 24 hours ending 19 0954    Physical Exam:     Physical Exam   Constitutional: He is oriented to person, place, and time  He appears well-developed and well-nourished  HENT:   Head: Normocephalic and atraumatic  Eyes: Pupils are equal, round, and reactive to light  EOM are normal    Neck: Normal range of motion  Neck supple  Cardiovascular: Normal rate and regular rhythm  Pulmonary/Chest: Effort normal  No respiratory distress  Coarse breath sounds noted bilaterally   Abdominal: Soft  Bowel sounds are normal    Musculoskeletal: Normal range of motion  He exhibits no edema  Neurological: He is alert and oriented to person, place, and time  Skin: Skin is warm  Capillary refill takes less than 2 seconds  Psychiatric:   Appears depressed   Nursing note and vitals reviewed  Additional Data:     Labs:    Results from last 7 days   Lab Units 19  1436   WBC Thousand/uL 11 50*   HEMOGLOBIN g/dL 15 9   HEMATOCRIT % 46 5   PLATELETS Thousands/uL 215   LYMPHO PCT % 7*   MONO PCT % 22*   EOS PCT % 1     Results from last 7 days   Lab Units 19  1436   POTASSIUM mmol/L 4 1   CHLORIDE mmol/L 103   CO2 mmol/L 31   BUN mg/dL 16   CREATININE mg/dL 0 88   CALCIUM mg/dL 9 6   ALK PHOS U/L 97   ALT U/L 12   AST U/L 11*     Results from last 7 days   Lab Units 19  1436   INR  1 84*               * I Have Reviewed All Lab Data Listed Above  * Additional Pertinent Lab Tests Reviewed:  All Labs For Current Hospital Admission  Reviewed    Imaging:  Imaging Reports Reviewed Today Include: n/a    Recent Cultures (last 7 days):           Last 24 Hours Medication List:     Current Facility-Administered Medications:  acetaminophen 650 mg Oral Q6H PRN Keily Roque MD   albuterol 2 puff Inhalation Q6H PRN Keily Roque MD   amLODIPine 10 mg Oral Daily Keily Roque MD   aspirin 81 mg Oral Daily Keily Roque MD   atorvastatin 40 mg Oral Daily Keily Roque MD   docusate sodium 100 mg Oral BID Keily Roque MD   DULoxetine 60 mg Oral Daily Keily Roque MD   finasteride 5 mg Oral Daily Keily Roque MD   influenza vaccine 0 5 mL Intramuscular Once Keily Roque MD   ipratropium-albuterol 3 mL Nebulization TID Keily Roque MD   isosorbide mononitrate 30 mg Oral Daily Keily Roque MD   metoprolol tartrate 25 mg Oral Q12H Radha Gonzalez MD   nicotine 1 patch Transdermal Daily Keily Roque MD   pantoprazole 40 mg Oral Early Morning Keily Roque MD   pregabalin 300 mg Oral BID Keily Roque MD   ranolazine 1,000 mg Oral BID Keily Roque MD   tamsulosin 0 4 mg Oral Daily Keily Roque MD   tiotropium 18 mcg Inhalation Daily Keily Roque MD        Today, Patient Was Seen By: Keily Roque MD    ** Please Note: Dictation voice to text software may have been used in the creation of this document   **

## 2019-09-28 NOTE — PLAN OF CARE
Problem: Potential for Falls  Goal: Patient will remain free of falls  Description  INTERVENTIONS:  - Assess patient frequently for physical needs  -  Identify cognitive and physical deficits and behaviors that affect risk of falls    -  [place] fall precautions as indicated by assessment   - Educate patient/family on patient safety including physical limitations  - Instruct patient to call for assistance with activity based on assessment  - Modify environment to reduce risk of injury  - Consider OT/PT consult to assist with strengthening/mobility  Outcome: Progressing     Problem: PAIN - ADULT  Goal: Verbalizes/displays adequate comfort level or baseline comfort level  Description  Interventions:  - Encourage patient to monitor pain and request assistance  - Assess pain using appropriate pain scale  - Administer analgesics based on type and severity of pain and evaluate response  - Implement non-pharmacological measures as appropriate and evaluate response  - Consider cultural and social influences on pain and pain management  - Notify physician/advanced practitioner if interventions unsuccessful or patient reports new pain  Outcome: Progressing     Problem: INFECTION - ADULT  Goal: Absence or prevention of progression during hospitalization  Description  INTERVENTIONS:  - Assess and monitor for signs and symptoms of infection  - Monitor lab/diagnostic results  - Monitor all insertion sites, i e  indwelling lines, tubes, and drains  - Monitor endotracheal if appropriate and nasal secretions for changes in amount and color  - [place] appropriate cooling/warming therapies per order  - Administer medications as ordered  - Instruct and encourage patient and family to use good hand hygiene technique  - Identify and instruct in appropriate isolation precautions for identified infection/condition  Outcome: Progressing  Goal: Absence of fever/infection during neutropenic period  Description  INTERVENTIONS:  - Monitor WBC    Outcome: Progressing     Problem: SAFETY ADULT  Goal: Maintain or return to baseline ADL function  Description  INTERVENTIONS:  -  Assess patient's ability to carry out ADLs; assess patient's baseline for ADL function and identify physical deficits which impact ability to perform ADLs (bathing, care of mouth/teeth, toileting, grooming, dressing, etc )  - Assess/evaluate cause of self-care deficits   - Assess range of motion  - Assess patient's mobility; develop plan if impaired  - Assess patient's need for assistive devices and provide as appropriate  - Encourage maximum independence but intervene and supervise when necessary  - Involve family in performance of ADLs  - Assess for home care needs following discharge   - Consider OT consult to assist with ADL evaluation and planning for discharge  - Provide patient education as appropriate  Outcome: Progressing  Goal: Maintain or return mobility status to optimal level  Description  INTERVENTIONS:  - Assess patient's baseline mobility status (ambulation, transfers, stairs, etc )    - Identify cognitive and physical deficits and behaviors that affect mobility  - Identify mobility aids required to assist with transfers and/or ambulation (gait belt, sit-to-stand, lift, walker, cane, etc )  - [place] fall precautions as indicated by assessment  - Record patient progress and toleration of activity level on Mobility SBAR; progress patient to next Phase/Stage  - Instruct patient to call for assistance with activity based on assessment  - Consider rehabilitation consult to assist with strengthening/weightbearing, etc   Outcome: Progressing     Problem: DISCHARGE PLANNING  Goal: Discharge to home or other facility with appropriate resources  Description  INTERVENTIONS:  - Identify barriers to discharge w/patient and caregiver  - Arrange for needed discharge resources and transportation as appropriate  - Identify discharge learning needs (meds, wound care, etc )  - Arrange for interpretive services to assist at discharge as needed  - Refer to Case Management Department for coordinating discharge planning if the patient needs post-hospital services based on physician/advanced practitioner order or complex needs related to functional status, cognitive ability, or social support system  Outcome: Progressing     Problem: Knowledge Deficit  Goal: Patient/family/caregiver demonstrates understanding of disease process, treatment plan, medications, and discharge instructions  Description  Complete learning assessment and assess knowledge base    Interventions:  - Provide teaching at level of understanding  - Provide teaching via preferred learning methods  Outcome: Progressing     Problem: CARDIOVASCULAR - ADULT  Goal: Maintains optimal cardiac output and hemodynamic stability  Description  INTERVENTIONS:  - Monitor I/O, vital signs and rhythm  - Monitor for S/S and trends of decreased cardiac output  - Administer and titrate ordered vasoactive medications to optimize hemodynamic stability  - Assess quality of pulses, skin color and temperature  - Assess for signs of decreased coronary artery perfusion  - Instruct patient to report change in severity of symptoms  Outcome: Progressing     Problem: RESPIRATORY - ADULT  Goal: Achieves optimal ventilation and oxygenation  Description  INTERVENTIONS:  - Assess for changes in respiratory status  - Assess for changes in mentation and behavior  - Position to facilitate oxygenation and minimize respiratory effort  - Oxygen administered by appropriate delivery if ordered  - Initiate smoking cessation education as indicated  - Encourage broncho-pulmonary hygiene including cough, deep breathe, Incentive Spirometry  - Assess the need for suctioning and aspirate as needed  - Assess and instruct to report SOB or any respiratory difficulty  - Respiratory Therapy support as indicated  Outcome: Progressing     Problem: MUSCULOSKELETAL - ADULT  Goal: Maintain or return mobility to safest level of function  Description  INTERVENTIONS:  - Assess patient's ability to carry out ADLs; assess patient's baseline for ADL function and identify physical deficits which impact ability to perform ADLs (bathing, care of mouth/teeth, toileting, grooming, dressing, etc )  - Assess/evaluate cause of self-care deficits   - Assess range of motion  - Assess patient's mobility  - Assess patient's need for assistive devices and provide as appropriate  - Encourage maximum independence but intervene and supervise when necessary  - Involve family in performance of ADLs  - Assess for home care needs following discharge   - Consider OT consult to assist with ADL evaluation and planning for discharge  - Provide patient education as appropriate  Outcome: Progressing

## 2019-09-28 NOTE — ASSESSMENT & PLAN NOTE
· Nonspecific symptoms  · Has resolved now after being given a meal  · CT scan of the head unremarkable   Recent admission at [facility] with similar symptoms and workup at that time was unremarkable "MRI of brain and MRA of head and neck were sub-optimal due to motion but didn't reveal any acute findings"  · Patient's presenting symptom more so due to homelessness and having no where else to go  · Patient being admitted under [facility] care to help coordinate placement as patient is homeless

## 2019-09-28 NOTE — RESPIRATORY THERAPY NOTE
Pt  Cyrena Bernheim on "our" CPAP/Auto 25 [id number] with 2lpm titrated into the unit  Small mask used and head gear adjusted to the pt's comfort  Pt  Is tolerating the CPAP quite well
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
## 2019-09-28 NOTE — OCCUPATIONAL THERAPY NOTE
Therapy    OT eval order received and chart reviewed  PT and OT attempted to see Pt for therapy, however, Pt chooses not to participate at this time  Therapy will continue to monitor      Rosy Palmer MS, OTR/L
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
## 2019-09-29 PROCEDURE — 99231 SBSQ HOSP IP/OBS SF/LOW 25: CPT | Performed by: INTERNAL MEDICINE

## 2019-09-29 PROCEDURE — 94664 DEMO&/EVAL PT USE INHALER: CPT

## 2019-09-29 PROCEDURE — 94640 AIRWAY INHALATION TREATMENT: CPT

## 2019-09-29 PROCEDURE — 94660 CPAP INITIATION&MGMT: CPT

## 2019-09-29 PROCEDURE — 94760 N-INVAS EAR/PLS OXIMETRY 1: CPT

## 2019-09-29 RX ORDER — BUTALBITAL, ACETAMINOPHEN AND CAFFEINE 50; 325; 40 MG/1; MG/1; MG/1
1 TABLET ORAL EVERY 4 HOURS PRN
Status: DISCONTINUED | OUTPATIENT
Start: 2019-09-29 | End: 2019-10-02 | Stop reason: HOSPADM

## 2019-09-29 RX ORDER — LORAZEPAM 0.5 MG/1
0.5 TABLET ORAL EVERY 8 HOURS PRN
Status: DISCONTINUED | OUTPATIENT
Start: 2019-09-29 | End: 2019-10-02 | Stop reason: HOSPADM

## 2019-09-29 RX ADMIN — FINASTERIDE 5 MG: 5 TABLET, FILM COATED ORAL at 08:27

## 2019-09-29 RX ADMIN — APIXABAN 5 MG: 5 TABLET, FILM COATED ORAL at 17:14

## 2019-09-29 RX ADMIN — APIXABAN 5 MG: 5 TABLET, FILM COATED ORAL at 08:27

## 2019-09-29 RX ADMIN — DULOXETINE HYDROCHLORIDE 60 MG: 60 CAPSULE, DELAYED RELEASE ORAL at 08:27

## 2019-09-29 RX ADMIN — AMLODIPINE BESYLATE 10 MG: 5 TABLET ORAL at 08:27

## 2019-09-29 RX ADMIN — BUTALBITAL, ACETAMINOPHEN AND CAFFEINE 1 TABLET: 50; 325; 40 TABLET ORAL at 09:46

## 2019-09-29 RX ADMIN — TIOTROPIUM BROMIDE 18 MCG: 18 CAPSULE ORAL; RESPIRATORY (INHALATION) at 08:30

## 2019-09-29 RX ADMIN — PREGABALIN 300 MG: 100 CAPSULE ORAL at 17:15

## 2019-09-29 RX ADMIN — LORAZEPAM 0.5 MG: 0.5 TABLET ORAL at 23:07

## 2019-09-29 RX ADMIN — METOPROLOL TARTRATE 25 MG: 25 TABLET, FILM COATED ORAL at 20:31

## 2019-09-29 RX ADMIN — RANOLAZINE 1000 MG: 500 TABLET, FILM COATED, EXTENDED RELEASE ORAL at 17:15

## 2019-09-29 RX ADMIN — IPRATROPIUM BROMIDE AND ALBUTEROL SULFATE 3 ML: 2.5; .5 SOLUTION RESPIRATORY (INHALATION) at 13:08

## 2019-09-29 RX ADMIN — TAMSULOSIN HYDROCHLORIDE 0.4 MG: 0.4 CAPSULE ORAL at 08:26

## 2019-09-29 RX ADMIN — RANOLAZINE 1000 MG: 500 TABLET, FILM COATED, EXTENDED RELEASE ORAL at 08:26

## 2019-09-29 RX ADMIN — DOCUSATE SODIUM 100 MG: 100 CAPSULE, LIQUID FILLED ORAL at 08:27

## 2019-09-29 RX ADMIN — ATORVASTATIN CALCIUM 40 MG: 40 TABLET, FILM COATED ORAL at 08:27

## 2019-09-29 RX ADMIN — DOCUSATE SODIUM 100 MG: 100 CAPSULE, LIQUID FILLED ORAL at 17:15

## 2019-09-29 RX ADMIN — METOPROLOL TARTRATE 25 MG: 25 TABLET, FILM COATED ORAL at 08:27

## 2019-09-29 RX ADMIN — ASPIRIN 81 MG 81 MG: 81 TABLET ORAL at 08:26

## 2019-09-29 RX ADMIN — IPRATROPIUM BROMIDE AND ALBUTEROL SULFATE 3 ML: 2.5; .5 SOLUTION RESPIRATORY (INHALATION) at 07:37

## 2019-09-29 RX ADMIN — PANTOPRAZOLE SODIUM 40 MG: 40 TABLET, DELAYED RELEASE ORAL at 06:45

## 2019-09-29 RX ADMIN — BUTALBITAL, ACETAMINOPHEN AND CAFFEINE 1 TABLET: 50; 325; 40 TABLET ORAL at 22:00

## 2019-09-29 RX ADMIN — ISOSORBIDE MONONITRATE 30 MG: 30 TABLET, EXTENDED RELEASE ORAL at 08:26

## 2019-09-29 RX ADMIN — PREGABALIN 300 MG: 100 CAPSULE ORAL at 08:26

## 2019-09-29 RX ADMIN — IPRATROPIUM BROMIDE AND ALBUTEROL SULFATE 3 ML: 2.5; .5 SOLUTION RESPIRATORY (INHALATION) at 19:53

## 2019-09-29 NOTE — PROGRESS NOTES
Patient resting in bed at present  Continues to have moist productive cough  Denies any complaints at this time  Call bell and belongings within reach, will continue to monitor
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
## 2019-09-29 NOTE — ASSESSMENT & PLAN NOTE
· Nonspecific symptoms  · Has resolved after being admitted  · CT scan of the head unremarkable   Recent admission at [facility] with similar symptoms and workup at that time was unremarkable "MRI of brain and MRA of head and neck were sub-optimal due to motion but didn't reveal any acute findings"  · Patient's presenting symptom more so due to homelessness and having no where else to go  · Patient being admitted under charitable care to help coordinate placement as patient is homeless   · Case management an area of aging are working on placement

## 2019-09-29 NOTE — PLAN OF CARE
Problem: Potential for Falls  Goal: Patient will remain free of falls  Description  INTERVENTIONS:  - Assess patient frequently for physical needs  -  Identify cognitive and physical deficits and behaviors that affect risk of falls    -  [place] fall precautions as indicated by assessment   - Educate patient/family on patient safety including physical limitations  - Instruct patient to call for assistance with activity based on assessment  - Modify environment to reduce risk of injury  - Consider OT/PT consult to assist with strengthening/mobility  Outcome: Progressing     Problem: PAIN - ADULT  Goal: Verbalizes/displays adequate comfort level or baseline comfort level  Description  Interventions:  - Encourage patient to monitor pain and request assistance  - Assess pain using appropriate pain scale  - Administer analgesics based on type and severity of pain and evaluate response  - Implement non-pharmacological measures as appropriate and evaluate response  - Consider cultural and social influences on pain and pain management  - Notify physician/advanced practitioner if interventions unsuccessful or patient reports new pain  Outcome: Progressing     Problem: INFECTION - ADULT  Goal: Absence or prevention of progression during hospitalization  Description  INTERVENTIONS:  - Assess and monitor for signs and symptoms of infection  - Monitor lab/diagnostic results  - Monitor all insertion sites, i e  indwelling lines, tubes, and drains  - Monitor endotracheal if appropriate and nasal secretions for changes in amount and color  - [place] appropriate cooling/warming therapies per order  - Administer medications as ordered  - Instruct and encourage patient and family to use good hand hygiene technique  - Identify and instruct in appropriate isolation precautions for identified infection/condition  Outcome: Progressing  Goal: Absence of fever/infection during neutropenic period  Description  INTERVENTIONS:  - Monitor WBC    Outcome: Progressing     Problem: SAFETY ADULT  Goal: Maintain or return to baseline ADL function  Description  INTERVENTIONS:  -  Assess patient's ability to carry out ADLs; assess patient's baseline for ADL function and identify physical deficits which impact ability to perform ADLs (bathing, care of mouth/teeth, toileting, grooming, dressing, etc )  - Assess/evaluate cause of self-care deficits   - Assess range of motion  - Assess patient's mobility; develop plan if impaired  - Assess patient's need for assistive devices and provide as appropriate  - Encourage maximum independence but intervene and supervise when necessary  - Involve family in performance of ADLs  - Assess for home care needs following discharge   - Consider OT consult to assist with ADL evaluation and planning for discharge  - Provide patient education as appropriate  Outcome: Progressing  Goal: Maintain or return mobility status to optimal level  Description  INTERVENTIONS:  - Assess patient's baseline mobility status (ambulation, transfers, stairs, etc )    - Identify cognitive and physical deficits and behaviors that affect mobility  - Identify mobility aids required to assist with transfers and/or ambulation (gait belt, sit-to-stand, lift, walker, cane, etc )  - [place] fall precautions as indicated by assessment  - Record patient progress and toleration of activity level on Mobility SBAR; progress patient to next Phase/Stage  - Instruct patient to call for assistance with activity based on assessment  - Consider rehabilitation consult to assist with strengthening/weightbearing, etc   Outcome: Progressing     Problem: DISCHARGE PLANNING  Goal: Discharge to home or other facility with appropriate resources  Description  INTERVENTIONS:  - Identify barriers to discharge w/patient and caregiver  - Arrange for needed discharge resources and transportation as appropriate  - Identify discharge learning needs (meds, wound care, etc )  - Arrange for interpretive services to assist at discharge as needed  - Refer to Case Management Department for coordinating discharge planning if the patient needs post-hospital services based on physician/advanced practitioner order or complex needs related to functional status, cognitive ability, or social support system  Outcome: Progressing     Problem: Knowledge Deficit  Goal: Patient/family/caregiver demonstrates understanding of disease process, treatment plan, medications, and discharge instructions  Description  Complete learning assessment and assess knowledge base    Interventions:  - Provide teaching at level of understanding  - Provide teaching via preferred learning methods  Outcome: Progressing     Problem: CARDIOVASCULAR - ADULT  Goal: Maintains optimal cardiac output and hemodynamic stability  Description  INTERVENTIONS:  - Monitor I/O, vital signs and rhythm  - Monitor for S/S and trends of decreased cardiac output  - Administer and titrate ordered vasoactive medications to optimize hemodynamic stability  - Assess quality of pulses, skin color and temperature  - Assess for signs of decreased coronary artery perfusion  - Instruct patient to report change in severity of symptoms  Outcome: Progressing     Problem: RESPIRATORY - ADULT  Goal: Achieves optimal ventilation and oxygenation  Description  INTERVENTIONS:  - Assess for changes in respiratory status  - Assess for changes in mentation and behavior  - Position to facilitate oxygenation and minimize respiratory effort  - Oxygen administered by appropriate delivery if ordered  - Initiate smoking cessation education as indicated  - Encourage broncho-pulmonary hygiene including cough, deep breathe, Incentive Spirometry  - Assess the need for suctioning and aspirate as needed  - Assess and instruct to report SOB or any respiratory difficulty  - Respiratory Therapy support as indicated  Outcome: Progressing     Problem: MUSCULOSKELETAL - ADULT  Goal: Maintain or return mobility to safest level of function  Description  INTERVENTIONS:  - Assess patient's ability to carry out ADLs; assess patient's baseline for ADL function and identify physical deficits which impact ability to perform ADLs (bathing, care of mouth/teeth, toileting, grooming, dressing, etc )  - Assess/evaluate cause of self-care deficits   - Assess range of motion  - Assess patient's mobility  - Assess patient's need for assistive devices and provide as appropriate  - Encourage maximum independence but intervene and supervise when necessary  - Involve family in performance of ADLs  - Assess for home care needs following discharge   - Consider OT consult to assist with ADL evaluation and planning for discharge  - Provide patient education as appropriate  Outcome: Progressing

## 2019-09-29 NOTE — PROGRESS NOTES
Progress Note - Armando Dempsey 1945, 66 y o  male MRN: [id number]    Unit/Bed#: -02 Encounter: [id number]    Primary Care Provider: Antoine Russo DO   Date and time admitted to hospital: 9/26/2019  2:11 PM        * Dizziness  Assessment & Plan  · Nonspecific symptoms  · Has resolved after being admitted  · CT scan of the head unremarkable  Recent admission at [facility] with similar symptoms and workup at that time was unremarkable "MRI of brain and MRA of head and neck were sub-optimal due to motion but didn't reveal any acute findings"  · Patient's presenting symptom more so due to homelessness and having no where else to go  · Patient being admitted under charitable care to help coordinate placement as patient is homeless   · Case management an area of aging are working on placement    Chronic respiratory failure with hypoxia ([facility] [address] [address] )  Assessment & Plan  · Patient wears 2 L of oxygen at home chronically which will continue    Essential hypertension  Assessment & Plan  · Blood pressure well controlled  · Continue home regimen of antihypertensives    PTSD (post-traumatic stress disorder)  Assessment & Plan  · Appreciate psychiatric input  · Patient cleared for discharge to nursing facility    Obstructive sleep apnea  Assessment & Plan  · Continue home CPAP settings    Persistent atrial fibrillation (HCC)  Assessment & Plan  · Continue home regimen of metoprolol and Eliquis        VTE Pharmacologic Prophylaxis: Pharmacologic: Apixaban (Eliquis)    Patient Centered Rounds: I have performed bedside rounds with nursing staff today      Discussions with Specialists or Other Care Team Provider: n/a  Education and Discussions with Family / Patient: patient    Current Length of Stay: 0 day(s)    Current Patient Status: Observation   Certification Statement: The patient will continue to require additional inpatient hospital stay due to placement    Discharge Plan:  Patient is medically cleared for discharge  Awaiting placement    Code Status: Level 1 - Full Code    Subjective:   Patient seen examined  No acute events overnight  Patient without complaints at this time    Objective:     Vitals:   Temp (24hrs), Av 3 °F (37 4 °C), Min:97 8 °F (36 6 °C), Max:100 5 °F (38 1 °C)    Temp:  [97 8 °F (36 6 °C)-100 5 °F (38 1 °C)] 99 4 °F (37 4 °C)  HR:  [] 98  Resp:  [18-20] 20  BP: (104-140)/(50-75) 136/68  SpO2:  [92 %-97 %] 94 %  Body mass index is 25 88 kg/m²  Input and Output Summary (last 24 hours): Intake/Output Summary (Last 24 hours) at 2019 1222  Last data filed at 2019 0900  Gross per 24 hour   Intake 300 ml   Output 600 ml   Net -300 ml       Physical Exam:     Physical Exam   Constitutional: He is oriented to person, place, and time  He appears well-developed and well-nourished  HENT:   Head: Normocephalic and atraumatic  Eyes: Pupils are equal, round, and reactive to light  EOM are normal    Neck: Normal range of motion  Neck supple  Cardiovascular: Regular rhythm  Irregularly irregular   Pulmonary/Chest: Effort normal    Coarse breath sounds noted bilaterally   Abdominal: Soft  Bowel sounds are normal    Musculoskeletal: Normal range of motion  Neurological: He is alert and oriented to person, place, and time  No cranial nerve deficit  Skin: Skin is warm  Capillary refill takes less than 2 seconds  Psychiatric: He has a normal mood and affect  His behavior is normal  Judgment and thought content normal    Nursing note and vitals reviewed        Additional Data:     Labs:    Results from last 7 days   Lab Units 19  1436   WBC Thousand/uL 11 50*   HEMOGLOBIN g/dL 15 9   HEMATOCRIT % 46 5   PLATELETS Thousands/uL 215   LYMPHO PCT % 7*   MONO PCT % 22*   EOS PCT % 1     Results from last 7 days   Lab Units 19  1436   POTASSIUM mmol/L 4 1   CHLORIDE mmol/L 103   CO2 mmol/L 31   BUN mg/dL 16   CREATININE mg/dL 0 88   CALCIUM mg/dL 9 6   ALK PHOS U/L 97   ALT U/L 12   AST U/L 11*     Results from last 7 days   Lab Units 09/26/19  1436   INR  1 84*               * I Have Reviewed All Lab Data Listed Above  * Additional Pertinent Lab Tests Reviewed: [address] Admission  Reviewed    Imaging:  Imaging Reports Reviewed Today Include: n/a    Recent Cultures (last 7 days):     Results from last 7 days   Lab Units 09/27/19  1034   URINE CULTURE  20,000-29,000 cfu/ml        Last 24 Hours Medication List:     Current Facility-Administered Medications:  albuterol 2 puff Inhalation Q6H PRN Delaney Kidd MD   amLODIPine 10 mg Oral Daily Delaney Kidd MD   apixaban 5 mg Oral BID Delaney Kidd MD   aspirin 81 mg Oral Daily Delaney Kidd MD   atorvastatin 40 mg Oral Daily Delaney Kidd MD   butalbital-acetaminophen-caffeine 1 tablet Oral Q4H PRN Delaney Kidd MD   docusate sodium 100 mg Oral BID Delaney Kidd MD   DULoxetine 60 mg Oral Daily Delaney Kidd MD   finasteride 5 mg Oral Daily Delaney Kidd MD   influenza vaccine 0 5 mL Intramuscular Once Delaney Kidd MD   ipratropium-albuterol 3 mL Nebulization TID Delaney Kidd MD   isosorbide mononitrate 30 mg Oral Daily Delaney Kidd MD   metoprolol tartrate 25 mg Oral Q12H [address], [place]   nicotine 1 patch Transdermal Daily Delaney Kidd MD   pantoprazole 40 mg Oral Early Morning Delaney Kidd MD   pregabalin 300 mg Oral BID Delaney Kidd MD   ranolazine 1,000 mg Oral BID Delaney Kidd MD   tamsulosin 0 4 mg Oral Daily Delaney Kidd MD   tiotropium 18 mcg Inhalation Daily Delaney Kidd MD        Today, Patient Was Seen By: Delaney Kidd MD    ** Please Note: Dictation voice to text software may have been used in the creation of this document   **

## 2019-09-30 PROBLEM — R50.9 FEVER: Status: ACTIVE | Noted: 2019-09-30

## 2019-09-30 LAB — PROCALCITONIN SERPL-MCNC: 0.06 NG/ML

## 2019-09-30 PROCEDURE — 94760 N-INVAS EAR/PLS OXIMETRY 1: CPT

## 2019-09-30 PROCEDURE — 94640 AIRWAY INHALATION TREATMENT: CPT

## 2019-09-30 PROCEDURE — 97167 OT EVAL HIGH COMPLEX 60 MIN: CPT

## 2019-09-30 PROCEDURE — G8988 SELF CARE GOAL STATUS: HCPCS

## 2019-09-30 PROCEDURE — 97530 THERAPEUTIC ACTIVITIES: CPT

## 2019-09-30 PROCEDURE — G8987 SELF CARE CURRENT STATUS: HCPCS

## 2019-09-30 PROCEDURE — 84145 PROCALCITONIN (PCT): CPT | Performed by: PHYSICIAN ASSISTANT

## 2019-09-30 RX ORDER — LEVALBUTEROL INHALATION SOLUTION 0.63 MG/3ML
0.63 SOLUTION RESPIRATORY (INHALATION)
Status: DISCONTINUED | OUTPATIENT
Start: 2019-09-30 | End: 2019-10-02 | Stop reason: HOSPADM

## 2019-09-30 RX ORDER — ACETAMINOPHEN 325 MG/1
650 TABLET ORAL EVERY 6 HOURS PRN
Status: DISCONTINUED | OUTPATIENT
Start: 2019-09-30 | End: 2019-10-02 | Stop reason: HOSPADM

## 2019-09-30 RX ORDER — GUAIFENESIN 600 MG
600 TABLET, EXTENDED RELEASE 12 HR ORAL EVERY 12 HOURS SCHEDULED
Status: DISCONTINUED | OUTPATIENT
Start: 2019-09-30 | End: 2019-10-02 | Stop reason: HOSPADM

## 2019-09-30 RX ORDER — AZITHROMYCIN 250 MG/1
500 TABLET, FILM COATED ORAL EVERY 24 HOURS
Status: DISCONTINUED | OUTPATIENT
Start: 2019-09-30 | End: 2019-10-01

## 2019-09-30 RX ADMIN — TAMSULOSIN HYDROCHLORIDE 0.4 MG: 0.4 CAPSULE ORAL at 08:22

## 2019-09-30 RX ADMIN — BUTALBITAL, ACETAMINOPHEN AND CAFFEINE 1 TABLET: 50; 325; 40 TABLET ORAL at 14:39

## 2019-09-30 RX ADMIN — AMLODIPINE BESYLATE 10 MG: 5 TABLET ORAL at 08:21

## 2019-09-30 RX ADMIN — PREGABALIN 300 MG: 100 CAPSULE ORAL at 08:22

## 2019-09-30 RX ADMIN — ASPIRIN 81 MG 81 MG: 81 TABLET ORAL at 08:21

## 2019-09-30 RX ADMIN — ATORVASTATIN CALCIUM 40 MG: 40 TABLET, FILM COATED ORAL at 08:21

## 2019-09-30 RX ADMIN — METOPROLOL TARTRATE 25 MG: 25 TABLET, FILM COATED ORAL at 08:20

## 2019-09-30 RX ADMIN — DOCUSATE SODIUM 100 MG: 100 CAPSULE, LIQUID FILLED ORAL at 08:22

## 2019-09-30 RX ADMIN — METOPROLOL TARTRATE 25 MG: 25 TABLET, FILM COATED ORAL at 22:08

## 2019-09-30 RX ADMIN — LEVALBUTEROL HYDROCHLORIDE 0.63 MG: 0.63 SOLUTION RESPIRATORY (INHALATION) at 13:25

## 2019-09-30 RX ADMIN — ISOSORBIDE MONONITRATE 30 MG: 30 TABLET, EXTENDED RELEASE ORAL at 08:20

## 2019-09-30 RX ADMIN — GUAIFENESIN 600 MG: 600 TABLET, EXTENDED RELEASE ORAL at 08:21

## 2019-09-30 RX ADMIN — RANOLAZINE 1000 MG: 500 TABLET, FILM COATED, EXTENDED RELEASE ORAL at 17:29

## 2019-09-30 RX ADMIN — RANOLAZINE 1000 MG: 500 TABLET, FILM COATED, EXTENDED RELEASE ORAL at 08:21

## 2019-09-30 RX ADMIN — PREGABALIN 300 MG: 100 CAPSULE ORAL at 17:29

## 2019-09-30 RX ADMIN — GUAIFENESIN 600 MG: 600 TABLET, EXTENDED RELEASE ORAL at 22:09

## 2019-09-30 RX ADMIN — ACETAMINOPHEN 650 MG: 325 TABLET ORAL at 17:32

## 2019-09-30 RX ADMIN — LEVALBUTEROL HYDROCHLORIDE 0.63 MG: 0.63 SOLUTION RESPIRATORY (INHALATION) at 07:44

## 2019-09-30 RX ADMIN — DOCUSATE SODIUM 100 MG: 100 CAPSULE, LIQUID FILLED ORAL at 17:29

## 2019-09-30 RX ADMIN — APIXABAN 5 MG: 5 TABLET, FILM COATED ORAL at 08:21

## 2019-09-30 RX ADMIN — FINASTERIDE 5 MG: 5 TABLET, FILM COATED ORAL at 08:22

## 2019-09-30 RX ADMIN — AZITHROMYCIN 500 MG: 250 TABLET, FILM COATED ORAL at 14:37

## 2019-09-30 RX ADMIN — TIOTROPIUM BROMIDE 18 MCG: 18 CAPSULE ORAL; RESPIRATORY (INHALATION) at 08:23

## 2019-09-30 RX ADMIN — NICOTINE 1 PATCH: 14 PATCH TRANSDERMAL at 08:21

## 2019-09-30 RX ADMIN — APIXABAN 5 MG: 5 TABLET, FILM COATED ORAL at 17:28

## 2019-09-30 RX ADMIN — LEVALBUTEROL HYDROCHLORIDE 0.63 MG: 0.63 SOLUTION RESPIRATORY (INHALATION) at 20:01

## 2019-09-30 RX ADMIN — DULOXETINE HYDROCHLORIDE 60 MG: 60 CAPSULE, DELAYED RELEASE ORAL at 08:22

## 2019-09-30 RX ADMIN — PANTOPRAZOLE SODIUM 40 MG: 40 TABLET, DELAYED RELEASE ORAL at 05:44

## 2019-09-30 NOTE — UTILIZATION REVIEW
Notification of Inpatient Admission/Inpatient Authorization Request  This is a Notification of Inpatient Admission/Request for Inpatient Authorization for our facility [address] [facility]  Be advised that this patient was admitted to our facility under Inpatient Status  Please contact the Utilization Review Department where the patient is receiving care services for additional admission information  Place of Service Code: 24   Place of Service Name: Inpatient Hospital  Presentation Date & Time: 9/26/2019  2:11 PM  Inpatient Admission Date & Time: 9/30/19 0920  Discharge Date & Time: No discharge date for patient encounter  Discharge Disposition (if discharged): Home/Self Care  Attending Physician & NPI#: Daisy Jaramillo [[id number]]   Attending Physician:  FAY Jaramillo  Specialty- Internal Medicine  [address]  Phone 1: [phone]  Fax: [phone]   Admission Orders (From admission, onward)     Ordered        09/30/19 0920  Inpatient Admission  Once         09/27/19 1844  Place in Observation (expected length of stay for this patient is less than two midnights)  Once                   Facility: [address] [place] [address] Utilization Review Department  Phone: [phone]; Fax [phone]  [email or web address]  ATTENTION: Please call with any questions or concerns to [phone]  and carefully listen to the prompts so that you are directed to the right person  Send all requests for admission clinical reviews, approved or denied determinations and any other requests to fax [phone]   All voicemails are confidential

## 2019-09-30 NOTE — PLAN OF CARE
Problem: Potential for Falls  Goal: Patient will remain free of falls  Description  INTERVENTIONS:  - Assess patient frequently for physical needs  -  Identify cognitive and physical deficits and behaviors that affect risk of falls    -  Reading fall precautions as indicated by assessment   - Educate patient/family on patient safety including physical limitations  - Instruct patient to call for assistance with activity based on assessment  - Modify environment to reduce risk of injury  - Consider OT/PT consult to assist with strengthening/mobility  Outcome: Progressing     Problem: PAIN - ADULT  Goal: Verbalizes/displays adequate comfort level or baseline comfort level  Description  Interventions:  - Encourage patient to monitor pain and request assistance  - Assess pain using appropriate pain scale  - Administer analgesics based on type and severity of pain and evaluate response  - Implement non-pharmacological measures as appropriate and evaluate response  - Consider cultural and social influences on pain and pain management  - Notify physician/advanced practitioner if interventions unsuccessful or patient reports new pain  Outcome: Progressing     Problem: INFECTION - ADULT  Goal: Absence or prevention of progression during hospitalization  Description  INTERVENTIONS:  - Assess and monitor for signs and symptoms of infection  - Monitor lab/diagnostic results  - Monitor all insertion sites, i e  indwelling lines, tubes, and drains  - Monitor endotracheal if appropriate and nasal secretions for changes in amount and color  - Reading appropriate cooling/warming therapies per order  - Administer medications as ordered  - Instruct and encourage patient and family to use good hand hygiene technique  - Identify and instruct in appropriate isolation precautions for identified infection/condition  Outcome: Progressing  Goal: Absence of fever/infection during neutropenic period  Description  INTERVENTIONS:  - Monitor WBC    Outcome: Progressing     Problem: SAFETY ADULT  Goal: Maintain or return to baseline ADL function  Description  INTERVENTIONS:  -  Assess patient's ability to carry out ADLs; assess patient's baseline for ADL function and identify physical deficits which impact ability to perform ADLs (bathing, care of mouth/teeth, toileting, grooming, dressing, etc )  - Assess/evaluate cause of self-care deficits   - Assess range of motion  - Assess patient's mobility; develop plan if impaired  - Assess patient's need for assistive devices and provide as appropriate  - Encourage maximum independence but intervene and supervise when necessary  - Involve family in performance of ADLs  - Assess for home care needs following discharge   - Consider OT consult to assist with ADL evaluation and planning for discharge  - Provide patient education as appropriate  Outcome: Progressing  Goal: Maintain or return mobility status to optimal level  Description  INTERVENTIONS:  - Assess patient's baseline mobility status (ambulation, transfers, stairs, etc )    - Identify cognitive and physical deficits and behaviors that affect mobility  - Identify mobility aids required to assist with transfers and/or ambulation (gait belt, sit-to-stand, lift, walker, cane, etc )  - [place] fall precautions as indicated by assessment  - Record patient progress and toleration of activity level on Mobility SBAR; progress patient to next Phase/Stage  - Instruct patient to call for assistance with activity based on assessment  - Consider rehabilitation consult to assist with strengthening/weightbearing, etc   Outcome: Progressing     Problem: DISCHARGE PLANNING  Goal: Discharge to home or other facility with appropriate resources  Description  INTERVENTIONS:  - Identify barriers to discharge w/patient and caregiver  - Arrange for needed discharge resources and transportation as appropriate  - Identify discharge learning needs (meds, wound care, etc )  - Arrange for interpretive services to assist at discharge as needed  - Refer to Case Management Department for coordinating discharge planning if the patient needs post-hospital services based on physician/advanced practitioner order or complex needs related to functional status, cognitive ability, or social support system  Outcome: Progressing     Problem: Knowledge Deficit  Goal: Patient/family/caregiver demonstrates understanding of disease process, treatment plan, medications, and discharge instructions  Description  Complete learning assessment and assess knowledge base    Interventions:  - Provide teaching at level of understanding  - Provide teaching via preferred learning methods  Outcome: Progressing     Problem: CARDIOVASCULAR - ADULT  Goal: Maintains optimal cardiac output and hemodynamic stability  Description  INTERVENTIONS:  - Monitor I/O, vital signs and rhythm  - Monitor for S/S and trends of decreased cardiac output  - Administer and titrate ordered vasoactive medications to optimize hemodynamic stability  - Assess quality of pulses, skin color and temperature  - Assess for signs of decreased coronary artery perfusion  - Instruct patient to report change in severity of symptoms  Outcome: Progressing     Problem: RESPIRATORY - ADULT  Goal: Achieves optimal ventilation and oxygenation  Description  INTERVENTIONS:  - Assess for changes in respiratory status  - Assess for changes in mentation and behavior  - Position to facilitate oxygenation and minimize respiratory effort  - Oxygen administered by appropriate delivery if ordered  - Initiate smoking cessation education as indicated  - Encourage broncho-pulmonary hygiene including cough, deep breathe, Incentive Spirometry  - Assess the need for suctioning and aspirate as needed  - Assess and instruct to report SOB or any respiratory difficulty  - Respiratory Therapy support as indicated  Outcome: Progressing     Problem: MUSCULOSKELETAL - ADULT  Goal: Maintain or return mobility to safest level of function  Description  INTERVENTIONS:  - Assess patient's ability to carry out ADLs; assess patient's baseline for ADL function and identify physical deficits which impact ability to perform ADLs (bathing, care of mouth/teeth, toileting, grooming, dressing, etc )  - Assess/evaluate cause of self-care deficits   - Assess range of motion  - Assess patient's mobility  - Assess patient's need for assistive devices and provide as appropriate  - Encourage maximum independence but intervene and supervise when necessary  - Involve family in performance of ADLs  - Assess for home care needs following discharge   - Consider OT consult to assist with ADL evaluation and planning for discharge  - Provide patient education as appropriate  Outcome: Progressing

## 2019-09-30 NOTE — PLAN OF CARE
Problem: OCCUPATIONAL THERAPY ADULT  Goal: Performs self-care activities at highest level of function for planned discharge setting  See evaluation for individualized goals  Description  Treatment Interventions: ADL retraining, Functional transfer training, UE strengthening/ROM, Endurance training, Cognitive reorientation, Equipment evaluation/education, Neuromuscular reeducation, Compensatory technique education, Activityengagement          See flowsheet documentation for full assessment, interventions and recommendations  Note:   Limitation: Decreased ADL status, Decreased UE ROM, Decreased UE strength, Decreased cognition, Decreased endurance, Decreased self-care trans, Decreased high-level ADLs  Prognosis: Fair  Assessment: Pt is a 66 y o  male who was admitted to [address] [place] [address] on 9/26/2019 with Dizziness  At this time, patient is also affected by the comorbidities of: A-fib, obstructive sleep apnea, PTSD, HTN, chronic respiratory failure and fever  Additionally, patient is affected by the following personal factors:difficulty performing ADLS, difficulty performing IADLS , decreased initiation and engagement  and homeless  Orders placed for OT evaluation/treatment with up with assistance orders  Prior to admission, Patient reporting being independent with ADLs/IADLs, and ambulatory with SPC  Upon OT evaluation, patient requires minimum assist for UB ADLs and moderate assist for LB ADLs  Occupational performance is affected by the following deficits: decreased strength, decreased balance, decreased tolerance, impaired arousal, impaired initiation, impaired sequencing, impaired problem solving, impulsivity, decreased safety awareness and increased pain  Based on the following information, patient would benefit from continued skilled OT treatment while in the hospital to address deficits and maximize level of functional independence with ADL's and functional mobility   Occupational Performance areas to address include: eating, grooming, bathing/shower, toilet hygiene, dressing, medication management, functional mobility and clothing management  From OT standpoint, recommendation at time of d/c would be short term rehab       OT Discharge Recommendation: Short Term Rehab  OT - OK to Discharge: Yes(Once medically cleared)     Yelitza Hernandez OT

## 2019-09-30 NOTE — ASSESSMENT & PLAN NOTE
· Nonspecific symptoms  · Has resolved after being admitted  · CT scan of the head unremarkable   Recent admission at [facility] with similar symptoms and workup at that time was unremarkable "MRI of brain and MRA of head and neck were sub-optimal due to motion but didn't reveal any acute findings"  · Patient's presenting symptom more so due to homelessness and having no where else to go  · Patient being admitted under charitable care to help coordinate placement as patient is homeless   · Case management and area of aging are working on placement

## 2019-09-30 NOTE — UTILIZATION REVIEW
Continued Stay Review    Date: 9/30                       Current Patient Class:     Admitted OBS status on 9/27  @  1844,  Med surg level of care   CHANGED to INPT status on  9/30  @  0920, medsurg Level of care    Due to lo grade fever,  And  increasing supplemental oxygen requirement      HPI:74 y o  male initially admitted on   9/27  due to c/o dizziness  Pt is homeless, Requires continuous oxygen @ 2L NC and CPAP at [facility]  Aggressive pulmonary toilet initiated w/nebs TID, Spiriva daily  On 9/28  Began running intermittent lo grade fever and requiring increasingly higher amounts of continuous oxygen (on 5-6 L)    Weaned to 4L NC evening of 9/29 and continues at 4L NC (9/30)     Assessment/Plan:   9/30  @  0600 -  [facility] nurse documented:  Breath sounds diminished with fine crackles and scattered  inspiratory wheezes, pt reports increased SOB    Moist cough noted  Sputum culture ORDERED       09/29/19 0717 99 4 °F 104 20 140/66 96 % Nasal cannula   09/28/19 2221 99 3 °F  106 20 124/63 97 % Nasal cannula   09/28/19 2209 -- 112 -- 125/58 -- --   09/28/19 2044 -- -- -- -- 92 % Nasal cannula   09/28/19 1620 100 5 °F  83 18 104/50 97 % Nasal cannula     09/30/19 0648 101 °F  102 18 163/75 96 % Nasal cannula Lying   09/29/19 2355 100 3 °F 115 15 142/64 97         Pertinent Labs/Diagnostic Results:   Results from last 7 days   Lab Units 09/26/19  1436   WBC Thousand/uL 11 50*   HEMOGLOBIN g/dL 15 9   HEMATOCRIT % 46 5   PLATELETS Thousands/uL 215   TOTAL NEUT ABS Thousand/uL 8 05*   BANDS PCT % 5         Results from last 7 days   Lab Units 09/26/19  1436   SODIUM mmol/L 139   POTASSIUM mmol/L 4 1   CHLORIDE mmol/L 103   CO2 mmol/L 31   ANION GAP mmol/L 5   BUN mg/dL 16   CREATININE mg/dL 0 88   EGFR ml/min/1 73sq m 83   CALCIUM mg/dL 9 6     Results from last 7 days   Lab Units 09/26/19  1436   AST U/L 11*   ALT U/L 12   ALK PHOS U/L 97   TOTAL PROTEIN g/dL 6 0*   ALBUMIN g/dL 3 8   TOTAL BILIRUBIN mg/dL 0 90 Results from last 7 days   Lab Units 09/26/19  1436   GLUCOSE RANDOM mg/dL 105*     Results from last 7 days   Lab Units 09/26/19  1436   TROPONIN I ng/mL <0 03         Results from last 7 days   Lab Units 09/26/19  1436   PROTIME seconds 21 5*   INR  1 84*   PTT seconds 44*       Results from last 7 days   Lab Units 09/27/19  1034   CLARITY UA  Slightly Cloudy*   COLOR UA  Brown*   SPEC GRAV UA  1 015   PH UA  7 0   GLUCOSE UA mg/dl Negative   KETONES UA mg/dl 15 (1+)*   BLOOD UA  Negative   PROTEIN UA mg/dl Negative   NITRITE UA  Negative   BILIRUBIN UA  Negative   UROBILINOGEN UA E U /dl 4 0*   LEUKOCYTES UA  1+*   WBC UA /hpf 30-50*   RBC UA /hpf 0-1*   BACTERIA UA /hpf None Seen   EPITHELIAL CELLS WET PREP /hpf Occasional   MUCUS THREADS  Moderate*     Results from last 7 days   Lab Units 09/27/19  1034   AMPH/METH  Negative   BARBITURATE UR  Negative   BENZODIAZEPINE UR  Negative   COCAINE UR  Negative   METHADONE URINE  Negative   OPIATE UR  Negative   PCP UR  Negative   THC UR  Negative     Results from last 7 days   Lab Units 09/26/19  1436   ETHANOL LVL mg/dL <10     Results from last 7 days   Lab Units 09/27/19  1034   URINE CULTURE  20,000-29,000 cfu/ml      Medications:   Scheduled Meds:   Current Facility-Administered Medications:  albuterol 2 puff Inhalation Q6H PRN   amLODIPine 10 mg Oral Daily   apixaban 5 mg Oral BID   aspirin 81 mg Oral Daily   atorvastatin 40 mg Oral Daily   butalbital-acetaminophen-caffeine 1 tablet Oral Q4H PRN   docusate sodium 100 mg Oral BID   DULoxetine 60 mg Oral Daily   finasteride 5 mg Oral Daily   guaiFENesin 600 mg Oral Q12H [facility]   influenza vaccine 0 5 mL Intramuscular Once   isosorbide mononitrate 30 mg Oral Daily   levalbuterol 0 63 mg Nebulization TID   LORazepam 0 5 mg Oral Q8H PRN   metoprolol tartrate 25 mg Oral Q12H [facility]   nicotine 1 patch Transdermal Daily   pantoprazole 40 mg Oral Early Morning   pregabalin 300 mg Oral BID   ranolazine 1,000 mg Oral BID tamsulosin 0 4 mg Oral Daily   tiotropium 18 mcg Inhalation Daily       Discharge Plan: tbd    Network Utilization Review Department  Phone: [phone]; Fax [phone]  [email or web address]  ATTENTION: Please call with any questions or concerns to [phone]  and carefully listen to the prompts so that you are directed to the right person  Send all requests for admission clinical reviews, approved or denied determinations and any other requests to fax [phone]   All voicemails are confidential

## 2019-09-30 NOTE — ASSESSMENT & PLAN NOTE
No acute cardiopulmonary disease on CXR  Urine culture with mixed contaminants  Will start zithromax for possible bronchitis  Continue mucinex, respiratory protocol  Monitor WBC count, was 11 5 on admission  Check procalcitonin, sputum culture  Check BC x 2 if spikes fever again

## 2019-09-30 NOTE — PLAN OF CARE
Problem: PHYSICAL THERAPY ADULT  Goal: Performs mobility at highest level of function for planned discharge setting  See evaluation for individualized goals  Description  Treatment/Interventions: Functional transfer training, LE strengthening/ROM, Elevations, Therapeutic exercise, Endurance training, Patient/family training, Equipment eval/education, Bed mobility, Gait training, Spoke to nursing, Spoke to case management, Spoke to advanced practitioner  Equipment Recommended: (pt may benefit from RW trial next session, refused today)       See flowsheet documentation for full assessment, interventions and recommendations  Outcome: Progressing  Note:   Prognosis: Guarded  Problem List: Decreased strength, Decreased endurance, Impaired balance, Decreased mobility, Decreased safety awareness, Impaired judgement, Pain  Assessment: Pt seen for PT treatment session this date with interventions consisting of gait training w/ emphasis on improving pt's ability to ambulate level surfaces x 5 ft with min A of 2 provided by therapist with [facility] and therapeutic activity consisting of training: supine<>sit transfers, sit<>stand transfers, static standing tolerance for 2-3 minutes w/ B UE support and vc and tactile cues for static standing posture faciliation  Pt agreeable to PT treatment session upon arrival, pt found supine in bed w/ HOB elevated, in no apparent distress, A&O x 3-4 and responsive  In comparison to previous session, pt with improvements in tolerating OOB activity at this time c minimal reports of pain  Post session: pt returned back to recliner, all needs in reach and RN notified of session findings/recommendations Continue to recommend STR at time of d/c in order to maximize pt's functional independence and safety w/ mobility  Pt continues to be functioning below baseline level, and remains limited 2* factors listed above   PT will continue to see pt while here in order to address the deficits listed above and provide interventions consistent w/ POC in effort to achieve STGs  Barriers to Discharge: Other (Comment)(pt homeless)     Recommendation: Short-term skilled PT     PT - OK to Discharge: Yes(when medically cleared, if to STR)    See flowsheet documentation for full assessment

## 2019-09-30 NOTE — PHYSICAL THERAPY NOTE
Physical Therapy Treatment Note       09/30/19 1116   Pain Assessment   Pain Assessment 0-10   Pain Score 4   Pain Type Chronic pain   Pain Location Head   Pain Descriptors Headache   Pain Frequency Constant/continuous   Pain Onset Ongoing   Clinical Progression Not changed   Pain Rating: FLACC (Rest) - Face 0   Pain Rating: FLACC (Rest) - Legs 0   Pain Rating: FLACC (Rest) - Activity 0   Pain Rating: FLACC (Rest) - Cry 0   Pain Rating: FLACC (Rest) - Consolability 0   Score: FLACC (Rest) 0   Pain Rating: FLACC (Activity) - Face 0   Pain Rating: FLACC (Activity) - Legs 0   Pain Rating: FLACC (Activity) - Activity 0   Pain Rating: FLACC (Activity) - Cry 1   Pain Rating: FLACC (Activity) - Consolability 0   Score: FLACC (Activity) 1   Restrictions/Precautions   Weight Bearing Precautions Per Order No   Other Precautions Fall Risk;Pain; Chair Alarm   General   Chart Reviewed No   Response to Previous Treatment Patient with no complaints from previous session  Family/Caregiver Present No   Cognition   Overall Cognitive Status Impaired   Arousal/Participation Responsive  (pt initially sleeping upon arrival)   Attention Attends with cues to redirect   Orientation Level Oriented to person;Oriented to place;Oriented to time   Memory Decreased recall of precautions;Decreased recall of recent events   Following Commands Follows one step commands without difficulty   Comments pt agreeable to PT session   Subjective   Subjective "I want to return home"   Bed Mobility   Supine to Sit 4  Minimal assistance   Additional items Assist x 2;HOB elevated; Bedrails; Increased time required;Verbal cues   Transfers   Sit to Stand 4  Minimal assistance   Additional items Assist x 2; Increased time required;Verbal cues   Stand to Sit 4  Minimal assistance   Additional items Assist x 2;Verbal cues; Impulsive   Ambulation/Elevation   Gait pattern Improper Weight shift;Decreased foot clearance;Decreased R stance; Antalgic; Shuffling; Step to;Excessively slow   Gait Assistance 4  Minimal assist   Additional items Assist x 2;Verbal cues; Tactile cues   Assistive Device Straight cane   Distance 5 ft   Stair Management Assistance Not tested   Balance   Static Sitting Good   Dynamic Sitting Fair +   Static Standing Fair   Dynamic Standing Fair -   Ambulatory Fair -   Endurance Deficit   Endurance Deficit Yes   Activity Tolerance   Activity Tolerance Patient limited by fatigue;Treatment limited secondary to agitation;Patient limited by pain   Nurse Made Aware Yes, RN Guillermo Hicks verbalized pt appropriate for PT session   Assessment   Prognosis Guarded   Problem List Decreased strength;Decreased endurance; Impaired balance;Decreased mobility; Decreased safety awareness; Impaired judgement;Pain   Assessment Pt seen for PT treatment session this date with interventions consisting of gait training w/ emphasis on improving pt's ability to ambulate level surfaces x 5 ft with min A of 2 provided by therapist with [facility] and therapeutic activity consisting of training: supine<>sit transfers, sit<>stand transfers, static standing tolerance for 2-3 minutes w/ B UE support and vc and tactile cues for static standing posture faciliation  Pt agreeable to PT treatment session upon arrival, pt found supine in bed w/ HOB elevated, in no apparent distress, A&O x 3-4 and responsive  In comparison to previous session, pt with improvements in tolerating OOB activity at this time c minimal reports of pain  Post session: pt returned back to recliner, all needs in reach and RN notified of session findings/recommendations Continue to recommend STR at time of d/c in order to maximize pt's functional independence and safety w/ mobility  Pt continues to be functioning below baseline level, and remains limited 2* factors listed above  PT will continue to see pt while here in order to address the deficits listed above and provide interventions consistent w/ POC in effort to achieve STGs  Barriers to Discharge Other (Comment)  (pt homeless)   Goals   Patient Goals "to return home"   STG Expiration Date 10/07/19   Short Term Goal #1 goals remain appropriate   PT Treatment Day 2   Plan   Treatment/Interventions Functional transfer training;LE strengthening/ROM; Elevations; Therapeutic exercise; Endurance training;Patient/family training;Equipment eval/education; Bed mobility;Gait training;Spoke to nursing;Spoke to case management;Spoke to advanced practitioner   Progress Slow progress, decreased activity tolerance   PT Frequency   (3-5x/wk)   Recommendation   Recommendation Short-term skilled PT   Equipment Recommended   (pt may benefit from RW trial next session, refused today)   PT - OK to Discharge Yes  (when medically cleared, if to STR)       Chelsey Kingston, PT    Time of PT treatment session: [id number]

## 2019-09-30 NOTE — NURSING NOTE
Patient awakened for medication administration  Patient's oxygen found our of his nares  Sat noted to be 87%  Oxygen placed back in nares,sat only slightly improved to 88%  Breath sounds diminished with fine crackles and scattered  inspiratory wheezes auscultated  Paient reportoinf shortness of breath at rest  Will notify hospitalist sat improved to 93% on 2 liters nasal canula  Will monitor  Moist cough noted

## 2019-09-30 NOTE — SOCIAL WORK
[employer] here to evaluate the pt for the option process  Will need the target letter and approval from the insurance company for placement
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
## 2019-09-30 NOTE — PROGRESS NOTES
Progress Note - Malina Caceres 1945, 66 y o  male MRN: [id number]    Unit/Bed#: -02 Encounter: [id number]    Primary Care Provider: Sherrell Milian DO   Date and time admitted to hospital: 9/26/2019  2:11 PM        Fever  Assessment & Plan  No acute cardiopulmonary disease on CXR  Urine culture with mixed contaminants  Will start zithromax for possible bronchitis  Continue mucinex, respiratory protocol  Monitor WBC count, was 11 5 on admission  Check procalcitonin, sputum culture  Check BC x 2 if spikes fever again    * Dizziness  Assessment & Plan  · Nonspecific symptoms  · Has resolved after being admitted  · CT scan of the head unremarkable  Recent admission at [facility] with similar symptoms and workup at that time was unremarkable "MRI of brain and MRA of head and neck were sub-optimal due to motion but didn't reveal any acute findings"  · Patient's presenting symptom more so due to homelessness and having no where else to go  · Patient being admitted under charitable care to help coordinate placement as patient is homeless   · Case management and area of aging are working on placement    Chronic respiratory failure with hypoxia ([facility] [address] )  Assessment & Plan  · Patient wears 2 L of oxygen at home chronically which will continue    Essential hypertension  Assessment & Plan  · Continue to monitor on home regimen of antihypertensives    PTSD (post-traumatic stress disorder)  Assessment & Plan  · Appreciate psychiatric input  · Patient cleared for discharge to nursing facility    Obstructive sleep apnea  Assessment & Plan  · Continue home CPAP settings    Persistent atrial fibrillation (HCC)  Assessment & Plan  · Continue home regimen of metoprolol and Eliquis    VTE Pharmacologic Prophylaxis: Pharmacologic: Apixaban (Eliquis)    Patient Centered Rounds: I have performed bedside rounds with nursing staff today      Discussions with Specialists or Other Care Team Provider: Nursing and Case management  Education and Discussions with Family / Patient: Patient    Current Length of Stay: 0 day(s)    Current Patient Status: Inpatient   Certification Statement: The patient will continue to require additional inpatient hospital stay due to fever, pending placement    Discharge Plan: Patient is homeless, will need placement at d/c,  following    Code Status: Level 1 - Full Code    Subjective:   Patient feels tired, just wants to sleep  C/O SOB, cough  No N/V/abdominal pain  Appetite ok  Objective:     Vitals:   Temp (24hrs), Av 2 °F (37 9 °C), Min:98 9 °F (37 2 °C), Max:101 °F (38 3 °C)    Temp:  [98 9 °F (37 2 °C)-101 °F (38 3 °C)] 100 5 °F (38 1 °C)  HR:  [101-115] 102  Resp:  [15-18] 18  BP: (117-163)/(62-75) 163/75  SpO2:  [93 %-98 %] 94 %  Body mass index is 25 88 kg/m²  Input and Output Summary (last 24 hours): Intake/Output Summary (Last 24 hours) at 2019 1406  Last data filed at 2019 0406  Gross per 24 hour   Intake 360 ml   Output 350 ml   Net 10 ml       Physical Exam:     Physical Exam   Constitutional: No distress  HENT:   Head: Normocephalic and atraumatic  Mouth/Throat: Oropharynx is clear and moist    Neck: Neck supple  Cardiovascular: Normal rate and regular rhythm  Pulmonary/Chest: Effort normal  No respiratory distress  +Coarse BS B/L, no wheezing   Abdominal: Soft  Bowel sounds are normal  He exhibits no distension  There is no tenderness  Musculoskeletal: He exhibits no edema  Lymphadenopathy:     He has no cervical adenopathy  Neurological: He is alert  Skin: Skin is warm and dry  No erythema         Additional Data:     Labs:    Results from last 7 days   Lab Units 19  1436   WBC Thousand/uL 11 50*   HEMOGLOBIN g/dL 15 9   HEMATOCRIT % 46 5   PLATELETS Thousands/uL 215   LYMPHO PCT % 7*   MONO PCT % 22*   EOS PCT % 1     Results from last 7 days   Lab Units 19  1436   POTASSIUM mmol/L 4 1   CHLORIDE mmol/L 103   CO2 mmol/L 31   BUN mg/dL 16   CREATININE mg/dL 0 88   CALCIUM mg/dL 9 6   ALK PHOS U/L 97   ALT U/L 12   AST U/L 11*     Results from last 7 days   Lab Units 09/26/19  1436   INR  1 84*               * I Have Reviewed All Lab Data Listed Above  * Additional Pertinent Lab Tests Reviewed: Adriel Robles Admission  Reviewed      Recent Cultures (last 7 days):     Results from last 7 days   Lab Units 09/27/19  1034   URINE CULTURE  20,000-29,000 cfu/ml        Last 24 Hours Medication List:     Current Facility-Administered Medications:  albuterol 2 puff Inhalation Q6H PRN Hany Overton MD   amLODIPine 10 mg Oral Daily Hany Overton MD   apixaban 5 mg Oral BID Hany Overton MD   aspirin 81 mg Oral Daily Hany Overton MD   atorvastatin 40 mg Oral Daily Hany Overton MD   butalbital-acetaminophen-caffeine 1 tablet Oral Q4H PRN Hany Overton MD   docusate sodium 100 mg Oral BID Hany Overton MD   DULoxetine 60 mg Oral Daily Hany Overton MD   finasteride 5 mg Oral Daily Hany Overton MD   guaiFENesin 600 mg Oral Q12H [address] Chicho Santos PA-C   influenza vaccine 0 5 mL Intramuscular Once Hany Overton MD   isosorbide mononitrate 30 mg Oral Daily Hany Overton MD   levalbuterol 0 63 mg Nebulization TID Hany Overton MD   LORazepam 0 5 mg Oral Q8H PRN Chicho Santos PA-C   metoprolol tartrate 25 mg Oral Q12H Jonathan Tello MD   nicotine 1 patch Transdermal Daily Hany Overton MD   pantoprazole 40 mg Oral Early Morning Hany Overton MD   pregabalin 300 mg Oral BID Hany Overton MD   ranolazine 1,000 mg Oral BID Hany Overton MD   tamsulosin 0 4 mg Oral Daily Hany Overton MD   tiotropium 18 mcg Inhalation Daily Hany Overton MD        Today, Patient Was Seen By: Natacha aBnegas PA-C    ** Please Note: Dictation voice to text software may have been used in the creation of this document   **

## 2019-10-01 ENCOUNTER — APPOINTMENT (INPATIENT)
Dept: RADIOLOGY | Facility: HOSPITAL | Age: 74
DRG: 202 | End: 2019-10-01
Payer: COMMERCIAL

## 2019-10-01 LAB
ANION GAP SERPL CALCULATED.3IONS-SCNC: 9 MMOL/L (ref 4–13)
BUN SERPL-MCNC: 20 MG/DL (ref 7–25)
CALCIUM SERPL-MCNC: 9.4 MG/DL (ref 8.6–10.5)
CHLORIDE SERPL-SCNC: 102 MMOL/L (ref 98–107)
CO2 SERPL-SCNC: 29 MMOL/L (ref 21–31)
CREAT SERPL-MCNC: 0.85 MG/DL (ref 0.7–1.3)
EOSINOPHIL # BLD AUTO: 0.13 THOUSAND/UL (ref 0–0.61)
EOSINOPHIL NFR BLD MANUAL: 1 % (ref 0–6)
ERYTHROCYTE [DISTWIDTH] IN BLOOD BY AUTOMATED COUNT: 15.5 % (ref 11.5–14.5)
GFR SERPL CREATININE-BSD FRML MDRD: 85 ML/MIN/1.73SQ M
GLUCOSE SERPL-MCNC: 192 MG/DL (ref 65–99)
HCT VFR BLD AUTO: 43.7 % (ref 42–47)
HGB BLD-MCNC: 14.5 G/DL (ref 14–18)
LYMPHOCYTES # BLD AUTO: 17 % (ref 20–51)
LYMPHOCYTES # BLD AUTO: 2.21 THOUSAND/UL (ref 0.6–4.47)
MCH RBC QN AUTO: 28.3 PG (ref 26–34)
MCHC RBC AUTO-ENTMCNC: 33.2 G/DL (ref 31–37)
MCV RBC AUTO: 85 FL (ref 81–99)
MONOCYTES # BLD AUTO: 1.56 THOUSAND/UL (ref 0–1.22)
MONOCYTES NFR BLD AUTO: 12 % (ref 4–12)
MYELOCYTES NFR BLD MANUAL: 1 % (ref 0–1)
NEUTS BAND NFR BLD MANUAL: 2 % (ref 0–8)
NEUTS SEG # BLD: 8.71 THOUSAND/UL (ref 1.81–6.82)
NEUTS SEG NFR BLD AUTO: 65 % (ref 42–75)
PLATELET # BLD AUTO: 204 THOUSANDS/UL (ref 149–390)
PMV BLD AUTO: 8.4 FL (ref 8.6–11.7)
POTASSIUM SERPL-SCNC: 3.3 MMOL/L (ref 3.5–5.5)
RBC # BLD AUTO: 5.12 MILLION/UL (ref 4.3–5.9)
SODIUM SERPL-SCNC: 140 MMOL/L (ref 134–143)
TOTAL CELLS COUNTED SPEC: 100
VARIANT LYMPHS # BLD AUTO: 2 % (ref 0–0)
WBC # BLD AUTO: 13 THOUSAND/UL (ref 4.8–10.8)

## 2019-10-01 PROCEDURE — 85007 BL SMEAR W/DIFF WBC COUNT: CPT | Performed by: PHYSICIAN ASSISTANT

## 2019-10-01 PROCEDURE — 80048 BASIC METABOLIC PNL TOTAL CA: CPT | Performed by: PHYSICIAN ASSISTANT

## 2019-10-01 PROCEDURE — 99232 SBSQ HOSP IP/OBS MODERATE 35: CPT | Performed by: PHYSICIAN ASSISTANT

## 2019-10-01 PROCEDURE — 87184 SC STD DISK METHOD PER PLATE: CPT | Performed by: PHYSICIAN ASSISTANT

## 2019-10-01 PROCEDURE — 94640 AIRWAY INHALATION TREATMENT: CPT

## 2019-10-01 PROCEDURE — 87185 SC STD ENZYME DETCJ PER NZM: CPT | Performed by: PHYSICIAN ASSISTANT

## 2019-10-01 PROCEDURE — 94760 N-INVAS EAR/PLS OXIMETRY 1: CPT

## 2019-10-01 PROCEDURE — 71045 X-RAY EXAM CHEST 1 VIEW: CPT

## 2019-10-01 PROCEDURE — 87077 CULTURE AEROBIC IDENTIFY: CPT | Performed by: PHYSICIAN ASSISTANT

## 2019-10-01 PROCEDURE — 87205 SMEAR GRAM STAIN: CPT | Performed by: PHYSICIAN ASSISTANT

## 2019-10-01 PROCEDURE — 87070 CULTURE OTHR SPECIMN AEROBIC: CPT | Performed by: PHYSICIAN ASSISTANT

## 2019-10-01 PROCEDURE — 94660 CPAP INITIATION&MGMT: CPT

## 2019-10-01 PROCEDURE — 85027 COMPLETE CBC AUTOMATED: CPT | Performed by: PHYSICIAN ASSISTANT

## 2019-10-01 RX ORDER — AZITHROMYCIN 250 MG/1
500 TABLET, FILM COATED ORAL EVERY 24 HOURS
Status: DISCONTINUED | OUTPATIENT
Start: 2019-10-02 | End: 2019-10-02

## 2019-10-01 RX ADMIN — METOPROLOL TARTRATE 25 MG: 25 TABLET, FILM COATED ORAL at 09:16

## 2019-10-01 RX ADMIN — RANOLAZINE 1000 MG: 500 TABLET, FILM COATED, EXTENDED RELEASE ORAL at 09:15

## 2019-10-01 RX ADMIN — RANOLAZINE 1000 MG: 500 TABLET, FILM COATED, EXTENDED RELEASE ORAL at 17:12

## 2019-10-01 RX ADMIN — DOCUSATE SODIUM 100 MG: 100 CAPSULE, LIQUID FILLED ORAL at 09:16

## 2019-10-01 RX ADMIN — BUTALBITAL, ACETAMINOPHEN AND CAFFEINE 1 TABLET: 50; 325; 40 TABLET ORAL at 01:23

## 2019-10-01 RX ADMIN — BUTALBITAL, ACETAMINOPHEN AND CAFFEINE 1 TABLET: 50; 325; 40 TABLET ORAL at 16:23

## 2019-10-01 RX ADMIN — PREGABALIN 300 MG: 100 CAPSULE ORAL at 09:15

## 2019-10-01 RX ADMIN — AMLODIPINE BESYLATE 10 MG: 5 TABLET ORAL at 09:14

## 2019-10-01 RX ADMIN — LORAZEPAM 0.5 MG: 0.5 TABLET ORAL at 01:23

## 2019-10-01 RX ADMIN — ISOSORBIDE MONONITRATE 30 MG: 30 TABLET, EXTENDED RELEASE ORAL at 09:15

## 2019-10-01 RX ADMIN — DOCUSATE SODIUM 100 MG: 100 CAPSULE, LIQUID FILLED ORAL at 17:12

## 2019-10-01 RX ADMIN — TIOTROPIUM BROMIDE 18 MCG: 18 CAPSULE ORAL; RESPIRATORY (INHALATION) at 11:48

## 2019-10-01 RX ADMIN — APIXABAN 5 MG: 5 TABLET, FILM COATED ORAL at 09:14

## 2019-10-01 RX ADMIN — DULOXETINE HYDROCHLORIDE 60 MG: 60 CAPSULE, DELAYED RELEASE ORAL at 09:15

## 2019-10-01 RX ADMIN — PANTOPRAZOLE SODIUM 40 MG: 40 TABLET, DELAYED RELEASE ORAL at 06:20

## 2019-10-01 RX ADMIN — BUTALBITAL, ACETAMINOPHEN AND CAFFEINE 1 TABLET: 50; 325; 40 TABLET ORAL at 20:30

## 2019-10-01 RX ADMIN — GUAIFENESIN 600 MG: 600 TABLET, EXTENDED RELEASE ORAL at 09:15

## 2019-10-01 RX ADMIN — LEVALBUTEROL HYDROCHLORIDE 0.63 MG: 0.63 SOLUTION RESPIRATORY (INHALATION) at 20:17

## 2019-10-01 RX ADMIN — PREGABALIN 300 MG: 100 CAPSULE ORAL at 17:12

## 2019-10-01 RX ADMIN — GUAIFENESIN 600 MG: 600 TABLET, EXTENDED RELEASE ORAL at 20:30

## 2019-10-01 RX ADMIN — APIXABAN 5 MG: 5 TABLET, FILM COATED ORAL at 17:12

## 2019-10-01 RX ADMIN — ATORVASTATIN CALCIUM 40 MG: 40 TABLET, FILM COATED ORAL at 09:15

## 2019-10-01 RX ADMIN — METOPROLOL TARTRATE 25 MG: 25 TABLET, FILM COATED ORAL at 20:30

## 2019-10-01 RX ADMIN — LEVALBUTEROL HYDROCHLORIDE 0.63 MG: 0.63 SOLUTION RESPIRATORY (INHALATION) at 14:53

## 2019-10-01 RX ADMIN — AZITHROMYCIN 500 MG: 250 TABLET, FILM COATED ORAL at 16:24

## 2019-10-01 RX ADMIN — TAMSULOSIN HYDROCHLORIDE 0.4 MG: 0.4 CAPSULE ORAL at 09:15

## 2019-10-01 RX ADMIN — FINASTERIDE 5 MG: 5 TABLET, FILM COATED ORAL at 09:16

## 2019-10-01 RX ADMIN — ASPIRIN 81 MG 81 MG: 81 TABLET ORAL at 09:15

## 2019-10-01 NOTE — OCCUPATIONAL THERAPY NOTE
Patient approached - he was supine in bed  Patient refused to participate in treatment at this time, stating "I don't feel well"  Will continue with POC per patient's cooperation     KARRIE Taylor/ALIN

## 2019-10-01 NOTE — SOCIAL WORK
Received the target letter from the state  Spoke to [place] at [facility] [place] [place]  All requested clinical and target letter sent to [facility] [place] states they will get the authorization  MD aware of same

## 2019-10-01 NOTE — RESPIRATORY THERAPY NOTE
Nursing called   the patient  Pulled mask off  CPAP reapplied and the headgear adjusted  3lpm 02 titrated into unit  Pt  States he will try again to keep the mask on  Pt  Was reassured that CPAP therapy is beneficial for him

## 2019-10-01 NOTE — RESPIRATORY THERAPY NOTE
C  N A  Took the pt  Off of the CPAP unit so the pt  Could eat his sandwich  Pt  Was placed on 3lpm 02 via nasal cannula  Pt  Left off the CPAP (tolerated poorly)  Pt  Seems to tolerate nasal 02 well  Nursing to call if pt  Has any respiratory distress

## 2019-10-01 NOTE — RESPIRATORY THERAPY NOTE
Pt  Placed on our CPAP unit with 3lpm titrated into the unit  Head gear adjusted to the pt's comfort

## 2019-10-01 NOTE — ASSESSMENT & PLAN NOTE
· With history of COPD  · Patient wears 2 L of oxygen at home chronically which will continue  · Respiratory protocol

## 2019-10-01 NOTE — PROGRESS NOTES
Progress Note - Ger Bell 1945, 66 y o  male MRN: [id number]    Unit/Bed#: -02 Encounter: [id number]    Primary Care Provider: Main Skinner DO   Date and time admitted to hospital: 9/26/2019  2:11 PM        * Dizziness  Assessment & Plan  · Nonspecific symptoms  · Has resolved after being admitted  · CT scan of the head unremarkable  Recent admission at [facility] with similar symptoms and workup at that time was unremarkable "MRI of brain and MRA of head and neck were sub-optimal due to motion but didn't reveal any acute findings"  · Suspected that patient's presenting symptom more so due to homelessness and having no where else to go  · Patient being admitted under charitable care to help coordinate placement as patient is homeless   · Case management and area of aging are working on placement    Fever  Assessment & Plan  · With mild leukocytosis  No fever over the past 24 hours  · No acute cardiopulmonary disease on CXR  Will repeat cxr today     · Urine culture with mixed contaminants  · Azithromycin was started for possible bronchitis  · Continue mucinex, respiratory protocol  · Pending sputum culture      Chronic respiratory failure with hypoxia (HCC)  Assessment & Plan  · With history of COPD  · Patient wears 2 L of oxygen at home chronically which will continue  · Respiratory protocol       Essential hypertension  Assessment & Plan  · Continue to monitor on home regimen of antihypertensives  · BP well-controlled      PTSD (post-traumatic stress disorder)  Assessment & Plan  · Appreciate psychiatric input  · Patient cleared for discharge to skilled nursing facility    Obstructive sleep apnea  Assessment & Plan  · Continue home CPAP settings      Persistent atrial fibrillation  Assessment & Plan  · Continue home regimen of metoprolol and Eliquis        VTE Pharmacologic Prophylaxis: Pharmacologic: Apixaban (Eliquis)    Patient Centered Rounds: I have performed bedside rounds with nursing staff today  Discussions with Specialists or Other Care Team Provider: nursing, CM, Rx  Education and Discussions with Family / Patient: patient     Current Length of Stay: 1 day(s)    Current Patient Status: Inpatient   Certification Statement: The patient will continue to require additional inpatient hospital stay due to dizziness    Discharge Plan: pending discharge disposition    Code Status: Level 1 - Full Code    Subjective:   Feeling sleepy  Denies any dyspnea or chest pain  Objective:     Vitals:   Temp (24hrs), Av 4 °F (36 9 °C), Min:97 3 °F (36 3 °C), Max:99 7 °F (37 6 °C)    Temp:  [97 3 °F (36 3 °C)-99 7 °F (37 6 °C)] 99 7 °F (37 6 °C)  HR:  [69-86] 70  Resp:  [18] 18  BP: (120-150)/(70-88) 140/74  SpO2:  [84 %-92 %] 92 %  Body mass index is 25 88 kg/m²  Input and Output Summary (last 24 hours): Intake/Output Summary (Last 24 hours) at 10/1/2019 1644  Last data filed at 10/1/2019 0800  Gross per 24 hour   Intake 380 ml   Output 200 ml   Net 180 ml       Physical Exam:     Physical Exam   Constitutional: He is oriented to person, place, and time  He appears well-developed  No distress  Chronically ill appearing      HENT:   Head: Normocephalic and atraumatic  Mouth/Throat: Oropharynx is clear and moist    Eyes: Pupils are equal, round, and reactive to light  Conjunctivae and EOM are normal    Neck: Normal range of motion  Neck supple  No thyromegaly present  Cardiovascular: Normal heart sounds and intact distal pulses  Irregularly irregular   Pulmonary/Chest: Effort normal  No respiratory distress  He has wheezes (expiratory wheezing throughout the lung fields)  Abdominal: Soft  Bowel sounds are normal  He exhibits no distension  There is no tenderness  Musculoskeletal: Normal range of motion  He exhibits no edema or deformity  Neurological: He is alert and oriented to person, place, and time  He has normal reflexes  No cranial nerve deficit     Skin: Skin is warm and dry  No erythema  Psychiatric: He has a normal mood and affect  His behavior is normal  Thought content normal    Vitals reviewed  Additional Data:     Labs:    Results from last 7 days   Lab Units 10/01/19  0609   WBC Thousand/uL 13 00*   HEMOGLOBIN g/dL 14 5   HEMATOCRIT % 43 7   PLATELETS Thousands/uL 204   LYMPHO PCT % 17*   MONO PCT % 12   EOS PCT % 1     Results from last 7 days   Lab Units 10/01/19  0609 09/26/19  1436   POTASSIUM mmol/L 3 3* 4 1   CHLORIDE mmol/L 102 103   CO2 mmol/L 29 31   BUN mg/dL 20 16   CREATININE mg/dL 0 85 0 88   CALCIUM mg/dL 9 4 9 6   ALK PHOS U/L  --  97   ALT U/L  --  12   AST U/L  --  11*     Results from last 7 days   Lab Units 09/26/19  1436   INR  1 84*               * I Have Reviewed All Lab Data Listed Above  * Additional Pertinent Lab Tests Reviewed:  [address] Admission  Reviewed    Imaging:  Imaging Reports Reviewed Today Include: n/a    Recent Cultures (last 7 days):     Results from last 7 days   Lab Units 10/01/19  0427 09/27/19  1034   GRAM STAIN RESULT  1+ Polys*  1+ Polys*  3+ Gram positive cocci in pairs, chains and clusters*  3+ Gram negative rods*  --    URINE CULTURE   --  20,000-29,000 cfu/ml        Last 24 Hours Medication List:     Current Facility-Administered Medications:  acetaminophen 650 mg Oral Q6H PRN Isauro Marley PA-C   albuterol 2 puff Inhalation Q6H PRN Jhoan Pulse, MD   amLODIPine 10 mg Oral Daily Jhoan Pulse, MD   apixaban 5 mg Oral BID Jhoan Pulse, MD   aspirin 81 mg Oral Daily Jhoan Pulse, MD   atorvastatin 40 mg Oral Daily Jhoan Pulse, MD   [START ON 10/2/2019] azithromycin 500 mg Oral Q24H NATALIIA Walker   butalbital-acetaminophen-caffeine 1 tablet Oral Q4H PRN Jhoan Pulse, MD   docusate sodium 100 mg Oral BID Jhoan Pulse, MD   DULoxetine 60 mg Oral Daily Jhoan Pulse, MD   finasteride 5 mg Oral Daily Jhoan Pulse, MD   guaiFENesin 600 mg Oral Q12H [address] Irena Tran PA-C   influenza vaccine 0 5 mL Intramuscular Once Reyes Morley MD   isosorbide mononitrate 30 mg Oral Daily Reyes Morley MD   levalbuterol 0 63 mg Nebulization TID Reyes Morley MD   LORazepam 0 5 mg Oral Q8H PRN Isma Orellana PA-C   metoprolol tartrate 25 mg Oral Q12H Mitesh Luong MD   nicotine 1 patch Transdermal Daily Reyes Morley MD   pantoprazole 40 mg Oral Early Morning Reyes Morley MD   pregabalin 300 mg Oral BID Reyes Morley MD   ranolazine 1,000 mg Oral BID Reyes Morley MD   tamsulosin 0 4 mg Oral Daily Reyes Morley MD   tiotropium 18 mcg Inhalation Daily Reyes Morley MD        Today, Patient Was Seen By: NATALIIA Macedo    ** Please Note: Dictation voice to text software may have been used in the creation of this document   **

## 2019-10-01 NOTE — PHYSICAL THERAPY NOTE
Physical Therapy Cancellation Note    Chart review performed  At this time, PT treatment session cancelled as patient is refusing PT services  PT approached pt supine in bed, pt stating he "doesn't feel well, I don't want to move"  RN made aware  PT will follow and provide PT interventions as appropriate      Daniel Cochran, PT

## 2019-10-01 NOTE — ASSESSMENT & PLAN NOTE
· With mild leukocytosis  No fever over the past 24 hours  · No acute cardiopulmonary disease on CXR  Will repeat cxr today     · Urine culture with mixed contaminants  · Azithromycin was started for possible bronchitis  · Continue mucinex, respiratory protocol  · Pending sputum culture

## 2019-10-01 NOTE — PLAN OF CARE
Problem: Potential for Falls  Goal: Patient will remain free of falls  Description  INTERVENTIONS:  - Assess patient frequently for physical needs  -  Identify cognitive and physical deficits and behaviors that affect risk of falls    -  [place] fall precautions as indicated by assessment   - Educate patient/family on patient safety including physical limitations  - Instruct patient to call for assistance with activity based on assessment  - Modify environment to reduce risk of injury  - Consider OT/PT consult to assist with strengthening/mobility  Outcome: Progressing     Problem: PAIN - ADULT  Goal: Verbalizes/displays adequate comfort level or baseline comfort level  Description  Interventions:  - Encourage patient to monitor pain and request assistance  - Assess pain using appropriate pain scale  - Administer analgesics based on type and severity of pain and evaluate response  - Implement non-pharmacological measures as appropriate and evaluate response  - Consider cultural and social influences on pain and pain management  - Notify physician/advanced practitioner if interventions unsuccessful or patient reports new pain  Outcome: Progressing     Problem: INFECTION - ADULT  Goal: Absence or prevention of progression during hospitalization  Description  INTERVENTIONS:  - Assess and monitor for signs and symptoms of infection  - Monitor lab/diagnostic results  - Monitor all insertion sites, i e  indwelling lines, tubes, and drains  - Monitor endotracheal if appropriate and nasal secretions for changes in amount and color  - [place] appropriate cooling/warming therapies per order  - Administer medications as ordered  - Instruct and encourage patient and family to use good hand hygiene technique  - Identify and instruct in appropriate isolation precautions for identified infection/condition  Outcome: Progressing  Goal: Absence of fever/infection during neutropenic period  Description  INTERVENTIONS:  - Monitor WBC    Outcome: Progressing     Problem: SAFETY ADULT  Goal: Maintain or return to baseline ADL function  Description  INTERVENTIONS:  -  Assess patient's ability to carry out ADLs; assess patient's baseline for ADL function and identify physical deficits which impact ability to perform ADLs (bathing, care of mouth/teeth, toileting, grooming, dressing, etc )  - Assess/evaluate cause of self-care deficits   - Assess range of motion  - Assess patient's mobility; develop plan if impaired  - Assess patient's need for assistive devices and provide as appropriate  - Encourage maximum independence but intervene and supervise when necessary  - Involve family in performance of ADLs  - Assess for home care needs following discharge   - Consider OT consult to assist with ADL evaluation and planning for discharge  - Provide patient education as appropriate  Outcome: Progressing  Goal: Maintain or return mobility status to optimal level  Description  INTERVENTIONS:  - Assess patient's baseline mobility status (ambulation, transfers, stairs, etc )    - Identify cognitive and physical deficits and behaviors that affect mobility  - Identify mobility aids required to assist with transfers and/or ambulation (gait belt, sit-to-stand, lift, walker, cane, etc )  - [place] fall precautions as indicated by assessment  - Record patient progress and toleration of activity level on Mobility SBAR; progress patient to next Phase/Stage  - Instruct patient to call for assistance with activity based on assessment  - Consider rehabilitation consult to assist with strengthening/weightbearing, etc   Outcome: Progressing     Problem: DISCHARGE PLANNING  Goal: Discharge to home or other facility with appropriate resources  Description  INTERVENTIONS:  - Identify barriers to discharge w/patient and caregiver  - Arrange for needed discharge resources and transportation as appropriate  - Identify discharge learning needs (meds, wound care, etc )  - Arrange for interpretive services to assist at discharge as needed  - Refer to Case Management Department for coordinating discharge planning if the patient needs post-hospital services based on physician/advanced practitioner order or complex needs related to functional status, cognitive ability, or social support system  Outcome: Progressing     Problem: Knowledge Deficit  Goal: Patient/family/caregiver demonstrates understanding of disease process, treatment plan, medications, and discharge instructions  Description  Complete learning assessment and assess knowledge base    Interventions:  - Provide teaching at level of understanding  - Provide teaching via preferred learning methods  Outcome: Progressing     Problem: CARDIOVASCULAR - ADULT  Goal: Maintains optimal cardiac output and hemodynamic stability  Description  INTERVENTIONS:  - Monitor I/O, vital signs and rhythm  - Monitor for S/S and trends of decreased cardiac output  - Administer and titrate ordered vasoactive medications to optimize hemodynamic stability  - Assess quality of pulses, skin color and temperature  - Assess for signs of decreased coronary artery perfusion  - Instruct patient to report change in severity of symptoms  Outcome: Progressing     Problem: RESPIRATORY - ADULT  Goal: Achieves optimal ventilation and oxygenation  Description  INTERVENTIONS:  - Assess for changes in respiratory status  - Assess for changes in mentation and behavior  - Position to facilitate oxygenation and minimize respiratory effort  - Oxygen administered by appropriate delivery if ordered  - Initiate smoking cessation education as indicated  - Encourage broncho-pulmonary hygiene including cough, deep breathe, Incentive Spirometry  - Assess the need for suctioning and aspirate as needed  - Assess and instruct to report SOB or any respiratory difficulty  - Respiratory Therapy support as indicated  Outcome: Progressing     Problem: MUSCULOSKELETAL - ADULT  Goal: Maintain or return mobility to safest level of function  Description  INTERVENTIONS:  - Assess patient's ability to carry out ADLs; assess patient's baseline for ADL function and identify physical deficits which impact ability to perform ADLs (bathing, care of mouth/teeth, toileting, grooming, dressing, etc )  - Assess/evaluate cause of self-care deficits   - Assess range of motion  - Assess patient's mobility  - Assess patient's need for assistive devices and provide as appropriate  - Encourage maximum independence but intervene and supervise when necessary  - Involve family in performance of ADLs  - Assess for home care needs following discharge   - Consider OT consult to assist with ADL evaluation and planning for discharge  - Provide patient education as appropriate  Outcome: Progressing     Problem: Nutrition/Hydration-ADULT  Goal: Nutrient/Hydration intake appropriate for improving, restoring or maintaining nutritional needs  Description  Monitor and assess patient's nutrition/hydration status for malnutrition  Collaborate with interdisciplinary team and initiate plan and interventions as ordered  Monitor patient's weight and dietary intake as ordered or per policy  Utilize nutrition screening tool and intervene as necessary  Determine patient's food preferences and provide high-protein, high-caloric foods as appropriate       INTERVENTIONS:  - Monitor oral intake, urinary output, labs, and treatment plans  - Assess nutrition and hydration status and recommend course of action  - Evaluate amount of meals eaten  - Assist patient with eating if necessary   - Allow adequate time for meals  - Recommend/ encourage appropriate diets, oral nutritional supplements, and vitamin/mineral supplements  - Order, calculate, and assess calorie counts as needed  - Recommend, monitor, and adjust tube feedings and TPN/PPN based on assessed needs  - Assess need for intravenous fluids  - Provide specific nutrition/hydration education as appropriate  - Include patient/family/caregiver in decisions related to nutrition  Outcome: Progressing

## 2019-10-01 NOTE — ASSESSMENT & PLAN NOTE
· Nonspecific symptoms  · Has resolved after being admitted  · CT scan of the head unremarkable   Recent admission at [facility] with similar symptoms and workup at that time was unremarkable "MRI of brain and MRA of head and neck were sub-optimal due to motion but didn't reveal any acute findings"  · Suspected that patient's presenting symptom more so due to homelessness and having no where else to go  · Patient being admitted under [facility] care to help coordinate placement as patient is homeless   · Case management and area of aging are working on placement

## 2019-10-02 VITALS
HEIGHT: 69 IN | TEMPERATURE: 99 F | WEIGHT: 175.27 LBS | HEART RATE: 92 BPM | DIASTOLIC BLOOD PRESSURE: 50 MMHG | SYSTOLIC BLOOD PRESSURE: 88 MMHG | OXYGEN SATURATION: 93 % | BODY MASS INDEX: 25.96 KG/M2 | RESPIRATION RATE: 17 BRPM

## 2019-10-02 PROBLEM — R50.9 FEVER: Status: RESOLVED | Noted: 2019-09-30 | Resolved: 2019-10-02

## 2019-10-02 PROBLEM — R42 DIZZINESS: Status: RESOLVED | Noted: 2019-09-26 | Resolved: 2019-10-02

## 2019-10-02 LAB
ANION GAP SERPL CALCULATED.3IONS-SCNC: 9 MMOL/L (ref 4–13)
BASOPHILS # BLD AUTO: 0.1 THOUSANDS/ΜL (ref 0–0.1)
BASOPHILS NFR BLD AUTO: 1 % (ref 0–2)
BUN SERPL-MCNC: 21 MG/DL (ref 7–25)
CALCIUM SERPL-MCNC: 10 MG/DL (ref 8.6–10.5)
CHLORIDE SERPL-SCNC: 98 MMOL/L (ref 98–107)
CO2 SERPL-SCNC: 31 MMOL/L (ref 21–31)
CREAT SERPL-MCNC: 0.82 MG/DL (ref 0.7–1.3)
EOSINOPHIL # BLD AUTO: 0.3 THOUSAND/ΜL (ref 0–0.61)
EOSINOPHIL NFR BLD AUTO: 3 % (ref 0–5)
ERYTHROCYTE [DISTWIDTH] IN BLOOD BY AUTOMATED COUNT: 15.3 % (ref 11.5–14.5)
GFR SERPL CREATININE-BSD FRML MDRD: 86 ML/MIN/1.73SQ M
GLUCOSE SERPL-MCNC: 114 MG/DL (ref 65–99)
HCT VFR BLD AUTO: 46.5 % (ref 42–47)
HGB BLD-MCNC: 15.6 G/DL (ref 14–18)
LYMPHOCYTES # BLD AUTO: 1.6 THOUSANDS/ΜL (ref 0.6–4.47)
LYMPHOCYTES NFR BLD AUTO: 13 % (ref 21–51)
MCH RBC QN AUTO: 28.4 PG (ref 26–34)
MCHC RBC AUTO-ENTMCNC: 33.6 G/DL (ref 31–37)
MCV RBC AUTO: 85 FL (ref 81–99)
MONOCYTES # BLD AUTO: 2.8 THOUSAND/ΜL (ref 0.17–1.22)
MONOCYTES NFR BLD AUTO: 22 % (ref 2–12)
NEUTROPHILS # BLD AUTO: 7.8 THOUSANDS/ΜL (ref 1.4–6.5)
NEUTS SEG NFR BLD AUTO: 62 % (ref 42–75)
PLATELET # BLD AUTO: 255 THOUSANDS/UL (ref 149–390)
PMV BLD AUTO: 8.3 FL (ref 8.6–11.7)
POTASSIUM SERPL-SCNC: 3.5 MMOL/L (ref 3.5–5.5)
PROCALCITONIN SERPL-MCNC: <0.05 NG/ML
RBC # BLD AUTO: 5.5 MILLION/UL (ref 4.3–5.9)
SODIUM SERPL-SCNC: 138 MMOL/L (ref 134–143)
WBC # BLD AUTO: 12.6 THOUSAND/UL (ref 4.8–10.8)

## 2019-10-02 PROCEDURE — 94660 CPAP INITIATION&MGMT: CPT

## 2019-10-02 PROCEDURE — 94760 N-INVAS EAR/PLS OXIMETRY 1: CPT

## 2019-10-02 PROCEDURE — 80048 BASIC METABOLIC PNL TOTAL CA: CPT | Performed by: NURSE PRACTITIONER

## 2019-10-02 PROCEDURE — 99239 HOSP IP/OBS DSCHRG MGMT >30: CPT | Performed by: PHYSICIAN ASSISTANT

## 2019-10-02 PROCEDURE — 94664 DEMO&/EVAL PT USE INHALER: CPT

## 2019-10-02 PROCEDURE — 97530 THERAPEUTIC ACTIVITIES: CPT

## 2019-10-02 PROCEDURE — 85025 COMPLETE CBC W/AUTO DIFF WBC: CPT | Performed by: NURSE PRACTITIONER

## 2019-10-02 PROCEDURE — 97535 SELF CARE MNGMENT TRAINING: CPT

## 2019-10-02 PROCEDURE — 84145 PROCALCITONIN (PCT): CPT | Performed by: NURSE PRACTITIONER

## 2019-10-02 PROCEDURE — 94640 AIRWAY INHALATION TREATMENT: CPT

## 2019-10-02 RX ORDER — CEFDINIR 300 MG/1
300 CAPSULE ORAL EVERY 12 HOURS SCHEDULED
Status: DISCONTINUED | OUTPATIENT
Start: 2019-10-02 | End: 2019-10-02

## 2019-10-02 RX ORDER — POTASSIUM CHLORIDE 20 MEQ/1
40 TABLET, EXTENDED RELEASE ORAL ONCE
Status: COMPLETED | OUTPATIENT
Start: 2019-10-02 | End: 2019-10-02

## 2019-10-02 RX ORDER — CEFDINIR 300 MG/1
300 CAPSULE ORAL EVERY 12 HOURS SCHEDULED
Status: DISCONTINUED | OUTPATIENT
Start: 2019-10-02 | End: 2019-10-02 | Stop reason: HOSPADM

## 2019-10-02 RX ORDER — GUAIFENESIN 600 MG
600 TABLET, EXTENDED RELEASE 12 HR ORAL EVERY 12 HOURS SCHEDULED
Qty: 10 TABLET | Refills: 0 | Status: SHIPPED | OUTPATIENT
Start: 2019-10-02 | End: 2019-10-07

## 2019-10-02 RX ORDER — CEFDINIR 300 MG/1
300 CAPSULE ORAL EVERY 12 HOURS SCHEDULED
Qty: 14 CAPSULE | Refills: 0 | Status: SHIPPED | OUTPATIENT
Start: 2019-10-02 | End: 2019-10-09

## 2019-10-02 RX ADMIN — NICOTINE 1 PATCH: 14 PATCH TRANSDERMAL at 10:29

## 2019-10-02 RX ADMIN — TAMSULOSIN HYDROCHLORIDE 0.4 MG: 0.4 CAPSULE ORAL at 10:29

## 2019-10-02 RX ADMIN — ATORVASTATIN CALCIUM 40 MG: 40 TABLET, FILM COATED ORAL at 10:28

## 2019-10-02 RX ADMIN — GUAIFENESIN 600 MG: 600 TABLET, EXTENDED RELEASE ORAL at 21:27

## 2019-10-02 RX ADMIN — ISOSORBIDE MONONITRATE 30 MG: 30 TABLET, EXTENDED RELEASE ORAL at 10:28

## 2019-10-02 RX ADMIN — PREGABALIN 300 MG: 100 CAPSULE ORAL at 10:28

## 2019-10-02 RX ADMIN — LEVALBUTEROL HYDROCHLORIDE 0.63 MG: 0.63 SOLUTION RESPIRATORY (INHALATION) at 09:03

## 2019-10-02 RX ADMIN — AMLODIPINE BESYLATE 10 MG: 5 TABLET ORAL at 10:29

## 2019-10-02 RX ADMIN — APIXABAN 5 MG: 5 TABLET, FILM COATED ORAL at 10:29

## 2019-10-02 RX ADMIN — ASPIRIN 81 MG 81 MG: 81 TABLET ORAL at 10:28

## 2019-10-02 RX ADMIN — FINASTERIDE 5 MG: 5 TABLET, FILM COATED ORAL at 10:28

## 2019-10-02 RX ADMIN — RANOLAZINE 1000 MG: 500 TABLET, FILM COATED, EXTENDED RELEASE ORAL at 17:24

## 2019-10-02 RX ADMIN — LEVALBUTEROL HYDROCHLORIDE 0.63 MG: 0.63 SOLUTION RESPIRATORY (INHALATION) at 20:31

## 2019-10-02 RX ADMIN — GUAIFENESIN 600 MG: 600 TABLET, EXTENDED RELEASE ORAL at 10:28

## 2019-10-02 RX ADMIN — AZITHROMYCIN 500 MG: 250 TABLET, FILM COATED ORAL at 15:16

## 2019-10-02 RX ADMIN — RANOLAZINE 1000 MG: 500 TABLET, FILM COATED, EXTENDED RELEASE ORAL at 10:27

## 2019-10-02 RX ADMIN — BUTALBITAL, ACETAMINOPHEN AND CAFFEINE 1 TABLET: 50; 325; 40 TABLET ORAL at 15:16

## 2019-10-02 RX ADMIN — DOCUSATE SODIUM 100 MG: 100 CAPSULE, LIQUID FILLED ORAL at 17:24

## 2019-10-02 RX ADMIN — DULOXETINE HYDROCHLORIDE 60 MG: 60 CAPSULE, DELAYED RELEASE ORAL at 10:27

## 2019-10-02 RX ADMIN — POTASSIUM CHLORIDE 40 MEQ: 1500 TABLET, EXTENDED RELEASE ORAL at 10:29

## 2019-10-02 RX ADMIN — CEFDINIR 300 MG: 300 CAPSULE ORAL at 17:24

## 2019-10-02 RX ADMIN — METOPROLOL TARTRATE 25 MG: 25 TABLET, FILM COATED ORAL at 10:28

## 2019-10-02 RX ADMIN — TIOTROPIUM BROMIDE 18 MCG: 18 CAPSULE ORAL; RESPIRATORY (INHALATION) at 10:33

## 2019-10-02 RX ADMIN — PREGABALIN 300 MG: 100 CAPSULE ORAL at 17:24

## 2019-10-02 RX ADMIN — APIXABAN 5 MG: 5 TABLET, FILM COATED ORAL at 17:24

## 2019-10-02 RX ADMIN — LORAZEPAM 0.5 MG: 0.5 TABLET ORAL at 03:08

## 2019-10-02 RX ADMIN — PANTOPRAZOLE SODIUM 40 MG: 40 TABLET, DELAYED RELEASE ORAL at 06:16

## 2019-10-02 RX ADMIN — LEVALBUTEROL HYDROCHLORIDE 0.63 MG: 0.63 SOLUTION RESPIRATORY (INHALATION) at 15:21

## 2019-10-02 RX ADMIN — DOCUSATE SODIUM 100 MG: 100 CAPSULE, LIQUID FILLED ORAL at 10:29

## 2019-10-02 NOTE — NURSING NOTE
Pt anxious and requested something to help him sleep  Medicated with ativan prn  cpap placed back on pt  He is tolerating well  Will cont to monitor

## 2019-10-02 NOTE — PHYSICAL THERAPY NOTE
Physical Therapy Cancellation Note  Attempted to see pt for PT session at 10:00  Pt refused stating he has too much back & leg pain  "I don't need PT, I don't have to walk   I have an electric WC " RN Chela Otero notified of refusal   Rodriguez Holbrook PTA

## 2019-10-02 NOTE — ASSESSMENT & PLAN NOTE
· With mild leukocytosis  No fever over the past 24 hours  · No acute cardiopulmonary disease on CXR  Will repeat cxr today  · Urine culture with mixed contaminants  · Continue mucinex, respiratory protocol  · Pending sputum culture  · cxr suspects atelectasis vs pneumonia  · Given the patient with fever, leukocytosis- will treat empirically for bacterial bronchitis  · Will give cefdinir for 7 days

## 2019-10-02 NOTE — DISCHARGE SUMMARY
Discharge- Tyrone Munoz 1945, 66 y o  male MRN: [id number]    Unit/Bed#: -02 Encounter: [id number]    Primary Care Provider: Wu Luo DO   Date and time admitted to hospital: 9/26/2019  2:11 PM        * Dizziness  Assessment & Plan  · Nonspecific symptoms  · Has resolved after being admitted  · CT scan of the head unremarkable  Recent admission at [facility] with similar symptoms and workup at that time was unremarkable "MRI of brain and MRA of head and neck were sub-optimal due to motion but didn't reveal any acute findings"  · Suspected that patient's presenting symptom more so due to homelessness and having no where else to go  · Patient being admitted under charitable care to help coordinate placement as patient is homeless   · [place] [facility] [place]     Fever  Assessment & Plan  · With mild leukocytosis  No fever over the past 24 hours  · No acute cardiopulmonary disease on CXR  Will repeat cxr today  · Urine culture with mixed contaminants  · Continue mucinex, respiratory protocol  · Pending sputum culture  · cxr suspects atelectasis vs pneumonia  · Given the patient with fever, leukocytosis- will treat empirically for bacterial bronchitis  · Will give cefdinir for 7 days           Chronic respiratory failure with hypoxia (HCC)  Assessment & Plan  · With history of COPD  · Patient wears 2 L of oxygen at home chronically which will continue  · Respiratory protocol       Essential hypertension  Assessment & Plan  · Continue to monitor on home regimen of antihypertensives  · BP well-controlled      PTSD (post-traumatic stress disorder)  Assessment & Plan  · Appreciate psychiatric input  · Patient cleared for discharge to skilled nursing facility    Obstructive sleep apnea  Assessment & Plan  · Continue home CPAP settings      Persistent atrial fibrillation  Assessment & Plan  · Continue home regimen of metoprolol and Eliquis            Discharging Physician / Practitioner: NATALIIA Mattson  PCP: Main Skinner DO  Admission Date:   Admission Orders (From admission, onward)     Ordered        09/30/19 0920  Inpatient Admission  Once         09/27/19 1844  Place in Observation (expected length of stay for this patient is less than two midnights)  Once                   Discharge Date: 10/02/19    Resolved Problems  Date Reviewed: 10/2/2019    None          Consultations During Hospital Stay:  · Psychiatry    Procedures Performed:   · None    Significant Findings / Test Results:   · cxr 10/2/2019- Right basilar consolidation likely pneumonia  · cxr 9/26/2019- no acute process   · CT head- No evidence of acute intracranial process or significant interval change  Chronic microangiopathy  Incidental Findings:   · None      Test Results Pending at Discharge (will require follow up): · Sputum culture      Outpatient Tests Requested:  · Follow up with PCP     Complications:     None     Reason for Admission: dizziness     Hospital Course:     Ger Bell is a 66 y o  male patient who originally presented to the hospital on 9/26/2019 due to dizziness  The patient with history of hypertension, CAD, COPD on home oxygen who presented to the ED on the admission day with complaint of dizziness  The patient was recently seen by a Neurology and neuro surgery in the past and was recently admitted to [facility] approximately 1 month ago  All the workup and testing were unremarkable  CT scan of the head showed no acute process  [facility]  The patient received some IV fluid and the dizziness has resolved  Developed fever and was started on azithromycin for suspected bronchitis  Repeated chest x-ray shows suspicious for pneumonia versus atelectasis    As the patient continues to have mild elevations of leukocytosis antibiotics was changed to cefdinir which the patient will be on for 7 days of treatment  Currently sputum culture is pending  His procalcitonin is negative and the patient has been afebrile for the past 48 hours  Psychiatry evaluation was done and the patient was clear from Psychiatry standpoint for skilled nursing facility  The patient will be discharged to [facility] today  Please see above list of diagnoses and related plan for additional information  Condition at Discharge: fair     Discharge Day Visit / Exam:     Subjective:  Feeling okay  Denies any pain  Denies dyspnea  Vitals: Blood Pressure: 137/66 (10/02/19 0700)  Pulse: 99 (10/02/19 0700)  Temperature: 99 7 °F (37 6 °C) (10/02/19 0700)  Temp Source: Tympanic (10/02/19 0700)  Respirations: 18 (10/02/19 0700)  Height: 5' 9" (175 3 cm) (09/27/19 1947)  Weight - Scale: 79 5 kg (175 lb 4 3 oz) (09/27/19 1947)  SpO2: 90 % (10/02/19 1521)  Exam:   Physical Exam   Constitutional: He is oriented to person, place, and time  He appears well-developed  No distress  Chronically ill appearing and frail      HENT:   Head: Normocephalic and atraumatic  Mouth/Throat: Oropharynx is clear and moist    Eyes: Pupils are equal, round, and reactive to light  Conjunctivae and EOM are normal    Neck: Normal range of motion  Neck supple  No thyromegaly present  Cardiovascular: Normal rate, regular rhythm, normal heart sounds and intact distal pulses  Pulmonary/Chest: Effort normal  No respiratory distress  He has no wheezes  Coarse breath sounds      Abdominal: Soft  Bowel sounds are normal  He exhibits no distension  There is no tenderness  Musculoskeletal: Normal range of motion  He exhibits no edema or deformity  Neurological: He is alert and oriented to person, place, and time  He has normal reflexes  No cranial nerve deficit  Skin: Skin is warm and dry  No erythema  Psychiatric: He has a normal mood and affect   His behavior is normal  Thought content normal    Vitals reviewed  Discussion with Family: n/a     Discharge instructions/Information to patient and family:   See after visit summary for information provided to patient and family  Provisions for Follow-Up Care:  See after visit summary for information related to follow-up care and any pertinent home health orders  Disposition:     Danuta Barclay at [facility] care    For Discharges to [facility] SNF:   · Not Applicable to this Patient - Not Applicable to this Patient    Planned Readmission:    No      Discharge Statement:  I spent 32 minutes discharging the patient  This time was spent on the day of discharge  I had direct contact with the patient on the day of discharge  Greater than 50% of the total time was spent examining patient, answering all patient questions, arranging and discussing plan of care with patient as well as directly providing post-discharge instructions  Additional time then spent on discharge activities  Discharge Medications:  See after visit summary for reconciled discharge medications provided to patient and family        ** Please Note: This note has been constructed using a voice recognition system **

## 2019-10-02 NOTE — ASSESSMENT & PLAN NOTE
· Nonspecific symptoms  · Has resolved after being admitted  · CT scan of the head unremarkable   Recent admission at [facility] with similar symptoms and workup at that time was unremarkable "MRI of brain and MRA of head and neck were sub-optimal due to motion but didn't reveal any acute findings"  · Suspected that patient's presenting symptom more so due to homelessness and having no where else to go  · [facility]   · Will discharge to [facility]

## 2019-10-02 NOTE — RESPIRATORY THERAPY NOTE
Pt  Placed on our CPAP unit VPAP/Auto 25 @10cm H20 as ordered  Headgear adjusted for pt  Comfort   3lpm 02 titrated into the unit

## 2019-10-02 NOTE — TRANSPORTATION MEDICAL NECESSITY
Section I - General Information    Name of Patient: Isaiah Sandoval                 : 1945    Medicare #: [id number]  Transport Date: 10/02/19 (PCS is valid for round trips on this date and for all repetitive trips in the 60-day range as noted below )  Origin: [place] [address]: [address] Is the pt's stay covered under Medicare Part A (PPS/DRG)   [x]     Closest appropriate facility? If no, why is transport to more distant facility required? Yes  If hospice pt, is this transport related to pt's terminal illness? NA       Section II - Medical Necessity Questionnaire  Ambulance transportation is medically necessary only if other means of transport are contraindicated or would be potentially harmful to the patient  To meet this requirement, the patient must either be "bed confined" or suffer from a condition such that transport by means other than ambulance is contraindicated by the patient's condition  The following questions must be answered by the medical professional signing below for this form to be valid:    1)  Describe the MEDICAL CONDITION (physical and/or mental) of this patient AT [address] [place] [address] that requires the patient to be transported in an ambulance and why transport by other means is contraindicated by the patient's condition:    2) Is the patient "bed confined" as defined below? Yes  To be "be confined" the patient must satisfy all three of the following conditions: (1) unable to get up from bed without Assistance; AND (2) unable to ambulate; AND (3) unable to sit in a chair or wheelchair  3) Can this patient safely be transported by car or wheelchair van (i e , seated during transport without a medical attendant or monitoring)?    No    4) In addition to completing questions 1-3 above, please check any of the following conditions that apply*: *Note: supporting documentation for any boxes checked must be maintained in the patient's medical records  If hosp-hosp transfer, describe services needed at 2nd facility not available at 1st facility? Requires oxygen-unable to self administer      Section III - Signature of Physician or Healthcare Professional  I certify that the above information is true and correct based on my evaluation of this patient, and represent that the patient requires transport by ambulance and that other forms of transport are contraindicated  I understand that this information will be used by the Centers for Medicare and Medicaid Services (CMS) to support the determination of medical necessity for ambulance services, and I represent that I have personal knowledge of the patient's condition at time of transport  [x]  If this box is checked, I also certify that the patient is physically or mentally incapable of signing the ambulance service's claim and that the institution with which I am affiliated has furnished care, services, or assistance to the patient  My signature below is made on behalf of the patient pursuant to 42 CFR §424 36(b)(4)  In accordance with 42 CFR §424 37, the specific reason(s) that the patient is physically or mentally incapable of signing the claim form is as follows:       Signature of Physician* or Healthcare Professional______________________________________________________________  Signature Date 10/02/19 (For scheduled repetitive transports, this form is not valid for transports performed more than 60 days after this date)    Printed Name & Credentials of Physician or Healthcare Professional (MD, , RN, etc )______Nieves Sepulveda RN__________________________  *Form must be signed by patient's attending physician for scheduled, repetitive transports   For non-repetitive, unscheduled ambulance transports, if unable to obtain the signature of the attending physician, any of the following may sign (choose appropriate option below)  [] Physician Assistant []  Clinical Nurse Specialist [x]  Registered Nurse  []  Nurse Practitioner  [] Discharge Planner

## 2019-10-02 NOTE — SOCIAL WORK
Pt has been accepted to [facility]  Faxed new PASAR to [facility], faxed target letter and SNF letter to [facility]  Pt has been medically cleared by Sylwia CRAIG  for discharge to [facility]  Authorization received for SNF  TC to [facility], spoke to Shameka Hastings about authorization for ambulance  [facility] transport does not require authorizatoin  TC to [facility] who are unable to transport  TC to [facility], spoke to [facility] and made transport arrangements for 9pm/  Spoke to Paul at [facility] who is aware of transport time

## 2019-10-02 NOTE — RESPIRATORY THERAPY NOTE
Pt  Was just given Ativan and placed back on the CPAP by the COLLETTE Martini  the patient  Left undisturbed

## 2019-10-02 NOTE — PLAN OF CARE
Problem: OCCUPATIONAL THERAPY ADULT  Goal: Performs self-care activities at highest level of function for planned discharge setting  See evaluation for individualized goals  Description  Treatment Interventions: ADL retraining, Functional transfer training, UE strengthening/ROM, Endurance training, Cognitive reorientation, Equipment evaluation/education, Neuromuscular reeducation, Compensatory technique education, Activityengagement          See flowsheet documentation for full assessment, interventions and recommendations  Outcome: Progressing  Note:   Limitation: Decreased ADL status, Decreased UE ROM, Decreased UE strength, Decreased cognition, Decreased endurance, Decreased self-care trans, Decreased high-level ADLs  Prognosis: Fair  Assessment: Patient participated in Skilled OT session this date with interventions consisting of ADL re training with the use of correct body mechnaics, Energy Conservation techniques, deep breathing technique, safety awareness and fall prevention techniques and  therapeutic activities to: increase activity tolerance   Patient agreeable to OT treatment session, upon arrival patient was found on toilet, then returned to bed for treatment  Patient requiring verbal cues for correct technique, frequent rest periods and ocassional safety reminders and occasional re-direction  Patient continues to be functioning below baseline level, occupational performance remains limited secondary to factors listed above and increased risk for falls and injury  From OT standpoint, recommendation at time of d/c would be Short Term Rehab to promote optimal safety and independence with higher level ADL's  Patient to benefit from continued Occupational Therapy treatment while in the hospital to address deficits as defined above and maximize level of functional independence with ADLs and functional mobility        OT Discharge Recommendation: Short Term Rehab  OT - OK to Discharge: Yes(Once medically cleared)  KARRIE Lopez/L

## 2019-10-03 ENCOUNTER — TRANSITIONAL CARE MANAGEMENT (OUTPATIENT)
Dept: FAMILY MEDICINE CLINIC | Facility: CLINIC | Age: 74
End: 2019-10-03

## 2019-10-03 LAB
BACTERIA SPT RESP CULT: ABNORMAL
BACTERIA SPT RESP CULT: ABNORMAL
GRAM STN SPEC: ABNORMAL

## 2019-10-03 NOTE — QUICK NOTE
Sputum culture shows H influenzae susceptible to cephalosporin  Patient was discharged with cefdinir for total 7 days treatment

## 2019-10-03 NOTE — NURSING NOTE
Pt IV was removed with dry dressing applied  [facility] was called into [facility] and information faxed them a facesheet and MAR  All belonging returned to him including clothing, medication, wallet, cigarettes, hat, Rolator, lighters, phones, and a bag from the safe  [facility] came to transport him with no incidents

## 2019-10-05 NOTE — ASSESSMENT & PLAN NOTE
Possibly d/t ortho static HTN  BP stable however on all PTA medications  CT head - unremarkable  Neuro checks stable  Troponin x 3 sets unremarkable  All imaging studies unremarkable  No

## 2019-11-17 NOTE — PHYSICAL THERAPY NOTE
PT NOTE:            Order received and chart review performed  Attempted to see pt this am for PT evaluation however pt out for MARTHA and cardioversion  Will reattempt as able  States will see PCP in AM

## 2020-01-07 NOTE — ASSESSMENT & PLAN NOTE
-ABG checked due to patient complaining of shortness of breath and sleeping more often  -ABG showed pH of 7 284, pCO2 50 4, pO2 54 0  -the patient was placed on BiPAP  Rx passes protocol-rx refilled.   HTN; CMP in past 15 m, last CR < 2, apt in past 6 m

## 2020-01-14 ENCOUNTER — APPOINTMENT (EMERGENCY)
Dept: CT IMAGING | Facility: HOSPITAL | Age: 75
End: 2020-01-14
Payer: COMMERCIAL

## 2020-01-14 ENCOUNTER — HOSPITAL ENCOUNTER (OUTPATIENT)
Facility: HOSPITAL | Age: 75
Setting detail: OBSERVATION
End: 2020-01-16
Attending: FAMILY MEDICINE | Admitting: HOSPITALIST
Payer: COMMERCIAL

## 2020-01-14 DIAGNOSIS — E43 SEVERE PROTEIN-CALORIE MALNUTRITION (HCC): Chronic | ICD-10-CM

## 2020-01-14 DIAGNOSIS — A41.9 SEPSIS (HCC): ICD-10-CM

## 2020-01-14 DIAGNOSIS — C34.90 NON-SMALL CELL LUNG CANCER (HCC): Primary | ICD-10-CM

## 2020-01-14 DIAGNOSIS — I48.91 A-FIB (HCC): ICD-10-CM

## 2020-01-14 PROBLEM — C34.91 ADENOCARCINOMA OF LUNG, STAGE 4, RIGHT (HCC): Status: ACTIVE | Noted: 2019-12-16

## 2020-01-14 PROBLEM — E83.42 HYPOMAGNESEMIA: Status: ACTIVE | Noted: 2020-01-14

## 2020-01-14 PROBLEM — E11.42 TYPE 2 DIABETES MELLITUS WITH DIABETIC POLYNEUROPATHY, WITHOUT LONG-TERM CURRENT USE OF INSULIN (HCC): Chronic | Status: ACTIVE | Noted: 2018-05-08

## 2020-01-14 PROBLEM — I10 ESSENTIAL HYPERTENSION: Chronic | Status: ACTIVE | Noted: 2019-02-23

## 2020-01-14 PROBLEM — G93.41 ACUTE METABOLIC ENCEPHALOPATHY: Status: ACTIVE | Noted: 2020-01-14

## 2020-01-14 LAB
ALBUMIN SERPL BCP-MCNC: 2.5 G/DL (ref 3.5–5.7)
ALP SERPL-CCNC: 92 U/L (ref 55–165)
ALT SERPL W P-5'-P-CCNC: 30 U/L (ref 7–52)
ANION GAP SERPL CALCULATED.3IONS-SCNC: 4 MMOL/L (ref 4–13)
ANISOCYTOSIS BLD QL SMEAR: PRESENT
APTT PPP: 51 SECONDS (ref 23–37)
AST SERPL W P-5'-P-CCNC: 29 U/L (ref 13–39)
BACTERIA UR QL AUTO: ABNORMAL /HPF
BILIRUB SERPL-MCNC: 0.6 MG/DL (ref 0.2–1)
BILIRUB UR QL STRIP: NEGATIVE
BUN SERPL-MCNC: 14 MG/DL (ref 7–25)
CALCIUM SERPL-MCNC: 8.7 MG/DL (ref 8.6–10.5)
CHLORIDE SERPL-SCNC: 101 MMOL/L (ref 98–107)
CLARITY UR: CLEAR
CO2 SERPL-SCNC: 29 MMOL/L (ref 21–31)
COLOR UR: YELLOW
CREAT SERPL-MCNC: 0.69 MG/DL (ref 0.7–1.3)
EOSINOPHIL # BLD AUTO: 10.32 THOUSAND/UL (ref 0–0.61)
EOSINOPHIL NFR BLD MANUAL: 31 % (ref 0–6)
ERYTHROCYTE [DISTWIDTH] IN BLOOD BY AUTOMATED COUNT: 17.7 % (ref 11.5–14.5)
GFR SERPL CREATININE-BSD FRML MDRD: 92 ML/MIN/1.73SQ M
GLUCOSE SERPL-MCNC: 91 MG/DL (ref 65–99)
GLUCOSE UR STRIP-MCNC: NEGATIVE MG/DL
HCT VFR BLD AUTO: 31.2 % (ref 42–47)
HGB BLD-MCNC: 9.4 G/DL (ref 14–18)
HGB UR QL STRIP.AUTO: NEGATIVE
HYPERCHROMIA BLD QL SMEAR: PRESENT
INR PPP: 1.96 (ref 0.9–1.5)
KETONES UR STRIP-MCNC: NEGATIVE MG/DL
LACTATE SERPL-SCNC: 1.5 MMOL/L (ref 0.5–2)
LEUKOCYTE ESTERASE UR QL STRIP: NEGATIVE
LIPASE SERPL-CCNC: <10 U/L (ref 11–82)
LYMPHOCYTES # BLD AUTO: 2.66 THOUSAND/UL (ref 0.6–4.47)
LYMPHOCYTES # BLD AUTO: 8 % (ref 20–51)
MAGNESIUM SERPL-MCNC: 1.8 MG/DL (ref 1.9–2.7)
MCH RBC QN AUTO: 21.5 PG (ref 26–34)
MCHC RBC AUTO-ENTMCNC: 30.2 G/DL (ref 31–37)
MCV RBC AUTO: 71 FL (ref 81–99)
MONOCYTES # BLD AUTO: 4 THOUSAND/UL (ref 0–1.22)
MONOCYTES NFR BLD AUTO: 12 % (ref 4–12)
NEUTS BAND NFR BLD MANUAL: 8 % (ref 0–8)
NEUTS SEG # BLD: 16.32 THOUSAND/UL (ref 1.81–6.82)
NEUTS SEG NFR BLD AUTO: 41 % (ref 42–75)
NITRITE UR QL STRIP: NEGATIVE
NON-SQ EPI CELLS URNS QL MICRO: ABNORMAL /HPF
OTHER STN SPEC: ABNORMAL
OVALOCYTES BLD QL SMEAR: PRESENT
PH UR STRIP.AUTO: 6 [PH]
PLATELET # BLD AUTO: 391 THOUSANDS/UL (ref 149–390)
PLATELET BLD QL SMEAR: ADEQUATE
PMV BLD AUTO: 6.7 FL (ref 8.6–11.7)
POTASSIUM SERPL-SCNC: 4.6 MMOL/L (ref 3.5–5.5)
PROCALCITONIN SERPL-MCNC: 0.11 NG/ML
PROT SERPL-MCNC: 5.2 G/DL (ref 6.4–8.9)
PROT UR STRIP-MCNC: NEGATIVE MG/DL
PROTHROMBIN TIME: 22.9 SECONDS (ref 10.2–13)
RBC # BLD AUTO: 4.39 MILLION/UL (ref 4.3–5.9)
RBC #/AREA URNS AUTO: ABNORMAL /HPF
RBC MORPH BLD: PRESENT
SODIUM SERPL-SCNC: 134 MMOL/L (ref 134–143)
SP GR UR STRIP.AUTO: 1.02 (ref 1–1.03)
TOTAL CELLS COUNTED SPEC: 100
UROBILINOGEN UR QL STRIP.AUTO: 2 E.U./DL
WBC # BLD AUTO: 33.3 THOUSAND/UL (ref 4.8–10.8)
WBC #/AREA URNS AUTO: ABNORMAL /HPF

## 2020-01-14 PROCEDURE — 70450 CT HEAD/BRAIN W/O DYE: CPT

## 2020-01-14 PROCEDURE — 82948 REAGENT STRIP/BLOOD GLUCOSE: CPT

## 2020-01-14 PROCEDURE — 83735 ASSAY OF MAGNESIUM: CPT | Performed by: PHYSICIAN ASSISTANT

## 2020-01-14 PROCEDURE — 99285 EMERGENCY DEPT VISIT HI MDM: CPT | Performed by: PHYSICIAN ASSISTANT

## 2020-01-14 PROCEDURE — 85007 BL SMEAR W/DIFF WBC COUNT: CPT | Performed by: PHYSICIAN ASSISTANT

## 2020-01-14 PROCEDURE — 36415 COLL VENOUS BLD VENIPUNCTURE: CPT | Performed by: PHYSICIAN ASSISTANT

## 2020-01-14 PROCEDURE — 74176 CT ABD & PELVIS W/O CONTRAST: CPT

## 2020-01-14 PROCEDURE — 99285 EMERGENCY DEPT VISIT HI MDM: CPT

## 2020-01-14 PROCEDURE — 85610 PROTHROMBIN TIME: CPT | Performed by: PHYSICIAN ASSISTANT

## 2020-01-14 PROCEDURE — 83690 ASSAY OF LIPASE: CPT | Performed by: PHYSICIAN ASSISTANT

## 2020-01-14 PROCEDURE — 71250 CT THORAX DX C-: CPT

## 2020-01-14 PROCEDURE — 81001 URINALYSIS AUTO W/SCOPE: CPT | Performed by: PHYSICIAN ASSISTANT

## 2020-01-14 PROCEDURE — 93005 ELECTROCARDIOGRAM TRACING: CPT

## 2020-01-14 PROCEDURE — 80053 COMPREHEN METABOLIC PANEL: CPT | Performed by: PHYSICIAN ASSISTANT

## 2020-01-14 PROCEDURE — 83605 ASSAY OF LACTIC ACID: CPT | Performed by: PHYSICIAN ASSISTANT

## 2020-01-14 PROCEDURE — 84145 PROCALCITONIN (PCT): CPT | Performed by: PHYSICIAN ASSISTANT

## 2020-01-14 PROCEDURE — 96361 HYDRATE IV INFUSION ADD-ON: CPT

## 2020-01-14 PROCEDURE — 96375 TX/PRO/DX INJ NEW DRUG ADDON: CPT

## 2020-01-14 PROCEDURE — 96365 THER/PROPH/DIAG IV INF INIT: CPT

## 2020-01-14 PROCEDURE — 85027 COMPLETE CBC AUTOMATED: CPT | Performed by: PHYSICIAN ASSISTANT

## 2020-01-14 PROCEDURE — 99220 PR INITIAL OBSERVATION CARE/DAY 70 MINUTES: CPT | Performed by: PHYSICIAN ASSISTANT

## 2020-01-14 PROCEDURE — 87040 BLOOD CULTURE FOR BACTERIA: CPT | Performed by: PHYSICIAN ASSISTANT

## 2020-01-14 PROCEDURE — 94760 N-INVAS EAR/PLS OXIMETRY 1: CPT

## 2020-01-14 PROCEDURE — 85730 THROMBOPLASTIN TIME PARTIAL: CPT | Performed by: PHYSICIAN ASSISTANT

## 2020-01-14 RX ORDER — CEFEPIME HYDROCHLORIDE 2 G/50ML
2000 INJECTION, SOLUTION INTRAVENOUS ONCE
Status: COMPLETED | OUTPATIENT
Start: 2020-01-14 | End: 2020-01-14

## 2020-01-14 RX ORDER — SODIUM CHLORIDE 9 MG/ML
100 INJECTION, SOLUTION INTRAVENOUS CONTINUOUS
Status: DISCONTINUED | OUTPATIENT
Start: 2020-01-14 | End: 2020-01-15

## 2020-01-14 RX ORDER — HYDROMORPHONE HCL/PF 1 MG/ML
0.5 SYRINGE (ML) INJECTION ONCE
Status: COMPLETED | OUTPATIENT
Start: 2020-01-14 | End: 2020-01-14

## 2020-01-14 RX ORDER — NICOTINE 21 MG/24HR
1 PATCH, TRANSDERMAL 24 HOURS TRANSDERMAL DAILY
Status: DISCONTINUED | OUTPATIENT
Start: 2020-01-15 | End: 2020-01-15

## 2020-01-14 RX ORDER — ISOSORBIDE MONONITRATE 30 MG/1
30 TABLET, EXTENDED RELEASE ORAL DAILY
Status: DISCONTINUED | OUTPATIENT
Start: 2020-01-15 | End: 2020-01-15

## 2020-01-14 RX ORDER — FINASTERIDE 5 MG/1
5 TABLET, FILM COATED ORAL DAILY
Status: DISCONTINUED | OUTPATIENT
Start: 2020-01-15 | End: 2020-01-15

## 2020-01-14 RX ORDER — IPRATROPIUM BROMIDE AND ALBUTEROL SULFATE 2.5; .5 MG/3ML; MG/3ML
3 SOLUTION RESPIRATORY (INHALATION) 4 TIMES DAILY PRN
Status: DISCONTINUED | OUTPATIENT
Start: 2020-01-14 | End: 2020-01-15

## 2020-01-14 RX ORDER — ONDANSETRON 2 MG/ML
4 INJECTION INTRAMUSCULAR; INTRAVENOUS ONCE
Status: COMPLETED | OUTPATIENT
Start: 2020-01-14 | End: 2020-01-14

## 2020-01-14 RX ORDER — AMLODIPINE BESYLATE 5 MG/1
10 TABLET ORAL DAILY
Status: DISCONTINUED | OUTPATIENT
Start: 2020-01-15 | End: 2020-01-15

## 2020-01-14 RX ORDER — ASPIRIN 81 MG/1
81 TABLET ORAL DAILY
Status: DISCONTINUED | OUTPATIENT
Start: 2020-01-15 | End: 2020-01-15

## 2020-01-14 RX ORDER — ACETAMINOPHEN 325 MG/1
650 TABLET ORAL EVERY 4 HOURS PRN
Status: DISCONTINUED | OUTPATIENT
Start: 2020-01-14 | End: 2020-01-15

## 2020-01-14 RX ORDER — MAGNESIUM SULFATE HEPTAHYDRATE 40 MG/ML
2 INJECTION, SOLUTION INTRAVENOUS ONCE
Status: COMPLETED | OUTPATIENT
Start: 2020-01-14 | End: 2020-01-15

## 2020-01-14 RX ORDER — ATORVASTATIN CALCIUM 40 MG/1
40 TABLET, FILM COATED ORAL DAILY
Status: DISCONTINUED | OUTPATIENT
Start: 2020-01-15 | End: 2020-01-15

## 2020-01-14 RX ORDER — DULOXETIN HYDROCHLORIDE 20 MG/1
60 CAPSULE, DELAYED RELEASE ORAL DAILY
Status: DISCONTINUED | OUTPATIENT
Start: 2020-01-15 | End: 2020-01-15

## 2020-01-14 RX ORDER — TAMSULOSIN HYDROCHLORIDE 0.4 MG/1
0.4 CAPSULE ORAL
Status: DISCONTINUED | OUTPATIENT
Start: 2020-01-15 | End: 2020-01-15

## 2020-01-14 RX ORDER — LANOLIN ALCOHOL/MO/W.PET/CERES
3 CREAM (GRAM) TOPICAL
Status: DISCONTINUED | OUTPATIENT
Start: 2020-01-14 | End: 2020-01-15

## 2020-01-14 RX ORDER — AMOXICILLIN 250 MG
2 CAPSULE ORAL
Status: DISCONTINUED | OUTPATIENT
Start: 2020-01-15 | End: 2020-01-15

## 2020-01-14 RX ORDER — ONDANSETRON 2 MG/ML
4 INJECTION INTRAMUSCULAR; INTRAVENOUS EVERY 6 HOURS PRN
Status: DISCONTINUED | OUTPATIENT
Start: 2020-01-14 | End: 2020-01-15

## 2020-01-14 RX ORDER — DILTIAZEM HYDROCHLORIDE 5 MG/ML
20 INJECTION INTRAVENOUS ONCE
Status: COMPLETED | OUTPATIENT
Start: 2020-01-14 | End: 2020-01-14

## 2020-01-14 RX ORDER — RANOLAZINE 500 MG/1
1000 TABLET, EXTENDED RELEASE ORAL 2 TIMES DAILY
Status: DISCONTINUED | OUTPATIENT
Start: 2020-01-15 | End: 2020-01-15

## 2020-01-14 RX ORDER — METOPROLOL TARTRATE 5 MG/5ML
5 INJECTION INTRAVENOUS EVERY 6 HOURS PRN
Status: DISCONTINUED | OUTPATIENT
Start: 2020-01-14 | End: 2020-01-15

## 2020-01-14 RX ORDER — MORPHINE SULFATE 4 MG/ML
4 INJECTION, SOLUTION INTRAMUSCULAR; INTRAVENOUS ONCE
Status: COMPLETED | OUTPATIENT
Start: 2020-01-14 | End: 2020-01-14

## 2020-01-14 RX ORDER — PANTOPRAZOLE SODIUM 20 MG/1
20 TABLET, DELAYED RELEASE ORAL
Status: DISCONTINUED | OUTPATIENT
Start: 2020-01-15 | End: 2020-01-15

## 2020-01-14 RX ORDER — PREGABALIN 100 MG/1
300 CAPSULE ORAL 2 TIMES DAILY
Status: DISCONTINUED | OUTPATIENT
Start: 2020-01-15 | End: 2020-01-15

## 2020-01-14 RX ADMIN — ONDANSETRON 4 MG: 2 INJECTION INTRAMUSCULAR; INTRAVENOUS at 19:50

## 2020-01-14 RX ADMIN — MAGNESIUM SULFATE HEPTAHYDRATE 2 G: 40 INJECTION, SOLUTION INTRAVENOUS at 22:25

## 2020-01-14 RX ADMIN — DILTIAZEM HYDROCHLORIDE 20 MG: 5 INJECTION INTRAVENOUS at 20:53

## 2020-01-14 RX ADMIN — CEFEPIME HYDROCHLORIDE 2000 MG: 2 INJECTION, SOLUTION INTRAVENOUS at 19:09

## 2020-01-14 RX ADMIN — SODIUM CHLORIDE 1000 ML: 0.9 INJECTION, SOLUTION INTRAVENOUS at 18:32

## 2020-01-14 RX ADMIN — Medication 0.5 MG: at 22:20

## 2020-01-14 RX ADMIN — SODIUM CHLORIDE 100 ML/HR: 9 INJECTION, SOLUTION INTRAVENOUS at 22:23

## 2020-01-14 RX ADMIN — MORPHINE SULFATE 4 MG: 4 INJECTION INTRAVENOUS at 19:55

## 2020-01-15 PROBLEM — Z71.89 GOALS OF CARE, COUNSELING/DISCUSSION: Status: ACTIVE | Noted: 2020-01-15

## 2020-01-15 LAB
ANION GAP SERPL CALCULATED.3IONS-SCNC: 7 MMOL/L (ref 4–13)
ATRIAL RATE: 104 BPM
BUN SERPL-MCNC: 13 MG/DL (ref 7–25)
CALCIUM SERPL-MCNC: 8.4 MG/DL (ref 8.6–10.5)
CHLORIDE SERPL-SCNC: 103 MMOL/L (ref 98–107)
CO2 SERPL-SCNC: 27 MMOL/L (ref 21–31)
CREAT SERPL-MCNC: 0.63 MG/DL (ref 0.7–1.3)
ERYTHROCYTE [DISTWIDTH] IN BLOOD BY AUTOMATED COUNT: 17.3 % (ref 11.5–14.5)
GFR SERPL CREATININE-BSD FRML MDRD: 95 ML/MIN/1.73SQ M
GLUCOSE SERPL-MCNC: 103 MG/DL (ref 65–99)
GLUCOSE SERPL-MCNC: 108 MG/DL (ref 65–140)
GLUCOSE SERPL-MCNC: 121 MG/DL (ref 65–140)
GLUCOSE SERPL-MCNC: 127 MG/DL (ref 65–140)
GLUCOSE SERPL-MCNC: 146 MG/DL (ref 65–140)
HCT VFR BLD AUTO: 28.5 % (ref 42–47)
HGB BLD-MCNC: 8.8 G/DL (ref 14–18)
MCH RBC QN AUTO: 21.8 PG (ref 26–34)
MCHC RBC AUTO-ENTMCNC: 30.8 G/DL (ref 31–37)
MCV RBC AUTO: 71 FL (ref 81–99)
PLATELET # BLD AUTO: 358 THOUSANDS/UL (ref 149–390)
PMV BLD AUTO: 7 FL (ref 8.6–11.7)
POTASSIUM SERPL-SCNC: 4.1 MMOL/L (ref 3.5–5.5)
PROCALCITONIN SERPL-MCNC: 0.07 NG/ML
QRS AXIS: 71 DEGREES
QRSD INTERVAL: 76 MS
QT INTERVAL: 328 MS
QTC INTERVAL: 459 MS
RBC # BLD AUTO: 4.03 MILLION/UL (ref 4.3–5.9)
SODIUM SERPL-SCNC: 137 MMOL/L (ref 134–143)
T WAVE AXIS: -83 DEGREES
VENTRICULAR RATE: 118 BPM
WBC # BLD AUTO: 33.7 THOUSAND/UL (ref 4.8–10.8)

## 2020-01-15 PROCEDURE — 97163 PT EVAL HIGH COMPLEX 45 MIN: CPT

## 2020-01-15 PROCEDURE — 93010 ELECTROCARDIOGRAM REPORT: CPT | Performed by: INTERNAL MEDICINE

## 2020-01-15 PROCEDURE — 99214 OFFICE O/P EST MOD 30 MIN: CPT | Performed by: HOSPITALIST

## 2020-01-15 PROCEDURE — 97167 OT EVAL HIGH COMPLEX 60 MIN: CPT

## 2020-01-15 PROCEDURE — 94760 N-INVAS EAR/PLS OXIMETRY 1: CPT

## 2020-01-15 PROCEDURE — 85027 COMPLETE CBC AUTOMATED: CPT | Performed by: PHYSICIAN ASSISTANT

## 2020-01-15 PROCEDURE — 82948 REAGENT STRIP/BLOOD GLUCOSE: CPT

## 2020-01-15 PROCEDURE — 97530 THERAPEUTIC ACTIVITIES: CPT

## 2020-01-15 PROCEDURE — 80048 BASIC METABOLIC PNL TOTAL CA: CPT | Performed by: PHYSICIAN ASSISTANT

## 2020-01-15 PROCEDURE — 84145 PROCALCITONIN (PCT): CPT | Performed by: PHYSICIAN ASSISTANT

## 2020-01-15 PROCEDURE — 94660 CPAP INITIATION&MGMT: CPT

## 2020-01-15 PROCEDURE — 94664 DEMO&/EVAL PT USE INHALER: CPT

## 2020-01-15 RX ORDER — LORAZEPAM 2 MG/ML
2 INJECTION INTRAMUSCULAR
Status: DISCONTINUED | OUTPATIENT
Start: 2020-01-15 | End: 2020-01-16

## 2020-01-15 RX ORDER — FENTANYL 25 UG/H
1 PATCH TRANSDERMAL
COMMUNITY
End: 2020-01-16 | Stop reason: HOSPADM

## 2020-01-15 RX ORDER — NITROGLYCERIN 0.4 MG/1
0.4 TABLET SUBLINGUAL
Status: DISCONTINUED | OUTPATIENT
Start: 2020-01-15 | End: 2020-01-15

## 2020-01-15 RX ORDER — LEVALBUTEROL INHALATION SOLUTION 0.63 MG/3ML
0.63 SOLUTION RESPIRATORY (INHALATION) EVERY 8 HOURS PRN
Status: DISCONTINUED | OUTPATIENT
Start: 2020-01-15 | End: 2020-01-16

## 2020-01-15 RX ORDER — FENTANYL 25 UG/H
1 PATCH TRANSDERMAL
Status: DISCONTINUED | OUTPATIENT
Start: 2020-01-15 | End: 2020-01-15

## 2020-01-15 RX ORDER — POTASSIUM CHLORIDE 1.5 G/1.77G
20 POWDER, FOR SOLUTION ORAL 2 TIMES DAILY
COMMUNITY
End: 2020-01-16 | Stop reason: HOSPADM

## 2020-01-15 RX ORDER — HYDROCODONE BITARTRATE AND ACETAMINOPHEN 5; 325 MG/1; MG/1
1 TABLET ORAL EVERY 6 HOURS PRN
Status: DISCONTINUED | OUTPATIENT
Start: 2020-01-15 | End: 2020-01-15

## 2020-01-15 RX ORDER — GABAPENTIN 300 MG/1
300 CAPSULE ORAL 3 TIMES DAILY
Status: DISCONTINUED | OUTPATIENT
Start: 2020-01-15 | End: 2020-01-15

## 2020-01-15 RX ORDER — ESOMEPRAZOLE MAGNESIUM 40 MG/1
40 CAPSULE, DELAYED RELEASE ORAL DAILY
COMMUNITY
End: 2020-01-16 | Stop reason: HOSPADM

## 2020-01-15 RX ORDER — GABAPENTIN 300 MG/1
300 CAPSULE ORAL 3 TIMES DAILY
COMMUNITY
End: 2020-01-16 | Stop reason: HOSPADM

## 2020-01-15 RX ORDER — HYDROCODONE BITARTRATE AND ACETAMINOPHEN 5; 325 MG/1; MG/1
1 TABLET ORAL EVERY 8 HOURS PRN
COMMUNITY
End: 2020-01-16 | Stop reason: HOSPADM

## 2020-01-15 RX ORDER — ONDANSETRON 4 MG/1
4 TABLET, FILM COATED ORAL EVERY 8 HOURS PRN
COMMUNITY
End: 2020-01-16 | Stop reason: HOSPADM

## 2020-01-15 RX ORDER — PANTOPRAZOLE SODIUM 40 MG/1
40 TABLET, DELAYED RELEASE ORAL
Status: DISCONTINUED | OUTPATIENT
Start: 2020-01-15 | End: 2020-01-15

## 2020-01-15 RX ORDER — LACTULOSE 20 G/30ML
20 SOLUTION ORAL 2 TIMES DAILY
COMMUNITY
End: 2020-01-16 | Stop reason: HOSPADM

## 2020-01-15 RX ORDER — ACETAMINOPHEN 500 MG
1000 TABLET ORAL EVERY 8 HOURS PRN
COMMUNITY
End: 2020-01-16 | Stop reason: HOSPADM

## 2020-01-15 RX ORDER — NITROGLYCERIN 0.4 MG/1
0.4 TABLET SUBLINGUAL
COMMUNITY
End: 2020-01-16 | Stop reason: HOSPADM

## 2020-01-15 RX ORDER — LACTULOSE 20 G/30ML
20 SOLUTION ORAL 2 TIMES DAILY
Status: DISCONTINUED | OUTPATIENT
Start: 2020-01-15 | End: 2020-01-15

## 2020-01-15 RX ADMIN — ACETAMINOPHEN 650 MG: 325 TABLET ORAL at 07:43

## 2020-01-15 RX ADMIN — MORPHINE SULFATE 2 MG: 2 INJECTION, SOLUTION INTRAMUSCULAR; INTRAVENOUS at 21:27

## 2020-01-15 RX ADMIN — LORAZEPAM 2 MG: 2 INJECTION INTRAMUSCULAR; INTRAVENOUS at 19:27

## 2020-01-15 RX ADMIN — FENTANYL TRANSDERMAL 1 PATCH: 25 PATCH, EXTENDED RELEASE TRANSDERMAL at 10:30

## 2020-01-15 RX ADMIN — METOPROLOL TARTRATE 25 MG: 25 TABLET ORAL at 00:47

## 2020-01-15 RX ADMIN — METOPROLOL TARTRATE 12.5 MG: 25 TABLET ORAL at 10:28

## 2020-01-15 RX ADMIN — RANOLAZINE 1000 MG: 500 TABLET, FILM COATED, EXTENDED RELEASE ORAL at 10:29

## 2020-01-15 RX ADMIN — ATORVASTATIN CALCIUM 40 MG: 40 TABLET, FILM COATED ORAL at 10:29

## 2020-01-15 RX ADMIN — MELATONIN 3 MG: at 00:46

## 2020-01-15 RX ADMIN — LORAZEPAM 2 MG: 2 INJECTION INTRAMUSCULAR; INTRAVENOUS at 23:12

## 2020-01-15 RX ADMIN — ASPIRIN 81 MG: 81 TABLET, COATED ORAL at 10:28

## 2020-01-15 RX ADMIN — LORAZEPAM 2 MG: 2 INJECTION INTRAMUSCULAR; INTRAVENOUS at 20:50

## 2020-01-15 RX ADMIN — PANTOPRAZOLE SODIUM 40 MG: 40 TABLET, DELAYED RELEASE ORAL at 05:14

## 2020-01-15 RX ADMIN — AMLODIPINE BESYLATE 10 MG: 5 TABLET ORAL at 10:28

## 2020-01-15 RX ADMIN — ISOSORBIDE MONONITRATE 30 MG: 30 TABLET, EXTENDED RELEASE ORAL at 10:28

## 2020-01-15 RX ADMIN — HYDROCODONE BITARTRATE AND ACETAMINOPHEN 1 TABLET: 5; 325 TABLET ORAL at 11:43

## 2020-01-15 RX ADMIN — APIXABAN 5 MG: 5 TABLET, FILM COATED ORAL at 10:29

## 2020-01-15 RX ADMIN — MORPHINE SULFATE 2 MG: 2 INJECTION, SOLUTION INTRAMUSCULAR; INTRAVENOUS at 20:05

## 2020-01-15 RX ADMIN — GABAPENTIN 300 MG: 300 CAPSULE ORAL at 10:29

## 2020-01-15 RX ADMIN — MAGNESIUM GLUCONATE 500 MG ORAL TABLET 400 MG: 500 TABLET ORAL at 10:29

## 2020-01-15 RX ADMIN — DULOXETINE HYDROCHLORIDE 60 MG: 20 CAPSULE, DELAYED RELEASE ORAL at 10:29

## 2020-01-15 RX ADMIN — SODIUM CHLORIDE 100 ML/HR: 9 INJECTION, SOLUTION INTRAVENOUS at 15:48

## 2020-01-15 NOTE — PROGRESS NOTES
Progress Note - Otf Caceres 1945, 66 y o  male MRN: [id number]    Unit/Bed#: -01 Encounter: [id number]    Primary Care Provider: Kenia An DO   Date and time admitted to hospital: 1/14/2020  5:31 PM        Goals of care, counseling/discussion  Assessment & Plan  · I had a long discussion with the patient's power of  Regan Sanchez at phone number [phone]  In view of the patient's extensive stage IV lung cancer, a decision was made earlier today for the patient to be seen by the hospice team   Georgiamary beth  reported to me that she has been in touch with the patient's Hematology oncologist who was in agreement with this plan  Patient will be transferred to the [facility] tomorrow under the umbrella of hospice  In the interim here, the patient is now full DNR DNI level 4, all medications have been stopped, comfort measures have been implemented  There will be no further aggressive testing, treatment, medication use, and/or escalation of care  Ativan and morphine will be made made available to the patient on an as-needed basis      Adenocarcinoma of lung, stage 4, right ([facility] [address] )  Assessment & Plan  · Patient with failure to thrive, 35 lb weight loss, lethargy, leukocytosis  · Imaging of the chest noted of 5 4 x 6 0 cm pulmonary mass in the right upper lobe, several right-sided pleural based masses-suspected to be metastasis, moderate size loculated right pleural effusion with atelectasis in the right lobe  · Discharge to hospice in a m , comfort measures in the interim    * Acute metabolic encephalopathy  Assessment & Plan  · Suspect secondary to progression of the underlying cancer verses infection not otherwise specified  · We have been instructed by the power of  for no further workup, testing and/or treatment  · Comfort measures have been implemented    COPD (chronic obstructive pulmonary disease) ([facility] [address] )  Assessment & Plan  · Continue supportive therapy only    Permanent atrial fibrillation with RVR  Assessment & Plan  · No further testing, treatment and/or escalation in care, okay to discontinue telemetry      Type 2 diabetes mellitus with diabetic polyneuropathy, without long-term current use of insulin [facility])  Assessment & Plan  Lab Results   Component Value Date    HGBA1C 5 4 12/31/2018       Recent Labs     01/14/20  2327 01/15/20  0629 01/15/20  1137 01/15/20  1606   POCGLU 127 121 108 146*       Blood Sugar Average: Last 72 hrs:  · (P) 125 5 monitor blood sugars with decreased p o  Intake and weight loss  · No further Accu-Cheks    JIMENA on CPAP  Assessment & Plan  · Supportive therapy only    Chronic pain  Assessment & Plan  · Patient has been placed on morphine and Ativan on an as-needed basis for comfort care measures    Depressive disorder  Assessment & Plan  · Discontinue Cymbalta    Chronic respiratory failure with hypoxia ([address] )  Assessment & Plan  · Patient chronic 2 L nasal cannula    Essential hypertension  Assessment & Plan  · No further medications     Hypomagnesemia  Assessment & Plan  · No further monitoring    Severe protein-calorie malnutrition (HCC)  Assessment & Plan  Malnutrition Findings:   Malnutrition type: Acute illness(Severe PCM r/t significant wt loss, muscle wasting, lack of interest in food and low BMI as evidenced by 44#/27% wt loss x1 month, prominent clavicle bone, 0% meal intake, and BMI 17 78  To be addressed with as liberal diet as possible and supplements )muscle and fat loss of extremities, prominent spine and pelvis bones  Secondary to chronic illness  Degree of Malnutrition: Other severe protein calorie malnutrition    BMI Findings:  BMI Classifications: Underweight < 18 5(BMI 17 78)     Body mass index is 17 78 kg/m²     Patient reports 35 pound weight loss  Consult nutrition services    SIRS (systemic inflammatory response syndrome) (HCC)  Assessment & Plan  · Noted by tachycardia, leukocytosis, tachypnea present on admission  · Patient was given cefepime in the ER, unclear source of infection - hold antibiotics for now  · No further workup        VTE Pharmacologic Prophylaxis: Pharmacologic: No pharmacological anticoagulation is indicated in this patient on full-blown comfort measures    Patient Centered Rounds: I have performed bedside rounds with nursing staff today  Discussions with Specialists or Other Care Team Provider:  Case management, nursing and pharmacy  Education and Discussions with Family / Patient:  Case was reviewed with the patient's power of  [place] at phone number [phone], all questions answered to her satisfaction    Current Length of Stay: 0 day(s)    Current Patient Status: Observation   Certification Statement: The patient will continue to require additional inpatient hospital stay due to The impending discharge to the nursing facility tomorrow morning with hospice    Discharge Plan:  Patient is scheduled for discharge for tomorrow morning    Code Status: Level 4 - Comfort Care    Subjective:   Patient seen and examined  Patient is somewhat lethargic but easily arousable  Is floridly confused  Does not follow simple 1 step commands  Objective:     Vitals:   Temp (24hrs), Av 6 °F (36 4 °C), Min:96 9 °F (36 1 °C), Max:98 3 °F (36 8 °C)    Temp:  [96 9 °F (36 1 °C)-98 3 °F (36 8 °C)] 97 6 °F (36 4 °C)  HR:  [] 92  Resp:  [16-28] 20  BP: (100-127)/(52-76) 100/55  SpO2:  [94 %-100 %] 94 %  Body mass index is 17 78 kg/m²  Input and Output Summary (last 24 hours): Intake/Output Summary (Last 24 hours) at 1/15/2020 1637  Last data filed at 2020 2225  Gross per 24 hour   Intake 1618 33 ml   Output --   Net 1618 33 ml       Physical Exam:     Physical Exam   Constitutional: He is oriented to person, place, and time  Thin, cachectic, elderly frail male, appears older than his stated age   HENT:   Head: Normocephalic and atraumatic     Nose: Nose normal  Mouth/Throat: Oropharynx is clear and moist    Eyes: Pupils are equal, round, and reactive to light  Conjunctivae and EOM are normal    Neck: Normal range of motion  Neck supple  No JVD present  No thyromegaly present  Cardiovascular: Normal rate, regular rhythm and intact distal pulses  Exam reveals no gallop and no friction rub  No murmur heard  Pulmonary/Chest: Effort normal  No respiratory distress  Decreased breath sounds bilaterally at the bases right greater than left, diminished air entry throughout both lungs   Abdominal: Soft  Bowel sounds are normal  He exhibits no distension and no mass  There is no tenderness  There is no guarding  Musculoskeletal: Normal range of motion  He exhibits no edema  Lymphadenopathy:     He has no cervical adenopathy  Neurological: He is alert and oriented to person, place, and time  No cranial nerve deficit  Skin: Skin is warm  No rash noted  No erythema  Psychiatric: He has a normal mood and affect  His behavior is normal    Vitals reviewed  Additional Data:     Labs:    Results from last 7 days   Lab Units 01/15/20  0503 01/14/20  1809   WBC Thousand/uL 33 70* 33 30*   HEMOGLOBIN g/dL 8 8* 9 4*   HEMATOCRIT % 28 5* 31 2*   PLATELETS Thousands/uL 358 391*   LYMPHO PCT %  --  8*   MONO PCT %  --  12   EOS PCT %  --  31*     Results from last 7 days   Lab Units 01/15/20  0503 01/14/20  1809   POTASSIUM mmol/L 4 1 4 6   CHLORIDE mmol/L 103 101   CO2 mmol/L 27 29   BUN mg/dL 13 14   CREATININE mg/dL 0 63* 0 69*   CALCIUM mg/dL 8 4* 8 7   ALK PHOS U/L  --  92   ALT U/L  --  30   AST U/L  --  29     Results from last 7 days   Lab Units 01/14/20  1809   INR  1 96*     Results from last 7 days   Lab Units 01/15/20  1606 01/15/20  1137 01/15/20  0629 01/14/20  2327   POC GLUCOSE mg/dl 146* 108 121 127           * I Have Reviewed All Lab Data Listed Above  * Additional Pertinent Lab Tests Reviewed:  [address] Admission Reviewed    Imaging:  Imaging Reports Reviewed Today Include:  None    Recent Cultures (last 7 days):     Results from last 7 days   Lab Units 01/14/20  1809   BLOOD CULTURE  Received in Microbiology Lab  Culture in Progress  Received in Microbiology Lab  Culture in Progress  Last 24 Hours Medication List:     Current Facility-Administered Medications:  ipratropium-albuterol 3 mL Nebulization 4x Daily PRN Santiago Jenkins PA-C   LORazepam 2 mg Intravenous Q1H PRN Olga Lidia Dueñas MD   morphine injection 2 mg Intravenous Q1H PRN Olga Lidia Dueñas MD        Today, Patient Was Seen By: Olga Lidia Dueñas MD    ** Please Note: Dictation voice to text software may have been used in the creation of this document   **
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
## 2020-01-15 NOTE — ASSESSMENT & PLAN NOTE
· I had a long discussion with the patient's power of  Amber Adam at phone number [phone]  In view of the patient's extensive stage IV lung cancer, a decision was made earlier today for the patient to be seen by the hospice team   David Pinzon reported to me that she has been in touch with the patient's Hematology oncologist who was in agreement with this plan  Patient will be transferred to the [facility] tomorrow under the umbrella of hospice  In the interim here, the patient is now full DNR DNI level 4, all medications have been stopped, comfort measures have been implemented  There will be no further aggressive testing, treatment, medication use, and/or escalation of care  Ativan and morphine will be made made available to the patient on an as-needed basis

## 2020-01-15 NOTE — ASSESSMENT & PLAN NOTE
· Patient with failure to thrive, 35 lb weight loss, lethargy, leukocytosis  · Family requests hospice evaluation in the a m    · Imaging of the chest noted of 5 4 x 6 0 cm pulmonary mass in the right upper lobe, several right-sided pleural based masses-suspected to be metastasis, moderate size loculated right pleural effusion with atelectasis in the right lobe

## 2020-01-15 NOTE — NURSING NOTE
Pt has been oriented to self only, confused, pleasant and cooperative with care  Placed on comfort care, to be discharged tomorrow 1/16/20 to [facility]  Call bell in reach  Will continue to monitor

## 2020-01-15 NOTE — ASSESSMENT & PLAN NOTE
Lab Results   Component Value Date    HGBA1C 5 4 12/31/2018       No results for input(s): POCGLU in the last 72 hours  Blood Sugar Average: Last 72 hrs:  ·  monitor blood sugars with decreased p o   Intake and weight loss

## 2020-01-15 NOTE — ASSESSMENT & PLAN NOTE
· Noted by tachycardia, leukocytosis, tachypnea present on admission  · Patient was given cefepime in the ER, unclear source of infection - hold antibiotics for now  · Follow-up blood cultures  · procalcitonin currently 0 11 repeat in the a m    · UA appears negative  · Lactic acid negative  · No sign of infection on CT
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
## 2020-01-15 NOTE — PLAN OF CARE
Problem: PHYSICAL THERAPY ADULT  Goal: Performs mobility at highest level of function for planned discharge setting  See evaluation for individualized goals  Description  Treatment/Interventions: Functional transfer training, LE strengthening/ROM, Therapeutic exercise, Endurance training, Bed mobility, Spoke to nursing, OT          See flowsheet documentation for full assessment, interventions and recommendations  Note:   Prognosis: Fair  Problem List: Decreased strength, Decreased endurance, Decreased mobility, Impaired balance, Decreased coordination, Decreased cognition, Impaired judgement, Decreased safety awareness  Assessment: Pt is 66 y o  male seen for PT evaluation s/p admit to [address] [place] [address] on 3/62/2679 w/ Acute metabolic encephalopathy  PT consulted to assess pt's functional mobility and d/c needs  Order placed for PT eval and tx, w/ activity as tolerated order  Comorbidities affecting pt's physical performance at time of assessment include: weakness, acute metabolic encephalopathy, COPD, depressive d/o, A-fib w/RVR, chronic pain, SIRS,DM, HTN, hypomagnesemia, CRF w/hypoxia, malnutrition  PTA, pt was long term resident of SNF  Personal factors affecting pt at time of IE include: communication issues, inability to ambulate household distances, unable to perform dynamic tasks in community, decreased cognition, positive fall history, decreased initiation and engagement, impulsivity, unable to perform physical activity and limited insight into impairments  Please find objective findings from PT assessment regarding body systems outlined above with impairments and limitations including weakness, impaired balance, decreased endurance, decreased activity tolerance, decreased functional mobility tolerance, decreased safety awareness, impaired judgement, fall risk and decreased cognition  The following objective measures performed on IE also reveal limitations: Barthel Index: 15/100   Pt's clinical presentation is currently unstable/unpredictable  Pt to benefit from continued PT tx to address deficits as defined above and maximize level of functional independent mobility and consistency  From PT/mobility standpoint, recommendation at time of d/c would be return to SNF w/PT interventions pending progress in order to facilitate return to PLOF        Recommendation: Long-term skilled nursing home placement(return to [facility] w/PT interventions)     PT - OK to Discharge: Yes(if medically stable)    See flowsheet documentation for full assessment

## 2020-01-15 NOTE — ED NOTES
Family brought outside food dinner to pt  While eating, Pt's HR increased to 150"s/rapid afib  Cardizem carlos enrique Aguilar RN  01/14/20 2100

## 2020-01-15 NOTE — ASSESSMENT & PLAN NOTE
Lab Results   Component Value Date    HGBA1C 5 4 12/31/2018       Recent Labs     01/14/20  2327 01/15/20  0629 01/15/20  1137 01/15/20  1606   POCGLU 127 121 108 146*       Blood Sugar Average: Last 72 hrs:  · (P) 125 5 monitor blood sugars with decreased p o   Intake and weight loss  · No further Accu-Cheks

## 2020-01-15 NOTE — RESPIRATORY THERAPY NOTE
Pt  Refuses CPAP tonight,states he doesn't always wear at home   no distress noted at this time 02 was decreased to 3lpm  Pt  Is non-complaint and poor historian/unreliable  Hospitalist-CECE cramer

## 2020-01-15 NOTE — SOCIAL WORK
CM received hospice consult  CM discussed same with pt POA Sapphire Brian due to pt confusion  Per Sapphire pt was admitted from [facility] where he has been for one month  Pt was to be a long term resident of the facility  Per Sapphire she does not feel they were taking per care of the pt at [facility] and does not want him to return  Also per Sapphire she discussed hospice care for pt with [facility] last evening  Pt with stage 4 lung cancer and would like to pursue hospice care at this time  Sapphire specifically requests referral to [facility] and does not want [facility]  Sapphire reports her father had [facility] and she marilia like same for pt  Referral was placed at her request  Awaiting response to discuss [facility] also specifically requests [facility] to [facility] for hospice placement upon discharge  Referral was also sent  CM will continue to follow  CM reviewed d/c planning process including the following: identifying help at home, patient preference for d/c planning needs, availability of treatment team to discuss questions or concerns patient and/or family may have regarding understanding medications and recognizing signs and symptoms once discharged  CM also encouraged patient to follow up with all recommended appointments after discharge  Patient advised of importance for patient and family to participate in managing patients medical well being

## 2020-01-15 NOTE — PLAN OF CARE
Problem: Nutrition/Hydration-ADULT  Goal: Nutrient/Hydration intake appropriate for improving, restoring or maintaining nutritional needs  Description  Monitor and assess patient's nutrition/hydration status for malnutrition  Collaborate with interdisciplinary team and initiate plan and interventions as ordered  Monitor patient's weight and dietary intake as ordered or per policy  Utilize nutrition screening tool and intervene as necessary  Determine patient's food preferences and provide high-protein, high-caloric foods as appropriate  INTERVENTIONS:  - Monitor oral intake, urinary output, labs, and treatment plans  - Assess nutrition and hydration status and recommend course of action  - Evaluate amount of meals eaten  - Assist patient with eating if necessary   - Allow adequate time for meals  - Recommend/ encourage appropriate diets, oral nutritional supplements, and vitamin/mineral supplements  - Order, calculate, and assess calorie counts as needed  - Recommend, monitor, and adjust tube feedings and TPN/PPN based on assessed needs  - Assess need for intravenous fluids  - Provide specific nutrition/hydration education as appropriate  - Include patient/family/caregiver in decisions related to nutrition  Outcome: Not Progressing  Note:   Pt refusing breakfast this am during RD encounter
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
## 2020-01-15 NOTE — SOCIAL WORK
Per Danny at [facility] [phone] they are able to accept pt  Per Ethel Torres at [facility] pt has been accepted under hospice care and they can take pt 1/16  Danny at hospice aware and will reach out to [facility] regarding hospice contract  CM discussed the above with pt Liz Ramos  She is in agreement with pt going to [facility] under [facility]  She is also aware Medicare does not cover room and board at SNF  Transport scheduled with Yvon PUGH at 1400 on 1/16 in anticipation pt will be able to be discharged to [facility]  Medical necessity completed for same and place don chart  CM will continue to follow

## 2020-01-15 NOTE — ASSESSMENT & PLAN NOTE
· Patient with failure to thrive, 35 lb weight loss, lethargy, leukocytosis  · Imaging of the chest noted of 5 4 x 6 0 cm pulmonary mass in the right upper lobe, several right-sided pleural based masses-suspected to be metastasis, moderate size loculated right pleural effusion with atelectasis in the right lobe  · Discharge to hospice in a m , comfort measures in the interim

## 2020-01-15 NOTE — RESPIRATORY THERAPY NOTE
Pt  Found with the CPAP mask off,but in no distress  02 via nasal cannula is intact at 3lpm  Pt  Again refuses to reapply the CPAP unit
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
## 2020-01-15 NOTE — ASSESSMENT & PLAN NOTE
Malnutrition Findings:   Malnutrition type: Acute illness(Severe PCM r/t significant wt loss, muscle wasting, lack of interest in food and low BMI as evidenced by 44#/27% wt loss x1 month, prominent clavicle bone, 0% meal intake, and BMI 17 78  To be addressed with as liberal diet as possible and supplements )muscle and fat loss of extremities, prominent spine and pelvis bones  Secondary to chronic illness  Degree of Malnutrition: Other severe protein calorie malnutrition    BMI Findings:  BMI Classifications: Underweight < 18 5(BMI 17 78)     Body mass index is 17 78 kg/m²     Patient reports 35 pound weight loss  Consult nutrition services

## 2020-01-15 NOTE — TRANSPORTATION MEDICAL NECESSITY
Section I - General Information    Name of Patient: Toya Sutton                 : 1945    Medicare #: [id number]  Transport Date: 6 (PCS is valid for round trips on this date and for all repetitive trips in the 60-day range as noted below )  Origin: [address]:   [facility]   [address] [facility]  [address] [facility], [address]    Is the pt's stay covered under Medicare Part A (PPS/DRG)   [x]     Closest appropriate facility? If no, why is transport to more distant facility required? Yes  If hospice pt, is this transport related to pt's terminal illness? Yes       Section II - Medical Necessity Questionnaire  Ambulance transportation is medically necessary only if other means of transport are contraindicated or would be potentially harmful to the patient  To meet this requirement, the patient must either be "bed confined" or suffer from a condition such that transport by means other than ambulance is contraindicated by the patient's condition  The following questions must be answered by the medical professional signing below for this form to be valid:    1)  Describe the MEDICAL CONDITION (physical and/or mental) of this patient AT [address] [place] [address] that requires the patient to be transported in an ambulance and why transport by other means is contraindicated by the patient's condition: Lung cancer, oxygen dependent    2) Is the patient "bed confined" as defined below? Yes  To be "be confined" the patient must satisfy all three of the following conditions: (1) unable to get up from bed without Assistance; AND (2) unable to ambulate; AND (3) unable to sit in a chair or wheelchair  3) Can this patient safely be transported by car or wheelchair van (i e , seated during transport without a medical attendant or monitoring)?    No    4) In addition to completing questions 1-3 above, please check any of the following conditions that apply*:   *Note: supporting documentation for any boxes checked must be maintained in the patient's medical records  If hosp-hosp transfer, describe services needed at 2nd facility not available at 1st facility? Requires oxygen-unable to self administer      Section III - Signature of Physician or Healthcare Professional  I certify that the above information is true and correct based on my evaluation of this patient, and represent that the patient requires transport by ambulance and that other forms of transport are contraindicated  I understand that this information will be used by the Centers for Medicare and Medicaid Services (CMS) to support the determination of medical necessity for ambulance services, and I represent that I have personal knowledge of the patient's condition at time of transport  []  If this box is checked, I also certify that the patient is physically or mentally incapable of signing the ambulance service's claim and that the institution with which I am affiliated has furnished care, services, or assistance to the patient  My signature below is made on behalf of the patient pursuant to 42 CFR §424 36(b)(4)  In accordance with 42 CFR §424 37, the specific reason(s) that the patient is physically or mentally incapable of signing the claim form is as follows  Signature of Physician* or Healthcare Professional______________________________________________________________  Signature Date 01/15/20 (For scheduled repetitive transports, this form is not valid for transports performed more than 60 days after this date)    Printed Name & Credentials of Physician or Healthcare Professional (MD, DO, RN, etc )________Enoch Vail ________________________  *Form must be signed by patient's attending physician for scheduled, repetitive transports   For non-repetitive, unscheduled ambulance transports, if unable to obtain the signature of the attending physician, any of the following may sign (choose appropriate option below)  [] Physician Assistant []  Clinical Nurse Specialist []  Registered Nurse  []  Nurse Practitioner  [x] Discharge Planner

## 2020-01-15 NOTE — ASSESSMENT & PLAN NOTE
· Suspect progression of disease or possible infection  · Patient with leukocytosis  · Weight loss of 35 lb  · Family is noting mental status change progression over the last 2-3 weeks  · Family is requesting hospice evaluation

## 2020-01-15 NOTE — PLAN OF CARE
Pt just admitted 1/14/20 just before MN, pt forgetful and at times agitated with interventions but has been calm and cooperative  Understands to use call bell and for any needs but has not been compliant to use call bell but at times yells out

## 2020-01-15 NOTE — ASSESSMENT & PLAN NOTE
Malnutrition Findings:    muscle and fat loss of extremities, prominent spine and pelvis bones  Secondary to chronic illness       BMI Findings: Body mass index is 17 78 kg/m²     Patient reports 35 pound weight loss  Consult nutrition services
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
## 2020-01-15 NOTE — MALNUTRITION/BMI
This medical record reflects one or more clinical indicators suggestive of malnutrition and/or morbid obesity  Malnutrition Findings:   Malnutrition type: Acute illness(Severe PCM r/t significant wt loss, muscle wasting, lack of interest in food and low BMI as evidenced by 44#/27% wt loss x1 month, prominent clavicle bone, 0% meal intake, and BMI 17 78  To be addressed with as liberal diet as possible and supplements )     Degree of Malnutrition: Other severe protein calorie malnutrition  Malnutrition Characteristics: Fat loss, Muscle loss, Inadequate energy, Weight loss    BMI Findings:  BMI Classifications: Underweight < 18 5(BMI 17 78)     Body mass index is 17 78 kg/m²  See Nutrition note dated 1/15/19 for additional details  Completed nutrition assessment is viewable in the nutrition documentation

## 2020-01-15 NOTE — ASSESSMENT & PLAN NOTE
· With RVR POA - patient was given Cardizem 20mg in ER  · Continue home medications and prn metoprolol IV  · Monitor rate
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
## 2020-01-15 NOTE — ASSESSMENT & PLAN NOTE
· Noted by tachycardia, leukocytosis, tachypnea present on admission  · Patient was given cefepime in the ER, unclear source of infection - hold antibiotics for now  · No further workup

## 2020-01-16 VITALS
HEIGHT: 69 IN | TEMPERATURE: 97.4 F | OXYGEN SATURATION: 92 % | DIASTOLIC BLOOD PRESSURE: 59 MMHG | BODY MASS INDEX: 17.83 KG/M2 | HEART RATE: 124 BPM | RESPIRATION RATE: 20 BRPM | SYSTOLIC BLOOD PRESSURE: 113 MMHG | WEIGHT: 120.37 LBS

## 2020-01-16 PROCEDURE — 99217 PR OBSERVATION CARE DISCHARGE MANAGEMENT: CPT | Performed by: HOSPITALIST

## 2020-01-16 RX ORDER — MORPHINE SULFATE 100 MG/5ML
20 SOLUTION ORAL
Status: DISCONTINUED | OUTPATIENT
Start: 2020-01-16 | End: 2020-01-16

## 2020-01-16 RX ORDER — MORPHINE SULFATE 100 MG/5ML
5 SOLUTION ORAL
Status: DISCONTINUED | OUTPATIENT
Start: 2020-01-16 | End: 2020-01-16 | Stop reason: HOSPADM

## 2020-01-16 RX ORDER — MORPHINE SULFATE 100 MG/5ML
20 SOLUTION ORAL
Qty: 118 ML | Refills: 0 | Status: SHIPPED | OUTPATIENT
Start: 2020-01-16 | End: 2020-01-16 | Stop reason: HOSPADM

## 2020-01-16 RX ORDER — MORPHINE SULFATE 100 MG/5ML
5 SOLUTION ORAL
Qty: 30 ML | Refills: 0 | Status: SHIPPED | OUTPATIENT
Start: 2020-01-16 | End: 2020-01-26

## 2020-01-16 RX ORDER — LORAZEPAM 2 MG/ML
0.5 CONCENTRATE ORAL
Qty: 30 ML | Refills: 0 | Status: SHIPPED | OUTPATIENT
Start: 2020-01-16 | End: 2020-01-26

## 2020-01-16 RX ORDER — LORAZEPAM 2 MG/ML
0.5 CONCENTRATE ORAL
Status: DISCONTINUED | OUTPATIENT
Start: 2020-01-16 | End: 2020-01-16 | Stop reason: HOSPADM

## 2020-01-16 RX ADMIN — MORPHINE SULFATE 2 MG: 2 INJECTION, SOLUTION INTRAMUSCULAR; INTRAVENOUS at 00:13

## 2020-01-16 RX ADMIN — LORAZEPAM 2 MG: 2 INJECTION INTRAMUSCULAR; INTRAVENOUS at 01:38

## 2020-01-16 RX ADMIN — MORPHINE SULFATE 5 MG: 100 SOLUTION ORAL at 13:18

## 2020-01-16 RX ADMIN — MORPHINE SULFATE 2 MG: 2 INJECTION, SOLUTION INTRAMUSCULAR; INTRAVENOUS at 03:01

## 2020-01-16 RX ADMIN — LORAZEPAM 2 MG: 2 INJECTION INTRAMUSCULAR; INTRAVENOUS at 05:13

## 2020-01-16 NOTE — ASSESSMENT & PLAN NOTE
· Patient with failure to thrive, 35 lb weight loss, lethargy, leukocytosis  · Imaging of the chest noted of 5 4 x 6 0 cm pulmonary mass in the right upper lobe, several right-sided pleural based masses-suspected to be metastasis, moderate size loculated right pleural effusion with atelectasis in the right lobe  · Discharge to hospice

## 2020-01-16 NOTE — ASSESSMENT & PLAN NOTE
· I had a long discussion with the patient's power of  Doretha Bey at phone number [phone]  In view of the patient's extensive stage IV lung cancer, a decision was made earlier today for the patient to be seen by the hospice team   Sheba Livingston reported to me that she has been in touch with the patient's Hematology oncologist who was in agreement with this plan  Patient will be transferred to the [facility] tomorrow under the umbrella of hospice  In the interim here, the patient is now full DNR DNI level 4, all medications have been stopped, comfort measures have been implemented  There will be no further aggressive testing, treatment, medication use, and/or escalation of care  Ativan and morphine will be made made available to the patient on an as-needed basis      Update on 01/16/2020  Notified by case management that the patient has been accepted to the [facility] where he will sign on to hospice with [address] for discharge  Patient's power of  Doretha Bey was bedside at the time of my discharge evaluation, she was in agreement with the plan  Prescriptions provided for Roxanol and [address] out of hospital life-sustaining  Treatment form completed

## 2020-01-16 NOTE — DISCHARGE SUMMARY
Discharge- Girma Jimmy 1945, 66 y o  male MRN: [id number]    Unit/Bed#: -01 Encounter: [id number]    Primary Care Provider: Kiran Woodson DO   Date and time admitted to hospital: 1/14/2020  5:31 PM        Goals of care, counseling/discussion  Assessment & Plan  · I had a long discussion with the patient's power of  Sandor Iron at phone number [phone]  In view of the patient's extensive stage IV lung cancer, a decision was made earlier today for the patient to be seen by the hospice team   Yvrose Noriega reported to me that she has been in touch with the patient's Hematology oncologist who was in agreement with this plan  Patient will be transferred to the [facility] tomorrow under the umbrella of hospice  In the interim here, the patient is now full DNR DNI level 4, all medications have been stopped, comfort measures have been implemented  There will be no further aggressive testing, treatment, medication use, and/or escalation of care  Ativan and morphine will be made made available to the patient on an as-needed basis      Update on 01/16/2020  Notified by case management that the patient has been accepted to the [facility] where he will sign on to hospice with [address] for discharge  Patient's power of  Sandor Martinez was bedside at the time of my discharge evaluation, she was in agreement with the plan  Prescriptions provided for Roxanol and [address] out of hospital life-sustaining  Treatment form completed    Adenocarcinoma of lung, stage 4, right ([facility] )  Assessment & Plan  · Patient with failure to thrive, 35 lb weight loss, lethargy, leukocytosis  · Imaging of the chest noted of 5 4 x 6 0 cm pulmonary mass in the right upper lobe, several right-sided pleural based masses-suspected to be metastasis, moderate size loculated right pleural effusion with atelectasis in the right lobe  · Discharge to hospice    * Acute metabolic encephalopathy  Assessment & Plan  · Suspect secondary to progression of the underlying cancer verses infection not otherwise specified  · We have been instructed by the power of  for no further workup, testing and/or treatment  · Comfort measures have been implemented    COPD (chronic obstructive pulmonary disease) ([facility] )  Assessment & Plan  · Continue supportive therapy only    Permanent atrial fibrillation with RVR  Assessment & Plan  · No further testing, treatment and/or escalation in care    Type 2 diabetes mellitus with diabetic polyneuropathy, without long-term current use of insulin [facility])  Assessment & Plan  Lab Results   Component Value Date    HGBA1C 5 4 12/31/2018       Recent Labs     01/14/20  2327 01/15/20  0629 01/15/20  1137 01/15/20  1606   POCGLU 127 121 108 146*       Blood Sugar Average: Last 72 hrs:  · (P) 125 5 monitor blood sugars with decreased p o  Intake and weight loss  · No further Accu-Cheks    JIMENA on CPAP  Assessment & Plan  · Supportive therapy only    Chronic pain  Assessment & Plan  · Patient has been placed on morphine and Ativan on an as-needed basis for comfort care measures  · Prescriptions provided the same time of discharge    Depressive disorder  Assessment & Plan  · Discontinue Cymbalta    Chronic respiratory failure with hypoxia ([place] [address] )  Assessment & Plan  · Patient chronic 2 L nasal cannula    Essential hypertension  Assessment & Plan  · No further medications     Hypomagnesemia  Assessment & Plan  · No further monitoring    Severe protein-calorie malnutrition (HCC)  Assessment & Plan  Malnutrition Findings:   Malnutrition type: Acute illness(Severe PCM r/t significant wt loss, muscle wasting, lack of interest in food and low BMI as evidenced by 44#/27% wt loss x1 month, prominent clavicle bone, 0% meal intake, and BMI 17 78   To be addressed with as liberal diet as possible and supplements )muscle and fat loss of extremities, prominent spine and pelvis bones  Secondary to chronic illness  Degree of Malnutrition: Other severe protein calorie malnutrition    BMI Findings:  BMI Classifications: Underweight < 18 5(BMI 17 78)     Body mass index is 17 78 kg/m²  Patient reports 35 pound weight loss  Consult nutrition services  Secondary to his metastatic cancer    SIRS (systemic inflammatory response syndrome) ([facility] )  Assessment & Plan  · Noted by tachycardia, leukocytosis, tachypnea present on admission  · Patient was given cefepime in the ER, unclear source of infection - hold antibiotics for now  · No further workup          Discharging Physician / Practitioner: Torrey Fair MD  PCP: Michael Helm DO  Admission Date:   Admission Orders (From admission, onward)     Ordered        01/14/20 2155  Place in Observation  Once                   Discharge Date: 01/16/20    Resolved Problems  Date Reviewed: 1/14/2020    None          Consultations During Hospital Stay:  · None    Procedures Performed:   · None    Significant Findings / Test Results:   · CT head-no acute intercranial abnormality  · CT chest abdomen pelvis-   5 4 x 6 0 cm pulmonary mass in the right upper lobe, most likely the patient's known adenocarcinoma  2   Several right-sided pleural-based masses as described in detail above, most likely metastases  3   Moderate sized loculated right pleural effusion with compressive atelectasis of the subjacent portion of the right lower lobe  4   No evidence of acute abnormality in the abdomen or pelvis  Incidental Findings:   · None     Test Results Pending at Discharge (will require follow up): · None     Outpatient Tests Requested:  · None    Complications:     None    Reason for Admission:  Acute metabolic encephalopathy, end-stage adenocarcinoma of the lung    Hospital Course:     Mary Ann Elizondo is a 66 y o  male patient who originally presented to the hospital on 1/14/2020 due to an altered mental status    Please refer to the initial history and physical examination completed by Mitchel Cabrera for the initial presenting features and complaints  In brief, the patient is a 56-year-old male who unfortunately has end-stage stage IV adenocarcinoma of the lung  He was brought into the hospital because on blood work as an outpatient was found to have a white count of greater than 30,000  He was also having episodes of confusion  After a discussion in the emergency room, the patient was admitted to [facility] after the patient's power of  requested a hospice evaluation  Patient was admitted to [facility]  Initially all of his medications were continued  After we spoke with the patient's power of  Rigoberto Madrid, a decision was made to seek a hospice evaluation  The patient was accepted to the [facility] under the umbrella of hospice  On the afternoon of 01/15/2020, all care was withdrawn  Patient was made a full DNR DNI  Medications were discontinued  There would be no further escalation in care, treatment and or blood work  Patient was discharged on the morning of 01/16/2020  Prognosis is bleak  Prescriptions were provided for Ativan and morphine  Patient remained encephalopathic throughout his stay  Please see above list of diagnoses and related plan for additional information  Condition at Discharge: Terminal     Discharge Day Visit / Exam:     Subjective:  Patient seen and examined, patient is completely altered  No purposeful interaction    He spontaneously moves his limbs  Vitals: Blood Pressure: 113/59 (01/16/20 0707)  Pulse: (!) 124 (01/16/20 0707)  Temperature: (!) 97 4 °F (36 3 °C) (01/16/20 0707)  Temp Source: Temporal (01/16/20 0707)  Respirations: 20 (01/16/20 0707)  Height: 5' 9" (175 3 cm) (01/15/20 1010)  Weight - Scale: 54 6 kg (120 lb 5 9 oz) (01/15/20 1010)  SpO2: 92 % (01/16/20 0707)  Exam:   Physical Exam   Constitutional:   Thin, frail and elderly   HENT: Head: Normocephalic and atraumatic  Nose: Nose normal    Mouth/Throat: Oropharynx is clear and moist    Eyes: Pupils are equal, round, and reactive to light  Conjunctivae and EOM are normal    Neck: Normal range of motion  Neck supple  No JVD present  No thyromegaly present  Cardiovascular: Normal rate, regular rhythm and intact distal pulses  Exam reveals no gallop and no friction rub  No murmur heard  Pulmonary/Chest: Effort normal and breath sounds normal  No respiratory distress  Abdominal: Soft  Bowel sounds are normal  He exhibits no distension and no mass  There is no tenderness  There is no guarding  Musculoskeletal: Normal range of motion  He exhibits no edema  Lymphadenopathy:     He has no cervical adenopathy  Neurological: No cranial nerve deficit  Lethargic, no purposeful interaction, spontaneously moves his limbs   Skin: Skin is warm  No rash noted  No erythema  Multiple tattoos all over his body   Psychiatric: He has a normal mood and affect  His behavior is normal    Vitals reviewed  Discussion with Family:  Patient's power-of- Erasto Ernst was bedside at the time of my discharge evaluation, all questions answered to her satisfaction  She participated in completing the [place] out of hospital life-sustaining therapy form    Discharge instructions/Information to patient and family:   See after visit summary for information provided to patient and family  Provisions for Follow-Up Care:  See after visit summary for information related to follow-up care and any pertinent home health orders  Disposition:     Other [facility] with hospice    For Discharges to   [address] SNF:   · Not Applicable to this Patient - Not Applicable to this Patient    Planned Readmission:    None     Discharge Statement:  I spent 45 minutes discharging the patient  This time was spent on the day of discharge   I had direct contact with the patient on the day of discharge  Greater than 50% of the total time was spent examining patient, answering all patient questions, arranging and discussing plan of care with patient as well as directly providing post-discharge instructions  Additional time then spent on discharge activities  Discharge Medications:  See after visit summary for reconciled discharge medications provided to patient and family        ** Please Note: This note has been constructed using a voice recognition system **

## 2020-01-16 NOTE — ASSESSMENT & PLAN NOTE
· Suspect secondary to progression of the underlying cancer verses infection not otherwise specified  · We have been instructed by the power of  for no further workup, testing and/or treatment  · Comfort measures have been implemented

## 2020-01-16 NOTE — ASSESSMENT & PLAN NOTE
· Patient has been placed on morphine and Ativan on an as-needed basis for comfort care measures  · Prescriptions provided the same time of discharge

## 2020-01-16 NOTE — NURSING NOTE
Patient belongings with home health aide, IV discontinued without incident, AVS sent for facility, report called to Юлия Anaya at [facility], no further questions at this time

## 2020-01-16 NOTE — PLAN OF CARE
Problem: Prexisting or High Potential for Compromised Skin Integrity  Goal: Skin integrity is maintained or improved  Description  INTERVENTIONS:  - Identify patients at risk for skin breakdown  - Assess and monitor skin integrity  - Assess and monitor nutrition and hydration status  - Monitor labs   - Assess for incontinence   - Turn and reposition patient  - Assist with mobility/ambulation  - Relieve pressure over bony prominences  - Avoid friction and shearing  - Provide appropriate hygiene as needed including keeping skin clean and dry  - Evaluate need for skin moisturizer/barrier cream  - Collaborate with interdisciplinary team   - Patient/family teaching  - Consider wound care consult   Outcome: Progressing     Problem: Nutrition/Hydration-ADULT  Goal: Nutrient/Hydration intake appropriate for improving, restoring or maintaining nutritional needs  Description  Monitor and assess patient's nutrition/hydration status for malnutrition  Collaborate with interdisciplinary team and initiate plan and interventions as ordered  Monitor patient's weight and dietary intake as ordered or per policy  Utilize nutrition screening tool and intervene as necessary  Determine patient's food preferences and provide high-protein, high-caloric foods as appropriate       INTERVENTIONS:  - Monitor oral intake, urinary output, labs, and treatment plans  - Assess nutrition and hydration status and recommend course of action  - Evaluate amount of meals eaten  - Assist patient with eating if necessary   - Allow adequate time for meals  - Recommend/ encourage appropriate diets, oral nutritional supplements, and vitamin/mineral supplements  - Order, calculate, and assess calorie counts as needed  - Recommend, monitor, and adjust tube feedings and TPN/PPN based on assessed needs  - Assess need for intravenous fluids  - Provide specific nutrition/hydration education as appropriate  - Include patient/family/caregiver in decisions related to nutrition  Outcome: Progressing     Problem: PAIN - ADULT  Goal: Verbalizes/displays adequate comfort level or baseline comfort level  Description  Interventions:  - Encourage patient to monitor pain and request assistance  - Assess pain using appropriate pain scale  - Administer analgesics based on type and severity of pain and evaluate response  - Implement non-pharmacological measures as appropriate and evaluate response  - Consider cultural and social influences on pain and pain management  - Notify physician/advanced practitioner if interventions unsuccessful or patient reports new pain  Outcome: Progressing     Problem: INFECTION - ADULT  Goal: Absence or prevention of progression during hospitalization  Description  INTERVENTIONS:  - Assess and monitor for signs and symptoms of infection  - Monitor lab/diagnostic results  - Monitor all insertion sites, i e  indwelling lines, tubes, and drains  - Monitor endotracheal if appropriate and nasal secretions for changes in amount and color  - [place] appropriate cooling/warming therapies per order  - Administer medications as ordered  - Instruct and encourage patient and family to use good hand hygiene technique  - Identify and instruct in appropriate isolation precautions for identified infection/condition  Outcome: Progressing  Goal: Absence of fever/infection during neutropenic period  Description  INTERVENTIONS:  - Monitor WBC    Outcome: Progressing     Problem: SAFETY ADULT  Goal: Patient will remain free of falls  Description  INTERVENTIONS:  - Assess patient frequently for physical needs  -  Identify cognitive and physical deficits and behaviors that affect risk of falls    -  [place] fall precautions as indicated by assessment   - Educate patient/family on patient safety including physical limitations  - Instruct patient to call for assistance with activity based on assessment  - Modify environment to reduce risk of injury  - Consider OT/PT consult to assist with strengthening/mobility  Outcome: Progressing  Goal: Maintain or return to baseline ADL function  Description  INTERVENTIONS:  -  Assess patient's ability to carry out ADLs; assess patient's baseline for ADL function and identify physical deficits which impact ability to perform ADLs (bathing, care of mouth/teeth, toileting, grooming, dressing, etc )  - Assess/evaluate cause of self-care deficits   - Assess range of motion  - Assess patient's mobility; develop plan if impaired  - Assess patient's need for assistive devices and provide as appropriate  - Encourage maximum independence but intervene and supervise when necessary  - Involve family in performance of ADLs  - Assess for home care needs following discharge   - Consider OT consult to assist with ADL evaluation and planning for discharge  - Provide patient education as appropriate  Outcome: Progressing  Goal: Maintain or return mobility status to optimal level  Description  INTERVENTIONS:  - Assess patient's baseline mobility status (ambulation, transfers, stairs, etc )    - Identify cognitive and physical deficits and behaviors that affect mobility  - Identify mobility aids required to assist with transfers and/or ambulation (gait belt, sit-to-stand, lift, walker, cane, etc )  - [place] fall precautions as indicated by assessment  - Record patient progress and toleration of activity level on Mobility SBAR; progress patient to next Phase/Stage  - Instruct patient to call for assistance with activity based on assessment  - Consider rehabilitation consult to assist with strengthening/weightbearing, etc   Outcome: Progressing     Problem: DISCHARGE PLANNING  Goal: Discharge to home or other facility with appropriate resources  Description  INTERVENTIONS:  - Identify barriers to discharge w/patient and caregiver  - Arrange for needed discharge resources and transportation as appropriate  - Identify discharge learning needs (meds, wound care, etc )  - Arrange for interpretive services to assist at discharge as needed  - Refer to Case Management Department for coordinating discharge planning if the patient needs post-hospital services based on physician/advanced practitioner order or complex needs related to functional status, cognitive ability, or social support system  Outcome: Progressing     Problem: Knowledge Deficit  Goal: Patient/family/caregiver demonstrates understanding of disease process, treatment plan, medications, and discharge instructions  Description  Complete learning assessment and assess knowledge base  Interventions:  - Provide teaching at level of understanding  - Provide teaching via preferred learning methods  Outcome: Progressing     Problem: Potential for Falls  Goal: Patient will remain free of falls  Description  INTERVENTIONS:  - Assess patient frequently for physical needs  -  Identify cognitive and physical deficits and behaviors that affect risk of falls    -  [place] fall precautions as indicated by assessment   - Educate patient/family on patient safety including physical limitations  - Instruct patient to call for assistance with activity based on assessment  - Modify environment to reduce risk of injury  - Consider OT/PT consult to assist with strengthening/mobility  Outcome: Progressing

## 2020-01-16 NOTE — UTILIZATION REVIEW
Notification of Inpatient Admission/Inpatient Authorization Request   This is a Notification of Inpatient Admission for [address]  Be advised that this patient was admitted to our facility under Inpatient Status  Contact Bc Alvarez at [phone] for additional admission information  [facility] DEPT  DEDICATED -[phone]  Patient Name:   Taylor Aly   YOB: 1945       [address]   [address]:   [address]  Tax ID: [id number]  NPI: [id number] Attending Provider/NPI: Tex Beltrán [[id number]]   Place of Service Code: 24     Place of Service Name:  Inpatient Hospital   Start Date: N/A N/A     Discharge Date & Time: 1/16/2020  2:21 PM    Type of Admission: Inpatient Status Discharge Disposition   (if discharged): [facility] SN[facility]/TCU/SNU   Patient Diagnoses: A-fib ([facility] ) [I48 91]  Non-[facility] cell [facility] ) [C34 90]  Failure to thrive in adult [R62 7]  Sepsis [facility]) [A41 9]     Orders: Admission Orders (From admission, onward)     Ordered        01/14/20 2155  Place in Observation  Once                    Assigned Utilization Review Contact: Bc Alvarez  Utilization   Network Utilization Review Department  Phone: [phone]; Fax [phone]  Email: [email or web address]   ATTENTION PAYERS: Please call the assigned Utilization  directly with any questions or concerns ALL voicemails in the department are confidential  Send all requests for admission clinical reviews, approved or denied determinations and any other requests to dedicated fax number belonging to the campus where the patient is receiving treatment

## 2020-01-16 NOTE — SOCIAL WORK
Pt has been accepted to [facility] [address] [facility]  Transport has been scheduled for 1400 with Yvon Delgado from [address] aware and will see pt at [facility]  Pt Liz Ramos also aware of plan and in agreement  CM left message for Mayte Nguyen at [facility] to make her aware of time  CM made pt nurse aware and provided number for nurse report prior to transport  Dr Katelyn aguirre and will discharge

## 2020-01-19 LAB
BACTERIA BLD CULT: NORMAL
BACTERIA BLD CULT: NORMAL
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
## 2020-01-28 NOTE — UTILIZATION REVIEW
STILL AWAITING ON APPROVAL FAX FOR THIS OBSERVATION Joi Cary # [phone]                                                                                                Notification of Observation Admission/Observation Authorization Request   This is a Notification of Observation Admission for [address]  Be advised that this patient was admitted to our facility under Observation Status  Contact Tawnya Shawn at [phone] for additional admission information  [facility] DEPT  DEDICATED -[phone]  Patient Name:   Nelly Liu   YOB: 1945       [address]   [address]:   [address]  Tax ID: [id number]  NPI: [id number] Attending Provider/NPI: Holly May, [address] [[id number]]   Place of Service Code: 25     Place of Service Name:  CPT Code for Observation:  On [address] [facility] / CPT 44186   Start Date:      Discharge Date & Time: 1/16/2020  2:21 PM    Type of Admission: Observation Status Discharge Disposition (if discharged): Non [address]   Patient Diagnoses: A-fib ([facility] ) [I48 91]  Non-small cell lung cancer ([facility] ) [C34 90]  Failure to thrive in adult [R62 7]  Sepsis [facility]) [A41 9]     Orders: Admission Orders (From admission, onward)     Ordered        01/14/20 2155  Place in Observation  Once                    Assigned Utilization Review Contact: Nomi Hale  Utilization   Network Utilization Review Department  Phone: [phone]; Fax [phone]  Email: [email or web address]   ATTENTION PAYERS: Please call the assigned Utilization  directly with any questions or concerns ALL voicemails in the department are confidential  Send all requests for admission clinical reviews, approved or denied determinations and any other requests to dedicated fax number belonging to the campus where the patient is receiving treatment

## 2020-04-10 NOTE — RESPIRATORY THERAPY NOTE
- Patient reports aching generalized abdominal pain, worse after eating  - Lipase normal but bili and alp trending upward  - KUB with mildly dilated small bowel, but no obvious obstruction  - RUQ u/s with cholelithiasis, but no cholecystitis  - Started bentyl TID for possible gallbladder dyskinesia causing pain  - Patient with multiple days of diarrhea that became watery on 3/29. C. diff ordered, but patient had no further bowel movements in that 24 hour period to collect.   - PPI daily, dukes and GI cocktail PRN should pain be 2/2 GERD  - CT A/P on 3/31 showing mild enteritis and hepatosplenomegaly  - Pain resolved   Per nursing the pt  Is now requesting to wear the CPAP  (He forgot his at home) Pt  Placed on our Resmed CPAP unit with 3lpm 02 titrated into the unit and [id number]  Hospitalist aware
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
## 2021-05-29 NOTE — ED PROCEDURE NOTE
Procedure  ECG 12 Lead Documentation  Date/Time: 7/25/2018 3:17 PM  Performed by: Eben Libman by: Karla Moss     Indications / Diagnosis:  Chest pain  ECG reviewed by me, the ED Provider: yes    Patient location:  ED  Previous ECG:     Comparison to cardiac monitor: Yes    Interpretation:     Interpretation: normal    Rate:     ECG rate:  72    ECG rate assessment: normal    Rhythm:     Rhythm: atrial fibrillation    Ectopy:     Ectopy: none    QRS:     QRS axis:  Normal    QRS intervals:  Normal  Conduction:     Conduction: normal    ST segments:     ST segments:  Normal  T waves:     T waves: normal                       Makayla Bauer PA-C  07/25/18 1513 .

## 2021-08-27 NOTE — PLAN OF CARE
Problem: DISCHARGE PLANNING - CARE MANAGEMENT  Goal: Discharge to post-acute care or home with appropriate resources  INTERVENTIONS:  - Conduct assessment to determine patient/family and health care team treatment goals, and need for post-acute services based on payer coverage, community resources, and patient preferences, and barriers to discharge  - Address psychosocial, clinical, and financial barriers to discharge as identified in assessment in conjunction with the patient/family and health care team  - Arrange appropriate level of post-acute services according to patient's   needs and preference and payer coverage in collaboration with the physician and health care team  - Communicate with and update the patient/family, physician, and health care team regarding progress on the discharge plan  - Arrange appropriate transportation to post-acute venues  Outcome: Progressing show

## 2022-02-09 NOTE — TELEPHONE ENCOUNTER
Stroke Neurology Progress Note    Admission Date: 2/8/2022    Overnight Events: No acute events overnight     Brief Summary: Sandy Rowe is a 73-year-old female with a past medical history significant for hypertension, lung cancer s/p radiation, DM, CKD, smoking who was admitted to [facility] on 2/8/22 with a left MCA infarct in the setting of a left M2 segment occlusion s/p thrombectomy with TICI 2b recanalization. Noted on cerebral angio to have 40% left ICA stenosis. She was not a tPA candidate as she did not present within the time window.           Neurological exam:  -Lying in bed comfortably, has NG tube   -Eyes closed, does not clearly open eyes to name or noxious stimuli, globally aphasic (no verbal output, does not follow commands)   -Eyes midline   -Mild right facial weakness  -Withdraws antigravity to pain in both arms, left more briskly than right  -Trace withdrawal to pain in bilateral legs, does not clearly lift antigravity today    -No dysmetria/abnormal movements   -Gait: deferred     Labs:    A1c 5.9    Imaging:  -CT head (2/9/22): left MCA infarct, no hemorrhage, mass effect with 2 mm midline shift   -TTE (2/8/22): EF 65%, no atrial shunt, normal LA   -Carotid dopplers (2/8/22): moderate left ICA stenosis (50-69%)  -CT head, CTA head/neck, CTP (2/8/22): no hemorrhage, moderate left cervical ICA stenosis, partial left M1 segment occlusion, left M2 occlusion, left MCA penumbra with no core infarct      Impression: Left MCA infarct in the setting of a left M2 occlusion s/p thrombectomy of unclear etiology - suspect hypercoagulability in setting of active malignancy (recently diagnosed with NSCLC in 2021, thought to be localized, s/p radiation) vs large artery atherosclerotic disease of the left ICA (40% stenosis noted on cerebral angiogram, 50-69% noted on carotid dopplers) vs cardioembolism (possibly occult atrial fibrillation).     Recommendations:  -Continue aspirin 325 mg as secondary stroke  Tisha Garcia from [facility] received script and last time they had medication for patient it was sent to nursing home  Tisha Garcia trying to see if office has any information on patient being discharged from nursing home  Please advise  prevention  -, continue atorvastatin 80 mg nightly   -Head CT this morning with evolving left MCA infarct, 2 mm midline shift; repeat head CT without contrast immediately if exam worsens   -Brain MRI without olimpia pending to better assess full infarct burden  -TTE without obvious intracardiac source of embolization, watch telemetry for atrial fibrillation  -A1c 5.9, glucose control per primary team   -PT/OT/SLP as tolerated  -DVT prophylaxis: heparin subq  -Seen and discussed with Dr. Grande  -Will follow up     Pennie Yanes NP  Vascular Neurology   2/9/2022 12:34 PM   ---------    Reference Data    PMH:  She  has no past medical history on file.     Social History:  She       Family History:  She family history is not on file.     Home Medications:  Medications Prior to Admission   Medication Sig Dispense Refill   • ALENDRONATE SODIUM PO Take 70 mg by mouth 1 day a week.     • amLODIPine (NORVASC) 5 MG tablet Take 5 mg by mouth daily.     • atorvastatin (LIPITOR) 40 MG tablet Take 40 mg by mouth daily.     • DULoxetine (CYMBALTA) 60 MG capsule Take 60 mg by mouth daily.     • fentaNYL (DURAGESIC) 25 MCG/HR patch Place 1 patch onto the skin every 72 hours.     • gabapentin (NEURONTIN) 400 MG capsule Take 400 mg by mouth 3 times daily.     • levETIRAcetam (KepPRA) 500 MG tablet Take 500 mg by mouth 2 times daily.     • lidocaine (LIDODERM) 5 % patch Place 1 patch onto the skin every 24 hours. Remove patch 12 hours after applying     • lisinopril (ZESTRIL) 40 MG tablet Take 40 mg by mouth daily.     • meclizine (ANTIVERT) 25 MG tablet Take 25 mg by mouth 3 times daily as needed.     • metFORMIN (GLUCOPHAGE) 500 MG tablet Take by mouth 2 times daily (with meals).     • ondansetron (ZOFRAN) 8 MG tablet Take 8 mg by mouth every 8 hours as needed for Nausea.     • pantoprazole (PROTONIX) 40 MG/20ML (compounded) suspension           Inpatient Medications:  • lisinopril  20 mg Oral Daily   • DULoxetine  60 mg  Oral Daily   • gabapentin  400 mg Oral TID   • levETIRAcetam  500 mg Oral BID   • pantoprazole  40 mg Per NG tube Daily   • sodium chloride (PF)  2 mL Intracatheter 2 times per day   • aspirin  325 mg Oral Daily   • atorvastatin  80 mg Oral Nightly   • docusate sodium  100 mg Per NG tube BID   • insulin regular (human)   Subcutaneous 4 times per day   • Phosphorus Standard Replacement Protocol   Does not apply See Admin Instructions   • Magnesium Standard Replacement Protocol   Does not apply See Admin Instructions   • Potassium Standard Replacement Protocol   Does not apply See Admin Instructions   • sodium chloride (PF)  2 mL Intracatheter 2 times per day   • heparin (porcine)  5,000 Units Subcutaneous 3 times per day   • amLODIPine  10 mg Per NG tube Daily   • nicotine  1 patch Transdermal Daily      • sodium chloride 0.9% infusion 75 mL/hr at 02/09/22 1200      acetaminophen **OR** acetaminophen, sodium chloride, dextrose, dextrose, glucagon, dextrose, dextrose, ondansetron **OR** ondansetron, sodium chloride, labetalol, hydrALAZINE (APRESOLINE) injection     Allergies:  ALLERGIES:   Allergen Reactions   • Morphine Other (See Comments)   • Penicillins Other (See Comments)        Labs:  CMP  Recent Labs     02/08/22  0122 02/08/22  0654 02/08/22  1129 02/08/22  1856 02/09/22  0528   SODIUM 130*  --  130* 127* 129*   POTASSIUM 5.6*   < > 5.1 4.4 4.1   CO2 26  --  27 22 24   ANIONGAP 9*  --  9* 9* 12   GLUCOSE 133*  --  130* 162* 153*   BUN 31*  --  21* 20 23*   CREATININE 1.57*  --  1.18* 1.15* 1.06*   BCRAT 20  --  18 17 22   CALCIUM 9.1  --  9.6 9.5 10.0   BILIRUBIN 0.3  --   --   --   --    AST 14  --   --   --   --    GPT 17  --   --   --   --    ALKPT 87  --   --   --   --    GLOB 3.4  --   --   --   --    AGR 1.1  --   --   --   --     < > = values in this interval not displayed.        CBC w/ Diff  Recent Labs     02/08/22  0122 02/09/22  0528   WBC 9.9 19.0*   RBC 4.35 4.89   HGB 13.1 14.4   HCT 39.0 43.1     227   MCV 89.7 88.1   MCH 30.1 29.4   MCHC 33.6 33.4   NRBCRE 0 0        Review of Systems:  Unable to obtain due to clinical condition.     Physical exam   Temp:  [98.9 °F (37.2 °C)-101.4 °F (38.6 °C)] 99 °F (37.2 °C)  Heart Rate:  [] 93  Resp:  [15-34] 18  BP: (112-174)/() 147/121     General: Well developed, in no apparent distress.   Neurologic:  Mental status: Eyes closed, does not open eyes to name or noxious stimuli, globally aphasic.   Cranial nerves: Eyes midline. Pupils equal, round, and reactive to light and accommodation. Unable to assess: eye movements, facial sensation, hearing, palate, shoulder shrug, tongue, dysarthria.   Motor: Withdraws antigravity to pain in both arms, left more briskly than right. Trace withdrawal to pain in bilateral legs, does not clearly lift antigravity today.  Sensory: Unable.   Coordination: No dysmetria.   Gait: Unable.     End of Reference Data (see top of note for tailored exam, assessment, and recommendations)

## 2022-03-14 NOTE — PLAN OF CARE
Problem: OCCUPATIONAL THERAPY ADULT  Goal: Performs self-care activities at highest level of function for planned discharge setting  See evaluation for individualized goals  Description  Treatment Interventions: ADL retraining, Functional transfer training, Endurance training, UE strengthening/ROM, Activityengagement, Patient/family training, Compensatory technique education          See flowsheet documentation for full assessment, interventions and recommendations  Note:   Limitation: Decreased ADL status, Decreased Safe judgement during ADL, Decreased endurance, Decreased self-care trans  Prognosis: Good  Assessment: Pt is a 66 y o  male seen for OT evaluation s/p admit to [facility] on 4/30/8842 w/ Acute metabolic encephalopathy  Comorbidities affecting pt's functional performance at time of assessment include: DM, HTN, COPD and A Fib, depression, Lung CA, see H & P for additional PMHx  Personal factors affecting pt at time of IE include:difficulty performing ADLS and decreased initiation and engagement   Prior to admission, pt was a resident at [facility], D for IADL's, A with self care ADL's  Upon evaluation: Pt requires an invreased level of assistance with self care ADL's, functional transfers/toileting/functional mobility r/t  the following deficits impacting occupational performance: weakness, decreased strength and decreased balance, tolerance Pt to benefit from continued skilled OT tx while in the hospital to address deficits as defined above and maximize level of functional independence w ADL's and functional mobility  Occupational Performance areas to address include: grooming, bathing/shower, dressing and functional mobility  From OT standpoint, recommendation at time of d/c would be return to SNF with therapies in facility as indicated  OT Discharge Recommendation: Other (Comment)(return to SNF with therapies in house as indicated)  OT - OK to Discharge:  Yes 68 yo male presents to [facility] prior to scheduled MIS Right L5-S1 Partial Hemilaminectomy with Discectomy on 3/18/2022 with Dr. Xavier Dinh at [facility]. Pmhx includes stomach cancer s/p resection (1999), prostate cancer s/p radiation (2007), B cell lymphoma, lung abscess (2011, 2012; required bronchoscopies and IV antibiotics; denies intubation), Covid19 positive (9/2020, 9/2021; symptoms of fatigue; denies SOB/chest pain/fevers), prediabetes. Reports intermittent burning/shooting pain originating in lower back, traveling to right leg and down to the right foot. Pain originated over a year ago and was treated w/ epidural injection which "worked the first time", but had little relief from epidural 3 weeks ago (Dr. Finley, paint management). He denies falls, numbness/tingling, weakness in his extremities. Recently evaluated by Dr. Dinh and above surgery was recommended.    Patient denies recent Covid19 infection/exposure,recent travel,fevers,chills,cough,SOB,loss of sense of smell/taste.  Covid19 PCR at [facility] to be performed today.  Received 2 doses of Pfizer Covid19 vaccination.

## 2022-10-25 NOTE — ASSESSMENT & PLAN NOTE
Addended by: MAGDALENA GASCA on: 10/25/2022 01:54 PM     Modules accepted: Orders     Work up ongoing  He received blood yesterday  Again, anti-platelet agents have been discontinued

## 2023-09-11 NOTE — UTILIZATION REVIEW
Notification of Discharge  This is a Notification of Discharge from our facility [address]  Please be advised that this patient has been discharge from our facility  Below you will find the admission and discharge date and time including the patients disposition  PRESENTATION DATE: 2/23/2019  3:25 PM  IP ADMISSION DATE: 2/23/19 1838  DISCHARGE DATE: 3/4/2019  5:45 PM  DISPOSITION: Non [facility] SNF/TCU/SNU    [address] Utilization Review Department  Phone: [phone]; Fax [phone]  [email or web address]  ATTENTION: Please call with any questions or concerns to [phone]  and carefully listen to the prompts so that you are directed to the right person  Send all requests for admission clinical reviews, approved or denied determinations and any other requests to fax [phone]   All voicemails are confidential  Was A Bandage Applied: Yes

## 2024-09-12 NOTE — PROGRESS NOTES
Physical Therapy Evaluation    Visit Type: Initial Evaluation  Visit: 1  Referring Provider: Sonya Murillo DO  Medical Diagnosis (from order): M54.2 - Neck pain   Treatment Diagnosis: cervical - increased pain/symptoms, impaired posture, impaired range of motion, impaired muscle length/flexibility, impaired strength and impaired activity tolerance.  Onset  - Date of onset: 8/25/2024  Chart reviewed at time of initial evaluation (relevant co-morbidities, allergies, tests and medications listed):   Past Medical History:  No date: Anxiety disorder  No date: Diabetes mellitus  (CMD)  No date: Hypothyroidism        SUBJECTIVE                                                                                                               Per MD note \"8/25 seen in the ED for a concussion she sustained while swimming. CT head/neck WNL. She initially had a headache/migraine for several days, this has resolved. She continues to have pain in her upper back/neck.\" Is also getting pain in bilateral upper trapezius as well. Was given muscle relaxer which has really helped.   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     Pain / Symptoms  - Pain/symptom is: constant  - Pain rating (out of 10): Current: 2 ; Best: 2; Worst: 5  - Location: Cervical paraspinals and posterior head  - Quality / Description: ache  - Alleviating Factors: prescription medications    Function:   Limitations / Exacerbation Factors:   - Patient reports pain, difficulty and increased time with function reported below.  - sleep disturbed, scanning environment for safety while driving/walking, lifting/carrying, pushing/pulling and reaching  Prior Level of Function: pain free ADLs and IADLs,    Patient Goals: decreased pain.    Prior treatment  - no therapies  - Discharged from hospital, home health, or skilled nursing facility in last 30 days: no  Home Environment   -  Progress Note - Shania Alfred 1945, 66 y o  male MRN: [id number]    Unit/Bed#:  Encounter: [id number]    Primary Care Provider: Daniel Ireland DO   Date and time admitted to hospital: 2/23/2019  3:25 PM        * Self-care deficit for medication administration  Assessment & Plan  · The patient requires SNF placement        Other chest pain  Assessment & Plan  · troponin levels x 3 sets - negative  · Echocardiogram was unremarkable, stress test was negative  · Telemetry monitoring has been unremarkable  Persistent atrial fibrillation (HCC)  Assessment & Plan  · Continue PO metoprolol for heart rate control  · Continue eliquis 5 mg PO BID for full anti-coagulation    Chronic pain  Assessment & Plan  Continue pain medication regimen as needed  Microscopic hematuria  Assessment & Plan  · Outpatient follow-up with Urology    Hypotension  Assessment & Plan  · Discontinued PO lasix  · NSS IV fluid bolus of 500 ml over 2 hours  · Follow the blood pressure trend  · Consider adding back lasix at lower dose  Constipation  Assessment & Plan  · Resolved with magnesium citrate   · Continue PO colace and PRN PO miralax    Essential hypertension  Assessment & Plan  · The patient was hypotensive yesterday  · This is improved with IVF and has remained stable  · Follow the blood pressure trend closely    Microcytic anemia  Assessment & Plan  · iron panel showing low ferritin and iron levels - will add iron supplementation  · Follow the CBC  · Transfuse for a hemoglobin less than 7 g/dl    Obstructive sleep apnea  Assessment & Plan  · Continue CPAP QHS and anytime while sleeping        VTE Pharmacologic Prophylaxis:   Pharmacologic: Apixaban (Eliquis)  Mechanical VTE Prophylaxis in Place: Yes        Time Spent for Care: 30 minutes  More than 50% of total time spent on counseling and coordination of care as described above      Current Length of Stay: 6 day(s)    Current Patient Status: Inpatient Certification Statement: The patient will continue to require additional inpatient hospital stay due to need for bed placement for personal care home  Discharge Plan / Estimated Discharge Date: TBD      Code Status: Level 1 - Full Code      Subjective:   Patient is feeling well today  Still with chronic dyspnea on exertion  No further episodes of chest pain  Objective:     Vitals:   Temp (24hrs), Av 3 °F (36 8 °C), Min:97 1 °F (36 2 °C), Max:98 9 °F (37 2 °C)    Temp:  [97 1 °F (36 2 °C)-98 9 °F (37 2 °C)] 97 1 °F (36 2 °C)  HR:  [68-86] 70  Resp:  [16-22] 16  BP: (116-132)/(56-77) 132/77  SpO2:  [98 %-100 %] 100 %  Body mass index is 29 24 kg/m²  Input and Output Summary (last 24 hours): Intake/Output Summary (Last 24 hours) at 3/1/2019 1407  Last data filed at 3/1/2019 1339  Gross per 24 hour   Intake 1380 ml   Output 850 ml   Net 530 ml       Physical Exam:     Physical Exam   Constitutional: He is oriented to person, place, and time  He appears well-developed and well-nourished  HENT:   Head: Normocephalic and atraumatic  Mouth/Throat: Oropharynx is clear and moist    Cardiovascular: Normal rate, regular rhythm and normal heart sounds  No murmur heard  Pulmonary/Chest: Effort normal and breath sounds normal    Abdominal: Bowel sounds are normal    Neurological: He is alert and oriented to person, place, and time  Skin: Skin is warm and dry  Multiple tattoos  Nursing note reviewed          Additional Data:     Labs:    Results from last 7 days   Lab Units 19  0626   WBC Thousand/uL 9 23   HEMOGLOBIN g/dL 11 2*   HEMATOCRIT % 38 4   PLATELETS Thousands/uL 235   NEUTROS PCT % 50   LYMPHS PCT % 25   MONOS PCT % 14*   EOS PCT % 8*     Results from last 7 days   Lab Units 19  0627   POTASSIUM mmol/L 4 0   CHLORIDE mmol/L 106   CO2 mmol/L 30   BUN mg/dL 26*   CREATININE mg/dL 0 98   CALCIUM mg/dL 8 9   ALK PHOS U/L 78   ALT U/L 25   AST U/L 11     Results from last 7 days Patient lives with: alone  - Type of home: apartment  - Assistance available: as needed  - Denies 2 or more falls or an unexplained fall with injury in the last year.  - Feel safe at home / work / school: yes      OBJECTIVE                                                                                                                    Hand Dominance: right-handed    Observation   Forward head, rounded shoulders     Range of Motion (ROM)   (degrees unless noted; active unless noted; norms in ( ); negative=lacking to 0, positive=beyond 0)  Cervical:    - Flexion (45-50): WFL    - Extension (45-60): WFL, pain    - Rotation (60-80):         Left: 60°         Right: 50° pain    - Side Bend (45):         Left: 42° Cervical L sidebend ROM: pull on right upper trapezius.         Right: 38°    Strength  (out of 5 unless noted, standard test position unless noted)   Shoulder:     - Flexion:          Left: 5          Right: 5     - Abduction:         Left: 4, pain         Right: 4, pain    - Internal Rotation:           Left (at 0°): 4+         Right (at 0°): 5    - External Rotation:          Left (at 0°): 4+         Right (at 0°): 5          Palpation  Left  - Cervical Paraspinals: tenderness  - Sub Occipitals: tenderness  - Upper Trapezius: tenderness and hypertonic  Right  - Cervical Paraspinals: tenderness  - Sub Occipitals: tenderness  - Levator Scapulae: tenderness  - Upper Trapezius: tenderness and hypertonic  Tender to palpation of nuchal line and suboccipitals     Muscle Flexibility  - Sub Occipital:  Left: moderate limitation  Right: moderate limitation  - Upper Trapezius: Left: moderate limitation  Right: moderate limitation    Special Tests  Cervical: Stability/Artery  - Pettman Distraction Test:  Left: negative Right: negative  - Vertebral Artery:  Left: negative Right: negative  Cervical: Nerve  - Compression in Sitting:  Left: negative Right: negative  - Distraction in Supine:  Left: negative Right:  Lab Units 02/25/19  0516   INR  1 35*       * I Have Reviewed All Lab Data Listed Above  * Additional Pertinent Lab Tests Reviewed: All Labs Within Last 24 Hours Reviewed    Imaging:  Imaging Reports Reviewed Today Include: Echocardiogram and stress test report  Recent Cultures (last 7 days):           Last 24 Hours Medication List:     Current Facility-Administered Medications:  albuterol 2 5 mg Nebulization Q6H PRN Kay Winkler MD   ALPRAZolam 0 25 mg Oral TID PRN Bahman Mcneil PA-C   amLODIPine 10 mg Oral Daily Savannah Y Swank, CRNP   apixaban 5 mg Oral BID Savannah Y Swank, CRNP   aspirin 81 mg Oral Daily Savannah Y Swank, CRNP   atorvastatin 40 mg Oral Daily With Dinner NATALIIA Lyn   butalbital-acetaminophen-caffeine 1 tablet Oral Q4H PRN Bahman Mcneil PA-C   cloNIDine 0 1 mg Oral Daily Saundra Y Swank, CRNP   docusate sodium 100 mg Oral BID Krzysztof Talamantes, DO   levalbuterol 1 25 mg Nebulization BID Kay Winkler MD   melatonin 6 mg Oral HS Saundra Y Swank, CRRUSS   metoprolol tartrate 25 mg Oral Q12H [address] Krzysztof Talamantes, DO   nicotine 1 patch Transdermal Daily Saundra Y Swank, NATALIIA   ondansetron 4 mg Intravenous Q4H PRN Bahman Mcneil PA-C   pantoprazole 40 mg Oral Early Morning Saundra Y Swank, CRNP   polyethylene glycol 17 g Oral Daily PRN Bahman Mcneil PA-C   pregabalin 300 mg Oral BID Saundra Y Swank, CRRUSS   ranolazine 1,000 mg Oral BID Saundra Y Swank, CRNP   sodium chloride 3 mL Nebulization BID Kay Winkler MD   tamsulosin 0 4 mg Oral Daily With Dinner Saundra Y Swank, CRRUSS   tiotropium 18 mcg Inhalation Daily NATALIIA Lyn        Today, Patient Was Seen By: Zee Dsouza PA-C    ** Please Note: Dragon 360 Dictation voice to text software may have been used in the creation of this document   ** negative             Outcome/Assessments  Outcome Measures:   Neck Disability Index (NDI): Neck Disability Index Score: 11  NDI Total Possible Score: 50  NDI Score Calculated: 22 %  (scored 0-100, higher score indicates higher disability) see flowsheet for additional documentation        Treatment     Therapeutic Exercise  Pt educated on evaluation findings, pathophysiology of current condition, prognosis, treatment options, plan of care, home exercises, and posture. Pt verbalizes understanding. Pt allowed to ask questions and it is felt these questions were answered during this session.      Skilled input: verbal instruction/cues    Writer verbally educated and received verbal consent for hand placement, positioning of patient, and techniques to be performed today from patient for hand placement and palpation for techniques and therapist position for techniques as described above and how they are pertinent to the patient's plan of care.  Home Exercise Program  Access Code: [email or web address]  URL: [email or web address] [email or web address]  Date: 09/13/2024  Prepared by: Krista Thomas    Exercises  - Seated Gentle Upper Trapezius Stretch  - 2 x daily - 7 x weekly - 2 sets - 30 sec  hold  - AAH: Kaushik Assisted Cervical Extension (Backward Bend) SNAG with Towel _Mobilization  - 1 x daily - 7 x weekly - 2 sets - 10 reps  - Seated Scapular Retraction  - 1 x daily - 7 x weekly - 3 sets - 10 reps  - Supine Chin Tuck  - 1 x daily - 7 x weekly - 2-3 sets - 10 reps - 3 sec hold      ASSESSMENT                                                                                                          23 year old patient has reported functional limitations listed above impacted by signs and symptoms consistent with treatment diagnosis below.  Treatment Diagnosis:   - Involved: cervical.  - Symptoms/impairments: increased pain/symptoms, impaired posture, impaired range of motion, impaired muscle length/flexibility, impaired  strength and impaired activity tolerance.    Prognosis: Patient will benefit from skilled therapy.  Rehabilitative potential is: good.  Predicted patient presentation: Low (stable) - Patient comorbidities and complexities, as defined above, will have little effect on progress for prescribed plan of care.  Education:   - Present and ready to learn: patient  - Results of above outlined education: Verbalizes understanding, Demonstrates understanding and Needs reinforcement    PLAN                                                                                                                         The following skilled interventions to be implemented to achieve goals listed below:  Neuromuscular Re-Education (12542)  Therapeutic Activity (78429)  Therapeutic Exercise (96177)  Manual Therapy (30819)  Electrical Stimulation Unattended (20906 or )  Electrical Stimulation Attended (42178)  Mechanical Traction-34558  Heat/Cold (51486)    Frequency / Duration  1 times per week tapering as patient progresses for 8 weeks for an estimated total of 8 visits    Patient involved in and agreed to plan of care and goals.  Patient has been given attendance policy at time of initial evaluation.    Suggestions for next session as indicated: Progress per plan of care, manual to bilateral upper trapezius and paraspinals, progress scapular strength, shoulder abduction strength     Goals  Long Term Goals: to be met by end of plan of care  1. Patient will be able to look behind to left and right 5/5 reps for looking behind for reversing and checking for blind spots while driving without pain/symptoms.   2. 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   3. Patient will demonstrate proper body mechanics for sitting and standing.    4. Patient will be independent with progressed and modified home  exercise program.    This note sent for referring provider signature        Therapy procedure time and total treatment time can be found documented on the Time Entry flowsheet

## (undated) DEVICE — LUBRICANT SURGILUBE TUBE 4 OZ  FLIP TOP

## (undated) DEVICE — ELEC QUIK COMBO

## (undated) DEVICE — SINGLE-USE POLYPECTOMY SNARE: Brand: SENSATION SHORT THROW

## (undated) DEVICE — THE LARIAT SNARE IS AN ELECTROSURGICAL DEVICE USED TO ENDOSCOPICALLY GRASP, DISSECT, AND TRANSECT TISSUE DURING GASTROINTESTINAL (GI) ENDOSCOPIC PROCEDURES.  THE SNARE CAN BE USED WITH OR WITHOUT THE USE OF MONOPOLAR DIATHERMIC ENERGY.: Brand: LARIAT

## (undated) DEVICE — THE EXACTO COLD SNARE IS INTENDED TO BE USED WITHOUT DIATHERMIC ENERGY FOR THE ENDOSCOPIC RESECTION OF POLYP TISSUE IN THE GASTROINTESTINAL TRACT.: Brand: EXACTO

## (undated) DEVICE — 2000CC GUARDIAN II: Brand: GUARDIAN

## (undated) DEVICE — TUBING SUCTION 5MM X 12 FT

## (undated) DEVICE — CONMED SCOPE SAVER BITE BLOCK, 20X27 MM: Brand: SCOPE SAVER